# Patient Record
Sex: MALE | Race: WHITE | NOT HISPANIC OR LATINO | Employment: OTHER | URBAN - METROPOLITAN AREA
[De-identification: names, ages, dates, MRNs, and addresses within clinical notes are randomized per-mention and may not be internally consistent; named-entity substitution may affect disease eponyms.]

---

## 2017-01-09 RX ORDER — AMLODIPINE BESYLATE 10 MG/1
TABLET ORAL
Qty: 90 TABLET | Refills: 1 | Status: SHIPPED | OUTPATIENT
Start: 2017-01-09 | End: 2017-07-12 | Stop reason: SDUPTHER

## 2017-02-15 RX ORDER — ATORVASTATIN CALCIUM 40 MG/1
TABLET, FILM COATED ORAL
Qty: 90 TABLET | Refills: 3 | Status: SHIPPED | OUTPATIENT
Start: 2017-02-15 | End: 2018-03-16 | Stop reason: SDUPTHER

## 2017-02-15 RX ORDER — POTASSIUM CHLORIDE 750 MG/1
CAPSULE, EXTENDED RELEASE ORAL
Qty: 120 CAPSULE | Refills: 5 | Status: SHIPPED | OUTPATIENT
Start: 2017-02-15 | End: 2017-09-14 | Stop reason: SDUPTHER

## 2017-03-03 DIAGNOSIS — E78.5 HYPERLIPIDEMIA, UNSPECIFIED HYPERLIPIDEMIA TYPE: Primary | ICD-10-CM

## 2017-03-03 DIAGNOSIS — E11.9 TYPE 2 DIABETES MELLITUS WITHOUT COMPLICATION, UNSPECIFIED LONG TERM INSULIN USE STATUS: ICD-10-CM

## 2017-03-06 LAB
ALBUMIN SERPL-MCNC: 4.5 G/DL (ref 3.5–5.2)
ALBUMIN/GLOB SERPL: 2 G/DL
ALP SERPL-CCNC: 79 U/L (ref 39–117)
ALT SERPL-CCNC: 43 U/L (ref 1–41)
AST SERPL-CCNC: 21 U/L (ref 1–40)
BILIRUB SERPL-MCNC: 0.6 MG/DL (ref 0.1–1.2)
BUN SERPL-MCNC: 21 MG/DL (ref 8–23)
BUN/CREAT SERPL: 26.3 (ref 7–25)
CALCIUM SERPL-MCNC: 9.4 MG/DL (ref 8.6–10.5)
CHLORIDE SERPL-SCNC: 101 MMOL/L (ref 98–107)
CHOLEST SERPL-MCNC: 164 MG/DL (ref 0–200)
CO2 SERPL-SCNC: 28.5 MMOL/L (ref 22–29)
CREAT SERPL-MCNC: 0.8 MG/DL (ref 0.76–1.27)
GLOBULIN SER CALC-MCNC: 2.3 GM/DL
GLUCOSE SERPL-MCNC: 158 MG/DL (ref 65–99)
HBA1C MFR BLD: 6.5 % (ref 4.8–5.6)
HDLC SERPL-MCNC: 42 MG/DL (ref 40–60)
LDLC SERPL CALC-MCNC: 98 MG/DL (ref 0–100)
LDLC/HDLC SERPL: 2.34 {RATIO}
POTASSIUM SERPL-SCNC: 3.8 MMOL/L (ref 3.5–5.2)
PROT SERPL-MCNC: 6.8 G/DL (ref 6–8.5)
SODIUM SERPL-SCNC: 145 MMOL/L (ref 136–145)
TRIGL SERPL-MCNC: 119 MG/DL (ref 0–150)
VLDLC SERPL CALC-MCNC: 23.8 MG/DL (ref 5–40)

## 2017-03-20 ENCOUNTER — OFFICE VISIT (OUTPATIENT)
Dept: FAMILY MEDICINE CLINIC | Facility: CLINIC | Age: 62
End: 2017-03-20

## 2017-03-20 VITALS
HEART RATE: 65 BPM | RESPIRATION RATE: 16 BRPM | HEIGHT: 68 IN | DIASTOLIC BLOOD PRESSURE: 70 MMHG | BODY MASS INDEX: 35.46 KG/M2 | OXYGEN SATURATION: 98 % | TEMPERATURE: 97.7 F | SYSTOLIC BLOOD PRESSURE: 132 MMHG | WEIGHT: 234 LBS

## 2017-03-20 DIAGNOSIS — E11.9 TYPE 2 DIABETES MELLITUS WITHOUT COMPLICATION, WITHOUT LONG-TERM CURRENT USE OF INSULIN (HCC): Primary | ICD-10-CM

## 2017-03-20 DIAGNOSIS — E78.00 HYPERCHOLESTEROLEMIA: ICD-10-CM

## 2017-03-20 DIAGNOSIS — I10 ESSENTIAL HYPERTENSION: ICD-10-CM

## 2017-03-20 PROCEDURE — 99213 OFFICE O/P EST LOW 20 MIN: CPT

## 2017-03-20 NOTE — PROGRESS NOTES
Ankush Ram is a 61 y.o. male. Patient is here today for   Chief Complaint   Patient presents with   • Diabetes   • Hyperlipidemia          Vitals:    03/20/17 0752   BP: 132/70   Pulse: 65   Resp: 16   Temp: 97.7 °F (36.5 °C)   SpO2: 98%     The following portions of the patient's history were reviewed and updated as appropriate: allergies, current medications, past family history, past medical history, past social history, past surgical history and problem list.    Past Medical History   Diagnosis Date   • Arthritis    • Diabetes mellitus    • Hyperlipidemia    • Hypertension    • Second degree AV block    • Sinus node dysfunction       No Known Allergies   Social History     Social History   • Marital status:      Spouse name: N/A   • Number of children: N/A   • Years of education: N/A     Occupational History   • Not on file.     Social History Main Topics   • Smoking status: Former Smoker     Quit date: 10/22/2011   • Smokeless tobacco: Not on file   • Alcohol use Yes      Comment: SOCIAL   • Drug use: Not on file   • Sexual activity: Not on file     Other Topics Concern   • Not on file     Social History Narrative   • No narrative on file        Current Outpatient Prescriptions:   •  amLODIPine (NORVASC) 10 MG tablet, TAKE 1 TABLET BY MOUTH EVERY DAY, Disp: 90 tablet, Rfl: 1  •  aspirin 325 MG tablet, Take  by mouth daily., Disp: , Rfl:   •  atorvastatin (LIPITOR) 40 MG tablet, TAKE 1 TABLET BY MOUTH EVERY DAY, Disp: 90 tablet, Rfl: 3  •  carvedilol (COREG) 25 MG tablet, TAKE 2 TABLETS TWICE A DAY, Disp: 360 tablet, Rfl: 3  •  hydrALAZINE (APRESOLINE) 25 MG tablet, Take  by mouth 2 (two) times a day., Disp: , Rfl:   •  metFORMIN XR (GLUCOPHATE-XR) 500 MG 24 hr tablet, Take  by mouth., Disp: , Rfl:   •  potassium chloride (MICRO-K) 10 MEQ CR capsule, TAKE 2 CAPSULES BY MOUTH TWICE A DAY, Disp: 120 capsule, Rfl: 5  •  valsartan-hydrochlorothiazide (DIOVAN-HCT) 320-25 MG per tablet, Take  by  mouth daily., Disp: , Rfl:      Objective     History of Present Illness   The patient is here today for follow-up on type 2 diabetes mellitus, essential hypertension, and hyperlipidemia.    Review of Systems   Constitutional:        Mild fatigue   HENT: Negative.    Respiratory: Negative for cough, shortness of breath and wheezing.    Cardiovascular: Negative for chest pain, palpitations and leg swelling.   Gastrointestinal: Negative for abdominal pain and constipation.   Genitourinary: Negative.    Musculoskeletal:        Patient has osteoarthritic aches and pains in multiple sites including the upper back and neck area   Hematological: Negative.    Psychiatric/Behavioral: Negative.        Physical Exam   Constitutional: He is oriented to person, place, and time. He appears well-developed and well-nourished.   Moderately overweight   Neck:   Carotid pulses normal   Cardiovascular: Normal rate, regular rhythm and normal heart sounds.    Pulmonary/Chest: Effort normal and breath sounds normal. No respiratory distress. He has no wheezes. He has no rales.   Abdominal: Soft. Bowel sounds are normal.   Musculoskeletal:   Osteoarthritic changes in multiple joints   Neurological: He is alert and oriented to person, place, and time.   Skin: Skin is warm and dry.   Psychiatric: He has a normal mood and affect.   Nursing note and vitals reviewed.      ASSESSMENT  #1 type 2 diabetes mellitus          #2 essential hypertension        #3 hypercholesterolemia    DISCUSSION/SUMMARY   The patient's vital signs are normal today.  CMP showed an elevated fasting blood sugar 158 and slightly elevated ALT of 43.  The patient's hemoglobin A1c was 6.5%, up from 6.4% on last visit.  The patient states that his diet is fair and he was encouraged to stay on a low sugar and low starch diet.  Total cholesterol is 164, triglycerides 119, HDL cholesterol 42, and LDL cholesterol is 98.  Other than his osteoarthritic aches and pains the patient  is doing well.  I will see him again in 6 months.  He will continue his present medications.    PLAN  Recheck 6 months with fasting CMP, lipid panel, HbA1c and PSA.  No Follow-up on file.

## 2017-03-22 RX ORDER — CARVEDILOL 25 MG/1
TABLET ORAL
Qty: 360 TABLET | Refills: 3 | Status: SHIPPED | OUTPATIENT
Start: 2017-03-22 | End: 2018-03-23 | Stop reason: SDUPTHER

## 2017-04-22 VITALS
SYSTOLIC BLOOD PRESSURE: 152 MMHG | RESPIRATION RATE: 20 BRPM | OXYGEN SATURATION: 96 % | DIASTOLIC BLOOD PRESSURE: 96 MMHG | BODY MASS INDEX: 34.86 KG/M2 | TEMPERATURE: 97.6 F | WEIGHT: 230 LBS | HEIGHT: 68 IN | HEART RATE: 64 BPM

## 2017-04-22 PROCEDURE — 99283 EMERGENCY DEPT VISIT LOW MDM: CPT

## 2017-04-23 ENCOUNTER — APPOINTMENT (OUTPATIENT)
Dept: CT IMAGING | Facility: HOSPITAL | Age: 62
End: 2017-04-23

## 2017-04-23 ENCOUNTER — HOSPITAL ENCOUNTER (EMERGENCY)
Facility: HOSPITAL | Age: 62
Discharge: HOME | End: 2017-04-23
Attending: EMERGENCY MEDICINE | Admitting: EMERGENCY MEDICINE

## 2017-04-23 DIAGNOSIS — S76.211A GROIN STRAIN, RIGHT, INITIAL ENCOUNTER: Primary | ICD-10-CM

## 2017-04-23 LAB
ALBUMIN SERPL-MCNC: 3.7 G/DL (ref 3.5–5.2)
ALBUMIN/GLOB SERPL: 1.2 G/DL
ALP SERPL-CCNC: 59 U/L (ref 39–117)
ALT SERPL W P-5'-P-CCNC: 18 U/L (ref 1–41)
ANION GAP SERPL CALCULATED.3IONS-SCNC: 15.3 MMOL/L
AST SERPL-CCNC: 9 U/L (ref 1–40)
BACTERIA UR QL AUTO: ABNORMAL /HPF
BASOPHILS # BLD AUTO: 0.01 10*3/MM3 (ref 0–0.2)
BASOPHILS NFR BLD AUTO: 0.1 % (ref 0–1.5)
BILIRUB SERPL-MCNC: 0.4 MG/DL (ref 0.1–1.2)
BILIRUB UR QL STRIP: NEGATIVE
BUN BLD-MCNC: 21 MG/DL (ref 8–23)
BUN/CREAT SERPL: 29.6 (ref 7–25)
CALCIUM SPEC-SCNC: 9 MG/DL (ref 8.6–10.5)
CHLORIDE SERPL-SCNC: 101 MMOL/L (ref 98–107)
CLARITY UR: CLEAR
CO2 SERPL-SCNC: 25.7 MMOL/L (ref 22–29)
COLOR UR: YELLOW
CREAT BLD-MCNC: 0.71 MG/DL (ref 0.76–1.27)
DEPRECATED RDW RBC AUTO: 46.1 FL (ref 37–54)
EOSINOPHIL # BLD AUTO: 0.02 10*3/MM3 (ref 0–0.7)
EOSINOPHIL NFR BLD AUTO: 0.2 % (ref 0.3–6.2)
ERYTHROCYTE [DISTWIDTH] IN BLOOD BY AUTOMATED COUNT: 14.4 % (ref 11.5–14.5)
GFR SERPL CREATININE-BSD FRML MDRD: 112 ML/MIN/1.73
GLOBULIN UR ELPH-MCNC: 3 GM/DL
GLUCOSE BLD-MCNC: 160 MG/DL (ref 65–99)
GLUCOSE UR STRIP-MCNC: NEGATIVE MG/DL
HCT VFR BLD AUTO: 41.2 % (ref 40.4–52.2)
HGB BLD-MCNC: 14 G/DL (ref 13.7–17.6)
HGB UR QL STRIP.AUTO: NEGATIVE
HYALINE CASTS UR QL AUTO: ABNORMAL /LPF
IMM GRANULOCYTES # BLD: 0.02 10*3/MM3 (ref 0–0.03)
IMM GRANULOCYTES NFR BLD: 0.2 % (ref 0–0.5)
KETONES UR QL STRIP: NEGATIVE
LEUKOCYTE ESTERASE UR QL STRIP.AUTO: NEGATIVE
LIPASE SERPL-CCNC: 19 U/L (ref 13–60)
LYMPHOCYTES # BLD AUTO: 1.82 10*3/MM3 (ref 0.9–4.8)
LYMPHOCYTES NFR BLD AUTO: 16 % (ref 19.6–45.3)
MCH RBC QN AUTO: 29.8 PG (ref 27–32.7)
MCHC RBC AUTO-ENTMCNC: 34 G/DL (ref 32.6–36.4)
MCV RBC AUTO: 87.7 FL (ref 79.8–96.2)
MONOCYTES # BLD AUTO: 0.87 10*3/MM3 (ref 0.2–1.2)
MONOCYTES NFR BLD AUTO: 7.6 % (ref 5–12)
NEUTROPHILS # BLD AUTO: 8.66 10*3/MM3 (ref 1.9–8.1)
NEUTROPHILS NFR BLD AUTO: 75.9 % (ref 42.7–76)
NITRITE UR QL STRIP: NEGATIVE
PH UR STRIP.AUTO: 6 [PH] (ref 5–8)
PLATELET # BLD AUTO: 268 10*3/MM3 (ref 140–500)
PMV BLD AUTO: 10.7 FL (ref 6–12)
POTASSIUM BLD-SCNC: 3.3 MMOL/L (ref 3.5–5.2)
PROT SERPL-MCNC: 6.7 G/DL (ref 6–8.5)
PROT UR QL STRIP: ABNORMAL
RBC # BLD AUTO: 4.7 10*6/MM3 (ref 4.6–6)
RBC # UR: ABNORMAL /HPF
REF LAB TEST METHOD: ABNORMAL
SODIUM BLD-SCNC: 142 MMOL/L (ref 136–145)
SP GR UR STRIP: 1.02 (ref 1–1.03)
SQUAMOUS #/AREA URNS HPF: ABNORMAL /HPF
TROPONIN T SERPL-MCNC: <0.01 NG/ML (ref 0–0.03)
UROBILINOGEN UR QL STRIP: ABNORMAL
WBC NRBC COR # BLD: 11.4 10*3/MM3 (ref 4.5–10.7)
WBC UR QL AUTO: ABNORMAL /HPF

## 2017-04-23 PROCEDURE — 96374 THER/PROPH/DIAG INJ IV PUSH: CPT

## 2017-04-23 PROCEDURE — 96375 TX/PRO/DX INJ NEW DRUG ADDON: CPT

## 2017-04-23 PROCEDURE — 74176 CT ABD & PELVIS W/O CONTRAST: CPT

## 2017-04-23 PROCEDURE — 83690 ASSAY OF LIPASE: CPT | Performed by: PHYSICIAN ASSISTANT

## 2017-04-23 PROCEDURE — 80053 COMPREHEN METABOLIC PANEL: CPT | Performed by: PHYSICIAN ASSISTANT

## 2017-04-23 PROCEDURE — 36415 COLL VENOUS BLD VENIPUNCTURE: CPT

## 2017-04-23 PROCEDURE — 84484 ASSAY OF TROPONIN QUANT: CPT | Performed by: PHYSICIAN ASSISTANT

## 2017-04-23 PROCEDURE — 81001 URINALYSIS AUTO W/SCOPE: CPT | Performed by: PHYSICIAN ASSISTANT

## 2017-04-23 PROCEDURE — 25010000002 ONDANSETRON PER 1 MG: Performed by: PHYSICIAN ASSISTANT

## 2017-04-23 PROCEDURE — 25010000002 MORPHINE PER 10 MG: Performed by: EMERGENCY MEDICINE

## 2017-04-23 PROCEDURE — 85025 COMPLETE CBC W/AUTO DIFF WBC: CPT | Performed by: PHYSICIAN ASSISTANT

## 2017-04-23 RX ORDER — HYDROCODONE BITARTRATE AND ACETAMINOPHEN 7.5; 325 MG/1; MG/1
1 TABLET ORAL EVERY 6 HOURS PRN
Qty: 16 TABLET | Refills: 0 | Status: SHIPPED | OUTPATIENT
Start: 2017-04-23 | End: 2017-11-13

## 2017-04-23 RX ORDER — SODIUM CHLORIDE 0.9 % (FLUSH) 0.9 %
10 SYRINGE (ML) INJECTION AS NEEDED
Status: DISCONTINUED | OUTPATIENT
Start: 2017-04-23 | End: 2017-04-23 | Stop reason: HOSPADM

## 2017-04-23 RX ORDER — METHOCARBAMOL 500 MG/1
1000 TABLET, FILM COATED ORAL 4 TIMES DAILY
Qty: 40 TABLET | Refills: 0 | Status: SHIPPED | OUTPATIENT
Start: 2017-04-23 | End: 2017-11-13

## 2017-04-23 RX ORDER — ONDANSETRON 2 MG/ML
4 INJECTION INTRAMUSCULAR; INTRAVENOUS ONCE
Status: COMPLETED | OUTPATIENT
Start: 2017-04-23 | End: 2017-04-23

## 2017-04-23 RX ADMIN — MORPHINE SULFATE 4 MG: 4 INJECTION, SOLUTION INTRAMUSCULAR; INTRAVENOUS at 04:12

## 2017-04-23 RX ADMIN — ONDANSETRON 4 MG: 2 INJECTION INTRAMUSCULAR; INTRAVENOUS at 04:12

## 2017-04-23 NOTE — ED NOTES
Patient reports lower back pain since Wednesday evening. Patient states the pain felt like cramps that wrapped around to his groin. Patient states the muscles in his groin were bothering him so much that he started using crutches to walk because he was unable to lift feet off the ground. Patient states on the way here he began belching and feeling nauseated.        Barbara Robin RN  04/22/17 6096

## 2017-04-23 NOTE — DISCHARGE INSTRUCTIONS
Home, rest, medicine as prescribed, follow up with PCP for recheck. Return to care with further concerns.

## 2017-04-23 NOTE — ED PROVIDER NOTES
EMERGENCY DEPARTMENT ENCOUNTER    CHIEF COMPLAINT  Chief Complaint: back pain  History given by: patient   History limited by: n/a  Room Number: 01/01  PMD: Steffen Flores MD      HPI:  Pt is a 62 y.o. male who presents complaining of lower back pain that began 3 days ago after spending an extended amount of time on a ladder. Pt states that the pain initially felt like muscle spasms, but currently is a burning pain. Pt states that the pain seemed to radiate from his tailbone to his right groin. Pt reports difficulty ambulating secondary to the pain, and states that he feels unable to lift his feet. Pt also complains of belching and nausea that began tonight. He denies CP, SOA, V/D, incontinence, or any other sx.     Duration:  3 days   Onset: gradual  Timing: constant   Location: lower back  Radiation: right groin  Quality: burning pain  Intensity/Severity: moderate   Progression: worsening  Associated Symptoms: nausea, belching  Aggravating Factors: movement  Alleviating Factors: none  Previous Episodes: none  Treatment before arrival: none    PAST MEDICAL HISTORY  Active Ambulatory Problems     Diagnosis Date Noted   • Hypercholesterolemia 02/01/2016   • Hypertension 02/01/2016   • Type 2 diabetes mellitus 02/01/2016     Resolved Ambulatory Problems     Diagnosis Date Noted   • No Resolved Ambulatory Problems     Past Medical History:   Diagnosis Date   • Arthritis    • Diabetes mellitus    • Hyperlipidemia    • Hypertension    • Second degree AV block    • Sinus node dysfunction        PAST SURGICAL HISTORY  Past Surgical History:   Procedure Laterality Date   • KNEE SURGERY     • PACEMAKER IMPLANTATION         FAMILY HISTORY  Family History   Problem Relation Age of Onset   • Diabetes Mother    • Stroke Father    • Heart disease Father    • Stroke Sister    • Heart disease Brother        SOCIAL HISTORY  Social History     Social History   • Marital status:      Spouse name: N/A   • Number of  children: N/A   • Years of education: N/A     Occupational History   • Not on file.     Social History Main Topics   • Smoking status: Former Smoker     Quit date: 10/22/2011   • Smokeless tobacco: Not on file   • Alcohol use Yes      Comment: SOCIAL   • Drug use: Not on file   • Sexual activity: Not on file     Other Topics Concern   • Not on file     Social History Narrative       ALLERGIES  Review of patient's allergies indicates no known allergies.    REVIEW OF SYSTEMS  Review of Systems   Constitutional: Negative for chills and fever.   HENT: Negative for congestion and sore throat.    Eyes: Negative.    Respiratory: Negative for cough and shortness of breath.    Cardiovascular: Negative for chest pain and leg swelling.   Gastrointestinal: Positive for nausea. Negative for abdominal pain, diarrhea and vomiting.   Genitourinary: Negative for difficulty urinating and dysuria.   Musculoskeletal: Positive for back pain. Negative for neck pain.   Skin: Negative for rash and wound.   Allergic/Immunologic: Negative.    Neurological: Negative for dizziness, weakness, numbness and headaches.   Psychiatric/Behavioral: Negative.    All other systems reviewed and are negative.      PHYSICAL EXAM  ED Triage Vitals   Temp Heart Rate Resp BP SpO2   04/22/17 2209 04/22/17 2209 04/22/17 2218 04/22/17 2218 04/22/17 2209   97.6 °F (36.4 °C) 64 20 152/96 96 %      Temp src Heart Rate Source Patient Position BP Location FiO2 (%)   -- 04/22/17 2209 04/22/17 2218 04/22/17 2218 --    Monitor Sitting Right arm        Physical Exam   Constitutional: He is oriented to person, place, and time and well-developed, well-nourished, and in no distress.   HENT:   Head: Normocephalic and atraumatic.   Eyes: EOM are normal. Pupils are equal, round, and reactive to light.   Neck: Normal range of motion. Neck supple.   Cardiovascular: Normal rate, regular rhythm, normal heart sounds and intact distal pulses.    Pulmonary/Chest: Effort normal and  breath sounds normal. No respiratory distress.   Abdominal: Soft. There is no tenderness. There is no rebound and no guarding.   Musculoskeletal: Normal range of motion. He exhibits edema (1+ BLE).   Tenderness to the r groin. Pain with r hip flexion  Normal distal pulses  Mild Left lower back tenderness, no midline tenderness.    Neurological: He is alert and oriented to person, place, and time. He has normal sensation and normal strength.   Skin: Skin is warm and dry. No rash noted.   Psychiatric: Mood and affect normal.   Nursing note and vitals reviewed.      LAB RESULTS  Lab Results (last 24 hours)     Procedure Component Value Units Date/Time    CBC & Differential [[id number]] Collected:  04/23/17 0354    Specimen:  Blood Updated:  04/23/17 0413    Narrative:       The following orders were created for panel order CBC & Differential.  Procedure                               Abnormality         Status                     ---------                               -----------         ------                     CBC Auto Differential[[id number]]         Abnormal            Final result                 Please view results for these tests on the individual orders.    Comprehensive Metabolic Panel [[id number]]  (Abnormal) Collected:  04/23/17 0354    Specimen:  Blood from Arm, Right Updated:  04/23/17 0426     Glucose 160 (H) mg/dL      BUN 21 mg/dL      Creatinine 0.71 (L) mg/dL      Sodium 142 mmol/L      Potassium 3.3 (L) mmol/L      Chloride 101 mmol/L      CO2 25.7 mmol/L      Calcium 9.0 mg/dL      Total Protein 6.7 g/dL      Albumin 3.70 g/dL      ALT (SGPT) 18 U/L      AST (SGOT) 9 U/L      Alkaline Phosphatase 59 U/L      Total Bilirubin 0.4 mg/dL      eGFR Non African Amer 112 mL/min/1.73      Globulin 3.0 gm/dL      A/G Ratio 1.2 g/dL      BUN/Creatinine Ratio 29.6 (H)     Anion Gap 15.3 mmol/L     Lipase [[id number]]  (Normal) Collected:  04/23/17 0354    Specimen:  Blood from Arm, Right Updated:  04/23/17 0426      Lipase 19 U/L     Troponin [[id number]]  (Normal) Collected:  04/23/17 0354    Specimen:  Blood from Arm, Right Updated:  04/23/17 0426     Troponin T <0.010 ng/mL     Narrative:       Troponin T Reference Ranges:  Less than 0.03 ng/mL:    Negative for AMI  0.03 to 0.09 ng/mL:      Indeterminant for AMI  Greater than 0.09 ng/mL: Positive for AMI    CBC Auto Differential [[id number]]  (Abnormal) Collected:  04/23/17 0354    Specimen:  Blood from Arm, Right Updated:  04/23/17 0413     WBC 11.40 (H) 10*3/mm3      RBC 4.70 10*6/mm3      Hemoglobin 14.0 g/dL      Hematocrit 41.2 %      MCV 87.7 fL      MCH 29.8 pg      MCHC 34.0 g/dL      RDW 14.4 %      RDW-SD 46.1 fl      MPV 10.7 fL      Platelets 268 10*3/mm3      Neutrophil % 75.9 %      Lymphocyte % 16.0 (L) %      Monocyte % 7.6 %      Eosinophil % 0.2 (L) %      Basophil % 0.1 %      Immature Grans % 0.2 %      Neutrophils, Absolute 8.66 (H) 10*3/mm3      Lymphocytes, Absolute 1.82 10*3/mm3      Monocytes, Absolute 0.87 10*3/mm3      Eosinophils, Absolute 0.02 10*3/mm3      Basophils, Absolute 0.01 10*3/mm3      Immature Grans, Absolute 0.02 10*3/mm3     Urinalysis With / Culture If Indicated [[id number]]  (Abnormal) Collected:  04/23/17 0438    Specimen:  Urine from Urine, Clean Catch Updated:  04/23/17 0454     Color, UA Yellow     Appearance, UA Clear     pH, UA 6.0     Specific Gravity, UA 1.023     Glucose, UA Negative     Ketones, UA Negative     Bilirubin, UA Negative     Blood, UA Negative     Protein,  mg/dL (2+) (A)     Leuk Esterase, UA Negative     Nitrite, UA Negative     Urobilinogen, UA 0.2 E.U./dL    Urinalysis, Microscopic Only [[id number]]  (Abnormal) Collected:  04/23/17 0438    Specimen:  Urine from Urine, Clean Catch Updated:  04/23/17 0454     RBC, UA 3-5 (A) /HPF      WBC, UA 0-2 /HPF      Bacteria, UA None Seen /HPF      Squamous Epithelial Cells, UA 0-2 /HPF      Hyaline Casts, UA 0-2 /LPF      Methodology Automated Microscopy          I  ordered the above labs and reviewed the results    RADIOLOGY  CT Abdomen Pelvis Without Contrast   Final Result      CT abd/pelvis shows   FINDINGS:  1. Bilateral nonobstructing kidney stones.  2. Bilateral benign renal cysts measuring up to 10.2 cm on the left and  5.3 cm on the right.  3. The appendix is normal and there is no obstruction, free air nor  dilatation of bowel.  4. The liver, gallbladder, biliary system, spleen, pancreas and adrenal  glands are normal.  5. Degenerative changes lumbar spine with relative canal stenosis L3-L4.    I ordered the above noted radiological studies. Interpreted by radiologist. Reviewed by me in PACS.       PROCEDURES  Procedures      PROGRESS AND CONSULTS  ED Course     03:26  Labs and CT abd/pelvis ordered for further evaluation. Morphine and Zofran ordered to treat pain and nausea.     04:36  BP- 152/96 HR- 64 Temp- 97.6 °F (36.4 °C) O2 sat- 96%  Rechecked the patient who is in NAD and is resting comfortably. Pt states that he feels better after the medication. Awaiting the urine results. Pt understands and agrees with the plan, all questions answered.    04:59  Discussed pt with Dr. Frazier who agrees with plan of care.     05:00  BP- 152/96 HR- 64 Temp- 97.6 °F (36.4 °C) O2 sat- 96%  Rechecked the patient who is in NAD and is resting comfortably. Advised pt that the workup in the ED shows NAD. Sx sx are musculoskeletal in nature. Pt will be discharged. Pt understands and agrees with the plan, all questions answered.    MEDICAL DECISION MAKING  Results were reviewed/discussed with the patient and they were also made aware of online access. Pt also made aware that some labs, such as cultures, will not be resulted during ER visit and follow up with PMD is necessary.     MDM  Number of Diagnoses or Management Options     Amount and/or Complexity of Data Reviewed  Clinical lab tests: reviewed and ordered (Urinalysis- RBC 3-5, WBC 0-2, Bacteria none seen  )  Tests in the  radiology section of CPT®: ordered and reviewed (CT abd/pelvis shows   1. Bilateral nonobstructing kidney stones.  2. Bilateral benign renal cysts measuring up to 10.2 cm on the left and  5.3 cm on the right.  3. The appendix is normal and there is no obstruction, free air nor  dilatation of bowel.  4. The liver, gallbladder, biliary system, spleen, pancreas and adrenal  glands are normal.  5. Degenerative changes lumbar spine with relative canal stenosis L3-L4.)  Decide to obtain previous medical records or to obtain history from someone other than the patient: yes    Patient Progress  Patient progress: stable         DIAGNOSIS  Final diagnoses:   Groin strain, right, initial encounter       DISPOSITION  DISCHARGE    Patient discharged in stable condition.    Reviewed implications of results, diagnosis, meds, responsibility to follow up, warning signs and symptoms of possible worsening, potential complications and reasons to return to ER.    Patient/Family voiced understanding of above instructions.    Discussed plan for discharge, as there is no emergent indication for admission.  Pt/family is agreeable and understands need for follow up and repeat testing.  Pt is aware that discharge does not mean that nothing is wrong but it indicates no emergency is present that requires admission and they must continue care with follow-up as given below or physician of their choice.     FOLLOW-UP  Steffen Flores MD  [id number] [address]  [phone]    Schedule an appointment as soon as possible for a visit in 3 days           Medication List      New Prescriptions          HYDROcodone-acetaminophen 7.5-325 MG per tablet   Commonly known as:  NORCO   Take 1 tablet by mouth Every 6 (Six) Hours As Needed for Moderate Pain   (4-6).       methocarbamol 500 MG tablet   Commonly known as:  ROBAXIN   Take 2 tablets by mouth 4 (Four) Times a Day.         Stop          metFORMIN  MG 24 hr tablet   Commonly  known as:  GLUCOPHAGE-XR           Latest Documented Vital Signs:  As of 5:11 AM  BP- 152/96 HR- 64 Temp- 97.6 °F (36.4 °C) O2 sat- 96%    --  Documentation assistance provided by bret Vega for Buster Webber PA-C.  Information recorded by the scribtimmy was done at my direction and has been verified and validated by me.          Lali Vega  04/23/17 0507       RENEE Msihra  04/23/17 0511

## 2017-04-25 ENCOUNTER — TELEPHONE (OUTPATIENT)
Dept: SOCIAL WORK | Facility: HOSPITAL | Age: 62
End: 2017-04-25

## 2017-04-25 NOTE — TELEPHONE ENCOUNTER
ED follow-up phone call. States he is taking prescribed meds and feeling better. Reminded to f/u w/ PCP. No questions/concerns

## 2017-07-12 RX ORDER — AMLODIPINE BESYLATE 10 MG/1
TABLET ORAL
Qty: 90 TABLET | Refills: 1 | Status: SHIPPED | OUTPATIENT
Start: 2017-07-12 | End: 2018-01-07 | Stop reason: SDUPTHER

## 2017-09-14 RX ORDER — POTASSIUM CHLORIDE 750 MG/1
CAPSULE, EXTENDED RELEASE ORAL
Qty: 120 CAPSULE | Refills: 5 | Status: SHIPPED | OUTPATIENT
Start: 2017-09-14 | End: 2018-03-12 | Stop reason: SDUPTHER

## 2017-10-31 DIAGNOSIS — E11.9 TYPE 2 DIABETES MELLITUS WITHOUT COMPLICATION, UNSPECIFIED LONG TERM INSULIN USE STATUS: ICD-10-CM

## 2017-10-31 DIAGNOSIS — Z12.5 SPECIAL SCREENING FOR MALIGNANT NEOPLASM OF PROSTATE: ICD-10-CM

## 2017-10-31 DIAGNOSIS — E78.00 HYPERCHOLESTEROLEMIA: ICD-10-CM

## 2017-10-31 DIAGNOSIS — I10 ESSENTIAL HYPERTENSION: Primary | ICD-10-CM

## 2017-11-01 LAB
ALBUMIN SERPL-MCNC: 4.3 G/DL (ref 3.5–5.2)
ALBUMIN/GLOB SERPL: 1.8 G/DL
ALP SERPL-CCNC: 78 U/L (ref 39–117)
ALT SERPL-CCNC: 27 U/L (ref 1–41)
AST SERPL-CCNC: 13 U/L (ref 1–40)
BILIRUB SERPL-MCNC: 0.6 MG/DL (ref 0.1–1.2)
BUN SERPL-MCNC: 20 MG/DL (ref 8–23)
BUN/CREAT SERPL: 25.6 (ref 7–25)
CALCIUM SERPL-MCNC: 9.4 MG/DL (ref 8.6–10.5)
CHLORIDE SERPL-SCNC: 100 MMOL/L (ref 98–107)
CHOLEST SERPL-MCNC: 156 MG/DL (ref 0–200)
CO2 SERPL-SCNC: 27.7 MMOL/L (ref 22–29)
CREAT SERPL-MCNC: 0.78 MG/DL (ref 0.76–1.27)
GFR SERPLBLD CREATININE-BSD FMLA CKD-EPI: 101 ML/MIN/1.73
GFR SERPLBLD CREATININE-BSD FMLA CKD-EPI: 122 ML/MIN/1.73
GLOBULIN SER CALC-MCNC: 2.4 GM/DL
GLUCOSE SERPL-MCNC: 143 MG/DL (ref 65–99)
HBA1C MFR BLD: 6.49 % (ref 4.8–5.6)
HDLC SERPL-MCNC: 46 MG/DL (ref 40–60)
LDLC SERPL CALC-MCNC: 91 MG/DL (ref 0–100)
LDLC/HDLC SERPL: 1.98 {RATIO}
POTASSIUM SERPL-SCNC: 3.8 MMOL/L (ref 3.5–5.2)
PROT SERPL-MCNC: 6.7 G/DL (ref 6–8.5)
PSA SERPL-MCNC: 2.33 NG/ML (ref 0–4)
SODIUM SERPL-SCNC: 144 MMOL/L (ref 136–145)
TRIGL SERPL-MCNC: 95 MG/DL (ref 0–150)
VLDLC SERPL CALC-MCNC: 19 MG/DL (ref 5–40)

## 2017-11-13 ENCOUNTER — OFFICE VISIT (OUTPATIENT)
Dept: FAMILY MEDICINE CLINIC | Facility: CLINIC | Age: 62
End: 2017-11-13

## 2017-11-13 VITALS
SYSTOLIC BLOOD PRESSURE: 158 MMHG | DIASTOLIC BLOOD PRESSURE: 80 MMHG | OXYGEN SATURATION: 97 % | RESPIRATION RATE: 18 BRPM | TEMPERATURE: 97.9 F | HEIGHT: 68 IN | BODY MASS INDEX: 37.44 KG/M2 | WEIGHT: 247 LBS | HEART RATE: 68 BPM

## 2017-11-13 DIAGNOSIS — E78.00 HYPERCHOLESTEROLEMIA: ICD-10-CM

## 2017-11-13 DIAGNOSIS — I10 ESSENTIAL HYPERTENSION: ICD-10-CM

## 2017-11-13 DIAGNOSIS — E11.9 TYPE 2 DIABETES MELLITUS WITHOUT COMPLICATION, WITHOUT LONG-TERM CURRENT USE OF INSULIN (HCC): Primary | ICD-10-CM

## 2017-11-13 PROCEDURE — 99213 OFFICE O/P EST LOW 20 MIN: CPT

## 2017-11-13 NOTE — PROGRESS NOTES
Ankush Ram is a 62 y.o. male. Patient is here today for   Chief Complaint   Patient presents with   • Diabetes   • Hyperlipidemia   • Hypertension          Vitals:    11/13/17 0954   BP: 158/80   Pulse: 68   Resp: 18   Temp: 97.9 °F (36.6 °C)   SpO2: 97%     The following portions of the patient's history were reviewed and updated as appropriate: allergies, current medications, past family history, past medical history, past social history, past surgical history and problem list.    Past Medical History:   Diagnosis Date   • Arthritis    • Diabetes mellitus    • Hyperlipidemia    • Hypertension    • Second degree AV block    • Sinus node dysfunction       No Known Allergies   Social History     Social History   • Marital status:      Spouse name: N/A   • Number of children: N/A   • Years of education: N/A     Occupational History   • Not on file.     Social History Main Topics   • Smoking status: Former Smoker     Quit date: 10/22/2011   • Smokeless tobacco: Not on file   • Alcohol use Yes      Comment: SOCIAL   • Drug use: Not on file   • Sexual activity: Not on file     Other Topics Concern   • Not on file     Social History Narrative        Current Outpatient Prescriptions:   •  amLODIPine (NORVASC) 10 MG tablet, TAKE 1 TABLET BY MOUTH EVERY DAY, Disp: 90 tablet, Rfl: 1  •  aspirin 325 MG tablet, Take  by mouth daily., Disp: , Rfl:   •  atorvastatin (LIPITOR) 40 MG tablet, TAKE 1 TABLET BY MOUTH EVERY DAY, Disp: 90 tablet, Rfl: 3  •  carvedilol (COREG) 25 MG tablet, TAKE 2 TABLETS BY MOUTH TWICE A DAY, Disp: 360 tablet, Rfl: 3  •  hydrALAZINE (APRESOLINE) 25 MG tablet, Take  by mouth 2 (two) times a day., Disp: , Rfl:   •  metFORMIN XR (GLUCOPHATE-XR) 500 MG 24 hr tablet, Take  by mouth., Disp: , Rfl:   •  potassium chloride (MICRO-K) 10 MEQ CR capsule, TAKE 2 CAPSULES BY MOUTH TWICE A DAY, Disp: 120 capsule, Rfl: 5  •  valsartan-hydrochlorothiazide (DIOVAN-HCT) 320-25 MG per tablet, Take   by mouth daily., Disp: , Rfl:      Objective     History of Present Illness    The patient is here today for follow-up on type 2 diabetes mellitus, essential hypertension, and hypercholesterolemia    Review of Systems   Constitutional: Negative.    HENT: Negative.    Respiratory: Negative for cough, shortness of breath and wheezing.    Cardiovascular: Negative for chest pain, palpitations and leg swelling.   Gastrointestinal: Negative for abdominal pain, blood in stool, constipation and diarrhea.   Genitourinary: Negative.    Musculoskeletal:        The patient strained his low back and groin muscles several months ago in a work-related incident.  The patient is slowly getting back to feeling fairly normal.  He has mild to moderate osteoarthritic aches and pains.   Neurological: Negative.    Hematological: Negative.    Psychiatric/Behavioral: Negative.        Physical Exam   Constitutional: He is oriented to person, place, and time. He appears well-developed and well-nourished.   Overweight   Neck:   Carotid pulses normal   Cardiovascular: Normal rate, regular rhythm and normal heart sounds.    Pulmonary/Chest: Effort normal and breath sounds normal. No respiratory distress. He has no wheezes. He has no rales.   Abdominal: Soft. Bowel sounds are normal.   Musculoskeletal:   Mild osteoarthritic changes in multiple joints.   Neurological: He is alert and oriented to person, place, and time.   Psychiatric: He has a normal mood and affect.   Nursing note and vitals reviewed.      ASSESSMENT   #1 type 2 diabetes mellitus              #2 essential hypertension            #3 hyperlipidemia     DISCUSSION/SUMMARY   The patient's blood pressure was initially elevated at 158/80.  Upon recheck I read 148/80.  The patient is on multiple medications for his hypertension and I have asked him to check it at home on a regular basis.  He will let us know if his average systolic blood pressure exceeds 140.  CMP was normal except for  elevated fasting blood sugar of 143 and the patient's hemoglobin A1c is 6.49%.  The patient is on no medications for his diabetes; metformin was tried but it caused GI upset.  The patient states that he would continue to work on his diet which could be significantly better.  Total cholesterol is 156, triglycerides 95, HDL cholesterol 46, and LDL cholesterol is 91.  PSA remains normal at 2.330.  The patient will continue his present medications, work on his diet, and recheck with me in 6 months.    PLAN  Recheck in 6 months with fasting CMP, lipid panel and hemoglobin A1c   No Follow-up on file.

## 2018-01-08 RX ORDER — AMLODIPINE BESYLATE 10 MG/1
TABLET ORAL
Qty: 90 TABLET | Refills: 1 | Status: SHIPPED | OUTPATIENT
Start: 2018-01-08 | End: 2018-07-12 | Stop reason: SDUPTHER

## 2018-03-13 RX ORDER — POTASSIUM CHLORIDE 750 MG/1
CAPSULE, EXTENDED RELEASE ORAL
Qty: 120 CAPSULE | Refills: 5 | Status: SHIPPED | OUTPATIENT
Start: 2018-03-13 | End: 2018-09-17 | Stop reason: SDUPTHER

## 2018-03-19 RX ORDER — ATORVASTATIN CALCIUM 40 MG/1
TABLET, FILM COATED ORAL
Qty: 90 TABLET | Refills: 3 | Status: SHIPPED | OUTPATIENT
Start: 2018-03-19 | End: 2018-11-12

## 2018-03-23 RX ORDER — CARVEDILOL 25 MG/1
TABLET ORAL
Qty: 360 TABLET | Refills: 3 | Status: SHIPPED | OUTPATIENT
Start: 2018-03-23 | End: 2018-11-12

## 2018-04-10 ENCOUNTER — APPOINTMENT (OUTPATIENT)
Dept: CT IMAGING | Facility: HOSPITAL | Age: 63
End: 2018-04-10

## 2018-04-10 ENCOUNTER — HOSPITAL ENCOUNTER (EMERGENCY)
Facility: HOSPITAL | Age: 63
Discharge: HOME | End: 2018-04-11
Attending: EMERGENCY MEDICINE | Admitting: EMERGENCY MEDICINE

## 2018-04-10 ENCOUNTER — APPOINTMENT (OUTPATIENT)
Dept: GENERAL RADIOLOGY | Facility: HOSPITAL | Age: 63
End: 2018-04-10

## 2018-04-10 VITALS
BODY MASS INDEX: 34.56 KG/M2 | RESPIRATION RATE: 18 BRPM | DIASTOLIC BLOOD PRESSURE: 78 MMHG | TEMPERATURE: 98.1 F | WEIGHT: 228 LBS | HEART RATE: 62 BPM | OXYGEN SATURATION: 95 % | SYSTOLIC BLOOD PRESSURE: 123 MMHG | HEIGHT: 68 IN

## 2018-04-10 DIAGNOSIS — R04.2 COUGH WITH HEMOPTYSIS: Primary | ICD-10-CM

## 2018-04-10 DIAGNOSIS — J18.9 PNEUMONIA OF LEFT LOWER LOBE DUE TO INFECTIOUS ORGANISM: ICD-10-CM

## 2018-04-10 LAB
ALBUMIN SERPL-MCNC: 4 G/DL (ref 3.5–5.2)
ALBUMIN/GLOB SERPL: 1.2 G/DL
ALP SERPL-CCNC: 85 U/L (ref 40–129)
ALT SERPL W P-5'-P-CCNC: 19 U/L (ref 5–41)
ANION GAP SERPL CALCULATED.3IONS-SCNC: 13.9 MMOL/L
APTT PPP: 28.6 SECONDS (ref 24.3–38.1)
AST SERPL-CCNC: 12 U/L (ref 5–40)
BASOPHILS # BLD AUTO: 0.05 10*3/MM3 (ref 0–0.2)
BASOPHILS NFR BLD AUTO: 0.4 % (ref 0–2)
BILIRUB SERPL-MCNC: 0.3 MG/DL (ref 0.2–1.2)
BUN BLD-MCNC: 24 MG/DL (ref 8–23)
BUN/CREAT SERPL: 27.9 (ref 7–25)
CALCIUM SPEC-SCNC: 9.3 MG/DL (ref 8.8–10.5)
CHLORIDE SERPL-SCNC: 98 MMOL/L (ref 98–107)
CO2 SERPL-SCNC: 26.1 MMOL/L (ref 22–29)
CREAT BLD-MCNC: 0.86 MG/DL (ref 0.76–1.27)
D DIMER PPP FEU-MCNC: 0.63 MCGFEU/ML (ref 0–0.46)
DEPRECATED RDW RBC AUTO: 46.6 FL (ref 37–54)
EOSINOPHIL # BLD AUTO: 0.31 10*3/MM3 (ref 0.1–0.3)
EOSINOPHIL NFR BLD AUTO: 2.3 % (ref 0–4)
ERYTHROCYTE [DISTWIDTH] IN BLOOD BY AUTOMATED COUNT: 15.2 % (ref 11.5–14.5)
GFR SERPL CREATININE-BSD FRML MDRD: 90 ML/MIN/1.73
GLOBULIN UR ELPH-MCNC: 3.3 GM/DL
GLUCOSE BLD-MCNC: 115 MG/DL (ref 65–99)
HCT VFR BLD AUTO: 46.4 % (ref 42–52)
HGB BLD-MCNC: 15.6 G/DL (ref 14–18)
IMM GRANULOCYTES # BLD: 0.05 10*3/MM3 (ref 0–0.03)
IMM GRANULOCYTES NFR BLD: 0.4 % (ref 0–0.5)
INR PPP: 1.09 (ref 0.9–1.1)
LYMPHOCYTES # BLD AUTO: 2.64 10*3/MM3 (ref 0.6–4.8)
LYMPHOCYTES NFR BLD AUTO: 20 % (ref 20–45)
MCH RBC QN AUTO: 28.6 PG (ref 27–31)
MCHC RBC AUTO-ENTMCNC: 33.6 G/DL (ref 31–37)
MCV RBC AUTO: 85.1 FL (ref 80–94)
MONOCYTES # BLD AUTO: 1.21 10*3/MM3 (ref 0–1)
MONOCYTES NFR BLD AUTO: 9.2 % (ref 3–8)
NEUTROPHILS # BLD AUTO: 8.95 10*3/MM3 (ref 1.5–8.3)
NEUTROPHILS NFR BLD AUTO: 67.7 % (ref 45–70)
NRBC BLD MANUAL-RTO: 0 /100 WBC (ref 0–0)
NT-PROBNP SERPL-MCNC: 185 PG/ML (ref 5–125)
PLATELET # BLD AUTO: 328 10*3/MM3 (ref 140–500)
PMV BLD AUTO: 10.7 FL (ref 7.4–10.4)
POTASSIUM BLD-SCNC: 3 MMOL/L (ref 3.5–5.2)
PROT SERPL-MCNC: 7.3 G/DL (ref 6–8.5)
PROTHROMBIN TIME: 14.1 SECONDS (ref 12.1–15)
RBC # BLD AUTO: 5.45 10*6/MM3 (ref 4.7–6.1)
SODIUM BLD-SCNC: 138 MMOL/L (ref 136–145)
WBC NRBC COR # BLD: 13.21 10*3/MM3 (ref 4.8–10.8)

## 2018-04-10 PROCEDURE — 99284 EMERGENCY DEPT VISIT MOD MDM: CPT

## 2018-04-10 PROCEDURE — 85025 COMPLETE CBC W/AUTO DIFF WBC: CPT | Performed by: EMERGENCY MEDICINE

## 2018-04-10 PROCEDURE — 80053 COMPREHEN METABOLIC PANEL: CPT | Performed by: EMERGENCY MEDICINE

## 2018-04-10 PROCEDURE — 0 IOPAMIDOL PER 1 ML: Performed by: EMERGENCY MEDICINE

## 2018-04-10 PROCEDURE — 99284 EMERGENCY DEPT VISIT MOD MDM: CPT | Performed by: EMERGENCY MEDICINE

## 2018-04-10 PROCEDURE — 85730 THROMBOPLASTIN TIME PARTIAL: CPT | Performed by: EMERGENCY MEDICINE

## 2018-04-10 PROCEDURE — 85379 FIBRIN DEGRADATION QUANT: CPT | Performed by: EMERGENCY MEDICINE

## 2018-04-10 PROCEDURE — 71275 CT ANGIOGRAPHY CHEST: CPT

## 2018-04-10 PROCEDURE — 83880 ASSAY OF NATRIURETIC PEPTIDE: CPT | Performed by: EMERGENCY MEDICINE

## 2018-04-10 PROCEDURE — 96360 HYDRATION IV INFUSION INIT: CPT

## 2018-04-10 PROCEDURE — 85610 PROTHROMBIN TIME: CPT | Performed by: EMERGENCY MEDICINE

## 2018-04-10 PROCEDURE — 71046 X-RAY EXAM CHEST 2 VIEWS: CPT

## 2018-04-10 RX ORDER — LEVOFLOXACIN 750 MG/1
750 TABLET ORAL ONCE
Status: COMPLETED | OUTPATIENT
Start: 2018-04-10 | End: 2018-04-10

## 2018-04-10 RX ORDER — POTASSIUM CHLORIDE 20 MEQ/1
40 TABLET, EXTENDED RELEASE ORAL ONCE
Status: COMPLETED | OUTPATIENT
Start: 2018-04-10 | End: 2018-04-10

## 2018-04-10 RX ORDER — LEVOFLOXACIN 750 MG/1
750 TABLET ORAL DAILY
Qty: 7 TABLET | Refills: 0 | Status: SHIPPED | OUTPATIENT
Start: 2018-04-10 | End: 2018-04-17

## 2018-04-10 RX ORDER — SODIUM CHLORIDE 0.9 % (FLUSH) 0.9 %
10 SYRINGE (ML) INJECTION AS NEEDED
Status: DISCONTINUED | OUTPATIENT
Start: 2018-04-10 | End: 2018-04-11 | Stop reason: HOSPADM

## 2018-04-10 RX ADMIN — LEVOFLOXACIN 750 MG: 750 TABLET, FILM COATED ORAL at 23:45

## 2018-04-10 RX ADMIN — SODIUM CHLORIDE 1000 ML: 9 INJECTION, SOLUTION INTRAVENOUS at 23:07

## 2018-04-10 RX ADMIN — IOPAMIDOL 100 ML: 755 INJECTION, SOLUTION INTRAVENOUS at 22:50

## 2018-04-10 RX ADMIN — POTASSIUM CHLORIDE 40 MEQ: 1500 TABLET, EXTENDED RELEASE ORAL at 23:07

## 2018-04-10 RX ADMIN — IOPAMIDOL 18 ML: 755 INJECTION, SOLUTION INTRAVENOUS at 22:50

## 2018-04-11 NOTE — ED PROVIDER NOTES
Subjective   History of Present Illness    Review of Systems    Past Medical History:   Diagnosis Date   • Arthritis    • Diabetes mellitus    • Hyperlipidemia    • Hypertension    • Second degree AV block    • Sinus node dysfunction        No Known Allergies    Past Surgical History:   Procedure Laterality Date   • KNEE SURGERY     • PACEMAKER IMPLANTATION         Family History   Problem Relation Age of Onset   • Diabetes Mother    • Stroke Father    • Heart disease Father    • Stroke Sister    • Heart disease Brother        Social History     Social History   • Marital status:      Social History Main Topics   • Smoking status: Former Smoker     Quit date: 10/22/2011   • Alcohol use Yes      Comment: SOCIAL   • Drug use: Unknown     Other Topics Concern   • Not on file           Objective   Physical Exam    Procedures         ED Course  ED Course   Comment By Time   Review laboratory studies: The patient's white count was elevated at 13 to a normal differential.  His/H was 15.6/46.4 within normal limits.  His d-dimer was slightly +0.63.  BNP was slightly elevated 185.  Coagulation studies within normal limits.  His CMP had a low potassium of 3.0 and a slightly elevated BUN of 24.  The remainder of his chemistries and liver function tests were within normal limits.  His GFR was 90.  His chest x-ray showed no acute disease.  A CT angiogram of his chest showed no PE or dissection.  It did show a small left lower lobe infiltrate is probably pneumonia. Marshall Dlecid MD 04/10 1381   The patient was administered normal saline IV for renal prophylaxis.  His hypokalemia was addressed with potassium chloride 40 mEq po.  Antibody therapy was initiated for his pneumonia with Levaquin 750 mg po.  He'll be given a prescription for the same for the next 7 days, instructed to follow with his PCP Dr. Samson Leyva in about 2-3 days.  He was instructed to return the emergency department should the hemoptysis become  worse. Marshall Delcid MD 04/10 4548   23:40 the patient had no recurrence of hemoptysis while in the ER. Marshall Delcid MD 04/10 1786                  [facility]    Final diagnoses:   Cough with hemoptysis   Pneumonia of left lower lobe due to infectious organism     Addendum: Radiology over read on the chest x-ray did show a mild infiltrate or atelectasis in the left posterior lung base.  Chart review shows that this was also noted on a previous chest CT wet read and the patient was appropriately covered with antibiotics.  No change in treatment plan.       Pipe Arreola MD  04/11/18 0721

## 2018-04-11 NOTE — ED NOTES
Educated pt and spouse on medications, home care, follow-up care, and reasons to return to ER. Patient and spouse verbalized understanding. Patient ambulatory from ER with spouse. Patient and spouse express appreciation for care provided today.     Cindy Jorgensen RN  04/10/18 5878

## 2018-04-11 NOTE — ED PROVIDER NOTES
"Subjective     History provided by:  Patient and spouse    History of Present Illness    · Chief complaint: Coughing up blood    · Location: He reports a heaviness feeling in his left upper chest    · Quality/Severity: The patient states he has a heavy feeling in his left upper chest and initially coughed up clear sputum, but is been feeling like he needs to clear something out of his chest all evening.  About 45 minutes ago he coughed up about a quarter cup of umberto bright red blood.    · Timing/Onset: The heaviness sensation in his left upper chest started about 5 PM.  He coughed up the umberto bright red blood about 45 minutes ago.    · Modifying Factors: None    · Associated symptoms: He denies any shortness of breath or fever or dizziness.    · Narrative: The patient is a 63-year-old white male who states at 5 PM this past afternoon he felt a heaviness in his chest and he started coughing up clear sputum.  He states all evening he felt like he was trying to clear something out of his chest nonproductive coughs.  Approximate 45 minutes ago he coughed up a \"quarter cup\" of umberto bright red blood.  He denies a prior history of coughing up blood, and denies any shortness of breath or fever.  The patient coughed up a small amount of bright red blood sputum when he got to the ER.  His past medical history significant for hypertension, hypercholesterolemia, hypoglycemia, and sinus node dysfunction with second-degree AV block for which she has a pacemaker in place.  He is on no anticoagulants other than aspirin 325 mg daily.  Social history the patient is , he smokes cigars inhaling them about 1 a day for the last several weeks, and has smoked them periodically for the last several years.  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  He does drink alcohol socially.    ED Triage Vitals [04/10/18 2132]   Temp Heart Rate Resp BP SpO2   98.1 °F (36.7 °C) 78 18 161/91 94 %      Temp src Heart Rate Source " Patient Position BP Location FiO2 (%)   Oral Monitor Sitting Right arm --       Review of Systems   Constitutional: Negative for activity change, appetite change, chills, diaphoresis, fatigue and fever.   HENT: Negative for congestion, dental problem, ear pain, hearing loss, mouth sores, postnasal drip, rhinorrhea, sinus pressure, sore throat, trouble swallowing and voice change.    Eyes: Negative for photophobia, pain, discharge, redness and visual disturbance.   Respiratory: Positive for cough. Negative for apnea, choking, chest tightness, shortness of breath, wheezing and stridor.         Coughing up blood   Cardiovascular: Negative for chest pain, palpitations and leg swelling.        Heaviness in the left upper chest   Gastrointestinal: Negative for abdominal pain, blood in stool, diarrhea, nausea and vomiting.   Genitourinary: Negative for difficulty urinating, dysuria, flank pain, frequency, hematuria and urgency.   Musculoskeletal: Negative for arthralgias, back pain, gait problem, joint swelling, myalgias, neck pain and neck stiffness.   Skin: Negative for color change and rash.   Neurological: Negative for dizziness, tremors, seizures, syncope, facial asymmetry, speech difficulty, weakness, light-headedness, numbness and headaches.   Hematological: Negative for adenopathy.   Psychiatric/Behavioral: Negative.  Negative for confusion and decreased concentration. The patient is not nervous/anxious.        Past Medical History:   Diagnosis Date   • Arthritis    • Diabetes mellitus    • Hyperlipidemia    • Hypertension    • Second degree AV block    • Sinus node dysfunction        No Known Allergies    Past Surgical History:   Procedure Laterality Date   • KNEE SURGERY     • PACEMAKER IMPLANTATION         Family History   Problem Relation Age of Onset   • Diabetes Mother    • Stroke Father    • Heart disease Father    • Stroke Sister    • Heart disease Brother        Social History     Social History   •  Marital status:      Social History Main Topics   • Smoking status: Former Smoker     Quit date: 10/22/2011   • Alcohol use Yes      Comment: SOCIAL   • Drug use: Unknown     Other Topics Concern   • Not on file           Objective   Physical Exam   Constitutional: He is oriented to person, place, and time. He appears well-developed and well-nourished. No distress.   The patient appears in no acute distress.  Review of his vital signs: Blood pressure slightly elevated 161/91, heart rate 78, respirations 18, temp 98.1, oxygen saturation is 94% on room air.   HENT:   Head: Normocephalic and atraumatic.   Nose: Nose normal.   Mouth/Throat: Oropharynx is clear and moist. No oropharyngeal exudate.   Eyes: EOM are normal. Pupils are equal, round, and reactive to light. Right eye exhibits no discharge. Left eye exhibits no discharge. No scleral icterus.   Neck: Normal range of motion. Neck supple. No JVD present. No thyromegaly present.   Cardiovascular: Normal rate, regular rhythm and normal heart sounds.    No murmur heard.  Pulmonary/Chest: Effort normal and breath sounds normal. He has no wheezes. He has no rales. He exhibits no tenderness.   Abdominal: Soft. Bowel sounds are normal. He exhibits no distension. There is no tenderness.   Musculoskeletal: Normal range of motion. He exhibits no edema, tenderness or deformity.   Lymphadenopathy:     He has no cervical adenopathy.   Neurological: He is alert and oriented to person, place, and time. No cranial nerve deficit. Coordination normal.   No focal motor sensory deficit   Skin: Skin is warm and dry. Capillary refill takes less than 2 seconds. No rash noted. He is not diaphoretic.   Psychiatric: He has a normal mood and affect. His behavior is normal. Judgment and thought content normal.   Nursing note and vitals reviewed.      Procedures         ED Course  ED Course   Comment By Time   Review laboratory studies: The patient's white count was elevated at 13 to a  normal differential.  His/H was 15.6/46.4 within normal limits.  His d-dimer was slightly +0.63.  BNP was slightly elevated 185.  Coagulation studies within normal limits.  His CMP had a low potassium of 3.0 and a slightly elevated BUN of 24.  The remainder of his chemistries and liver function tests were within normal limits.  His GFR was 90.  His chest x-ray showed no acute disease.  A CT angiogram of his chest showed no PE or dissection.  It did show a small left lower lobe infiltrate is probably pneumonia. Marshall Delcid MD 04/10 9061   The patient was administered normal saline IV for renal prophylaxis.  His hypokalemia was addressed with potassium chloride 40 mEq po.  Antibody therapy was initiated for his pneumonia with Levaquin 750 mg po.  He'll be given a prescription for the same for the next 7 days, instructed to follow with his PCP Dr. Samson Leyva in about 2-3 days.  He was instructed to return the emergency department should the hemoptysis become worse. Marshall Delcid MD 04/10 1080   23:40 the patient had no recurrence of hemoptysis while in the ER. Marshall Delcid MD 04/10 4704                  MDM  Number of Diagnoses or Management Options  Cough with hemoptysis: new and requires workup  Pneumonia of left lower lobe due to infectious organism: new and requires workup     Amount and/or Complexity of Data Reviewed  Clinical lab tests: ordered and reviewed  Tests in the radiology section of CPT®: ordered and reviewed  Independent visualization of images, tracings, or specimens: yes    Risk of Complications, Morbidity, and/or Mortality  Presenting problems: high  Diagnostic procedures: high  Management options: high    Patient Progress  Patient progress: improved      Final diagnoses:   Cough with hemoptysis   Pneumonia of left lower lobe due to infectious organism           Labs Reviewed   COMPREHENSIVE METABOLIC PANEL - Abnormal; Notable for the following:        Result Value    Glucose 115 (*)      BUN 24 (*)     Potassium 3.0 (*)     BUN/Creatinine Ratio 27.9 (*)     All other components within normal limits   CBC WITH AUTO DIFFERENTIAL - Abnormal; Notable for the following:     WBC 13.21 (*)     RDW 15.2 (*)     MPV 10.7 (*)     Monocyte % 9.2 (*)     Neutrophils, Absolute 8.95 (*)     Monocytes, Absolute 1.21 (*)     Eosinophils, Absolute 0.31 (*)     Immature Grans, Absolute 0.05 (*)     All other components within normal limits   D-DIMER, QUANTITATIVE - Abnormal; Notable for the following:     D-Dimer, Quantitative 0.63 (*)     All other components within normal limits    Narrative:     Can be elevated in, but is not diagnostic for deep vein thrombosis (DVT) or pulmonary embolis (PE).  It is also elevated in other medical conditions.  Clinical correlation is required.  The negative cut-off value for the D-Dimer is 0.50 mcg FEU/mL for DVT and PE.   BNP (IN-HOUSE) - Abnormal; Notable for the following:     proBNP 185.0 (*)     All other components within normal limits    Narrative:     Among patients with dyspnea, NT-proBNP is highly sensitive for the detection of acute congestive heart failure. In addition NT-proBNP of <300 pg/ml effectively rules out acute congestive heart failure with 99% negative predictive value.   APTT - Normal    Narrative:     PTT = The equivalent PTT values for the therapeutic range of heparin levels at 0.1 to 0.7 U/ml are 53 to 110 seconds.   PROTIME-INR - Normal    Narrative:     Therapeutic Ranges for INR: 2.0-3.0 (PT 20-30)                              2.5-3.5 (PT 25-34)   CBC AND DIFFERENTIAL    Narrative:     The following orders were created for panel order CBC & Differential.  Procedure                               Abnormality         Status                     ---------                               -----------         ------                     CBC Auto Differential[[id number]]         Abnormal            Final result                 Please view results for these tests  on the individual orders.     CT Angiogram Chest With Contrast   ED Interpretation   No PE or dissection.  Mild left lower lobe infiltrate, probably pneumonia.  Emphysema.  Bilateral adrenal hyperplasia.  Per Dr. Mosher      XR Chest 2 View   ED Interpretation   No acute disease.  Slight atelectasis in the left base.             Medication List      New Prescriptions    levoFLOXacin 750 MG tablet  Commonly known as:  LEVAQUIN  Take 1 tablet by mouth Daily for 7 days.        Stop    aspirin 325 MG tablet     metFORMIN  MG 24 hr tablet  Commonly known as:  GLUCOPHAGE-XR               Marshall Delcid MD  04/10/18 8459

## 2018-04-11 NOTE — ED NOTES
Patient given a work note to return on 4/13/18 without limitations.     Cindy Jorgensen RN  04/10/18 4659

## 2018-04-12 ENCOUNTER — OFFICE VISIT (OUTPATIENT)
Dept: FAMILY MEDICINE CLINIC | Facility: CLINIC | Age: 63
End: 2018-04-12

## 2018-04-12 VITALS
RESPIRATION RATE: 18 BRPM | DIASTOLIC BLOOD PRESSURE: 68 MMHG | TEMPERATURE: 98.4 F | HEART RATE: 71 BPM | WEIGHT: 234 LBS | OXYGEN SATURATION: 97 % | HEIGHT: 68 IN | SYSTOLIC BLOOD PRESSURE: 116 MMHG | BODY MASS INDEX: 35.46 KG/M2

## 2018-04-12 DIAGNOSIS — R04.2 HEMOPTYSIS: ICD-10-CM

## 2018-04-12 DIAGNOSIS — R91.8 PULMONARY INFILTRATE PRESENT ON COMPUTED TOMOGRAPHY: Primary | ICD-10-CM

## 2018-04-12 PROCEDURE — 99213 OFFICE O/P EST LOW 20 MIN: CPT

## 2018-04-12 NOTE — PROGRESS NOTES
Ankush Ram is a 63 y.o. male. Patient is here today for   Chief Complaint   Patient presents with   • Follow-up     Hospital follow up 4/10/18; pneumonia           Vitals:    04/12/18 0952   BP: 116/68   Pulse: 71   Resp: 18   Temp: 98.4 °F (36.9 °C)   SpO2: 97%     The following portions of the patient's history were reviewed and updated as appropriate: allergies, current medications, past family history, past medical history, past social history, past surgical history and problem list.    Past Medical History:   Diagnosis Date   • Arthritis    • Diabetes mellitus    • Hyperlipidemia    • Hypertension    • Second degree AV block    • Sinus node dysfunction       No Known Allergies   Social History     Social History   • Marital status:      Spouse name: N/A   • Number of children: N/A   • Years of education: N/A     Occupational History   • Not on file.     Social History Main Topics   • Smoking status: Former Smoker     Quit date: 10/22/2011   • Smokeless tobacco: Not on file   • Alcohol use Yes      Comment: SOCIAL   • Drug use: Unknown   • Sexual activity: Not on file     Other Topics Concern   • Not on file     Social History Narrative   • No narrative on file        Current Outpatient Prescriptions:   •  amLODIPine (NORVASC) 10 MG tablet, TAKE 1 TABLET BY MOUTH EVERY DAY, Disp: 90 tablet, Rfl: 1  •  atorvastatin (LIPITOR) 40 MG tablet, TAKE 1 TABLET BY MOUTH EVERY DAY, Disp: 90 tablet, Rfl: 3  •  carvedilol (COREG) 25 MG tablet, TAKE 2 TABLETS BY MOUTH TWICE A DAY, Disp: 360 tablet, Rfl: 3  •  hydrALAZINE (APRESOLINE) 25 MG tablet, Take  by mouth 2 (two) times a day., Disp: , Rfl:   •  levoFLOXacin (LEVAQUIN) 750 MG tablet, Take 1 tablet by mouth Daily for 7 days., Disp: 7 tablet, Rfl: 0  •  potassium chloride (MICRO-K) 10 MEQ CR capsule, TAKE 2 CAPSULES BY MOUTH TWICE A DAY, Disp: 120 capsule, Rfl: 5  •  valsartan-hydrochlorothiazide (DIOVAN-HCT) 320-25 MG per tablet, Take  by mouth  daily., Disp: , Rfl:      Objective     History of Present Illness   The patient is here today for follow-up after an episode of hemoptysis and evaluation in the emergency room where he was diagnosed as having pneumonia 2 days ago    Review of Systems   Constitutional: Positive for fatigue. Negative for chills and fever.   HENT: Negative.    Respiratory:        The patient does have a mild cough.  The patient had an episode of significant hemoptysis 2 days ago where he coughed.  Probably nearly a couple of blood.  Now he is only noticing a very scant amount of streaks of blood when he coughs.  He is having no chest pain.  He denies shortness of air.  He is having no wheezing.   Cardiovascular:        The patient had a small amount of left upper chest discomfort 2 days ago when he had the onset of hemoptysis but now this is improved.   Gastrointestinal: Negative.    Musculoskeletal:        Mild to moderate muscular aches and pains   Neurological: Negative.    Hematological:        The patient has been on full dose, 325 mg, aspirin ever since he had a pacemaker a number of years ago.  He states that he does bleed easily when he has a cut.   Psychiatric/Behavioral: Negative.        Physical Exam   Constitutional: He is oriented to person, place, and time. He appears well-developed and well-nourished.   Moderately overweight   Cardiovascular: Normal rate and regular rhythm.    Pulmonary/Chest: Effort normal and breath sounds normal. No respiratory distress. He has no wheezes. He has no rales.   Abdominal: Soft. Bowel sounds are normal.   Musculoskeletal: Normal range of motion.   Neurological: He is alert and oriented to person, place, and time.   Skin: Skin is warm and dry.   Psychiatric: He has a normal mood and affect.   Nursing note and vitals reviewed.      ASSESSMENT  #1 small left lower lobe pneumonia per CT              #2 hemoptysis, resolving    DISCUSSION/SUMMARY   The patient's vital signs are normal.  The  patient states that he felt his normal self until about 5 PM on 4/10/18 when he noticed some heaviness in his left upper chest and he subsequently coughed up a very small amount of blood-streaked sputum.  Then a little while later he coughed up a moderate amount of blood, almost a cupful.  Of course this alarmed him and he went to the emergency room at [facility].  There a chest x-ray showed no abnormalities but a CTA was done to rule out pulmonary embolus.  The CT showed no evidence of pulmonary embolism or other acute vascular abnormalities.  It did show a sub-segmental focus of subpleural tree in bud nodularity peripheral to a fluid opacified peripheral posterior left lower lobe bronchus.  The radiologist felt that this was likely inflammatory in etiology and most likely the source of the patient's hemoptysis.  He recommended close clinical follow-up and a follow-up CT of the chest in 3-4 months.  On CT it was also felt that he had mild emphysema and he has been a smoker in the past.  The patient was sent home on Levaquin one daily for 7 days.  Today the patient still feels quite fatigued but he has not slept well over the last 2 nights.  He has a mild cough which is basically not very productive and only shows a few streaks of blood.  The emergency room physician told the patient to hold his full strength aspirin tablet until he talked to his cardiologist.  If the patient progresses well with respect to his symptoms we will just repeat a chest CT in 3 months.  If he has any worsening of his symptoms he will call us.    PLAN  Call if any increase in symptoms.  The patient has a follow-up appointment in May already set up.  No Follow-up on file.

## 2018-04-22 ENCOUNTER — APPOINTMENT (OUTPATIENT)
Dept: GENERAL RADIOLOGY | Facility: HOSPITAL | Age: 63
End: 2018-04-22

## 2018-04-22 ENCOUNTER — HOSPITAL ENCOUNTER (EMERGENCY)
Facility: HOSPITAL | Age: 63
Discharge: HOME | End: 2018-04-22
Attending: EMERGENCY MEDICINE | Admitting: EMERGENCY MEDICINE

## 2018-04-22 VITALS
BODY MASS INDEX: 34.86 KG/M2 | SYSTOLIC BLOOD PRESSURE: 150 MMHG | WEIGHT: 230 LBS | TEMPERATURE: 97.8 F | HEART RATE: 56 BPM | OXYGEN SATURATION: 95 % | DIASTOLIC BLOOD PRESSURE: 87 MMHG | RESPIRATION RATE: 16 BRPM | HEIGHT: 68 IN

## 2018-04-22 DIAGNOSIS — R04.2 HEMOPTYSIS: Primary | ICD-10-CM

## 2018-04-22 LAB
ALBUMIN SERPL-MCNC: 3.6 G/DL (ref 3.5–5.2)
ALBUMIN/GLOB SERPL: 1.2 G/DL
ALP SERPL-CCNC: 73 U/L (ref 40–129)
ALT SERPL W P-5'-P-CCNC: 15 U/L (ref 5–41)
ANION GAP SERPL CALCULATED.3IONS-SCNC: 11.5 MMOL/L
APTT PPP: 26.8 SECONDS (ref 24.3–38.1)
AST SERPL-CCNC: 11 U/L (ref 5–40)
BASOPHILS # BLD AUTO: 0.05 10*3/MM3 (ref 0–0.2)
BASOPHILS NFR BLD AUTO: 0.4 % (ref 0–2)
BILIRUB SERPL-MCNC: 0.2 MG/DL (ref 0.2–1.2)
BUN BLD-MCNC: 21 MG/DL (ref 8–23)
BUN/CREAT SERPL: 26.9 (ref 7–25)
CALCIUM SPEC-SCNC: 9.4 MG/DL (ref 8.8–10.5)
CHLORIDE SERPL-SCNC: 103 MMOL/L (ref 98–107)
CO2 SERPL-SCNC: 27.5 MMOL/L (ref 22–29)
CREAT BLD-MCNC: 0.78 MG/DL (ref 0.76–1.27)
DEPRECATED RDW RBC AUTO: 47.9 FL (ref 37–54)
EOSINOPHIL # BLD AUTO: 0.32 10*3/MM3 (ref 0.1–0.3)
EOSINOPHIL NFR BLD AUTO: 2.7 % (ref 0–4)
ERYTHROCYTE [DISTWIDTH] IN BLOOD BY AUTOMATED COUNT: 15.4 % (ref 11.5–14.5)
GFR SERPL CREATININE-BSD FRML MDRD: 101 ML/MIN/1.73
GLOBULIN UR ELPH-MCNC: 2.9 GM/DL
GLUCOSE BLD-MCNC: 124 MG/DL (ref 65–99)
HCT VFR BLD AUTO: 43.1 % (ref 42–52)
HGB BLD-MCNC: 14.5 G/DL (ref 14–18)
IMM GRANULOCYTES # BLD: 0.03 10*3/MM3 (ref 0–0.03)
IMM GRANULOCYTES NFR BLD: 0.3 % (ref 0–0.5)
INR PPP: 1.03 (ref 0.9–1.1)
LYMPHOCYTES # BLD AUTO: 2.02 10*3/MM3 (ref 0.6–4.8)
LYMPHOCYTES NFR BLD AUTO: 17.3 % (ref 20–45)
MCH RBC QN AUTO: 28.8 PG (ref 27–31)
MCHC RBC AUTO-ENTMCNC: 33.6 G/DL (ref 31–37)
MCV RBC AUTO: 85.7 FL (ref 80–94)
MONOCYTES # BLD AUTO: 0.88 10*3/MM3 (ref 0–1)
MONOCYTES NFR BLD AUTO: 7.5 % (ref 3–8)
NEUTROPHILS # BLD AUTO: 8.39 10*3/MM3 (ref 1.5–8.3)
NEUTROPHILS NFR BLD AUTO: 71.8 % (ref 45–70)
NRBC BLD MANUAL-RTO: 0 /100 WBC (ref 0–0)
PLATELET # BLD AUTO: 304 10*3/MM3 (ref 140–500)
PMV BLD AUTO: 10.4 FL (ref 7.4–10.4)
POTASSIUM BLD-SCNC: 3.3 MMOL/L (ref 3.5–5.2)
PROT SERPL-MCNC: 6.5 G/DL (ref 6–8.5)
PROTHROMBIN TIME: 13.5 SECONDS (ref 12.1–15)
RBC # BLD AUTO: 5.03 10*6/MM3 (ref 4.7–6.1)
SODIUM BLD-SCNC: 142 MMOL/L (ref 136–145)
WBC NRBC COR # BLD: 11.69 10*3/MM3 (ref 4.8–10.8)

## 2018-04-22 PROCEDURE — 71046 X-RAY EXAM CHEST 2 VIEWS: CPT

## 2018-04-22 PROCEDURE — 99283 EMERGENCY DEPT VISIT LOW MDM: CPT

## 2018-04-22 PROCEDURE — 85730 THROMBOPLASTIN TIME PARTIAL: CPT | Performed by: EMERGENCY MEDICINE

## 2018-04-22 PROCEDURE — 85025 COMPLETE CBC W/AUTO DIFF WBC: CPT | Performed by: EMERGENCY MEDICINE

## 2018-04-22 PROCEDURE — 99284 EMERGENCY DEPT VISIT MOD MDM: CPT | Performed by: EMERGENCY MEDICINE

## 2018-04-22 PROCEDURE — 85610 PROTHROMBIN TIME: CPT | Performed by: EMERGENCY MEDICINE

## 2018-04-22 PROCEDURE — 80053 COMPREHEN METABOLIC PANEL: CPT | Performed by: EMERGENCY MEDICINE

## 2018-04-22 RX ORDER — ASPIRIN 325 MG
325 TABLET ORAL DAILY
COMMUNITY
End: 2018-11-12

## 2018-04-22 NOTE — DISCHARGE INSTRUCTIONS
Must follow up with the Pulmonology specialist this week for further evaluation and testing.  Please return to the ER for any worsening pain, fevers, cough, weakness, dizziness, difficulties breathing, or any other concerns.

## 2018-04-22 NOTE — ED PROVIDER NOTES
"Subjective   History of Present Illness  History of Present Illness    Chief complaint: \"Coughed up blood\"    Location: Respiratory    Quality/Severity:  Mild symptoms     Timing/Duration: began again tonight for one episode    Modifying Factors: None    Narrative: This patient returns to our facility for repeat evaluation of hemoptysis tonight.  Apparently he was seen here a couple weeks ago for the same presentation.  He had blood work and a CT scan done.  An inflammatory area of the left lower lobe was identified.  The patient was discharged home with antibiotics and instructed to follow up with his primary care doctor.  He finished the antibiotics and did follow up with his doctor.  He said that he was feeling much better and had been breathing fine without any coughing up blood.  He went to bed tonight and was feeling well.  He woke up in the middle night with a coughing spell and noted that he had coughed up some bright red blood one time.  This concerned him so he returned here tonight.  He denies any chest pain.  He denies any fevers.  He denies any weakness or dizziness.  He denies any breathlessness or difficulties getting his air at all.  He says that he feels pre-well right now but he is alarmed about the coughing of blood again.  He is not a current smoker.  He quit several years ago.  He does not wear oxygen at home.  Apart from aspirin, he doesn't take any blood thinners.    Associated Symptoms: As above    Review of Systems   Constitutional: Negative for activity change, diaphoresis and fever.   HENT: Positive for congestion. Negative for sinus pain, trouble swallowing and voice change.    Respiratory: Positive for cough. Negative for apnea, chest tightness, shortness of breath, wheezing and stridor.    Cardiovascular: Negative for chest pain and palpitations.   Gastrointestinal: Negative for abdominal pain, diarrhea and vomiting.   Genitourinary: Negative for dysuria and flank pain. "   Musculoskeletal: Negative for back pain and myalgias.   Skin: Negative for color change and rash.   Neurological: Negative for syncope and headaches.   All other systems reviewed and are negative.      Past Medical History:   Diagnosis Date   • Arthritis    • Diabetes mellitus    • Hyperlipidemia    • Hypertension    • Pneumonia    • Second degree AV block    • Sinus node dysfunction        No Known Allergies    Past Surgical History:   Procedure Laterality Date   • KNEE SURGERY     • PACEMAKER IMPLANTATION         Family History   Problem Relation Age of Onset   • Diabetes Mother    • Stroke Father    • Heart disease Father    • Stroke Sister    • Heart disease Brother        Social History     Social History   • Marital status:      Social History Main Topics   • Smoking status: Former Smoker     Quit date: 10/22/2011   • Alcohol use Yes      Comment: SOCIAL   • Drug use: Unknown     Other Topics Concern   • Not on file       ED Triage Vitals [04/22/18 0328]   Temp Heart Rate Resp BP SpO2   97.8 °F (36.6 °C) 60 16 147/94 97 %      Temp src Heart Rate Source Patient Position BP Location FiO2 (%)   Oral Monitor Lying Right arm --         Objective   Physical Exam   Constitutional: He is oriented to person, place, and time. He appears well-developed and well-nourished. No distress.   No distress at all.  Pleasant, normal interaction.     HENT:   Head: Normocephalic and atraumatic.   Nose: Nose normal.   Mouth/Throat: Oropharynx is clear and moist.   Eyes: EOM are normal. Pupils are equal, round, and reactive to light. Right eye exhibits no discharge. Left eye exhibits no discharge.   Neck: Normal range of motion. Neck supple.   Cardiovascular: Normal rate, regular rhythm, normal heart sounds and intact distal pulses.  Exam reveals no gallop and no friction rub.    No murmur heard.  Pulmonary/Chest: Effort normal. No respiratory distress. He has no wheezes. He has no rales. He exhibits no tenderness.   No  distress at all.  Lungs clear bilaterally.  Not a single cough observed during the entire H&P process while I was in the room.    Abdominal: Soft. He exhibits no mass. There is no tenderness. There is no rebound and no guarding. No hernia.   Musculoskeletal: Normal range of motion. He exhibits no edema, tenderness or deformity.   Neurological: He is alert and oriented to person, place, and time. He exhibits normal muscle tone.   Skin: Skin is warm and dry. No rash noted. He is not diaphoretic. No erythema. No pallor.   Psychiatric: He has a normal mood and affect. His behavior is normal. Judgment and thought content normal.   Nursing note and vitals reviewed.    Results for orders placed or performed during the hospital encounter of 04/22/18   Comprehensive Metabolic Panel   Result Value Ref Range    Glucose 124 (H) 65 - 99 mg/dL    BUN 21 8 - 23 mg/dL    Creatinine 0.78 0.76 - 1.27 mg/dL    Sodium 142 136 - 145 mmol/L    Potassium 3.3 (L) 3.5 - 5.2 mmol/L    Chloride 103 98 - 107 mmol/L    CO2 27.5 22.0 - 29.0 mmol/L    Calcium 9.4 8.8 - 10.5 mg/dL    Total Protein 6.5 6.0 - 8.5 g/dL    Albumin 3.60 3.50 - 5.20 g/dL    ALT (SGPT) 15 5 - 41 U/L    AST (SGOT) 11 5 - 40 U/L    Alkaline Phosphatase 73 40 - 129 U/L    Total Bilirubin 0.2 0.2 - 1.2 mg/dL    eGFR Non African Amer 101 >60 mL/min/1.73    Globulin 2.9 gm/dL    A/G Ratio 1.2 g/dL    BUN/Creatinine Ratio 26.9 (H) 7.0 - 25.0    Anion Gap 11.5 mmol/L   Protime-INR   Result Value Ref Range    Protime 13.5 12.1 - 15.0 Seconds    INR 1.03 0.90 - 1.10   aPTT   Result Value Ref Range    PTT 26.8 24.3 - 38.1 seconds   CBC Auto Differential   Result Value Ref Range    WBC 11.69 (H) 4.80 - 10.80 10*3/mm3    RBC 5.03 4.70 - 6.10 10*6/mm3    Hemoglobin 14.5 14.0 - 18.0 g/dL    Hematocrit 43.1 42.0 - 52.0 %    MCV 85.7 80.0 - 94.0 fL    MCH 28.8 27.0 - 31.0 pg    MCHC 33.6 31.0 - 37.0 g/dL    RDW 15.4 (H) 11.5 - 14.5 %    RDW-SD 47.9 37.0 - 54.0 fl    MPV 10.4 7.4 - 10.4  fL    Platelets 304 140 - 500 10*3/mm3    Neutrophil % 71.8 (H) 45.0 - 70.0 %    Lymphocyte % 17.3 (L) 20.0 - 45.0 %    Monocyte % 7.5 3.0 - 8.0 %    Eosinophil % 2.7 0.0 - 4.0 %    Basophil % 0.4 0.0 - 2.0 %    Immature Grans % 0.3 0.0 - 0.5 %    Neutrophils, Absolute 8.39 (H) 1.50 - 8.30 10*3/mm3    Lymphocytes, Absolute 2.02 0.60 - 4.80 10*3/mm3    Monocytes, Absolute 0.88 0.00 - 1.00 10*3/mm3    Eosinophils, Absolute 0.32 (H) 0.10 - 0.30 10*3/mm3    Basophils, Absolute 0.05 0.00 - 0.20 10*3/mm3    Immature Grans, Absolute 0.03 0.00 - 0.03 10*3/mm3    nRBC 0.0 0.0 - 0.0 /100 WBC       RADIOLOGY        Study: Chest x-ray    Findings: No new findings.  Somewhat improved left posterior basilar inflammatory change    Interpreted contemporaneously with treatment by myself, independently viewed by me        Procedures         ED Course  ED Course   Comment By Time   I have reviewed the labs and a chest x-ray.  All of these seem to be quite reassuring right now.  His chest x-ray appears to be improved to me.  His hemoglobin and hematocrit are normal and he is not coagulopathic.  His work of breathing is totally normal and he does not seem to be suffering from ongoing or persistent hemoptysis episodes.  We kept him in the department for over 2 hours of observation and he did very well throughout the entire time with normal oxygenation on room air.  I spoke with him and his family after I reviewed his recent encounter very carefully in the records.  I don't think there is any need for further testing right now especially since he just had a CT scan done a few weeks ago.  I do think he may strongly from an urgent follow-up with a pulmonologist who could possibly decide to do an elective bronchoscopy for further investigation about this hemostasis.  I will give him Dr. Steele's office so he can arrange that follow-up appointment this week.  I explained to the patient that he should return to our facility emergently if he has  any shortness of breath or worsening coughing episodes especially with blood that does not resolve.  He agreed to do so.  We'll discharge home in good condition at this time. Dejuan Live MD 04/22 0715                  MDM  Number of Diagnoses or Management Options  Hemoptysis:       Final diagnoses:   Hemoptysis            Dejuan Live MD  04/22/18 0721

## 2018-05-18 DIAGNOSIS — E78.5 HYPERLIPIDEMIA, UNSPECIFIED HYPERLIPIDEMIA TYPE: ICD-10-CM

## 2018-05-18 DIAGNOSIS — Z11.59 NEED FOR HEPATITIS C SCREENING TEST: ICD-10-CM

## 2018-05-18 DIAGNOSIS — I10 ESSENTIAL HYPERTENSION: Primary | ICD-10-CM

## 2018-05-18 DIAGNOSIS — E11.9 TYPE 2 DIABETES MELLITUS WITHOUT COMPLICATION, UNSPECIFIED LONG TERM INSULIN USE STATUS: ICD-10-CM

## 2018-05-22 LAB
ALBUMIN SERPL-MCNC: 4.2 G/DL (ref 3.5–5.2)
ALBUMIN/CREAT UR: 1000 MG/G CREAT (ref 0–30)
ALBUMIN/GLOB SERPL: 1.6 G/DL
ALP SERPL-CCNC: 86 U/L (ref 39–117)
ALT SERPL-CCNC: 20 U/L (ref 1–41)
AST SERPL-CCNC: 11 U/L (ref 1–40)
BILIRUB SERPL-MCNC: 0.5 MG/DL (ref 0.1–1.2)
BUN SERPL-MCNC: 14 MG/DL (ref 8–23)
BUN/CREAT SERPL: 16.5 (ref 7–25)
CALCIUM SERPL-MCNC: 9.1 MG/DL (ref 8.6–10.5)
CHLORIDE SERPL-SCNC: 101 MMOL/L (ref 98–107)
CHOLEST SERPL-MCNC: 155 MG/DL (ref 0–200)
CO2 SERPL-SCNC: 27.5 MMOL/L (ref 22–29)
CREAT SERPL-MCNC: 0.85 MG/DL (ref 0.76–1.27)
CREAT UR-MCNC: 29.5 MG/DL
GFR SERPLBLD CREATININE-BSD FMLA CKD-EPI: 110 ML/MIN/1.73
GFR SERPLBLD CREATININE-BSD FMLA CKD-EPI: 91 ML/MIN/1.73
GLOBULIN SER CALC-MCNC: 2.7 GM/DL
GLUCOSE SERPL-MCNC: 134 MG/DL (ref 65–99)
HBA1C MFR BLD: 6.5 % (ref 4.8–5.6)
HCV AB S/CO SERPL IA: <0.1 S/CO RATIO (ref 0–0.9)
HDLC SERPL-MCNC: 45 MG/DL (ref 40–60)
LDLC SERPL CALC-MCNC: 94 MG/DL (ref 0–100)
LDLC/HDLC SERPL: 2.09 {RATIO}
MICROALBUMIN UR-MCNC: 295 UG/ML
POTASSIUM SERPL-SCNC: 3.9 MMOL/L (ref 3.5–5.2)
PROT SERPL-MCNC: 6.9 G/DL (ref 6–8.5)
SODIUM SERPL-SCNC: 143 MMOL/L (ref 136–145)
TRIGL SERPL-MCNC: 79 MG/DL (ref 0–150)
VLDLC SERPL CALC-MCNC: 15.8 MG/DL (ref 5–40)

## 2018-05-29 ENCOUNTER — OFFICE VISIT (OUTPATIENT)
Dept: FAMILY MEDICINE CLINIC | Facility: CLINIC | Age: 63
End: 2018-05-29

## 2018-05-29 VITALS
BODY MASS INDEX: 35.77 KG/M2 | WEIGHT: 236 LBS | HEIGHT: 68 IN | RESPIRATION RATE: 18 BRPM | TEMPERATURE: 97.4 F | DIASTOLIC BLOOD PRESSURE: 84 MMHG | SYSTOLIC BLOOD PRESSURE: 150 MMHG

## 2018-05-29 DIAGNOSIS — I10 ESSENTIAL HYPERTENSION: ICD-10-CM

## 2018-05-29 DIAGNOSIS — E11.9 TYPE 2 DIABETES MELLITUS WITHOUT COMPLICATION, WITHOUT LONG-TERM CURRENT USE OF INSULIN (HCC): Primary | ICD-10-CM

## 2018-05-29 DIAGNOSIS — E78.00 HYPERCHOLESTEROLEMIA: ICD-10-CM

## 2018-05-29 DIAGNOSIS — R91.8 PULMONARY INFILTRATE PRESENT ON COMPUTED TOMOGRAPHY: ICD-10-CM

## 2018-05-29 PROCEDURE — 99213 OFFICE O/P EST LOW 20 MIN: CPT

## 2018-05-29 NOTE — PROGRESS NOTES
Ankush Ram is a 63 y.o. male. Patient is here today for   Chief Complaint   Patient presents with   • Diabetes   • Hyperlipidemia   • Hypertension          Vitals:    05/29/18 0756   BP: 150/84   Resp: 18   Temp: 97.4 °F (36.3 °C)     The following portions of the patient's history were reviewed and updated as appropriate: allergies, current medications, past family history, past medical history, past social history, past surgical history and problem list.    Past Medical History:   Diagnosis Date   • Arthritis    • Diabetes mellitus    • Hyperlipidemia    • Hypertension    • Pneumonia    • Second degree AV block    • Sinus node dysfunction       No Known Allergies   Social History     Social History   • Marital status:      Spouse name: N/A   • Number of children: N/A   • Years of education: N/A     Occupational History   • Not on file.     Social History Main Topics   • Smoking status: Former Smoker     Quit date: 10/22/2011   • Smokeless tobacco: Not on file   • Alcohol use Yes      Comment: SOCIAL   • Drug use: Unknown   • Sexual activity: Not on file     Other Topics Concern   • Not on file     Social History Narrative   • No narrative on file        Current Outpatient Prescriptions:   •  amLODIPine (NORVASC) 10 MG tablet, TAKE 1 TABLET BY MOUTH EVERY DAY, Disp: 90 tablet, Rfl: 1  •  aspirin 325 MG tablet, Take 325 mg by mouth Daily., Disp: , Rfl:   •  atorvastatin (LIPITOR) 40 MG tablet, TAKE 1 TABLET BY MOUTH EVERY DAY, Disp: 90 tablet, Rfl: 3  •  carvedilol (COREG) 25 MG tablet, TAKE 2 TABLETS BY MOUTH TWICE A DAY, Disp: 360 tablet, Rfl: 3  •  hydrALAZINE (APRESOLINE) 25 MG tablet, Take  by mouth 2 (two) times a day., Disp: , Rfl:   •  potassium chloride (MICRO-K) 10 MEQ CR capsule, TAKE 2 CAPSULES BY MOUTH TWICE A DAY, Disp: 120 capsule, Rfl: 5  •  valsartan-hydrochlorothiazide (DIOVAN-HCT) 320-25 MG per tablet, Take  by mouth daily., Disp: , Rfl:      Objective     History of Present  Illness    The patient is here today for follow-up on type 2 diabetes mellitus, essential hypertension and hyperlipidemia    Review of Systems   Constitutional:        Mild fatigue   HENT: Negative.    Respiratory: Negative for cough, shortness of breath and wheezing.    Cardiovascular: Negative for chest pain, palpitations and leg swelling.   Gastrointestinal: Negative for blood in stool, constipation and diarrhea.        The patient had nausea associated with metformin   Genitourinary: Negative.    Musculoskeletal:        Mild to moderate osteoarthritic aches and pains   Hematological: Negative.    Psychiatric/Behavioral: Negative.        Physical Exam   Constitutional: He is oriented to person, place, and time. He appears well-developed and well-nourished.   Overweight   Neck:   Carotid pulses normal   Cardiovascular: Normal rate, regular rhythm and normal heart sounds.    Pulmonary/Chest: Effort normal and breath sounds normal. No respiratory distress. He has no wheezes. He has no rales.   Abdominal: Soft. Bowel sounds are normal. He exhibits no distension. There is no tenderness.   Musculoskeletal:   Osteoarthritic changes in multiple joints   Neurological: He is alert and oriented to person, place, and time.   Skin: Skin is warm and dry.   Psychiatric: He has a normal mood and affect.   Nursing note and vitals reviewed.      ASSESSMENT  #1 type 2 diabetes mellitus, diet controlled              #2 essential hypertension                    #3 hyperlipidemia                  #4 history of abnormal CT    DISCUSSION/SUMMARY   The patient's blood pressure is elevated today at 150/84.  The patient states that he has not yet taken his blood pressure medicine.  I explained to him that his blood pressure shouldn't come up even if he hadn't taken his blood pressure medicine this morning.  He will closely monitor his blood pressures at home over the next 2 weeks and bring his readings in.  We may have to give him more  medication.  CMP is normal except for elevated fasting blood sugar of 134 and the patient's hemoglobin A1c is 6.5%.  In the past patient was unable to take metformin due to nausea.  The patient does not want to take medications for this and I explained to him that he must lose weight in order to get his blood sugar down.  Hepatitis C antibody titer was normal.  Total cholesterol is 155, triglycerides 79, HDL cholesterol 45 and LDL cholesterol is 94.  Urine for microalbumin was abnormal.  I explained to the patient that this is another reason we must get his blood sugar down.  Back in late March or early April the patient had 2 episodes of hemoptysis and went to the emergency room.  A CT angiogram at that time showed slight nodularity in the left lower lobe and a repeat CT was suggested in 3-4 months.  The patient is concerned about this and I told him to call us right before July 1 and we will set this up for early July rather than waiting for months ;we will do this in 3 months.    PLAN  Recheck in 6 months with fasting CMP, lipid panel hemoglobin A1c and PSA  No Follow-up on file.

## 2018-07-12 RX ORDER — AMLODIPINE BESYLATE 10 MG/1
TABLET ORAL
Qty: 90 TABLET | Refills: 1 | Status: SHIPPED | OUTPATIENT
Start: 2018-07-12 | End: 2018-11-12

## 2018-07-16 ENCOUNTER — TELEPHONE (OUTPATIENT)
Dept: FAMILY MEDICINE CLINIC | Facility: CLINIC | Age: 63
End: 2018-07-16

## 2018-07-16 DIAGNOSIS — R91.1 LUNG NODULE SEEN ON IMAGING STUDY: Primary | ICD-10-CM

## 2018-07-16 NOTE — TELEPHONE ENCOUNTER
"Ordered     ----- Message from Andree Fernandez MA sent at 7/12/2018 11:37 AM EDT -----  Contact: PT'S WIFE VAN- [phone]  PT SAW DR. HAMILTON IN MAY FOR A HOSPITAL FOLLOW UP FROM April- PT HAD A CT SCAN DONE ON 04/11/2018 AND DR. HAMILTON SAID IN HIS LAST OFFICE VISIT NOTE: \"A CT angiogram at that time showed slight nodularity in the left lower lobe and a repeat CT was suggested in 3-4 months.  The patient is concerned about this and I told him to call us right before July 1 and we will set this up for early July rather than waiting for months ;we will do this in 3 months.\" CAN YOU PLEASE PUT ORDER IN FOR THIS? THANK YOU. PT DID HAVE LAST CT DONE AT [facility], BUT THEY SAID DR. HAMILTON SAID HE COULD HAVE NEXT ONE DONE AT [place]. PLEASE CALL PT'S WIFE WITH ANY QUESTIONS. [phone]. THANK YOU.        "

## 2018-07-31 ENCOUNTER — HOSPITAL ENCOUNTER (OUTPATIENT)
Dept: CT IMAGING | Facility: HOSPITAL | Age: 63
Discharge: HOME | End: 2018-07-31

## 2018-07-31 PROCEDURE — 71250 CT THORAX DX C-: CPT

## 2018-08-02 DIAGNOSIS — R91.8 ABNORMAL CT SCAN OF LUNG: Primary | ICD-10-CM

## 2018-09-17 RX ORDER — POTASSIUM CHLORIDE 750 MG/1
CAPSULE, EXTENDED RELEASE ORAL
Qty: 120 CAPSULE | Refills: 5 | Status: SHIPPED | OUTPATIENT
Start: 2018-09-17 | End: 2018-11-12

## 2018-10-11 ENCOUNTER — ANESTHESIA EVENT (OUTPATIENT)
Dept: GASTROENTEROLOGY | Facility: HOSPITAL | Age: 63
End: 2018-10-11

## 2018-10-11 ENCOUNTER — APPOINTMENT (OUTPATIENT)
Dept: GENERAL RADIOLOGY | Facility: HOSPITAL | Age: 63
End: 2018-10-11

## 2018-10-11 ENCOUNTER — HOSPITAL ENCOUNTER (OUTPATIENT)
Facility: HOSPITAL | Age: 63
Setting detail: HOSPITAL OUTPATIENT SURGERY
Discharge: HOME | End: 2018-10-11
Attending: INTERNAL MEDICINE | Admitting: INTERNAL MEDICINE

## 2018-10-11 ENCOUNTER — ANESTHESIA (OUTPATIENT)
Dept: GASTROENTEROLOGY | Facility: HOSPITAL | Age: 63
End: 2018-10-11

## 2018-10-11 VITALS
OXYGEN SATURATION: 94 % | TEMPERATURE: 97.4 F | WEIGHT: 237 LBS | HEIGHT: 68 IN | BODY MASS INDEX: 35.92 KG/M2 | RESPIRATION RATE: 16 BRPM | SYSTOLIC BLOOD PRESSURE: 110 MMHG | HEART RATE: 66 BPM | DIASTOLIC BLOOD PRESSURE: 56 MMHG

## 2018-10-11 DIAGNOSIS — R91.8 LUNG MASS: ICD-10-CM

## 2018-10-11 LAB
APPEARANCE FLD: ABNORMAL
COLOR FLD: ABNORMAL
GIE STN SPEC: NORMAL
GIE STN SPEC: NORMAL
LYMPHOCYTES NFR FLD MANUAL: 15 %
MONOS+MACROS NFR FLD: 65 %
NEUTROPHILS NFR FLD MANUAL: 20 %
RBC # FLD AUTO: 9875 /MM3
WBC # FLD: 665 /MM3

## 2018-10-11 PROCEDURE — 87071 CULTURE AEROBIC QUANT OTHER: CPT | Performed by: INTERNAL MEDICINE

## 2018-10-11 PROCEDURE — 71045 X-RAY EXAM CHEST 1 VIEW: CPT

## 2018-10-11 PROCEDURE — 87116 MYCOBACTERIA CULTURE: CPT | Performed by: INTERNAL MEDICINE

## 2018-10-11 PROCEDURE — 25010000002 PROPOFOL 10 MG/ML EMULSION: Performed by: ANESTHESIOLOGY

## 2018-10-11 PROCEDURE — 87205 SMEAR GRAM STAIN: CPT | Performed by: INTERNAL MEDICINE

## 2018-10-11 PROCEDURE — 88341 IMHCHEM/IMCYTCHM EA ADD ANTB: CPT | Performed by: INTERNAL MEDICINE

## 2018-10-11 PROCEDURE — 89051 BODY FLUID CELL COUNT: CPT | Performed by: INTERNAL MEDICINE

## 2018-10-11 PROCEDURE — 88342 IMHCHEM/IMCYTCHM 1ST ANTB: CPT | Performed by: INTERNAL MEDICINE

## 2018-10-11 PROCEDURE — 88305 TISSUE EXAM BY PATHOLOGIST: CPT | Performed by: INTERNAL MEDICINE

## 2018-10-11 PROCEDURE — 87206 SMEAR FLUORESCENT/ACID STAI: CPT | Performed by: INTERNAL MEDICINE

## 2018-10-11 PROCEDURE — 88112 CYTOPATH CELL ENHANCE TECH: CPT | Performed by: INTERNAL MEDICINE

## 2018-10-11 PROCEDURE — 76000 FLUOROSCOPY <1 HR PHYS/QHP: CPT

## 2018-10-11 PROCEDURE — 87102 FUNGUS ISOLATION CULTURE: CPT | Performed by: INTERNAL MEDICINE

## 2018-10-11 RX ORDER — GLYCOPYRROLATE 0.2 MG/ML
INJECTION INTRAMUSCULAR; INTRAVENOUS AS NEEDED
Status: DISCONTINUED | OUTPATIENT
Start: 2018-10-11 | End: 2018-10-11 | Stop reason: SURG

## 2018-10-11 RX ORDER — LIDOCAINE HYDROCHLORIDE 10 MG/ML
INJECTION, SOLUTION EPIDURAL; INFILTRATION; INTRACAUDAL; PERINEURAL AS NEEDED
Status: DISCONTINUED | OUTPATIENT
Start: 2018-10-11 | End: 2018-10-11 | Stop reason: HOSPADM

## 2018-10-11 RX ORDER — LIDOCAINE HYDROCHLORIDE 20 MG/ML
INJECTION, SOLUTION INFILTRATION; PERINEURAL AS NEEDED
Status: DISCONTINUED | OUTPATIENT
Start: 2018-10-11 | End: 2018-10-11 | Stop reason: SURG

## 2018-10-11 RX ORDER — PROPOFOL 10 MG/ML
VIAL (ML) INTRAVENOUS AS NEEDED
Status: DISCONTINUED | OUTPATIENT
Start: 2018-10-11 | End: 2018-10-11 | Stop reason: SURG

## 2018-10-11 RX ORDER — SODIUM CHLORIDE, SODIUM LACTATE, POTASSIUM CHLORIDE, CALCIUM CHLORIDE 600; 310; 30; 20 MG/100ML; MG/100ML; MG/100ML; MG/100ML
1000 INJECTION, SOLUTION INTRAVENOUS CONTINUOUS
Status: DISCONTINUED | OUTPATIENT
Start: 2018-10-11 | End: 2018-10-11 | Stop reason: HOSPADM

## 2018-10-11 RX ADMIN — LIDOCAINE HYDROCHLORIDE 60 MG: 20 INJECTION, SOLUTION INFILTRATION; PERINEURAL at 09:40

## 2018-10-11 RX ADMIN — PROPOFOL 200 MG: 10 INJECTION, EMULSION INTRAVENOUS at 09:45

## 2018-10-11 RX ADMIN — PROPOFOL 200 MG: 10 INJECTION, EMULSION INTRAVENOUS at 09:43

## 2018-10-11 RX ADMIN — GLYCOPYRROLATE 0.2 MG: 0.2 INJECTION INTRAMUSCULAR; INTRAVENOUS at 09:40

## 2018-10-11 RX ADMIN — SODIUM CHLORIDE, POTASSIUM CHLORIDE, SODIUM LACTATE AND CALCIUM CHLORIDE 1000 ML: 600; 310; 30; 20 INJECTION, SOLUTION INTRAVENOUS at 08:51

## 2018-10-11 RX ADMIN — PROPOFOL 100 MG: 10 INJECTION, EMULSION INTRAVENOUS at 09:50

## 2018-10-11 NOTE — ANESTHESIA PROCEDURE NOTES
Airway  Urgency: elective    Date/Time: 10/11/2018 9:46 AM    General Information and Staff    Patient location during procedure: OR  Anesthesiologist: JALIL GODDARD    Indications and Patient Condition  Indications for airway management: airway protection    Preoxygenated: yes  MILS maintained throughout      Final Airway Details  Final airway type: supraglottic airway      Successful airway: classic  Size 4    Number of attempts at approach: 1

## 2018-10-11 NOTE — OP NOTE
Bronchoscopy Procedure Note    Procedure:  1. Bronchoscopy, Diagnostic    Pre-Operative Diagnosis:  Lung mass    Post-Operative Diagnosis: Same    Indication:  Lung mass    Anesthesia: Monitored Anesthesia Care (MAC)    Procedure Details: Patient was consented for the procedure with all risk and benefit of the procedure explained in detail.  Patient was given the opportunity to ask questions and all concerns were answered.  The bronchocope was inserted into the main airway via the LMA. An anatomical survey was done of the main airways and the subsegmental bronchus to at least the first subsegmental level of all five lobes of both lungs.  The findings are reported below.  A bronchoalveolar lavage was performed using aliquots of normal saline instilled into the airways then aspirated back.    Findings:  Bronchoscope passed through LMA to the level of the vocal cords.  Lidocaine used for local anesthetic over vocal cords.  Bronchoscope was passed between the vocal cords into the trachea.  All airways were visualized to at least the first subsegment level of all 5 lobes of both lungs.  Airways were of normal size and caliber.  No endobronchial lesions seen.  Fluoroscopically guided transbronchial brushings left lower lobe x 3 passes.  Fluoroscopically guided transbronchial biopsies left lower lobe x 4 passes.  Bronchial alveolar lavage performed in the left lower lobe with 240cc saline instilled and 65cc blood tinged return.      Estimated Blood Loss:  Minimal           Specimens:  As above                Complications:  None; patient tolerated the procedure well.           Disposition: PACU - hemodynamically stable.      Patient tolerated the procedure well.    Akil Ortiz MD  10/11/2018  9:59 AM

## 2018-10-11 NOTE — DISCHARGE INSTRUCTIONS
Flexible Bronchoscopy, Care After  DR GILBERT [phone]  NOTHING BY MOUTH UNTIL 12:00  These instructions give you information on caring for yourself after your procedure. Your doctor may also give you more specific instructions. Call your doctor if you have any problems or questions after your procedure.  Follow these instructions at home:  · Do not eat or drink anything for 2 hours after your procedure. If you try to eat or drink before the medicine wears off, food or drink could go into your lungs. You could also burn yourself.  · After 2 hours have passed and when you can cough and gag normally, you may eat soft food and drink liquids slowly.  · The day after the test, you may eat your normal diet.  · You may do your normal activities.  · Keep all doctor visits.  Get help right away if:  · You get more and more short of breath.  · You get light-headed.  · You feel like you are going to pass out (faint).  · You have chest pain.  · You have new problems that worry you.  · You cough up more than a little blood.  · You cough up more blood than before.  This information is not intended to replace advice given to you by your health care provider. Make sure you discuss any questions you have with your health care provider.  Document Released: 10/15/2010 Document Revised: 05/25/2017 Document Reviewed: 08/22/2014  Elsevier Interactive Patient Education © 2017 [employer]

## 2018-10-11 NOTE — ANESTHESIA POSTPROCEDURE EVALUATION
"Patient: Alexy Ram    Procedure Summary     Date:  10/11/18 Room / Location:  [facility] ENDOSCOPY 7 /  [facility]    Anesthesia Start:  0937 Anesthesia Stop:  1022    Procedure:  BRONCHOSCOPY WITH FLUORO, LEFT LOWER LOBE BRUSHINGS WET AND DRY. WITH BX'S AND BAL (IN LLL). (N/A Bronchus) Diagnosis:      Surgeon:  Akil Ortiz MD Provider:  David Dalal MD    Anesthesia Type:  general ASA Status:  3          Anesthesia Type: general  Last vitals  BP   110/56 (10/11/18 1032)   Temp   36.8 °C (98.3 °F) (10/11/18 0839)   Pulse   66 (10/11/18 1041)   Resp   16 (10/11/18 1041)     SpO2   94 % (10/11/18 1041)     Post Anesthesia Care and Evaluation    Patient location during evaluation: PACU  Patient participation: complete - patient participated  Level of consciousness: awake  Pain score: 0  Pain management: adequate  Airway patency: patent  Anesthetic complications: No anesthetic complications  PONV Status: none  Cardiovascular status: acceptable  Respiratory status: acceptable  Hydration status: acceptable    Comments: /56 (BP Location: Left arm, Patient Position: Lying)   Pulse 66   Temp 36.8 °C (98.3 °F) (Oral)   Resp 16   Ht 172.7 cm (67.99\")   Wt 108 kg (237 lb)   SpO2 94%   BMI 36.04 kg/m²       "

## 2018-10-11 NOTE — ANESTHESIA PREPROCEDURE EVALUATION
Anesthesia Evaluation     Patient summary reviewed and Nursing notes reviewed                Airway   Mallampati: I  TM distance: >3 FB  Neck ROM: full  No difficulty expected  Dental - normal exam     Pulmonary - normal exam   (+) pneumonia , a smoker Former,   Cardiovascular - normal exam    (+) hypertension, dysrhythmias, hyperlipidemia,       Neuro/Psych- negative ROS  GI/Hepatic/Renal/Endo    (+)   diabetes mellitus,     Musculoskeletal     Abdominal  - normal exam    Bowel sounds: normal.   Substance History - negative use     OB/GYN negative ob/gyn ROS         Other   (+) arthritis                     Anesthesia Plan    ASA 3     general   total IV anesthesia  Anesthetic plan, all risks, benefits, and alternatives have been provided, discussed and informed consent has been obtained with: patient.

## 2018-10-13 LAB
BACTERIA SPEC AEROBE CULT: NORMAL
GRAM STN SPEC: NORMAL

## 2018-10-15 LAB
CYTO UR: NORMAL
LAB AP CASE REPORT: NORMAL
PATH REPORT.FINAL DX SPEC: NORMAL
PATH REPORT.GROSS SPEC: NORMAL

## 2018-10-17 LAB
CYTO UR: NORMAL
LAB AP CASE REPORT: NORMAL
LAB AP INTRADEPARTMENTAL CONSULT: NORMAL
PATH REPORT.ADDENDUM SPEC: NORMAL
PATH REPORT.FINAL DX SPEC: NORMAL
PATH REPORT.GROSS SPEC: NORMAL

## 2018-10-19 ENCOUNTER — TRANSCRIBE ORDERS (OUTPATIENT)
Dept: ADMINISTRATIVE | Facility: HOSPITAL | Age: 63
End: 2018-10-19

## 2018-10-19 DIAGNOSIS — C34.92 SQUAMOUS CELL LUNG CANCER, LEFT (HCC): Primary | ICD-10-CM

## 2018-10-24 ENCOUNTER — HOSPITAL ENCOUNTER (OUTPATIENT)
Dept: PET IMAGING | Facility: HOSPITAL | Age: 63
Discharge: HOME | End: 2018-10-24
Attending: INTERNAL MEDICINE

## 2018-10-24 ENCOUNTER — HOSPITAL ENCOUNTER (OUTPATIENT)
Dept: PET IMAGING | Facility: HOSPITAL | Age: 63
Discharge: HOME | End: 2018-10-24
Attending: INTERNAL MEDICINE | Admitting: INTERNAL MEDICINE

## 2018-10-24 DIAGNOSIS — C34.92 SQUAMOUS CELL LUNG CANCER, LEFT (HCC): ICD-10-CM

## 2018-10-24 LAB — GLUCOSE BLDC GLUCOMTR-MCNC: 99 MG/DL (ref 70–130)

## 2018-10-24 PROCEDURE — 82962 GLUCOSE BLOOD TEST: CPT

## 2018-10-24 PROCEDURE — A9552 F18 FDG: HCPCS | Performed by: INTERNAL MEDICINE

## 2018-10-24 PROCEDURE — 78815 PET IMAGE W/CT SKULL-THIGH: CPT

## 2018-10-24 PROCEDURE — 0 FLUDEOXYGLUCOSE F18 SOLUTION: Performed by: INTERNAL MEDICINE

## 2018-10-24 RX ADMIN — FLUDEOXYGLUCOSE F18 1 DOSE: 300 INJECTION INTRAVENOUS at 13:10

## 2018-11-01 ENCOUNTER — OFFICE VISIT (OUTPATIENT)
Dept: OTHER | Facility: HOSPITAL | Age: 63
End: 2018-11-01
Attending: THORACIC SURGERY (CARDIOTHORACIC VASCULAR SURGERY)

## 2018-11-01 ENCOUNTER — DOCUMENTATION (OUTPATIENT)
Dept: OTHER | Facility: HOSPITAL | Age: 63
End: 2018-11-01

## 2018-11-01 ENCOUNTER — APPOINTMENT (OUTPATIENT)
Dept: OTHER | Facility: HOSPITAL | Age: 63
End: 2018-11-01

## 2018-11-01 ENCOUNTER — OFFICE VISIT (OUTPATIENT)
Dept: OTHER | Facility: HOSPITAL | Age: 63
End: 2018-11-01
Attending: INTERNAL MEDICINE

## 2018-11-01 ENCOUNTER — PREP FOR SURGERY (OUTPATIENT)
Dept: OTHER | Facility: HOSPITAL | Age: 63
End: 2018-11-01

## 2018-11-01 VITALS
WEIGHT: 242 LBS | BODY MASS INDEX: 36.68 KG/M2 | HEART RATE: 72 BPM | RESPIRATION RATE: 16 BRPM | DIASTOLIC BLOOD PRESSURE: 90 MMHG | TEMPERATURE: 97.3 F | SYSTOLIC BLOOD PRESSURE: 153 MMHG | HEIGHT: 68 IN | OXYGEN SATURATION: 94 %

## 2018-11-01 VITALS
DIASTOLIC BLOOD PRESSURE: 90 MMHG | RESPIRATION RATE: 16 BRPM | OXYGEN SATURATION: 94 % | TEMPERATURE: 97.3 F | HEART RATE: 72 BPM | BODY MASS INDEX: 36.68 KG/M2 | WEIGHT: 242 LBS | HEIGHT: 68 IN | SYSTOLIC BLOOD PRESSURE: 153 MMHG

## 2018-11-01 DIAGNOSIS — C34.92 SQUAMOUS CELL CARCINOMA OF LEFT LUNG (HCC): Primary | ICD-10-CM

## 2018-11-01 DIAGNOSIS — C34.92 SQUAMOUS CELL LUNG CANCER, LEFT (HCC): Primary | ICD-10-CM

## 2018-11-01 PROBLEM — R91.8 PULMONARY INFILTRATE PRESENT ON COMPUTED TOMOGRAPHY: Status: RESOLVED | Noted: 2018-04-12 | Resolved: 2018-11-01

## 2018-11-01 PROCEDURE — G0463 HOSPITAL OUTPT CLINIC VISIT: HCPCS

## 2018-11-01 PROCEDURE — 99244 OFF/OP CNSLTJ NEW/EST MOD 40: CPT | Performed by: INTERNAL MEDICINE

## 2018-11-01 PROCEDURE — G0463 HOSPITAL OUTPT CLINIC VISIT: HCPCS | Performed by: INTERNAL MEDICINE

## 2018-11-01 PROCEDURE — 99245 OFF/OP CONSLTJ NEW/EST HI 55: CPT | Performed by: THORACIC SURGERY (CARDIOTHORACIC VASCULAR SURGERY)

## 2018-11-01 RX ORDER — SODIUM CHLORIDE 0.9 % (FLUSH) 0.9 %
3-10 SYRINGE (ML) INJECTION AS NEEDED
Status: CANCELLED | OUTPATIENT
Start: 2018-11-19

## 2018-11-01 RX ORDER — CEFAZOLIN SODIUM 2 G/100ML
2 INJECTION, SOLUTION INTRAVENOUS ONCE
Status: CANCELLED | OUTPATIENT
Start: 2018-11-19 | End: 2018-11-01

## 2018-11-01 RX ORDER — SODIUM CHLORIDE 0.9 % (FLUSH) 0.9 %
3 SYRINGE (ML) INJECTION EVERY 12 HOURS SCHEDULED
Status: CANCELLED | OUTPATIENT
Start: 2018-11-19

## 2018-11-01 NOTE — H&P (VIEW-ONLY)
Subjective   Patient ID: Alexy Ram is a 63 y.o. male is being seen for consultation today at the request of Samson Leyva MD    History of Present Illness  Dear Colleague,  Alexy Ram was seen in the lung care center at  today November 1, 2018 as part of our multidisciplinary thoracic oncology clinic.  Together with Dr. Whitt of the [facility] oncology group we have reviewed his history his x-rays and have examined him.  Thank you for asking us to participate in the care of Mr Ram    Patient is a 63-year-old  male.  He is a lifelong smoker of at least one pack of cigarettes per day.  He stopped smoking cigarettes in 2004 and began smoking cigars.  In April of this year he had a moderate amount of hemoptysis.  He was seen in the emergency room at [facility] and was started on treatment for pneumonia.  2 weeks later he had another episode of hemoptysis.  He was then referred to pulmonary medicine and underwent a workup which included bronchoscopy with biopsy showing squamous cell carcinoma.  He has had pulmonary function tests and CT PET scan.  He has been referred here for further evaluation and treatment.    He just recently retired.  He is active and has no significant shortness of breath or wheezing.  He has no pleuritic pain.  He has no unexplained weight loss.  There has been no history of myocardial infarction.  He does have atrial fibrillation and a pacemaker.  He is not on anticoagulation.  He has no prior history of cancer but has a family history of cancer    The following portions of the patient's history were reviewed and updated as appropriate: allergies, current medications, past family history, past medical history, past social history, past surgical history and problem list.  Review of Systems   Constitution: Positive for night sweats.   HENT: Positive for congestion.    Eyes: Negative.    Cardiovascular: Negative.    Respiratory: Negative.     Endocrine: Negative.    Hematologic/Lymphatic: Negative.    Skin: Negative.    Musculoskeletal: Positive for arthritis, back pain, joint pain, muscle cramps, neck pain and stiffness.   Gastrointestinal: Positive for heartburn.   Genitourinary: Negative.    Neurological: Positive for excessive daytime sleepiness and numbness.   Psychiatric/Behavioral: Positive for depression. The patient has insomnia.    All other systems reviewed and are negative.    Patient Active Problem List   Diagnosis   • Hypercholesterolemia   • Hypertension   • Type 2 diabetes mellitus (CMS/HCC)   • Hemoptysis   • Squamous cell lung cancer, left (CMS/HCC)     Past Medical History:   Diagnosis Date   • Arthritis    • Diabetes mellitus (CMS/HCC)     patient says he is not diabetic   • Hyperlipidemia    • Hypertension    • Hypoglycemia    • Lesion of lung     ON RECENT SCAN...   • Lung cancer (CMS/HCC)    • Pneumonia     APRIL 2018   • Second degree AV block    • Sinus node dysfunction (CMS/HCC)      Past Surgical History:   Procedure Laterality Date   • BRONCHOSCOPY N/A 10/11/2018    Procedure: BRONCHOSCOPY WITH FLUORO, LEFT LOWER LOBE BRUSHINGS WET AND DRY. WITH BX'S AND BAL (IN LLL).;  Surgeon: Akil Ortiz MD;  Location: [facility] ENDOSCOPY;  Service: Pulmonary   • KNEE SURGERY     • PACEMAKER IMPLANTATION  2005, 2014     Family History   Problem Relation Age of Onset   • Diabetes Mother    • Stroke Father    • Heart disease Father    • Stroke Sister    • Heart disease Brother    • Malig Hyperthermia Neg Hx      Social History     Social History   • Marital status:      Spouse name: N/A   • Number of children: N/A   • Years of education: N/A     Occupational History   • Not on file.     Social History Main Topics   • Smoking status: Former Smoker     Quit date: 10/22/2011   • Smokeless tobacco: Not on file   • Alcohol use Yes      Comment: SOCIAL   • Drug use: Unknown   • Sexual activity: Defer     Other Topics Concern   • Not  on file     Social History Narrative   • No narrative on file       Current Outpatient Prescriptions:   •  amLODIPine (NORVASC) 10 MG tablet, TAKE 1 TABLET BY MOUTH EVERY DAY, Disp: 90 tablet, Rfl: 1  •  aspirin 325 MG tablet, Take 325 mg by mouth Daily. HOLDING, Disp: , Rfl:   •  atorvastatin (LIPITOR) 40 MG tablet, TAKE 1 TABLET BY MOUTH EVERY DAY, Disp: 90 tablet, Rfl: 3  •  carvedilol (COREG) 25 MG tablet, TAKE 2 TABLETS BY MOUTH TWICE A DAY, Disp: 360 tablet, Rfl: 3  •  hydrALAZINE (APRESOLINE) 25 MG tablet, Take 25 mg by mouth 2 (Two) Times a Day., Disp: , Rfl:   •  potassium chloride (MICRO-K) 10 MEQ CR capsule, TAKE 2 CAPSULES BY MOUTH TWICE A DAY, Disp: 120 capsule, Rfl: 5  •  valsartan-hydrochlorothiazide (DIOVAN-HCT) 320-25 MG per tablet, Take 1 tablet by mouth Daily., Disp: , Rfl:   No Known Allergies     Objective   Vitals:    11/01/18 0844   BP: 153/90   Pulse: 72   Resp: 16   Temp: 97.3 °F (36.3 °C)   SpO2: 94%     Physical Exam   Constitutional: He is oriented to person, place, and time. He appears well-developed and well-nourished.   HENT:   Head: Normocephalic.   Eyes: Pupils are equal, round, and reactive to light. Conjunctivae, EOM and lids are normal.   Neck: Trachea normal and normal range of motion. Neck supple. No hepatojugular reflux and no JVD present. Carotid bruit is not present. No thyroid mass and no thyromegaly present.   Cardiovascular: Normal rate, regular rhythm, S1 normal, S2 normal, normal heart sounds and normal pulses.   No extrasystoles are present. PMI is not displaced.    Pulmonary/Chest: Effort normal and breath sounds normal.   Abdominal: Soft. Normal appearance and bowel sounds are normal. He exhibits no mass. There is no hepatosplenomegaly. There is no tenderness. No hernia.   Musculoskeletal: Normal range of motion.   Neurological: He is alert and oriented to person, place, and time. He has normal strength and normal reflexes. No cranial nerve deficit or sensory deficit.  He displays a negative Romberg sign.   Skin: Skin is warm, dry and intact.   Psychiatric: He has a normal mood and affect. His speech is normal and behavior is normal. Judgment and thought content normal. Cognition and memory are normal.     Independent Review of Radiographic Studies:    CT PET scan performed October 24, 2018 was independently reviewed.  3.8 x 1.8 cm mass in the medial basilar aspect of the left lower lobe is PET positive.  Some hilar and mediastinal lymph nodes which are small I have mild uptake which is below that of blood pool level.  There is no other evidence for cancer within the neck chest abdomen or pelvis.    Pathology:  Final Diagnosis   1.  LEFT LOWER LOBE LAVAGE:              POSITIVE FOR SQUAMOUS CELL CARCINOMA.     2.  LEFT LOWER LOBE WET AND DRY BRUSHING:             POSITIVE FOR SQUAMOUS CELL CARCINOMA.         Final Diagnosis   1.  LEFT LUNG, LOWER LOBE, ENDOBRONCHIAL BIOPSIES:                SQUAMOUS CARCINOMA.     COMMENT: Immunostains with appropriate controls are positive for CK5/6 and P63.  Stains are negative for CK7, CK20, TTF1, Napsin, CDX2, Villin, PSA, and GATA3.  These results support the above interpretation. Sent for PD-L1 (Keytruda) testing per standing medical staff order. Report to follow.     [facility]/th/jse  [facility]/a/CMK     Pulmonary function tests:    FVC is 3.66 which is 87% of predicted.  FEV1 is 2.74 which is 87% of predicted.  FEV1 FVC ratio is 75.  Diffusion capacity DLCO is 27.7 which is 97% of predicted.  Assessment/Plan     It appears that this gentleman has a clinical T2A N0 M0 stage IB squamous cell carcinoma of the left lower lobe of the lung.  I have recommended robot assisted left lower lobectomy.  I've explained the procedure as well as the risks and benefits.  All questions have been answered.  The patient has requested that we proceed.    He will be evaluated by cardiology for clearance for surgery.  Once we have his cardiac clearance he will be scheduled  for surgery.  I will keep you informed of his progress.  Thank you for allowing us to participate in the care Mr. Ram    Diagnoses and all orders for this visit:    Squamous cell lung cancer, left (CMS/HCC)  -     Case Request  -     Ambulatory Referral to Cardiology

## 2018-11-01 NOTE — PROGRESS NOTES
Subjective   Patient ID: Alexy Ram is a 63 y.o. male is being seen for consultation today at the request of Samson Leyva MD    History of Present Illness  Dear Colleague,  Alexy Ram was seen in the lung care center at [facility] today November 1, 2018 as part of our multidisciplinary thoracic oncology clinic.  Together with Dr. Whitt of the [facility] oncology group we have reviewed his history his x-rays and have examined him.  Thank you for asking us to participate in the care of Mr Ram    Patient is a 63-year-old  male.  He is a lifelong smoker of at least one pack of cigarettes per day.  He stopped smoking cigarettes in 2004 and began smoking cigars.  In April of this year he had a moderate amount of hemoptysis.  He was seen in the emergency room at [facility] and was started on treatment for pneumonia.  2 weeks later he had another episode of hemoptysis.  He was then referred to pulmonary medicine and underwent a workup which included bronchoscopy with biopsy showing squamous cell carcinoma.  He has had pulmonary function tests and CT PET scan.  He has been referred here for further evaluation and treatment.    He just recently retired.  He is active and has no significant shortness of breath or wheezing.  He has no pleuritic pain.  He has no unexplained weight loss.  There has been no history of myocardial infarction.  He does have atrial fibrillation and a pacemaker.  He is not on anticoagulation.  He has no prior history of cancer but has a family history of cancer    The following portions of the patient's history were reviewed and updated as appropriate: allergies, current medications, past family history, past medical history, past social history, past surgical history and problem list.  Review of Systems   Constitution: Positive for night sweats.   HENT: Positive for congestion.    Eyes: Negative.    Cardiovascular: Negative.    Respiratory: Negative.     Endocrine: Negative.    Hematologic/Lymphatic: Negative.    Skin: Negative.    Musculoskeletal: Positive for arthritis, back pain, joint pain, muscle cramps, neck pain and stiffness.   Gastrointestinal: Positive for heartburn.   Genitourinary: Negative.    Neurological: Positive for excessive daytime sleepiness and numbness.   Psychiatric/Behavioral: Positive for depression. The patient has insomnia.    All other systems reviewed and are negative.    Patient Active Problem List   Diagnosis   • Hypercholesterolemia   • Hypertension   • Type 2 diabetes mellitus (CMS/HCC)   • Hemoptysis   • Squamous cell lung cancer, left (CMS/HCC)     Past Medical History:   Diagnosis Date   • Arthritis    • Diabetes mellitus (CMS/HCC)     patient says he is not diabetic   • Hyperlipidemia    • Hypertension    • Hypoglycemia    • Lesion of lung     ON RECENT SCAN...   • Lung cancer (CMS/HCC)    • Pneumonia     APRIL 2018   • Second degree AV block    • Sinus node dysfunction (CMS/HCC)      Past Surgical History:   Procedure Laterality Date   • BRONCHOSCOPY N/A 10/11/2018    Procedure: BRONCHOSCOPY WITH FLUORO, LEFT LOWER LOBE BRUSHINGS WET AND DRY. WITH BX'S AND BAL (IN LLL).;  Surgeon: Akil Ortiz MD;  Location: [facility] ENDOSCOPY;  Service: Pulmonary   • KNEE SURGERY     • PACEMAKER IMPLANTATION  2005, 2014     Family History   Problem Relation Age of Onset   • Diabetes Mother    • Stroke Father    • Heart disease Father    • Stroke Sister    • Heart disease Brother    • Malig Hyperthermia Neg Hx      Social History     Social History   • Marital status:      Spouse name: N/A   • Number of children: N/A   • Years of education: N/A     Occupational History   • Not on file.     Social History Main Topics   • Smoking status: Former Smoker     Quit date: 10/22/2011   • Smokeless tobacco: Not on file   • Alcohol use Yes      Comment: SOCIAL   • Drug use: Unknown   • Sexual activity: Defer     Other Topics Concern   • Not  on file     Social History Narrative   • No narrative on file       Current Outpatient Prescriptions:   •  amLODIPine (NORVASC) 10 MG tablet, TAKE 1 TABLET BY MOUTH EVERY DAY, Disp: 90 tablet, Rfl: 1  •  aspirin 325 MG tablet, Take 325 mg by mouth Daily. HOLDING, Disp: , Rfl:   •  atorvastatin (LIPITOR) 40 MG tablet, TAKE 1 TABLET BY MOUTH EVERY DAY, Disp: 90 tablet, Rfl: 3  •  carvedilol (COREG) 25 MG tablet, TAKE 2 TABLETS BY MOUTH TWICE A DAY, Disp: 360 tablet, Rfl: 3  •  hydrALAZINE (APRESOLINE) 25 MG tablet, Take 25 mg by mouth 2 (Two) Times a Day., Disp: , Rfl:   •  potassium chloride (MICRO-K) 10 MEQ CR capsule, TAKE 2 CAPSULES BY MOUTH TWICE A DAY, Disp: 120 capsule, Rfl: 5  •  valsartan-hydrochlorothiazide (DIOVAN-HCT) 320-25 MG per tablet, Take 1 tablet by mouth Daily., Disp: , Rfl:   No Known Allergies     Objective   Vitals:    11/01/18 0844   BP: 153/90   Pulse: 72   Resp: 16   Temp: 97.3 °F (36.3 °C)   SpO2: 94%     Physical Exam   Constitutional: He is oriented to person, place, and time. He appears well-developed and well-nourished.   HENT:   Head: Normocephalic.   Eyes: Pupils are equal, round, and reactive to light. Conjunctivae, EOM and lids are normal.   Neck: Trachea normal and normal range of motion. Neck supple. No hepatojugular reflux and no JVD present. Carotid bruit is not present. No thyroid mass and no thyromegaly present.   Cardiovascular: Normal rate, regular rhythm, S1 normal, S2 normal, normal heart sounds and normal pulses.   No extrasystoles are present. PMI is not displaced.    Pulmonary/Chest: Effort normal and breath sounds normal.   Abdominal: Soft. Normal appearance and bowel sounds are normal. He exhibits no mass. There is no hepatosplenomegaly. There is no tenderness. No hernia.   Musculoskeletal: Normal range of motion.   Neurological: He is alert and oriented to person, place, and time. He has normal strength and normal reflexes. No cranial nerve deficit or sensory deficit.  He displays a negative Romberg sign.   Skin: Skin is warm, dry and intact.   Psychiatric: He has a normal mood and affect. His speech is normal and behavior is normal. Judgment and thought content normal. Cognition and memory are normal.     Independent Review of Radiographic Studies:    CT PET scan performed October 24, 2018 was independently reviewed.  3.8 x 1.8 cm mass in the medial basilar aspect of the left lower lobe is PET positive.  Some hilar and mediastinal lymph nodes which are small I have mild uptake which is below that of blood pool level.  There is no other evidence for cancer within the neck chest abdomen or pelvis.    Pathology:  Final Diagnosis   1.  LEFT LOWER LOBE LAVAGE:              POSITIVE FOR SQUAMOUS CELL CARCINOMA.     2.  LEFT LOWER LOBE WET AND DRY BRUSHING:             POSITIVE FOR SQUAMOUS CELL CARCINOMA.         Final Diagnosis   1.  LEFT LUNG, LOWER LOBE, ENDOBRONCHIAL BIOPSIES:                SQUAMOUS CARCINOMA.     COMMENT: Immunostains with appropriate controls are positive for CK5/6 and P63.  Stains are negative for CK7, CK20, TTF1, Napsin, CDX2, Villin, PSA, and GATA3.  These results support the above interpretation. Sent for PD-L1 (Keytruda) testing per standing medical staff order. Report to follow.     [facility]/th/jse  [facility]/a/CMK     Pulmonary function tests:    FVC is 3.66 which is 87% of predicted.  FEV1 is 2.74 which is 87% of predicted.  FEV1 FVC ratio is 75.  Diffusion capacity DLCO is 27.7 which is 97% of predicted.  Assessment/Plan     It appears that this gentleman has a clinical T2A N0 M0 stage IB squamous cell carcinoma of the left lower lobe of the lung.  I have recommended robot assisted left lower lobectomy.  I've explained the procedure as well as the risks and benefits.  All questions have been answered.  The patient has requested that we proceed.    He will be evaluated by cardiology for clearance for surgery.  Once we have his cardiac clearance he will be scheduled  for surgery.  I will keep you informed of his progress.  Thank you for allowing us to participate in the care Mr. Ram    Diagnoses and all orders for this visit:    Squamous cell lung cancer, left (CMS/HCC)  -     Case Request  -     Ambulatory Referral to Cardiology

## 2018-11-01 NOTE — PROGRESS NOTES
Subjective     REASON FOR CONSULTATION:  Provide an opinion on any further workup or treatment on:    Newly diagnosed left lung cancer                       REQUESTING PHYSICIAN: Samson Leyva MD      RECORDS OBTAINED: Records of the patients history including those obtained from the referring provider were reviewed and summarized in detail.    HISTORY OF PRESENT ILLNESS:      Alexy Ram is a 63 y.o. patient who was referred to the thoracic clinc for evaluation of lung cancer.   He is seen today accompanied by his wife. He developed hemoptysis in April 2018 that resolved spontaneously. He had a CT scan of the chest that showed a nodularity in the left lung. Follow up in 3-4 months was recommended. He had the follow up CT scan on 7/31/18 which revealed progression of the left lung density. He was referred to Pulmonary and Dr. Ortiz performed a bronchoscopy on 10/11/18. Cytology exam was positive for squamous cell carcinoma.    He report no chest pain or shortness of breath. He no longer has hemoptysis.  He reports congestion. No weight change. He did not notice lymph node enlargement.          Past Medical History:   Diagnosis Date   • Arthritis    • Diabetes mellitus (CMS/HCC)     patient says he is not diabetic   • Hyperlipidemia    • Hypertension    • Hypoglycemia    • Lesion of lung     ON RECENT SCAN...   • Lung cancer (CMS/HCC)    • Pneumonia     APRIL 2018   • Second degree AV block    • Sinus node dysfunction (CMS/HCC)        Past Surgical History:   Procedure Laterality Date   • BRONCHOSCOPY N/A 10/11/2018    Procedure: BRONCHOSCOPY WITH FLUORO, LEFT LOWER LOBE BRUSHINGS WET AND DRY. WITH BX'S AND BAL (IN LLL).;  Surgeon: Akil Ortiz MD;  Location: [facility];  Service: Pulmonary   • KNEE SURGERY     • PACEMAKER IMPLANTATION  2005, 2014       Social History     Social History   • Marital status:      Spouse name: N/A   • Number of children: N/A   • Years of education: N/A      Occupational History   • Not on file.     Social History Main Topics   • Smoking status: Former Smoker     Quit date: 10/22/2011   • Smokeless tobacco: Not on file   • Alcohol use Yes      Comment: SOCIAL   • Drug use: Unknown   • Sexual activity: Defer     Other Topics Concern   • Not on file     Social History Narrative   • No narrative on file       Cancer-related family history is not on file.    MEDICATIONS:    Current Outpatient Prescriptions:   •  amLODIPine (NORVASC) 10 MG tablet, TAKE 1 TABLET BY MOUTH EVERY DAY, Disp: 90 tablet, Rfl: 1  •  aspirin 325 MG tablet, Take 325 mg by mouth Daily. HOLDING, Disp: , Rfl:   •  atorvastatin (LIPITOR) 40 MG tablet, TAKE 1 TABLET BY MOUTH EVERY DAY, Disp: 90 tablet, Rfl: 3  •  carvedilol (COREG) 25 MG tablet, TAKE 2 TABLETS BY MOUTH TWICE A DAY, Disp: 360 tablet, Rfl: 3  •  hydrALAZINE (APRESOLINE) 25 MG tablet, Take 25 mg by mouth 2 (Two) Times a Day., Disp: , Rfl:   •  potassium chloride (MICRO-K) 10 MEQ CR capsule, TAKE 2 CAPSULES BY MOUTH TWICE A DAY, Disp: 120 capsule, Rfl: 5  •  valsartan-hydrochlorothiazide (DIOVAN-HCT) 320-25 MG per tablet, Take 1 tablet by mouth Daily., Disp: , Rfl:      ALLERGIES:  No Known Allergies     REVIEW OF SYSTEMS:  Review of Systems   Constitutional: Negative for chills, fever and unexpected weight change.        Night sweats   HENT: Positive for congestion. Negative for mouth sores, nosebleeds, sore throat and voice change.    Eyes: Negative for visual disturbance.   Respiratory: Negative for cough and shortness of breath.    Cardiovascular: Negative for chest pain and leg swelling.   Gastrointestinal: Negative for abdominal pain, blood in stool, constipation, diarrhea, nausea and vomiting.        Heartburn   Genitourinary: Negative for dysuria, frequency and hematuria.   Musculoskeletal: Negative for arthralgias, back pain and joint swelling.   Skin: Negative for rash.   Neurological: Positive for numbness. Negative for  "dizziness and headaches.   Hematological: Negative for adenopathy. Does not bruise/bleed easily.   Psychiatric/Behavioral: Negative for dysphoric mood. The patient is not nervous/anxious.          Objective   VITAL SIGNS:  Vitals:    11/01/18 0847   BP: 153/90   Pulse: 72   Resp: 16   Temp: 97.3 °F (36.3 °C)   TempSrc: Oral   SpO2: 94%  Comment: room air   Weight: 110 kg (242 lb)   Height: 172.7 cm (67.99\")       Wt Readings from Last 3 Encounters:   11/01/18 110 kg (242 lb)   11/01/18 110 kg (242 lb)   10/11/18 108 kg (237 lb)       PHYSICAL EXAMINATION  GENERAL:  The patient appears in good general condition, not in acute distress.  SKIN: Warm and dry. No skin rashes, ecchymosis or petechiae.  HEAD:  Normocephalic.  EYES:  No Jaundice. No Pallor. Pupils equal. EOMI.  NECK:  Supple with Good ROM. No Thyromegaly. No Masses.  LYMPHATICS:  No cervical or supraclavicular lymphadenopathy.  CHEST: Normal respiratory effort. Lungs clear to auscultation.   CARDIAC:  Normal S1 & S2. No murmurs. No edema.  ABDOMEN:  Non-distended.  EXTREMITIES:  No clubbing. No joint swelling in the hands.   NEUROLOGICAL:  No Focal neurological deficits.         RESULT REVIEW:     Pathology Exam from 10/11/18:  1.  LEFT LOWER LOBE LAVAGE:              POSITIVE FOR SQUAMOUS CELL CARCINOMA.     2.  LEFT LOWER LOBE WET AND DRY BRUSHING:             POSITIVE FOR SQUAMOUS CELL CARCINOMA.    F-18 FDG PET FROM SKULL BASE TO MID THIGH WITH PET/CT FUSION 10/24/18:     HISTORY: Lung cancer.     TECHNIQUE: Radiation dose reduction techniques were utilized, including  automated exposure control and exposure modulation based on body size.   Blood glucose level at time of injection was 99 mg/dL.  6.4 mCi of F-18  FDG were injected and PET was performed from skull base to mid thigh. CT  was obtained for localization and attenuation correction. Time at  injection 1310 hours. PET start time 1433 hours.      Compared with chest CT 07/31/2018.     FINDINGS:    "   Brain: No findings of intracranial pathology or abnormal FDG uptake.     Neck: No findings of FDG avid cervical lymph nodes. Peripherally  calcified 0.8 cm hypodense lesion within the left lobe of thyroid gland  demonstrating uptake mildly above that of blood pool with a max SUV of  3.5.     Chest: There is an irregular left lower lobe pulmonary mass measuring up  to 3.7 x 2.5 cm in greatest axial dimensions. Very few mildly prominent  mediastinal lymph nodes which demonstrate FDG uptake below that of blood  pool and measure less than 1 cm in short axis dimension which are likely  reactive.     A right-sided pacemaker is present. There is no pleural effusion or  pneumothorax.      Abdomen and pelvis:     The spleen, and pancreas have a normal non contrast CT appearance and  demonstrate physiologic FDG uptake.     There is a 1.3 cm hypodense left adrenal nodule with Hounsfield units of  1.5 and uptake similar to that of the liver likely representing an  adenoma.     Hepatic steatosis is present.     Bilateral probable simple renal cysts are present.     The gallbladder is unremarkable.     The bowel demonstrates normal physiologic FDG uptake.     There are no FDG avid or enlarged abdominopelvic lymph nodes by size  criteria.     Severe atherosclerotic calcification of the abdominal aorta and its  major branches is present.     There is no free intraperitoneal fluid or air.     The bladder is unremarkable for its degree of distension.     There is physiologic marrow uptake.  No suspicious lytic or blastic bony  lesions.     IMPRESSION:  1.  Moderate to intense FDG avid, partially calcified left lower lobe  pulmonary mass representing the patient's known malignancy.  2.  Mildly prominent subcentimeter mediastinal lymph nodes which  demonstrate FDG uptake below that of blood pool which are likely  reactive. Attention on follow-up is recommended.  3.  Subcentimeter peripherally calcified hypodense lesion within  the  left thyroid gland demonstrating focal mild FDG uptake. Findings are  nonspecific as both benign and malignant thyroid nodules can demonstrate  FDG uptake. Further evaluation with thyroid sonogram is recommended.  4.  Findings of a left adrenal adenoma as above.     This report was finalized on 10/26/2018 5:15 PM by Dr. Harmeet Baca M.D.        Assessment/Plan   Newly diagnosed left lung squamous cell carcinoma.He initially started to have symptoms of hemoptysis in April and was found to have a slowly enlarging left lung mass.  The PET scan showed a 3.7x2.5 cm lesion in the left lower lobe.  The mediastinal lymph nodes were mildly prominent but were not hypermetabolic and are likely reactive in nature. Clinically, he is considered to have stage IB disease (T1b,N0M0). Testing for PD-L1 was performed and was 1-4% (low expression).     I discussed the case with Dr. Ring who also saw the patient at the thoracic clinic today. Our recommendation is to proceed with surgery with left lower lobectomy and LN sampling. He does not need neoadjuvant therapy.  I explained to the patient that we would review the findings on pathology exam and make recommendations regarding the possible need for adjuvant therapy accordingly.    The patient and his wife asked questions and they were answered to their satisfaction.          Yoshi Pedraza MD  11/01/18

## 2018-11-02 NOTE — PATIENT INSTRUCTIONS
"Pt seen by Dr. Ring and will be scheduled for a robot assisted left lower lobectomy. Pt given booklet “Living With A Diagnosis of Lung Cancer” and pt given VATS dvd and brochure along with \"What to Expect\" VATS/Thoracotomy  handout with discharge instructions. Pt instructed to call nurse navigator with any questions or concerns. Pt given contact cards for Dr. Ring and nurse navigator.      "

## 2018-11-08 LAB
FUNGUS WND CULT: NORMAL
FUNGUS WND CULT: NORMAL

## 2018-11-12 ENCOUNTER — APPOINTMENT (OUTPATIENT)
Dept: PREADMISSION TESTING | Facility: HOSPITAL | Age: 63
End: 2018-11-12

## 2018-11-12 VITALS
HEIGHT: 68 IN | SYSTOLIC BLOOD PRESSURE: 126 MMHG | DIASTOLIC BLOOD PRESSURE: 83 MMHG | RESPIRATION RATE: 20 BRPM | WEIGHT: 241 LBS | TEMPERATURE: 98.2 F | OXYGEN SATURATION: 95 % | HEART RATE: 74 BPM | BODY MASS INDEX: 36.53 KG/M2

## 2018-11-12 DIAGNOSIS — C34.92 SQUAMOUS CELL CARCINOMA OF LEFT LUNG (HCC): ICD-10-CM

## 2018-11-12 LAB
ABO GROUP BLD: NORMAL
ANION GAP SERPL CALCULATED.3IONS-SCNC: 12.2 MMOL/L
BASOPHILS # BLD AUTO: 0.02 10*3/MM3 (ref 0–0.2)
BASOPHILS NFR BLD AUTO: 0.2 % (ref 0–1.5)
BLD GP AB SCN SERPL QL: NEGATIVE
BUN BLD-MCNC: 19 MG/DL (ref 8–23)
BUN/CREAT SERPL: 24.1 (ref 7–25)
CALCIUM SPEC-SCNC: 9 MG/DL (ref 8.6–10.5)
CHLORIDE SERPL-SCNC: 102 MMOL/L (ref 98–107)
CO2 SERPL-SCNC: 25.8 MMOL/L (ref 22–29)
CREAT BLD-MCNC: 0.79 MG/DL (ref 0.76–1.27)
DEPRECATED RDW RBC AUTO: 48.9 FL (ref 37–54)
EOSINOPHIL # BLD AUTO: 0.19 10*3/MM3 (ref 0–0.7)
EOSINOPHIL NFR BLD AUTO: 1.6 % (ref 0.3–6.2)
ERYTHROCYTE [DISTWIDTH] IN BLOOD BY AUTOMATED COUNT: 15.3 % (ref 11.5–14.5)
GFR SERPL CREATININE-BSD FRML MDRD: 99 ML/MIN/1.73
GLUCOSE BLD-MCNC: 155 MG/DL (ref 65–99)
HCT VFR BLD AUTO: 45.1 % (ref 40.4–52.2)
HGB BLD-MCNC: 14.8 G/DL (ref 13.7–17.6)
IMM GRANULOCYTES # BLD: 0.03 10*3/MM3 (ref 0–0.03)
IMM GRANULOCYTES NFR BLD: 0.3 % (ref 0–0.5)
INR PPP: 1.13 (ref 0.9–1.1)
LYMPHOCYTES # BLD AUTO: 2.12 10*3/MM3 (ref 0.9–4.8)
LYMPHOCYTES NFR BLD AUTO: 17.7 % (ref 19.6–45.3)
MCH RBC QN AUTO: 28.2 PG (ref 27–32.7)
MCHC RBC AUTO-ENTMCNC: 32.8 G/DL (ref 32.6–36.4)
MCV RBC AUTO: 85.9 FL (ref 79.8–96.2)
MONOCYTES # BLD AUTO: 1.02 10*3/MM3 (ref 0.2–1.2)
MONOCYTES NFR BLD AUTO: 8.5 % (ref 5–12)
NEUTROPHILS # BLD AUTO: 8.62 10*3/MM3 (ref 1.9–8.1)
NEUTROPHILS NFR BLD AUTO: 72 % (ref 42.7–76)
NRBC BLD MANUAL-RTO: 0 /100 WBC (ref 0–0)
PLATELET # BLD AUTO: 324 10*3/MM3 (ref 140–500)
PMV BLD AUTO: 11.1 FL (ref 6–12)
POTASSIUM BLD-SCNC: 3.2 MMOL/L (ref 3.5–5.2)
PROTHROMBIN TIME: 14.3 SECONDS (ref 11.7–14.2)
RBC # BLD AUTO: 5.25 10*6/MM3 (ref 4.6–6)
RH BLD: POSITIVE
SODIUM BLD-SCNC: 140 MMOL/L (ref 136–145)
T&S EXPIRATION DATE: NORMAL
WBC NRBC COR # BLD: 11.97 10*3/MM3 (ref 4.5–10.7)

## 2018-11-12 PROCEDURE — 36415 COLL VENOUS BLD VENIPUNCTURE: CPT

## 2018-11-12 PROCEDURE — 93005 ELECTROCARDIOGRAM TRACING: CPT

## 2018-11-12 PROCEDURE — 86850 RBC ANTIBODY SCREEN: CPT | Performed by: THORACIC SURGERY (CARDIOTHORACIC VASCULAR SURGERY)

## 2018-11-12 PROCEDURE — 80048 BASIC METABOLIC PNL TOTAL CA: CPT | Performed by: THORACIC SURGERY (CARDIOTHORACIC VASCULAR SURGERY)

## 2018-11-12 PROCEDURE — 86900 BLOOD TYPING SEROLOGIC ABO: CPT | Performed by: THORACIC SURGERY (CARDIOTHORACIC VASCULAR SURGERY)

## 2018-11-12 PROCEDURE — 85610 PROTHROMBIN TIME: CPT | Performed by: THORACIC SURGERY (CARDIOTHORACIC VASCULAR SURGERY)

## 2018-11-12 PROCEDURE — 93010 ELECTROCARDIOGRAM REPORT: CPT | Performed by: INTERNAL MEDICINE

## 2018-11-12 PROCEDURE — 86901 BLOOD TYPING SEROLOGIC RH(D): CPT | Performed by: THORACIC SURGERY (CARDIOTHORACIC VASCULAR SURGERY)

## 2018-11-12 PROCEDURE — 85025 COMPLETE CBC W/AUTO DIFF WBC: CPT | Performed by: THORACIC SURGERY (CARDIOTHORACIC VASCULAR SURGERY)

## 2018-11-12 RX ORDER — HYDRALAZINE HYDROCHLORIDE 25 MG/1
25 TABLET, FILM COATED ORAL 2 TIMES DAILY
COMMUNITY

## 2018-11-12 RX ORDER — ASPIRIN 325 MG
325 TABLET ORAL DAILY
COMMUNITY
End: 2021-10-22 | Stop reason: HOSPADM

## 2018-11-12 RX ORDER — CARVEDILOL 25 MG/1
50 TABLET ORAL 2 TIMES DAILY WITH MEALS
COMMUNITY
End: 2018-12-17 | Stop reason: SDUPTHER

## 2018-11-12 RX ORDER — VALSARTAN AND HYDROCHLOROTHIAZIDE 320; 25 MG/1; MG/1
1 TABLET, FILM COATED ORAL EVERY MORNING
COMMUNITY

## 2018-11-12 RX ORDER — ATORVASTATIN CALCIUM 40 MG/1
40 TABLET, FILM COATED ORAL NIGHTLY
COMMUNITY
End: 2018-12-17 | Stop reason: SDUPTHER

## 2018-11-12 RX ORDER — AMLODIPINE BESYLATE 10 MG/1
10 TABLET ORAL EVERY MORNING
COMMUNITY
End: 2019-03-18

## 2018-11-12 RX ORDER — POTASSIUM CHLORIDE 750 MG/1
20 TABLET, FILM COATED, EXTENDED RELEASE ORAL 2 TIMES DAILY
COMMUNITY
End: 2019-01-16 | Stop reason: SDUPTHER

## 2018-11-12 NOTE — DISCHARGE INSTRUCTIONS
Take the following medications the morning of surgery with a small sip of water:    Amlodipine   Coreg   Hydralazine   Valsartan/HCTZ    General Instructions:  • Do not eat solid food after midnight the night before surgery.  • You may drink clear liquids day of surgery but must stop at least one hour before your hospital arrival time.  • It is beneficial for you to have a clear drink that contains carbohydrates the day of surgery.  We suggest a 12 to 20 ounce bottle of Gatorade or Powerade for non-diabetic patients or a 12 to 20 ounce bottle of G2 or Powerade Zero for diabetic patients. (Pediatric patients, are not advised to drink a 12 to 20 ounce carbohydrate drink)    Clear liquids are liquids you can see through.  Nothing red in color.     Plain water                               Sports drinks  Sodas                                   Gelatin (Jell-O)  Fruit juices without pulp such as white grape juice and apple juice  Popsicles that contain no fruit or yogurt  Tea or coffee (no cream or milk added)  Gatorade / Powerade  G2 / Powerade Zero    • Infants may have breast milk up to four hours before surgery.  • Infants drinking formula may drink formula up to six hours before surgery.   • Patients who avoid smoking, chewing tobacco and alcohol for 4 weeks prior to surgery have a reduced risk of post-operative complications.  Quit smoking as many days before surgery as you can.  • Do not smoke, use chewing tobacco or drink alcohol the day of surgery.   • If applicable bring your C-PAP/ BI-PAP machine.  • Bring any papers given to you in the doctor’s office.  • Wear clean comfortable clothes and socks.  • Do not wear contact lenses or make-up.  Bring a case for your glasses.   • Bring crutches or walker if applicable.  • Remove all piercings.  Leave jewelry and any other valuables at home.  • Hair extensions with metal clips must be removed prior to surgery.  • The Pre-Admission Testing nurse will instruct you to  bring medications if unable to obtain an accurate list in Pre-Admission Testing.        If you were given a blood bank ID arm band remember to bring it with you the day of surgery.    Preventing a Surgical Site Infection:  • For 2 to 3 days before surgery, avoid shaving with a razor because the razor can irritate skin and make it easier to develop an infection.    • Any areas of open skin can increase the risk of a post-operative wound infection by allowing bacteria to enter and travel throughout the body.  Notify your surgeon if you have any skin wounds / rashes even if it is not near the expected surgical site.  The area will need assessed to determine if surgery should be delayed until it is healed.  • The night prior to surgery sleep in a clean bed with clean clothing.  Do not allow pets to sleep with you.  • Shower on the morning of surgery using a fresh bar of anti-bacterial soap (such as Dial) and clean washcloth.  Dry with a clean towel and dress in clean clothing.  • Ask your surgeon if you will be receiving antibiotics prior to surgery.  • Make sure you, your family, and all healthcare providers clean their hands with soap and water or an alcohol based hand  before caring for you or your wound.    Day of surgery:11/19/18   0930  Upon arrival, a Pre-op nurse and Anesthesiologist will review your health history, obtain vital signs, and answer questions you may have.  The only belongings needed at this time will be your home medications and if applicable your C-PAP/BI-PAP machine.  If you are staying overnight your family can leave the rest of your belongings in the car and bring them to your room later.  A Pre-op nurse will start an IV and you may receive medication in preparation for surgery, including something to help you relax.  Your family will be able to see you in the Pre-op area.  While you are in surgery your family should notify the waiting room  if they leave the waiting room  area and provide a contact phone number.    Please be aware that surgery does come with discomfort.  We want to make every effort to control your discomfort so please discuss any uncontrolled symptoms with your nurse.   Your doctor will most likely have prescribed pain medications.      If you are going home after surgery you will receive individualized written care instructions before being discharged.  A responsible adult must drive you to and from the hospital on the day of your surgery and stay with you for 24 hours.    If you are staying overnight following surgery, you will be transported to your hospital room following the recovery period.  [facility] has all private rooms.    You have received a list of surgical assistants for your reference.  If you have any questions please call Pre-Admission Testing at [phone].  Deductibles and co-payments are collected on the day of service. Please be prepared to pay the required co-pay, deductible or deposit on the day of service as defined by your plan.

## 2018-11-19 ENCOUNTER — HOSPITAL ENCOUNTER (INPATIENT)
Facility: HOSPITAL | Age: 63
LOS: 4 days | Discharge: HOME | End: 2018-11-23
Attending: THORACIC SURGERY (CARDIOTHORACIC VASCULAR SURGERY) | Admitting: THORACIC SURGERY (CARDIOTHORACIC VASCULAR SURGERY)

## 2018-11-19 ENCOUNTER — ANESTHESIA EVENT (OUTPATIENT)
Dept: PERIOP | Facility: HOSPITAL | Age: 63
End: 2018-11-19

## 2018-11-19 ENCOUNTER — ANESTHESIA (OUTPATIENT)
Dept: PERIOP | Facility: HOSPITAL | Age: 63
End: 2018-11-19

## 2018-11-19 ENCOUNTER — APPOINTMENT (OUTPATIENT)
Dept: GENERAL RADIOLOGY | Facility: HOSPITAL | Age: 63
End: 2018-11-19
Attending: THORACIC SURGERY (CARDIOTHORACIC VASCULAR SURGERY)

## 2018-11-19 DIAGNOSIS — J15.9 BACTERIAL PNEUMONIA: ICD-10-CM

## 2018-11-19 DIAGNOSIS — C34.92 SQUAMOUS CELL CARCINOMA OF LEFT LUNG (HCC): ICD-10-CM

## 2018-11-19 DIAGNOSIS — C34.92 SQUAMOUS CELL LUNG CANCER, LEFT (HCC): ICD-10-CM

## 2018-11-19 DIAGNOSIS — Z74.09 IMPAIRED FUNCTIONAL MOBILITY, BALANCE, GAIT, AND ENDURANCE: Primary | ICD-10-CM

## 2018-11-19 LAB
GLUCOSE BLDC GLUCOMTR-MCNC: 152 MG/DL (ref 70–130)
GLUCOSE BLDC GLUCOMTR-MCNC: 182 MG/DL (ref 70–130)

## 2018-11-19 PROCEDURE — C1729 CATH, DRAINAGE: HCPCS | Performed by: THORACIC SURGERY (CARDIOTHORACIC VASCULAR SURGERY)

## 2018-11-19 PROCEDURE — 25010000002 KETOROLAC TROMETHAMINE PER 15 MG: Performed by: NURSE ANESTHETIST, CERTIFIED REGISTERED

## 2018-11-19 PROCEDURE — 25010000002 ONDANSETRON PER 1 MG: Performed by: ANESTHESIOLOGY

## 2018-11-19 PROCEDURE — 25010000002 MIDAZOLAM PER 1 MG: Performed by: ANESTHESIOLOGY

## 2018-11-19 PROCEDURE — 82962 GLUCOSE BLOOD TEST: CPT

## 2018-11-19 PROCEDURE — 25010000002 HYDROMORPHONE PER 4 MG: Performed by: NURSE ANESTHETIST, CERTIFIED REGISTERED

## 2018-11-19 PROCEDURE — 25010000002 FENTANYL CITRATE (PF) 100 MCG/2ML SOLUTION: Performed by: ANESTHESIOLOGY

## 2018-11-19 PROCEDURE — 25010000003 CEFAZOLIN IN DEXTROSE 2-4 GM/100ML-% SOLUTION: Performed by: THORACIC SURGERY (CARDIOTHORACIC VASCULAR SURGERY)

## 2018-11-19 PROCEDURE — 25010000002 PROPOFOL 10 MG/ML EMULSION: Performed by: NURSE ANESTHETIST, CERTIFIED REGISTERED

## 2018-11-19 PROCEDURE — 71045 X-RAY EXAM CHEST 1 VIEW: CPT

## 2018-11-19 PROCEDURE — 25010000002 KETOROLAC TROMETHAMINE PER 15 MG: Performed by: THORACIC SURGERY (CARDIOTHORACIC VASCULAR SURGERY)

## 2018-11-19 PROCEDURE — 03HY32Z INSERTION OF MONITORING DEVICE INTO UPPER ARTERY, PERCUTANEOUS APPROACH: ICD-10-PCS | Performed by: ANESTHESIOLOGY

## 2018-11-19 PROCEDURE — 07B70ZX EXCISION OF THORAX LYMPHATIC, OPEN APPROACH, DIAGNOSTIC: ICD-10-PCS | Performed by: THORACIC SURGERY (CARDIOTHORACIC VASCULAR SURGERY)

## 2018-11-19 PROCEDURE — 0BJ08ZZ INSPECTION OF TRACHEOBRONCHIAL TREE, VIA NATURAL OR ARTIFICIAL OPENING ENDOSCOPIC: ICD-10-PCS | Performed by: THORACIC SURGERY (CARDIOTHORACIC VASCULAR SURGERY)

## 2018-11-19 PROCEDURE — 0BTJ0ZZ RESECTION OF LEFT LOWER LUNG LOBE, OPEN APPROACH: ICD-10-PCS | Performed by: THORACIC SURGERY (CARDIOTHORACIC VASCULAR SURGERY)

## 2018-11-19 PROCEDURE — 32480 PARTIAL REMOVAL OF LUNG: CPT | Performed by: THORACIC SURGERY (CARDIOTHORACIC VASCULAR SURGERY)

## 2018-11-19 PROCEDURE — 88309 TISSUE EXAM BY PATHOLOGIST: CPT | Performed by: THORACIC SURGERY (CARDIOTHORACIC VASCULAR SURGERY)

## 2018-11-19 PROCEDURE — 25010000002 FENTANYL CITRATE (PF) 100 MCG/2ML SOLUTION: Performed by: NURSE ANESTHETIST, CERTIFIED REGISTERED

## 2018-11-19 PROCEDURE — 25010000002 HYDROMORPHONE PER 4 MG: Performed by: ANESTHESIOLOGY

## 2018-11-19 PROCEDURE — 88305 TISSUE EXAM BY PATHOLOGIST: CPT | Performed by: THORACIC SURGERY (CARDIOTHORACIC VASCULAR SURGERY)

## 2018-11-19 DEVICE — CLIP LIG VASC HEMOCLIP TRADITIONAL TI 10CT SM: Type: IMPLANTABLE DEVICE | Status: FUNCTIONAL

## 2018-11-19 DEVICE — CLIP LIG HEMOLOK PA LG 6CT PRP: Type: IMPLANTABLE DEVICE | Status: FUNCTIONAL

## 2018-11-19 RX ORDER — BISACODYL 10 MG
10 SUPPOSITORY, RECTAL RECTAL DAILY PRN
Status: DISCONTINUED | OUTPATIENT
Start: 2018-11-19 | End: 2018-11-23 | Stop reason: HOSPADM

## 2018-11-19 RX ORDER — SODIUM CHLORIDE 0.9 % (FLUSH) 0.9 %
1-10 SYRINGE (ML) INJECTION AS NEEDED
Status: DISCONTINUED | OUTPATIENT
Start: 2018-11-19 | End: 2018-11-19 | Stop reason: HOSPADM

## 2018-11-19 RX ORDER — HYDROCODONE BITARTRATE AND ACETAMINOPHEN 7.5; 325 MG/1; MG/1
2 TABLET ORAL EVERY 4 HOURS PRN
Status: DISCONTINUED | OUTPATIENT
Start: 2018-11-19 | End: 2018-11-23 | Stop reason: HOSPADM

## 2018-11-19 RX ORDER — SODIUM CHLORIDE 0.9 % (FLUSH) 0.9 %
3-10 SYRINGE (ML) INJECTION AS NEEDED
Status: DISCONTINUED | OUTPATIENT
Start: 2018-11-19 | End: 2018-11-23 | Stop reason: HOSPADM

## 2018-11-19 RX ORDER — HYDROMORPHONE HYDROCHLORIDE 1 MG/ML
0.5 INJECTION, SOLUTION INTRAMUSCULAR; INTRAVENOUS; SUBCUTANEOUS
Status: DISCONTINUED | OUTPATIENT
Start: 2018-11-19 | End: 2018-11-19 | Stop reason: HOSPADM

## 2018-11-19 RX ORDER — MULTIPLE VITAMINS W/ MINERALS TAB 9MG-400MCG
1 TAB ORAL DAILY
Status: DISCONTINUED | OUTPATIENT
Start: 2018-11-19 | End: 2018-11-23 | Stop reason: HOSPADM

## 2018-11-19 RX ORDER — ATORVASTATIN CALCIUM 20 MG/1
40 TABLET, FILM COATED ORAL NIGHTLY
Status: DISCONTINUED | OUTPATIENT
Start: 2018-11-19 | End: 2018-11-23 | Stop reason: HOSPADM

## 2018-11-19 RX ORDER — HYDRALAZINE HYDROCHLORIDE 25 MG/1
25 TABLET, FILM COATED ORAL 2 TIMES DAILY
Status: DISCONTINUED | OUTPATIENT
Start: 2018-11-19 | End: 2018-11-23 | Stop reason: HOSPADM

## 2018-11-19 RX ORDER — POTASSIUM CHLORIDE 1.5 G/1.77G
40 POWDER, FOR SOLUTION ORAL AS NEEDED
Status: DISCONTINUED | OUTPATIENT
Start: 2018-11-19 | End: 2018-11-23 | Stop reason: HOSPADM

## 2018-11-19 RX ORDER — LABETALOL HYDROCHLORIDE 5 MG/ML
5 INJECTION, SOLUTION INTRAVENOUS
Status: DISCONTINUED | OUTPATIENT
Start: 2018-11-19 | End: 2018-11-19 | Stop reason: HOSPADM

## 2018-11-19 RX ORDER — CEFAZOLIN SODIUM 2 G/100ML
2 INJECTION, SOLUTION INTRAVENOUS ONCE
Status: COMPLETED | OUTPATIENT
Start: 2018-11-19 | End: 2018-11-19

## 2018-11-19 RX ORDER — DOCUSATE SODIUM 100 MG/1
100 CAPSULE, LIQUID FILLED ORAL 2 TIMES DAILY
Status: DISCONTINUED | OUTPATIENT
Start: 2018-11-19 | End: 2018-11-23 | Stop reason: HOSPADM

## 2018-11-19 RX ORDER — SODIUM CHLORIDE 9 MG/ML
INJECTION, SOLUTION INTRAVENOUS AS NEEDED
Status: DISCONTINUED | OUTPATIENT
Start: 2018-11-19 | End: 2018-11-19 | Stop reason: HOSPADM

## 2018-11-19 RX ORDER — HYDROCODONE BITARTRATE AND ACETAMINOPHEN 7.5; 325 MG/1; MG/1
1 TABLET ORAL EVERY 4 HOURS PRN
Status: DISCONTINUED | OUTPATIENT
Start: 2018-11-19 | End: 2018-11-23 | Stop reason: HOSPADM

## 2018-11-19 RX ORDER — SODIUM CHLORIDE 0.9 % (FLUSH) 0.9 %
3 SYRINGE (ML) INJECTION EVERY 12 HOURS SCHEDULED
Status: DISCONTINUED | OUTPATIENT
Start: 2018-11-19 | End: 2018-11-23 | Stop reason: HOSPADM

## 2018-11-19 RX ORDER — MORPHINE SULFATE 2 MG/ML
4 INJECTION, SOLUTION INTRAMUSCULAR; INTRAVENOUS EVERY 4 HOURS PRN
Status: DISCONTINUED | OUTPATIENT
Start: 2018-11-19 | End: 2018-11-23 | Stop reason: HOSPADM

## 2018-11-19 RX ORDER — PROPOFOL 10 MG/ML
VIAL (ML) INTRAVENOUS AS NEEDED
Status: DISCONTINUED | OUTPATIENT
Start: 2018-11-19 | End: 2018-11-19 | Stop reason: SURG

## 2018-11-19 RX ORDER — FENTANYL CITRATE 50 UG/ML
50 INJECTION, SOLUTION INTRAMUSCULAR; INTRAVENOUS
Status: DISCONTINUED | OUTPATIENT
Start: 2018-11-19 | End: 2018-11-19 | Stop reason: HOSPADM

## 2018-11-19 RX ORDER — SODIUM CHLORIDE 0.9 % (FLUSH) 0.9 %
3 SYRINGE (ML) INJECTION EVERY 12 HOURS SCHEDULED
Status: DISCONTINUED | OUTPATIENT
Start: 2018-11-20 | End: 2018-11-23 | Stop reason: HOSPADM

## 2018-11-19 RX ORDER — POTASSIUM CHLORIDE 750 MG/1
40 CAPSULE, EXTENDED RELEASE ORAL AS NEEDED
Status: DISCONTINUED | OUTPATIENT
Start: 2018-11-19 | End: 2018-11-23 | Stop reason: HOSPADM

## 2018-11-19 RX ORDER — IPRATROPIUM BROMIDE AND ALBUTEROL SULFATE 2.5; .5 MG/3ML; MG/3ML
3 SOLUTION RESPIRATORY (INHALATION) ONCE AS NEEDED
Status: DISCONTINUED | OUTPATIENT
Start: 2018-11-19 | End: 2018-11-19 | Stop reason: HOSPADM

## 2018-11-19 RX ORDER — OXYCODONE AND ACETAMINOPHEN 7.5; 325 MG/1; MG/1
1 TABLET ORAL ONCE AS NEEDED
Status: DISCONTINUED | OUTPATIENT
Start: 2018-11-19 | End: 2018-11-19 | Stop reason: HOSPADM

## 2018-11-19 RX ORDER — KETOROLAC TROMETHAMINE 30 MG/ML
INJECTION, SOLUTION INTRAMUSCULAR; INTRAVENOUS AS NEEDED
Status: DISCONTINUED | OUTPATIENT
Start: 2018-11-19 | End: 2018-11-19 | Stop reason: SURG

## 2018-11-19 RX ORDER — SODIUM CHLORIDE, SODIUM LACTATE, POTASSIUM CHLORIDE, CALCIUM CHLORIDE 600; 310; 30; 20 MG/100ML; MG/100ML; MG/100ML; MG/100ML
9 INJECTION, SOLUTION INTRAVENOUS CONTINUOUS
Status: DISCONTINUED | OUTPATIENT
Start: 2018-11-19 | End: 2018-11-19

## 2018-11-19 RX ORDER — PROMETHAZINE HYDROCHLORIDE 25 MG/1
25 SUPPOSITORY RECTAL ONCE AS NEEDED
Status: DISCONTINUED | OUTPATIENT
Start: 2018-11-19 | End: 2018-11-19 | Stop reason: HOSPADM

## 2018-11-19 RX ORDER — LIDOCAINE HYDROCHLORIDE 10 MG/ML
0.5 INJECTION, SOLUTION EPIDURAL; INFILTRATION; INTRACAUDAL; PERINEURAL ONCE AS NEEDED
Status: DISCONTINUED | OUTPATIENT
Start: 2018-11-19 | End: 2018-11-19 | Stop reason: HOSPADM

## 2018-11-19 RX ORDER — PROMETHAZINE HYDROCHLORIDE 25 MG/1
12.5 TABLET ORAL ONCE AS NEEDED
Status: DISCONTINUED | OUTPATIENT
Start: 2018-11-19 | End: 2018-11-19 | Stop reason: HOSPADM

## 2018-11-19 RX ORDER — ONDANSETRON 2 MG/ML
INJECTION INTRAMUSCULAR; INTRAVENOUS AS NEEDED
Status: DISCONTINUED | OUTPATIENT
Start: 2018-11-19 | End: 2018-11-19 | Stop reason: SURG

## 2018-11-19 RX ORDER — MEPERIDINE HYDROCHLORIDE 25 MG/ML
12.5 INJECTION INTRAMUSCULAR; INTRAVENOUS; SUBCUTANEOUS
Status: DISCONTINUED | OUTPATIENT
Start: 2018-11-19 | End: 2018-11-19 | Stop reason: HOSPADM

## 2018-11-19 RX ORDER — SODIUM CHLORIDE 0.9 % (FLUSH) 0.9 %
3 SYRINGE (ML) INJECTION EVERY 12 HOURS SCHEDULED
Status: DISCONTINUED | OUTPATIENT
Start: 2018-11-19 | End: 2018-11-19 | Stop reason: HOSPADM

## 2018-11-19 RX ORDER — IPRATROPIUM BROMIDE AND ALBUTEROL SULFATE 2.5; .5 MG/3ML; MG/3ML
3 SOLUTION RESPIRATORY (INHALATION)
Status: DISCONTINUED | OUTPATIENT
Start: 2018-11-19 | End: 2018-11-20

## 2018-11-19 RX ORDER — NALOXONE HCL 0.4 MG/ML
0.1 VIAL (ML) INJECTION
Status: DISCONTINUED | OUTPATIENT
Start: 2018-11-19 | End: 2018-11-21

## 2018-11-19 RX ORDER — HYDROMORPHONE HCL 110MG/55ML
PATIENT CONTROLLED ANALGESIA SYRINGE INTRAVENOUS AS NEEDED
Status: DISCONTINUED | OUTPATIENT
Start: 2018-11-19 | End: 2018-11-19 | Stop reason: SURG

## 2018-11-19 RX ORDER — AMLODIPINE BESYLATE 10 MG/1
10 TABLET ORAL EVERY MORNING
Status: DISCONTINUED | OUTPATIENT
Start: 2018-11-20 | End: 2018-11-23 | Stop reason: HOSPADM

## 2018-11-19 RX ORDER — FAMOTIDINE 10 MG/ML
20 INJECTION, SOLUTION INTRAVENOUS ONCE
Status: COMPLETED | OUTPATIENT
Start: 2018-11-19 | End: 2018-11-19

## 2018-11-19 RX ORDER — HEPARIN SODIUM 5000 [USP'U]/ML
5000 INJECTION, SOLUTION INTRAVENOUS; SUBCUTANEOUS EVERY 8 HOURS SCHEDULED
Status: DISCONTINUED | OUTPATIENT
Start: 2018-11-20 | End: 2018-11-23 | Stop reason: HOSPADM

## 2018-11-19 RX ORDER — MIDAZOLAM HYDROCHLORIDE 1 MG/ML
1 INJECTION INTRAMUSCULAR; INTRAVENOUS
Status: DISCONTINUED | OUTPATIENT
Start: 2018-11-19 | End: 2018-11-19 | Stop reason: HOSPADM

## 2018-11-19 RX ORDER — POTASSIUM CHLORIDE 7.45 MG/ML
10 INJECTION INTRAVENOUS
Status: DISCONTINUED | OUTPATIENT
Start: 2018-11-19 | End: 2018-11-23 | Stop reason: HOSPADM

## 2018-11-19 RX ORDER — SENNA AND DOCUSATE SODIUM 50; 8.6 MG/1; MG/1
2 TABLET, FILM COATED ORAL NIGHTLY
Status: DISCONTINUED | OUTPATIENT
Start: 2018-11-19 | End: 2018-11-23 | Stop reason: HOSPADM

## 2018-11-19 RX ORDER — DEXTROSE MONOHYDRATE 25 G/50ML
25 INJECTION, SOLUTION INTRAVENOUS
Status: DISCONTINUED | OUTPATIENT
Start: 2018-11-19 | End: 2018-11-23 | Stop reason: HOSPADM

## 2018-11-19 RX ORDER — FLUMAZENIL 0.1 MG/ML
0.2 INJECTION INTRAVENOUS AS NEEDED
Status: DISCONTINUED | OUTPATIENT
Start: 2018-11-19 | End: 2018-11-19 | Stop reason: HOSPADM

## 2018-11-19 RX ORDER — KETOROLAC TROMETHAMINE 30 MG/ML
15 INJECTION, SOLUTION INTRAMUSCULAR; INTRAVENOUS EVERY 6 HOURS SCHEDULED
Status: COMPLETED | OUTPATIENT
Start: 2018-11-19 | End: 2018-11-21

## 2018-11-19 RX ORDER — PROMETHAZINE HYDROCHLORIDE 25 MG/1
25 TABLET ORAL ONCE AS NEEDED
Status: DISCONTINUED | OUTPATIENT
Start: 2018-11-19 | End: 2018-11-19 | Stop reason: HOSPADM

## 2018-11-19 RX ORDER — ONDANSETRON 4 MG/1
4 TABLET, ORALLY DISINTEGRATING ORAL EVERY 6 HOURS PRN
Status: DISCONTINUED | OUTPATIENT
Start: 2018-11-19 | End: 2018-11-23 | Stop reason: HOSPADM

## 2018-11-19 RX ORDER — ONDANSETRON 2 MG/ML
4 INJECTION INTRAMUSCULAR; INTRAVENOUS EVERY 6 HOURS PRN
Status: DISCONTINUED | OUTPATIENT
Start: 2018-11-19 | End: 2018-11-23 | Stop reason: HOSPADM

## 2018-11-19 RX ORDER — PROMETHAZINE HYDROCHLORIDE 25 MG/ML
12.5 INJECTION, SOLUTION INTRAMUSCULAR; INTRAVENOUS ONCE AS NEEDED
Status: DISCONTINUED | OUTPATIENT
Start: 2018-11-19 | End: 2018-11-19 | Stop reason: HOSPADM

## 2018-11-19 RX ORDER — ONDANSETRON 4 MG/1
4 TABLET, FILM COATED ORAL EVERY 6 HOURS PRN
Status: DISCONTINUED | OUTPATIENT
Start: 2018-11-19 | End: 2018-11-23 | Stop reason: HOSPADM

## 2018-11-19 RX ORDER — MIDAZOLAM HYDROCHLORIDE 1 MG/ML
2 INJECTION INTRAMUSCULAR; INTRAVENOUS
Status: DISCONTINUED | OUTPATIENT
Start: 2018-11-19 | End: 2018-11-19 | Stop reason: HOSPADM

## 2018-11-19 RX ORDER — EPHEDRINE SULFATE 50 MG/ML
5 INJECTION, SOLUTION INTRAVENOUS ONCE AS NEEDED
Status: DISCONTINUED | OUTPATIENT
Start: 2018-11-19 | End: 2018-11-19 | Stop reason: HOSPADM

## 2018-11-19 RX ORDER — LIDOCAINE HYDROCHLORIDE 20 MG/ML
INJECTION, SOLUTION INFILTRATION; PERINEURAL AS NEEDED
Status: DISCONTINUED | OUTPATIENT
Start: 2018-11-19 | End: 2018-11-19 | Stop reason: SURG

## 2018-11-19 RX ORDER — NALOXONE HCL 0.4 MG/ML
0.4 VIAL (ML) INJECTION
Status: DISCONTINUED | OUTPATIENT
Start: 2018-11-19 | End: 2018-11-23 | Stop reason: HOSPADM

## 2018-11-19 RX ORDER — CARVEDILOL 25 MG/1
50 TABLET ORAL 2 TIMES DAILY WITH MEALS
Status: DISCONTINUED | OUTPATIENT
Start: 2018-11-19 | End: 2018-11-23 | Stop reason: HOSPADM

## 2018-11-19 RX ORDER — HYDROCODONE BITARTRATE AND ACETAMINOPHEN 7.5; 325 MG/1; MG/1
1 TABLET ORAL ONCE AS NEEDED
Status: DISCONTINUED | OUTPATIENT
Start: 2018-11-19 | End: 2018-11-19 | Stop reason: HOSPADM

## 2018-11-19 RX ORDER — ROCURONIUM BROMIDE 10 MG/ML
INJECTION, SOLUTION INTRAVENOUS AS NEEDED
Status: DISCONTINUED | OUTPATIENT
Start: 2018-11-19 | End: 2018-11-19 | Stop reason: SURG

## 2018-11-19 RX ORDER — MORPHINE SULFATE 2 MG/ML
2 INJECTION, SOLUTION INTRAMUSCULAR; INTRAVENOUS EVERY 4 HOURS PRN
Status: DISCONTINUED | OUTPATIENT
Start: 2018-11-19 | End: 2018-11-23 | Stop reason: HOSPADM

## 2018-11-19 RX ORDER — NICOTINE POLACRILEX 4 MG
15 LOZENGE BUCCAL
Status: DISCONTINUED | OUTPATIENT
Start: 2018-11-19 | End: 2018-11-23 | Stop reason: HOSPADM

## 2018-11-19 RX ORDER — MAGNESIUM HYDROXIDE 1200 MG/15ML
LIQUID ORAL AS NEEDED
Status: DISCONTINUED | OUTPATIENT
Start: 2018-11-19 | End: 2018-11-19 | Stop reason: HOSPADM

## 2018-11-19 RX ORDER — ONDANSETRON 2 MG/ML
4 INJECTION INTRAMUSCULAR; INTRAVENOUS ONCE AS NEEDED
Status: DISCONTINUED | OUTPATIENT
Start: 2018-11-19 | End: 2018-11-19 | Stop reason: HOSPADM

## 2018-11-19 RX ORDER — NITROGLYCERIN 0.4 MG/1
0.4 TABLET SUBLINGUAL
Status: DISCONTINUED | OUTPATIENT
Start: 2018-11-19 | End: 2018-11-23 | Stop reason: HOSPADM

## 2018-11-19 RX ORDER — SODIUM CHLORIDE 0.9 % (FLUSH) 0.9 %
3-10 SYRINGE (ML) INJECTION AS NEEDED
Status: DISCONTINUED | OUTPATIENT
Start: 2018-11-19 | End: 2018-11-19 | Stop reason: HOSPADM

## 2018-11-19 RX ORDER — HYDROMORPHONE HCL IN 0.9% NACL 10 MG/50ML
PATIENT CONTROLLED ANALGESIA SYRINGE INTRAVENOUS CONTINUOUS
Status: DISCONTINUED | OUTPATIENT
Start: 2018-11-19 | End: 2018-11-21

## 2018-11-19 RX ORDER — GLYCOPYRROLATE 0.2 MG/ML
INJECTION INTRAMUSCULAR; INTRAVENOUS AS NEEDED
Status: DISCONTINUED | OUTPATIENT
Start: 2018-11-19 | End: 2018-11-19 | Stop reason: SURG

## 2018-11-19 RX ORDER — SODIUM CHLORIDE, SODIUM LACTATE, POTASSIUM CHLORIDE, CALCIUM CHLORIDE 600; 310; 30; 20 MG/100ML; MG/100ML; MG/100ML; MG/100ML
INJECTION, SOLUTION INTRAVENOUS CONTINUOUS PRN
Status: DISCONTINUED | OUTPATIENT
Start: 2018-11-19 | End: 2018-11-19 | Stop reason: SURG

## 2018-11-19 RX ORDER — ASPIRIN 325 MG
325 TABLET ORAL DAILY
Status: DISCONTINUED | OUTPATIENT
Start: 2018-11-19 | End: 2018-11-23 | Stop reason: HOSPADM

## 2018-11-19 RX ORDER — SODIUM CHLORIDE 9 MG/ML
75 INJECTION, SOLUTION INTRAVENOUS CONTINUOUS
Status: DISCONTINUED | OUTPATIENT
Start: 2018-11-19 | End: 2018-11-20

## 2018-11-19 RX ORDER — ACETAMINOPHEN 325 MG/1
650 TABLET ORAL EVERY 4 HOURS PRN
Status: DISCONTINUED | OUTPATIENT
Start: 2018-11-19 | End: 2018-11-23 | Stop reason: HOSPADM

## 2018-11-19 RX ORDER — NALOXONE HCL 0.4 MG/ML
0.2 VIAL (ML) INJECTION AS NEEDED
Status: DISCONTINUED | OUTPATIENT
Start: 2018-11-19 | End: 2018-11-19 | Stop reason: HOSPADM

## 2018-11-19 RX ORDER — CEFAZOLIN SODIUM 2 G/100ML
2 INJECTION, SOLUTION INTRAVENOUS EVERY 8 HOURS
Status: COMPLETED | OUTPATIENT
Start: 2018-11-19 | End: 2018-11-20

## 2018-11-19 RX ADMIN — FAMOTIDINE 20 MG: 10 INJECTION INTRAVENOUS at 10:34

## 2018-11-19 RX ADMIN — FENTANYL CITRATE 100 MCG: 50 INJECTION INTRAMUSCULAR; INTRAVENOUS at 15:09

## 2018-11-19 RX ADMIN — GLYCOPYRROLATE 0.2 MG: 0.2 INJECTION INTRAMUSCULAR; INTRAVENOUS at 17:42

## 2018-11-19 RX ADMIN — HYDRALAZINE HYDROCHLORIDE 25 MG: 25 TABLET ORAL at 23:40

## 2018-11-19 RX ADMIN — CEFAZOLIN SODIUM 2 G: 2 INJECTION, SOLUTION INTRAVENOUS at 23:40

## 2018-11-19 RX ADMIN — ASPIRIN 325 MG: 325 TABLET ORAL at 22:03

## 2018-11-19 RX ADMIN — DOCUSATE SODIUM -SENNOSIDES 2 TABLET: 50; 8.6 TABLET, COATED ORAL at 22:07

## 2018-11-19 RX ADMIN — LIDOCAINE HYDROCHLORIDE 80 MG: 20 INJECTION, SOLUTION INFILTRATION; PERINEURAL at 15:11

## 2018-11-19 RX ADMIN — FENTANYL CITRATE 50 MCG: 50 INJECTION, SOLUTION INTRAMUSCULAR; INTRAVENOUS at 20:51

## 2018-11-19 RX ADMIN — HYDROMORPHONE HYDROCHLORIDE 0.25 MG: 2 INJECTION INTRAMUSCULAR; INTRAVENOUS; SUBCUTANEOUS at 19:39

## 2018-11-19 RX ADMIN — KETOROLAC TROMETHAMINE 15 MG: 30 INJECTION, SOLUTION INTRAMUSCULAR at 21:56

## 2018-11-19 RX ADMIN — MIDAZOLAM 1 MG: 1 INJECTION INTRAMUSCULAR; INTRAVENOUS at 10:12

## 2018-11-19 RX ADMIN — FENTANYL CITRATE 50 MCG: 50 INJECTION INTRAMUSCULAR; INTRAVENOUS at 10:23

## 2018-11-19 RX ADMIN — SODIUM CHLORIDE, POTASSIUM CHLORIDE, SODIUM LACTATE AND CALCIUM CHLORIDE 9 ML/HR: 600; 310; 30; 20 INJECTION, SOLUTION INTRAVENOUS at 10:00

## 2018-11-19 RX ADMIN — SODIUM CHLORIDE 75 ML/HR: 9 INJECTION, SOLUTION INTRAVENOUS at 21:55

## 2018-11-19 RX ADMIN — HYDROMORPHONE HYDROCHLORIDE 0.25 MG: 2 INJECTION INTRAMUSCULAR; INTRAVENOUS; SUBCUTANEOUS at 17:47

## 2018-11-19 RX ADMIN — ROCURONIUM BROMIDE 20 MG: 10 INJECTION INTRAVENOUS at 18:17

## 2018-11-19 RX ADMIN — ROCURONIUM BROMIDE 25 MG: 10 INJECTION INTRAVENOUS at 16:44

## 2018-11-19 RX ADMIN — HYDROMORPHONE HYDROCHLORIDE: 10 INJECTION, SOLUTION INTRAMUSCULAR; INTRAVENOUS; SUBCUTANEOUS at 20:08

## 2018-11-19 RX ADMIN — SODIUM CHLORIDE, POTASSIUM CHLORIDE, SODIUM LACTATE AND CALCIUM CHLORIDE: 600; 310; 30; 20 INJECTION, SOLUTION INTRAVENOUS at 15:03

## 2018-11-19 RX ADMIN — MIDAZOLAM 1 MG: 1 INJECTION INTRAMUSCULAR; INTRAVENOUS at 14:06

## 2018-11-19 RX ADMIN — ATORVASTATIN CALCIUM 40 MG: 20 TABLET, FILM COATED ORAL at 23:39

## 2018-11-19 RX ADMIN — KETOROLAC TROMETHAMINE 15 MG: 30 INJECTION, SOLUTION INTRAMUSCULAR at 23:40

## 2018-11-19 RX ADMIN — CEFAZOLIN SODIUM 2 G: 2 INJECTION, SOLUTION INTRAVENOUS at 15:20

## 2018-11-19 RX ADMIN — FENTANYL CITRATE 50 MCG: 50 INJECTION, SOLUTION INTRAMUSCULAR; INTRAVENOUS at 20:29

## 2018-11-19 RX ADMIN — ROCURONIUM BROMIDE 25 MG: 10 INJECTION INTRAVENOUS at 15:44

## 2018-11-19 RX ADMIN — ONDANSETRON 4 MG: 2 INJECTION INTRAMUSCULAR; INTRAVENOUS at 17:45

## 2018-11-19 RX ADMIN — MIDAZOLAM 2 MG: 1 INJECTION INTRAMUSCULAR; INTRAVENOUS at 14:49

## 2018-11-19 RX ADMIN — ROCURONIUM BROMIDE 50 MG: 10 INJECTION INTRAVENOUS at 15:11

## 2018-11-19 RX ADMIN — PROPOFOL 180 MG: 10 INJECTION, EMULSION INTRAVENOUS at 15:11

## 2018-11-19 RX ADMIN — SUGAMMADEX 200 MG: 100 INJECTION, SOLUTION INTRAVENOUS at 18:59

## 2018-11-19 RX ADMIN — DOCUSATE SODIUM 100 MG: 100 CAPSULE, LIQUID FILLED ORAL at 22:04

## 2018-11-19 RX ADMIN — KETOROLAC TROMETHAMINE 30 MG: 30 INJECTION, SOLUTION INTRAMUSCULAR; INTRAVENOUS at 15:39

## 2018-11-19 RX ADMIN — HYDROMORPHONE HYDROCHLORIDE 0.25 MG: 2 INJECTION INTRAMUSCULAR; INTRAVENOUS; SUBCUTANEOUS at 19:01

## 2018-11-19 RX ADMIN — FENTANYL CITRATE 50 MCG: 50 INJECTION, SOLUTION INTRAMUSCULAR; INTRAVENOUS at 20:13

## 2018-11-19 RX ADMIN — HYDROCHLOROTHIAZIDE: 25 TABLET ORAL at 23:41

## 2018-11-19 RX ADMIN — FENTANYL CITRATE 50 MCG: 50 INJECTION, SOLUTION INTRAMUSCULAR; INTRAVENOUS at 21:00

## 2018-11-19 RX ADMIN — MULTIPLE VITAMINS W/ MINERALS TAB 1 TABLET: TAB at 23:39

## 2018-11-19 RX ADMIN — FENTANYL CITRATE 50 MCG: 50 INJECTION INTRAMUSCULAR; INTRAVENOUS at 16:35

## 2018-11-19 RX ADMIN — SODIUM CHLORIDE, PRESERVATIVE FREE 3 ML: 5 INJECTION INTRAVENOUS at 21:56

## 2018-11-19 RX ADMIN — HYDROMORPHONE HYDROCHLORIDE 0.5 MG: 1 INJECTION, SOLUTION INTRAMUSCULAR; INTRAVENOUS; SUBCUTANEOUS at 20:14

## 2018-11-19 NOTE — ANESTHESIA PREPROCEDURE EVALUATION
Anesthesia Evaluation     Patient summary reviewed and Nursing notes reviewed   NPO Solid Status: > 8 hours  NPO Liquid Status: > 4 hours           Airway   Mallampati: II  TM distance: >3 FB  Neck ROM: full  no difficulty expected  Dental - normal exam     Pulmonary - normal exam   (+) pneumonia , a smoker Former, lung cancer,   Cardiovascular - normal exam    (+) hypertension, dysrhythmias, hyperlipidemia,       Neuro/Psych  GI/Hepatic/Renal/Endo    (+) obesity, morbid obesity,  diabetes mellitus type 2,     Musculoskeletal     Abdominal  - normal exam   Substance History      OB/GYN          Other                      Anesthesia Plan    ASA 3     general   (A line)  intravenous induction   Anesthetic plan, all risks, benefits, and alternatives have been provided, discussed and informed consent has been obtained with: patient.

## 2018-11-20 ENCOUNTER — APPOINTMENT (OUTPATIENT)
Dept: GENERAL RADIOLOGY | Facility: HOSPITAL | Age: 63
End: 2018-11-20

## 2018-11-20 LAB
ANION GAP SERPL CALCULATED.3IONS-SCNC: 13.3 MMOL/L
BUN BLD-MCNC: 19 MG/DL (ref 8–23)
BUN/CREAT SERPL: 26.4 (ref 7–25)
CALCIUM SPEC-SCNC: 8.3 MG/DL (ref 8.6–10.5)
CHLORIDE SERPL-SCNC: 98 MMOL/L (ref 98–107)
CO2 SERPL-SCNC: 26.7 MMOL/L (ref 22–29)
CREAT BLD-MCNC: 0.72 MG/DL (ref 0.76–1.27)
DEPRECATED RDW RBC AUTO: 46.9 FL (ref 37–54)
ERYTHROCYTE [DISTWIDTH] IN BLOOD BY AUTOMATED COUNT: 14.8 % (ref 11.5–14.5)
GFR SERPL CREATININE-BSD FRML MDRD: 110 ML/MIN/1.73
GLUCOSE BLD-MCNC: 169 MG/DL (ref 65–99)
GLUCOSE BLDC GLUCOMTR-MCNC: 139 MG/DL (ref 70–130)
GLUCOSE BLDC GLUCOMTR-MCNC: 146 MG/DL (ref 70–130)
GLUCOSE BLDC GLUCOMTR-MCNC: 150 MG/DL (ref 70–130)
GLUCOSE BLDC GLUCOMTR-MCNC: 161 MG/DL (ref 70–130)
HCT VFR BLD AUTO: 41 % (ref 40.4–52.2)
HGB BLD-MCNC: 13.7 G/DL (ref 13.7–17.6)
MCH RBC QN AUTO: 28.8 PG (ref 27–32.7)
MCHC RBC AUTO-ENTMCNC: 33.4 G/DL (ref 32.6–36.4)
MCV RBC AUTO: 86.3 FL (ref 79.8–96.2)
PLATELET # BLD AUTO: 311 10*3/MM3 (ref 140–500)
PMV BLD AUTO: 10.6 FL (ref 6–12)
POTASSIUM BLD-SCNC: 3.3 MMOL/L (ref 3.5–5.2)
RBC # BLD AUTO: 4.75 10*6/MM3 (ref 4.6–6)
SODIUM BLD-SCNC: 138 MMOL/L (ref 136–145)
WBC NRBC COR # BLD: 20.02 10*3/MM3 (ref 4.5–10.7)

## 2018-11-20 PROCEDURE — 85027 COMPLETE CBC AUTOMATED: CPT | Performed by: THORACIC SURGERY (CARDIOTHORACIC VASCULAR SURGERY)

## 2018-11-20 PROCEDURE — 99024 POSTOP FOLLOW-UP VISIT: CPT | Performed by: NURSE PRACTITIONER

## 2018-11-20 PROCEDURE — 82962 GLUCOSE BLOOD TEST: CPT

## 2018-11-20 PROCEDURE — 87081 CULTURE SCREEN ONLY: CPT | Performed by: NURSE PRACTITIONER

## 2018-11-20 PROCEDURE — 71045 X-RAY EXAM CHEST 1 VIEW: CPT

## 2018-11-20 PROCEDURE — 94799 UNLISTED PULMONARY SVC/PX: CPT

## 2018-11-20 PROCEDURE — 94640 AIRWAY INHALATION TREATMENT: CPT

## 2018-11-20 PROCEDURE — 25010000002 ONDANSETRON PER 1 MG: Performed by: THORACIC SURGERY (CARDIOTHORACIC VASCULAR SURGERY)

## 2018-11-20 PROCEDURE — 97162 PT EVAL MOD COMPLEX 30 MIN: CPT

## 2018-11-20 PROCEDURE — 80048 BASIC METABOLIC PNL TOTAL CA: CPT | Performed by: THORACIC SURGERY (CARDIOTHORACIC VASCULAR SURGERY)

## 2018-11-20 PROCEDURE — 25010000002 KETOROLAC TROMETHAMINE PER 15 MG: Performed by: THORACIC SURGERY (CARDIOTHORACIC VASCULAR SURGERY)

## 2018-11-20 PROCEDURE — 63710000001 INSULIN LISPRO (HUMAN) PER 5 UNITS: Performed by: INTERNAL MEDICINE

## 2018-11-20 PROCEDURE — 97110 THERAPEUTIC EXERCISES: CPT

## 2018-11-20 PROCEDURE — 87070 CULTURE OTHR SPECIMN AEROBIC: CPT | Performed by: NURSE PRACTITIONER

## 2018-11-20 PROCEDURE — 87205 SMEAR GRAM STAIN: CPT | Performed by: NURSE PRACTITIONER

## 2018-11-20 PROCEDURE — 25010000002 PIPERACILLIN SOD-TAZOBACTAM PER 1 G: Performed by: NURSE PRACTITIONER

## 2018-11-20 PROCEDURE — 25010000003 CEFAZOLIN IN DEXTROSE 2-4 GM/100ML-% SOLUTION: Performed by: THORACIC SURGERY (CARDIOTHORACIC VASCULAR SURGERY)

## 2018-11-20 PROCEDURE — 25010000002 HEPARIN (PORCINE) PER 1000 UNITS: Performed by: THORACIC SURGERY (CARDIOTHORACIC VASCULAR SURGERY)

## 2018-11-20 RX ORDER — IPRATROPIUM BROMIDE AND ALBUTEROL SULFATE 2.5; .5 MG/3ML; MG/3ML
3 SOLUTION RESPIRATORY (INHALATION)
Status: DISCONTINUED | OUTPATIENT
Start: 2018-11-20 | End: 2018-11-23 | Stop reason: HOSPADM

## 2018-11-20 RX ORDER — IPRATROPIUM BROMIDE AND ALBUTEROL SULFATE 2.5; .5 MG/3ML; MG/3ML
3 SOLUTION RESPIRATORY (INHALATION)
Status: DISCONTINUED | OUTPATIENT
Start: 2018-11-20 | End: 2018-11-20

## 2018-11-20 RX ADMIN — HYDRALAZINE HYDROCHLORIDE 25 MG: 25 TABLET ORAL at 21:03

## 2018-11-20 RX ADMIN — SODIUM CHLORIDE, PRESERVATIVE FREE 3 ML: 5 INJECTION INTRAVENOUS at 08:18

## 2018-11-20 RX ADMIN — ASPIRIN 325 MG: 325 TABLET ORAL at 08:07

## 2018-11-20 RX ADMIN — HEPARIN SODIUM 5000 UNITS: 5000 INJECTION INTRAVENOUS; SUBCUTANEOUS at 15:06

## 2018-11-20 RX ADMIN — HYDROCODONE BITARTRATE AND ACETAMINOPHEN 1 TABLET: 7.5; 325 TABLET ORAL at 03:22

## 2018-11-20 RX ADMIN — KETOROLAC TROMETHAMINE 15 MG: 30 INJECTION, SOLUTION INTRAMUSCULAR at 05:44

## 2018-11-20 RX ADMIN — HYDROCHLOROTHIAZIDE: 25 TABLET ORAL at 08:06

## 2018-11-20 RX ADMIN — AMLODIPINE BESYLATE 10 MG: 10 TABLET ORAL at 08:06

## 2018-11-20 RX ADMIN — HYDROCODONE BITARTRATE AND ACETAMINOPHEN 2 TABLET: 7.5; 325 TABLET ORAL at 15:13

## 2018-11-20 RX ADMIN — TAZOBACTAM SODIUM AND PIPERACILLIN SODIUM 3.38 G: 375; 3 INJECTION, SOLUTION INTRAVENOUS at 12:40

## 2018-11-20 RX ADMIN — KETOROLAC TROMETHAMINE 15 MG: 30 INJECTION, SOLUTION INTRAMUSCULAR at 17:54

## 2018-11-20 RX ADMIN — HYDROMORPHONE HYDROCHLORIDE: 10 INJECTION, SOLUTION INTRAMUSCULAR; INTRAVENOUS; SUBCUTANEOUS at 19:12

## 2018-11-20 RX ADMIN — POTASSIUM CHLORIDE 40 MEQ: 750 CAPSULE, EXTENDED RELEASE ORAL at 10:17

## 2018-11-20 RX ADMIN — HEPARIN SODIUM 5000 UNITS: 5000 INJECTION INTRAVENOUS; SUBCUTANEOUS at 05:43

## 2018-11-20 RX ADMIN — IPRATROPIUM BROMIDE AND ALBUTEROL SULFATE 3 ML: 2.5; .5 SOLUTION RESPIRATORY (INHALATION) at 14:42

## 2018-11-20 RX ADMIN — CARVEDILOL 50 MG: 25 TABLET, FILM COATED ORAL at 08:06

## 2018-11-20 RX ADMIN — CEFAZOLIN SODIUM 2 G: 2 INJECTION, SOLUTION INTRAVENOUS at 06:10

## 2018-11-20 RX ADMIN — HYDRALAZINE HYDROCHLORIDE 25 MG: 25 TABLET ORAL at 08:06

## 2018-11-20 RX ADMIN — IPRATROPIUM BROMIDE AND ALBUTEROL SULFATE 3 ML: 2.5; .5 SOLUTION RESPIRATORY (INHALATION) at 19:20

## 2018-11-20 RX ADMIN — DOCUSATE SODIUM 100 MG: 100 CAPSULE, LIQUID FILLED ORAL at 21:04

## 2018-11-20 RX ADMIN — POTASSIUM CHLORIDE 40 MEQ: 750 CAPSULE, EXTENDED RELEASE ORAL at 05:43

## 2018-11-20 RX ADMIN — IPRATROPIUM BROMIDE AND ALBUTEROL SULFATE 3 ML: 2.5; .5 SOLUTION RESPIRATORY (INHALATION) at 00:27

## 2018-11-20 RX ADMIN — ATORVASTATIN CALCIUM 40 MG: 20 TABLET, FILM COATED ORAL at 21:04

## 2018-11-20 RX ADMIN — HEPARIN SODIUM 5000 UNITS: 5000 INJECTION INTRAVENOUS; SUBCUTANEOUS at 21:04

## 2018-11-20 RX ADMIN — TAZOBACTAM SODIUM AND PIPERACILLIN SODIUM 3.38 G: 375; 3 INJECTION, SOLUTION INTRAVENOUS at 21:03

## 2018-11-20 RX ADMIN — CARVEDILOL 50 MG: 25 TABLET, FILM COATED ORAL at 17:54

## 2018-11-20 RX ADMIN — IPRATROPIUM BROMIDE AND ALBUTEROL SULFATE 3 ML: 2.5; .5 SOLUTION RESPIRATORY (INHALATION) at 07:55

## 2018-11-20 RX ADMIN — INSULIN LISPRO 3 UNITS: 100 INJECTION, SOLUTION INTRAVENOUS; SUBCUTANEOUS at 08:10

## 2018-11-20 RX ADMIN — DOCUSATE SODIUM -SENNOSIDES 2 TABLET: 50; 8.6 TABLET, COATED ORAL at 21:04

## 2018-11-20 RX ADMIN — POLYETHYLENE GLYCOL 3350 17 G: 17 POWDER, FOR SOLUTION ORAL at 08:08

## 2018-11-20 RX ADMIN — KETOROLAC TROMETHAMINE 15 MG: 30 INJECTION, SOLUTION INTRAMUSCULAR at 12:39

## 2018-11-20 RX ADMIN — ONDANSETRON 4 MG: 2 INJECTION INTRAMUSCULAR; INTRAVENOUS at 07:01

## 2018-11-20 RX ADMIN — SODIUM CHLORIDE 75 ML/HR: 9 INJECTION, SOLUTION INTRAVENOUS at 08:54

## 2018-11-20 NOTE — ANESTHESIA POSTPROCEDURE EVALUATION
"Patient: Alexy Ram    Procedure Summary     Date:  11/19/18 Room / Location:  [facility] OR 08 /  [facility]    Anesthesia Start:  1503 Anesthesia Stop:  1939    Procedure:  BRONCHOSCOPY, DAVINCI ROBOT ASSISTED VIDEIO ASSISTED THORACOSCOPY  WITH CONVERT TO OPEN THORACOTOMY,LEFT LOWER LOBECTOMY,INTERCOSTAL NERVE BLOCK (Left Chest) Diagnosis:       Squamous cell lung cancer, left (CMS/HCC)      (Squamous cell lung cancer, left (CMS/HCC) [C34.92])    Surgeon:  Michael Ring III, MD Provider:  Sebastián Perez MD    Anesthesia Type:  general ASA Status:  3          Anesthesia Type: general  Last vitals  BP   138/89 (11/19/18 2045)   Temp   36.6 °C (97.8 °F) (11/19/18 1936)   Pulse   61 (11/19/18 2045)   Resp   16 (11/19/18 2045)     SpO2   93 % (11/19/18 2045)     Post Anesthesia Care and Evaluation    Patient location during evaluation: bedside  Patient participation: complete - patient participated  Level of consciousness: awake and alert  Pain management: adequate  Airway patency: patent  Anesthetic complications: No anesthetic complications    Cardiovascular status: acceptable  Respiratory status: acceptable  Hydration status: acceptable    Comments: /89 (BP Location: Left arm, Patient Position: Lying)   Pulse 61   Temp 36.6 °C (97.8 °F) (Oral)   Resp 16   Ht 172.7 cm (67.99\")   Wt 108 kg (237 lb)   SpO2 93%   BMI 36.04 kg/m²       "

## 2018-11-20 NOTE — PLAN OF CARE
Problem: Patient Care Overview  Goal: Plan of Care Review  Outcome: Ongoing (interventions implemented as appropriate)   11/20/18 0611   Coping/Psychosocial   Plan of Care Reviewed With patient   OTHER   Outcome Summary pt came from PACU to ICU due to restlessness. Pt slept well most of night, completely alert and oriented now. Dressing changed at 0400, chest tube site and incision CDI. Pain meds given x1, patient uses PCA appropriately. CT had out 290mL, air leak present, PACU RN said MD aware. James removed at 0600, will continue to monitor       Problem: Skin Injury Risk (Adult)  Goal: Identify Related Risk Factors and Signs and Symptoms  Outcome: Outcome(s) achieved Date Met: 11/20/18    Goal: Skin Health and Integrity  Outcome: Ongoing (interventions implemented as appropriate)      Problem: Fall Risk (Adult)  Goal: Identify Related Risk Factors and Signs and Symptoms  Outcome: Outcome(s) achieved Date Met: 11/20/18    Goal: Absence of Fall  Outcome: Ongoing (interventions implemented as appropriate)      Problem: Infection, Risk/Actual (Adult)  Goal: Identify Related Risk Factors and Signs and Symptoms  Outcome: Outcome(s) achieved Date Met: 11/20/18    Goal: Infection Prevention/Resolution  Outcome: Ongoing (interventions implemented as appropriate)      Problem: Pain, Acute (Adult)  Goal: Identify Related Risk Factors and Signs and Symptoms  Outcome: Outcome(s) achieved Date Met: 11/20/18    Goal: Acceptable Pain Control/Comfort Level  Outcome: Ongoing (interventions implemented as appropriate)

## 2018-11-20 NOTE — PROGRESS NOTES
"    Chief Complaint: Left lower lobe lung mass  S/P: Bronchoscopy, robot assisted VATS with conversion to open thoracotomy, left lower lobectomy, intercostal nerve blocks  POD # 1    Subjective:  Symptoms:  Stable.  He reports chest pain and weakness.  No shortness of breath.    Diet:  Adequate intake.  No nausea or vomiting.    Activity level: Impaired due to pain.    Pain:  He complains of pain that is moderate.  Pain is well controlled.        Vital Signs:  Temp:  [97.4 °F (36.3 °C)-98.3 °F (36.8 °C)] 97.8 °F (36.6 °C)  Heart Rate:  [55-77] 75  Resp:  [16-18] 18  BP: (103-156)/() 132/85  Arterial Line BP: (0-163)/(0-125) 63/51    Intake & Output (last day)       11/19 0701 - 11/20 0700 11/20 0701 - 11/21 0700    I.V. (mL/kg) 2222 (20.4)     IV Piggyback 100     Total Intake(mL/kg) 2322 (21.3)     Urine (mL/kg/hr) 675     Chest Tube 290     Total Output 965     Net +1357                 Objective:  General Appearance:  Comfortable, well-appearing, in no acute distress and in pain.    Vital signs: (most recent): Blood pressure 132/85, pulse 75, temperature 97.8 °F (36.6 °C), temperature source Oral, resp. rate 18, height 172.7 cm (67.99\"), weight 110 kg (241 lb 6.5 oz), SpO2 (!) 89 %.  Vital signs are normal.  No fever.    Output: Producing urine and no stool output.    HEENT: Normal HEENT exam.    Lungs:  Normal effort and normal respiratory rate.  He is not in respiratory distress.  There are decreased breath sounds (Left lung fields) and rhonchi.    Heart: Normal rate.  Regular rhythm.  S1 normal and S2 normal.  No murmur.   Chest: Chest wall tenderness (At thoracotomy incision site) present.    Abdomen: Abdomen is soft and non-distended.  Bowel sounds are normal.   There is no abdominal tenderness.   There is no mass.   Extremities: Normal range of motion.  There is no dependent edema.    Pulses: Distal pulses are intact.    Neurological: Patient is alert and oriented to person, place and time.  Normal " strength.    Pupils:  Pupils are equal, round, and reactive to light.    Skin:  Warm and dry.              Chest tube:   Site: Left, Clean, Dry, Intact and Securement device intact  Suction: -20 cm  Air Leak: positive  24 Hour Total: 290cc    Results Review:     I reviewed the patient's new clinical results.  I reviewed the patient's new imaging results and agree with the interpretation.  Discussed with patient, family at the bedside, RN and Dr. Ring.    Imaging Results (last 24 hours)     Procedure Component Value Units Date/Time    XR Chest 1 View [[id number]] Collected:  11/20/18 0641     Updated:  11/20/18 0641    Narrative:       PORTABLE CHEST X-RAY     CLINICAL HISTORY: Chest tube management; C34.92-Malignant neoplasm of  unspecified part of left bronchus or lung; C34.92-Malignant neoplasm of  unspecified part of left bronchus or lung     COMPARISON: 11/19/2018.     FINDINGS: Portable AP view of the chest was obtained with overlying  monitor leads in place. Postsurgical changes again noted on the left  side. Thoracotomy tube and pacemaker remain in place. There is some  volume loss on the left side with leftward shift of the mediastinum, but  no pneumothorax. Increasing consolidation and pleural thickening on the  left side likely some postoperative atelectasis and pleural fluid. Right  lung is under aerated but clear. Stable cardiomegaly. Vascularity is  normal considering under aeration and portable technique.             Impression:       Increasing left-sided opacities, likely atelectasis and  effusion. Continued follow-up recommended.                XR Chest 1 View [[id number]] Collected:  11/19/18 1953     Updated:  11/19/18 1958    Narrative:       XR CHEST 1 VW-     HISTORY: Male who is 63 years-old,  postoperative evaluation     TECHNIQUE: Frontal view of the chest     COMPARISON: 10/11/2018     FINDINGS: Left chest tube extends to the upper medial left hemithorax,  overlying skin staples. Left-sided  pacemaker, cardiac leads. Heart  appears enlarged. Slight prominence of vascular and interstitial  markings. Opacity at the left mid to lower lung may reflect atelectasis,  effusion. Subcutaneous emphysema is apparent along the lower left chest  wall. No pneumothorax. No acute osseous process.       Impression:       Postsurgical changes with opacity at the left mid to lower  hemithorax, continued follow-up suggested.     This report was finalized on 11/19/2018 7:55 PM by Dr. Sebastián Griffith M.D.             Lab Results:     Lab Results (last 24 hours)     Procedure Component Value Units Date/Time    POC Glucose Once [[id number]]  (Abnormal) Collected:  11/20/18 1123    Specimen:  Blood Updated:  11/20/18 1137     Glucose 146 mg/dL     POC Glucose Once [[id number]]  (Abnormal) Collected:  11/20/18 0737    Specimen:  Blood Updated:  11/20/18 0745     Glucose 161 mg/dL     Basic Metabolic Panel [[id number]]  (Abnormal) Collected:  11/20/18 0414    Specimen:  Blood Updated:  11/20/18 0511     Glucose 169 mg/dL      BUN 19 mg/dL      Creatinine 0.72 mg/dL      Sodium 138 mmol/L      Potassium 3.3 mmol/L      Chloride 98 mmol/L      CO2 26.7 mmol/L      Calcium 8.3 mg/dL      eGFR Non African Amer 110 mL/min/1.73      BUN/Creatinine Ratio 26.4     Anion Gap 13.3 mmol/L     Narrative:       GFR Normal >60  Chronic Kidney Disease <60  Kidney Failure <15    CBC (No Diff) [[id number]]  (Abnormal) Collected:  11/20/18 0414    Specimen:  Blood Updated:  11/20/18 0455     WBC 20.02 10*3/mm3      RBC 4.75 10*6/mm3      Hemoglobin 13.7 g/dL      Hematocrit 41.0 %      MCV 86.3 fL      MCH 28.8 pg      MCHC 33.4 g/dL      RDW 14.8 %      RDW-SD 46.9 fl      MPV 10.6 fL      Platelets 311 10*3/mm3     POC Glucose Once [[id number]]  (Abnormal) Collected:  11/19/18 2151    Specimen:  Blood Updated:  11/19/18 2153     Glucose 182 mg/dL     Tissue Pathology Exam [[id number]] Collected:  11/19/18 1638    Specimen:  Tissue from Lymph Node;  Tissue from Lymph Node; Tissue from Lymph Node; Tissue from Lymph Node; Tissue from Lung, Left Lower Lobe; Tissue from Lymph Node Updated:  11/19/18 2038    POC Glucose Once [[id number]]  (Abnormal) Collected:  11/19/18 1954    Specimen:  Blood Updated:  11/19/18 1956     Glucose 152 mg/dL            Assessment/Plan       Squamous cell lung cancer, left (CMS/HCC)    Squamous cell carcinoma of left lung (CMS/HCC)       Assessment & Plan     Patient is doing well.  Hemodynamically stable.  On room air.  Alert and oriented ×3.  Working with physical therapy today.  I placed chest tube to [facility].  I have asked for him to be ambulated at least 3 times per day, up to chair today.   I have discussed the importance of good pulmonary hygiene with the patient with his family at the bedside.  He needs to be performing incentive spirometry at least 10 times per hour.  I have added a flutter valve.  I have renewed the order for his dual neb's.  He may need NT suctioning, but he states that he has been reluctant to cough due to pain.  I have discussed the use of his PCA in order to perform good pulmonary toilet.  This morning's chest x-ray has been reviewed.  I have ordered a sputum culture as well as MRSA screen of the nares.  Starting on Zosyn due to concern for postoperative pneumonia developing.  Okay to transfer to  E.  Follow-up chest x-ray in the morning.    ROME Reyes  [facility]  11/20/18  11:37 AM

## 2018-11-20 NOTE — PROGRESS NOTES
"                                              LOS: 1 day   Patient Care Team:  Samson Leyva MD as PCP - General (Family Medicine)  Steve Rice MD as Consulting Physician (Cardiac Electrophysiology)    Chief Complaint:  Follow-up on thoracotomy for lung cancer, respiratory failure with hypoxemia and medical care in the intensive care unit    Interval History:   I reviewed the consultation note by Dr. Vega.  I reviewed the PMH, PSH, family history and social history.  No updates.      Since yesterday, patient was weaned off oxygen.  He reported cough which is dry.  He has chest pain on the left but this manageable with current pain medications.  No fever overnight.  Again, no sputum production. James catheter was removed.  Arterial line removed as well.  He has no significant secretions from the left chest tube.  Potassium is 3.3.        REVIEW OF SYSTEMS:   CARDIOVASCULAR: Left-sided chest pain at the site of the surgery. No palpitations or edema.   RESPIRATORY: See above   GASTROINTESTINAL: No anorexia, nausea, vomiting or diarrhea. No abdominal pain or blood.   HEMATOLOGIC: No bleeding or bruising.     Ventilator/Non-Invasive Ventilation Settings (From admission, onward)    None            Vital Signs  Temp:  [97.4 °F (36.3 °C)-98.3 °F (36.8 °C)] 98 °F (36.7 °C)  Heart Rate:  [55-77] 75  Resp:  [16-18] 18  BP: (103-156)/() 132/85  Arterial Line BP: (0-163)/(0-125) 63/51    Intake/Output Summary (Last 24 hours) at 11/20/2018 1207  Last data filed at 11/20/2018 0500  Gross per 24 hour   Intake 2322 ml   Output 965 ml   Net 1357 ml     Flowsheet Rows      First Filed Value   Admission Height  172.7 cm (67.99\") Documented at 11/19/2018 0927   Admission Weight  108 kg (237 lb) Documented at 11/19/2018 0927          Physical Exam:   General Appearance:    Alert, cooperative, in no acute distress   HEENT:  Mallampati score 3, moist mucous membrane   Neck:   Large   Lungs:     Decreased air entry on the " left with diffuse rhonchi bilaterally.  No crackles.  No wheezing     Heart:    Regular rhythm and normal rate, normal S1 and S2, no            murmur   Skin:    No abnormalities observed   Abdomen:     Obese. Soft. No tenderness. No dullness.   Neuro:   Conscious, alert, oriented x3   Extremities:   Moves all extremities well, no edema, no cyanosis, no             Redness          Results Review:        Results from last 7 days   Lab Units  11/20/18   0414   SODIUM mmol/L  138   POTASSIUM mmol/L  3.3*   CHLORIDE mmol/L  98   CO2 mmol/L  26.7   BUN mg/dL  19   CREATININE mg/dL  0.72*   GLUCOSE mg/dL  169*   CALCIUM mg/dL  8.3*         Results from last 7 days   Lab Units  11/20/18   0414   WBC 10*3/mm3  20.02*   HEMOGLOBIN g/dL  13.7   HEMATOCRIT %  41.0   PLATELETS 10*3/mm3  311               I reviewed the patient's new clinical results.  I personally viewed and interpreted the patient's CXR        Medication Review:     amLODIPine 10 mg Oral QAM   aspirin 325 mg Oral Daily   atorvastatin 40 mg Oral Nightly   carvedilol 50 mg Oral BID With Meals   docusate sodium 100 mg Oral BID   heparin (porcine) 5,000 Units Subcutaneous Q8H   hydrALAZINE 25 mg Oral BID   insulin lispro 0-14 Units Subcutaneous 4x Daily With Meals & Nightly   ipratropium-albuterol 3 mL Nebulization Q8H - RT   ketorolac 15 mg Intravenous Q6H   multivitamin with minerals 1 tablet Oral Daily   piperacillin-tazobactam 3.375 g Intravenous Q8H   polyethylene glycol 17 g Oral Daily   sennosides-docusate sodium 2 tablet Oral Nightly   sodium chloride 3 mL Intravenous Q12H   sodium chloride 3 mL Intravenous Q12H   valsartan-HCTZ 320-25 combo dose  Oral Daily         HYDROmorphone HCl-NaCl     sodium chloride 75 mL/hr Last Rate: 75 mL/hr (11/20/18 0854)       Diagnostic imaging:  I personally and independently reviewed the following images:    11/20/18 11/19/18:  Left lung atelectasis with mediastinal  shift.  No infiltrates on the right.      Assessment:    1. Squamous Cell carcinoma the left lung status post 11/19/18 left thoracotomy with intercostal nerve blocks with left lower lobe lobectomy  2. Acute hypoxic respiratory failure postoperatively , resolved  3. Atelectasis left lung  4. Hypokalemia   5. Leukocytosis, likely stress-induced   6. Hypokalemia  7. Diabetes mellitus type 2  8. Hypertension  9. Hyperlipidemia  10. Tobacco abuse    Plan   · Increase DuoNeb to every 6 and add flutter to improve mucous clearance.  · Continue incentive spirometry and I did encourage the patient to use.  · Check pro-calcitonin.  Not much concerns for pneumonia.  Zosyn started by primary team  · Replace potassium   · Bowel regimen instituted   · Insulin subcutaneous for hyperglycemia   · Heparin for DVT prophylaxis  · Okay to transfer out of ICU        Abdiel Robertson MD  11/20/18  12:07 PM        This note was dictated utilizing [employer] Text dictation

## 2018-11-20 NOTE — OP NOTE
THORACOSCOPY WITH DAVINCI ROBOT  Progress Note    Alexy Ram  11/19/2018    Pre-op Diagnosis:   Squamous cell lung cancer, left (CMS/HCC) [C34.92]       Post-Op Diagnosis Codes:     * Squamous cell lung cancer, left (CMS/HCC) [C34.92]    Procedure/CPT® Codes:      Procedure(s):  BRONCHOSCOPY, ROBOT ASSISTED VATS WITH CONVERSION TO OPEN THORACOTOMY, LEFT LOWER LOBECTOMY, INTERCOSTAL NERVE BLOCKS    Surgeon(s):  Michael Ring III, MD    Anesthesia: General    Staff:   Cell Saver : Frederick Harp  Circulator: Slime Cruz RN; Ernie Guy Jr., RN; Belinda Porter RN  Scrub Person: Sonya Hammond; Omaira Sims; Tiana Encarnacion  Assistant: Ame Lopez CRNFA    Estimated Blood Loss: 300 cc    Urine Voided: * No values recorded between 11/19/2018  3:00 PM and 11/19/2018  7:04 PM *    Specimens:                ID Type Source Tests Collected by Time   A : L9 LYMPH NODE Tissue Lymph Node TISSUE PATHOLOGY EXAM Michael Ring III, MD 11/19/2018 1638   B : L9 LYMPH NODE #2 Tissue Lymph Node TISSUE PATHOLOGY EXAM Michael Ring III, MD 11/19/2018 1639   C : L9 LYMPH NODE #3 Tissue Lymph Node TISSUE PATHOLOGY EXAM Michael Ring III, MD 11/19/2018 1643   D : L10 LYMPH NODE Tissue Lymph Node TISSUE PATHOLOGY EXAM Michael Ring III, MD 11/19/2018 1721   E :  Tissue Lung, Left Lower Lobe TISSUE PATHOLOGY EXAM Michael Ring III, MD 11/19/2018 1826   F : AP WINDOW LYMPH NODE Tissue Lymph Node TISSUE PATHOLOGY EXAM Michael Ring III, MD 11/19/2018 1828         Drains:   Chest Tube Left (Active)       Urethral Catheter Non-latex 16 Fr. (Active)       Findings: Extensive scarring and adhesions around the hilum of the lung.  Major fissure was mostly fused.  Inflamed calcified lymph nodes around the bronchus and pulmonary artery.  Because of this anatomy had to abort the robot and proceed with open thoracotomy    Complications: none    Summary of procedure: Mr. Ram was brought  to the operating room and placed on the operating table in the supine position.  Following the induction of adequate general endotracheal anesthesia, the flexible bronchoscope was passed down the endotracheal tube.  Distal trachea and shady appeared to be normal.  Shady was sharp and nondisplaced.  Right mainstem bronchus, right upper lobe, right middle lobe, and right lower lobe bronchi showed no endobronchial lesions or mucosal abnormalities.  The scope was then passed down the left main bronchus.  Left upper lobe and left lower lobe bronchi showed no endobronchial lesions or mucosal abnormalities.  The scope was then used to position the double-lumen tube in the left main bronchus.  Once the endotracheal tube was in good position, the patient was turned into the right lateral decubitus position.  All pressure points were padded.  A roll was placed in the right axilla.  Patient was secured to the operating table with 3 inch adhesive tape and Velcro safety strap.  Left chest was prepped and draped in usual sterile manner.    Port site was created the seventh intercostal space mid axillary line.  It was difficult to get into the pleural space because of scarring and adhesions.  Once I did get into the pleural space we insufflated CO2.  4 other port sites were created.  The robot was brought into the field and docked to the ports.  Instruments were passed under direct vision into the pleural space.  At this point, I broke scrub and went to the robot console.  My assistant stayed at the bedside to pass and manipulate instruments.    We began by dividing the inferior pulmonary ligament.  There is a lot of scarring around the ligament leading up to the inferior pulmonary vein.  There is extensive adenopathy all around the pulmonary vein.  Multiple lymph nodes from the L9 station were harvested and sent for permanent section.  Scarring around of the vein made it difficult to mobilize the vein.  I then went to the  interlobar fissure.  This was mostly fused.  I tried to dissect the pulmonary artery free but was unable to do so.  At this point I decided that the anatomy was such that I could not safely do this procedure with the robot and elected to abort the robot and proceed with open thoracotomy.    I scrubbed back into the operative field.  Instruments were removed from the patient under direct vision.  The robot was undocked.  Ports were removed.  A standard lateral thoracotomy incision was made.  Chest was entered through the sixth intercostal space shingling the seventh rib.  It was quite difficult to mobilize the inferior pulmonary vein but I was able to get a vessel loop around this.  I carried out dissection up along the posterior aspect of the hilum to the pulmonary artery.  Lymph nodes from the L 10 station were harvested.  Multiple lymph nodes and were removed along the way.  The lung was retracted posteriorly. We continued our dissection up along the pericardium and identified the superior pulmonary vein.  I continued dissecting in the interlobar fissure.  Was able to create a tunnel along the pulmonary artery.  With 2 passes of the echelon 45 with the 4.8 staples was able to complete the fissure between the upper lobe and the superior segment of the lower lobe.  I continued dissecting in the interlobar fissure.  There were a lot of inflamed lymph nodes around the pulmonary artery.  Some of these lymph nodes are calcified making it more difficult to mobilize the artery.  With 4 passes of the echelon 45 I was able to complete the fissure between the lingula and the lower lobe.  This allowed for better mobilization of the pulmonary artery.  After quite a while with tedious dissection, I was able to get a vessel loop around the pulmonary artery.  I then divided the pulmonary artery with the endovascular stapler.  The pulmonary vein was then divided with the endovascular stapler.  I was able to mobilize the bronchus  and  placed a TX 30 with the 4.8 staples across the bronchus.  I partially close the stapler occluding the bronchus.  The upper lobe ventilated easily.  Stapled the bronchus and then divided the bronchus.  The lobe was removed.      The bronchial stump was tested under water at an airway pressure 30 cm water and was found to be airtight.  The hilum, the bronchus, and the area of the inferior pulmonary ligament were also sprayed with platelet rich plasma.  Lymph nodes were harvested from the AP window.  This area was sprayed with platelet rich plasma.  Intercostal nerve blocks were then performed with Exparel.  A #28 chest tube was passed into the pleural space and positioned in the paraspinous gutter with its tip at the apex of the chest.  This was brought out through one of the port sites and secured to the chest wall with a suture 0 silk.    The ribs were now reapproximated with #2 Vicryl.  The muscle layers were sprayed with platelet rich plasma.  Muscle layers were then closed individually with running 0 Vicryl.  Subcutaneous tissues were closed with running 2-0 Vicryl.  The skin was closed with staples.  Dry sterile dressings were applied.  The patient was then awakened and extubated in the operating room and transported to recovery room in satisfactory condition having tolerated procedure well.  Sponge instrument and needle counts were correct at the end the procedure    .        Dictated utilizing Dragon dictation  Michael Ring III, MD     Date: 11/19/2018  Time: 7:23 PM

## 2018-11-20 NOTE — CONSULTS
Patient Care Team:  Samson Leyva MD as PCP - General (Family Medicine)  Steve Rice MD as Consulting Physician (Cardiac Electrophysiology)      Subjective     Patient is a 63 y.o. male.  To see to assist with critical care management this gentleman underwent a thoracotomy with left lower lobe lobectomy and adductors costal nerve blocks today for squamous cell carcinoma the left lung.  Patient denies pain or shortness of breath he is on 4 L nasal cannula O2 the nurses report he has been hemodynamically stable      Review of Systems:  History of seizure strokes no recent heart disease he does have history of hypertension hyperlipidemia.  No kidney disease or liver disease no melena or hematochezia.  No hematuria or blood clots easy bleeding or bruising patient does smoke drinks a little bit of alcohol      History  Past Medical History:   Diagnosis Date   • Arthritis    • At risk for obstructive sleep apnea 2018   • At risk for sleep apnea     5   • Hyperlipidemia    • Hypertension    • Hypoglycemia    • Hypoglycemia    • Lesion of lung     ON RECENT SCAN...   • Lung cancer (CMS/HCC)    • Pacemaker    • Pneumonia     2018   • Second degree AV block    • Sinus node dysfunction (CMS/HCC)      Past Surgical History:   Procedure Laterality Date   • KNEE SURGERY N/A    • PACEMAKER IMPLANTATION  ,      Social History     Socioeconomic History   • Marital status:      Spouse name: Not on file   • Number of children: Not on file   • Years of education: Not on file   • Highest education level: Not on file   Tobacco Use   • Smoking status: Former Smoker     Packs/day: 1.00     Years: 33.00     Pack years: 33.00     Types: Cigarettes     Last attempt to quit: 2018     Years since quittin.8   • Smokeless tobacco: Never Used   • Tobacco comment: started cigars 3-4 daily in 2018 and has quit a month ago   Substance and Sexual Activity   • Alcohol use: Yes     Comment:  18  lillian monthly   • Drug use: No     Family History   Problem Relation Age of Onset   • Diabetes Mother    • Stroke Father    • Heart disease Father    • Stroke Sister    • Heart disease Brother    • Malig Hyperthermia Neg Hx          Allergies:  Patient has no known allergies.    Medications:  Prior to Admission medications    Medication Sig Start Date End Date Taking? Authorizing Provider   amLODIPine (NORVASC) 10 MG tablet Take 10 mg by mouth Every Morning.   Yes Jonas Horvath MD   aspirin 325 MG tablet Take 325 mg by mouth Daily.   Yes Jonas Horvath MD   atorvastatin (LIPITOR) 40 MG tablet Take 40 mg by mouth Every Night.   Yes Jonas Horvath MD   carvedilol (COREG) 25 MG tablet Take 50 mg by mouth 2 (Two) Times a Day With Meals.   Yes Jonas Horvath MD   hydrALAZINE (APRESOLINE) 25 MG tablet Take 25 mg by mouth 2 (Two) Times a Day.   Yes Jonas Horvath MD   Multiple Vitamins-Minerals (MULTIVITAMIN ADULT PO) Take 1 tablet by mouth Daily.   Yes Jonas Horvath MD   potassium chloride (K-DUR) 10 MEQ CR tablet Take 20 mEq by mouth 2 (Two) Times a Day.   Yes Jonas Horvath MD   TURMERIC PO Take 1 capsule by mouth Daily.   Yes Jonas Horvath MD   valsartan-hydrochlorothiazide (DIOVAN-HCT) 320-25 MG per tablet Take 1 tablet by mouth Every Morning.   Yes Jonas Horvath MD       [START ON 11/20/2018] amLODIPine 10 mg Oral QAM   aspirin 325 mg Oral Daily   atorvastatin 40 mg Oral Nightly   carvedilol 50 mg Oral BID With Meals   ceFAZolin 2 g Intravenous Q8H   docusate sodium 100 mg Oral BID   [START ON 11/20/2018] heparin (porcine) 5,000 Units Subcutaneous Q8H   hydrALAZINE 25 mg Oral BID   ipratropium-albuterol 3 mL Nebulization Q8H - RT   ketorolac 15 mg Intravenous Q6H   multivitamin with minerals 1 tablet Oral Daily   polyethylene glycol 17 g Oral Daily   sennosides-docusate sodium 2 tablet Oral Nightly   sodium chloride 3 mL Intravenous Q12H  "  valsartan-HCTZ 320-25 combo dose  Oral Daily       HYDROmorphone HCl-NaCl     sodium chloride 75 mL/hr Last Rate: 75 mL/hr (11/19/18 2155)       Objective     Vital Signs  Vital Sign Min/Max for last 24 hours  Temp  Min: 97.4 °F (36.3 °C)  Max: 98.6 °F (37 °C)   BP  Min: 103/90  Max: 159/106   Pulse  Min: 55  Max: 77   Resp  Min: 16  Max: 20   SpO2  Min: 88 %  Max: 100 %   Flow (L/min)  Min: 2  Max: 4   Weight  Min: 108 kg (237 lb)  Max: 110 kg (241 lb 6.5 oz)       Intake/Output Summary (Last 24 hours) at 11/19/2018 2253  Last data filed at 11/19/2018 1939  Gross per 24 hour   Intake 1300 ml   Output --   Net 1300 ml     I/O this shift:  In: 900 [I.V.:900]  Out: -   Last Weight and Admission Weight        11/19/18 2140   Weight: 110 kg (241 lb 6.5 oz)     Flowsheet Rows      First Filed Value   Admission Height  172.7 cm (67.99\") Documented at 11/19/2018 0927   Admission Weight  108 kg (237 lb) Documented at 11/19/2018 0927          Body mass index is 36.71 kg/m².           Physical Exam:  General Appearance: Well developed white male he is resting comfortably in bed he is in no apparent distress he is on 4 L nasal cannula O2 with an oxygen saturation of 99%.  His heart rate is in the 60s and looks to be sinus on the monitor and his blood pressure by arterial line is in the 120s over 70s systolic.  Eyes: Conjunctiva are clear and anicteric pupils are about 3 mm equal and reactive to light  ENT: There is no facial asymmetry nasal and oral mucous membranes are dry no erythema or exudates he has a Mallampati 1 airway and nasal septum is midline  Neck: No adenopathy or thyromegaly no jugular venous distention trachea is midline there is no crepitance  Lungs: Very decreased breath sounds on the left almost no air movement.  The right is clear no wheezes rales no rhonchi he is not using accessory muscles and he is not splinting  Cardiac: Regular rate and rhythm no murmur  Abdomen: Soft nontender no palpable " hepatosplenomegaly or masses active bowel sounds  : Catheter with yellow urine  Musculoskeletal: He has a left chest tube there is a small intermittent air leak and there is about 210 cc of serosanguineous fluid in the Pleur-evac.  He has a dressing over the left posterior lateral chest consistent with surgical history it is clean and dry  Skin: No jaundice no petechiae no rashes noted.  No cyanosis or mottling  Neuro: Alert oriented he is following commands moving all 4 extremities well I don't see any focal neurologic deficits  Extremities/P Vascular: Clubbing no cyanosis no edema he has palpable dorsalis pedis and left radial pulse he has a right radial a line with good waveform  MSE: He is rather flat affect person but otherwise seems appropriate      Labs:      Estimated Creatinine Clearance: 115.1 mL/min (by C-G formula based on SCr of 0.79 mg/dL).                                  Microbiology Results (last 10 days)     ** No results found for the last 240 hours. **            Diagnostics:  Xr Chest 1 View    Result Date: 11/19/2018  XR CHEST 1 VW-  HISTORY: Male who is 63 years-old,  postoperative evaluation  TECHNIQUE: Frontal view of the chest  COMPARISON: 10/11/2018  FINDINGS: Left chest tube extends to the upper medial left hemithorax, overlying skin staples. Left-sided pacemaker, cardiac leads. Heart appears enlarged. Slight prominence of vascular and interstitial markings. Opacity at the left mid to lower lung may reflect atelectasis, effusion. Subcutaneous emphysema is apparent along the lower left chest wall. No pneumothorax. No acute osseous process.      Postsurgical changes with opacity at the left mid to lower hemithorax, continued follow-up suggested.  This report was finalized on 11/19/2018 7:55 PM by Dr. Sebastián Griffith M.D.      Nm Pet Skull Base To Mid Thigh    Result Date: 10/26/2018  F-18 FDG PET FROM SKULL BASE TO MID THIGH WITH PET/CT FUSION  HISTORY: Lung cancer.  TECHNIQUE:  Radiation dose reduction techniques were utilized, including automated exposure control and exposure modulation based on body size. Blood glucose level at time of injection was 99 mg/dL.  6.4 mCi of F-18 FDG were injected and PET was performed from skull base to mid thigh. CT was obtained for localization and attenuation correction. Time at injection 1310 hours. PET start time 1433 hours.  Compared with chest CT 07/31/2018.  FINDINGS:  Brain: No findings of intracranial pathology or abnormal FDG uptake.  Neck: No findings of FDG avid cervical lymph nodes. Peripherally calcified 0.8 cm hypodense lesion within the left lobe of thyroid gland demonstrating uptake mildly above that of blood pool with a max SUV of 3.5.  Chest: There is an irregular left lower lobe pulmonary mass measuring up to 3.7 x 2.5 cm in greatest axial dimensions. Very few mildly prominent mediastinal lymph nodes which demonstrate FDG uptake below that of blood pool and measure less than 1 cm in short axis dimension which are likely reactive.  A right-sided pacemaker is present. There is no pleural effusion or pneumothorax.  Abdomen and pelvis:  The spleen, and pancreas have a normal non contrast CT appearance and demonstrate physiologic FDG uptake.  There is a 1.3 cm hypodense left adrenal nodule with Hounsfield units of 1.5 and uptake similar to that of the liver likely representing an adenoma.  Hepatic steatosis is present.  Bilateral probable simple renal cysts are present.  The gallbladder is unremarkable.  The bowel demonstrates normal physiologic FDG uptake.  There are no FDG avid or enlarged abdominopelvic lymph nodes by size criteria.  Severe atherosclerotic calcification of the abdominal aorta and its major branches is present.  There is no free intraperitoneal fluid or air.  The bladder is unremarkable for its degree of distension.  There is physiologic marrow uptake.  No suspicious lytic or blastic bony lesions.      1.  Moderate to  intense FDG avid, partially calcified left lower lobe pulmonary mass representing the patient's known malignancy. 2.  Mildly prominent subcentimeter mediastinal lymph nodes which demonstrate FDG uptake below that of blood pool which are likely reactive. Attention on follow-up is recommended. 3.  Subcentimeter peripherally calcified hypodense lesion within the left thyroid gland demonstrating focal mild FDG uptake. Findings are nonspecific as both benign and malignant thyroid nodules can demonstrate FDG uptake. Further evaluation with thyroid sonogram is recommended. 4.  Findings of a left adrenal adenoma as above.  This report was finalized on 10/26/2018 5:15 PM by Dr. Harmeet Baca M.D.           Asked x-ray looks like there might be some mild vascular congestion on the right the left hemithorax is almost completely opacified except for the very left apex the midline really doesn't appear to be shifted    Assessment/Plan     1. Squamous Cell carcinoma the left lung status post 11/19/18 left thoracotomy with intercostal nerve blocks with left lower lobe lobectomy  2. Acute hypoxic respiratory failure postoperatively I suspect it is primarily atelectasis in the left chest but impossible to say for certain day if the not improve he may need a CT scan to better evaluate and work on pulmonary hygiene.  3. Diabetes mellitus type 2 we'll put him on a sliding scale insulin  4. Hypertension  5. Hyperlipidemia  6. Tobacco abuse discussed discussed smoking cessation      Calvin Vega MD  11/19/18  10:53 PM    Time:

## 2018-11-20 NOTE — PROGRESS NOTES
Clinical Pharmacy Services: Medication History    Alexy Ram is a 63 y.o. male presenting to [facility] for Squamous cell lung cancer, left (CMS/HCC) [C34.92]  Squamous cell carcinoma of left lung (CMS/HCC) [C34.92]    He  has a past medical history of Arthritis, At risk for obstructive sleep apnea (11/19/2018), At risk for sleep apnea, Hyperlipidemia, Hypertension, Hypoglycemia, Hypoglycemia, Lesion of lung, Lung cancer (CMS/HCC), Pacemaker, Pneumonia, Second degree AV block, and Sinus node dysfunction (CMS/HCC).    Allergies as of 11/01/2018   • (No Known Allergies)       Medication information was obtained from: Self and Spouse    Prior to Admission Medications     Prescriptions Last Dose Informant Patient Reported? Taking?    amLODIPine (NORVASC) 10 MG tablet 11/19/2018 Self Yes Yes    Take 10 mg by mouth Every Morning.    aspirin 325 MG tablet 11/12/2018  Yes Yes    Take 325 mg by mouth Daily.    atorvastatin (LIPITOR) 40 MG tablet 11/19/2018 Self Yes Yes    Take 40 mg by mouth Every Night.    carvedilol (COREG) 25 MG tablet 11/19/2018 Self Yes Yes    Take 50 mg by mouth 2 (Two) Times a Day With Meals.    hydrALAZINE (APRESOLINE) 25 MG tablet 11/19/2018 Self Yes Yes    Take 25 mg by mouth 2 (Two) Times a Day.    Multiple Vitamins-Minerals (MULTIVITAMIN ADULT PO) 11/12/2018 Self Yes Yes    Take 1 tablet by mouth Daily.    potassium chloride (K-DUR) 10 MEQ CR tablet 11/18/2018  Yes Yes    Take 20 mEq by mouth 2 (Two) Times a Day.    valsartan-hydrochlorothiazide (DIOVAN-HCT) 320-25 MG per tablet 11/19/2018  Yes Yes    Take 1 tablet by mouth Every Morning.            Medication notes: Patient was alert and oriented. His wife had a physical medication list she had written out. Their medication list matched ours on file and the patient verified that the list was accurate and up to date. No changes were necessary except tumeric powder (which was probably added accidentally) was removed from the  list.     This medication list is complete to the best of my knowledge as of 11/20/2018    Please call if questions.    Sheryl Urban  11/20/2018 11:49 AM

## 2018-11-20 NOTE — THERAPY EVALUATION
Acute Care - Physical Therapy Initial Evaluation  [facility]     Patient Name: Alexy Ram  : 1955  MRN: [id number]  Today's Date: 2018   Onset of Illness/Injury or Date of Surgery: 18  Date of Referral to PT: 18  Referring Physician: Dr. Ring      Admit Date: 2018    Visit Dx:     ICD-10-CM ICD-9-CM   1. Impaired functional mobility, balance, gait, and endurance Z74.09 V49.89   2. Squamous cell carcinoma of left lung (CMS/HCC) C34.92 162.9   3. Squamous cell lung cancer, left (CMS/HCC) C34.92 162.9     Patient Active Problem List   Diagnosis   • Hypercholesterolemia   • Hypertension   • Type 2 diabetes mellitus (CMS/HCC)   • Hemoptysis   • Squamous cell lung cancer, left (CMS/HCC)   • Squamous cell carcinoma of left lung (CMS/HCC)     Past Medical History:   Diagnosis Date   • Arthritis    • At risk for obstructive sleep apnea 2018   • At risk for sleep apnea     5   • Hyperlipidemia    • Hypertension    • Hypoglycemia    • Hypoglycemia    • Lesion of lung     ON RECENT SCAN...   • Lung cancer (CMS/HCC)    • Pacemaker    • Pneumonia     2018   • Second degree AV block    • Sinus node dysfunction (CMS/HCC)      Past Surgical History:   Procedure Laterality Date   • KNEE SURGERY N/A    • PACEMAKER IMPLANTATION  ,         PT ASSESSMENT (last 12 hours)      Physical Therapy Evaluation     Row Name 18 1100          PT Evaluation Time/Intention    Subjective Information  complains of;pain  -MA     Document Type  evaluation  -MA     Mode of Treatment  physical therapy  -MA     Patient Effort  adequate  -MA     Symptoms Noted During/After Treatment  fatigue;increased pain;dizziness  -MA     Row Name 18 1100          General Information    Patient Profile Reviewed?  yes  -MA     Onset of Illness/Injury or Date of Surgery  18  -MA     Referring Physician  Dr. Ring  -MA     Patient Observations  alert;cooperative;agree to therapy  -MA     General  Observations of Patient  Supine in bed with HOB elevated, no acute distress noted at rest, chest tube in place, no acute distress noted at rest  -MA     Prior Level of Function  independent:;all household mobility Daljit for bed mobility per patient  -MA     Equipment Currently Used at Home  none  -MA     Pertinent History of Current Functional Problem  Dx of squamous cell lung CA, POD1 BRONCHOSCOPY, ROBOT ASSISTED VATS WITH CONVERSION TO OPEN THORACOTOMY, LEFT LOWER LOBECTOMY, INTERCOSTAL NERVE BLOCKS  -MA     Existing Precautions/Restrictions  fall chest tube- on suction this date  -MA     Limitations/Impairments  safety/cognitive  -MA     Risks Reviewed  patient and family:  -MA     Benefits Reviewed  patient and family:  -MA     Barriers to Rehab  none identified  -MA     Row Name 11/20/18 1100          Home Main Entrance    Stairs Comment, Main Entrance  2 options- flight or 4 steps  -MA     Row Name 11/20/18 1100          Cognitive Assessment/Intervention- PT/OT    Orientation Status (Cognition)  oriented x 4  -MA     Follows Commands (Cognition)  follows two step commands;repetition of directions required;verbal cues/prompting required  -MA     Safety Deficit (Cognitive)  mild deficit;at risk behavior observed;awareness of need for assistance  -MA     Personal Safety Interventions  fall prevention program maintained;gait belt;nonskid shoes/slippers when out of bed  -MA     Row Name 11/20/18 1100          Safety Issues, Functional Mobility    Safety Issues Affecting Function (Mobility)  sequencing abilities  -MA     Impairments Affecting Function (Mobility)  pain;strength;endurance/activity tolerance  -MA     Row Name 11/20/18 1100          Bed Mobility Assessment/Treatment    Bed Mobility Assessment/Treatment  supine-sit;sit-supine  -MA     Supine-Sit [place] (Bed Mobility)  minimum assist (75% patient effort);verbal cues;nonverbal cues (demo/gesture)  -MA     Bed Mobility, Safety Issues  decreased use of  arms for pushing/pulling  -MA     Assistive Device (Bed Mobility)  bed rails;head of bed elevated  -MA     Comment (Bed Mobility)  Use of UEs for assist as patient did not want to roll on chest tube side.  -MA     Row Name 11/20/18 1100          Transfer Assessment/Treatment    Transfer Assessment/Treatment  sit-stand transfer;stand-sit transfer  -MA     Comment (Transfers)  Hand placement and sequencing cues.  -MA     Sit-Stand [place] (Transfers)  contact guard;1 person to manage equipment;verbal cues;nonverbal cues (demo/gesture)  -MA     Stand-Sit [place] (Transfers)  contact guard;verbal cues  -MA     Row Name 11/20/18 1100          Sit-Stand Transfer    Assistive Device (Sit-Stand Transfers)  -- HHA  -MA     Row Name 11/20/18 1100          Stand-Sit Transfer    Assistive Device (Stand-Sit Transfers)  -- HHA  -MA     Row Name 11/20/18 1100          Gait/Stairs Assessment/Training    Gait/Stairs Assessment/Training  gait/ambulation independence  -MA     [place] Level (Gait)  contact guard;2 person assist;1 person to manage equipment  -MA     Assistive Device (Gait)  -- HHAx2  -MA     Distance in Feet (Gait)  5  -MA     Pattern (Gait)  step-to  -MA     Comment (Gait/Stairs)  Side steps towards HOB- limited 2' to fatigue as well as suction line.  -MA     Row Name 11/20/18 1100          General ROM    GENERAL ROM COMMENTS  B LE WFL  -MA     Row Name 11/20/18 1100          MMT (Manual Muscle Testing)    General MMT Comments  B LEs grossly 4/5  -MA     Row Name 11/20/18 1100          Motor Assessment/Intervention    Additional Documentation  Balance (Group);Balance Interventions (Group);Therapeutic Exercise (Group);Therapeutic Exercise Interventions (Group)  -MA     Row Name 11/20/18 1100          Therapeutic Exercise    Lower Extremity (Therapeutic Exercise)  marching while seated;LAQ (long arc quad), bilateral  -MA     Exercise Type (Therapeutic Exercise)  AROM (active range of motion)  -MA      Position (Therapeutic Exercise)  seated  -MA     Sets/Reps (Therapeutic Exercise)  10  -MA     Row Name 11/20/18 1100          Balance    Balance  static sitting balance;static standing balance  -MA     Row Name 11/20/18 1100          Static Sitting Balance    Level of [place] (Unsupported Sitting, Static Balance)  supervision  -MA     Row Name 11/20/18 1100          Static Standing Balance    Level of [place] (Supported Standing, Static Balance)  contact guard assist  -MA     Row Name 11/20/18 1100          Sensory Assessment/Intervention    Sensory General Assessment  no sensation deficits identified  -[facility] Name 11/20/18 1100          Vision Assessment/Intervention    Visual Impairment/Limitations  WFL  -MA     Row Name 11/20/18 1100          Pain Assessment    Additional Documentation  Pain Scale: FACES Pre/Post-Treatment (Group)  -MA     Row Name 11/20/18 1100          Pain Scale: Numbers Pre/Post-Treatment    Pain Location - Side  Left  -MA     Pain Location - Orientation  incisional  -MA     Pain Location  chest  -MA     Pain Intervention(s)  Repositioned;Ambulation/increased activity;Rest  -[facility] Name 11/20/18 1100          Pain Scale: FACES Pre/Post-Treatment    Pain: FACES Scale, Pretreatment  4-->hurts little more  -MA     Pain: FACES Scale, Post-Treatment  6-->hurts even more  -MA     Row Name             Wound 11/19/18 1922 Left chest incision    Wound - Properties Group Date first assessed: 11/19/18  -RM Time first assessed: 1922  -RM Side: Left  -RM Location: chest  -RM Type: incision  -RM    Row Name 11/20/18 1100          Plan of Care Review    Plan of Care Reviewed With  patient  -MA     Row Name 11/20/18 1100          Physical Therapy Clinical Impression    Date of Referral to PT  11/20/18  -MA     PT Diagnosis (PT Clinical Impression)  impaired functional mobility and endurance  -MA     Functional Level at Time of Evaluation (PT Clinical Impression)  SBA-CGAx2  -MA      Patient/Family Goals Statement (PT Clinical Impression)  Return home with assist  -MA     Criteria for Skilled Interventions Met (PT Clinical Impression)  yes;treatment indicated  -MA     Pathology/Pathophysiology Noted (Describe Specifically for Each System)  musculoskeletal  -MA     Impairments Found (describe specific impairments)  aerobic capacity/endurance;ergonomics and body mechanics;gait, locomotion, and balance  -MA     Rehab Potential (PT Clinical Summary)  good, to achieve stated therapy goals  -MA     Care Plan Review (PT)  patient/other agree to care plan  -MA     Row Name 11/20/18 1100          Vital Signs    Post Systolic BP Rehab  127  -MA     Post Treatment Diastolic BP  77  -MA     Pre SpO2 (%)  93  -MA     O2 Delivery Pre Treatment  room air  -MA     Row Name 11/20/18 1100          Physical Therapy Goals    Bed Mobility Goal Selection (PT)  bed mobility, PT goal 1  -MA     Transfer Goal Selection (PT)  transfer, PT goal 1  -MA     Gait Training Goal Selection (PT)  gait training, PT goal 1  -MA     Row Name 11/20/18 1100          Bed Mobility Goal 1 (PT)    Activity/Assistive Device (Bed Mobility Goal 1, PT)  bed mobility activities, all  -MA     [place] Level/Cues Needed (Bed Mobility Goal 1, PT)  supervision required  -MA     Time Frame (Bed Mobility Goal 1, PT)  1 week  -MA     Progress/Outcomes (Bed Mobility Goal 1, PT)  goal ongoing  -MA     Row Name 11/20/18 1100          Transfer Goal 1 (PT)    Activity/Assistive Device (Transfer Goal 1, PT)  transfers, all  -MA     [place] Level/Cues Needed (Transfer Goal 1, PT)  supervision required  -MA     Time Frame (Transfer Goal 1, PT)  1 week  -MA     Progress/Outcome (Transfer Goal 1, PT)  goal ongoing  -MA     Row Name 11/20/18 1100          Gait Training Goal 1 (PT)    Activity/Assistive Device (Gait Training Goal 1, PT)  gait (walking locomotion)  -MA     [place] Level (Gait Training Goal 1, PT)  supervision required  -MA      Distance (Gait Goal 1, PT)  150  -MA     Time Frame (Gait Training Goal 1, PT)  1 week  -MA     Progress/Outcome (Gait Training Goal 1, PT)  goal ongoing  -MA     Row Name 11/20/18 1100          Positioning and Restraints    Pre-Treatment Position  in bed  -MA     Post Treatment Position  bed  -MA     In Bed  notified nsg;fowlers;call light within reach;encouraged to call for assist;with family/caregiver  -MA     Row Name 11/20/18 1100          Living Environment    Home Accessibility  stairs to enter home  -MA       User Key  (r) = Recorded By, (t) = Taken By, (c) = Cosigned By    Initials Name Provider Type     Belinda Porter, RN Registered Nurse    Zakia Parker, PT Physical Therapist        Physical Therapy Education     Title: PT OT SLP Therapies (Active)     Topic: Physical Therapy (Active)     Point: Mobility training (Active)     Learning Progress Summary           Patient Acceptance, E, NR by MA at 11/20/2018 11:21 AM                   Point: Home exercise program (Active)     Learning Progress Summary           Patient Acceptance, E, NR by MA at 11/20/2018 11:21 AM                   Point: Body mechanics (Active)     Learning Progress Summary           Patient Acceptance, E, NR by MA at 11/20/2018 11:21 AM                   Point: Precautions (Active)     Learning Progress Summary           Patient Acceptance, E, NR by MA at 11/20/2018 11:21 AM                               User Key     Initials Effective Dates Name Provider Type Discipline    MA 04/03/18 -  Zakia Mcgraw, PT Physical Therapist PT              PT Recommendation and Plan  Anticipated Discharge Disposition (PT): home with assist, home with home health(pending PT progress)  Planned Therapy Interventions (PT Eval): balance training, bed mobility training, gait training, home exercise program, patient/family education, postural re-education, strengthening, transfer training  Therapy Frequency (PT Clinical Impression):  daily  Outcome Summary/Treatment Plan (PT)  Anticipated Discharge Disposition (PT): home with assist, home with home health(pending PT progress)  Plan of Care Reviewed With: patient, family  Outcome Summary: Patient is a pleasant 63 y.o. male admitted to [facility] for squamous cell lung CA resulting in [id number], ROBOT ASSISTED VATS WITH CONVERSION TO OPEN THORACOTOMY, LEFT LOWER LOBECTOMY, INTERCOSTAL NERVE BLOCKS per Dr. Ring on 11/19/2018. Patient is independent at baseline, requires Janes for bed mobility at home and lives with family- steps to enter home required. No prior use of AD. Today, patient performed bed mobility with Janes, required CGA for transfers, and tolerated taking a few side steps towards HOB CGA with HHAx2. Endurance and strength deficits noted as well as complaints of L sided chest pain. Patient may benefit from skilled PT services acutely to address functional deficits as well as improve level of independence prior to discharge. Anticipate home with assist and possible HH PT upon DC.  Outcome Measures     Row Name 11/20/18 1100             How much help from another person do you currently need...    Turning from your back to your side while in flat bed without using bedrails?  3  -MA      Moving from lying on back to sitting on the side of a flat bed without bedrails?  3  -MA      Moving to and from a bed to a chair (including a wheelchair)?  3  -MA      Standing up from a chair using your arms (e.g., wheelchair, bedside chair)?  3  -MA      Climbing 3-5 steps with a railing?  3  -MA      To walk in hospital room?  3  -MA      AM-PAC 6 Clicks Score  18  -MA         Functional Assessment    Outcome Measure Options  AM-PAC 6 Clicks Basic Mobility (PT)  -MA        User Key  (r) = Recorded By, (t) = Taken By, (c) = Cosigned By    Initials Name Provider Type    Zakia Parker, PT Physical Therapist         Time Calculation:   PT Charges     Row Name 11/20/18 1106             Time  Calculation    Start Time  1042  -MA      Stop Time  1102  -MA      Time Calculation (min)  20 min  -MA      PT Received On  11/20/18  -MA      PT - Next Appointment  11/21/18  -MA      PT Goal Re-Cert Due Date  11/27/18  -MA         Time Calculation- PT    Total Timed Code Minutes- PT  16 minute(s)  -MA        User Key  (r) = Recorded By, (t) = Taken By, (c) = Cosigned By    Initials Name Provider Type    Zakia Parker, PT Physical Therapist        Therapy Suggested Charges     Code   Minutes Charges    None           Therapy Charges for Today     Code Description Service Date Service Provider Modifiers Qty    76063525671 HC PT EVAL MOD COMPLEXITY 2 11/20/2018 Zakia Mcgraw, PT GP 1    66619534680 HC PT THER PROC EA 15 MIN 11/20/2018 Zakia Mcgraw, PT GP 1          PT G-Codes  Outcome Measure Options: AM-PAC 6 Clicks Basic Mobility (PT)  AM-PAC 6 Clicks Score: 18      Zakia Mcgraw PT  11/20/2018

## 2018-11-20 NOTE — PLAN OF CARE
Problem: Patient Care Overview  Goal: Plan of Care Review   11/20/18 1117   Coping/Psychosocial   Plan of Care Reviewed With patient;family   OTHER   Outcome Summary Patient is a pleasant 63 y.o. male admitted to [facility] for squamous cell lung CA resulting in [id number], ROBOT ASSISTED VATS WITH CONVERSION TO OPEN THORACOTOMY, LEFT LOWER LOBECTOMY, INTERCOSTAL NERVE BLOCKS per Dr. Ring on 11/19/2018. Patient is independent at baseline, requires Janes for bed mobility at home and lives with family- steps to enter home required. No prior use of AD. Today, patient performed bed mobility with Janes, required CGA for transfers, and tolerated taking a few side steps towards HOB CGA with HHAx2. Endurance and strength deficits noted as well as complaints of L sided chest pain. Patient may benefit from skilled PT services acutely to address functional deficits as well as improve level of independence prior to discharge. Anticipate home with assist and possible  PT upon DC.

## 2018-11-21 ENCOUNTER — APPOINTMENT (OUTPATIENT)
Dept: GENERAL RADIOLOGY | Facility: HOSPITAL | Age: 63
End: 2018-11-21

## 2018-11-21 LAB
ANION GAP SERPL CALCULATED.3IONS-SCNC: 12.9 MMOL/L
BUN BLD-MCNC: 29 MG/DL (ref 8–23)
BUN/CREAT SERPL: 25.4 (ref 7–25)
CALCIUM SPEC-SCNC: 8.7 MG/DL (ref 8.6–10.5)
CHLORIDE SERPL-SCNC: 95 MMOL/L (ref 98–107)
CO2 SERPL-SCNC: 26.1 MMOL/L (ref 22–29)
CREAT BLD-MCNC: 1.14 MG/DL (ref 0.76–1.27)
CYTO UR: NORMAL
DEPRECATED RDW RBC AUTO: 48.3 FL (ref 37–54)
ERYTHROCYTE [DISTWIDTH] IN BLOOD BY AUTOMATED COUNT: 14.9 % (ref 11.5–14.5)
GFR SERPL CREATININE-BSD FRML MDRD: 65 ML/MIN/1.73
GLUCOSE BLD-MCNC: 177 MG/DL (ref 65–99)
GLUCOSE BLDC GLUCOMTR-MCNC: 126 MG/DL (ref 70–130)
GLUCOSE BLDC GLUCOMTR-MCNC: 129 MG/DL (ref 70–130)
GLUCOSE BLDC GLUCOMTR-MCNC: 157 MG/DL (ref 70–130)
GLUCOSE BLDC GLUCOMTR-MCNC: 261 MG/DL (ref 70–130)
HCT VFR BLD AUTO: 42 % (ref 40.4–52.2)
HGB BLD-MCNC: 13.6 G/DL (ref 13.7–17.6)
LAB AP CASE REPORT: NORMAL
LAB AP SYNOPTIC CHECKLIST: NORMAL
MCH RBC QN AUTO: 28.7 PG (ref 27–32.7)
MCHC RBC AUTO-ENTMCNC: 32.4 G/DL (ref 32.6–36.4)
MCV RBC AUTO: 88.6 FL (ref 79.8–96.2)
MRSA SPEC QL CULT: ABNORMAL
PATH REPORT.FINAL DX SPEC: NORMAL
PATH REPORT.GROSS SPEC: NORMAL
PLATELET # BLD AUTO: 310 10*3/MM3 (ref 140–500)
PMV BLD AUTO: 9.9 FL (ref 6–12)
POTASSIUM BLD-SCNC: 3.4 MMOL/L (ref 3.5–5.2)
PROCALCITONIN SERPL-MCNC: 0.75 NG/ML (ref 0.1–0.25)
RBC # BLD AUTO: 4.74 10*6/MM3 (ref 4.6–6)
SODIUM BLD-SCNC: 134 MMOL/L (ref 136–145)
WBC NRBC COR # BLD: 16.56 10*3/MM3 (ref 4.5–10.7)

## 2018-11-21 PROCEDURE — 94799 UNLISTED PULMONARY SVC/PX: CPT

## 2018-11-21 PROCEDURE — 25010000002 VANCOMYCIN 10 G RECONSTITUTED SOLUTION: Performed by: THORACIC SURGERY (CARDIOTHORACIC VASCULAR SURGERY)

## 2018-11-21 PROCEDURE — 97110 THERAPEUTIC EXERCISES: CPT

## 2018-11-21 PROCEDURE — 25010000002 KETOROLAC TROMETHAMINE PER 15 MG: Performed by: THORACIC SURGERY (CARDIOTHORACIC VASCULAR SURGERY)

## 2018-11-21 PROCEDURE — 25010000002 KETOROLAC TROMETHAMINE PER 15 MG: Performed by: NURSE PRACTITIONER

## 2018-11-21 PROCEDURE — 80048 BASIC METABOLIC PNL TOTAL CA: CPT | Performed by: THORACIC SURGERY (CARDIOTHORACIC VASCULAR SURGERY)

## 2018-11-21 PROCEDURE — 25010000002 PIPERACILLIN SOD-TAZOBACTAM PER 1 G: Performed by: NURSE PRACTITIONER

## 2018-11-21 PROCEDURE — 71045 X-RAY EXAM CHEST 1 VIEW: CPT

## 2018-11-21 PROCEDURE — 99024 POSTOP FOLLOW-UP VISIT: CPT | Performed by: NURSE PRACTITIONER

## 2018-11-21 PROCEDURE — 25010000002 HEPARIN (PORCINE) PER 1000 UNITS: Performed by: THORACIC SURGERY (CARDIOTHORACIC VASCULAR SURGERY)

## 2018-11-21 PROCEDURE — 63710000001 INSULIN LISPRO (HUMAN) PER 5 UNITS: Performed by: INTERNAL MEDICINE

## 2018-11-21 PROCEDURE — 82962 GLUCOSE BLOOD TEST: CPT

## 2018-11-21 PROCEDURE — 85027 COMPLETE CBC AUTOMATED: CPT | Performed by: NURSE PRACTITIONER

## 2018-11-21 PROCEDURE — 84145 PROCALCITONIN (PCT): CPT | Performed by: INTERNAL MEDICINE

## 2018-11-21 RX ORDER — KETOROLAC TROMETHAMINE 15 MG/ML
15 INJECTION, SOLUTION INTRAMUSCULAR; INTRAVENOUS EVERY 6 HOURS SCHEDULED
Status: DISCONTINUED | OUTPATIENT
Start: 2018-11-21 | End: 2018-11-23 | Stop reason: HOSPADM

## 2018-11-21 RX ORDER — ALPRAZOLAM 0.25 MG/1
0.25 TABLET ORAL 2 TIMES DAILY PRN
Status: DISCONTINUED | OUTPATIENT
Start: 2018-11-21 | End: 2018-11-23 | Stop reason: HOSPADM

## 2018-11-21 RX ADMIN — POLYETHYLENE GLYCOL 3350 17 G: 17 POWDER, FOR SOLUTION ORAL at 08:56

## 2018-11-21 RX ADMIN — CARVEDILOL 50 MG: 25 TABLET, FILM COATED ORAL at 08:55

## 2018-11-21 RX ADMIN — HEPARIN SODIUM 5000 UNITS: 5000 INJECTION INTRAVENOUS; SUBCUTANEOUS at 23:32

## 2018-11-21 RX ADMIN — TAZOBACTAM SODIUM AND PIPERACILLIN SODIUM 3.38 G: 375; 3 INJECTION, SOLUTION INTRAVENOUS at 23:35

## 2018-11-21 RX ADMIN — KETOROLAC TROMETHAMINE 15 MG: 30 INJECTION, SOLUTION INTRAMUSCULAR at 00:02

## 2018-11-21 RX ADMIN — HYDROCODONE BITARTRATE AND ACETAMINOPHEN 1 TABLET: 7.5; 325 TABLET ORAL at 09:03

## 2018-11-21 RX ADMIN — KETOROLAC TROMETHAMINE 15 MG: 15 INJECTION, SOLUTION INTRAMUSCULAR; INTRAVENOUS at 23:34

## 2018-11-21 RX ADMIN — IPRATROPIUM BROMIDE AND ALBUTEROL SULFATE 3 ML: 2.5; .5 SOLUTION RESPIRATORY (INHALATION) at 10:59

## 2018-11-21 RX ADMIN — HYDROCODONE BITARTRATE AND ACETAMINOPHEN 2 TABLET: 7.5; 325 TABLET ORAL at 23:50

## 2018-11-21 RX ADMIN — MULTIPLE VITAMINS W/ MINERALS TAB 1 TABLET: TAB at 08:55

## 2018-11-21 RX ADMIN — TAZOBACTAM SODIUM AND PIPERACILLIN SODIUM 3.38 G: 375; 3 INJECTION, SOLUTION INTRAVENOUS at 14:01

## 2018-11-21 RX ADMIN — HEPARIN SODIUM 5000 UNITS: 5000 INJECTION INTRAVENOUS; SUBCUTANEOUS at 06:28

## 2018-11-21 RX ADMIN — IPRATROPIUM BROMIDE AND ALBUTEROL SULFATE 3 ML: 2.5; .5 SOLUTION RESPIRATORY (INHALATION) at 19:14

## 2018-11-21 RX ADMIN — HEPARIN SODIUM 5000 UNITS: 5000 INJECTION INTRAVENOUS; SUBCUTANEOUS at 14:01

## 2018-11-21 RX ADMIN — IPRATROPIUM BROMIDE AND ALBUTEROL SULFATE 3 ML: 2.5; .5 SOLUTION RESPIRATORY (INHALATION) at 07:05

## 2018-11-21 RX ADMIN — HYDRALAZINE HYDROCHLORIDE 25 MG: 25 TABLET ORAL at 08:55

## 2018-11-21 RX ADMIN — SODIUM CHLORIDE, PRESERVATIVE FREE 3 ML: 5 INJECTION INTRAVENOUS at 23:36

## 2018-11-21 RX ADMIN — INSULIN LISPRO 8 UNITS: 100 INJECTION, SOLUTION INTRAVENOUS; SUBCUTANEOUS at 20:59

## 2018-11-21 RX ADMIN — ATORVASTATIN CALCIUM 40 MG: 20 TABLET, FILM COATED ORAL at 20:45

## 2018-11-21 RX ADMIN — HYDRALAZINE HYDROCHLORIDE 25 MG: 25 TABLET ORAL at 20:45

## 2018-11-21 RX ADMIN — VANCOMYCIN HYDROCHLORIDE 2250 MG: 10 INJECTION, POWDER, LYOPHILIZED, FOR SOLUTION INTRAVENOUS at 14:01

## 2018-11-21 RX ADMIN — ASPIRIN 325 MG: 325 TABLET ORAL at 08:55

## 2018-11-21 RX ADMIN — KETOROLAC TROMETHAMINE 15 MG: 30 INJECTION, SOLUTION INTRAMUSCULAR at 06:28

## 2018-11-21 RX ADMIN — KETOROLAC TROMETHAMINE 15 MG: 15 INJECTION, SOLUTION INTRAMUSCULAR; INTRAVENOUS at 18:36

## 2018-11-21 RX ADMIN — HYDROCODONE BITARTRATE AND ACETAMINOPHEN 2 TABLET: 7.5; 325 TABLET ORAL at 12:33

## 2018-11-21 RX ADMIN — HYDROCODONE BITARTRATE AND ACETAMINOPHEN 2 TABLET: 7.5; 325 TABLET ORAL at 17:08

## 2018-11-21 RX ADMIN — SODIUM CHLORIDE, PRESERVATIVE FREE 3 ML: 5 INJECTION INTRAVENOUS at 23:33

## 2018-11-21 RX ADMIN — TAZOBACTAM SODIUM AND PIPERACILLIN SODIUM 3.38 G: 375; 3 INJECTION, SOLUTION INTRAVENOUS at 04:11

## 2018-11-21 RX ADMIN — CARVEDILOL 50 MG: 25 TABLET, FILM COATED ORAL at 17:08

## 2018-11-21 RX ADMIN — ALPRAZOLAM 0.25 MG: 0.25 TABLET ORAL at 19:04

## 2018-11-21 RX ADMIN — DOCUSATE SODIUM 100 MG: 100 CAPSULE, LIQUID FILLED ORAL at 08:56

## 2018-11-21 RX ADMIN — KETOROLAC TROMETHAMINE 15 MG: 30 INJECTION, SOLUTION INTRAMUSCULAR at 12:32

## 2018-11-21 RX ADMIN — IPRATROPIUM BROMIDE AND ALBUTEROL SULFATE 3 ML: 2.5; .5 SOLUTION RESPIRATORY (INHALATION) at 14:49

## 2018-11-21 RX ADMIN — AMLODIPINE BESYLATE 10 MG: 10 TABLET ORAL at 08:55

## 2018-11-21 RX ADMIN — HYDROCHLOROTHIAZIDE: 25 TABLET ORAL at 08:56

## 2018-11-21 NOTE — PLAN OF CARE
Problem: Patient Care Overview  Goal: Plan of Care Review  Outcome: Ongoing (interventions implemented as appropriate)   11/21/18 4911   Coping/Psychosocial   Plan of Care Reviewed With patient   OTHER   Outcome Summary vss, pt with + MRSA nares so started on vancomycin and pipercillin. ambulated with assistance x3 ct remains waterseal with occassional airleak, cxr in am continue monitor    Plan of Care Review   Progress improving

## 2018-11-21 NOTE — PLAN OF CARE
Problem: Patient Care Overview  Goal: Plan of Care Review  Outcome: Ongoing (interventions implemented as appropriate)   11/21/18 1508   Coping/Psychosocial   Plan of Care Reviewed With patient   OTHER   Outcome Summary Pt tolerated treatment with no complaints. Pt ambulated 150 feet with standby assist no AD.    Plan of Care Review   Progress improving

## 2018-11-21 NOTE — PROGRESS NOTES
"                                              LOS: 2 days   Patient Care Team:  Samson Leyva MD as PCP - General (Family Medicine)  Steve Rice MD as Consulting Physician (Cardiac Electrophysiology)    Chief Complaint:  Follow-up on thoracotomy for lung cancer, respiratory failure with hypoxemia and medical care in the intensive care unit    Interval History:   Off oxygen.  He reported cough with brownish/bloody phlegm.  Chest pain is controlled.  His still have his chest tube on the left.  There is visible intermittent air leak during deep breathing and cough.  Large bloody output noted.  Patient ambulated today.  He is using the incentive spirometry and getting up to 1500 ML.  He also has a flutter device    REVIEW OF SYSTEMS:   CARDIOVASCULAR: Left-sided chest pain at the site of the surgery. No palpitations or edema.   RESPIRATORY: See above   GASTROINTESTINAL: No anorexia, nausea, vomiting or diarrhea. No abdominal pain or blood.   HEMATOLOGIC: No bleeding or bruising.     Ventilator/Non-Invasive Ventilation Settings (From admission, onward)    None            Vital Signs  Temp:  [96.8 °F (36 °C)-98.7 °F (37.1 °C)] 96.8 °F (36 °C)  Heart Rate:  [60-94] 94  Resp:  [14-20] 14  BP: (107-138)/(76-90) 117/87    Intake/Output Summary (Last 24 hours) at 11/21/2018 1836  Last data filed at 11/21/2018 1710  Gross per 24 hour   Intake 720 ml   Output 1000 ml   Net -280 ml     Flowsheet Rows      First Filed Value   Admission Height  172.7 cm (67.99\") Documented at 11/19/2018 0927   Admission Weight  108 kg (237 lb) Documented at 11/19/2018 0927          Physical Exam:   General Appearance:    Alert, cooperative, in no acute distress   HEENT:  Mallampati score 3, moist mucous membrane   Neck:   Large   Lungs:     Decreased air entry on the left with diffuse rhonchi bilaterally.  No crackles.  No wheezing     Heart:    Regular rhythm and normal rate, normal S1 and S2, no            murmur   Skin:    No " abnormalities observed   Abdomen:     Obese. Soft. No tenderness. No dullness.   Neuro:   Conscious, alert, oriented x3   Extremities:   Moves all extremities well, no edema, no cyanosis, no             Redness          Results Review:        Results from last 7 days   Lab Units  11/21/18   1420  11/20/18   0414   SODIUM mmol/L  134*  138   POTASSIUM mmol/L  3.4*  3.3*   CHLORIDE mmol/L  95*  98   CO2 mmol/L  26.1  26.7   BUN mg/dL  29*  19   CREATININE mg/dL  1.14  0.72*   GLUCOSE mg/dL  177*  169*   CALCIUM mg/dL  8.7  8.3*         Results from last 7 days   Lab Units  11/21/18   0936  11/20/18   0414   WBC 10*3/mm3  16.56*  20.02*   HEMOGLOBIN g/dL  13.6*  13.7   HEMATOCRIT %  42.0  41.0   PLATELETS 10*3/mm3  310  311               I reviewed the patient's new clinical results.  I personally viewed and interpreted the patient's CXR        Medication Review:     amLODIPine 10 mg Oral QAM   aspirin 325 mg Oral Daily   atorvastatin 40 mg Oral Nightly   carvedilol 50 mg Oral BID With Meals   docusate sodium 100 mg Oral BID   heparin (porcine) 5,000 Units Subcutaneous Q8H   hydrALAZINE 25 mg Oral BID   insulin lispro 0-14 Units Subcutaneous 4x Daily With Meals & Nightly   ipratropium-albuterol 3 mL Nebulization 4x Daily - RT   ketorolac 15 mg Intravenous Q6H   multivitamin with minerals 1 tablet Oral Daily   piperacillin-tazobactam 3.375 g Intravenous Q8H   polyethylene glycol 17 g Oral Daily   sennosides-docusate sodium 2 tablet Oral Nightly   sodium chloride 3 mL Intravenous Q12H   sodium chloride 3 mL Intravenous Q12H   valsartan-HCTZ 320-25 combo dose  Oral Daily   Vancomycin Pharmacy Intermittent Dosing  Does not apply Daily         Pharmacy to dose vancomycin        Diagnostic imaging:  I personally and independently reviewed the following images:    11/20/18 11/19/18:  Left lung atelectasis with mediastinal shift.  No infiltrates on the right.      I  reviewed the chest x-ray from 11/21/18.  No significant changes compared to yesterday.    Assessment:    1. Squamous Cell carcinoma the left lung status post 11/19/18 left thoracotomy with intercostal nerve blocks with left lower lobe lobectomy  2. Acute hypoxic respiratory failure postoperatively , resolved  3. Atelectasis left lung  4. Possible left lower lobe pneumonia  5. Hypokalemia   6. Leukocytosis, secondary to pneumonia versus stress induced  7. Hypokalemia  8. Diabetes mellitus type 2  9. Hypertension  10. Hyperlipidemia  11. Tobacco abuse    Plan   · Continue bronchodilators with DuoNeb, incentive spirometry and flutter  · Check pro-calcitonin and it's elevated.  Agree with current antibiotics.  Patient is currently on vancomycin and Zosyn.  May potentially switch to Augmentin prior to patient's discharge.          Abdiel Robertson MD  11/21/18  6:36 PM        This note was dictated utilizing [employer] dictation

## 2018-11-21 NOTE — THERAPY TREATMENT NOTE
Acute Care - Physical Therapy Treatment Note  [facility]     Patient Name: Alexy Ram  : 1955  MRN: [id number]  Today's Date: 2018  Onset of Illness/Injury or Date of Surgery: 18  Date of Referral to PT: 18  Referring Physician: Dr. Ring    Admit Date: 2018    Visit Dx:    ICD-10-CM ICD-9-CM   1. Impaired functional mobility, balance, gait, and endurance Z74.09 V49.89   2. Squamous cell carcinoma of left lung (CMS/HCC) C34.92 162.9   3. Squamous cell lung cancer, left (CMS/HCC) C34.92 162.9   4. Bacterial pneumonia J15.9 482.9     Patient Active Problem List   Diagnosis   • Hypercholesterolemia   • Hypertension   • Type 2 diabetes mellitus (CMS/HCC)   • Hemoptysis   • Squamous cell lung cancer, left (CMS/HCC)   • Squamous cell carcinoma of left lung (CMS/HCC)       Therapy Treatment    Rehabilitation Treatment Summary     Row Name 18 1451             Treatment Time/Intention    Discipline  physical therapy assistant  -      Document Type  therapy note (daily note)  -      Subjective Information  no complaints  -EH      Mode of Treatment  physical therapy  -EH      Patient/Family Observations  Pt sitting in chair with family present  -EH      Care Plan Review  patient/other agree to care plan  -EH      Therapy Frequency (PT Clinical Impression)  daily  -EH      Patient Effort  good  -EH      Existing Precautions/Restrictions  fall  -EH      Recorded by [EH] Lisbet Schroeder, PTA 18 1504      Row Name 18 1451             Cognitive Assessment/Intervention- PT/OT    Orientation Status (Cognition)  oriented x 4  -EH      Follows Commands (Cognition)  WNL  -EH2      Safety Deficit (Cognitive)  mild deficit;insight into deficits/self awareness;awareness of need for assistance  -EH2      Personal Safety Interventions  fall prevention program maintained;gait belt;nonskid shoes/slippers when out of bed  -EH      Recorded by [EH] Lisbet Schroeder, ADELA 18  1504  [EH2] Lisbet Schroeder, PTA 11/21/18 1508      Row Name 11/21/18 1451             Bed Mobility Assessment/Treatment    Supine-Sit Spencer (Bed Mobility)  not tested pt sitting in chair  -EH      Recorded by [EH] Lisbet Schroeder, PTA 11/21/18 1508      Row Name 11/21/18 1451             Sit-Stand Transfer    Sit-Stand Spencer (Transfers)  stand by assist  -      Assistive Device (Sit-Stand Transfers)  -- No AD  -EH      Recorded by [] Lisbet Schroeder, PTA 11/21/18 1508      Row Name 11/21/18 1451             Stand-Sit Transfer    Stand-Sit Spencer (Transfers)  stand by assist  -      Assistive Device (Stand-Sit Transfers)  -- No AD  -EH      Recorded by [] Lisbet Schroeder, PTA 11/21/18 1508      Row Name 11/21/18 1451             Gait/Stairs Assessment/Training    Spencer Level (Gait)  stand by assist  -EH      Assistive Device (Gait)  -- No AD  -EH      Distance in Feet (Gait)  150  -EH      Pattern (Gait)  step-through  -EH      Deviations/Abnormal Patterns (Gait)  gait speed decreased  -EH      Recorded by [EH] Lisbet Schroeder, PTA 11/21/18 1508      Row Name 11/21/18 1451             Positioning and Restraints    Pre-Treatment Position  sitting in chair/recliner  -EH      Post Treatment Position  chair  -EH      In Chair  sitting;with family/caregiver  -EH      Recorded by [EH] Lisbet Schroeder, PTA 11/21/18 1508      Row Name                Wound 11/19/18 1922 Left chest incision    Wound - Properties Group Date first assessed: 11/19/18 [RM] Time first assessed: 1922 [RM] Side: Left [RM] Location: chest [RM] Type: incision [RM] Recorded by:  [RM] Belinda Porter RN 11/19/18 1922      User Key  (r) = Recorded By, (t) = Taken By, (c) = Cosigned By    Initials Name Effective Dates Discipline    RM Belinda Porter RN 06/16/16 -  Nurse    EH Lisbet Schroeder, PTA 08/19/18 -  PT          Wound 11/19/18 1922 Left chest incision (Active)   Dressing Appearance open to air 11/21/2018  9:07 AM    Closure Staples 11/21/2018  9:07 AM   Base clean;pink 11/21/2018  9:07 AM   Periwound intact;dry;swelling 11/21/2018  9:07 AM   Periwound Temperature warm 11/21/2018  9:07 AM   Periwound Skin Turgor soft 11/21/2018  9:07 AM   Drainage Amount none 11/21/2018  9:07 AM   Care, Wound cleansed with;antimicrobial agent applied 11/21/2018  9:07 AM   Dressing Care, Wound open to air 11/21/2018  9:07 AM           Physical Therapy Education     Title: PT OT SLP Therapies (Done)     Topic: Physical Therapy (Done)     Point: Mobility training (Done)     Learning Progress Summary           Patient Acceptance, E, VU,DU by  at 11/21/2018  3:09 PM    Acceptance, E, NR by MA at 11/20/2018 11:21 AM                   Point: Home exercise program (Done)     Learning Progress Summary           Patient Acceptance, E, VU,DU by  at 11/21/2018  3:09 PM    Acceptance, E, NR by MA at 11/20/2018 11:21 AM                   Point: Body mechanics (Done)     Learning Progress Summary           Patient Acceptance, E, VU,DU by  at 11/21/2018  3:09 PM    Acceptance, E, NR by MA at 11/20/2018 11:21 AM                   Point: Precautions (Done)     Learning Progress Summary           Patient Acceptance, E, VU,DU by  at 11/21/2018  3:09 PM    Acceptance, E, NR by MA at 11/20/2018 11:21 AM                               User Key     Initials Effective Dates Name Provider Type Discipline    MA 04/03/18 -  Zakia Mcgraw, PT Physical Therapist PT     08/19/18 -  Lisbet Schroeder PTA Physical Therapy Assistant PT                PT Recommendation and Plan  Therapy Frequency (PT Clinical Impression): daily  Plan of Care Reviewed With: patient  Progress: improving  Outcome Summary: Pt tolerated treatment with no complaints. Pt ambulated 150 feet with standby assist no AD.   Outcome Measures     Row Name 11/21/18 1500 11/20/18 1100          How much help from another person do you currently need...    Turning from your back to your side while in  flat bed without using bedrails?  3  -  3  -MA     Moving from lying on back to sitting on the side of a flat bed without bedrails?  3  -  3  -MA     Moving to and from a bed to a chair (including a wheelchair)?  3  -EH  3  -MA     Standing up from a chair using your arms (e.g., wheelchair, bedside chair)?  3  -EH  3  -MA     Climbing 3-5 steps with a railing?  3  -  3  -MA     To walk in hospital room?  3  -  3  -MA     AM-PAC 6 Clicks Score  18  -  18  -MA        Functional Assessment    Outcome Measure Options  --  AM-PAC 6 Clicks Basic Mobility (PT)  -MA       User Key  (r) = Recorded By, (t) = Taken By, (c) = Cosigned By    Initials Name Provider Type    Zakia Parker, PT Physical Therapist     Lisbet Schroeder PTA Physical Therapy Assistant         Time Calculation:   PT Charges     Row Name 11/21/18 1509             Time Calculation    Start Time  1451  -      Stop Time  1500  -      Time Calculation (min)  9 min  -      PT Received On  11/21/18  -      PT - Next Appointment  11/22/18  -         Time Calculation- PT    Total Timed Code Minutes- PT  9 minute(s)  -        User Key  (r) = Recorded By, (t) = Taken By, (c) = Cosigned By    Initials Name Provider Type     Lisbet Schroeder PTA Physical Therapy Assistant        Therapy Suggested Charges     Code   Minutes Charges    None           Therapy Charges for Today     Code Description Service Date Service Provider Modifiers Qty    72053875181 HC PT THER PROC EA 15 MIN 11/21/2018 Lisbet Schroeder PTA GP 1          PT G-Codes  Outcome Measure Options: AM-PAC 6 Clicks Basic Mobility (PT)  AM-PAC 6 Clicks Score: 18    Lisbet Schroeder PTA  11/21/2018

## 2018-11-21 NOTE — PROGRESS NOTES
Discharge Planning Assessment  [facility]     Patient Name: Alexy Ram  MRN: [id number]  Today's Date: 11/21/2018    Admit Date: 11/19/2018    Discharge Needs Assessment     Row Name 11/21/18 1706       Living Environment    Quality of Family Relationships  supportive    Row Name 11/21/18 1704       Living Environment    Lives With  spouse    Current Living Arrangements  home/apartment/condo    Primary Care Provided by  spouse/significant other    Provides Primary Care For  no one, unable/limited ability to care for self    Family Caregiver if Needed  none    Quality of Family Relationships  supportive       Resource/Environmental Concerns    Resource/Environmental Concerns  none    Transportation Concerns  car, none       Transition Planning    Patient/Family Anticipates Transition to  home with family       Discharge Needs Assessment    Readmission Within the Last 30 Days  no previous admission in last 30 days    Equipment Currently Used at Home  none    Anticipated Changes Related to Illness  none    Equipment Needed After Discharge  none        Discharge Plan     Row Name 11/21/18 7584       Plan    Plan  Home no needs    Patient/Family in Agreement with Plan  yes wife and son at bedside.     Plan Comments  Met at bedside with pt and his family at bedside. Pt gives permission for CCP to question in front of amily. Per pt he lives at home with wife and has been independant in care. He has never used home health or been to rehab. Pt amb 150ft today with PT. Pt uses no equipment. [employer] demograpghics. Pt pharmacy is [facility] in [place]. denied problems affording or obtaining medications. Pt denies having an advanced directives. Plan at DC is home no needs. CCP will follow....[facility]        Destination      No service coordination in this encounter.      Durable Medical Equipment      No service coordination in this encounter.      Dialysis/Infusion      No service coordination in this encounter.      Home  Medical Care      No service coordination in this encounter.      Community Resources      No service coordination in this encounter.          Demographic Summary     Row Name 11/21/18 1707       General Information    Admission Type  inpatient    Arrived From  emergency department    Referral Source  admission list    Reason for Consult  discharge planning        Functional Status     Row Name 11/21/18 1706       Functional Status    Usual Activity Tolerance  good    Current Activity Tolerance  poor       Functional Status, IADL    Medications  independent    Meal Preparation  independent    Housekeeping  independent    Laundry  independent    Shopping  independent       Mental Status    General Appearance WDL  WDL       Mental Status Summary    Recent Changes in Mental Status/Cognitive Functioning  no changes        Psychosocial    No documentation.       Abuse/Neglect    No documentation.       Legal    No documentation.       Substance Abuse    No documentation.       Patient Forms    No documentation.           Dayna Lyle RN

## 2018-11-21 NOTE — PLAN OF CARE
Problem: Patient Care Overview  Goal: Plan of Care Review  Outcome: Ongoing (interventions implemented as appropriate)   11/20/18 1947   Coping/Psychosocial   Plan of Care Reviewed With patient   OTHER   Outcome Summary pt transferred from ICU with left thoracotomy lower lobectomy, ct remains with intermittent air leak, fluctuation, suture intact, thoracotomy site herb with staples, dilaudid PCA infusing, oral pain medication controlling pain. continue to montior    Plan of Care Review   Progress improving

## 2018-11-21 NOTE — PROGRESS NOTES
"Pharmacokinetic Consult - Vancomycin Dosing (Follow-up Note)    Alexy Ram is a 63 y.o. male who is on day 1 of 7 pharmacy to dose vancomycin for pneumonia.  Pharmacy dosing vancomycin per Dr. Lizabeth Lowery's request.   Other antimicrobials: Zosyn  Goal trough: 15-20 mg/L    Current Vancomycin dose: 1500 mg IV q12h     Relevant clinical data and objective history reviewed:  172.7 cm (67.99\")  102 kg (224 lb)  Body mass index is 34.07 kg/m².     He has a past medical history of Arthritis, At risk for obstructive sleep apnea (2018), At risk for sleep apnea, Hyperlipidemia, Hypertension, Hypoglycemia, Hypoglycemia, Lesion of lung, Lung cancer (CMS/HCC), Pacemaker, Pneumonia, Second degree AV block, and Sinus node dysfunction (CMS/HCC).    Allergies as of 2018   • (No Known Allergies)     Vital Signs (last 24 hours)        0700  -   0659  0700  -   1718   Most Recent    Temp (°F) 97.8 -  98.4    96.8 -  98.7     96.8 (36)    Heart Rate 59 -  75    60 -  94     94    Resp  -      14 -  16     14    /81 -  145/96    117/87 -  138/90     117/87    SpO2 (%) (!)89 -  100    91 -  98     92        Estimated Creatinine Clearance: 76.7 mL/min (by C-G formula based on SCr of 1.14 mg/dL).  Results from last 7 days   Lab Units  18   1420  18   0414   CREATININE mg/dL  1.14  0.72*     Results from last 7 days   Lab Units  18   0936  18   0414   WBC 10*3/mm3  16.56*  20.02*     Baseline culture/source/susceptibility:    MRSA swab positive   sputum w no organism on gram stain and normal resp hernandez growth      Imagin/21 CXR Increased expansion of right lung with increased opacity and  diminished aeration of mid to lower left lung.     Anti-Infectives (From admission, onward)    Ordered     Dose/Rate Route Frequency Start Stop    18 1717  Vancomycin Pharmacy Intermittent Dosing     Ordering Provider:  Michael Ring III, MD     Does not " apply Daily 11/21/18 1815 11/28/18 0859    11/21/18 1208  vancomycin 2250 mg/500 mL 0.9% NS IVPB ([facility])     Ordering Provider:  Michael Ring III, MD    20 mg/kg × 110 kg  over 225 Minutes Intravenous Once 11/21/18 1300      11/21/18 1231  piperacillin-tazobactam (ZOSYN) 3.375 g in iso-osmotic dextrose 50 ml (premix)     Comments:  Time dosing appropriately based on recently given dose from previous scheduled order   Ordering Provider:  Lizabeth Lowery APRN    3.375 g Intravenous Every 8 Hours 11/21/18 1300 11/28/18 1259    11/21/18 1143  Pharmacy to dose vancomycin     Ordering Provider:  Lizabeth Lowery APRN     Does not apply Continuous PRN 11/21/18 1143 11/28/18 1142    11/19/18 2043  ceFAZolin in dextrose (ANCEF) IVPB solution 2 g     Ordering Provider:  Michael Ring III, MD    2 g  over 30 Minutes Intravenous Every 8 Hours 11/19/18 2300 11/20/18 0640    11/19/18 0953  ceFAZolin in dextrose (ANCEF) IVPB solution 2 g     Ordering Provider:  Michael Ring III, MD    2 g  over 30 Minutes Intravenous Once 11/19/18 0955 11/19/18 1520           Assessment/Plan  Vancomycin 2250 mg once given 11/21/18 1401. Due to SCr increase 0.72 to 1.14 in < 12 hours, will cancel scheduled vancomycin regimen and begin intermittent dosing.     1) Vancomycin level ordered with AM labs tomorrow. Recommend re-dosing when level < 20 mg/L.   2) Will monitor serum creatinine tomorrow with AM labs.   3) Encourage hydration as allowed by MD to help prevent toxic accumulation; monitor for s/sxn of toxicity including increase in SCr and decrease in UOP.    Pharmacy will continue to follow daily while on vancomycin and adjust as needed.     Thank you for this consult,    Pipe Hutton, PharmD, MORENA, BCPS

## 2018-11-21 NOTE — PLAN OF CARE
Problem: Patient Care Overview  Goal: Plan of Care Review  Outcome: Ongoing (interventions implemented as appropriate)   11/21/18 5581   Coping/Psychosocial   Plan of Care Reviewed With patient   OTHER   Outcome Summary VSS; Dilaudid PCA continued. CT remains with intermittent air leak and flucation. Thoracotomy site painted with betadine. Pt. up to the side of bed several times. Anxious. Will continue to monitor       Problem: Skin Injury Risk (Adult)  Goal: Skin Health and Integrity  Outcome: Ongoing (interventions implemented as appropriate)      Problem: Fall Risk (Adult)  Goal: Absence of Fall  Outcome: Ongoing (interventions implemented as appropriate)      Problem: Infection, Risk/Actual (Adult)  Goal: Infection Prevention/Resolution  Outcome: Ongoing (interventions implemented as appropriate)      Problem: Pain, Acute (Adult)  Goal: Acceptable Pain Control/Comfort Level  Outcome: Ongoing (interventions implemented as appropriate)

## 2018-11-21 NOTE — PROGRESS NOTES
"Pharmacokinetic Consult - Vancomycin Dosing    Alexy Ram is a 63 y.o. male who is on day 1 of 7 pharmacy to dose vancomycin for pna per Lizabeth Camacho's request.   Other antimicrobials: zosyn  Goal trough: 15-20 mg/L     He  has a past medical history of Arthritis, At risk for obstructive sleep apnea (2018), At risk for sleep apnea, Hyperlipidemia, Hypertension, Hypoglycemia, Hypoglycemia, Lesion of lung, Lung cancer (CMS/HCC), Pacemaker, Pneumonia, Second degree AV block, and Sinus node dysfunction (CMS/HCC).    Allergies as of 2018   • (No Known Allergies)     /79 (BP Location: Left leg, Patient Position: Sitting)   Pulse 67   Temp 97.7 °F (36.5 °C) (Oral)   Resp 16   Ht 172.7 cm (67.99\")   Wt 110 kg (241 lb 6.5 oz)   SpO2 92%   BMI 36.71 kg/m²     Estimated Creatinine Clearance: 126.3 mL/min (A) (by C-G formula based on SCr of 0.72 mg/dL (L)).  Results from last 7 days   Lab Units  18   0414   CREATININE mg/dL  0.72*     Lab Results   Component Value Date    WBC 16.56 (H) 2018     Baseline culture/source/susceptibility:    MRSA swab positive   sputum w no organism on gram stain and normal resp hernandez growth     Imagin/21 CXR Increased expansion of right lung with increased opacity and  diminished aeration of mid to lower left lung.     Dosing hx: none    Assessment/Plan  Reviewed chart   Renal function is stable/at baseline, no lab today, so need creatinine prior to scheduling    1. Vancomycin 2250 mg once then 1500 mg q12 (unless renal fxn worse)   2. Vancomycin trough  1230   3. Creatinine now and  am labs     Pharmacy will continue to follow daily while on vancomycin and adjust as needed.     Jaliyn Salomon, PharmD, BCPS  2018 12:01 PM      "

## 2018-11-21 NOTE — PROGRESS NOTES
"    Chief Complaint: Left lower lobe lung mass  S/P: Bronchoscopy, robot assisted VATS with conversion to open thoracotomy, left lower lobectomy, intercostal nerve blocks  POD # 2    Subjective:  Symptoms:  Stable.  He reports shortness of breath and chest pain.    Diet:  Adequate intake.  No nausea or vomiting.    Activity level: Returning to normal.    Pain:  He complains of pain that is moderate.  Pain is partially controlled.        Vital Signs:  Temp:  [97.7 °F (36.5 °C)-98.7 °F (37.1 °C)] 97.7 °F (36.5 °C)  Heart Rate:  [60-83] 76  Resp:  [14-20] 14  BP: (107-138)/(76-90) 123/78    Intake & Output (last day)       11/20 0701 - 11/21 0700 11/21 0701 - 11/22 0700    P.O.  480    I.V. (mL/kg)      IV Piggyback      Total Intake(mL/kg)  480 (4.7)    Urine (mL/kg/hr) 1100 (0.4)     Chest Tube 300     Total Output 1400     Net -1400 +480                Objective:  General Appearance:  Comfortable, well-appearing, in pain and in no acute distress.    Vital signs: (most recent): Blood pressure 123/78, pulse 76, temperature 97.7 °F (36.5 °C), temperature source Oral, resp. rate 14, height 172.7 cm (67.99\"), weight 102 kg (224 lb), SpO2 92 %.  Vital signs are normal.  No fever.    Output: Producing urine.    Lungs:  Normal effort and normal respiratory rate.  He is not in respiratory distress.  There are decreased breath sounds (left lung fields).    Heart: Normal rate.  Regular rhythm.  S1 normal and S2 normal.  No murmur.   Chest: Chest wall tenderness present.    Abdomen: Abdomen is soft and non-distended.  Bowel sounds are normal.   There is no abdominal tenderness.   There is no mass.   Extremities: Normal range of motion.    Pulses: Distal pulses are intact.    Neurological: Patient is alert and oriented to person, place and time.    Skin:  Warm and dry.              Chest tube:   Site: Left, Clean, Dry, Intact and Securement device intact  Suction: waterseal  Air Leak: negative  24 Hour Total: 300cc    Results " Review:     I reviewed the patient's new clinical results.  I reviewed the patient's new imaging results and agree with the interpretation.  Discussed with patient, his wife at the bedside, RN, and Dr. Ring.    Imaging Results (last 24 hours)     Procedure Component Value Units Date/Time    XR Chest 1 View [[id number]] Collected:  11/21/18 0814     Updated:  11/21/18 0819    Narrative:       CLINICAL HISTORY: 63-year-old male 2 days postop left thoracotomy with  left lower lobectomy.     PORTABLE AP ERECT CHEST DATED 11/21/2018 AT 0555 HOURS     FINDINGS: When compared to the most recent available prior chest  radiograph, the portable AP semierect projection of 11/20/2018 at 0456  hours, there is again a left chest tube terminating at the medial left  mid to upper chest. Skin staples about the left chest remain. There is  increased diffuse hazy to dense opacity throughout the mid to lower left  chest. Coarse markings in the right lung are present. There is increased  expansion of right lung with perhaps minimally reduced expansion of left  lung. Permanent right subclavian transvenous pacer and lead wires to the  distributions of right atrium and right ventricle of the heart remain.  Aortic calcifications and ectasia are again demonstrated. The diffusely  coarse perihilar pulmonary vascular and interstitial markings in the  lungs remain. Right costophrenic angle is relatively sharp. Left  costophrenic angle is obscured with the remainder of the left lung base.  Cardiac silhouette appears borderline enlarged but is not as well  demonstrated on the left. Monitoring lead wires are present. Severe  degenerative change or post fracture deformity and degenerative change  at the right shoulder and some extensive degenerative change at the left  shoulder are again demonstrated.     CONCLUSION: Increased expansion of right lung with increased opacity and  diminished aeration of mid to lower left lung. No pneumothorax  is  detected.     This report was finalized on 11/21/2018 8:16 AM by Dr. Arie Hairston M.D.       XR Chest 1 View [[id number]] Collected:  11/20/18 0641     Updated:  11/21/18 0040    Narrative:       PORTABLE CHEST X-RAY     CLINICAL HISTORY: Chest tube management; C34.92-Malignant neoplasm of  unspecified part of left bronchus or lung; C34.92-Malignant neoplasm of  unspecified part of left bronchus or lung     COMPARISON: 11/19/2018.     FINDINGS: Portable AP view of the chest was obtained with overlying  monitor leads in place. Postsurgical changes again noted on the left  side. Thoracotomy tube and pacemaker remain in place. There is some  volume loss on the left side with leftward shift of the mediastinum, but  no pneumothorax. Increasing consolidation and pleural thickening on the  left side likely some postoperative atelectasis and pleural fluid. Right  lung is under aerated but clear. Stable cardiomegaly. Vascularity is  normal considering under aeration and portable technique.             Impression:       Increasing left-sided opacities, likely atelectasis and  effusion. Continued follow-up recommended.        This report was finalized on 11/21/2018 12:37 AM by Clovis Crockett M.D.             Lab Results:     Lab Results (last 24 hours)     Procedure Component Value Units Date/Time    POC Glucose Once [[id number]]  (Abnormal) Collected:  11/21/18 1133    Specimen:  Blood Updated:  11/21/18 1137     Glucose 157 mg/dL     MRSA Screen Culture - Swab, Nares [[id number]]  (Abnormal) Collected:  11/20/18 1247    Specimen:  Swab from Nares Updated:  11/21/18 1043     MRSA SCREEN CX Staphylococcus aureus, MRSA     Comment:   Methicillin resistant Staphylococcus aureus, Patient may be an isolation risk.       Respiratory Culture - Sputum, Cough [[id number]] Collected:  11/20/18 1623    Specimen:  Sputum from Cough Updated:  11/21/18 1022     Respiratory Culture Light growth (2+) Normal Respiratory Kami     Gram Stain  No organisms seen      No WBCs per low power field      No Epithelial cells per low power field    CBC (No Diff) [[id number]]  (Abnormal) Collected:  11/21/18 0936    Specimen:  Blood Updated:  11/21/18 0947     WBC 16.56 10*3/mm3      RBC 4.74 10*6/mm3      Hemoglobin 13.6 g/dL      Hematocrit 42.0 %      MCV 88.6 fL      MCH 28.7 pg      MCHC 32.4 g/dL      RDW 14.9 %      RDW-SD 48.3 fl      MPV 9.9 fL      Platelets 310 10*3/mm3     POC Glucose Once [[id number]]  (Normal) Collected:  11/21/18 0626    Specimen:  Blood Updated:  11/21/18 0628     Glucose 129 mg/dL     POC Glucose Once [[id number]]  (Abnormal) Collected:  11/20/18 2048    Specimen:  Blood Updated:  11/20/18 2050     Glucose 150 mg/dL     POC Glucose Once [[id number]]  (Abnormal) Collected:  11/20/18 1717    Specimen:  Blood Updated:  11/20/18 1719     Glucose 139 mg/dL            Assessment/Plan       Squamous cell lung cancer, left (CMS/HCC)    Squamous cell carcinoma of left lung (CMS/HCC)       Assessment:    Condition: In stable condition.  Improving.       Plan:   Encourage ambulation.  Start/continue incentive spirometry.  Regular diet.  Chest x-ray.  Administer medications as ordered.       This morning's chest x-ray is similar in appearance to yesterday's with significant opacities in the left lung fields.    MRSA nares positive.  I will add vancomycin to his antibiotic regimen.  Pharmacist consulted and she recommends continue with Zosyn, as well since sputum culture is pending and looks to be normal hernandez preliminarily.  Follow-up chest x-ray in the morning.  Patient is ambulating but is in some pain.  He has not been using his PCA because the Dilaudid was making him itch.  I am going to DC his PCA and switch him to by mouth pain medication with IV meds as needed.      Continue to encourage good pulmonary hygiene, incentive spirometry, flutter valve and increasing activity as much as tolerated.    ROME Reyes  [facility]  11/21/18  3:11 PM

## 2018-11-22 ENCOUNTER — APPOINTMENT (OUTPATIENT)
Dept: GENERAL RADIOLOGY | Facility: HOSPITAL | Age: 63
End: 2018-11-22

## 2018-11-22 LAB
ANION GAP SERPL CALCULATED.3IONS-SCNC: 12.1 MMOL/L
BACTERIA SPEC RESP CULT: NORMAL
BUN BLD-MCNC: 18 MG/DL (ref 8–23)
BUN/CREAT SERPL: 23.1 (ref 7–25)
CALCIUM SPEC-SCNC: 8.6 MG/DL (ref 8.6–10.5)
CHLORIDE SERPL-SCNC: 101 MMOL/L (ref 98–107)
CO2 SERPL-SCNC: 25.9 MMOL/L (ref 22–29)
CREAT BLD-MCNC: 0.78 MG/DL (ref 0.76–1.27)
DEPRECATED RDW RBC AUTO: 48.7 FL (ref 37–54)
ERYTHROCYTE [DISTWIDTH] IN BLOOD BY AUTOMATED COUNT: 15 % (ref 11.5–14.5)
GFR SERPL CREATININE-BSD FRML MDRD: 101 ML/MIN/1.73
GLUCOSE BLD-MCNC: 116 MG/DL (ref 65–99)
GLUCOSE BLDC GLUCOMTR-MCNC: 116 MG/DL (ref 70–130)
GLUCOSE BLDC GLUCOMTR-MCNC: 121 MG/DL (ref 70–130)
GLUCOSE BLDC GLUCOMTR-MCNC: 173 MG/DL (ref 70–130)
GLUCOSE BLDC GLUCOMTR-MCNC: 214 MG/DL (ref 70–130)
GRAM STN SPEC: NORMAL
HCT VFR BLD AUTO: 39.6 % (ref 40.4–52.2)
HGB BLD-MCNC: 12.5 G/DL (ref 13.7–17.6)
MCH RBC QN AUTO: 28 PG (ref 27–32.7)
MCHC RBC AUTO-ENTMCNC: 31.6 G/DL (ref 32.6–36.4)
MCV RBC AUTO: 88.8 FL (ref 79.8–96.2)
MYCOBACTERIUM SPEC CULT: NORMAL
MYCOBACTERIUM SPEC CULT: NORMAL
NIGHT BLUE STAIN TISS: NORMAL
NIGHT BLUE STAIN TISS: NORMAL
PLATELET # BLD AUTO: 303 10*3/MM3 (ref 140–500)
PMV BLD AUTO: 9.8 FL (ref 6–12)
POTASSIUM BLD-SCNC: 3.1 MMOL/L (ref 3.5–5.2)
RBC # BLD AUTO: 4.46 10*6/MM3 (ref 4.6–6)
SODIUM BLD-SCNC: 139 MMOL/L (ref 136–145)
VANCOMYCIN SERPL-MCNC: 5.9 MCG/ML (ref 5–40)
WBC NRBC COR # BLD: 13.72 10*3/MM3 (ref 4.5–10.7)

## 2018-11-22 PROCEDURE — 99024 POSTOP FOLLOW-UP VISIT: CPT | Performed by: THORACIC SURGERY (CARDIOTHORACIC VASCULAR SURGERY)

## 2018-11-22 PROCEDURE — 71045 X-RAY EXAM CHEST 1 VIEW: CPT

## 2018-11-22 PROCEDURE — 85027 COMPLETE CBC AUTOMATED: CPT | Performed by: NURSE PRACTITIONER

## 2018-11-22 PROCEDURE — 94799 UNLISTED PULMONARY SVC/PX: CPT

## 2018-11-22 PROCEDURE — 25010000002 KETOROLAC TROMETHAMINE PER 15 MG: Performed by: NURSE PRACTITIONER

## 2018-11-22 PROCEDURE — 63710000001 INSULIN LISPRO (HUMAN) PER 5 UNITS: Performed by: INTERNAL MEDICINE

## 2018-11-22 PROCEDURE — 82962 GLUCOSE BLOOD TEST: CPT

## 2018-11-22 PROCEDURE — 80048 BASIC METABOLIC PNL TOTAL CA: CPT | Performed by: NURSE PRACTITIONER

## 2018-11-22 PROCEDURE — 25010000002 HEPARIN (PORCINE) PER 1000 UNITS: Performed by: THORACIC SURGERY (CARDIOTHORACIC VASCULAR SURGERY)

## 2018-11-22 PROCEDURE — 25010000002 PIPERACILLIN SOD-TAZOBACTAM PER 1 G: Performed by: NURSE PRACTITIONER

## 2018-11-22 PROCEDURE — 97110 THERAPEUTIC EXERCISES: CPT

## 2018-11-22 PROCEDURE — 25010000002 VANCOMYCIN PER 500 MG: Performed by: INTERNAL MEDICINE

## 2018-11-22 PROCEDURE — 80202 ASSAY OF VANCOMYCIN: CPT | Performed by: THORACIC SURGERY (CARDIOTHORACIC VASCULAR SURGERY)

## 2018-11-22 RX ORDER — VANCOMYCIN HYDROCHLORIDE 1 G/200ML
1000 INJECTION, SOLUTION INTRAVENOUS EVERY 12 HOURS
Status: DISCONTINUED | OUTPATIENT
Start: 2018-11-22 | End: 2018-11-23 | Stop reason: HOSPADM

## 2018-11-22 RX ORDER — POTASSIUM CHLORIDE 750 MG/1
20 CAPSULE, EXTENDED RELEASE ORAL 2 TIMES DAILY WITH MEALS
Status: DISCONTINUED | OUTPATIENT
Start: 2018-11-23 | End: 2018-11-23 | Stop reason: HOSPADM

## 2018-11-22 RX ADMIN — VANCOMYCIN HYDROCHLORIDE 1000 MG: 1 INJECTION, SOLUTION INTRAVENOUS at 11:43

## 2018-11-22 RX ADMIN — HEPARIN SODIUM 5000 UNITS: 5000 INJECTION INTRAVENOUS; SUBCUTANEOUS at 05:23

## 2018-11-22 RX ADMIN — SODIUM CHLORIDE, PRESERVATIVE FREE 3 ML: 5 INJECTION INTRAVENOUS at 08:45

## 2018-11-22 RX ADMIN — KETOROLAC TROMETHAMINE 15 MG: 15 INJECTION, SOLUTION INTRAMUSCULAR; INTRAVENOUS at 23:04

## 2018-11-22 RX ADMIN — TAZOBACTAM SODIUM AND PIPERACILLIN SODIUM 3.38 G: 375; 3 INJECTION, SOLUTION INTRAVENOUS at 22:55

## 2018-11-22 RX ADMIN — SODIUM CHLORIDE, PRESERVATIVE FREE 3 ML: 5 INJECTION INTRAVENOUS at 20:45

## 2018-11-22 RX ADMIN — HYDROCODONE BITARTRATE AND ACETAMINOPHEN 2 TABLET: 7.5; 325 TABLET ORAL at 14:25

## 2018-11-22 RX ADMIN — KETOROLAC TROMETHAMINE 15 MG: 15 INJECTION, SOLUTION INTRAMUSCULAR; INTRAVENOUS at 05:23

## 2018-11-22 RX ADMIN — VANCOMYCIN HYDROCHLORIDE 1000 MG: 1 INJECTION, SOLUTION INTRAVENOUS at 20:51

## 2018-11-22 RX ADMIN — TAZOBACTAM SODIUM AND PIPERACILLIN SODIUM 3.38 G: 375; 3 INJECTION, SOLUTION INTRAVENOUS at 14:25

## 2018-11-22 RX ADMIN — HYDRALAZINE HYDROCHLORIDE 25 MG: 25 TABLET ORAL at 20:51

## 2018-11-22 RX ADMIN — HEPARIN SODIUM 5000 UNITS: 5000 INJECTION INTRAVENOUS; SUBCUTANEOUS at 21:36

## 2018-11-22 RX ADMIN — HEPARIN SODIUM 5000 UNITS: 5000 INJECTION INTRAVENOUS; SUBCUTANEOUS at 14:16

## 2018-11-22 RX ADMIN — IPRATROPIUM BROMIDE AND ALBUTEROL SULFATE 3 ML: 2.5; .5 SOLUTION RESPIRATORY (INHALATION) at 19:56

## 2018-11-22 RX ADMIN — IPRATROPIUM BROMIDE AND ALBUTEROL SULFATE 3 ML: 2.5; .5 SOLUTION RESPIRATORY (INHALATION) at 15:15

## 2018-11-22 RX ADMIN — HYDROCODONE BITARTRATE AND ACETAMINOPHEN 2 TABLET: 7.5; 325 TABLET ORAL at 18:35

## 2018-11-22 RX ADMIN — INSULIN LISPRO 5 UNITS: 100 INJECTION, SOLUTION INTRAVENOUS; SUBCUTANEOUS at 11:43

## 2018-11-22 RX ADMIN — IPRATROPIUM BROMIDE AND ALBUTEROL SULFATE 3 ML: 2.5; .5 SOLUTION RESPIRATORY (INHALATION) at 11:16

## 2018-11-22 RX ADMIN — AMLODIPINE BESYLATE 10 MG: 10 TABLET ORAL at 08:44

## 2018-11-22 RX ADMIN — SODIUM CHLORIDE, PRESERVATIVE FREE 3 ML: 5 INJECTION INTRAVENOUS at 20:46

## 2018-11-22 RX ADMIN — ASPIRIN 325 MG: 325 TABLET ORAL at 08:44

## 2018-11-22 RX ADMIN — POTASSIUM CHLORIDE 40 MEQ: 750 CAPSULE, EXTENDED RELEASE ORAL at 08:43

## 2018-11-22 RX ADMIN — HYDROCODONE BITARTRATE AND ACETAMINOPHEN 2 TABLET: 7.5; 325 TABLET ORAL at 22:55

## 2018-11-22 RX ADMIN — HYDROCODONE BITARTRATE AND ACETAMINOPHEN 2 TABLET: 7.5; 325 TABLET ORAL at 05:10

## 2018-11-22 RX ADMIN — IPRATROPIUM BROMIDE AND ALBUTEROL SULFATE 3 ML: 2.5; .5 SOLUTION RESPIRATORY (INHALATION) at 07:30

## 2018-11-22 RX ADMIN — POTASSIUM CHLORIDE 40 MEQ: 750 CAPSULE, EXTENDED RELEASE ORAL at 17:38

## 2018-11-22 RX ADMIN — HYDRALAZINE HYDROCHLORIDE 25 MG: 25 TABLET ORAL at 08:44

## 2018-11-22 RX ADMIN — POTASSIUM CHLORIDE 40 MEQ: 750 CAPSULE, EXTENDED RELEASE ORAL at 11:43

## 2018-11-22 RX ADMIN — POLYETHYLENE GLYCOL 3350 17 G: 17 POWDER, FOR SOLUTION ORAL at 08:43

## 2018-11-22 RX ADMIN — CARVEDILOL 50 MG: 25 TABLET, FILM COATED ORAL at 17:37

## 2018-11-22 RX ADMIN — TAZOBACTAM SODIUM AND PIPERACILLIN SODIUM 3.38 G: 375; 3 INJECTION, SOLUTION INTRAVENOUS at 05:07

## 2018-11-22 RX ADMIN — HYDROCHLOROTHIAZIDE: 25 TABLET ORAL at 08:43

## 2018-11-22 RX ADMIN — CARVEDILOL 50 MG: 25 TABLET, FILM COATED ORAL at 08:44

## 2018-11-22 RX ADMIN — KETOROLAC TROMETHAMINE 15 MG: 15 INJECTION, SOLUTION INTRAMUSCULAR; INTRAVENOUS at 11:43

## 2018-11-22 RX ADMIN — ATORVASTATIN CALCIUM 40 MG: 20 TABLET, FILM COATED ORAL at 20:51

## 2018-11-22 RX ADMIN — DOCUSATE SODIUM 100 MG: 100 CAPSULE, LIQUID FILLED ORAL at 08:43

## 2018-11-22 RX ADMIN — MULTIPLE VITAMINS W/ MINERALS TAB 1 TABLET: TAB at 08:44

## 2018-11-22 RX ADMIN — HYDROCODONE BITARTRATE AND ACETAMINOPHEN 2 TABLET: 7.5; 325 TABLET ORAL at 10:14

## 2018-11-22 RX ADMIN — KETOROLAC TROMETHAMINE 15 MG: 15 INJECTION, SOLUTION INTRAMUSCULAR; INTRAVENOUS at 17:37

## 2018-11-22 NOTE — PROGRESS NOTES
"    Chief Complaint: Left lower lobe lung mass  S/P: Bronchoscopy, robot assisted VATS with conversion to open thoracotomy, left lower lobectomy, intercostal nerve blocks  POD # 3    Subjective:  Symptoms:  Stable.  He reports shortness of breath and chest pain.  No cough.    Diet:  Adequate intake.  No nausea or vomiting.    Activity level: Returning to normal.    Pain:  He complains of pain that is mild.  Pain is well controlled.        Vital Signs:  Temp:  [96.8 °F (36 °C)-98.7 °F (37.1 °C)] 98.7 °F (37.1 °C)  Heart Rate:  [] 126  Resp:  [14-18] 14  BP: (114-155)/(73-93) 128/86    Intake & Output (last day)       11/21 0701 - 11/22 0700 11/22 0701 - 11/23 0700    P.O. 720     Total Intake(mL/kg) 720 (7.1)     Urine (mL/kg/hr) 700 (0.3) 375 (1.5)    Chest Tube 290     Total Output 990 375    Net -270 -375                Objective:  General Appearance:  Comfortable, well-appearing and in no acute distress.    Vital signs: (most recent): Blood pressure 128/86, pulse (!) 126, temperature 98.7 °F (37.1 °C), temperature source Oral, resp. rate 14, height 172.7 cm (67.99\"), weight 102 kg (224 lb), SpO2 94 %.  No fever.    Output: Producing urine.    Lungs:  Normal effort and normal respiratory rate.  There are decreased breath sounds.    Heart: Normal rate.  Regular rhythm.  No murmur.   Abdomen: Abdomen is soft.  Bowel sounds are normal.   There is no abdominal tenderness.   There is no mass.   Extremities: There is no dependent edema.    Neurological: Patient is alert and oriented to person, place and time.  Normal strength.              Chest tube:   Site: Left, Clean, Dry, Intact and Securement device intact  Suction: waterseal  Air Leak: negative  24 Hour Total: 290 cc    Results Review:     I reviewed the patient's new clinical results.  I reviewed the patient's new imaging results and agree with the interpretation.    Imaging Results (last 24 hours)     Procedure Component Value Units Date/Time    XR Chest " 1 View [[id number]] Collected:  11/22/18 0654     Updated:  11/22/18 0700    Narrative:       XR CHEST 1 VW-     HISTORY: Male who is 63 years-old,  chest tube      TECHNIQUE: Frontal view of the chest     COMPARISON: 11/21/2018     FINDINGS: Stable appearing left chest tube, right-sided pacemaker with  cardiac leads. Heart is enlarged. Aorta is tortuous. Pulmonary  vasculature is mildly congested. Increased opacification of the left  hemithorax may reflect increased atelectasis/infiltrate/effusion.  Suggestion of minimal left apical pneumothorax. No right pneumothorax.  No acute osseous process.       Impression:       Interval worsening. Increased opacification of the left  hemithorax, with suggestion of minimal left apical pneumothorax.  Continued follow-up recommended.     This report was finalized on 11/22/2018 6:57 AM by Dr. Sebastián Griffith M.D.             Lab Results:     Lab Results (last 24 hours)     Procedure Component Value Units Date/Time    POC Glucose Once [[id number]]  (Normal) Collected:  11/22/18 0709    Specimen:  Blood Updated:  11/22/18 0712     Glucose 116 mg/dL     Vancomycin, Random [[id number]]  (Normal) Collected:  11/22/18 0537    Specimen:  Blood Updated:  11/22/18 0627     Vancomycin Random 5.90 mcg/mL     Basic Metabolic Panel [[id number]]  (Abnormal) Collected:  11/22/18 0537    Specimen:  Blood Updated:  11/22/18 0625     Glucose 116 mg/dL      BUN 18 mg/dL      Creatinine 0.78 mg/dL      Sodium 139 mmol/L      Potassium 3.1 mmol/L      Chloride 101 mmol/L      CO2 25.9 mmol/L      Calcium 8.6 mg/dL      eGFR Non African Amer 101 mL/min/1.73      BUN/Creatinine Ratio 23.1     Anion Gap 12.1 mmol/L     Narrative:       GFR Normal >60  Chronic Kidney Disease <60  Kidney Failure <15    CBC (No Diff) [[id number]]  (Abnormal) Collected:  11/22/18 0538    Specimen:  Blood Updated:  11/22/18 0601     WBC 13.72 10*3/mm3      RBC 4.46 10*6/mm3      Hemoglobin 12.5 g/dL      Hematocrit 39.6 %   "    MCV 88.8 fL      MCH 28.0 pg      MCHC 31.6 g/dL      RDW 15.0 %      RDW-SD 48.7 fl      MPV 9.8 fL      Platelets 303 10*3/mm3     POC Glucose Once [[id number]]  (Abnormal) Collected:  11/21/18 2021    Specimen:  Blood Updated:  11/21/18 2023     Glucose 261 mg/dL     Procalcitonin [[id number]]  (Abnormal) Collected:  11/21/18 1420    Specimen:  Blood Updated:  11/21/18 1801     Procalcitonin 0.75 ng/mL     Narrative:       As a Marker for Sepsis (Non-Neonates):   1. <0.5 ng/mL represents a low risk of severe sepsis and/or septic shock.  1. >2 ng/mL represents a high risk of severe sepsis and/or septic shock.    As a Marker for Lower Respiratory Tract Infections that require antibiotic therapy:  PCT on Admission     Antibiotic Therapy             6-12 Hrs later  > 0.5                Strongly Recommended            >0.25 - <0.5         Recommended  0.1 - 0.25           Discouraged                   Remeasure/reassess PCT  <0.1                 Strongly Discouraged          Remeasure/reassess PCT      As 28 day mortality risk marker: \"Change in Procalcitonin Result\" (> 80 % or <=80 %) if Day 0 (or Day 1) and Day 4 values are available. Refer to http://www.[employer]-pct-calculator.com/   Change in PCT <=80 %   A decrease of PCT levels below or equal to 80 % defines a positive change in PCT test result representing a higher risk for 28-day all-cause mortality of patients diagnosed with severe sepsis or septic shock.  Change in PCT > 80 %   A decrease of PCT levels of more than 80 % defines a negative change in PCT result representing a lower risk for 28-day all-cause mortality of patients diagnosed with severe sepsis or septic shock.                Basic Metabolic Panel [[id number]]  (Abnormal) Collected:  11/21/18 1420    Specimen:  Blood Updated:  11/21/18 1705     Glucose 177 mg/dL      BUN 29 mg/dL      Creatinine 1.14 mg/dL      Sodium 134 mmol/L      Potassium 3.4 mmol/L      Chloride 95 mmol/L      CO2 26.1 mmol/L "      Calcium 8.7 mg/dL      eGFR Non African Amer 65 mL/min/1.73      BUN/Creatinine Ratio 25.4     Anion Gap 12.9 mmol/L     Narrative:       GFR Normal >60  Chronic Kidney Disease <60  Kidney Failure <15    Tissue Pathology Exam [[id number]] Collected:  11/19/18 1638    Specimen:  Tissue from Lymph Node; Tissue from Lymph Node; Tissue from Lymph Node; Tissue from Lymph Node; Tissue from Lung, Left Lower Lobe; Tissue from Lymph Node Updated:  11/21/18 1637     Case Report --     Surgical Pathology Report                         Case: [id number]                                  Authorizing Provider:  Michael Ring III, MD  Collected:           11/19/2018 04:38 PM          Ordering Location:     [facility]  Received:            11/19/2018 08:38 PM                                 MAIN OR                                                                      Pathologist:           Carol Ann Loya MD                                                          Specimens:   1) - Lymph Node, L9 LYMPH NODE                                                                      2) - Lymph Node, L9 LYMPH NODE #2                                                                   3) - Lymph Node, L9 LYMPH NODE #3                                                                   4) - Lymph Node, L10 LYMPH NODE                                                                     5) - Lung, Left Lower Lobe                                                                          6) - Lymph Node, AP WINDOW LYMPH NODE                                                       Final Diagnosis --     1.  Lymph Node, L9, Excision:    A.  Reactive lymph node (0/1).     2.  Lymph Node L9, #2, Excision:    A.  Reactive lymph node (0/1).     3.  Lymph Node L9, #3, Excision:    A.  Reactive anthracotic lymph node (0/1).     4.  Lymph Node L10, Excision:    A.  Fragments of reactive anthracotic lymph node (0/1).     5.  Lung, Left Lower  Lobe, Lobectomy:    A.  Invasive moderate to poorly differentiated squamous cell carcinoma, 1.2 cm maximally.     B.  Margin free of tumor.   C.  16 reactive lymph nodes (0/16).    D.   See synoptic template below for all additional details.    6.  Lymph Node, AP Window, Excision:    A.  Benign anthracotic lymph node (0/1).     MEC/brb          Synoptic Checklist --     LUNG  (Lung - 1, 2, 3, 4, 5, 6)      SPECIMEN     Procedure:    Lobectomy      Specimen Laterality:    Left     TUMOR     Tumor Site:    Lower lobe      Histologic Type:    Invasive squamous cell carcinoma, keratinizing      Histologic Grade:    G3: Poorly differentiated      Spread Through Air Spaces (BEATRICE):    Not identified    :         Tumor Size:    Greatest dimension in Centimeters (cm): 1.2 Centimeters (cm)       Additional Dimension in Centimeters (cm):    0.6 Centimeters (cm)   Tumor Focality:    Single tumor    Tumor Extent:         Visceral Pleura Invasion:    Not identified      Direct Invasion of Adjacent Structures:    No adjacent structures present    Accessory Findings:         Treatment Effect:    No known presurgical therapy      Lymphovascular Invasion:    Not identified     MARGINS   Margins:    All margins are uninvolved by carcinoma      Margins Examined:    Bronchial      Margins Examined:    Vascular      Margins Examined:    Parenchymal      Distance of Invasive Carcinoma from Closest Margin in Centimeters (cm):    At least: 4.5 Centimeters (cm)       Closest Margin:    Bronchial        Closest Margin:    Vascular        Closest Margin:    Parenchymal      Distance of Carcinoma in Situ from Closest Margin in Centimeters (cm):    At least: 4.5 Centimeters (cm)       Closest Margin:    Bronchial     LYMPH NODES   Number of Lymph Nodes Involved:    0    Number of Lymph Nodes Examined:    21      Cassie Stations Examined:    9R: Pulmonary ligament      Cassie Stations Examined:    10R: Hilar      Cassie Stations Examined:    5:  "Subaortic/ aortopulmonary (AP) / AP window     PATHOLOGIC STAGE CLASSIFICATION (pTNM, AJCC 8th Edition)   TNM Descriptors:    Not applicable    Primary Tumor (pT):    pT1b    Regional Lymph Nodes (pN):    pN0     ADDITIONAL FINDINGS   Additional Pathologic Findings:    Squamous dysplasia    Additional Pathologic Findings:    Obstructive organizing pneumonia        Gross Description --     1. Received in formalin labeled \"L9 lymph node\" and consists of two purple-pink apparent lymph nodes with scant attached adipose tissue that measure 0.7 cm and 1.1 cm in greatest dimension.  These lymph nodes are entirely submitted in cassette 1A.    2. Received in formalin labeled \"L9 lymph node #2\" and consists of a gray-purple apparent lymph node with scant attached adipose tissue that measures 1.5 x 1.3 x 0.8 cm.  The specimen is bisected and entirely submitted in cassette 2A.    3. Received in formalin labeled \"L9 lymph node #3\" and consists of a purple-gray lymph node with scant attached adipose tissue measuring 1.5 x 1.3 x 0.6 cm.  The lymph node is bisected and entirely submitted in cassette 3A.    4. Received in formalin labeled \"L10 lymph node\" and consists of four purple-black lymph node fragments ranging in size from 0.5 cm up to 1.2 cm in greatest dimension.  The lymph node fragments are entirely submitted in cassette 4A.    5. Received in formalin labeled \"lung, left lower lobe\" and consists of a 309 gram intact lobe of lung measuring 15.0 x 11.5 x 4.5 cm.  The vascular margins are stapled.  The staple lines are removed, and the underlying surface is inked green.  The airway margins are also inked green.  The staple line indicating the resected margin is removed, and the underlying surface is inked blue.  The pleura is predominantly purple-gray and smooth.  A firm nodule is palpated within the base of the lobe, and discrete pleural involvement is not grossly identified.  The pleural surface closest to the firm nodule " "is inked black.  The specimen is serially sectioned revealing a poorly circumscribed tan-gray mass that corresponds to the previously described palpated nodule that measures 5.0 x 5.0 x 4.5 cm.  The nodule comes to within 0.2 cm from the black inked pleural surface and is 4.5 cm from the hilum.  The mass is 5.0 cm from the closest blue inked resected stapled margin.  Additional sectioning reveals two calcified nodules near the hilum that measure 1.4 cm and 0.6 cm in greatest dimension.  The remaining cut surfaces are purple-red and grossly unremarkable. No additional discrete lesions are identified.  Upon palpation, thirteen possible lymph nodes are identified at the hilum ranging in size from 0.4 cm up to 1.0 cm in greatest dimension.  A full thickness cross section of the lung including the mass, hilum and resection margin is displayed in a photograph.  Representative sections are submitted in cassettes 5A-5K as follows:  5A-B - shave of the hilar vascular and area margins (inked green)  5C - nine possible lymph nodes  5D - five possible lymph nodes  5E - calcified nodules present at the hilum (will be ready after decalcification)  5F-G - perpendicular sections of the mass to the closest black inked margin  5H-I - representative sections of the mass to the closest vessels and airways  5J - representative section of the mass to the uninvolved lung parenchyma  5K - representative section shave of the blue inked resection margin and additional representative section of unremarkable lung parenchyma    6. Received in formalin labeled \"AP window lymph node\" and consists of three irregular gray-black lymph node fragments with attached adipose tissue ranging in size from 0.4 cm up to 1.7 cm in greatest dimension.  The specimen is entirely submitted in cassette 6A.    MARTHA/USO/MEC/jse        Microscopic Description --     Microscopic performed, incorporated in diagnosis.       POC Glucose Once [[id number]]  (Normal) " Collected:  11/21/18 1634    Specimen:  Blood Updated:  11/21/18 1635     Glucose 126 mg/dL     POC Glucose Once [[id number]]  (Abnormal) Collected:  11/21/18 1133    Specimen:  Blood Updated:  11/21/18 1137     Glucose 157 mg/dL     MRSA Screen Culture - Swab, Nares [[id number]]  (Abnormal) Collected:  11/20/18 1247    Specimen:  Swab from Nares Updated:  11/21/18 1043     MRSA SCREEN CX Staphylococcus aureus, MRSA     Comment:   Methicillin resistant Staphylococcus aureus, Patient may be an isolation risk.       Respiratory Culture - Sputum, Cough [[id number]] Collected:  11/20/18 1623    Specimen:  Sputum from Cough Updated:  11/21/18 1022     Respiratory Culture Light growth (2+) Normal Respiratory Kami     Gram Stain No organisms seen      No WBCs per low power field      No Epithelial cells per low power field    CBC (No Diff) [[id number]]  (Abnormal) Collected:  11/21/18 0936    Specimen:  Blood Updated:  11/21/18 0947     WBC 16.56 10*3/mm3      RBC 4.74 10*6/mm3      Hemoglobin 13.6 g/dL      Hematocrit 42.0 %      MCV 88.6 fL      MCH 28.7 pg      MCHC 32.4 g/dL      RDW 14.9 %      RDW-SD 48.3 fl      MPV 9.9 fL      Platelets 310 10*3/mm3            Assessment/Plan       Squamous cell lung cancer, left (CMS/HCC)    Squamous cell carcinoma of left lung (CMS/HCC)       Assessment & Plan     Patient continues to look better than his chest x-ray.  Ambulating without significant shortness of breath.  Pulling of 1500 cc on his ILS.  His pain is well controlled.  No air leak with forceful cough.  Will give trial of clamping chest tube today.  Check chest x-ray in the morning.  Continue antibiotics for now.  Replace potassium.  Encourage good pulmonary hygiene and increase activity.    Path report shows p T1b N0 M0 squamous cell carcinoma of the left lower lobe of the lung.  No adjuvant treatment needed.  Discussed with the patient and his family.    Michael Ring III, MD  [facility]  11/22/18  9:27 AM

## 2018-11-22 NOTE — PROGRESS NOTES
"                                              LOS: 3 days   Patient Care Team:  Samson Leyva MD as PCP - General (Family Medicine)  Steve Rice MD as Consulting Physician (Cardiac Electrophysiology)    Chief Complaint:  Follow-up on thoracotomy for lung cancer, respiratory failure with hypoxemia and medical care in the intensive care unit    Interval History:   Off oxygen.  Cough improved but continued to be productive of dark phlegm.  Chest tube was clamped.  Patient is ambulating in the room with no difficulty breathing.          Ventilator/Non-Invasive Ventilation Settings (From admission, onward)    None            Vital Signs  Temp:  [96.8 °F (36 °C)-98.7 °F (37.1 °C)] 97 °F (36.1 °C)  Heart Rate:  [] 76  Resp:  [14-18] 14  BP: ()/(64-93) 96/64    Intake/Output Summary (Last 24 hours) at 11/22/2018 1336  Last data filed at 11/22/2018 0843  Gross per 24 hour   Intake 240 ml   Output 1365 ml   Net -1125 ml     Flowsheet Rows      First Filed Value   Admission Height  172.7 cm (67.99\") Documented at 11/19/2018 0927   Admission Weight  108 kg (237 lb) Documented at 11/19/2018 0927          Physical Exam:   General Appearance:    Alert, cooperative, in no acute distress   HEENT:  Mallampati score 3, moist mucous membrane   Neck:   Large   Lungs:     Decreased air entry on the left with diffuse rhonchi bilaterally.  No crackles.  No wheezing     Heart:    Regular rhythm and normal rate, normal S1 and S2, no            murmur   Skin:    No abnormalities observed   Abdomen:     Obese. Soft. No tenderness. No dullness.   Neuro:   Conscious, alert, oriented x3   Extremities:   Moves all extremities well, no edema, no cyanosis, no             Redness          Results Review:        Results from last 7 days   Lab Units  11/22/18   0537  11/21/18   1420  11/20/18   0414   SODIUM mmol/L  139  134*  138   POTASSIUM mmol/L  3.1*  3.4*  3.3*   CHLORIDE mmol/L  101  95*  98   CO2 mmol/L  25.9  26.1  26.7 "   BUN mg/dL  18  29*  19   CREATININE mg/dL  0.78  1.14  0.72*   GLUCOSE mg/dL  116*  177*  169*   CALCIUM mg/dL  8.6  8.7  8.3*         Results from last 7 days   Lab Units  11/22/18   0538  11/21/18   0936  11/20/18   0414   WBC 10*3/mm3  13.72*  16.56*  20.02*   HEMOGLOBIN g/dL  12.5*  13.6*  13.7   HEMATOCRIT %  39.6*  42.0  41.0   PLATELETS 10*3/mm3  303  310  311               I reviewed the patient's new clinical results.  I personally viewed and interpreted the patient's CXR        Medication Review:     amLODIPine 10 mg Oral QAM   aspirin 325 mg Oral Daily   atorvastatin 40 mg Oral Nightly   carvedilol 50 mg Oral BID With Meals   docusate sodium 100 mg Oral BID   heparin (porcine) 5,000 Units Subcutaneous Q8H   hydrALAZINE 25 mg Oral BID   insulin lispro 0-14 Units Subcutaneous 4x Daily With Meals & Nightly   ipratropium-albuterol 3 mL Nebulization 4x Daily - RT   ketorolac 15 mg Intravenous Q6H   multivitamin with minerals 1 tablet Oral Daily   piperacillin-tazobactam 3.375 g Intravenous Q8H   polyethylene glycol 17 g Oral Daily   sennosides-docusate sodium 2 tablet Oral Nightly   sodium chloride 3 mL Intravenous Q12H   sodium chloride 3 mL Intravenous Q12H   valsartan-HCTZ 320-25 combo dose  Oral Daily   vancomycin 1,000 mg Intravenous Q12H   Vancomycin Pharmacy Intermittent Dosing  Does not apply Daily         Pharmacy to dose vancomycin        Diagnostic imaging:  I personally and independently reviewed the following images:    11/20/18 11/19/18:  Left lung atelectasis with mediastinal shift.  No infiltrates on the right.      11/22/18:  Worsening midline shift and infiltrate in the left upper lobe.      Assessment:    1. Squamous Cell carcinoma the left lung status post 11/19/18 left thoracotomy with intercostal nerve blocks with left lower lobe lobectomy  2. Acute hypoxic respiratory failure postoperatively , resolved  3. Atelectasis left  lung  4. Possible left upper lobe pneumonia  5. Hypokalemia   6. Leukocytosis, secondary to pneumonia versus stress induced  7. Hypokalemia  8. Diabetes mellitus type 2  9. Hypertension  10. Hyperlipidemia  11. Tobacco abuse    Plan   · Continue bronchodilators with DuoNeb, incentive spirometry and flutter.  I encouraged the patient to use the IS and flutter.   · Continue antibiotics.  May potentially switch to Augmentin prior to patient's discharge.          Abdiel Robertson MD  11/22/18  1:36 PM        This note was dictated utilizing Dragon dictation

## 2018-11-22 NOTE — PLAN OF CARE
Problem: Patient Care Overview  Goal: Plan of Care Review  Outcome: Ongoing (interventions implemented as appropriate)   11/22/18 8906   Coping/Psychosocial   Plan of Care Reviewed With patient   OTHER   Outcome Summary VSS. CT clamped today. Pain controlled. Possibly home tomorrow.   Plan of Care Review   Progress improving     Goal: Individualization and Mutuality  Outcome: Ongoing (interventions implemented as appropriate)    Goal: Discharge Needs Assessment  Outcome: Ongoing (interventions implemented as appropriate)    Goal: Interprofessional Rounds/Family Conf  Outcome: Ongoing (interventions implemented as appropriate)

## 2018-11-22 NOTE — THERAPY TREATMENT NOTE
Acute Care - Physical Therapy Treatment Note  [facility]     Patient Name: Alexy Ram  : 1955  MRN: [id number]  Today's Date: 2018  Onset of Illness/Injury or Date of Surgery: 18  Date of Referral to PT: 18  Referring Physician: Dr. Ring    Admit Date: 2018    Visit Dx:    ICD-10-CM ICD-9-CM   1. Impaired functional mobility, balance, gait, and endurance Z74.09 V49.89   2. Squamous cell carcinoma of left lung (CMS/HCC) C34.92 162.9   3. Squamous cell lung cancer, left (CMS/HCC) C34.92 162.9   4. Bacterial pneumonia J15.9 482.9     Patient Active Problem List   Diagnosis   • Hypercholesterolemia   • Hypertension   • Type 2 diabetes mellitus (CMS/HCC)   • Hemoptysis   • Squamous cell lung cancer, left (CMS/HCC)   • Squamous cell carcinoma of left lung (CMS/HCC)       Therapy Treatment    Rehabilitation Treatment Summary     Row Name 18 1400             Treatment Time/Intention    Discipline  physical therapy assistant  -      Document Type  therapy note (daily note)  -      Subjective Information  complains of;pain  -RH      Mode of Treatment  physical therapy  -RH      Patient Effort  good  -RH      Existing Precautions/Restrictions  -- chest tube  -RH      Recorded by [RH] Jonathan Real, PTA 18 1445      Row Name 18 1400             Vital Signs    O2 Delivery Pre Treatment  room air  -RH      Recorded by [RH] Jonathan Real, PTA 18 1445      Row Name 18 1400             Cognitive Assessment/Intervention- PT/OT    Orientation Status (Cognition)  oriented x 4  -RH      Recorded by [RH] Jonathan Real, PTA 18 1445      Row Name 18 1400             Bed Mobility Assessment/Treatment    Comment (Bed Mobility)  sitting up at EOB  -RH      Recorded by [RH] Jonathan Real, PTA 18 1445      Row Name 18 1400             Gait/Stairs Assessment/Training    Boyle Level (Gait)  supervision;verbal cues  -RH       Assistive Device (Gait)  -- none  -RH      Distance in Feet (Gait)  200  -RH      Pattern (Gait)  step-through  -RH      Recorded by [RH] Jonathan Real, PTA 11/22/18 1445      Row Name 11/22/18 1400             Static Sitting Balance    Level of [place] (Unsupported Sitting, Static Balance)  supervision  -RH      Recorded by [RH] Jonathan Real, PTA 11/22/18 1445      Row Name 11/22/18 1400             Static Standing Balance    Level of [place] (Supported Standing, Static Balance)  supervision  -RH      Recorded by [RH] Jonathan Real, PTA 11/22/18 1445      Row Name 11/22/18 1400             Positioning and Restraints    Pre-Treatment Position  in bed  -RH      Post Treatment Position  bed  -RH      In Bed  sitting EOB  -RH      Recorded by [] Jonathan Real, PTA 11/22/18 1445      Row Name 11/22/18 1400             Pain Assessment    Additional Documentation  -- 3/10  -RH      Recorded by [] Jonathan Real, PTA 11/22/18 1445      Row Name 11/22/18 1400             Pain Scale: Numbers Pre/Post-Treatment    Pain Location - Side  Left  -RH      Pain Location - Orientation  lateral  -RH      Pain Location  -- tube site  -RH      Recorded by [] Jonathan Real, PTA 11/22/18 1445      Row Name                Wound 11/19/18 1922 Left chest incision    Wound - Properties Group Date first assessed: 11/19/18 [RM] Time first assessed: 1922 [RM] Side: Left [RM] Location: chest [RM] Type: incision [RM] Recorded by:  [RM] Belinda Porter RN 11/19/18 1922    Row Name 11/22/18 1400             Coping    Observed Emotional State  accepting  -RH      Recorded by [RH] Jonathan Real, PTA 11/22/18 1445      Row Name 11/22/18 1400             Outcome Summary/Treatment Plan (PT)    Daily Summary of Progress (PT)  progress toward functional goals is good  -RH      Recorded by [] Jonathan Real, PTA 11/22/18 1445        User Key  (r) = Recorded By, (t) = Taken By, (c) = Cosigned By     Initials Name Effective Dates Discipline     Belinda Porter, RN 06/16/16 -  Nurse    RH Jonathan Real PTA 03/07/18 -  PT          Wound 11/19/18 1922 Left chest incision (Active)   Dressing Appearance open to air 11/22/2018  8:48 AM   Closure Staples 11/22/2018  8:48 AM   Base clean;pink 11/22/2018  8:48 AM   Periwound intact;dry 11/22/2018  8:48 AM   Periwound Temperature warm 11/22/2018  8:48 AM   Periwound Skin Turgor soft 11/22/2018  8:48 AM   Drainage Amount none 11/22/2018  8:48 AM   Care, Wound cleansed with 11/22/2018  8:48 AM   Dressing Care, Wound open to air 11/22/2018  8:48 AM           Physical Therapy Education     Title: PT OT SLP Therapies (Done)     Topic: Physical Therapy (Done)     Point: Mobility training (Done)     Learning Progress Summary           Patient Acceptance, E, VU,DU by  at 11/21/2018  3:09 PM    Acceptance, E, NR by MA at 11/20/2018 11:21 AM                   Point: Home exercise program (Done)     Learning Progress Summary           Patient Acceptance, E, VU,DU by  at 11/21/2018  3:09 PM    Acceptance, E, NR by MA at 11/20/2018 11:21 AM                   Point: Body mechanics (Done)     Learning Progress Summary           Patient Acceptance, E, VU,DU by  at 11/21/2018  3:09 PM    Acceptance, E, NR by MA at 11/20/2018 11:21 AM                   Point: Precautions (Done)     Learning Progress Summary           Patient Acceptance, E, VU,DU by  at 11/21/2018  3:09 PM    Acceptance, E, NR by MA at 11/20/2018 11:21 AM                               User Key     Initials Effective Dates Name Provider Type Discipline    MA 04/03/18 -  Zakia Mcgraw, PT Physical Therapist PT     08/19/18 -  Lisbet Schroeder PTA Physical Therapy Assistant PT                PT Recommendation and Plan     Outcome Summary/Treatment Plan (PT)  Daily Summary of Progress (PT): progress toward functional goals is good  Plan of Care Reviewed With: patient  Progress: improving  Outcome  Measures     Row Name 11/22/18 1400 11/21/18 1500 11/20/18 1100       How much help from another person do you currently need...    Turning from your back to your side while in flat bed without using bedrails?  3  -  3  -EH  3  -MA    Moving from lying on back to sitting on the side of a flat bed without bedrails?  3  -  3  -EH  3  -MA    Moving to and from a bed to a chair (including a wheelchair)?  3  -  3  -EH  3  -MA    Standing up from a chair using your arms (e.g., wheelchair, bedside chair)?  3  -  3  -EH  3  -MA    Climbing 3-5 steps with a railing?  3  -  3  -EH  3  -MA    To walk in hospital room?  3  -  3  -EH  3  -MA    AM-PAC 6 Clicks Score  18  -  18  -EH  18  -MA       Functional Assessment    Outcome Measure Options  --  --  AM-PAC 6 Clicks Basic Mobility (PT)  -MA      User Key  (r) = Recorded By, (t) = Taken By, (c) = Cosigned By    Initials Name Provider Type     Jonathan Real, [employer] Physical Therapy Assistant    Zakia Parker, PT Physical Therapist     Lisbet Schroeder, [facility] Physical Therapy Assistant         Time Calculation:   PT Charges     Row Name 11/22/18 1446             Time Calculation    Start Time  1425  -      Stop Time  1441  -      Time Calculation (min)  16 min  -      PT Received On  11/22/18  -      PT - Next Appointment  11/23/18  -        User Key  (r) = Recorded By, (t) = Taken By, (c) = Cosigned By    Initials Name Provider Type     Jonathan Real PTA Physical Therapy Assistant        Therapy Suggested Charges     Code   Minutes Charges    None           Therapy Charges for Today     Code Description Service Date Service Provider Modifiers Qty    87541032576 HC PT THER PROC EA 15 MIN 11/22/2018 Jonathan Real, A[employer] GP 1          PT G-Codes  Outcome Measure Options: AM-PAC 6 Clicks Basic Mobility (PT)  AM-PAC 6 Clicks Score: 18    Jonathan Real PTA  11/22/2018

## 2018-11-22 NOTE — PROGRESS NOTES
"Pharmacokinetic Evaluation - Vancomycin    Alexy Ram is a 63 y.o. male on vancomycin pharmacy to dose.  MRN: [id number]  : 1955    Day of vancomycin therapy:   Indication: pna  Consulted by: Lizabeth Lowery  Goal trough: 15-20 mcg/ml  Current dose: intermittent  Other antimicrobials: zosyn    Blood pressure 128/86, pulse (!) 126, temperature 98.7 °F (37.1 °C), temperature source Oral, resp. rate 14, height 172.7 cm (67.99\"), weight 102 kg (224 lb), SpO2 94 %.  Results from last 7 days   Lab Units  18   0537  18   1420  18   0414   CREATININE mg/dL  0.78  1.14  0.72*     Estimated Creatinine Clearance: 112.2 mL/min (by C-G formula based on SCr of 0.78 mg/dL).  Results from last 7 days   Lab Units  18   0538  18   0936  18   0414   WBC 10*3/mm3  13.72*  16.56*  20.02*   HEMOGLOBIN g/dL  12.5*  13.6*  13.7   HEMATOCRIT %  39.6*  42.0  41.0   PLATELETS 10*3/mm3  303  310  311       Procal: 0.75      Cultures:      Microbiology Results (last 10 days)     Procedure Component Value - Date/Time    Respiratory Culture - Sputum, Cough [[id number]] Collected:  18 1623    Lab Status:  Preliminary result Specimen:  Sputum from Cough Updated:  18 1022     Respiratory Culture Light growth (2+) Normal Respiratory Kami     Gram Stain No organisms seen      No WBCs per low power field      No Epithelial cells per low power field    MRSA Screen Culture - Swab, Nares [[id number]]  (Abnormal) Collected:  18 1247    Lab Status:  Final result Specimen:  Swab from Nares Updated:  18 1043     MRSA SCREEN CX Staphylococcus aureus, MRSA     Comment:   Methicillin resistant Staphylococcus aureus, Patient may be an isolation risk.               Dosing hx (include troughs if drawn):     2250 mg at 1401.  537 random=5.9 mcg/ml . Start 1g iv q12.     Assessment:  Since scr has trended back down, 1.14->0.78, will change to scheduled dosing and start 1g iv q12 this " am.     Plan:  1) Start vancomycin 1000 mg every 12 hours.  2) Next trough on 11/24 at 0915 after 5 total doses.  3) Encourage adequate hydration if appropriate. Monitor for decreased UOP, rash or other signs of vancomycin intolerance.    Thanks for this consult, will follow until Jaiden monsalve Pharm.D, BCCCP

## 2018-11-23 ENCOUNTER — APPOINTMENT (OUTPATIENT)
Dept: GENERAL RADIOLOGY | Facility: HOSPITAL | Age: 63
End: 2018-11-23

## 2018-11-23 VITALS
HEIGHT: 68 IN | SYSTOLIC BLOOD PRESSURE: 133 MMHG | BODY MASS INDEX: 35.61 KG/M2 | DIASTOLIC BLOOD PRESSURE: 90 MMHG | WEIGHT: 235 LBS | HEART RATE: 104 BPM | OXYGEN SATURATION: 94 % | RESPIRATION RATE: 18 BRPM | TEMPERATURE: 98.2 F

## 2018-11-23 LAB
ANION GAP SERPL CALCULATED.3IONS-SCNC: 12.5 MMOL/L
BASOPHILS # BLD AUTO: 0.04 10*3/MM3 (ref 0–0.2)
BASOPHILS NFR BLD AUTO: 0.2 % (ref 0–1.5)
BUN BLD-MCNC: 24 MG/DL (ref 8–23)
BUN/CREAT SERPL: 26.4 (ref 7–25)
CALCIUM SPEC-SCNC: 8.7 MG/DL (ref 8.6–10.5)
CHLORIDE SERPL-SCNC: 99 MMOL/L (ref 98–107)
CO2 SERPL-SCNC: 24.5 MMOL/L (ref 22–29)
CREAT BLD-MCNC: 0.91 MG/DL (ref 0.76–1.27)
DEPRECATED RDW RBC AUTO: 47.9 FL (ref 37–54)
EOSINOPHIL # BLD AUTO: 0.43 10*3/MM3 (ref 0–0.7)
EOSINOPHIL NFR BLD AUTO: 2.7 % (ref 0.3–6.2)
ERYTHROCYTE [DISTWIDTH] IN BLOOD BY AUTOMATED COUNT: 15.4 % (ref 11.5–14.5)
GFR SERPL CREATININE-BSD FRML MDRD: 84 ML/MIN/1.73
GLUCOSE BLD-MCNC: 255 MG/DL (ref 65–99)
GLUCOSE BLDC GLUCOMTR-MCNC: 106 MG/DL (ref 70–130)
GLUCOSE BLDC GLUCOMTR-MCNC: 148 MG/DL (ref 70–130)
HCT VFR BLD AUTO: 38.8 % (ref 40.4–52.2)
HGB BLD-MCNC: 12.8 G/DL (ref 13.7–17.6)
IMM GRANULOCYTES # BLD: 0.08 10*3/MM3 (ref 0–0.03)
IMM GRANULOCYTES NFR BLD: 0.5 % (ref 0–0.5)
LYMPHOCYTES # BLD AUTO: 2.33 10*3/MM3 (ref 0.9–4.8)
LYMPHOCYTES NFR BLD AUTO: 14.4 % (ref 19.6–45.3)
MCH RBC QN AUTO: 28.1 PG (ref 27–32.7)
MCHC RBC AUTO-ENTMCNC: 33 G/DL (ref 32.6–36.4)
MCV RBC AUTO: 85.3 FL (ref 79.8–96.2)
MONOCYTES # BLD AUTO: 1.15 10*3/MM3 (ref 0.2–1.2)
MONOCYTES NFR BLD AUTO: 7.1 % (ref 5–12)
NEUTROPHILS # BLD AUTO: 12.2 10*3/MM3 (ref 1.9–8.1)
NEUTROPHILS NFR BLD AUTO: 75.6 % (ref 42.7–76)
PLATELET # BLD AUTO: 373 10*3/MM3 (ref 140–500)
PMV BLD AUTO: 10.6 FL (ref 6–12)
POTASSIUM BLD-SCNC: 3.4 MMOL/L (ref 3.5–5.2)
RBC # BLD AUTO: 4.55 10*6/MM3 (ref 4.6–6)
SODIUM BLD-SCNC: 136 MMOL/L (ref 136–145)
WBC NRBC COR # BLD: 16.15 10*3/MM3 (ref 4.5–10.7)

## 2018-11-23 PROCEDURE — 85025 COMPLETE CBC W/AUTO DIFF WBC: CPT | Performed by: THORACIC SURGERY (CARDIOTHORACIC VASCULAR SURGERY)

## 2018-11-23 PROCEDURE — 80048 BASIC METABOLIC PNL TOTAL CA: CPT | Performed by: THORACIC SURGERY (CARDIOTHORACIC VASCULAR SURGERY)

## 2018-11-23 PROCEDURE — 25010000002 PIPERACILLIN SOD-TAZOBACTAM PER 1 G: Performed by: NURSE PRACTITIONER

## 2018-11-23 PROCEDURE — 71045 X-RAY EXAM CHEST 1 VIEW: CPT

## 2018-11-23 PROCEDURE — 99024 POSTOP FOLLOW-UP VISIT: CPT | Performed by: THORACIC SURGERY (CARDIOTHORACIC VASCULAR SURGERY)

## 2018-11-23 PROCEDURE — 82962 GLUCOSE BLOOD TEST: CPT

## 2018-11-23 PROCEDURE — 25010000002 KETOROLAC TROMETHAMINE PER 15 MG: Performed by: NURSE PRACTITIONER

## 2018-11-23 PROCEDURE — 25010000002 HEPARIN (PORCINE) PER 1000 UNITS: Performed by: THORACIC SURGERY (CARDIOTHORACIC VASCULAR SURGERY)

## 2018-11-23 PROCEDURE — 25010000002 VANCOMYCIN PER 500 MG: Performed by: INTERNAL MEDICINE

## 2018-11-23 RX ORDER — HYDROCODONE BITARTRATE AND ACETAMINOPHEN 7.5; 325 MG/1; MG/1
1 TABLET ORAL EVERY 6 HOURS PRN
Qty: 60 TABLET | Refills: 0 | Status: SHIPPED | OUTPATIENT
Start: 2018-11-23 | End: 2018-12-07

## 2018-11-23 RX ORDER — LEVOFLOXACIN 750 MG/1
750 TABLET ORAL DAILY
Qty: 7 TABLET | Refills: 0 | Status: SHIPPED | OUTPATIENT
Start: 2018-11-23 | End: 2018-12-03

## 2018-11-23 RX ORDER — ALPRAZOLAM 0.25 MG/1
0.25 TABLET ORAL 2 TIMES DAILY PRN
Qty: 28 TABLET | Refills: 0 | Status: SHIPPED | OUTPATIENT
Start: 2018-11-23 | End: 2018-12-07

## 2018-11-23 RX ADMIN — CARVEDILOL 50 MG: 25 TABLET, FILM COATED ORAL at 08:17

## 2018-11-23 RX ADMIN — VANCOMYCIN HYDROCHLORIDE 1000 MG: 1 INJECTION, SOLUTION INTRAVENOUS at 09:45

## 2018-11-23 RX ADMIN — TAZOBACTAM SODIUM AND PIPERACILLIN SODIUM 3.38 G: 375; 3 INJECTION, SOLUTION INTRAVENOUS at 05:58

## 2018-11-23 RX ADMIN — POTASSIUM CHLORIDE 20 MEQ: 750 CAPSULE, EXTENDED RELEASE ORAL at 08:17

## 2018-11-23 RX ADMIN — KETOROLAC TROMETHAMINE 15 MG: 15 INJECTION, SOLUTION INTRAMUSCULAR; INTRAVENOUS at 05:56

## 2018-11-23 RX ADMIN — ASPIRIN 325 MG: 325 TABLET ORAL at 08:17

## 2018-11-23 RX ADMIN — AMLODIPINE BESYLATE 10 MG: 10 TABLET ORAL at 08:17

## 2018-11-23 RX ADMIN — HYDROCODONE BITARTRATE AND ACETAMINOPHEN 2 TABLET: 7.5; 325 TABLET ORAL at 05:57

## 2018-11-23 RX ADMIN — HYDROCODONE BITARTRATE AND ACETAMINOPHEN 2 TABLET: 7.5; 325 TABLET ORAL at 11:07

## 2018-11-23 RX ADMIN — HYDRALAZINE HYDROCHLORIDE 25 MG: 25 TABLET ORAL at 09:49

## 2018-11-23 RX ADMIN — HEPARIN SODIUM 5000 UNITS: 5000 INJECTION INTRAVENOUS; SUBCUTANEOUS at 05:57

## 2018-11-23 RX ADMIN — HYDROCHLOROTHIAZIDE: 25 TABLET ORAL at 08:16

## 2018-11-23 RX ADMIN — MULTIPLE VITAMINS W/ MINERALS TAB 1 TABLET: TAB at 08:16

## 2018-11-23 NOTE — DISCHARGE SUMMARY
Patient Care Team:  Samson Leyva MD as PCP - General (Family Medicine)  Steve Rice MD as Consulting Physician (Cardiac Electrophysiology)    Date of Admission: 11/19/2018   Date of Discharge:  11/23/2018    Discharge Diagnosis: pT1b N0 M0 squamous cell carcinoma left lower lobe of the lung    Presenting Problem  Squamous cell lung cancer, left (CMS/HCC) [C34.92]  Squamous cell carcinoma of left lung (CMS/HCC) [C34.92]     History of Present Illness  Alexy Ram is a 63 y.o. male . He is a lifelong smoker of at least one pack of cigarettes per day.  He stopped smoking cigarettes in 2004 and began smoking cigars.  In April of this year he had a moderate amount of hemoptysis.  He was seen in the emergency room at [facility] and was started on treatment for pneumonia.  2 weeks later he had another episode of hemoptysis.  He was then referred to pulmonary medicine and underwent a workup which included bronchoscopy with biopsy showing squamous cell carcinoma.  He has had pulmonary function tests and CT PET scan.  He has been referred here for further evaluation and treatment.     He just recently retired.  He is active and has no significant shortness of breath or wheezing.  He has no pleuritic pain.  He has no unexplained weight loss.  There has been no history of myocardial infarction.  He does have atrial fibrillation and a pacemaker.  He is not on anticoagulation.  He has no prior history of cancer but has a family history of cancer        Hospital Course  Patient was brought to the hospital on November 19, 2018.  Under general anesthesia and attempt was made to do a robot-assisted left lower lobectomy.  Scarring in patient's anatomy did not allow the use of the minimally invasive techniques.  A thoracotomy was required.  Patient tolerated the lobectomy without difficulty.  He was extubated in the operating room. In the recovery room he was slow to wake up.  We elected to transfer  him to the intensive care unit.  He did very well during the night.  He remained hemodynamically stable.  He was weaned off of his oxygen.  He had moderate chest tube drainage.  Chest x-ray did show some infiltrate in the remaining right upper lobe.  He was started on Zosyn.  He was transferred to 5 E.  On 5 E. diet and activities were gradually increased.  He tolerated this well.  He had no air leak.  Chest tube was clamped.  Lung remained expanded.  There was infiltrate in the upper lobe.  Despite this temperature and white blood cell count was normal.  Chest tube was removed.  He is discharged home today on antibiotics to return to see me in the office in 2 weeks with a chest x-ray.  Prescriptions and instructions were given to the patient.    Procedures Performed  Procedure(s):  BRONCHOSCOPY, DAVINCI ROBOT ASSISTED VIDEIO ASSISTED THORACOSCOPY  WITH CONVERT TO OPEN THORACOTOMY,LEFT LOWER LOBECTOMY,INTERCOSTAL NERVE BLOCK       Consults:   Consults     No orders found from 10/21/2018 to 11/20/2018.          Pertinent Test Results:     Imaging Results (last 24 hours)     Procedure Component Value Units Date/Time    XR Chest 1 View [[id number]] Collected:  11/23/18 0802     Updated:  11/23/18 0807    Narrative:       CLINICAL HISTORY: 63-year-old male 4 days postop left thoracotomy with  left lower lobectomy for malignancy. Follow-up with left chest tube.     EXAM: PORTABLE AP ERECT CHEST DATED 11/23/2018 AT 0535 HOURS.     FINDINGS: When compared to the exam dated 11/22/2018 at 0606 hours,  there is again faint demonstration of the tip of the patient's left  chest tube at the medial left mid chest. Aortic calcification is again  demonstrated. There is again subtotal opacification of the left chest  with some apparent pulmonary aeration demonstrated at the apical area  and with a small subcentimeter superolateral left pneumothorax  remaining. Skin staples about the left chest are again noted. Shift of  midline  structures to the left along with some mild rotation of  projection to the left. Right subclavian permanent transvenous pacer and  lead wires to the distributions of right atrium and right ventricle the  heart remain. Cardiac size is not determined with certainty on the  current exam but is apparently within normal range. Degenerative changes  in the spine are again demonstrated. There is increased expansion of  right lung with sharp right costophrenic angle. Monitoring lead wires  are present.     CONCLUSION: Increased expansion of right lung with only trace aeration  of upper lung parenchyma, with opacification of most of the left chest,  and with a small superolateral left pneumothorax despite left chest  tube.     This report was finalized on 11/23/2018 8:04 AM by Dr. Arie Hairston M.D.             Lab Results (last 24 hours)     Procedure Component Value Units Date/Time    CBC & Differential [[id number]] Collected:  11/23/18 0820    Specimen:  Blood Updated:  11/23/18 0902    Narrative:       The following orders were created for panel order CBC & Differential.  Procedure                               Abnormality         Status                     ---------                               -----------         ------                     CBC Auto Differential[[id number]]        Abnormal            Final result                 Please view results for these tests on the individual orders.    CBC Auto Differential [[id number]]  (Abnormal) Collected:  11/23/18 0820    Specimen:  Blood Updated:  11/23/18 0902     WBC 16.15 10*3/mm3      RBC 4.55 10*6/mm3      Hemoglobin 12.8 g/dL      Hematocrit 38.8 %      MCV 85.3 fL      MCH 28.1 pg      MCHC 33.0 g/dL      RDW 15.4 %      RDW-SD 47.9 fl      MPV 10.6 fL      Platelets 373 10*3/mm3      Neutrophil % 75.6 %      Lymphocyte % 14.4 %      Monocyte % 7.1 %      Eosinophil % 2.7 %      Basophil % 0.2 %      Immature Grans % 0.5 %      Neutrophils, Absolute 12.20 10*3/mm3       Lymphocytes, Absolute 2.33 10*3/mm3      Monocytes, Absolute 1.15 10*3/mm3      Eosinophils, Absolute 0.43 10*3/mm3      Basophils, Absolute 0.04 10*3/mm3      Immature Grans, Absolute 0.08 10*3/mm3     Basic Metabolic Panel [[id number]] Collected:  11/23/18 0820    Specimen:  Blood Updated:  11/23/18 0856    POC Glucose Once [[id number]]  (Normal) Collected:  11/23/18 0537    Specimen:  Blood Updated:  11/23/18 0538     Glucose 106 mg/dL     POC Glucose Once [[id number]]  (Abnormal) Collected:  11/22/18 2017    Specimen:  Blood Updated:  11/22/18 2019     Glucose 173 mg/dL     POC Glucose Once [[id number]]  (Normal) Collected:  11/22/18 1655    Specimen:  Blood Updated:  11/22/18 1657     Glucose 121 mg/dL     POC Glucose Once [[id number]]  (Abnormal) Collected:  11/22/18 1129    Specimen:  Blood Updated:  11/22/18 1130     Glucose 214 mg/dL     Respiratory Culture - Sputum, Cough [[id number]] Collected:  11/20/18 1623    Specimen:  Sputum from Cough Updated:  11/22/18 1045     Respiratory Culture Light growth (2+) Normal Respiratory Kami     Gram Stain No organisms seen      No WBCs per low power field      No Epithelial cells per low power field            Condition on Discharge:  Stable    Vital Signs  Temp:  [97 °F (36.1 °C)-98.2 °F (36.8 °C)] 98.2 °F (36.8 °C)  Heart Rate:  [] 104  Resp:  [14-18] 18  BP: ()/(64-90) 133/90    Physical Exam:  Chest: Incisions are clean dry and healing well.  Chest tube with moderate drainage around the tube.  No air leak with forceful cough.  Chest tube was removed.  Diminished breath sounds at the apex.    Cardiac: Regular rate and rhythm.  No murmurs or gallops.  No dependent edema.    Abdomen: Soft and nontender.  No masses or organomegaly.  Good bowel sounds in all quadrants.    Discharge Disposition  Home today    Discharge Medications     Discharge Medications      New Medications      Instructions Start Date   ALPRAZolam 0.25 MG tablet  Commonly known as:  XANAX    0.25 mg, Oral, 2 Times Daily PRN      HYDROcodone-acetaminophen 7.5-325 MG per tablet  Commonly known as:  NORCO   1 tablet, Oral, Every 6 Hours PRN      levoFLOXacin 750 MG tablet  Commonly known as:  LEVAQUIN   750 mg, Oral, Daily         Continue These Medications      Instructions Start Date   amLODIPine 10 MG tablet  Commonly known as:  NORVASC   10 mg, Oral, Every Morning      aspirin 325 MG tablet   325 mg, Oral, Daily      atorvastatin 40 MG tablet  Commonly known as:  LIPITOR   40 mg, Oral, Nightly      carvedilol 25 MG tablet  Commonly known as:  COREG   50 mg, Oral, 2 Times Daily With Meals      hydrALAZINE 25 MG tablet  Commonly known as:  APRESOLINE   25 mg, Oral, 2 Times Daily      MULTIVITAMIN ADULT PO   1 tablet, Oral, Daily      potassium chloride 10 MEQ CR tablet  Commonly known as:  K-DUR   20 mEq, Oral, 2 Times Daily      valsartan-hydrochlorothiazide 320-25 MG per tablet  Commonly known as:  DIOVAN-HCT   1 tablet, Oral, Every Morning             Discharge Instructions:  · No heavy lifting, pushing, pulling greater than 10 pounds.  · No driving up until 2 weeks after surgery and no longer taking narcotics.  · Resume home diet as tolerated.  · Continue incentive spirometer at least 4 times per day.  · Remove dressing from post chest tube site after 48 hours, may shower and clean surgical sites with antibacterial soap or hydrogen peroxide, and apply gauze dressing or band-aid as needed for any drainage.  No dressing needed once no longer draining.          Follow-up Appointments  Future Appointments   Date Time Provider Department Center   12/3/2018  8:15 AM [facility] None   12/10/2018  8:30 AM Samson Leyva MD [facility] None   12/12/2018 10:45 AM Michael Ring III, MD [facility] None         Test Results Pending at Discharge   Order Current Status    Basic Metabolic Panel In process          For any questions regarding patient's stay, please refer to  patient's chart.    Michael Ring III, MD  [facility]  11/23/18  9:25 AM

## 2018-11-23 NOTE — PLAN OF CARE
Problem: Patient Care Overview  Goal: Plan of Care Review  Outcome: Ongoing (interventions implemented as appropriate)   11/23/18 0123   Coping/Psychosocial   Plan of Care Reviewed With patient   OTHER   Outcome Summary Monitor pain,labs,and vitals. CT clamped. Pain controlled with PRN meds. IV ABX. Pt is on room air. Possible D/C in the morning. Will cont. to monitor.   Plan of Care Review   Progress improving     Goal: Individualization and Mutuality  Outcome: Ongoing (interventions implemented as appropriate)    Goal: Discharge Needs Assessment  Outcome: Ongoing (interventions implemented as appropriate)    Goal: Interprofessional Rounds/Family Conf  Outcome: Ongoing (interventions implemented as appropriate)      Problem: Skin Injury Risk (Adult)  Goal: Skin Health and Integrity  Outcome: Ongoing (interventions implemented as appropriate)   11/23/18 0123   Skin Injury Risk (Adult)   Skin Health and Integrity making progress toward outcome       Problem: Fall Risk (Adult)  Goal: Absence of Fall  Outcome: Ongoing (interventions implemented as appropriate)   11/23/18 0123   Fall Risk (Adult)   Absence of Fall making progress toward outcome       Problem: Infection, Risk/Actual (Adult)  Goal: Infection Prevention/Resolution  Outcome: Ongoing (interventions implemented as appropriate)   11/23/18 0123   Infection, Risk/Actual (Adult)   Infection Prevention/Resolution making progress toward outcome       Problem: Pain, Acute (Adult)  Goal: Acceptable Pain Control/Comfort Level  Outcome: Ongoing (interventions implemented as appropriate)   11/23/18 0123   Pain, Acute (Adult)   Acceptable Pain Control/Comfort Level making progress toward outcome

## 2018-11-24 ENCOUNTER — READMISSION MANAGEMENT (OUTPATIENT)
Dept: CALL CENTER | Facility: HOSPITAL | Age: 63
End: 2018-11-24

## 2018-11-24 NOTE — OUTREACH NOTE
Prep Survey      Responses   Facility patient discharged from?  [place]   Is patient eligible?  Yes   Discharge diagnosis  Squamous celllung cancer bronchoscopy, thoracoscotomy, lobectomy this visit   Does the patient have one of the following disease processes/diagnoses(primary or secondary)?  Cardiothoracic surgery   Does the patient have Home health ordered?  No   Is there a DME ordered?  No   Prep survey completed?  Yes          Gabbi Bueno RN

## 2018-11-26 ENCOUNTER — READMISSION MANAGEMENT (OUTPATIENT)
Dept: CALL CENTER | Facility: HOSPITAL | Age: 63
End: 2018-11-26

## 2018-11-26 NOTE — PROGRESS NOTES
Case Management Discharge Note    Final Note: Pt discharged home ,no known needs. WHIT Corbett RN    Destination      No service has been selected for the patient.      Durable Medical Equipment      No service has been selected for the patient.      Dialysis/Infusion      No service has been selected for the patient.      Home Medical Care      No service has been selected for the patient.      Community Resources      No service has been selected for the patient.        Other: Other(private auto)    Final Discharge Disposition Code: 01 - home or self-care

## 2018-11-26 NOTE — OUTREACH NOTE
CT Surgery Week 1 Survey      Responses   Facility patient discharged from?  [place]   Does the patient have one of the following disease processes/diagnoses(primary or secondary)?  Cardiothoracic surgery   Is there a successful TCM telephone encounter documented?  No   Week 1 attempt successful?  Yes   Call start time  1429   Rescheduled  Revoked   Revoke  Decline to participate   Call end time  1431   Discharge diagnosis  Squamous celllung cancer bronchoscopy, thoracoscotomy, lobectomy this visit   Is patient permission given to speak with other caregiver?  Yes   Person spoke with today (if not patient) and relationship  Wife, Darlin Mendoza RN

## 2018-11-29 DIAGNOSIS — E11.9 TYPE 2 DIABETES MELLITUS WITHOUT COMPLICATION, UNSPECIFIED WHETHER LONG TERM INSULIN USE (HCC): ICD-10-CM

## 2018-11-29 DIAGNOSIS — E78.5 HYPERLIPIDEMIA, UNSPECIFIED HYPERLIPIDEMIA TYPE: Primary | ICD-10-CM

## 2018-11-29 DIAGNOSIS — Z12.5 SCREENING PSA (PROSTATE SPECIFIC ANTIGEN): ICD-10-CM

## 2018-12-03 LAB
ALBUMIN SERPL-MCNC: 3.3 G/DL (ref 3.5–5.2)
ALBUMIN/GLOB SERPL: 1.3 G/DL
ALP SERPL-CCNC: 75 U/L (ref 39–117)
ALT SERPL-CCNC: 32 U/L (ref 1–41)
AST SERPL-CCNC: 15 U/L (ref 1–40)
BILIRUB SERPL-MCNC: 0.3 MG/DL (ref 0.1–1.2)
BUN SERPL-MCNC: 25 MG/DL (ref 8–23)
BUN/CREAT SERPL: 29.8 (ref 7–25)
CALCIUM SERPL-MCNC: 9.1 MG/DL (ref 8.6–10.5)
CHLORIDE SERPL-SCNC: 99 MMOL/L (ref 98–107)
CHOLEST SERPL-MCNC: 139 MG/DL (ref 0–200)
CO2 SERPL-SCNC: 30 MMOL/L (ref 22–29)
CREAT SERPL-MCNC: 0.84 MG/DL (ref 0.76–1.27)
GLOBULIN SER CALC-MCNC: 2.5 GM/DL
GLUCOSE SERPL-MCNC: 115 MG/DL (ref 65–99)
HBA1C MFR BLD: 6.5 % (ref 4.8–5.6)
HDLC SERPL-MCNC: 33 MG/DL (ref 40–60)
LDLC SERPL CALC-MCNC: 88 MG/DL (ref 0–100)
LDLC/HDLC SERPL: 2.66 {RATIO}
POTASSIUM SERPL-SCNC: 4.2 MMOL/L (ref 3.5–5.2)
PROT SERPL-MCNC: 5.8 G/DL (ref 6–8.5)
PSA SERPL-MCNC: 4.11 NG/ML (ref 0–4)
SODIUM SERPL-SCNC: 139 MMOL/L (ref 136–145)
TRIGL SERPL-MCNC: 91 MG/DL (ref 0–150)
VLDLC SERPL CALC-MCNC: 18.2 MG/DL (ref 5–40)

## 2018-12-04 NOTE — ANESTHESIA PROCEDURE NOTES
ANESTHESIA INTUBATION  Urgency: elective    Date/Time: 11/19/2018 3:14 PM  Airway not difficult    General Information and Staff    Patient location during procedure: OR  Anesthesiologist: José Antonio Lawson MD  CRNA: Dayna Boogie CRNA    Indications and Patient Condition  Indications for airway management: airway protection    Preoxygenated: yes  Mask difficulty assessment: 1 - vent by mask    Final Airway Details  Final airway type: endotracheal airway      Successful airway: ETT - double lumen left  Cuffed: yes   Successful intubation technique: direct laryngoscopy  Endotracheal tube insertion site: oral  Blade: Sidra  Blade size: 4  ETT DL size (fr): 41  Cormack-Lehane Classification: grade I - full view of glottis  Placement verified by: chest auscultation, bronchoscopy and capnometry   Measured from: lips  ETT to lips (cm): 29  Number of attempts at approach: 1    Additional Comments  Smooth IV induction. Trachea intubated. Cuff up. BEBS.  FOB per surgeon for placement. BEBS after secured.            
Arterial Line      Patient location during procedure: holding area   Line placed for hemodynamic monitoring.  Performed By   Anesthesiologist: José Antonio Lawson MD  Preanesthetic Checklist  Completed: patient identified, site marked, surgical consent, pre-op evaluation, timeout performed, IV checked, risks and benefits discussed and monitors and equipment checked  Arterial Line Prep   Sterile Tech: mask and cap  Prep: ChloraPrep  Patient monitoring: blood pressure monitoring, continuous pulse oximetry and EKG  Arterial Line Procedure   Laterality:right  Location:  radial artery  Catheter size: 20 G   Guidance: landmark technique  Number of attempts: 2  Successful placement: yes          Post Assessment   Dressing Type: occlusive dressing applied, secured with tape and wrist guard applied.   Complications no  Circ/Move/Sens Assessment: unchanged.   Patient Tolerance: patient tolerated the procedure well with no apparent complications            
Dr Celio Sanchez

## 2018-12-10 ENCOUNTER — OFFICE VISIT (OUTPATIENT)
Dept: FAMILY MEDICINE CLINIC | Facility: CLINIC | Age: 63
End: 2018-12-10

## 2018-12-10 VITALS
DIASTOLIC BLOOD PRESSURE: 74 MMHG | OXYGEN SATURATION: 96 % | RESPIRATION RATE: 20 BRPM | SYSTOLIC BLOOD PRESSURE: 142 MMHG | WEIGHT: 251 LBS | BODY MASS INDEX: 38.04 KG/M2 | HEART RATE: 71 BPM | HEIGHT: 68 IN | TEMPERATURE: 97.6 F

## 2018-12-10 DIAGNOSIS — E78.00 HYPERCHOLESTEROLEMIA: ICD-10-CM

## 2018-12-10 DIAGNOSIS — E11.9 TYPE 2 DIABETES MELLITUS WITHOUT COMPLICATION, WITHOUT LONG-TERM CURRENT USE OF INSULIN (HCC): Primary | ICD-10-CM

## 2018-12-10 DIAGNOSIS — C34.92 SQUAMOUS CELL LUNG CANCER, LEFT (HCC): ICD-10-CM

## 2018-12-10 DIAGNOSIS — I10 ESSENTIAL HYPERTENSION: ICD-10-CM

## 2018-12-10 PROCEDURE — 99213 OFFICE O/P EST LOW 20 MIN: CPT

## 2018-12-10 NOTE — PROGRESS NOTES
Ankush Ram is a 63 y.o. male. Patient is here today for   Chief Complaint   Patient presents with   • Hyperlipidemia   • Diabetes          Vitals:    12/10/18 0810   BP: 142/74   Pulse: 71   Resp: 20   Temp: 97.6 °F (36.4 °C)   SpO2: 96%     The following portions of the patient's history were reviewed and updated as appropriate: allergies, current medications, past family history, past medical history, past social history, past surgical history and problem list.    Past Medical History:   Diagnosis Date   • Arthritis    • At risk for obstructive sleep apnea 2018   • At risk for sleep apnea     5   • Hyperlipidemia    • Hypertension    • Hypoglycemia    • Hypoglycemia    • Lesion of lung     ON RECENT SCAN...   • Lung cancer (CMS/HCC)    • Pacemaker    • Pneumonia     2018   • Second degree AV block    • Sinus node dysfunction (CMS/HCC)       No Known Allergies   Social History     Socioeconomic History   • Marital status:      Spouse name: Not on file   • Number of children: Not on file   • Years of education: Not on file   • Highest education level: Not on file   Social Needs   • Financial resource strain: Not on file   • Food insecurity - worry: Not on file   • Food insecurity - inability: Not on file   • Transportation needs - medical: Not on file   • Transportation needs - non-medical: Not on file   Occupational History   • Not on file   Tobacco Use   • Smoking status: Former Smoker     Packs/day: 1.00     Years: 33.00     Pack years: 33.00     Types: Cigarettes     Last attempt to quit: 2018     Years since quittin.9   • Smokeless tobacco: Never Used   • Tobacco comment: started cigars 3-4 daily in 2018 and has quit a month ago   Substance and Sexual Activity   • Alcohol use: Yes     Comment:  18 beers monthly   • Drug use: No   • Sexual activity: Not on file   Other Topics Concern   • Not on file   Social History Narrative   • Not on file        Current Outpatient  Medications:   •  amLODIPine (NORVASC) 10 MG tablet, Take 10 mg by mouth Every Morning., Disp: , Rfl:   •  aspirin 325 MG tablet, Take 325 mg by mouth Daily., Disp: , Rfl:   •  atorvastatin (LIPITOR) 40 MG tablet, Take 40 mg by mouth Every Night., Disp: , Rfl:   •  carvedilol (COREG) 25 MG tablet, Take 50 mg by mouth 2 (Two) Times a Day With Meals., Disp: , Rfl:   •  hydrALAZINE (APRESOLINE) 25 MG tablet, Take 25 mg by mouth 2 (Two) Times a Day., Disp: , Rfl:   •  Multiple Vitamins-Minerals (MULTIVITAMIN ADULT PO), Take 1 tablet by mouth Daily., Disp: , Rfl:   •  potassium chloride (K-DUR) 10 MEQ CR tablet, Take 20 mEq by mouth 2 (Two) Times a Day., Disp: , Rfl:   •  valsartan-hydrochlorothiazide (DIOVAN-HCT) 320-25 MG per tablet, Take 1 tablet by mouth Every Morning., Disp: , Rfl:      Objective     History of Present Illness   The patient is here today for follow-up on type 2 diabetes mellitus, essential hypertension and hyperlipidemia.  The patient was hospitalized on 11/19/18 and underwent a left lower lobe lobectomy for squamous cell cancer of the lung.  He is under close follow-up by his thoracic surgeon.    Review of Systems   Constitutional: Negative for chills and fever.        The patient is still somewhat fatigued from his recent surgery.   HENT: Negative.    Respiratory: Negative for cough and shortness of breath.         The patient does have some left lower anterior chest discomfort when sneezing or coughing secondary to his recent left lower lobe lobectomy   Cardiovascular: Negative for chest pain, palpitations and leg swelling.   Gastrointestinal: Negative for abdominal pain, blood in stool, constipation and diarrhea.   Genitourinary:        The patient is not having any dysuria at this time.  The patient was catheterized during hospitalization for his left lower lobe lobectomy   Musculoskeletal:        Mild to moderate osteoarthritic aches and pains.   Neurological: Negative.    Hematological:  Negative.    Psychiatric/Behavioral: Negative.        Physical Exam   Constitutional: He is oriented to person, place, and time. He appears well-developed and well-nourished.   Moderately overweight   Neck:   Carotid Pulses normal   Cardiovascular: Normal rate, regular rhythm and normal heart sounds.   Pulmonary/Chest: Effort normal and breath sounds normal. No respiratory distress. He has no wheezes. He has no rales.   Abdominal: Soft. Bowel sounds are normal.   Musculoskeletal: Normal range of motion. He exhibits no edema.   Neurological: He is alert and oriented to person, place, and time.   Skin: Skin is warm and dry.   Psychiatric: He has a normal mood and affect.   Nursing note and vitals reviewed.      ASSESSMENT  #1 type 2 diabetes mellitus                 #2 essential hypertension                 #3 hyperlipidemia               #4 status post recent left lower lobectomy for squamous cell cancer of the lung     DISCUSSION/SUMMARY   Blood pressure by me was 136/80.  Fasting CMP showed a fasting blood sugar of 115, slightly low total protein and albumin but was otherwise essentially normal.  Hemoglobin A1c is 6.50%.  Total cholesterol is 139, triglycerides 91, HDL cholesterol 33 and LDL cholesterol is 88.  The patient's PSA was elevated at 4.110 as compared to 2.330 year ago.  However the patient was catheterized during his hospitalization for his left lower lobectomy within the last 3 weeks.  I'm going to repeat a PSA, lab only, in about 2 months.  The patient still has a mild amount of left lower chest discomfort but is doing well after his surgery.  The patient has now retired.  I will see him again in 6 months.    PLAN  Recheck in 6 months with fasting CMP, lipid panel, hemoglobin A1c.  Lab only, nonfasting appointment for PSA in about 2 months   No Follow-up on file.

## 2018-12-12 ENCOUNTER — OFFICE VISIT (OUTPATIENT)
Dept: SURGERY | Facility: CLINIC | Age: 63
End: 2018-12-12

## 2018-12-12 ENCOUNTER — HOSPITAL ENCOUNTER (OUTPATIENT)
Dept: GENERAL RADIOLOGY | Facility: HOSPITAL | Age: 63
Discharge: HOME | End: 2018-12-12
Attending: THORACIC SURGERY (CARDIOTHORACIC VASCULAR SURGERY) | Admitting: THORACIC SURGERY (CARDIOTHORACIC VASCULAR SURGERY)

## 2018-12-12 VITALS
OXYGEN SATURATION: 97 % | DIASTOLIC BLOOD PRESSURE: 86 MMHG | BODY MASS INDEX: 37.13 KG/M2 | HEART RATE: 79 BPM | HEIGHT: 68 IN | WEIGHT: 245 LBS | SYSTOLIC BLOOD PRESSURE: 132 MMHG

## 2018-12-12 DIAGNOSIS — Z09 FOLLOW-UP EXAMINATION FOLLOWING SURGERY: ICD-10-CM

## 2018-12-12 DIAGNOSIS — Z98.890 POST-OPERATIVE STATE: ICD-10-CM

## 2018-12-12 DIAGNOSIS — Z98.890 POST-OPERATIVE STATE: Primary | ICD-10-CM

## 2018-12-12 DIAGNOSIS — C34.92 SQUAMOUS CELL CARCINOMA OF LEFT LUNG (HCC): Primary | ICD-10-CM

## 2018-12-12 PROCEDURE — 71046 X-RAY EXAM CHEST 2 VIEWS: CPT

## 2018-12-12 PROCEDURE — 99024 POSTOP FOLLOW-UP VISIT: CPT | Performed by: THORACIC SURGERY (CARDIOTHORACIC VASCULAR SURGERY)

## 2018-12-12 NOTE — PROGRESS NOTES
Subjective   Patient ID: Alexy Ram is a 63 y.o. male is here today for follow-up.    History of Present Illness  Dear Colleagues,   Alexy Ram is here today for their first postoperative visit following a robot-assisted left lower lobectomy performed November 19, 2018 for squamous cell carcinoma of the lung.  This was a T1 N0 M0 squamous cell carcinoma.  Postoperative course was prolonged by respiratory insufficiency thought to be related to aspiration pneumonia.  Patient has been doing well at home.  He is not using any oxygen.  He is resuming his normal activities is not experiencing any significant shortness of breath.  He has some chest wall pain but this is resolving.  There is some numbness under his left breast but this too is resolving.  His appetite is normal.  He has had no fever chills or night sweats.  He has no cough or hemoptysis    The following portions of the patient's history were reviewed and updated as appropriate: allergies, current medications, past family history, past medical history, past social history, past surgical history and problem list.  Review of Systems   Constitution: Negative.   HENT: Negative.    Eyes: Negative.    Cardiovascular: Negative.    Respiratory: Positive for shortness of breath.    Endocrine: Negative.    Hematologic/Lymphatic: Negative.    Skin: Negative.    Musculoskeletal: Negative.    Gastrointestinal: Negative.    Genitourinary: Negative.    Neurological: Negative.    Psychiatric/Behavioral: Negative.    Allergic/Immunologic: Negative.         Objective   Physical Exam   Constitutional: He is oriented to person, place, and time. He appears well-developed and well-nourished.   HENT:   Head: Normocephalic.   Eyes: Conjunctivae, EOM and lids are normal. Pupils are equal, round, and reactive to light.   Neck: Trachea normal and normal range of motion. Neck supple. No hepatojugular reflux and no JVD present. Carotid bruit is not present. No thyroid mass and  no thyromegaly present.   Cardiovascular: Normal rate, regular rhythm, S1 normal, S2 normal, normal heart sounds and normal pulses.  No extrasystoles are present. PMI is not displaced.   Pulmonary/Chest: Effort normal. He has decreased breath sounds in the left lower field.   Left thoracotomy incision is well-healed.  Staples were removed.  No subcutaneous masses or nodules.  Chest wall is stable.  Good range of motion in the left shoulder.   Abdominal: Soft. Normal appearance and bowel sounds are normal. He exhibits no mass. There is no hepatosplenomegaly. There is no tenderness. No hernia.   Musculoskeletal: Normal range of motion.   Neurological: He is alert and oriented to person, place, and time. He has normal strength and normal reflexes. No cranial nerve deficit or sensory deficit. He displays a negative Romberg sign.   Skin: Skin is warm, dry and intact.   Psychiatric: He has a normal mood and affect. His speech is normal and behavior is normal. Judgment and thought content normal. Cognition and memory are normal.        Assessment/Plan   Independent Review of Radiographic Studies:    Chest x-ray performed today was independently reviewed and compared to previous x-rays.  There is some marked improvement in the overall appearance of the left lung.  Left upper lobe is much better aerated.  There is some elevation of the left hemidiaphragm with left pleural effusion.  I see no infiltrates.  Right side is normal.    Assessment:    The patient is doing remarkably well.  He has little or no intercostal nerve neuralgia.  Chest wall pain is resolving.  He is resuming his normal activities.  Respiratory insufficiency is resolved    Plan:     Patient will continue increasing his activities back to normal.  He may start driving his car.  He will return to this office in 6 weeks with a repeat chest x-ray.  I will keep you informed of his progress.    Diagnoses and all orders for this visit:    Squamous cell carcinoma  of left lung (CMS/HCC)  -     XR Chest 2 View; Future    Follow-up examination following surgery  -     XR Chest 2 View; Future

## 2018-12-17 RX ORDER — ATORVASTATIN CALCIUM 40 MG/1
40 TABLET, FILM COATED ORAL NIGHTLY
Qty: 90 TABLET | Refills: 3 | Status: SHIPPED | OUTPATIENT
Start: 2018-12-17 | End: 2020-04-14

## 2018-12-17 RX ORDER — CARVEDILOL 25 MG/1
50 TABLET ORAL 2 TIMES DAILY WITH MEALS
Qty: 360 TABLET | Refills: 3 | Status: SHIPPED | OUTPATIENT
Start: 2018-12-17 | End: 2020-01-08 | Stop reason: SDUPTHER

## 2018-12-31 RX ORDER — AMLODIPINE BESYLATE 10 MG/1
TABLET ORAL
Qty: 90 TABLET | Refills: 1 | Status: SHIPPED | OUTPATIENT
Start: 2018-12-31 | End: 2019-06-28 | Stop reason: SDUPTHER

## 2019-01-16 RX ORDER — POTASSIUM CHLORIDE 750 MG/1
TABLET, FILM COATED, EXTENDED RELEASE ORAL
Qty: 360 TABLET | Refills: 3 | Status: SHIPPED | OUTPATIENT
Start: 2019-01-16 | End: 2019-06-24 | Stop reason: SDUPTHER

## 2019-01-21 RX ORDER — POTASSIUM CHLORIDE 750 MG/1
CAPSULE, EXTENDED RELEASE ORAL
Qty: 120 CAPSULE | Refills: 4 | Status: SHIPPED | OUTPATIENT
Start: 2019-01-21 | End: 2019-04-20 | Stop reason: SDUPTHER

## 2019-01-23 ENCOUNTER — HOSPITAL ENCOUNTER (OUTPATIENT)
Dept: GENERAL RADIOLOGY | Facility: HOSPITAL | Age: 64
Discharge: HOME | End: 2019-01-23
Attending: THORACIC SURGERY (CARDIOTHORACIC VASCULAR SURGERY) | Admitting: THORACIC SURGERY (CARDIOTHORACIC VASCULAR SURGERY)

## 2019-01-23 ENCOUNTER — OFFICE VISIT (OUTPATIENT)
Dept: SURGERY | Facility: CLINIC | Age: 64
End: 2019-01-23

## 2019-01-23 VITALS
WEIGHT: 245 LBS | SYSTOLIC BLOOD PRESSURE: 130 MMHG | DIASTOLIC BLOOD PRESSURE: 78 MMHG | BODY MASS INDEX: 37.13 KG/M2 | HEIGHT: 68 IN | HEART RATE: 76 BPM | OXYGEN SATURATION: 97 %

## 2019-01-23 DIAGNOSIS — C34.92 SQUAMOUS CELL CARCINOMA OF LEFT LUNG (HCC): Primary | ICD-10-CM

## 2019-01-23 DIAGNOSIS — Z09 FOLLOW-UP EXAMINATION FOLLOWING SURGERY: ICD-10-CM

## 2019-01-23 DIAGNOSIS — C34.92 SQUAMOUS CELL CARCINOMA OF LEFT LUNG (HCC): ICD-10-CM

## 2019-01-23 PROCEDURE — 99024 POSTOP FOLLOW-UP VISIT: CPT | Performed by: THORACIC SURGERY (CARDIOTHORACIC VASCULAR SURGERY)

## 2019-01-23 PROCEDURE — 71046 X-RAY EXAM CHEST 2 VIEWS: CPT

## 2019-01-23 NOTE — PROGRESS NOTES
Subjective   Patient ID: Alexy Ram is a 63 y.o. male is here today for follow-up.    History of Present Illness  Dear Colleagues,   Alexy Ram is here today for their second postoperative visit following a left lower lobectomy performed November 19, 2018 for a stage IA2 squamous cell carcinoma of the lung.  Since his last visit he has improved.  Chest wall pain and numbness are almost completely resolved.  He still gets short of breath with climbing steps and minimal activity but this is no different than preoperatively.  He has no wheezing.  He has no cough or hemoptysis.  He has no hoarseness or change in his voice.  He has gained some weight.    The following portions of the patient's history were reviewed and updated as appropriate: allergies, current medications, past family history, past medical history, past social history, past surgical history and problem list.  Review of Systems   Constitution: Negative.   HENT: Negative.    Eyes: Negative.    Cardiovascular: Negative.    Respiratory: Positive for shortness of breath.    Endocrine: Negative.    Hematologic/Lymphatic: Negative.    Skin: Negative.    Musculoskeletal: Negative.    Gastrointestinal: Negative.    Genitourinary: Negative.    Neurological: Negative.    Psychiatric/Behavioral: Negative.         Objective   Physical Exam   Constitutional: He is oriented to person, place, and time. He appears well-developed and well-nourished.   HENT:   Head: Normocephalic.   Eyes: Conjunctivae, EOM and lids are normal. Pupils are equal, round, and reactive to light.   Neck: Trachea normal and normal range of motion. Neck supple. No hepatojugular reflux and no JVD present. Carotid bruit is not present. No thyroid mass and no thyromegaly present.   Cardiovascular: Normal rate, regular rhythm, S1 normal, S2 normal, normal heart sounds and normal pulses.  No extrasystoles are present. PMI is not displaced.   Pulmonary/Chest: Effort normal. He has decreased  breath sounds in the left lower field.   Left thoracotomy incision is well-healed.  No subcutaneous masses or nodules.  Chest wall is stable.  Good range of motion in the left shoulder.   Abdominal: Soft. Normal appearance and bowel sounds are normal. He exhibits no mass. There is no hepatosplenomegaly. There is no tenderness. No hernia.   Musculoskeletal: Normal range of motion.   Neurological: He is alert and oriented to person, place, and time. He has normal strength and normal reflexes. No cranial nerve deficit or sensory deficit. He displays a negative Romberg sign.   Skin: Skin is warm, dry and intact.   Psychiatric: He has a normal mood and affect. His speech is normal and behavior is normal. Judgment and thought content normal. Cognition and memory are normal.       Assessment/Plan   Independent Review of Radiographic Studies:    Chest x-ray performed today shows usual postoperative changes on the left.  There is good expansion of the left upper lobe.  There is elevation of the left hemidiaphragm and fluid on the left unchanged from previous x-ray.  Right lung remains clear.      Assessment: Patient has made a satisfactory recovery from his left thoracotomy and left lower lobectomy.  He has a stage I squamous cell carcinoma and will require no adjuvant therapy.  I have encouraged him to continue increasing his activities back to his normal status.    Plan: Patient will return to see me in 6 months with a CT of the chest with contrast for his routine surveillance.  I will keep you informed of his progress.    Diagnoses and all orders for this visit:    Squamous cell carcinoma of left lung (CMS/HCC)  -     CT Chest With Contrast; Future    Follow-up examination following surgery  -     CT Chest With Contrast; Future

## 2019-02-07 DIAGNOSIS — Z12.5 SPECIAL SCREENING FOR MALIGNANT NEOPLASM OF PROSTATE: Primary | ICD-10-CM

## 2019-02-15 LAB — PSA SERPL-MCNC: 3.5 NG/ML (ref 0–4)

## 2019-02-19 ENCOUNTER — TELEPHONE (OUTPATIENT)
Dept: FAMILY MEDICINE CLINIC | Facility: CLINIC | Age: 64
End: 2019-02-19

## 2019-02-19 NOTE — TELEPHONE ENCOUNTER
Patient notified.    ----- Message from Samson Leyva MD sent at 2/18/2019 10:39 AM EST -----  Tell patient that his repeat PSA was down from 4.11 to 3.5 which is in the normal range.  We will repeat his PSA again in about 6 months

## 2019-03-13 ENCOUNTER — APPOINTMENT (OUTPATIENT)
Dept: GENERAL RADIOLOGY | Facility: HOSPITAL | Age: 64
End: 2019-03-13

## 2019-03-13 PROCEDURE — 71046 X-RAY EXAM CHEST 2 VIEWS: CPT | Performed by: GENERAL PRACTICE

## 2019-03-18 ENCOUNTER — OFFICE VISIT (OUTPATIENT)
Dept: FAMILY MEDICINE CLINIC | Facility: CLINIC | Age: 64
End: 2019-03-18

## 2019-03-18 VITALS
OXYGEN SATURATION: 97 % | BODY MASS INDEX: 38.65 KG/M2 | HEIGHT: 68 IN | TEMPERATURE: 97.5 F | SYSTOLIC BLOOD PRESSURE: 144 MMHG | WEIGHT: 255 LBS | DIASTOLIC BLOOD PRESSURE: 78 MMHG | HEART RATE: 79 BPM | RESPIRATION RATE: 18 BRPM

## 2019-03-18 DIAGNOSIS — Z90.2 STATUS POST LOBECTOMY OF LUNG: ICD-10-CM

## 2019-03-18 DIAGNOSIS — J20.9 ACUTE BRONCHITIS WITH BRONCHOSPASM: Primary | ICD-10-CM

## 2019-03-18 PROCEDURE — 99213 OFFICE O/P EST LOW 20 MIN: CPT

## 2019-03-18 RX ORDER — DEXTROMETHORPHAN HYDROBROMIDE AND PROMETHAZINE HYDROCHLORIDE 15; 6.25 MG/5ML; MG/5ML
5 SYRUP ORAL 4 TIMES DAILY PRN
Qty: 180 ML | Refills: 0 | OUTPATIENT
Start: 2019-03-18 | End: 2019-06-09

## 2019-03-18 NOTE — PROGRESS NOTES
Ankush Ram is a 63 y.o. male. Patient is here today for   Chief Complaint   Patient presents with   • Cough     patient went to the urgent care 3/13/19 URI          Vitals:    19 1425   BP: 144/78   Pulse: 79   Resp: 18   Temp: 97.5 °F (36.4 °C)   SpO2: 97%     The following portions of the patient's history were reviewed and updated as appropriate: allergies, current medications, past family history, past medical history, past social history, past surgical history and problem list.    Past Medical History:   Diagnosis Date   • Arthritis    • At risk for obstructive sleep apnea 2018   • At risk for sleep apnea     5   • Hyperlipidemia    • Hypertension    • Hypoglycemia    • Hypoglycemia    • Lesion of lung     ON RECENT SCAN...   • Lung cancer (CMS/HCC)    • Pacemaker    • Pneumonia     2018   • Second degree AV block    • Sinus node dysfunction (CMS/HCC)       No Known Allergies   Social History     Socioeconomic History   • Marital status:      Spouse name: Not on file   • Number of children: Not on file   • Years of education: Not on file   • Highest education level: Not on file   Social Needs   • Financial resource strain: Not on file   • Food insecurity - worry: Not on file   • Food insecurity - inability: Not on file   • Transportation needs - medical: Not on file   • Transportation needs - non-medical: Not on file   Occupational History   • Not on file   Tobacco Use   • Smoking status: Former Smoker     Packs/day: 1.00     Years: 33.00     Pack years: 33.00     Types: Cigarettes     Last attempt to quit: 2018     Years since quittin.2   • Smokeless tobacco: Never Used   • Tobacco comment: started cigars 3-4 daily in 2018 and has quit a month ago   Substance and Sexual Activity   • Alcohol use: Yes     Comment:  18 beers monthly   • Drug use: No   • Sexual activity: Defer   Other Topics Concern   • Not on file   Social History Narrative   • Not on file         Current Outpatient Medications:   •  albuterol sulfate  (90 Base) MCG/ACT inhaler, Inhale 2 puffs Every 4 (Four) Hours As Needed for Wheezing., Disp: 1 inhaler, Rfl: 0  •  amLODIPine (NORVASC) 10 MG tablet, TAKE 1 TABLET BY MOUTH EVERY DAY, Disp: 90 tablet, Rfl: 1  •  aspirin 325 MG tablet, Take 325 mg by mouth Daily., Disp: , Rfl:   •  atorvastatin (LIPITOR) 40 MG tablet, Take 1 tablet by mouth Every Night., Disp: 90 tablet, Rfl: 3  •  carvedilol (COREG) 25 MG tablet, Take 2 tablets by mouth 2 (Two) Times a Day With Meals., Disp: 360 tablet, Rfl: 3  •  cefdinir (OMNICEF) 300 MG capsule, Take 1 capsule by mouth 2 (Two) Times a Day., Disp: 20 capsule, Rfl: 0  •  hydrALAZINE (APRESOLINE) 25 MG tablet, Take 25 mg by mouth 2 (Two) Times a Day., Disp: , Rfl:   •  Multiple Vitamins-Minerals (MULTIVITAMIN ADULT PO), Take 1 tablet by mouth Daily., Disp: , Rfl:   •  potassium chloride (K-DUR) 10 MEQ CR tablet, Take 2 capsules by mouth twice a day, Disp: 360 tablet, Rfl: 3  •  potassium chloride (MICRO-K) 10 MEQ CR capsule, TAKE 2 CAPSULES BY MOUTH TWICE A DAY, Disp: 120 capsule, Rfl: 4  •  predniSONE (DELTASONE) 10 MG (21) tablet pack, Take  by mouth Daily. Use as directed on package, Disp: 1 each, Rfl: 0  •  valsartan-hydrochlorothiazide (DIOVAN-HCT) 320-25 MG per tablet, Take 1 tablet by mouth Every Morning., Disp: , Rfl:   •  promethazine-dextromethorphan (PROMETHAZINE-DM) 6.25-15 MG/5ML syrup, Take 5 mL by mouth 4 (Four) Times a Day As Needed for Cough., Disp: 180 mL, Rfl: 0     Objective     History of Present Illness   The patient is here today for follow-up of an apparent acute bronchitis for which the patient was treated at an urgent care center last week.    Review of Systems   Constitutional:        The patient stated that he did have a fever for a couple of days but has not had a fever now for probably close to a week.  The patient has been quite inactive due to his cough and breathing issues.   HENT:         The patient states that his problem originally started with  typical cold symptoms approximately 2 or 3 weeks ago.  He then developed increased cough which was productive as well as increased shortness of air and wheezing.   Respiratory:        The patient has a mildly productive cough, episodic wheezing, and moderate shortness of air.   Cardiovascular: Negative for chest pain and palpitations.   Gastrointestinal: Negative.    Genitourinary: Negative.    Musculoskeletal:        Patient does have some discomfort along the left lower anterolateral rib cage area from his cough.   Psychiatric/Behavioral: Negative.        Physical Exam   Constitutional: He is oriented to person, place, and time. He appears well-developed and well-nourished.   Overweight.  The patient is somewhat uncomfortable due to his chest wall discomfort from his cough but is in no acute respiratory distress.   HENT:   Right Ear: External ear normal.   Left Ear: External ear normal.   Mouth/Throat: Oropharynx is clear and moist.   Cardiovascular: Normal rate, regular rhythm and normal heart sounds.   Pulmonary/Chest: Effort normal.   The patient has coarse rales throughout his chest.  He does have some mild expiratory wheezes.  Decreased breath sounds in the left lower chest.  There is chest wall tenderness in the left lower anterolateral rib cage   Abdominal: Soft. Bowel sounds are normal.   Neurological: He is alert and oriented to person, place, and time.   Skin: Skin is warm and dry.   Psychiatric: He has a normal mood and affect.   Nursing note and vitals reviewed.      ASSESSMENT  #1 acute bronchitis with bronchospasm                    #2 status post left lower lobe lobectomy for lung cancer in November 2018               #3 stable left lower lobe pleural effusion    DISCUSSION/SUMMARY   The patient's vital signs are essentially normal today.  The patient had a left lower lobe lobectomy in November 2018 for lung cancer and has been  doing fairly well since that time.  He does have a stable left pleural effusion for several months now on chest x-ray is reported.  The patient went to the urgent care center last week before an acute orchitis and a chest x-ray showed the above.  There were no infiltrates.  The patient was started on a cephalosporin, steroid pack, and was given an albuterol inhaler to use as needed.  The patient states that the degree of coughing has lessened slightly in the amount of sputum that he is coughing up has also lessened somewhat since that time.  However he is still coughing up a fair amount of sputum.  Patient states that he does feel tightness in his chest.  He has been using his albuterol inhaler a couple of times a day and I encouraged him to increase this to every 4-6 hours as needed for wheezing.  I am also giving him a Spiriva inhaler sample to start using 2 inhalations once daily.  He will continue his antibiotic and finish his steroid pack.  I am going to try to get him to see his pulmonologist as soon as possible for reevaluation because he is not improving as he should.    PLAN  The patient will be referred back to Dr. Akil Ortiz for evaluation of his persistent bronchitis  No Follow-up on file.

## 2019-04-22 RX ORDER — POTASSIUM CHLORIDE 750 MG/1
CAPSULE, EXTENDED RELEASE ORAL
Qty: 120 CAPSULE | Refills: 2 | Status: SHIPPED | OUTPATIENT
Start: 2019-04-22 | End: 2019-07-28 | Stop reason: SDUPTHER

## 2019-06-13 DIAGNOSIS — E78.00 HYPERCHOLESTEROLEMIA: ICD-10-CM

## 2019-06-13 DIAGNOSIS — E11.9 TYPE 2 DIABETES MELLITUS WITHOUT COMPLICATION, WITHOUT LONG-TERM CURRENT USE OF INSULIN (HCC): ICD-10-CM

## 2019-06-18 LAB
ALBUMIN SERPL-MCNC: 3.7 G/DL (ref 3.5–5.2)
ALBUMIN/GLOB SERPL: 1.4 G/DL
ALP SERPL-CCNC: 84 U/L (ref 39–117)
ALT SERPL-CCNC: 79 U/L (ref 1–41)
AST SERPL-CCNC: 33 U/L (ref 1–40)
BILIRUB SERPL-MCNC: 0.6 MG/DL (ref 0.2–1.2)
BUN SERPL-MCNC: 21 MG/DL (ref 8–23)
BUN/CREAT SERPL: 26.6 (ref 7–25)
CALCIUM SERPL-MCNC: 8.9 MG/DL (ref 8.6–10.5)
CHLORIDE SERPL-SCNC: 101 MMOL/L (ref 98–107)
CHOLEST SERPL-MCNC: 150 MG/DL (ref 0–200)
CO2 SERPL-SCNC: 27.6 MMOL/L (ref 22–29)
CREAT SERPL-MCNC: 0.79 MG/DL (ref 0.76–1.27)
GLOBULIN SER CALC-MCNC: 2.6 GM/DL
GLUCOSE SERPL-MCNC: 128 MG/DL (ref 65–99)
HBA1C MFR BLD: 7.3 % (ref 4.8–5.6)
HDLC SERPL-MCNC: 40 MG/DL (ref 40–60)
LDLC SERPL CALC-MCNC: 67 MG/DL (ref 0–100)
LDLC/HDLC SERPL: 1.67 {RATIO}
POTASSIUM SERPL-SCNC: 3.8 MMOL/L (ref 3.5–5.2)
PROT SERPL-MCNC: 6.3 G/DL (ref 6–8.5)
SODIUM SERPL-SCNC: 142 MMOL/L (ref 136–145)
TRIGL SERPL-MCNC: 216 MG/DL (ref 0–150)
VLDLC SERPL CALC-MCNC: 43.2 MG/DL

## 2019-06-24 ENCOUNTER — OFFICE VISIT (OUTPATIENT)
Dept: FAMILY MEDICINE CLINIC | Facility: CLINIC | Age: 64
End: 2019-06-24

## 2019-06-24 VITALS
RESPIRATION RATE: 20 BRPM | HEIGHT: 68 IN | HEART RATE: 69 BPM | TEMPERATURE: 98.2 F | WEIGHT: 261.4 LBS | DIASTOLIC BLOOD PRESSURE: 86 MMHG | SYSTOLIC BLOOD PRESSURE: 134 MMHG | BODY MASS INDEX: 39.62 KG/M2 | OXYGEN SATURATION: 96 %

## 2019-06-24 DIAGNOSIS — E11.9 TYPE 2 DIABETES MELLITUS WITHOUT COMPLICATION, WITHOUT LONG-TERM CURRENT USE OF INSULIN (HCC): Primary | ICD-10-CM

## 2019-06-24 DIAGNOSIS — Z90.2 STATUS POST LOBECTOMY OF LUNG: ICD-10-CM

## 2019-06-24 DIAGNOSIS — I10 ESSENTIAL HYPERTENSION: ICD-10-CM

## 2019-06-24 DIAGNOSIS — R74.8 ELEVATED LIVER ENZYMES: ICD-10-CM

## 2019-06-24 DIAGNOSIS — E78.00 HYPERCHOLESTEROLEMIA: ICD-10-CM

## 2019-06-24 PROCEDURE — 99213 OFFICE O/P EST LOW 20 MIN: CPT

## 2019-06-24 NOTE — PROGRESS NOTES
Ankush Ram is a 64 y.o. male. Patient is here today for   Chief Complaint   Patient presents with   • Hypertension     HYPERCHOLESTEROLEMIA- FOLLOW UP LABS          Vitals:    19 0902   BP: 134/86   Pulse: 69   Resp: 20   Temp: 98.2 °F (36.8 °C)   SpO2: 96%     The following portions of the patient's history were reviewed and updated as appropriate: allergies, current medications, past family history, past medical history, past social history, past surgical history and problem list.    Past Medical History:   Diagnosis Date   • Arthritis    • At risk for obstructive sleep apnea 2018   • At risk for sleep apnea     5   • Hyperlipidemia    • Hypertension    • Hypoglycemia    • Hypoglycemia    • Lesion of lung     ON RECENT SCAN...   • Lung cancer (CMS/HCC)    • Pacemaker    • Pneumonia     2018   • Second degree AV block    • Sinus node dysfunction (CMS/HCC)       No Known Allergies   Social History     Socioeconomic History   • Marital status:      Spouse name: Not on file   • Number of children: Not on file   • Years of education: Not on file   • Highest education level: Not on file   Tobacco Use   • Smoking status: Former Smoker     Packs/day: 1.00     Years: 33.00     Pack years: 33.00     Types: Cigarettes     Last attempt to quit: 2018     Years since quittin.4   • Smokeless tobacco: Never Used   • Tobacco comment: started cigars 3-4 daily in 2018 and has quit a month ago   Substance and Sexual Activity   • Alcohol use: Yes     Comment:  18 beers monthly   • Drug use: No   • Sexual activity: Defer        Current Outpatient Medications:   •  albuterol sulfate  (90 Base) MCG/ACT inhaler, Inhale 2 puffs Every 4 (Four) Hours As Needed for Wheezing., Disp: 1 inhaler, Rfl: 0  •  amLODIPine (NORVASC) 10 MG tablet, TAKE 1 TABLET BY MOUTH EVERY DAY, Disp: 90 tablet, Rfl: 1  •  aspirin 325 MG tablet, Take 325 mg by mouth Daily., Disp: , Rfl:   •  atorvastatin  (LIPITOR) 40 MG tablet, Take 1 tablet by mouth Every Night., Disp: 90 tablet, Rfl: 3  •  carvedilol (COREG) 25 MG tablet, Take 2 tablets by mouth 2 (Two) Times a Day With Meals., Disp: 360 tablet, Rfl: 3  •  fluticasone (FLONASE) 50 MCG/ACT nasal spray, 2 sprays into the nostril(s) as directed by provider Daily., Disp: 15.8 mL, Rfl: 0  •  hydrALAZINE (APRESOLINE) 25 MG tablet, Take 25 mg by mouth 2 (Two) Times a Day., Disp: , Rfl:   •  Multiple Vitamins-Minerals (MULTIVITAMIN ADULT PO), Take 1 tablet by mouth Daily., Disp: , Rfl:   •  potassium chloride (MICRO-K) 10 MEQ CR capsule, TAKE 2 CAPSULES BY MOUTH TWICE A DAY, Disp: 120 capsule, Rfl: 2  •  valsartan-hydrochlorothiazide (DIOVAN-HCT) 320-25 MG per tablet, Take 1 tablet by mouth Every Morning., Disp: , Rfl:      Objective     History of Present Illness   The patient is here today for follow-up on type 2 diabetes mellitus, essential hypertension, and hyper cholesterolemia    Review of Systems   Constitutional: Negative for activity change, appetite change, chills and fever.        Mild fatigue.  The patient has gained significant weight over the last year.   HENT:        The patient was recently treated for sinusitis and bronchitis at the urgent care center approximately 3 weeks ago.   Respiratory:        Occasional breakthrough wheezing.  Mild shortness of air with moderate exertion   Cardiovascular: Negative for chest pain, palpitations and leg swelling.   Gastrointestinal: Negative for abdominal pain, blood in stool, constipation and diarrhea.   Genitourinary: Negative.    Musculoskeletal:        Mild osteoarthritic aches and pains   Neurological: Negative.    Hematological: Negative.    Psychiatric/Behavioral: Negative.        Physical Exam   Constitutional: He is oriented to person, place, and time. He appears well-developed and well-nourished.   Overweight   Neck: No JVD present. No thyromegaly present.   Carotid pulses normal   Cardiovascular: Normal  rate, regular rhythm and normal heart sounds.   Pulmonary/Chest: Effort normal.   A few scattered mild expiratory wheezes were heard   Abdominal: Soft. Bowel sounds are normal.   Musculoskeletal:   Mild osteoarthritic changes in multiple joints   Neurological: He is alert and oriented to person, place, and time.   Skin: Skin is warm and dry.   Psychiatric: He has a normal mood and affect.   Nursing note and vitals reviewed.      ASSESSMENT  #1 type 2 diabetes mellitus                     #2 essential hypertension                      #3 hyperlipidemia                     #4 history of lung cancer                       #5 chronic bronchitis    DISCUSSION/SUMMARY   The patient's vital signs are essentially normal.  CMP showed an elevated fasting blood sugar of 128 and elevated ALT of 79.  AST was in the normal range.  The patient has been off and on steroids for chronic bronchitis most recently being several weeks ago.  The patient's diet is high in sugars and starches.  Total cholesterol is 150, triglycerides 216, HDL cholesterol 40 and LDL cholesterol is 67.  Hemoglobin A1c is elevated at 7.30%.  Hopefully his elevated hemoglobin A1c is mostly due to to his being on steroids for quite frequently for his lungs.    The patient was tried on both immediate release metformin and extended release Metformin in the past and they both caused him to have GI upset.  The patient would like to work hard on his diet and exercise before thinking about any new diabetic medications.    I am going to have the patient come back in about 6 weeks for a lab only hepatic function panel because of his elevated ALT.  Recheck in 3-4 months with fasting labs.    PLAN  Lab only, nonfasting hepatic function panel in approximately 6 weeks.  Recheck in 3 to 4 months with fasting CMP, lipid panel and hemoglobin A1c  No Follow-up on file.

## 2019-06-28 RX ORDER — AMLODIPINE BESYLATE 10 MG/1
TABLET ORAL
Qty: 90 TABLET | Refills: 1 | Status: SHIPPED | OUTPATIENT
Start: 2019-06-28 | End: 2019-10-23 | Stop reason: SDUPTHER

## 2019-07-17 ENCOUNTER — HOSPITAL ENCOUNTER (OUTPATIENT)
Dept: CT IMAGING | Facility: HOSPITAL | Age: 64
Discharge: HOME | End: 2019-07-17
Attending: THORACIC SURGERY (CARDIOTHORACIC VASCULAR SURGERY) | Admitting: THORACIC SURGERY (CARDIOTHORACIC VASCULAR SURGERY)

## 2019-07-17 DIAGNOSIS — C34.92 SQUAMOUS CELL CARCINOMA OF LEFT LUNG (HCC): ICD-10-CM

## 2019-07-17 DIAGNOSIS — Z09 FOLLOW-UP EXAMINATION FOLLOWING SURGERY: ICD-10-CM

## 2019-07-17 LAB — CREAT BLDA-MCNC: 0.8 MG/DL (ref 0.6–1.3)

## 2019-07-17 PROCEDURE — 71260 CT THORAX DX C+: CPT

## 2019-07-17 PROCEDURE — 82565 ASSAY OF CREATININE: CPT

## 2019-07-17 PROCEDURE — 25010000002 IOPAMIDOL 61 % SOLUTION: Performed by: THORACIC SURGERY (CARDIOTHORACIC VASCULAR SURGERY)

## 2019-07-17 RX ADMIN — IOPAMIDOL 85 ML: 612 INJECTION, SOLUTION INTRAVENOUS at 10:46

## 2019-07-24 ENCOUNTER — OFFICE VISIT (OUTPATIENT)
Dept: SURGERY | Facility: CLINIC | Age: 64
End: 2019-07-24

## 2019-07-24 VITALS
HEIGHT: 68 IN | BODY MASS INDEX: 39.56 KG/M2 | OXYGEN SATURATION: 95 % | HEART RATE: 69 BPM | DIASTOLIC BLOOD PRESSURE: 86 MMHG | SYSTOLIC BLOOD PRESSURE: 128 MMHG | WEIGHT: 261 LBS

## 2019-07-24 DIAGNOSIS — Z09 FOLLOW-UP EXAMINATION FOLLOWING SURGERY: ICD-10-CM

## 2019-07-24 DIAGNOSIS — C34.92 SQUAMOUS CELL CARCINOMA OF LEFT LUNG (HCC): Primary | ICD-10-CM

## 2019-07-24 PROCEDURE — 99213 OFFICE O/P EST LOW 20 MIN: CPT | Performed by: NURSE PRACTITIONER

## 2019-07-24 NOTE — PROGRESS NOTES
Subjective   Patient ID: Alexy Ram is a 64 y.o. male is here today for follow-up.    History of Present Illness  Dear Colleague,  Alexy Ram was seen in our office today for continued follow up and surveillance after undergoing a left thoracotomy with lower lobectomy by Dr. 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   Mr. Ram has been doing well since he was last seen.  His chest wall pain and numbness has nearly completely resolved.  He does get short of air with exertion, but this is his baseline.  The patient denies a cough, hemoptysis, wheezing, fever, chills.  He has no hoarseness or change in his voice.  He has gained weight since his surgery.   The following portions of the patient's history were reviewed and updated as appropriate: allergies, current medications, past family history, past medical history, past social history, past surgical history and problem list.  Review of Systems   Constitution: Negative.   HENT: Negative.    Eyes: Negative.    Cardiovascular: Negative.    Respiratory: Positive for shortness of breath.    Endocrine: Negative.    Hematologic/Lymphatic: Negative.    Skin: Negative.    Musculoskeletal: Negative.    Gastrointestinal: Negative.    Genitourinary: Negative.    Neurological: Negative.    Psychiatric/Behavioral: Negative.    Allergic/Immunologic: Negative.      Patient Active Problem List   Diagnosis   • Hypercholesterolemia   • Hypertension   • Type 2 diabetes mellitus (CMS/HCC)   • Hemoptysis   • Squamous cell carcinoma of left lung (CMS/HCC)   • Acute bronchitis with bronchospasm   • Status post lobectomy of lung   • Elevated liver enzymes     Past Medical History:   Diagnosis Date   • Arthritis    • At risk for obstructive sleep apnea 11/19/2018   • At risk for sleep apnea     5   • Hyperlipidemia    • Hypertension    • Hypoglycemia    • Hypoglycemia    • Lesion of lung     ON RECENT SCAN...   • Lung cancer  (CMS/HCC)    • Pacemaker    • Pneumonia     2018   • Second degree AV block    • Sinus node dysfunction (CMS/HCC)      Past Surgical History:   Procedure Laterality Date   • BRONCHOSCOPY N/A 10/11/2018    Procedure: BRONCHOSCOPY WITH FLUORO, LEFT LOWER LOBE BRUSHINGS WET AND DRY. WITH BX'S AND BAL (IN LLL).;  Surgeon: Akil Ortiz MD;  Location: [facility] ENDOSCOPY;  Service: Pulmonary   • KNEE SURGERY N/A    • PACEMAKER IMPLANTATION  ,    • THORACOSCOPY Left 2018    Procedure: BRONCHOSCOPY, DAVINCI ROBOT ASSISTED VIDEIO ASSISTED THORACOSCOPY  WITH CONVERT TO OPEN THORACOTOMY,LEFT LOWER LOBECTOMY,INTERCOSTAL NERVE BLOCK;  Surgeon: Michael Ring III, MD;  Location: [facility] MAIN OR;  [facility]     Family History   Problem Relation Age of Onset   • Diabetes Mother    • Stroke Father    • Heart disease Father    • Stroke Sister    • Heart disease Brother    • Malig Hyperthermia Neg Hx      Social History     Socioeconomic History   • Marital status:      Spouse name: Not on file   • Number of children: Not on file   • Years of education: Not on file   • Highest education level: Not on file   Tobacco Use   • Smoking status: Former Smoker     Packs/day: 1.00     Years: 33.00     Pack years: 33.00     Types: Cigarettes     Last attempt to quit: 2018     Years since quittin.5   • Smokeless tobacco: Never Used   • Tobacco comment: started cigars 3-4 daily in 2018 and has quit a month ago   Substance and Sexual Activity   • Alcohol use: Yes     Comment:  18 beers monthly   • Drug use: No   • Sexual activity: Defer       Current Outpatient Medications:   •  albuterol sulfate  (90 Base) MCG/ACT inhaler, Inhale 2 puffs Every 4 (Four) Hours As Needed for Wheezing., Disp: 1 inhaler, Rfl: 0  •  amLODIPine (NORVASC) 10 MG tablet, TAKE 1 TABLET BY MOUTH EVERY DAY, Disp: 90 tablet, Rfl: 1  •  aspirin 325 MG tablet, Take 325 mg by mouth Daily., Disp: , Rfl:   •  atorvastatin  (LIPITOR) 40 MG tablet, Take 1 tablet by mouth Every Night., Disp: 90 tablet, Rfl: 3  •  carvedilol (COREG) 25 MG tablet, Take 2 tablets by mouth 2 (Two) Times a Day With Meals., Disp: 360 tablet, Rfl: 3  •  hydrALAZINE (APRESOLINE) 25 MG tablet, Take 25 mg by mouth 2 (Two) Times a Day., Disp: , Rfl:   •  Multiple Vitamins-Minerals (MULTIVITAMIN ADULT PO), Take 1 tablet by mouth Daily., Disp: , Rfl:   •  potassium chloride (MICRO-K) 10 MEQ CR capsule, TAKE 2 CAPSULES BY MOUTH TWICE A DAY, Disp: 120 capsule, Rfl: 2  •  valsartan-hydrochlorothiazide (DIOVAN-HCT) 320-25 MG per tablet, Take 1 tablet by mouth Every Morning., Disp: , Rfl:   •  fluticasone (FLONASE) 50 MCG/ACT nasal spray, 2 sprays into the nostril(s) as directed by provider Daily., Disp: 15.8 mL, Rfl: 0  No Known Allergies     Objective   Vitals:    07/24/19 1347   BP: 128/86   Pulse: 69   SpO2: 95%     Physical Exam   Constitutional: He is oriented to person, place, and time. He appears well-developed and well-nourished. No distress.   HENT:   Head: Normocephalic and atraumatic.   Eyes: Conjunctivae and EOM are normal. No scleral icterus.   Neck: Normal range of motion. Neck supple. No JVD present. No tracheal deviation present.   Cardiovascular: Normal rate, regular rhythm, normal heart sounds and intact distal pulses.   No murmur heard.  Pulmonary/Chest: Effort normal and breath sounds normal. No stridor. No respiratory distress. He has no wheezes. He has no rales.   Surgical incisions have healed nicely.   Abdominal: Soft. Bowel sounds are normal. He exhibits no mass.   Musculoskeletal: Normal range of motion. He exhibits no edema.   Lymphadenopathy:     He has no cervical adenopathy.   Neurological: He is alert and oriented to person, place, and time.   Skin: Skin is warm and dry. Capillary refill takes less than 2 seconds. He is not diaphoretic.   Psychiatric: He has a normal mood and affect. His behavior is normal. Judgment and thought content  normal.   Nursing note and vitals reviewed.    Independent Review of Radiographic Studies: I personally reviewed the CT of the chest performed with contrast on 7/17/2019.  There is no acute pulmonary disease.  There is no new nodule or evidence of recurrent or metastatic disease.  Small mediastinal and hilar lymph nodes are stable.  There are expected postoperative findings including volume loss of the left hemothorax and hyper aeration of the left upper lobe.  There is some atelectasis adjacent to the suture line and a small left-sided pleural effusion as well as healing left right rib fractures.    Assessment/Plan     Assessment: Mr. Ram has returned to his normal activities and has been doing well since he was last seen in our office.  CT of the chest is stable with no evidence of new, recurrent or metastatic disease.    Plan: We will continue our surveillance with a follow-up CT of the chest with contrast in 6 months.  We will set the patient up to see Dr. Ring at that time.  Thank you for allowing us to participate in the care of Alexy Ram.  We will continue to keep you informed of his progress.    Diagnoses and all orders for this visit:    Squamous cell carcinoma of left lung (CMS/HCC)  -     CT Chest With Contrast; Future    Follow-up examination following surgery

## 2019-07-29 RX ORDER — POTASSIUM CHLORIDE 750 MG/1
CAPSULE, EXTENDED RELEASE ORAL
Qty: 360 CAPSULE | Refills: 0 | Status: SHIPPED | OUTPATIENT
Start: 2019-07-29 | End: 2019-10-27 | Stop reason: SDUPTHER

## 2019-08-05 DIAGNOSIS — E11.9 TYPE 2 DIABETES MELLITUS WITHOUT COMPLICATION, WITHOUT LONG-TERM CURRENT USE OF INSULIN (HCC): ICD-10-CM

## 2019-08-05 DIAGNOSIS — E78.00 HYPERCHOLESTEROLEMIA: ICD-10-CM

## 2019-08-06 LAB
ALBUMIN SERPL-MCNC: 4.2 G/DL (ref 3.5–5.2)
ALBUMIN/GLOB SERPL: 1.6 G/DL
ALP SERPL-CCNC: 85 U/L (ref 39–117)
ALT SERPL-CCNC: 101 U/L (ref 1–41)
AST SERPL-CCNC: 44 U/L (ref 1–40)
BILIRUB SERPL-MCNC: 0.4 MG/DL (ref 0.2–1.2)
BUN SERPL-MCNC: 15 MG/DL (ref 8–23)
BUN/CREAT SERPL: 17.4 (ref 7–25)
CALCIUM SERPL-MCNC: 9.4 MG/DL (ref 8.6–10.5)
CHLORIDE SERPL-SCNC: 99 MMOL/L (ref 98–107)
CHOLEST SERPL-MCNC: 145 MG/DL (ref 0–200)
CO2 SERPL-SCNC: 33.1 MMOL/L (ref 22–29)
CREAT SERPL-MCNC: 0.86 MG/DL (ref 0.76–1.27)
GLOBULIN SER CALC-MCNC: 2.6 GM/DL
GLUCOSE SERPL-MCNC: 143 MG/DL (ref 65–99)
HBA1C MFR BLD: 7.4 % (ref 4.8–5.6)
HDLC SERPL-MCNC: 37 MG/DL (ref 40–60)
LDLC SERPL CALC-MCNC: 79 MG/DL (ref 0–100)
LDLC/HDLC SERPL: 2.14 {RATIO}
MICROALBUMIN UR-MCNC: 818.1 UG/ML
POTASSIUM SERPL-SCNC: 4.3 MMOL/L (ref 3.5–5.2)
PROT SERPL-MCNC: 6.8 G/DL (ref 6–8.5)
SODIUM SERPL-SCNC: 142 MMOL/L (ref 136–145)
TRIGL SERPL-MCNC: 145 MG/DL (ref 0–150)
VLDLC SERPL CALC-MCNC: 29 MG/DL

## 2019-10-04 ENCOUNTER — TELEPHONE (OUTPATIENT)
Dept: FAMILY MEDICINE CLINIC | Facility: CLINIC | Age: 64
End: 2019-10-04

## 2019-10-23 DIAGNOSIS — R74.8 ELEVATED LIVER ENZYMES: ICD-10-CM

## 2019-10-23 DIAGNOSIS — E11.9 TYPE 2 DIABETES MELLITUS WITHOUT COMPLICATION, WITHOUT LONG-TERM CURRENT USE OF INSULIN (HCC): Primary | ICD-10-CM

## 2019-10-23 RX ORDER — AMLODIPINE BESYLATE 10 MG/1
10 TABLET ORAL DAILY
Qty: 90 TABLET | Refills: 1 | Status: SHIPPED | OUTPATIENT
Start: 2019-10-23 | End: 2020-04-27

## 2019-10-24 LAB
ALBUMIN SERPL-MCNC: 4.3 G/DL (ref 3.5–5.2)
ALBUMIN/GLOB SERPL: 1.7 G/DL
ALP SERPL-CCNC: 82 U/L (ref 39–117)
ALT SERPL-CCNC: 85 U/L (ref 1–41)
AST SERPL-CCNC: 39 U/L (ref 1–40)
BILIRUB SERPL-MCNC: 0.5 MG/DL (ref 0.2–1.2)
BUN SERPL-MCNC: 16 MG/DL (ref 8–23)
BUN/CREAT SERPL: 18.8 (ref 7–25)
CALCIUM SERPL-MCNC: 9.2 MG/DL (ref 8.6–10.5)
CHLORIDE SERPL-SCNC: 100 MMOL/L (ref 98–107)
CO2 SERPL-SCNC: 29.9 MMOL/L (ref 22–29)
CREAT SERPL-MCNC: 0.85 MG/DL (ref 0.76–1.27)
GLOBULIN SER CALC-MCNC: 2.6 GM/DL
GLUCOSE SERPL-MCNC: 173 MG/DL (ref 65–99)
HBA1C MFR BLD: 7.7 % (ref 4.8–5.6)
POTASSIUM SERPL-SCNC: 3.9 MMOL/L (ref 3.5–5.2)
PROT SERPL-MCNC: 6.9 G/DL (ref 6–8.5)
SODIUM SERPL-SCNC: 143 MMOL/L (ref 136–145)

## 2019-10-28 RX ORDER — POTASSIUM CHLORIDE 750 MG/1
CAPSULE, EXTENDED RELEASE ORAL
Qty: 360 CAPSULE | Refills: 0 | Status: SHIPPED | OUTPATIENT
Start: 2019-10-28 | End: 2020-01-29

## 2019-11-06 ENCOUNTER — OFFICE VISIT (OUTPATIENT)
Dept: FAMILY MEDICINE CLINIC | Facility: CLINIC | Age: 64
End: 2019-11-06

## 2019-11-06 VITALS
DIASTOLIC BLOOD PRESSURE: 74 MMHG | WEIGHT: 264.4 LBS | RESPIRATION RATE: 16 BRPM | HEART RATE: 59 BPM | TEMPERATURE: 97.8 F | OXYGEN SATURATION: 98 % | HEIGHT: 68 IN | BODY MASS INDEX: 40.07 KG/M2 | SYSTOLIC BLOOD PRESSURE: 124 MMHG

## 2019-11-06 DIAGNOSIS — I10 ESSENTIAL HYPERTENSION: Primary | ICD-10-CM

## 2019-11-06 DIAGNOSIS — R74.8 ELEVATED LIVER ENZYMES: ICD-10-CM

## 2019-11-06 DIAGNOSIS — C34.92 SQUAMOUS CELL CARCINOMA OF LEFT LUNG (HCC): ICD-10-CM

## 2019-11-06 DIAGNOSIS — E11.9 TYPE 2 DIABETES MELLITUS WITHOUT COMPLICATION, WITHOUT LONG-TERM CURRENT USE OF INSULIN (HCC): ICD-10-CM

## 2019-11-06 PROCEDURE — 90674 CCIIV4 VAC NO PRSV 0.5 ML IM: CPT | Performed by: INTERNAL MEDICINE

## 2019-11-06 PROCEDURE — 99214 OFFICE O/P EST MOD 30 MIN: CPT | Performed by: INTERNAL MEDICINE

## 2019-11-06 PROCEDURE — 90471 IMMUNIZATION ADMIN: CPT | Performed by: INTERNAL MEDICINE

## 2019-11-06 RX ORDER — TIOTROPIUM BROMIDE INHALATION SPRAY 3.12 UG/1
SPRAY, METERED RESPIRATORY (INHALATION)
Refills: 3 | COMMUNITY
Start: 2019-09-08 | End: 2022-01-17 | Stop reason: SDUPTHER

## 2019-11-06 NOTE — PROGRESS NOTES
Ankush Ram is a 64 y.o. male. Patient is here today for follow-up on his hypertension and diabetes mellitus type 2.  He also has a history of lung cancer and seems to be doing okay.  He also has had some elevated liver tests and a history of hypokalemia.  He is generally feeling well and has no acute complaints aside from some dependent edema controlled by compression stockings  Chief Complaint   Patient presents with   • Diabetes   • Hypertension          Vitals:    19 0758   BP: 124/74   Pulse: 59   Resp: 16   Temp: 97.8 °F (36.6 °C)   SpO2: 98%     Body mass index is 40.21 kg/m².  The following portions of the patient's history were reviewed and updated as appropriate: allergies, current medications, past family history, past medical history, past social history, past surgical history and problem list.    Past Medical History:   Diagnosis Date   • Arthritis    • At risk for obstructive sleep apnea 2018   • At risk for sleep apnea     5   • Hyperlipidemia    • Hypertension    • Hypoglycemia    • Hypoglycemia    • Lesion of lung     ON RECENT SCAN...   • Lung cancer (CMS/HCC)    • Pacemaker    • Pneumonia     2018   • Second degree AV block    • Sinus node dysfunction (CMS/HCC)       No Known Allergies   Social History     Socioeconomic History   • Marital status:      Spouse name: Not on file   • Number of children: Not on file   • Years of education: Not on file   • Highest education level: Not on file   Tobacco Use   • Smoking status: Former Smoker     Packs/day: 1.00     Years: 33.00     Pack years: 33.00     Types: Cigarettes     Last attempt to quit: 2018     Years since quittin.8   • Smokeless tobacco: Never Used   • Tobacco comment: started cigars 3-4 daily in 2018 and has quit a month ago   Substance and Sexual Activity   • Alcohol use: Yes     Comment:  18 beers monthly   • Drug use: No   • Sexual activity: Defer        Current Outpatient Medications:   •   albuterol sulfate  (90 Base) MCG/ACT inhaler, Inhale 2 puffs Every 4 (Four) Hours As Needed for Wheezing., Disp: 1 inhaler, Rfl: 0  •  amLODIPine (NORVASC) 10 MG tablet, Take 1 tablet by mouth Daily., Disp: 90 tablet, Rfl: 1  •  aspirin 325 MG tablet, Take 325 mg by mouth Daily., Disp: , Rfl:   •  atorvastatin (LIPITOR) 40 MG tablet, Take 1 tablet by mouth Every Night., Disp: 90 tablet, Rfl: 3  •  carvedilol (COREG) 25 MG tablet, Take 2 tablets by mouth 2 (Two) Times a Day With Meals., Disp: 360 tablet, Rfl: 3  •  fluticasone (FLONASE) 50 MCG/ACT nasal spray, 2 sprays into the nostril(s) as directed by provider Daily., Disp: 15.8 mL, Rfl: 0  •  hydrALAZINE (APRESOLINE) 25 MG tablet, Take 25 mg by mouth 2 (Two) Times a Day., Disp: , Rfl:   •  Multiple Vitamins-Minerals (MULTIVITAMIN ADULT PO), Take 1 tablet by mouth Daily., Disp: , Rfl:   •  potassium chloride (MICRO-K) 10 MEQ CR capsule, TAKE 2 CAPSULES BY MOUTH TWICE A DAY, Disp: 360 capsule, Rfl: 0  •  SPIRIVA RESPIMAT 2.5 MCG/ACT aerosol solution inhaler, 2 PUFF DAILY, Disp: , Rfl: 3  •  valsartan-hydrochlorothiazide (DIOVAN-HCT) 320-25 MG per tablet, Take 1 tablet by mouth Every Morning., Disp: , Rfl:   •  Canagliflozin (INVOKANA) 100 MG tablet, Take 100 mg by mouth Daily., Disp: 30 tablet, Rfl: 5     Objective     History of Present Illness     Review of Systems   Constitutional: Negative.    HENT: Negative.    Eyes: Negative.    Respiratory: Negative.    Cardiovascular: Positive for leg swelling.   Gastrointestinal: Negative.    Genitourinary: Negative.    Musculoskeletal: Negative.    Skin: Negative.    Neurological: Negative.    Psychiatric/Behavioral: Negative.        Physical Exam   Constitutional: He is oriented to person, place, and time. He appears well-developed and well-nourished.   Pleasant, cooperative no acute distress, blood pressure 120/80   HENT:   Head: Normocephalic and atraumatic.   Eyes: Conjunctivae are normal. Pupils are equal,  round, and reactive to light. No scleral icterus.   Neck: Normal range of motion. Neck supple.   Cardiovascular: Normal rate, regular rhythm and normal heart sounds.   Pulmonary/Chest: Effort normal and breath sounds normal. No respiratory distress. He has no wheezes. He has no rales.   Musculoskeletal: Normal range of motion. He exhibits edema.   Patient has about 2+ edema little more in the left ankle and right   Neurological: He is alert and oriented to person, place, and time.   Skin: Skin is warm and dry.   Psychiatric: He has a normal mood and affect. His behavior is normal.   Nursing note and vitals reviewed.      ASSESSMENT CMP had an elevated sugar of 173 and ALT of 85 and was otherwise normal.  Urine microalbumin was elevated at 818.  Hemoglobin A1c was high at 7.7  #1-hypertension well controlled  #2-diabetes mellitus type 2 with proteinuria, not optimally controlled  #3-history of lung cancer, asymptomatic  #4-dependent edema, stable  #5-minimal elevation of ALT, isolated and asymptomatic     Problem List Items Addressed This Visit        Cardiovascular and Mediastinum    Hypertension - Primary       Respiratory    Squamous cell carcinoma of left lung (CMS/HCC)    Relevant Medications    SPIRIVA RESPIMAT 2.5 MCG/ACT aerosol solution inhaler       Endocrine    Type 2 diabetes mellitus (CMS/HCC)    Relevant Medications    Canagliflozin (INVOKANA) 100 MG tablet       Other    Elevated liver enzymes          PLAN because of the proteinuria and the diabetes I am going to try the patient on Invokana 100 mg daily.  He is been intolerant of metformin.  The patient received a flu shot today and I want to recheck him in 3 months with a CBC, CMP, lipid panel, hemoglobin A1c, urine microalbumin    There are no Patient Instructions on file for this visit.  Return in about 3 months (around 2/6/2020) for with labs.

## 2019-11-15 ENCOUNTER — TELEPHONE (OUTPATIENT)
Dept: FAMILY MEDICINE CLINIC | Facility: CLINIC | Age: 64
End: 2019-11-15

## 2020-01-08 RX ORDER — CARVEDILOL 25 MG/1
50 TABLET ORAL 2 TIMES DAILY WITH MEALS
Qty: 360 TABLET | Refills: 0 | Status: SHIPPED | OUTPATIENT
Start: 2020-01-08 | End: 2020-04-06

## 2020-01-21 ENCOUNTER — HOSPITAL ENCOUNTER (OUTPATIENT)
Dept: CT IMAGING | Facility: HOSPITAL | Age: 65
Discharge: HOME | End: 2020-01-21
Admitting: NURSE PRACTITIONER

## 2020-01-21 DIAGNOSIS — C34.92 SQUAMOUS CELL CARCINOMA OF LEFT LUNG (HCC): ICD-10-CM

## 2020-01-21 LAB — CREAT BLDA-MCNC: 0.8 MG/DL (ref 0.6–1.3)

## 2020-01-21 PROCEDURE — 25010000002 IOPAMIDOL 61 % SOLUTION: Performed by: NURSE PRACTITIONER

## 2020-01-21 PROCEDURE — 71260 CT THORAX DX C+: CPT

## 2020-01-21 PROCEDURE — 82565 ASSAY OF CREATININE: CPT

## 2020-01-21 RX ADMIN — IOPAMIDOL 75 ML: 612 INJECTION, SOLUTION INTRAVENOUS at 10:42

## 2020-01-28 ENCOUNTER — OFFICE VISIT (OUTPATIENT)
Dept: SURGERY | Facility: CLINIC | Age: 65
End: 2020-01-28

## 2020-01-28 VITALS
WEIGHT: 264 LBS | SYSTOLIC BLOOD PRESSURE: 132 MMHG | DIASTOLIC BLOOD PRESSURE: 86 MMHG | HEART RATE: 75 BPM | OXYGEN SATURATION: 98 % | BODY MASS INDEX: 40.01 KG/M2 | HEIGHT: 68 IN

## 2020-01-28 DIAGNOSIS — Z48.3 AFTERCARE FOLLOWING SURGERY FOR NEOPLASM: ICD-10-CM

## 2020-01-28 DIAGNOSIS — C34.92 SQUAMOUS CELL CARCINOMA OF LEFT LUNG (HCC): Primary | ICD-10-CM

## 2020-01-28 DIAGNOSIS — E11.9 TYPE 2 DIABETES MELLITUS WITHOUT COMPLICATION, WITHOUT LONG-TERM CURRENT USE OF INSULIN (HCC): ICD-10-CM

## 2020-01-28 DIAGNOSIS — E78.00 HYPERCHOLESTEROLEMIA: ICD-10-CM

## 2020-01-28 PROCEDURE — 99213 OFFICE O/P EST LOW 20 MIN: CPT | Performed by: THORACIC SURGERY (CARDIOTHORACIC VASCULAR SURGERY)

## 2020-01-28 NOTE — PROGRESS NOTES
Subjective   Patient ID: Alexy Ram is a 64 y.o. male is here today for follow-up.    History of Present Illness  Dear Colleague,  Alexy Ram was seen in our office today for further follow-up of a robot-assisted left VAT with conversion to open thoracotomy for left lower lobectomy performed November 19, 2018 for squamous cell carcinoma of the lung.  Since his last visit here he has been doing well.  He has shortness of breath with moderate exertion.  He occasionally will have wheezing especially if he has problems with his sinuses.  He has no hemoptysis.  He has no pleuritic pain.  He has no hoarseness or change in his voice.  He has had no unexplained weight loss.    The following portions of the patient's history were reviewed and updated as appropriate: allergies, current medications, past family history, past medical history, past social history, past surgical history and problem list.  Review of Systems   Constitution: Negative.   HENT: Negative.    Eyes: Negative.    Cardiovascular: Negative.    Respiratory: Negative.    Endocrine: Negative.    Hematologic/Lymphatic: Negative.    Skin: Negative.    Musculoskeletal: Negative.    Gastrointestinal: Negative.    Genitourinary: Negative.    Neurological: Negative.    Psychiatric/Behavioral: Negative.    Allergic/Immunologic: Negative.      Patient Active Problem List   Diagnosis   • Hypercholesterolemia   • Hypertension   • Type 2 diabetes mellitus (CMS/HCC)   • Hemoptysis   • Squamous cell carcinoma of left lung (CMS/HCC)   • Acute bronchitis with bronchospasm   • Status post lobectomy of lung   • Elevated liver enzymes     Past Medical History:   Diagnosis Date   • Arthritis    • At risk for obstructive sleep apnea 11/19/2018   • At risk for sleep apnea     5   • Hyperlipidemia    • Hypertension    • Hypoglycemia    • Hypoglycemia    • Lesion of lung     ON RECENT SCAN...   • Lung cancer (CMS/HCC)    • Pacemaker    • Pneumonia     APRIL 2018   • Second  degree AV block    • Sinus node dysfunction (CMS/HCC)      Past Surgical History:   Procedure Laterality Date   • BRONCHOSCOPY N/A 10/11/2018    Procedure: BRONCHOSCOPY WITH FLUORO, LEFT LOWER LOBE BRUSHINGS WET AND DRY. WITH BX'S AND BAL (IN LLL).;  Surgeon: Akil Ortiz MD;  Location: [facility] ENDOSCOPY;  Service: Pulmonary   • KNEE SURGERY N/A    • PACEMAKER IMPLANTATION  ,    • THORACOSCOPY Left 2018    Procedure: BRONCHOSCOPY, DAVINCI ROBOT ASSISTED VIDEIO ASSISTED THORACOSCOPY  WITH CONVERT TO OPEN THORACOTOMY,LEFT LOWER LOBECTOMY,INTERCOSTAL NERVE BLOCK;  Surgeon: Michael Ring III, MD;  Location: [facility] MAIN OR;  Service: [facility]     Family History   Problem Relation Age of Onset   • Diabetes Mother    • Stroke Father    • Heart disease Father    • Stroke Sister    • Heart disease Brother    • Malig Hyperthermia Neg Hx      Social History     Socioeconomic History   • Marital status:      Spouse name: Not on file   • Number of children: Not on file   • Years of education: Not on file   • Highest education level: Not on file   Tobacco Use   • Smoking status: Former Smoker     Packs/day: 1.00     Years: 33.00     Pack years: 33.00     Types: Cigarettes     Last attempt to quit: 2018     Years since quittin.0   • Smokeless tobacco: Never Used   • Tobacco comment: started cigars 3-4 daily in 2018 and has quit a month ago   Substance and Sexual Activity   • Alcohol use: Yes     Comment:  18 beers monthly   • Drug use: No   • Sexual activity: Defer       Current Outpatient Medications:   •  albuterol sulfate  (90 Base) MCG/ACT inhaler, Inhale 2 puffs Every 4 (Four) Hours As Needed for Wheezing., Disp: 1 inhaler, Rfl: 0  •  amLODIPine (NORVASC) 10 MG tablet, Take 1 tablet by mouth Daily., Disp: 90 tablet, Rfl: 1  •  aspirin 325 MG tablet, Take 325 mg by mouth Daily., Disp: , Rfl:   •  atorvastatin (LIPITOR) 40 MG tablet, Take 1 tablet by mouth Every Night., Disp: 90  tablet, Rfl: 3  •  benzonatate (TESSALON) 200 MG capsule, Take 1 capsule by mouth 3 (Three) Times a Day As Needed for Cough., Disp: 20 capsule, Rfl: 0  •  Canagliflozin (INVOKANA) 100 MG tablet, Take 100 mg by mouth Daily., Disp: 30 tablet, Rfl: 5  •  carvedilol (COREG) 25 MG tablet, Take 2 tablets by mouth 2 (Two) Times a Day With Meals., Disp: 360 tablet, Rfl: 0  •  fluticasone (FLONASE) 50 MCG/ACT nasal spray, 2 sprays into the nostril(s) as directed by provider Daily., Disp: 15.8 mL, Rfl: 0  •  fluticasone (FLONASE) 50 MCG/ACT nasal spray, 2 sprays into the nostril(s) as directed by provider Daily., Disp: 1 bottle, Rfl: 0  •  hydrALAZINE (APRESOLINE) 25 MG tablet, Take 25 mg by mouth 2 (Two) Times a Day., Disp: , Rfl:   •  Multiple Vitamins-Minerals (MULTIVITAMIN ADULT PO), Take 1 tablet by mouth Daily., Disp: , Rfl:   •  potassium chloride (MICRO-K) 10 MEQ CR capsule, TAKE 2 CAPSULES BY MOUTH TWICE A DAY, Disp: 360 capsule, Rfl: 0  •  SPIRIVA RESPIMAT 2.5 MCG/ACT aerosol solution inhaler, 2 PUFF DAILY, Disp: , Rfl: 3  •  valsartan-hydrochlorothiazide (DIOVAN-HCT) 320-25 MG per tablet, Take 1 tablet by mouth Every Morning., Disp: , Rfl:   No Known Allergies     Objective   Vitals:    01/28/20 1402   BP: 132/86   Pulse: 75   SpO2: 98%     Physical Exam   Constitutional: He is oriented to person, place, and time. He appears well-developed and well-nourished.   HENT:   Head: Normocephalic.   Eyes: Pupils are equal, round, and reactive to light. Conjunctivae, EOM and lids are normal.   Neck: Trachea normal and normal range of motion. Neck supple. No hepatojugular reflux and no JVD present. Carotid bruit is not present. No thyroid mass and no thyromegaly present.   Cardiovascular: Normal rate, regular rhythm, S1 normal, S2 normal, normal heart sounds and normal pulses.  No extrasystoles are present. PMI is not displaced.   Pulmonary/Chest: Effort normal. He has decreased breath sounds in the left lower field.    Incisions are well-healed.  No subcutaneous masses or nodules.  Chest wall is stable.  Good range of motion in the left shoulder.   Abdominal: Soft. Normal appearance and bowel sounds are normal. He exhibits no mass. There is no hepatosplenomegaly. There is no tenderness. No hernia.   Musculoskeletal: Normal range of motion.   Neurological: He is alert and oriented to person, place, and time. He has normal strength and normal reflexes. No cranial nerve deficit or sensory deficit. He displays a negative Romberg sign.   Skin: Skin is warm, dry and intact.   Psychiatric: He has a normal mood and affect. His speech is normal and behavior is normal. Judgment and thought content normal. Cognition and memory are normal.     Independent Review of Radiographic Studies:    CT of the chest performed January 21, 2020 was independently reviewed and compared to previous CT scans.  There is some increased density in the anterior left lung base that appears to be atelectasis.  There are no new infiltrates nodules or masses.  There are no suspicious hilar or mediastinal lymph nodes.  There is no pleural effusion.  Upper abdomen shows a left renal cyst and some fatty infiltration of the liver.      Assessment/Plan   Assessment:  Patient continues to do well following left lower lobectomy for squamous cell carcinoma.  He shows no evidence of recurrent or metastatic disease.    Plan: Patient will return to our office in 6 months for further surveillance.  He will have a CT of the chest with contrast on his next visit.  I will keep you informed of his progress.  Thank you for allowing us to participate in the care of Mr. Ram    Diagnoses and all orders for this visit:    Squamous cell carcinoma of left lung (CMS/HCC)  -     CT Chest With Contrast; Future    Aftercare following surgery for neoplasm  -     CT Chest With Contrast; Future

## 2020-01-29 RX ORDER — POTASSIUM CHLORIDE 750 MG/1
CAPSULE, EXTENDED RELEASE ORAL
Qty: 360 CAPSULE | Refills: 0 | Status: SHIPPED | OUTPATIENT
Start: 2020-01-29 | End: 2020-04-30

## 2020-02-04 LAB
ALBUMIN SERPL-MCNC: 3.9 G/DL (ref 3.5–5.2)
ALBUMIN/GLOB SERPL: 1.8 G/DL
ALP SERPL-CCNC: 70 U/L (ref 39–117)
ALT SERPL-CCNC: 75 U/L (ref 1–41)
AST SERPL-CCNC: 35 U/L (ref 1–40)
BASOPHILS # BLD AUTO: 0.05 10*3/MM3 (ref 0–0.2)
BASOPHILS NFR BLD AUTO: 0.5 % (ref 0–1.5)
BILIRUB SERPL-MCNC: 0.6 MG/DL (ref 0.2–1.2)
BUN SERPL-MCNC: 16 MG/DL (ref 8–23)
BUN/CREAT SERPL: 18.8 (ref 7–25)
CALCIUM SERPL-MCNC: 9 MG/DL (ref 8.6–10.5)
CHLORIDE SERPL-SCNC: 100 MMOL/L (ref 98–107)
CHOLEST SERPL-MCNC: 139 MG/DL (ref 0–200)
CO2 SERPL-SCNC: 26.1 MMOL/L (ref 22–29)
CREAT SERPL-MCNC: 0.85 MG/DL (ref 0.76–1.27)
EOSINOPHIL # BLD AUTO: 0.19 10*3/MM3 (ref 0–0.4)
EOSINOPHIL NFR BLD AUTO: 1.9 % (ref 0.3–6.2)
ERYTHROCYTE [DISTWIDTH] IN BLOOD BY AUTOMATED COUNT: 14.5 % (ref 12.3–15.4)
GLOBULIN SER CALC-MCNC: 2.2 GM/DL
GLUCOSE SERPL-MCNC: 155 MG/DL (ref 65–99)
HBA1C MFR BLD: 8.1 % (ref 4.8–5.6)
HCT VFR BLD AUTO: 47.1 % (ref 37.5–51)
HDLC SERPL-MCNC: 35 MG/DL (ref 40–60)
HGB BLD-MCNC: 15.7 G/DL (ref 13–17.7)
IMM GRANULOCYTES # BLD AUTO: 0.02 10*3/MM3 (ref 0–0.05)
IMM GRANULOCYTES NFR BLD AUTO: 0.2 % (ref 0–0.5)
LDLC SERPL CALC-MCNC: 82 MG/DL (ref 0–100)
LDLC/HDLC SERPL: 2.33 {RATIO}
LYMPHOCYTES # BLD AUTO: 1.78 10*3/MM3 (ref 0.7–3.1)
LYMPHOCYTES NFR BLD AUTO: 17.9 % (ref 19.6–45.3)
MCH RBC QN AUTO: 27.8 PG (ref 26.6–33)
MCHC RBC AUTO-ENTMCNC: 33.3 G/DL (ref 31.5–35.7)
MCV RBC AUTO: 83.5 FL (ref 79–97)
MICROALBUMIN UR-MCNC: 198.5 UG/ML
MONOCYTES # BLD AUTO: 0.77 10*3/MM3 (ref 0.1–0.9)
MONOCYTES NFR BLD AUTO: 7.8 % (ref 5–12)
NEUTROPHILS # BLD AUTO: 7.11 10*3/MM3 (ref 1.7–7)
NEUTROPHILS NFR BLD AUTO: 71.7 % (ref 42.7–76)
NRBC BLD AUTO-RTO: 0 /100 WBC (ref 0–0.2)
PLATELET # BLD AUTO: 280 10*3/MM3 (ref 140–450)
POTASSIUM SERPL-SCNC: 3.4 MMOL/L (ref 3.5–5.2)
PROT SERPL-MCNC: 6.1 G/DL (ref 6–8.5)
RBC # BLD AUTO: 5.64 10*6/MM3 (ref 4.14–5.8)
SODIUM SERPL-SCNC: 140 MMOL/L (ref 136–145)
TRIGL SERPL-MCNC: 112 MG/DL (ref 0–150)
VLDLC SERPL CALC-MCNC: 22.4 MG/DL
WBC # BLD AUTO: 9.92 10*3/MM3 (ref 3.4–10.8)

## 2020-02-07 ENCOUNTER — OFFICE VISIT (OUTPATIENT)
Dept: FAMILY MEDICINE CLINIC | Facility: CLINIC | Age: 65
End: 2020-02-07

## 2020-02-07 VITALS
HEART RATE: 70 BPM | DIASTOLIC BLOOD PRESSURE: 78 MMHG | HEIGHT: 68 IN | BODY MASS INDEX: 39.98 KG/M2 | RESPIRATION RATE: 18 BRPM | SYSTOLIC BLOOD PRESSURE: 118 MMHG | OXYGEN SATURATION: 97 % | TEMPERATURE: 98.2 F | WEIGHT: 263.8 LBS

## 2020-02-07 DIAGNOSIS — I10 ESSENTIAL HYPERTENSION: Primary | ICD-10-CM

## 2020-02-07 DIAGNOSIS — E66.01 CLASS 3 SEVERE OBESITY DUE TO EXCESS CALORIES WITH SERIOUS COMORBIDITY AND BODY MASS INDEX (BMI) OF 40.0 TO 44.9 IN ADULT (HCC): ICD-10-CM

## 2020-02-07 DIAGNOSIS — E78.00 HYPERCHOLESTEROLEMIA: ICD-10-CM

## 2020-02-07 DIAGNOSIS — R74.8 ELEVATED LIVER ENZYMES: ICD-10-CM

## 2020-02-07 DIAGNOSIS — E11.9 TYPE 2 DIABETES MELLITUS WITHOUT COMPLICATION, WITHOUT LONG-TERM CURRENT USE OF INSULIN (HCC): ICD-10-CM

## 2020-02-07 PROBLEM — E66.813 CLASS 3 SEVERE OBESITY DUE TO EXCESS CALORIES WITH BODY MASS INDEX (BMI) OF 40.0 TO 44.9 IN ADULT: Status: ACTIVE | Noted: 2020-02-07

## 2020-02-07 PROCEDURE — 99214 OFFICE O/P EST MOD 30 MIN: CPT | Performed by: INTERNAL MEDICINE

## 2020-02-07 NOTE — PROGRESS NOTES
Ankush Ram is a 64 y.o. male. Patient is here today for follow-up on his hyperlipidemia, hypertension, diabetes mellitus type 2 and history of lung cancer.  The patient is also obese with no weight gain or loss.  Chief Complaint   Patient presents with   • Diabetes     HTN, HYPERCHOLESTEROLEMIA- FOLLOW UP LABS          Vitals:    20 0821   BP: 118/78   Pulse: 70   Resp: 18   Temp: 98.2 °F (36.8 °C)   SpO2: 97%     Body mass index is 40.12 kg/m².  The following portions of the patient's history were reviewed and updated as appropriate: allergies, current medications, past family history, past medical history, past social history, past surgical history and problem list.    Past Medical History:   Diagnosis Date   • Arthritis    • At risk for obstructive sleep apnea 2018   • At risk for sleep apnea     5   • Hyperlipidemia    • Hypertension    • Hypoglycemia    • Hypoglycemia    • Lesion of lung     ON RECENT SCAN...   • Lung cancer (CMS/HCC)    • Pacemaker    • Pneumonia     2018   • Second degree AV block    • Sinus node dysfunction (CMS/HCC)       No Known Allergies   Social History     Socioeconomic History   • Marital status:      Spouse name: Not on file   • Number of children: Not on file   • Years of education: Not on file   • Highest education level: Not on file   Tobacco Use   • Smoking status: Former Smoker     Packs/day: 1.00     Years: 33.00     Pack years: 33.00     Types: Cigarettes     Last attempt to quit: 2018     Years since quittin.1   • Smokeless tobacco: Never Used   • Tobacco comment: started cigars 3-4 daily in 2018 and has quit a month ago   Substance and Sexual Activity   • Alcohol use: Yes     Comment:  18 beers monthly   • Drug use: No   • Sexual activity: Defer        Current Outpatient Medications:   •  albuterol sulfate  (90 Base) MCG/ACT inhaler, Inhale 2 puffs Every 4 (Four) Hours As Needed for Wheezing., Disp: 1 inhaler, Rfl: 0  •   amLODIPine (NORVASC) 10 MG tablet, Take 1 tablet by mouth Daily., Disp: 90 tablet, Rfl: 1  •  aspirin 325 MG tablet, Take 325 mg by mouth Daily., Disp: , Rfl:   •  atorvastatin (LIPITOR) 40 MG tablet, Take 1 tablet by mouth Every Night., Disp: 90 tablet, Rfl: 3  •  carvedilol (COREG) 25 MG tablet, Take 2 tablets by mouth 2 (Two) Times a Day With Meals., Disp: 360 tablet, Rfl: 0  •  fluticasone (FLONASE) 50 MCG/ACT nasal spray, 2 sprays into the nostril(s) as directed by provider Daily., Disp: 1 bottle, Rfl: 0  •  hydrALAZINE (APRESOLINE) 25 MG tablet, Take 25 mg by mouth 2 (Two) Times a Day., Disp: , Rfl:   •  Multiple Vitamins-Minerals (MULTIVITAMIN ADULT PO), Take 1 tablet by mouth Daily., Disp: , Rfl:   •  potassium chloride (MICRO-K) 10 MEQ CR capsule, TAKE 2 CAPSULES BY MOUTH TWICE A DAY, Disp: 360 capsule, Rfl: 0  •  SPIRIVA RESPIMAT 2.5 MCG/ACT aerosol solution inhaler, 2 PUFF DAILY, Disp: , Rfl: 3  •  valsartan-hydrochlorothiazide (DIOVAN-HCT) 320-25 MG per tablet, Take 1 tablet by mouth Every Morning., Disp: , Rfl:   •  Canagliflozin (INVOKANA) 300 MG tablet, Take 300 mg by mouth Daily., Disp: 30 tablet, Rfl: 11     Objective     History of Present Illness     Review of Systems   Constitutional: Negative.    HENT: Negative.    Eyes: Negative.    Respiratory: Negative.    Cardiovascular: Negative.    Gastrointestinal: Negative.    Genitourinary: Negative.    Musculoskeletal: Negative.    Skin: Negative.    Neurological: Negative.    Psychiatric/Behavioral: Negative.        Physical Exam   Constitutional: He is oriented to person, place, and time. He appears well-developed and well-nourished.   Pleasant, cooperative no acute distress but obese, blood pressure 105/70   HENT:   Head: Normocephalic and atraumatic.   Eyes: Pupils are equal, round, and reactive to light. Conjunctivae are normal. No scleral icterus.   Neck: Normal range of motion. Neck supple.   Cardiovascular: Normal rate, regular rhythm and  normal heart sounds.   Pulmonary/Chest: Effort normal and breath sounds normal. No respiratory distress. He has no wheezes. He has no rales.   Musculoskeletal: Normal range of motion. He exhibits no edema.   Neurological: He is alert and oriented to person, place, and time.   Skin: Skin is warm and dry.   Psychiatric: He has a normal mood and affect. His behavior is normal.   Nursing note and vitals reviewed.      ASSESSMENT CBC was essentially normal.  CMP had an elevated but stable sugar of 155, and ALT of 75 and potassium of 3.4.  Lipid panel has a total cholesterol of 139, HDL 35 and LDL of 82.  Hemoglobin A1c is elevated at 8.1.  Urine microalbumin is elevated but significantly improved at 198.  #1-hypertension, controlled  #2-hyperlipidemia, controlled  #3-diabetes mellitus type 2, not optimally controlled  #4-history of lung cancer, asymptomatic  #5-obesity with no weight loss     Problem List Items Addressed This Visit        Cardiovascular and Mediastinum    Hypercholesterolemia    Hypertension - Primary       Endocrine    Type 2 diabetes mellitus (CMS/HCC)    Relevant Medications    Canagliflozin (INVOKANA) 300 MG tablet       Other    Elevated liver enzymes          PLAN I am going to increase the patient's Invokana to 300 mg daily and he will continue on other medicines as now.  I encouraged him to exercise, watch dietary carbs and really try and lose some weight.  I would like to recheck him in 6 months with a CMP, lipid panel, hemoglobin A1c and urine microalbumin and PSA and TSH    There are no Patient Instructions on file for this visit.  Return in about 6 months (around 8/7/2020) for with labs.

## 2020-04-06 RX ORDER — CARVEDILOL 25 MG/1
50 TABLET ORAL 2 TIMES DAILY WITH MEALS
Qty: 360 TABLET | Refills: 0 | Status: SHIPPED | OUTPATIENT
Start: 2020-04-06 | End: 2020-07-06

## 2020-04-14 RX ORDER — ATORVASTATIN CALCIUM 40 MG/1
TABLET, FILM COATED ORAL
Qty: 90 TABLET | Refills: 0 | Status: SHIPPED | OUTPATIENT
Start: 2020-04-14 | End: 2020-07-13

## 2020-04-27 RX ORDER — AMLODIPINE BESYLATE 10 MG/1
TABLET ORAL
Qty: 90 TABLET | Refills: 1 | Status: SHIPPED | OUTPATIENT
Start: 2020-04-27 | End: 2020-10-26

## 2020-04-30 RX ORDER — POTASSIUM CHLORIDE 750 MG/1
CAPSULE, EXTENDED RELEASE ORAL
Qty: 360 CAPSULE | Refills: 0 | Status: SHIPPED | OUTPATIENT
Start: 2020-04-30 | End: 2020-07-21

## 2020-07-06 RX ORDER — CARVEDILOL 25 MG/1
50 TABLET ORAL 2 TIMES DAILY WITH MEALS
Qty: 360 TABLET | Refills: 0 | Status: SHIPPED | OUTPATIENT
Start: 2020-07-06 | End: 2020-10-01

## 2020-07-09 ENCOUNTER — HOSPITAL ENCOUNTER (OUTPATIENT)
Dept: CT IMAGING | Facility: HOSPITAL | Age: 65
Discharge: HOME | End: 2020-07-09
Admitting: THORACIC SURGERY (CARDIOTHORACIC VASCULAR SURGERY)

## 2020-07-09 DIAGNOSIS — C34.92 SQUAMOUS CELL CARCINOMA OF LEFT LUNG (HCC): ICD-10-CM

## 2020-07-09 DIAGNOSIS — Z48.3 AFTERCARE FOLLOWING SURGERY FOR NEOPLASM: ICD-10-CM

## 2020-07-09 PROCEDURE — 82565 ASSAY OF CREATININE: CPT

## 2020-07-09 PROCEDURE — 25010000002 IOPAMIDOL 61 % SOLUTION: Performed by: THORACIC SURGERY (CARDIOTHORACIC VASCULAR SURGERY)

## 2020-07-09 PROCEDURE — 71260 CT THORAX DX C+: CPT

## 2020-07-09 RX ADMIN — IOPAMIDOL 80 ML: 612 INJECTION, SOLUTION INTRAVENOUS at 09:25

## 2020-07-10 LAB — CREAT BLDA-MCNC: 0.7 MG/DL (ref 0.6–1.3)

## 2020-07-13 RX ORDER — ATORVASTATIN CALCIUM 40 MG/1
TABLET, FILM COATED ORAL
Qty: 90 TABLET | Refills: 0 | Status: SHIPPED | OUTPATIENT
Start: 2020-07-13 | End: 2020-10-13

## 2020-07-21 RX ORDER — POTASSIUM CHLORIDE 750 MG/1
TABLET, FILM COATED, EXTENDED RELEASE ORAL
Qty: 360 TABLET | Refills: 0 | Status: SHIPPED | OUTPATIENT
Start: 2020-07-21 | End: 2020-10-27

## 2020-07-22 ENCOUNTER — OFFICE VISIT (OUTPATIENT)
Dept: SURGERY | Facility: CLINIC | Age: 65
End: 2020-07-22

## 2020-07-22 DIAGNOSIS — C34.92 SQUAMOUS CELL CARCINOMA OF LEFT LUNG (HCC): Primary | ICD-10-CM

## 2020-07-22 DIAGNOSIS — Z48.3 AFTERCARE FOLLOWING SURGERY FOR NEOPLASM: ICD-10-CM

## 2020-07-22 PROCEDURE — 99442 PR PHYS/QHP TELEPHONE EVALUATION 11-20 MIN: CPT | Performed by: NURSE PRACTITIONER

## 2020-07-22 NOTE — PROGRESS NOTES
Subjective   Patient ID: Alexy Ram is a 65 y.o. male presents today for follow-up. You have chosen to receive care through a telephone visit. Do you consent to use a telephone visit for your medical care today? Yes. Mr. Ram presents for telephone visit and his wife is on the call with him today.    History of Present Illness  Dear Colleague,  Alexy Ram presented for a telemedicine visit today for continued follow up and surveillance after undergoing a left lower lobectomy for treatment of a squamous cell carcinoma by Dr. Michael Ring on November 19, 2018.  Mr. Ram reports that in the last couple of days he has had increased wheezing and has had to use his albuterol inhaler more frequently. He follows with Dr. Akil Ortiz with [facility] and saw him in June. He believes his pulmonary symptoms is secondary to the heat and poor air quality. The patient denies complaints of fever, chills, pleuritic chest pain, night sweats or voice changes.  He has had no nausea, vomiting or unintentional weight loss.  In fact, he admits to being overweight and having difficulty with weight loss which could also exacerbate his shortness of breath.  The following portions of the patient's history were reviewed and updated as appropriate: allergies, current medications, past family history, past medical history, past social history, past surgical history and problem list.  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   • Lung cancer (CMS/HCC)    • Pacemaker    • Pneumonia     2018   • Second degree AV block    • Sinus node dysfunction (CMS/HCC)      Past Surgical History:   Procedure Laterality Date   • BRONCHOSCOPY N/A 10/11/2018    Procedure: BRONCHOSCOPY WITH FLUORO, LEFT LOWER LOBE BRUSHINGS WET AND DRY. WITH BX'S AND BAL (IN LLL).;  Surgeon: Akil Ortiz MD;  Location: [facility] ENDOSCOPY;  Service: Pulmonary   • KNEE SURGERY N/A    • PACEMAKER IMPLANTATION  ,    • THORACOSCOPY Left 2018    Procedure: BRONCHOSCOPY, DAVINCI ROBOT ASSISTED VIDEIO ASSISTED THORACOSCOPY  WITH CONVERT TO OPEN THORACOTOMY,LEFT LOWER LOBECTOMY,INTERCOSTAL NERVE BLOCK;  Surgeon: Michael Ring III, MD;  Location: [facility] MAIN OR;  Service: [facility]     Family History   Problem Relation Age of Onset   • Diabetes Mother    • Stroke Father    • Heart disease Father    • Stroke Sister    • Heart disease Brother    • Malig Hyperthermia Neg Hx      Social History     Socioeconomic History   • Marital status:      Spouse name: Not on file   • Number of children: Not on file   • Years of education: Not on file   • Highest education level: Not on file   Tobacco Use   • Smoking status: Former Smoker     Packs/day: 1.00     Years: 33.00     Pack years: 33.00     Types: Cigarettes     Last attempt to quit: 2018     Years since quittin.5   • Smokeless tobacco: Never Used   • Tobacco comment: started cigars 3-4 daily in 2018 and has quit a month ago   Substance and Sexual Activity   • Alcohol use: Yes     Comment:  18 beers monthly   • Drug use: No   • Sexual activity: Defer       Current Outpatient Medications:   •  albuterol sulfate  (90 Base) MCG/ACT inhaler, Inhale 2 puffs Every 4 (Four) Hours As Needed for Wheezing., Disp: 1 inhaler, Rfl: 0  •  amLODIPine (NORVASC) 10 MG tablet, TAKE 1 TABLET  BY MOUTH EVERY DAY, Disp: 90 tablet, Rfl: 1  •  amoxicillin (AMOXIL) 875 MG tablet, Take 1 tablet by mouth 2 (Two) Times a Day., Disp: 20 tablet, Rfl: 0  •  aspirin 325 MG tablet, Take 325 mg by mouth Daily., Disp: , Rfl:   •  atorvastatin (LIPITOR) 40 MG tablet, TAKE 1 TABLET BY MOUTH EVERY DAY AT NIGHT, Disp: 90 tablet, Rfl: 0  •  Canagliflozin (INVOKANA) 300 MG tablet, Take 300 mg by mouth Daily., Disp: 30 tablet, Rfl: 11  •  carvedilol (COREG) 25 MG tablet, TAKE 2 TABLETS BY MOUTH 2 (TWO) TIMES A DAY WITH MEALS., Disp: 360 tablet, Rfl: 0  •  fluticasone (FLONASE) 50 MCG/ACT nasal spray, 2 sprays into the nostril(s) as directed by provider Daily., Disp: 1 bottle, Rfl: 0  •  guaiFENesin (MUCINEX) 600 MG 12 hr tablet, Take 2 tablets by mouth 2 (Two) Times a Day., Disp: 40 tablet, Rfl: 0  •  hydrALAZINE (APRESOLINE) 25 MG tablet, Take 25 mg by mouth 2 (Two) Times a Day., Disp: , Rfl:   •  Multiple Vitamins-Minerals (MULTIVITAMIN ADULT PO), Take 1 tablet by mouth Daily., Disp: , Rfl:   •  potassium chloride (K-DUR) 10 MEQ CR tablet, TAKE 2 TABLETS BY MOUTH TWICE A DAY, Disp: 360 tablet, Rfl: 0  •  SPIRIVA RESPIMAT 2.5 MCG/ACT aerosol solution inhaler, 2 PUFF DAILY, Disp: , Rfl: 3  •  valsartan-hydrochlorothiazide (DIOVAN-HCT) 320-25 MG per tablet, Take 1 tablet by mouth Every Morning., Disp: , Rfl:   No Known Allergies     Objective   There were no vitals filed for this visit.  Physical Exam   No vital signs were assessed or physical examination performed secondary to telephone visit.    Independent Review of Radiographic Studies: I personally reviewed the CT of the chest performed with IV contrast and super dimension protocol on 7/9/2020.  There are expected postoperative changes status post lobectomy.  No evidence of new, recurrent or metastatic disease within the chest.  There is some diffuse fatty liver.    Assessment/Plan   Assessment: Mr. Ram's CT of the chest is stable with no evidence of acute pulmonary  process, new, recurrent or metastatic disease within the chest.  He does exhibit some worsening shortness of breath over the last few days and has been noticing relief with more frequent use of his rescue inhaler - sometimes more frequently than every four hours.    Plan: Recommended he notify his pulmonologist that he is needing to utilize his albuterol inhaler much more frequently in case they would like to make an adjustment in his current treatment regimen.  Patient is stable from a surgical standpoint and we will plan a surveillance CT in 6 months with a follow-up appointment in our office at that time.  Thank you for allowing us to participate in the care of Alexy Ram.  We will continue to keep you informed of his progress.    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

## 2020-08-03 DIAGNOSIS — Z12.5 SPECIAL SCREENING FOR MALIGNANT NEOPLASM OF PROSTATE: ICD-10-CM

## 2020-08-03 DIAGNOSIS — E11.9 TYPE 2 DIABETES MELLITUS WITHOUT COMPLICATION, WITHOUT LONG-TERM CURRENT USE OF INSULIN (HCC): ICD-10-CM

## 2020-08-03 DIAGNOSIS — E78.00 HYPERCHOLESTEROLEMIA: ICD-10-CM

## 2020-08-05 ENCOUNTER — RESULTS ENCOUNTER (OUTPATIENT)
Dept: FAMILY MEDICINE CLINIC | Facility: CLINIC | Age: 65
End: 2020-08-05

## 2020-08-05 DIAGNOSIS — E11.9 TYPE 2 DIABETES MELLITUS WITHOUT COMPLICATION, WITHOUT LONG-TERM CURRENT USE OF INSULIN (HCC): ICD-10-CM

## 2020-08-05 DIAGNOSIS — E78.00 HYPERCHOLESTEROLEMIA: ICD-10-CM

## 2020-08-05 DIAGNOSIS — Z12.5 SPECIAL SCREENING FOR MALIGNANT NEOPLASM OF PROSTATE: ICD-10-CM

## 2020-08-06 LAB
ALBUMIN SERPL-MCNC: 4.1 G/DL (ref 3.5–5.2)
ALBUMIN/GLOB SERPL: 2 G/DL
ALP SERPL-CCNC: 76 U/L (ref 39–117)
ALT SERPL-CCNC: 70 U/L (ref 1–41)
AST SERPL-CCNC: 34 U/L (ref 1–40)
BILIRUB SERPL-MCNC: 0.4 MG/DL (ref 0–1.2)
BUN SERPL-MCNC: 19 MG/DL (ref 8–23)
BUN/CREAT SERPL: 22.6 (ref 7–25)
CALCIUM SERPL-MCNC: 8.6 MG/DL (ref 8.6–10.5)
CHLORIDE SERPL-SCNC: 101 MMOL/L (ref 98–107)
CHOLEST SERPL-MCNC: 130 MG/DL (ref 0–200)
CO2 SERPL-SCNC: 29 MMOL/L (ref 22–29)
CREAT SERPL-MCNC: 0.84 MG/DL (ref 0.76–1.27)
GLOBULIN SER CALC-MCNC: 2.1 GM/DL
GLUCOSE SERPL-MCNC: 180 MG/DL (ref 65–99)
HBA1C MFR BLD: 7.9 % (ref 4.8–5.6)
HDLC SERPL-MCNC: 33 MG/DL (ref 40–60)
LDLC SERPL CALC-MCNC: 69 MG/DL (ref 0–100)
LDLC/HDLC SERPL: 2.1 {RATIO}
MICROALBUMIN UR-MCNC: 320.1 UG/ML
POTASSIUM SERPL-SCNC: 3.7 MMOL/L (ref 3.5–5.2)
PROT SERPL-MCNC: 6.2 G/DL (ref 6–8.5)
PSA SERPL-MCNC: 3.39 NG/ML (ref 0–4)
SODIUM SERPL-SCNC: 141 MMOL/L (ref 136–145)
TRIGL SERPL-MCNC: 138 MG/DL (ref 0–150)
TSH SERPL DL<=0.005 MIU/L-ACNC: 2.8 UIU/ML (ref 0.27–4.2)
VLDLC SERPL CALC-MCNC: 27.6 MG/DL

## 2020-08-12 ENCOUNTER — OFFICE VISIT (OUTPATIENT)
Dept: FAMILY MEDICINE CLINIC | Facility: CLINIC | Age: 65
End: 2020-08-12

## 2020-08-12 VITALS
DIASTOLIC BLOOD PRESSURE: 82 MMHG | TEMPERATURE: 97.3 F | HEIGHT: 68 IN | WEIGHT: 266 LBS | OXYGEN SATURATION: 95 % | HEART RATE: 77 BPM | SYSTOLIC BLOOD PRESSURE: 120 MMHG | BODY MASS INDEX: 40.32 KG/M2 | RESPIRATION RATE: 20 BRPM

## 2020-08-12 DIAGNOSIS — E11.9 TYPE 2 DIABETES MELLITUS WITHOUT COMPLICATION, WITHOUT LONG-TERM CURRENT USE OF INSULIN (HCC): ICD-10-CM

## 2020-08-12 DIAGNOSIS — R74.8 ELEVATED LIVER ENZYMES: ICD-10-CM

## 2020-08-12 DIAGNOSIS — I10 ESSENTIAL HYPERTENSION: Primary | ICD-10-CM

## 2020-08-12 DIAGNOSIS — E66.01 CLASS 3 SEVERE OBESITY DUE TO EXCESS CALORIES WITH SERIOUS COMORBIDITY AND BODY MASS INDEX (BMI) OF 40.0 TO 44.9 IN ADULT (HCC): ICD-10-CM

## 2020-08-12 DIAGNOSIS — C34.92 SQUAMOUS CELL CARCINOMA OF LEFT LUNG (HCC): ICD-10-CM

## 2020-08-12 DIAGNOSIS — E78.00 HYPERCHOLESTEROLEMIA: ICD-10-CM

## 2020-08-12 PROCEDURE — 90732 PPSV23 VACC 2 YRS+ SUBQ/IM: CPT | Performed by: INTERNAL MEDICINE

## 2020-08-12 PROCEDURE — G0009 ADMIN PNEUMOCOCCAL VACCINE: HCPCS | Performed by: INTERNAL MEDICINE

## 2020-08-12 PROCEDURE — 99214 OFFICE O/P EST MOD 30 MIN: CPT | Performed by: INTERNAL MEDICINE

## 2020-08-12 NOTE — PROGRESS NOTES
Subjective   Alexy Ram is a 65 y.o. male. Patient is here today for follow-up on his hypertension, hyperlipidemia, diabetes mellitus type 2 and history of lung cancer.  He recently saw Dr. Payne and had a CT scan which shows no sign of recurrence.  He does have a fatty liver but no liver masses and has some obesity.  He is generally been feeling well and has no acute complaints.  He was unable to tolerate the Invokana due to muscle cramps..  Chief Complaint   Patient presents with   • Diabetes     HTN, HYPERCHOLESTEROLEMIA- FOLLOW UP LABS          Vitals:    20 0819   BP: 120/82   Pulse: 77   Resp: 20   Temp: 97.3 °F (36.3 °C)   SpO2: 95%     Body mass index is 40.45 kg/m².  The following portions of the patient's history were reviewed and updated as appropriate: allergies, current medications, past family history, past medical history, past social history, past surgical history and problem list.    Past Medical History:   Diagnosis Date   • Arthritis    • At risk for obstructive sleep apnea 2018   • At risk for sleep apnea     5   • Hyperlipidemia    • Hypertension    • Hypoglycemia    • Hypoglycemia    • Lesion of lung     ON RECENT SCAN...   • Lung cancer (CMS/HCC)    • Pacemaker    • Pneumonia     2018   • Second degree AV block    • Sinus node dysfunction (CMS/HCC)       No Known Allergies   Social History     Socioeconomic History   • Marital status:      Spouse name: Not on file   • Number of children: Not on file   • Years of education: Not on file   • Highest education level: Not on file   Tobacco Use   • Smoking status: Former Smoker     Packs/day: 1.00     Years: 33.00     Pack years: 33.00     Types: Cigarettes     Last attempt to quit: 2018     Years since quittin.6   • Smokeless tobacco: Never Used   • Tobacco comment: started cigars 3-4 daily in 2018 and has quit a month ago   Substance and Sexual Activity   • Alcohol use: Yes     Comment:  18 beers monthly   •  Drug use: No   • Sexual activity: Defer        Current Outpatient Medications:   •  albuterol sulfate  (90 Base) MCG/ACT inhaler, Inhale 2 puffs Every 4 (Four) Hours As Needed for Wheezing., Disp: 1 inhaler, Rfl: 0  •  amLODIPine (NORVASC) 10 MG tablet, TAKE 1 TABLET BY MOUTH EVERY DAY, Disp: 90 tablet, Rfl: 1  •  aspirin 325 MG tablet, Take 325 mg by mouth Daily., Disp: , Rfl:   •  atorvastatin (LIPITOR) 40 MG tablet, TAKE 1 TABLET BY MOUTH EVERY DAY AT NIGHT, Disp: 90 tablet, Rfl: 0  •  carvedilol (COREG) 25 MG tablet, TAKE 2 TABLETS BY MOUTH 2 (TWO) TIMES A DAY WITH MEALS., Disp: 360 tablet, Rfl: 0  •  hydrALAZINE (APRESOLINE) 25 MG tablet, Take 25 mg by mouth 2 (Two) Times a Day., Disp: , Rfl:   •  Multiple Vitamins-Minerals (MULTIVITAMIN ADULT PO), Take 1 tablet by mouth Daily., Disp: , Rfl:   •  potassium chloride (K-DUR) 10 MEQ CR tablet, TAKE 2 TABLETS BY MOUTH TWICE A DAY, Disp: 360 tablet, Rfl: 0  •  SPIRIVA RESPIMAT 2.5 MCG/ACT aerosol solution inhaler, 2 PUFF DAILY, Disp: , Rfl: 3  •  valsartan-hydrochlorothiazide (DIOVAN-HCT) 320-25 MG per tablet, Take 1 tablet by mouth Every Morning., Disp: , Rfl:   •  SITagliptin (Januvia) 100 MG tablet, Take 1 tablet by mouth Daily., Disp: 90 tablet, Rfl: 3     Objective     History of Present Illness     Review of Systems   Constitutional: Negative.    HENT: Negative.    Respiratory: Negative.    Cardiovascular: Negative.    Gastrointestinal: Negative.    Genitourinary: Negative.    Musculoskeletal: Negative.    Skin: Negative.    Neurological: Negative.    Psychiatric/Behavioral: Negative.        Physical Exam   Constitutional: He is oriented to person, place, and time. He appears well-developed and well-nourished.   Pleasant, cooperative no acute distress, blood pressure 110/70   HENT:   Head: Normocephalic and atraumatic.   Eyes: Conjunctivae are normal. No scleral icterus.   Neck: Normal range of motion. Neck supple.   Cardiovascular: Normal rate, regular  rhythm and normal heart sounds.   Pulmonary/Chest: Effort normal and breath sounds normal. No respiratory distress. He has no wheezes. He has no rales.   Musculoskeletal: Normal range of motion. He exhibits no edema.   Neurological: He is alert and oriented to person, place, and time.   Skin: Skin is warm and dry.   Psychiatric: He has a normal mood and affect. His behavior is normal.   Nursing note and vitals reviewed.      ASSESSMENT CMP has a sugar of 180 and ALT stable but elevated at 70 and is otherwise normal and hemoglobin A1c is slightly improved but high at 7.9.  Urine microalbumin was 320.  Somewhat lower.  TSH was normal and PSA is normal.  Lipid panel is controlled with total cholesterol 130, HDL of 33 and LDL 69.  Recent CT scan of the chest shows no signs of recurrence of his squamous cell carcinoma of the lung.  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  He will continue other medicines as now and I would like to recheck him in 3 months with a CMP, hemoglobin A1c and urine microalbumin    There are no Patient Instructions on file for this visit.  Return in about 3 months (around  11/12/2020) for with labs.

## 2020-10-01 RX ORDER — CARVEDILOL 25 MG/1
50 TABLET ORAL 2 TIMES DAILY WITH MEALS
Qty: 360 TABLET | Refills: 3 | Status: SHIPPED | OUTPATIENT
Start: 2020-10-01 | End: 2021-07-28

## 2020-10-13 RX ORDER — ATORVASTATIN CALCIUM 40 MG/1
TABLET, FILM COATED ORAL
Qty: 90 TABLET | Refills: 0 | Status: SHIPPED | OUTPATIENT
Start: 2020-10-13 | End: 2021-01-08

## 2020-10-27 RX ORDER — AMLODIPINE BESYLATE 10 MG/1
TABLET ORAL
Qty: 90 TABLET | Refills: 1 | Status: SHIPPED | OUTPATIENT
Start: 2020-10-27 | End: 2021-04-26

## 2020-10-27 RX ORDER — POTASSIUM CHLORIDE 750 MG/1
TABLET, FILM COATED, EXTENDED RELEASE ORAL
Qty: 360 TABLET | Refills: 0 | Status: SHIPPED | OUTPATIENT
Start: 2020-10-27 | End: 2021-01-19

## 2020-11-05 DIAGNOSIS — E11.9 TYPE 2 DIABETES MELLITUS WITHOUT COMPLICATION, WITHOUT LONG-TERM CURRENT USE OF INSULIN (HCC): Primary | ICD-10-CM

## 2020-11-12 ENCOUNTER — RESULTS ENCOUNTER (OUTPATIENT)
Dept: FAMILY MEDICINE CLINIC | Facility: CLINIC | Age: 65
End: 2020-11-12

## 2020-11-12 DIAGNOSIS — E11.9 TYPE 2 DIABETES MELLITUS WITHOUT COMPLICATION, WITHOUT LONG-TERM CURRENT USE OF INSULIN (HCC): ICD-10-CM

## 2020-11-13 LAB
ALBUMIN SERPL-MCNC: 3.9 G/DL (ref 3.5–5.2)
ALBUMIN/GLOB SERPL: 1.7 G/DL
ALP SERPL-CCNC: 76 U/L (ref 39–117)
ALT SERPL-CCNC: 93 U/L (ref 1–41)
AST SERPL-CCNC: 46 U/L (ref 1–40)
BILIRUB SERPL-MCNC: 0.5 MG/DL (ref 0–1.2)
BUN SERPL-MCNC: 22 MG/DL (ref 8–23)
BUN/CREAT SERPL: 28.2 (ref 7–25)
CALCIUM SERPL-MCNC: 8.8 MG/DL (ref 8.6–10.5)
CHLORIDE SERPL-SCNC: 101 MMOL/L (ref 98–107)
CO2 SERPL-SCNC: 30.8 MMOL/L (ref 22–29)
CREAT SERPL-MCNC: 0.78 MG/DL (ref 0.76–1.27)
GLOBULIN SER CALC-MCNC: 2.3 GM/DL
GLUCOSE SERPL-MCNC: 147 MG/DL (ref 65–99)
HBA1C MFR BLD: 7.8 % (ref 4.8–5.6)
POTASSIUM SERPL-SCNC: 3.4 MMOL/L (ref 3.5–5.2)
PROT SERPL-MCNC: 6.2 G/DL (ref 6–8.5)
SODIUM SERPL-SCNC: 141 MMOL/L (ref 136–145)
UNABLE TO VOID: NORMAL

## 2020-11-18 ENCOUNTER — OFFICE VISIT (OUTPATIENT)
Dept: FAMILY MEDICINE CLINIC | Facility: CLINIC | Age: 65
End: 2020-11-18

## 2020-11-18 VITALS
DIASTOLIC BLOOD PRESSURE: 80 MMHG | TEMPERATURE: 97.5 F | HEIGHT: 68 IN | OXYGEN SATURATION: 97 % | BODY MASS INDEX: 41.31 KG/M2 | SYSTOLIC BLOOD PRESSURE: 130 MMHG | RESPIRATION RATE: 20 BRPM | HEART RATE: 76 BPM | WEIGHT: 272.6 LBS

## 2020-11-18 DIAGNOSIS — E78.00 HYPERCHOLESTEROLEMIA: ICD-10-CM

## 2020-11-18 DIAGNOSIS — E66.01 CLASS 3 SEVERE OBESITY DUE TO EXCESS CALORIES WITH SERIOUS COMORBIDITY AND BODY MASS INDEX (BMI) OF 40.0 TO 44.9 IN ADULT (HCC): ICD-10-CM

## 2020-11-18 DIAGNOSIS — E11.9 TYPE 2 DIABETES MELLITUS WITHOUT COMPLICATION, WITHOUT LONG-TERM CURRENT USE OF INSULIN (HCC): ICD-10-CM

## 2020-11-18 DIAGNOSIS — Z90.2 STATUS POST LOBECTOMY OF LUNG: ICD-10-CM

## 2020-11-18 DIAGNOSIS — I10 ESSENTIAL HYPERTENSION: Primary | ICD-10-CM

## 2020-11-18 DIAGNOSIS — C34.92 SQUAMOUS CELL CARCINOMA OF LEFT LUNG (HCC): ICD-10-CM

## 2020-11-18 PROCEDURE — 99214 OFFICE O/P EST MOD 30 MIN: CPT | Performed by: INTERNAL MEDICINE

## 2020-11-18 RX ORDER — PIOGLITAZONEHYDROCHLORIDE 30 MG/1
30 TABLET ORAL DAILY
Qty: 90 TABLET | Refills: 3 | Status: SHIPPED | OUTPATIENT
Start: 2020-11-18 | End: 2021-11-23 | Stop reason: SDUPTHER

## 2020-11-18 NOTE — PROGRESS NOTES
Ankush Ram is a 65 y.o. male. Patient is here today for follow-up on his hypertension, hyperlipidemia, history of lung cancer, status post lobectomy, obesity and diabetes mellitus type 2.  He is generally been stable but unfortunately has gained some weight.  He has had no chest pain, change in breathing or significant abdominal pains.  Chief Complaint   Patient presents with   • Diabetes     HTN, HYPERCHOLESTEROLEMIA- FOLLOW UP LABS          Vitals:    20 0823   BP: 130/80   Pulse: 76   Resp: 20   Temp: 97.5 °F (36.4 °C)   SpO2: 97%     Body mass index is 41.46 kg/m².  The following portions of the patient's history were reviewed and updated as appropriate: allergies, current medications, past family history, past medical history, past social history, past surgical history and problem list.    Past Medical History:   Diagnosis Date   • Arthritis    • At risk for obstructive sleep apnea 2018   • At risk for sleep apnea     5   • Hyperlipidemia    • Hypertension    • Hypoglycemia    • Hypoglycemia    • Lesion of lung     ON RECENT SCAN...   • Lung cancer (CMS/HCC)    • Pacemaker    • Pneumonia     2018   • Second degree AV block    • Sinus node dysfunction (CMS/HCC)       No Known Allergies   Social History     Socioeconomic History   • Marital status:      Spouse name: Not on file   • Number of children: Not on file   • Years of education: Not on file   • Highest education level: Not on file   Tobacco Use   • Smoking status: Former Smoker     Packs/day: 1.00     Years: 33.00     Pack years: 33.00     Types: Cigarettes     Quit date: 2018     Years since quittin.8   • Smokeless tobacco: Never Used   • Tobacco comment: started cigars 3-4 daily in 2018 and has quit a month ago   Substance and Sexual Activity   • Alcohol use: Yes     Comment:  18 beers monthly   • Drug use: No   • Sexual activity: Defer        Current Outpatient Medications:   •  albuterol sulfate   (90 Base) MCG/ACT inhaler, Inhale 2 puffs Every 4 (Four) Hours As Needed for Wheezing., Disp: 1 inhaler, Rfl: 0  •  amLODIPine (NORVASC) 10 MG tablet, TAKE 1 TABLET BY MOUTH EVERY DAY, Disp: 90 tablet, Rfl: 1  •  aspirin 325 MG tablet, Take 325 mg by mouth Daily., Disp: , Rfl:   •  atorvastatin (LIPITOR) 40 MG tablet, TAKE 1 TABLET BY MOUTH EVERY DAY AT NIGHT, Disp: 90 tablet, Rfl: 0  •  carvedilol (COREG) 25 MG tablet, TAKE 2 TABLETS BY MOUTH 2 (TWO) TIMES A DAY WITH MEALS., Disp: 360 tablet, Rfl: 3  •  hydrALAZINE (APRESOLINE) 25 MG tablet, Take 25 mg by mouth 2 (Two) Times a Day., Disp: , Rfl:   •  Multiple Vitamins-Minerals (MULTIVITAMIN ADULT PO), Take 1 tablet by mouth Daily., Disp: , Rfl:   •  potassium chloride 10 MEQ CR tablet, TAKE 2 TABLETS BY MOUTH TWICE A DAY, Disp: 360 tablet, Rfl: 0  •  SITagliptin (Januvia) 100 MG tablet, Take 1 tablet by mouth Daily., Disp: 90 tablet, Rfl: 3  •  SPIRIVA RESPIMAT 2.5 MCG/ACT aerosol solution inhaler, 2 PUFF DAILY, Disp: , Rfl: 3  •  valsartan-hydrochlorothiazide (DIOVAN-HCT) 320-25 MG per tablet, Take 1 tablet by mouth Every Morning., Disp: , Rfl:   •  pioglitazone (Actos) 30 MG tablet, Take 1 tablet by mouth Daily., Disp: 90 tablet, Rfl: 3     Objective     History of Present Illness     Review of Systems   Constitutional: Negative.    HENT: Negative.    Respiratory: Negative.    Cardiovascular: Negative.    Gastrointestinal: Negative.    Genitourinary: Negative.    Musculoskeletal: Negative.    Skin: Negative.    Neurological: Negative.    Hematological: Negative.    Psychiatric/Behavioral: Negative.        Physical Exam  Vitals signs and nursing note reviewed.   Constitutional:       General: He is not in acute distress.     Appearance: Normal appearance. He is obese. He is not ill-appearing.   HENT:      Head: Normocephalic and atraumatic.   Eyes:      General: No scleral icterus.     Conjunctiva/sclera: Conjunctivae normal.   Neck:      Musculoskeletal: Normal  range of motion and neck supple.   Cardiovascular:      Rate and Rhythm: Normal rate and regular rhythm.      Heart sounds: Normal heart sounds.   Pulmonary:      Effort: Pulmonary effort is normal. No respiratory distress.      Breath sounds: Normal breath sounds. No wheezing, rhonchi or rales.   Musculoskeletal: Normal range of motion.   Skin:     General: Skin is warm and dry.   Neurological:      General: No focal deficit present.      Mental Status: He is alert and oriented to person, place, and time.   Psychiatric:         Mood and Affect: Mood normal.         Behavior: Behavior normal.         ASSESSMENT CMP has a sugar of 147 that is a bit lower, minimally low potassium at 3.4 and an AST mildly elevated at 46 and ALT of 93.  Hemoglobin A1c is high but slightly improved at 7.8.  The patient is intolerant of Metformin and is doing okay on the Januvia.  #1-hypertension, reasonable control on medication  #2-hyperlipidemia, asymptomatic  #3-obesity with weight gain  #4-diabetes mellitus type 2, stable with elevated but slightly improved hemoglobin A1c  #5-mild hypokalemia, on supplement  #6-minimally elevated liver tests, probably related to obesity  #7-history of lung cancer, status post lobectomy and asymptomatic     Problems Addressed this Visit        Cardiovascular and Mediastinum    Hypercholesterolemia    Hypertension - Primary       Respiratory    Squamous cell carcinoma of left lung (CMS/HCC)       Digestive    Class 3 severe obesity due to excess calories with body mass index (BMI) of 40.0 to 44.9 in adult (CMS/HCC)       Endocrine    Type 2 diabetes mellitus (CMS/HCC)    Relevant Medications    pioglitazone (Actos) 30 MG tablet       Other    Status post lobectomy of lung      Diagnoses       Codes Comments    Essential hypertension    -  Primary ICD-10-CM: I10  ICD-9-CM: 401.9     Hypercholesterolemia     ICD-10-CM: E78.00  ICD-9-CM: 272.0     Squamous cell carcinoma of left lung (CMS/HCC)      ICD-10-CM: C34.92  ICD-9-CM: 162.9     Class 3 severe obesity due to excess calories with serious comorbidity and body mass index (BMI) of 40.0 to 44.9 in adult (CMS/[facility])     ICD-10-CM: E66.01, Z68.41  ICD-9-CM: 278.01, V85.41     Type 2 diabetes mellitus without complication, without long-term current use of insulin (CMS/[facility])     ICD-10-CM: E11.9  ICD-9-CM: 250.00     Status post lobectomy of lung     ICD-10-CM: Z90.2  ICD-9-CM: V45.89           PLAN I am adding in pioglitazone 30 mg daily in addition to his Januvia 100 mg daily.  I would like to recheck him in 3 months with a CBC, CMP, lipid panel, hemoglobin A1c    There are no Patient Instructions on file for this visit.  Return in about 3 months (around 2/18/2021) for with labs.

## 2021-01-08 RX ORDER — ATORVASTATIN CALCIUM 40 MG/1
TABLET, FILM COATED ORAL
Qty: 90 TABLET | Refills: 1 | Status: SHIPPED | OUTPATIENT
Start: 2021-01-08 | End: 2021-07-08

## 2021-01-19 RX ORDER — POTASSIUM CHLORIDE 750 MG/1
TABLET, FILM COATED, EXTENDED RELEASE ORAL
Qty: 360 TABLET | Refills: 3 | Status: SHIPPED | OUTPATIENT
Start: 2021-01-19 | End: 2021-11-23 | Stop reason: SDUPTHER

## 2021-01-20 ENCOUNTER — HOSPITAL ENCOUNTER (OUTPATIENT)
Dept: CT IMAGING | Facility: HOSPITAL | Age: 66
Discharge: HOME | End: 2021-01-20
Admitting: NURSE PRACTITIONER

## 2021-01-20 DIAGNOSIS — C34.92 SQUAMOUS CELL CARCINOMA OF LEFT LUNG (HCC): ICD-10-CM

## 2021-01-20 DIAGNOSIS — Z48.3 AFTERCARE FOLLOWING SURGERY FOR NEOPLASM: ICD-10-CM

## 2021-01-20 PROCEDURE — 71250 CT THORAX DX C-: CPT

## 2021-01-26 ENCOUNTER — OFFICE VISIT (OUTPATIENT)
Dept: SURGERY | Facility: CLINIC | Age: 66
End: 2021-01-26

## 2021-01-26 DIAGNOSIS — C34.92 SQUAMOUS CELL CARCINOMA OF LEFT LUNG (HCC): Primary | ICD-10-CM

## 2021-01-26 DIAGNOSIS — Z48.3 AFTERCARE FOLLOWING SURGERY FOR NEOPLASM: ICD-10-CM

## 2021-01-26 PROCEDURE — 99441 PR PHYS/QHP TELEPHONE EVALUATION 5-10 MIN: CPT | Performed by: THORACIC SURGERY (CARDIOTHORACIC VASCULAR SURGERY)

## 2021-01-26 NOTE — PROGRESS NOTES
Subjective   Patient ID: Alexy Ram is a 65 y.o. male is here today for follow-up.    History of Present Illness  Dear Colleague,  Alexy Ram was contacted by telephone today January 26, 2021 and consented to a telemedicine visit.  Patient is being followed for a left lower lobectomy performed November 19, 2018 for squamous cell carcinoma.  Patient has been doing well.  He reports no cough or hemoptysis.  He has no hoarseness or change in his voice.  He has no pleuritic pain.  He does have some shortness of breath with moderate exertion.  He reports no wheezing.  He has had no unexplained weight loss.    The following portions of the patient's history were reviewed and updated as appropriate: allergies, current medications, past family history, past medical history, past social history, past surgical history and problem list.  Review of Systems   Constitution: Negative.   HENT: Negative.    Eyes: Negative.    Cardiovascular: Negative.    Respiratory: Positive for shortness of breath.    Endocrine: Negative.    Hematologic/Lymphatic: Negative.    Skin: Negative.    Musculoskeletal: Negative.    Gastrointestinal: Negative.    Genitourinary: Negative.    Neurological: Negative.    Psychiatric/Behavioral: Negative.      Patient Active Problem List   Diagnosis   • Hypercholesterolemia   • Hypertension   • Type 2 diabetes mellitus (CMS/HCC)   • Hemoptysis   • Squamous cell carcinoma of left lung (CMS/HCC)   • Acute bronchitis with bronchospasm   • Status post lobectomy of lung   • Elevated liver enzymes   • Class 3 severe obesity due to excess calories with body mass index (BMI) of 40.0 to 44.9 in adult (CMS/HCC)     Past Medical History:   Diagnosis Date   • Arthritis    • At risk for obstructive sleep apnea 11/19/2018   • At risk for sleep apnea     5   • Hyperlipidemia    • Hypertension    • Hypoglycemia    • Hypoglycemia    • Lesion of lung     ON RECENT SCAN...   • Lung cancer (CMS/HCC)    • Pacemaker    •  Pneumonia     APRIL 2018   • Second degree AV block    • Sinus node dysfunction (CMS/HCC)      Past Surgical History:   Procedure Laterality Date   • BRONCHOSCOPY N/A 10/11/2018    Procedure: BRONCHOSCOPY WITH FLUORO, LEFT LOWER LOBE BRUSHINGS WET AND DRY. WITH BX'S AND BAL (IN LLL).;  Surgeon: Akil Ortiz MD;  Location: [facility] ENDOSCOPY;  Service: Pulmonary   • KNEE SURGERY N/A    • PACEMAKER IMPLANTATION  2005, 2014   • THORACOSCOPY Left 11/19/2018    Procedure: BRONCHOSCOPY, DAVINCI ROBOT ASSISTED VIDEIO ASSISTED THORACOSCOPY  WITH CONVERT TO OPEN THORACOTOMY,LEFT LOWER LOBECTOMY,INTERCOSTAL NERVE BLOCK;  Surgeon: Michael Ring III, MD;  Location: [facility] MAIN OR;  [facility]     Family History   Problem Relation Age of Onset   • Diabetes Mother    • Stroke Father    • Heart disease Father    • Stroke Sister    • Heart disease Brother    • Malig Hyperthermia Neg Hx      Social History     Socioeconomic History   • Marital status:      Spouse name: Not on file   • Number of children: Not on file   • Years of education: Not on file   • Highest education level: Not on file   Tobacco Use   • Smoking status: Former Smoker     Packs/day: 1.00     Years: 33.00     Pack years: 33.00     Types: Cigarettes     Quit date: 2018     Years since quitting: 3.0   • Smokeless tobacco: Never Used   • Tobacco comment: started cigars 3-4 daily in 4/2018 and has quit a month ago   Substance and Sexual Activity   • Alcohol use: Yes     Comment:  18 beers monthly   • Drug use: No   • Sexual activity: Defer       Current Outpatient Medications:   •  albuterol sulfate  (90 Base) MCG/ACT inhaler, Inhale 2 puffs Every 4 (Four) Hours As Needed for Wheezing., Disp: 1 inhaler, Rfl: 0  •  amLODIPine (NORVASC) 10 MG tablet, TAKE 1 TABLET BY MOUTH EVERY DAY, Disp: 90 tablet, Rfl: 1  •  aspirin 325 MG tablet, Take 325 mg by mouth Daily., Disp: , Rfl:   •  atorvastatin (LIPITOR) 40 MG tablet, TAKE 1 TABLET BY MOUTH  EVERY DAY AT NIGHT, Disp: 90 tablet, Rfl: 1  •  carvedilol (COREG) 25 MG tablet, TAKE 2 TABLETS BY MOUTH 2 (TWO) TIMES A DAY WITH MEALS., Disp: 360 tablet, Rfl: 3  •  hydrALAZINE (APRESOLINE) 25 MG tablet, Take 25 mg by mouth 2 (Two) Times a Day., Disp: , Rfl:   •  Multiple Vitamins-Minerals (MULTIVITAMIN ADULT PO), Take 1 tablet by mouth Daily., Disp: , Rfl:   •  pioglitazone (Actos) 30 MG tablet, Take 1 tablet by mouth Daily., Disp: 90 tablet, Rfl: 3  •  potassium chloride 10 MEQ CR tablet, TAKE 2 TABLETS BY MOUTH TWICE A DAY, Disp: 360 tablet, Rfl: 3  •  SITagliptin (Januvia) 100 MG tablet, Take 1 tablet by mouth Daily., Disp: 90 tablet, Rfl: 3  •  SPIRIVA RESPIMAT 2.5 MCG/ACT aerosol solution inhaler, 2 PUFF DAILY, Disp: , Rfl: 3  •  valsartan-hydrochlorothiazide (DIOVAN-HCT) 320-25 MG per tablet, Take 1 tablet by mouth Every Morning., Disp: , Rfl:   No Known Allergies     Objective   There were no vitals filed for this visit.  Physical Exam    Telemedicine visit      Independent Review of Radiographic Studies:    CT scan of the chest performed January 20 2021 was independently reviewed and compared to previous x-rays.  Postoperative changes of left lower lobectomy in the left chest.  No new infiltrates nodules or masses.  No suspicious hilar or mediastinal lymphadenopathy.  No pleural effusions.  Upper abdomen shows some diffuse hepatic steatosis and a large left renal cyst both of which are unchanged.    Assessment/Plan   Assessment:  Patient continues to do well and shows no evidence of recurrent or metastatic cancer.  We will continue our surveillance in 6 months.    Plan:  Return to the office in 6 months with CT of the chest without contrast.  I will keep you informed of his progress.  Thank you for allowing me to participate in the care of Mr. Ram    I spent 8 minutes talking with the patient on the telephone today.    Diagnoses and all orders for this visit:    Squamous cell carcinoma of left lung  (CMS/HCC)  -     CT Chest Without Contrast; Future    Aftercare following surgery for neoplasm  -     CT Chest Without Contrast; Future

## 2021-02-17 DIAGNOSIS — E11.9 TYPE 2 DIABETES MELLITUS WITHOUT COMPLICATION, WITHOUT LONG-TERM CURRENT USE OF INSULIN (HCC): ICD-10-CM

## 2021-02-17 DIAGNOSIS — E78.00 HYPERCHOLESTEROLEMIA: Primary | ICD-10-CM

## 2021-03-03 ENCOUNTER — OFFICE VISIT (OUTPATIENT)
Dept: FAMILY MEDICINE CLINIC | Facility: CLINIC | Age: 66
End: 2021-03-03

## 2021-03-03 VITALS
HEIGHT: 68 IN | HEART RATE: 76 BPM | WEIGHT: 277 LBS | SYSTOLIC BLOOD PRESSURE: 132 MMHG | TEMPERATURE: 97.3 F | DIASTOLIC BLOOD PRESSURE: 78 MMHG | OXYGEN SATURATION: 96 % | BODY MASS INDEX: 41.98 KG/M2

## 2021-03-03 DIAGNOSIS — E11.9 TYPE 2 DIABETES MELLITUS WITHOUT COMPLICATION, WITHOUT LONG-TERM CURRENT USE OF INSULIN (HCC): ICD-10-CM

## 2021-03-03 DIAGNOSIS — E66.01 CLASS 3 SEVERE OBESITY DUE TO EXCESS CALORIES WITH SERIOUS COMORBIDITY AND BODY MASS INDEX (BMI) OF 40.0 TO 44.9 IN ADULT (HCC): ICD-10-CM

## 2021-03-03 DIAGNOSIS — C34.92 SQUAMOUS CELL CARCINOMA OF LEFT LUNG (HCC): ICD-10-CM

## 2021-03-03 DIAGNOSIS — E78.00 HYPERCHOLESTEROLEMIA: ICD-10-CM

## 2021-03-03 DIAGNOSIS — I10 ESSENTIAL HYPERTENSION: Primary | ICD-10-CM

## 2021-03-03 DIAGNOSIS — J20.9 ACUTE BRONCHITIS WITH BRONCHOSPASM: ICD-10-CM

## 2021-03-03 DIAGNOSIS — Z90.2 STATUS POST LOBECTOMY OF LUNG: ICD-10-CM

## 2021-03-03 PROCEDURE — 99214 OFFICE O/P EST MOD 30 MIN: CPT | Performed by: INTERNAL MEDICINE

## 2021-03-03 NOTE — PROGRESS NOTES
"Chief Complaint  Hypertension (6 mth follow up lab review) and Diabetes    Subjective          Alexy Ram presents to [facility]  History of Present Illness patient is here for follow-up on his hypertension, hyperlipidemia, diabetes mellitus type 2, obesity and history of lung cancer.  He saw Dr. Payne recently and seems to be doing okay.  He has had no chest pain, shortness of breath, edema or myalgias and is signed up for the COVID-19 vaccinations.    Review of Systems   Constitutional: Negative.    HENT: Negative.    Eyes: Negative.    Respiratory: Negative.    Cardiovascular: Negative.    Gastrointestinal: Negative.    Endocrine: Negative.    Genitourinary: Negative.    Musculoskeletal: Negative.    Skin: Negative.    Neurological: Negative.    Hematological: Negative.    Psychiatric/Behavioral: Negative.      Objective   Vital Signs:   /78 (BP Location: Left arm, Patient Position: Sitting, Cuff Size: Large Adult)   Pulse 76   Temp 97.3 °F (36.3 °C) (Temporal)   Ht 172.7 cm (68\")   Wt 126 kg (277 lb)   SpO2 96%   BMI 42.12 kg/m²     Physical Exam  Vitals signs (100/80) and nursing note reviewed.   Constitutional:       General: He is not in acute distress.     Appearance: Normal appearance. He is obese. He is not ill-appearing.   HENT:      Head: Normocephalic and atraumatic.   Eyes:      General: No scleral icterus.     Conjunctiva/sclera: Conjunctivae normal.   Neck:      Musculoskeletal: Normal range of motion and neck supple.   Cardiovascular:      Rate and Rhythm: Normal rate and regular rhythm.      Heart sounds: Normal heart sounds.   Pulmonary:      Effort: Pulmonary effort is normal. No respiratory distress.      Breath sounds: Normal breath sounds. No wheezing or rales.   Musculoskeletal: Normal range of motion.   Skin:     General: Skin is warm and dry.   Neurological:      General: No focal deficit present.      Mental Status: He is alert and oriented " to person, place, and time.   Psychiatric:         Mood and Affect: Mood normal.         Behavior: Behavior normal.        Result Review :                 Assessment and Plan    There are no diagnoses linked to this encounter.  CBC is normal.  CMP has a sugar of 125 that is improved and ALT of 87 that is stable.  Hemoglobin A1c is improved to 7.2 and the patient was off Metformin for a month.  Lipid panel has total cholesterol 128, HDL 35 and LDL 76.  #1-hypertension, well controlled on medications  #2-hyperlipidemia, reasonable control on medications  #3-diabetes mellitus type 2, improved control on medications  #4-obesity with no weight loss, slight weight gain  #5-history of lung cancer, stable and asymptomatic    Follow Up   No follow-ups on file.  I am going to have the patient continue current medicines for now and I would like to recheck him in 4 months with a CMP, lipid panel, hemoglobin A1c    Patient was given instructions and counseling regarding his condition or for health maintenance advice. Please see specific information pulled into the AVS if appropriate.

## 2021-03-18 ENCOUNTER — TELEPHONE (OUTPATIENT)
Dept: FAMILY MEDICINE CLINIC | Facility: CLINIC | Age: 66
End: 2021-03-18

## 2021-03-18 NOTE — TELEPHONE ENCOUNTER
PT WIFE CALLED AND NEEDS A REFERRAL FOR A COLONOSCOPY FOR HER . SHE WOULD LIKE TO MAKE AN APPT. WITH DR. CAMARENA.  PT HAS A APPT. ON 4/1/21 AT 8:30, IN CASE PT NEEDS TO COME IN.    PT #[phone]

## 2021-04-13 ENCOUNTER — TELEPHONE (OUTPATIENT)
Dept: GASTROENTEROLOGY | Facility: CLINIC | Age: 66
End: 2021-04-13

## 2021-04-15 ENCOUNTER — PREP FOR SURGERY (OUTPATIENT)
Dept: OTHER | Facility: HOSPITAL | Age: 66
End: 2021-04-15

## 2021-04-15 DIAGNOSIS — Z12.11 ENCOUNTER FOR SCREENING FOR MALIGNANT NEOPLASM OF COLON: Primary | ICD-10-CM

## 2021-04-15 DIAGNOSIS — Z80.0 FAMILY HISTORY OF COLON CANCER: ICD-10-CM

## 2021-04-19 PROBLEM — Z12.11 ENCOUNTER FOR SCREENING FOR MALIGNANT NEOPLASM OF COLON: Status: ACTIVE | Noted: 2021-04-19

## 2021-04-19 PROBLEM — Z80.0 FAMILY HISTORY OF COLON CANCER: Status: ACTIVE | Noted: 2021-04-19

## 2021-04-19 NOTE — TELEPHONE ENCOUNTER
Wife called.  Need to move his colonoscopy on 10/08/2021.  She unable to get off work.    Rescheduled to [place] on 10/22/2021 at 10:15am - arrive 9am.  Will mail instructions.

## 2021-04-19 NOTE — TELEPHONE ENCOUNTER
CALLED AND SPOKE WITH PATIENT.  SCHEDULED AT [facility] ON 10/08/2021 AT 11:45AM - ARRIVE 10:30AM.  WILL MAIL INSTRUCTIONS.    COVID TEST ON 10/06/2021, WILL CALL WITH TIME.  NEED TO SELF QUARANTINE UNTIL AFTER PROCEDURE.  HE UNDERSTANDS.

## 2021-04-26 RX ORDER — AMLODIPINE BESYLATE 10 MG/1
TABLET ORAL
Qty: 90 TABLET | Refills: 1 | Status: SHIPPED | OUTPATIENT
Start: 2021-04-26 | End: 2021-11-01

## 2021-05-26 ENCOUNTER — TRANSCRIBE ORDERS (OUTPATIENT)
Dept: OBSTETRICS AND GYNECOLOGY | Facility: CLINIC | Age: 66
End: 2021-05-26

## 2021-05-26 DIAGNOSIS — Z01.818 OTHER SPECIFIED PRE-OPERATIVE EXAMINATION: Primary | ICD-10-CM

## 2021-06-15 ENCOUNTER — LAB (OUTPATIENT)
Dept: LAB | Facility: HOSPITAL | Age: 66
End: 2021-06-15

## 2021-06-15 DIAGNOSIS — Z01.818 OTHER SPECIFIED PRE-OPERATIVE EXAMINATION: ICD-10-CM

## 2021-06-15 LAB — SARS-COV-2 RNA PNL SPEC NAA+PROBE: NOT DETECTED

## 2021-06-15 PROCEDURE — C9803 HOPD COVID-19 SPEC COLLECT: HCPCS

## 2021-06-15 PROCEDURE — 87635 SARS-COV-2 COVID-19 AMP PRB: CPT | Performed by: OBSTETRICS & GYNECOLOGY

## 2021-06-16 ENCOUNTER — TRANSCRIBE ORDERS (OUTPATIENT)
Dept: PULMONOLOGY | Facility: HOSPITAL | Age: 66
End: 2021-06-16

## 2021-06-16 DIAGNOSIS — G47.33 OSA (OBSTRUCTIVE SLEEP APNEA): Primary | ICD-10-CM

## 2021-06-17 ENCOUNTER — HOSPITAL ENCOUNTER (OUTPATIENT)
Dept: SLEEP MEDICINE | Facility: HOSPITAL | Age: 66
Discharge: HOME | End: 2021-06-17
Admitting: INTERNAL MEDICINE

## 2021-06-17 DIAGNOSIS — G47.33 OSA (OBSTRUCTIVE SLEEP APNEA): ICD-10-CM

## 2021-06-17 PROCEDURE — 95811 POLYSOM 6/>YRS CPAP 4/> PARM: CPT

## 2021-06-24 DIAGNOSIS — E78.00 HYPERCHOLESTEROLEMIA: ICD-10-CM

## 2021-06-24 DIAGNOSIS — E11.9 TYPE 2 DIABETES MELLITUS WITHOUT COMPLICATION, WITHOUT LONG-TERM CURRENT USE OF INSULIN (HCC): ICD-10-CM

## 2021-07-01 ENCOUNTER — TELEPHONE (OUTPATIENT)
Dept: SLEEP MEDICINE | Facility: HOSPITAL | Age: 66
End: 2021-07-01

## 2021-07-01 NOTE — TELEPHONE ENCOUNTER
Spoke to patient about results, faxed orders to [facility], patient to f/u with Dr Ortiz at [facility]

## 2021-07-07 ENCOUNTER — OFFICE VISIT (OUTPATIENT)
Dept: FAMILY MEDICINE CLINIC | Facility: CLINIC | Age: 66
End: 2021-07-07

## 2021-07-07 VITALS
WEIGHT: 286 LBS | HEIGHT: 68 IN | SYSTOLIC BLOOD PRESSURE: 130 MMHG | TEMPERATURE: 97.7 F | DIASTOLIC BLOOD PRESSURE: 78 MMHG | RESPIRATION RATE: 20 BRPM | HEART RATE: 78 BPM | BODY MASS INDEX: 43.35 KG/M2 | OXYGEN SATURATION: 92 %

## 2021-07-07 DIAGNOSIS — I10 ESSENTIAL HYPERTENSION: Primary | ICD-10-CM

## 2021-07-07 DIAGNOSIS — R74.8 ELEVATED LIVER ENZYMES: ICD-10-CM

## 2021-07-07 DIAGNOSIS — E66.01 CLASS 3 SEVERE OBESITY DUE TO EXCESS CALORIES WITH SERIOUS COMORBIDITY AND BODY MASS INDEX (BMI) OF 40.0 TO 44.9 IN ADULT (HCC): ICD-10-CM

## 2021-07-07 DIAGNOSIS — E78.00 HYPERCHOLESTEROLEMIA: ICD-10-CM

## 2021-07-07 DIAGNOSIS — G47.33 OBSTRUCTIVE SLEEP APNEA OF ADULT: ICD-10-CM

## 2021-07-07 DIAGNOSIS — E11.9 TYPE 2 DIABETES MELLITUS WITHOUT COMPLICATION, WITHOUT LONG-TERM CURRENT USE OF INSULIN (HCC): ICD-10-CM

## 2021-07-07 PROCEDURE — 99214 OFFICE O/P EST MOD 30 MIN: CPT | Performed by: INTERNAL MEDICINE

## 2021-07-07 NOTE — PROGRESS NOTES
Ankush Ram is a 66 y.o. male. Patient is here today for follow-up on his hypertension, hyperlipidemia, diabetes mellitus type 2, obesity with weight gain, elevated liver tests.  Patient's generally been feeling okay and stable.  He is very tired frequently and has had a sleep study and has been diagnosed with obstructive sleep apnea and will be starting on CPAP.  Chief Complaint   Patient presents with   • Hypertension     DIABETES, HYPERCHOLESTEROLEMIA- F/U LABS          Vitals:    07/07/21 0754   BP: 130/78   Pulse: 78   Resp: 20   Temp: 97.7 °F (36.5 °C)   SpO2: 92%     Body mass index is 43.5 kg/m².  The following portions of the patient's history were reviewed and updated as appropriate: allergies, current medications, past family history, past medical history, past social history, past surgical history and problem list.    Past Medical History:   Diagnosis Date   • Arthritis    • At risk for obstructive sleep apnea 11/19/2018   • At risk for sleep apnea     5   • Hyperlipidemia    • Hypertension    • Hypoglycemia    • Hypoglycemia    • Lesion of lung     ON RECENT SCAN...   • Lung cancer (CMS/HCC)    • Pacemaker    • Pneumonia     APRIL 2018   • Second degree AV block    • Sinus node dysfunction (CMS/HCC)       Allergies   Allergen Reactions   • Metformin GI Intolerance      Social History     Socioeconomic History   • Marital status:      Spouse name: Not on file   • Number of children: Not on file   • Years of education: Not on file   • Highest education level: Not on file   Tobacco Use   • Smoking status: Former Smoker     Packs/day: 1.00     Years: 33.00     Pack years: 33.00     Types: Cigarettes     Quit date: 2018     Years since quitting: 3.5   • Smokeless tobacco: Never Used   • Tobacco comment: started cigars 3-4 daily in 4/2018 and has quit a month ago   Substance and Sexual Activity   • Alcohol use: Yes     Comment:  18 beers monthly   • Drug use: No   • Sexual activity:  Defer        Current Outpatient Medications:   •  albuterol sulfate  (90 Base) MCG/ACT inhaler, Inhale 2 puffs Every 4 (Four) Hours As Needed for Wheezing., Disp: 1 inhaler, Rfl: 0  •  amLODIPine (NORVASC) 10 MG tablet, TAKE 1 TABLET BY MOUTH EVERY DAY, Disp: 90 tablet, Rfl: 1  •  aspirin 325 MG tablet, Take 325 mg by mouth Daily., Disp: , Rfl:   •  atorvastatin (LIPITOR) 40 MG tablet, TAKE 1 TABLET BY MOUTH EVERY DAY AT NIGHT, Disp: 90 tablet, Rfl: 1  •  carvedilol (COREG) 25 MG tablet, TAKE 2 TABLETS BY MOUTH 2 (TWO) TIMES A DAY WITH MEALS., Disp: 360 tablet, Rfl: 3  •  hydrALAZINE (APRESOLINE) 25 MG tablet, Take 25 mg by mouth 2 (Two) Times a Day., Disp: , Rfl:   •  Multiple Vitamins-Minerals (MULTIVITAMIN ADULT PO), Take 1 tablet by mouth Daily., Disp: , Rfl:   •  pioglitazone (Actos) 30 MG tablet, Take 1 tablet by mouth Daily., Disp: 90 tablet, Rfl: 3  •  potassium chloride 10 MEQ CR tablet, TAKE 2 TABLETS BY MOUTH TWICE A DAY, Disp: 360 tablet, Rfl: 3  •  SITagliptin (Januvia) 100 MG tablet, Take 1 tablet by mouth Daily., Disp: 90 tablet, Rfl: 3  •  SPIRIVA RESPIMAT 2.5 MCG/ACT aerosol solution inhaler, 2 PUFF DAILY, Disp: , Rfl: 3  •  valsartan-hydrochlorothiazide (DIOVAN-HCT) 320-25 MG per tablet, Take 1 tablet by mouth Every Morning., Disp: , Rfl:      Objective     History of Present Illness     Review of Systems   Constitutional: Positive for fatigue.   HENT: Negative.    Respiratory: Negative.    Cardiovascular: Negative.  Negative for chest pain.   Gastrointestinal: Negative.    Endocrine: Positive for polyuria. Negative for polydipsia and polyphagia.   Genitourinary: Negative.    Musculoskeletal: Negative.    Skin: Negative.  Negative for pallor.   Neurological: Negative.  Negative for dizziness, tremors, seizures, speech difficulty, weakness and headaches.   Hematological: Negative.    Psychiatric/Behavioral: Negative.  Negative for confusion. The patient is not nervous/anxious.        Physical  Exam  Vitals (105/70) and nursing note reviewed.   Constitutional:       General: He is not in acute distress.     Appearance: Normal appearance. He is obese. He is not ill-appearing.   HENT:      Head: Normocephalic and atraumatic.   Eyes:      General: No scleral icterus.     Conjunctiva/sclera: Conjunctivae normal.   Cardiovascular:      Rate and Rhythm: Normal rate and regular rhythm.      Heart sounds: Normal heart sounds.   Pulmonary:      Effort: Pulmonary effort is normal. No respiratory distress.      Breath sounds: Normal breath sounds. No wheezing or rales.   Musculoskeletal:         General: Normal range of motion.      Cervical back: Normal range of motion and neck supple.   Skin:     General: Skin is warm and dry.   Neurological:      General: No focal deficit present.      Mental Status: He is alert and oriented to person, place, and time.   Psychiatric:         Mood and Affect: Mood normal.         Behavior: Behavior normal.         ASSESSMENT CMP has a sugar of 132, potassium minimally low at 3.4 and ALT of 56 that are stable.  Hemoglobin A1c is improved to 6.5.  Lipid panel is total cholesterol 143, HDL low at 32 but LDL of 90.  #1-hypertension, well controlled on medication  #2-hyperlipidemia, controlled on medication  #3-obesity with 9 pound weight gain  #4-diabetes mellitus type 2, stable with improved acceptable hemoglobin A1c  #5-obstructive sleep apnea, starting CPAP     Problems Addressed this Visit        Cardiac and Vasculature    Hypercholesterolemia    Hypertension - Primary       Endocrine and Metabolic    Type 2 diabetes mellitus (CMS/[facility])    Relevant Medications    SITagliptin (Januvia) 100 MG tablet    Other Relevant Orders    Ambulatory Referral to Diabetic Education    Class 3 severe obesity due to excess calories with body mass index (BMI) of 40.0 to 44.9 in adult (CMS/[facility])    Relevant Orders    Ambulatory Referral to Diabetic Education       Gastrointestinal Abdominal      Elevated liver enzymes      Diagnoses       Codes Comments    Essential hypertension    -  Primary ICD-10-CM: I10  ICD-9-CM: 401.9     Hypercholesterolemia     ICD-10-CM: E78.00  ICD-9-CM: 272.0     Type 2 diabetes mellitus without complication, without long-term current use of insulin (CMS/HCC)     ICD-10-CM: E11.9  ICD-9-CM: 250.00     Class 3 severe obesity due to excess calories with serious comorbidity and body mass index (BMI) of 40.0 to 44.9 in adult (CMS/HCC)     ICD-10-CM: E66.01, Z68.41  ICD-9-CM: 278.01, V85.41     Elevated liver enzymes     ICD-10-CM: R74.8  ICD-9-CM: 790.5           PLAN patient will continue current medicines as now.  I am referring him to diabetic education to see if we can help with his diabetes and get some weight loss going.  I would like to recheck him in 6 months with a CBC, CMP, lipid panel, hemoglobin A1c, TSH, PSA and urine microalbumin    There are no Patient Instructions on file for this visit.  Return in about 6 months (around 1/7/2022) for with labs.

## 2021-07-08 RX ORDER — ATORVASTATIN CALCIUM 40 MG/1
TABLET, FILM COATED ORAL
Qty: 90 TABLET | Refills: 1 | Status: SHIPPED | OUTPATIENT
Start: 2021-07-08 | End: 2021-11-19

## 2021-07-13 ENCOUNTER — HOSPITAL ENCOUNTER (OUTPATIENT)
Dept: CT IMAGING | Facility: HOSPITAL | Age: 66
Discharge: HOME | End: 2021-07-13
Admitting: THORACIC SURGERY (CARDIOTHORACIC VASCULAR SURGERY)

## 2021-07-13 DIAGNOSIS — Z48.3 AFTERCARE FOLLOWING SURGERY FOR NEOPLASM: ICD-10-CM

## 2021-07-13 DIAGNOSIS — C34.92 SQUAMOUS CELL CARCINOMA OF LEFT LUNG (HCC): ICD-10-CM

## 2021-07-13 PROCEDURE — 71250 CT THORAX DX C-: CPT

## 2021-07-28 RX ORDER — CARVEDILOL 25 MG/1
50 TABLET ORAL 2 TIMES DAILY WITH MEALS
Qty: 360 TABLET | Refills: 3 | Status: SHIPPED | OUTPATIENT
Start: 2021-07-28 | End: 2022-01-17 | Stop reason: SDUPTHER

## 2021-08-10 ENCOUNTER — OFFICE VISIT (OUTPATIENT)
Dept: SURGERY | Facility: CLINIC | Age: 66
End: 2021-08-10

## 2021-08-10 VITALS
RESPIRATION RATE: 16 BRPM | HEIGHT: 68 IN | TEMPERATURE: 97.7 F | BODY MASS INDEX: 43.44 KG/M2 | SYSTOLIC BLOOD PRESSURE: 123 MMHG | HEART RATE: 68 BPM | WEIGHT: 286.6 LBS | OXYGEN SATURATION: 94 % | DIASTOLIC BLOOD PRESSURE: 82 MMHG

## 2021-08-10 DIAGNOSIS — C34.92 SQUAMOUS CELL CARCINOMA OF LEFT LUNG (HCC): Primary | ICD-10-CM

## 2021-08-10 DIAGNOSIS — Z48.3 AFTERCARE FOLLOWING SURGERY FOR NEOPLASM: ICD-10-CM

## 2021-08-10 PROCEDURE — 99212 OFFICE O/P EST SF 10 MIN: CPT | Performed by: THORACIC SURGERY (CARDIOTHORACIC VASCULAR SURGERY)

## 2021-08-10 NOTE — PROGRESS NOTES
"Chief Complaint  No chief complaint on file.     Aftercare following surgery for lung cancer    Subjective          Alexy Ram presents to [facility] for a 6 month CT follow up.  History of Present Illness     66-year-old  male presents today for further follow-up of a left lower lobectomy performed in November 19, 2018 for squamous cell carcinoma of the lung.  Since his last visit the patient has done well.  He was diagnosed with sleep apnea and has been placed on CPAP.  He states that this is made a huge change in his life.  He has been able to sleep 8 hours at night uninterrupted and he feels much better.  He still has shortness of breath with exertion but this is not changed since his last visit.  He has no cough or hemoptysis.  He has no pleuritic pain.  He has no hoarseness or change in his voice.  He is not had any unexplained weight loss.    Objective   Vital Signs:   /82 (BP Location: Right arm, Patient Position: Sitting, Cuff Size: Adult)   Pulse 68   Temp 97.7 °F (36.5 °C) (Temporal)   Resp 16   Ht 172.7 cm (67.99\")   Wt 130 kg (286 lb 9.6 oz)   SpO2 94%   BMI 43.59 kg/m²     Physical Exam     Chest: No cervical supraclavicular or axillary adenopathy.  Incision is well-healed.  No subcutaneous masses or nodules.    Pulmonary: Equal breath sounds bilaterally.  Good air movement.    Cardiac: Regular rate and rhythm.  No murmurs or gallops.  No dependent edema.    Result Review :   The following data was reviewed by: Michael Ring III, MD on 08/10/2021:    CT scan of the chest performed July 13, 2021 was independently reviewed.  Postoperative changes in the left chest.  There are no new infiltrates nodules or masses.  No suspicious hilar or mediastinal adenopathy.  No pleural effusions.         Assessment and Plan    Diagnoses and all orders for this visit:    1. Squamous cell carcinoma of left lung (CMS/HCC) (Primary)  -     CT Chest Without " Contrast; Future    2. Aftercare following surgery for neoplasm  -     CT Chest Without Contrast; Future      I spent 15 minutes caring for Alexy on this date of service. This time includes time spent by me in the following activities:preparing for the visit, reviewing tests, performing a medically appropriate examination and/or evaluation , counseling and educating the patient/family/caregiver, ordering medications, tests, or procedures, referring and communicating with other health care professionals  and documenting information in the medical record  Follow Up     Patient continues to do well and shows no evidence of recurrent or metastatic lung cancer.  He follows with pulmonary medicine for his COPD and sleep apnea.  He will return to see me in 1 year with a CT of the chest without contrast.  I will keep you informed of his progress.  Thank you for allowing us to participate in the care of Mr. Ram.    Return in about 1 year (around 8/10/2022) for Recheck.  Patient was given instructions and counseling regarding his condition or for health maintenance advice. Please see specific information pulled into the AVS if appropriate.

## 2021-10-11 DIAGNOSIS — Z12.11 ENCOUNTER FOR SCREENING FOR MALIGNANT NEOPLASM OF COLON: Primary | ICD-10-CM

## 2021-10-20 ENCOUNTER — LAB (OUTPATIENT)
Dept: LAB | Facility: HOSPITAL | Age: 66
End: 2021-10-20

## 2021-10-20 DIAGNOSIS — Z12.11 ENCOUNTER FOR SCREENING FOR MALIGNANT NEOPLASM OF COLON: ICD-10-CM

## 2021-10-20 LAB — SARS-COV-2 RNA PNL SPEC NAA+PROBE: NOT DETECTED

## 2021-10-20 PROCEDURE — C9803 HOPD COVID-19 SPEC COLLECT: HCPCS

## 2021-10-20 PROCEDURE — 87635 SARS-COV-2 COVID-19 AMP PRB: CPT | Performed by: INTERNAL MEDICINE

## 2021-10-21 ENCOUNTER — ANESTHESIA EVENT (OUTPATIENT)
Dept: PERIOP | Facility: HOSPITAL | Age: 66
End: 2021-10-21

## 2021-10-22 ENCOUNTER — HOSPITAL ENCOUNTER (OUTPATIENT)
Facility: HOSPITAL | Age: 66
Setting detail: HOSPITAL OUTPATIENT SURGERY
Discharge: HOME | End: 2021-10-22
Attending: INTERNAL MEDICINE | Admitting: INTERNAL MEDICINE

## 2021-10-22 ENCOUNTER — ANESTHESIA (OUTPATIENT)
Dept: PERIOP | Facility: HOSPITAL | Age: 66
End: 2021-10-22

## 2021-10-22 VITALS
SYSTOLIC BLOOD PRESSURE: 166 MMHG | DIASTOLIC BLOOD PRESSURE: 99 MMHG | RESPIRATION RATE: 18 BRPM | HEART RATE: 62 BPM | WEIGHT: 283 LBS | OXYGEN SATURATION: 93 % | BODY MASS INDEX: 43.04 KG/M2 | TEMPERATURE: 97.4 F

## 2021-10-22 DIAGNOSIS — Z12.11 ENCOUNTER FOR SCREENING FOR MALIGNANT NEOPLASM OF COLON: ICD-10-CM

## 2021-10-22 DIAGNOSIS — Z80.0 FAMILY HISTORY OF COLON CANCER: ICD-10-CM

## 2021-10-22 PROCEDURE — 88305 TISSUE EXAM BY PATHOLOGIST: CPT | Performed by: INTERNAL MEDICINE

## 2021-10-22 PROCEDURE — C1889 IMPLANT/INSERT DEVICE, NOC: HCPCS | Performed by: INTERNAL MEDICINE

## 2021-10-22 PROCEDURE — 25010000002 PROPOFOL 10 MG/ML EMULSION: Performed by: NURSE ANESTHETIST, CERTIFIED REGISTERED

## 2021-10-22 PROCEDURE — 45380 COLONOSCOPY AND BIOPSY: CPT | Performed by: INTERNAL MEDICINE

## 2021-10-22 PROCEDURE — 45385 COLONOSCOPY W/LESION REMOVAL: CPT | Performed by: INTERNAL MEDICINE

## 2021-10-22 DEVICE — DEV CLIP ENDO RESOLUTION360 CONTRL ROT 235CM: Type: IMPLANTABLE DEVICE | Site: SIGMOID COLON | Status: FUNCTIONAL

## 2021-10-22 RX ORDER — SODIUM CHLORIDE, SODIUM LACTATE, POTASSIUM CHLORIDE, CALCIUM CHLORIDE 600; 310; 30; 20 MG/100ML; MG/100ML; MG/100ML; MG/100ML
9 INJECTION, SOLUTION INTRAVENOUS CONTINUOUS
Status: DISCONTINUED | OUTPATIENT
Start: 2021-10-22 | End: 2021-10-22 | Stop reason: HOSPADM

## 2021-10-22 RX ORDER — SODIUM CHLORIDE 0.9 % (FLUSH) 0.9 %
10 SYRINGE (ML) INJECTION EVERY 12 HOURS SCHEDULED
Status: DISCONTINUED | OUTPATIENT
Start: 2021-10-22 | End: 2021-10-22 | Stop reason: HOSPADM

## 2021-10-22 RX ORDER — SODIUM CHLORIDE 0.9 % (FLUSH) 0.9 %
10 SYRINGE (ML) INJECTION AS NEEDED
Status: DISCONTINUED | OUTPATIENT
Start: 2021-10-22 | End: 2021-10-22 | Stop reason: HOSPADM

## 2021-10-22 RX ORDER — LIDOCAINE HYDROCHLORIDE 10 MG/ML
0.5 INJECTION, SOLUTION EPIDURAL; INFILTRATION; INTRACAUDAL; PERINEURAL ONCE AS NEEDED
Status: DISCONTINUED | OUTPATIENT
Start: 2021-10-22 | End: 2021-10-22 | Stop reason: HOSPADM

## 2021-10-22 RX ORDER — LIDOCAINE HYDROCHLORIDE 20 MG/ML
INJECTION, SOLUTION INFILTRATION; PERINEURAL AS NEEDED
Status: DISCONTINUED | OUTPATIENT
Start: 2021-10-22 | End: 2021-10-22 | Stop reason: SURG

## 2021-10-22 RX ORDER — PROPOFOL 10 MG/ML
VIAL (ML) INTRAVENOUS AS NEEDED
Status: DISCONTINUED | OUTPATIENT
Start: 2021-10-22 | End: 2021-10-22 | Stop reason: SURG

## 2021-10-22 RX ORDER — MAGNESIUM HYDROXIDE 1200 MG/15ML
LIQUID ORAL AS NEEDED
Status: DISCONTINUED | OUTPATIENT
Start: 2021-10-22 | End: 2021-10-22 | Stop reason: HOSPADM

## 2021-10-22 RX ORDER — SODIUM CHLORIDE 9 MG/ML
40 INJECTION, SOLUTION INTRAVENOUS AS NEEDED
Status: DISCONTINUED | OUTPATIENT
Start: 2021-10-22 | End: 2021-10-22 | Stop reason: HOSPADM

## 2021-10-22 RX ORDER — SODIUM CHLORIDE, SODIUM LACTATE, POTASSIUM CHLORIDE, CALCIUM CHLORIDE 600; 310; 30; 20 MG/100ML; MG/100ML; MG/100ML; MG/100ML
100 INJECTION, SOLUTION INTRAVENOUS CONTINUOUS
Status: DISCONTINUED | OUTPATIENT
Start: 2021-10-22 | End: 2021-10-22 | Stop reason: HOSPADM

## 2021-10-22 RX ADMIN — PROPOFOL 50 MG: 10 INJECTION, EMULSION INTRAVENOUS at 10:09

## 2021-10-22 RX ADMIN — SODIUM CHLORIDE, POTASSIUM CHLORIDE, SODIUM LACTATE AND CALCIUM CHLORIDE 9 ML/HR: 600; 310; 30; 20 INJECTION, SOLUTION INTRAVENOUS at 09:37

## 2021-10-22 RX ADMIN — LIDOCAINE HYDROCHLORIDE 100 MG: 20 INJECTION, SOLUTION INFILTRATION; PERINEURAL at 10:02

## 2021-10-22 RX ADMIN — PROPOFOL 50 MG: 10 INJECTION, EMULSION INTRAVENOUS at 10:06

## 2021-10-22 RX ADMIN — PROPOFOL 50 MG: 10 INJECTION, EMULSION INTRAVENOUS at 10:28

## 2021-10-22 RX ADMIN — PROPOFOL 50 MG: 10 INJECTION, EMULSION INTRAVENOUS at 10:14

## 2021-10-22 RX ADMIN — PROPOFOL 100 MG: 10 INJECTION, EMULSION INTRAVENOUS at 10:02

## 2021-10-22 RX ADMIN — PROPOFOL 50 MG: 10 INJECTION, EMULSION INTRAVENOUS at 10:22

## 2021-10-22 NOTE — ANESTHESIA PREPROCEDURE EVALUATION
Anesthesia Evaluation     Patient summary reviewed and Nursing notes reviewed   NPO Solid Status: > 8 hours  NPO Liquid Status: > 8 hours           Airway   Mallampati: II  TM distance: >3 FB  Neck ROM: full  no difficulty expected  Dental - normal exam     Pulmonary - normal exam   (+) a smoker ( quit 3.5 years ago. Hx 1.5ppd x 40 years.) Former, lung cancer ( 2018 left lower lobectomy), sleep apnea on CPAP,   Cardiovascular - normal exam    (+) pacemaker ( interrogated in 8/2021) pacemaker, hypertension well controlled 2 medications or greater, dysrhythmias, hyperlipidemia,       Neuro/Psych- negative ROS  GI/Hepatic/Renal/Endo    (+) obesity, morbid obesity,  diabetes mellitus type 2 well controlled,     Musculoskeletal     Abdominal  - normal exam   Substance History      OB/GYN          Other        ROS/Med Hx Other: Tests:   ECG tracing performed and reviewed today shows sinus rhythm and right bundle branch block. ECG was performed in addition to device interrogation to evaluate for conduction abnormalities not evident on device printouts.     Device Interrogation: Normal function of Viptable Dual-chamber pacemaker. Battery estimated at 4 years, 6 months. A paced 18%. V paced 1%. Normal pacing and sensing thresholds. No episodes. No changes made.    Assessment:   1. AV block, 2nd degree   2. PAF (paroxysmal atrial fibrillation) (CMS/HCC)   --No episodes on today's interrogation and review of remotes over the past year show only 1 brief AF episode lasting 8 seconds.   3. Essential hypertension   --Well-controlled on current medications   4. Cardiac pacemaker in situ   --Normal function. No episodes. No changes made.   Last asa 1 week ago                    Anesthesia Plan    ASA 3     MAC     intravenous induction     Anesthetic plan, all risks, benefits, and alternatives have been provided, discussed and informed consent has been obtained with: patient.  Use of blood products discussed with patient   Consented to blood products.   Plan discussed with CRNA.

## 2021-10-22 NOTE — ANESTHESIA POSTPROCEDURE EVALUATION
Patient: Alexy Ram    Procedure Summary     Date: 10/22/21 Room / Location: [facility] ENDOSCOPY 1 /  [facility]    Anesthesia Start: 0958 Anesthesia Stop: 1036    Procedure: COLONOSCOPY WITH POLYPECTOMY (N/A ) Diagnosis:       Encounter for screening for malignant neoplasm of colon      Family history of colon cancer      Colon polyp      Diverticulosis      (Encounter for screening for malignant neoplasm of colon [Z12.11])      (Family history of colon cancer [Z80.0])    Surgeons: Lan Solis MD Provider: Jason Fernando CRNA    Anesthesia Type: MAC ASA Status: 3          Anesthesia Type: MAC    Vitals  Vitals Value Taken Time   /99 10/22/21 1106   Temp 97.4 °F (36.3 °C) 10/22/21 1043   Pulse 62 10/22/21 1106   Resp 18 10/22/21 1106   SpO2 93 % 10/22/21 1106           Post Anesthesia Care and Evaluation    Patient location during evaluation: PHASE II  Patient participation: complete - patient participated  Level of consciousness: awake  Pain score: 0  Pain management: adequate  Airway patency: patent  Anesthetic complications: No anesthetic complications  PONV Status: none  Cardiovascular status: acceptable  Respiratory status: acceptable  Hydration status: acceptable

## 2021-10-25 LAB
LAB AP CASE REPORT: NORMAL
PATH REPORT.FINAL DX SPEC: NORMAL
PATH REPORT.GROSS SPEC: NORMAL

## 2021-11-01 RX ORDER — AMLODIPINE BESYLATE 10 MG/1
TABLET ORAL
Qty: 90 TABLET | Refills: 1 | Status: SHIPPED | OUTPATIENT
Start: 2021-11-01 | End: 2022-04-18

## 2021-11-01 NOTE — TELEPHONE ENCOUNTER
Rx Refill Note  Requested Prescriptions     Pending Prescriptions Disp Refills   • amLODIPine (NORVASC) 10 MG tablet [Pharmacy Med Name: AMLODIPINE BESYLATE 10 MG TAB] 90 tablet 1     Sig: TAKE 1 TABLET BY MOUTH EVERY DAY      Last office visit with prescribing clinician: Visit date not found      Next office visit with prescribing clinician: Visit date not found            Tasha Leyva MA  11/01/21, 09:34 EDT

## 2021-11-19 RX ORDER — ATORVASTATIN CALCIUM 40 MG/1
TABLET, FILM COATED ORAL
Qty: 90 TABLET | Refills: 1 | Status: SHIPPED | OUTPATIENT
Start: 2021-11-19 | End: 2022-01-17 | Stop reason: SDUPTHER

## 2021-11-23 RX ORDER — POTASSIUM CHLORIDE 750 MG/1
20 TABLET, FILM COATED, EXTENDED RELEASE ORAL 2 TIMES DAILY
Qty: 360 TABLET | Refills: 3 | Status: SHIPPED | OUTPATIENT
Start: 2021-11-23 | End: 2022-01-17 | Stop reason: SDUPTHER

## 2021-11-23 RX ORDER — PIOGLITAZONEHYDROCHLORIDE 30 MG/1
30 TABLET ORAL DAILY
Qty: 90 TABLET | Refills: 3 | Status: SHIPPED | OUTPATIENT
Start: 2021-11-23 | End: 2022-10-11

## 2021-11-23 NOTE — TELEPHONE ENCOUNTER
Caller: Darlin Winslow    Relationship: Emergency Contact    Best call back number:     Requested Prescriptions:   Requested Prescriptions     Pending Prescriptions Disp Refills   • pioglitazone (Actos) 30 MG tablet 90 tablet 3     Sig: Take 1 tablet by mouth Daily.   • potassium chloride 10 MEQ CR tablet 360 tablet 3     Sig: Take 2 tablets by mouth 2 (Two) Times a Day.    CAN PUT POTASSIUM ON HOLD FOR NOW     Pharmacy where request should be sent: [facility]/PHARMACY #[id number] - [facility] [address] [address] [address] [address] - [phone]  - [phone] FX     Additional details provided by patient:     Does the patient have less than a 3 day supply:  [x] Yes  [] No    Laurence Callahan Rep   11/23/21 09:48 EST

## 2021-11-29 RX ORDER — PIOGLITAZONEHYDROCHLORIDE 30 MG/1
TABLET ORAL
Qty: 90 TABLET | Refills: 3 | OUTPATIENT
Start: 2021-11-29

## 2021-12-07 ENCOUNTER — IMMUNIZATION (OUTPATIENT)
Dept: VACCINE CLINIC | Facility: HOSPITAL | Age: 66
End: 2021-12-07

## 2021-12-07 PROCEDURE — 91300 HC SARSCOV02 VAC 30MCG/0.3ML IM: CPT | Performed by: INTERNAL MEDICINE

## 2021-12-07 PROCEDURE — 0004A HC ADM SARSCOV2 30MCG/0.3ML BOOSTER: CPT | Performed by: INTERNAL MEDICINE

## 2022-01-05 DIAGNOSIS — Z12.5 SPECIAL SCREENING FOR MALIGNANT NEOPLASM OF PROSTATE: ICD-10-CM

## 2022-01-05 DIAGNOSIS — E11.9 TYPE 2 DIABETES MELLITUS WITHOUT COMPLICATION, WITHOUT LONG-TERM CURRENT USE OF INSULIN: ICD-10-CM

## 2022-01-05 DIAGNOSIS — E78.00 HYPERCHOLESTEROLEMIA: ICD-10-CM

## 2022-01-11 LAB
ALBUMIN SERPL-MCNC: 4 G/DL (ref 3.8–4.8)
ALBUMIN/GLOB SERPL: 1.4 {RATIO} (ref 1.2–2.2)
ALP SERPL-CCNC: 72 IU/L (ref 44–121)
ALT SERPL-CCNC: 60 IU/L (ref 0–44)
AST SERPL-CCNC: 33 IU/L (ref 0–40)
BASOPHILS # BLD AUTO: 0.1 X10E3/UL (ref 0–0.2)
BASOPHILS NFR BLD AUTO: 1 %
BILIRUB SERPL-MCNC: 0.4 MG/DL (ref 0–1.2)
BUN SERPL-MCNC: 15 MG/DL (ref 8–27)
BUN/CREAT SERPL: 19 (ref 10–24)
CALCIUM SERPL-MCNC: 8.9 MG/DL (ref 8.6–10.2)
CHLORIDE SERPL-SCNC: 101 MMOL/L (ref 96–106)
CHOLEST SERPL-MCNC: 153 MG/DL (ref 100–199)
CO2 SERPL-SCNC: 26 MMOL/L (ref 20–29)
CREAT SERPL-MCNC: 0.81 MG/DL (ref 0.76–1.27)
EOSINOPHIL # BLD AUTO: 0.3 X10E3/UL (ref 0–0.4)
EOSINOPHIL NFR BLD AUTO: 3 %
ERYTHROCYTE [DISTWIDTH] IN BLOOD BY AUTOMATED COUNT: 14.5 % (ref 11.6–15.4)
GLOBULIN SER CALC-MCNC: 2.9 G/DL (ref 1.5–4.5)
GLUCOSE SERPL-MCNC: 127 MG/DL (ref 65–99)
HBA1C MFR BLD: 6.5 % (ref 4.8–5.6)
HCT VFR BLD AUTO: 45.2 % (ref 37.5–51)
HDLC SERPL-MCNC: 35 MG/DL
HGB BLD-MCNC: 15.2 G/DL (ref 13–17.7)
IMM GRANULOCYTES # BLD AUTO: 0 X10E3/UL (ref 0–0.1)
IMM GRANULOCYTES NFR BLD AUTO: 0 %
LDLC SERPL CALC-MCNC: 96 MG/DL (ref 0–99)
LDLC/HDLC SERPL: 2.7 RATIO (ref 0–3.6)
LYMPHOCYTES # BLD AUTO: 1.8 X10E3/UL (ref 0.7–3.1)
LYMPHOCYTES NFR BLD AUTO: 20 %
MCH RBC QN AUTO: 28.6 PG (ref 26.6–33)
MCHC RBC AUTO-ENTMCNC: 33.6 G/DL (ref 31.5–35.7)
MCV RBC AUTO: 85 FL (ref 79–97)
MICROALBUMIN UR-MCNC: 253.5 UG/ML
MONOCYTES # BLD AUTO: 0.8 X10E3/UL (ref 0.1–0.9)
MONOCYTES NFR BLD AUTO: 9 %
NEUTROPHILS # BLD AUTO: 6.2 X10E3/UL (ref 1.4–7)
NEUTROPHILS NFR BLD AUTO: 67 %
PLATELET # BLD AUTO: 293 X10E3/UL (ref 150–450)
POTASSIUM SERPL-SCNC: 3.6 MMOL/L (ref 3.5–5.2)
PROT SERPL-MCNC: 6.9 G/DL (ref 6–8.5)
PSA SERPL-MCNC: 2.7 NG/ML (ref 0–4)
RBC # BLD AUTO: 5.32 X10E6/UL (ref 4.14–5.8)
SODIUM SERPL-SCNC: 140 MMOL/L (ref 134–144)
TRIGL SERPL-MCNC: 122 MG/DL (ref 0–149)
TSH SERPL DL<=0.005 MIU/L-ACNC: 2.82 UIU/ML (ref 0.45–4.5)
VLDLC SERPL CALC-MCNC: 22 MG/DL (ref 5–40)
WBC # BLD AUTO: 9.1 X10E3/UL (ref 3.4–10.8)

## 2022-01-17 NOTE — TELEPHONE ENCOUNTER
Caller: Darlin Winslow    Relationship: Emergency Contact    Best call back number: [phone]    Requested Prescriptions:   Requested Prescriptions     Pending Prescriptions Disp Refills   • atorvastatin (LIPITOR) 40 MG tablet 90 tablet 1     Sig: Take 1 tablet by mouth every night at bedtime.   • carvedilol (COREG) 25 MG tablet 360 tablet 3     Sig: Take 2 tablets by mouth 2 (Two) Times a Day With Meals.   • potassium chloride 10 MEQ CR tablet 360 tablet 3     Sig: Take 2 tablets by mouth 2 (Two) Times a Day.   • Spiriva Respimat 2.5 MCG/ACT aerosol solution inhaler  3        Pharmacy where request should be sent: [facility]/PHARMACY #[id number] - [place], [place] - [address] [address] AT [address] OF [address] [address] - [phone]  - [phone]      Additional details provided by patient: PATIENT HAS THREE DOES LEFT OF THE CARVEDILOL.  THE OTHERS HAVE APPROX A WEEK    Does the patient have less than a 3 day supply:  [x] Yes  [] No    Laurence Garcia Rep   01/17/22 13:17 EST

## 2022-01-18 RX ORDER — TIOTROPIUM BROMIDE INHALATION SPRAY 3.12 UG/1
2 SPRAY, METERED RESPIRATORY (INHALATION)
Qty: 3 EACH | Refills: 3 | Status: SHIPPED | OUTPATIENT
Start: 2022-01-18

## 2022-01-18 RX ORDER — POTASSIUM CHLORIDE 750 MG/1
20 TABLET, FILM COATED, EXTENDED RELEASE ORAL 2 TIMES DAILY
Qty: 360 TABLET | Refills: 3 | Status: SHIPPED | OUTPATIENT
Start: 2022-01-18 | End: 2023-02-01

## 2022-01-18 RX ORDER — CARVEDILOL 25 MG/1
50 TABLET ORAL 2 TIMES DAILY WITH MEALS
Qty: 360 TABLET | Refills: 3 | Status: SHIPPED | OUTPATIENT
Start: 2022-01-18 | End: 2023-01-23

## 2022-01-18 RX ORDER — ATORVASTATIN CALCIUM 40 MG/1
40 TABLET, FILM COATED ORAL
Qty: 90 TABLET | Refills: 3 | Status: SHIPPED | OUTPATIENT
Start: 2022-01-18 | End: 2023-01-23

## 2022-01-19 ENCOUNTER — OFFICE VISIT (OUTPATIENT)
Dept: FAMILY MEDICINE CLINIC | Facility: CLINIC | Age: 67
End: 2022-01-19

## 2022-01-19 VITALS
BODY MASS INDEX: 43.89 KG/M2 | HEIGHT: 68 IN | RESPIRATION RATE: 20 BRPM | WEIGHT: 289.6 LBS | OXYGEN SATURATION: 97 % | SYSTOLIC BLOOD PRESSURE: 136 MMHG | HEART RATE: 76 BPM | TEMPERATURE: 96.8 F | DIASTOLIC BLOOD PRESSURE: 80 MMHG

## 2022-01-19 DIAGNOSIS — E78.00 HYPERCHOLESTEROLEMIA: ICD-10-CM

## 2022-01-19 DIAGNOSIS — R74.8 ELEVATED LIVER ENZYMES: ICD-10-CM

## 2022-01-19 DIAGNOSIS — E11.9 TYPE 2 DIABETES MELLITUS WITHOUT COMPLICATION, WITHOUT LONG-TERM CURRENT USE OF INSULIN: ICD-10-CM

## 2022-01-19 DIAGNOSIS — G47.33 OBSTRUCTIVE SLEEP APNEA OF ADULT: ICD-10-CM

## 2022-01-19 DIAGNOSIS — I10 PRIMARY HYPERTENSION: Primary | ICD-10-CM

## 2022-01-19 DIAGNOSIS — E66.01 CLASS 3 SEVERE OBESITY DUE TO EXCESS CALORIES WITH SERIOUS COMORBIDITY AND BODY MASS INDEX (BMI) OF 40.0 TO 44.9 IN ADULT: ICD-10-CM

## 2022-01-19 PROCEDURE — 1170F FXNL STATUS ASSESSED: CPT | Performed by: INTERNAL MEDICINE

## 2022-01-19 PROCEDURE — 99214 OFFICE O/P EST MOD 30 MIN: CPT | Performed by: INTERNAL MEDICINE

## 2022-01-19 PROCEDURE — 1159F MED LIST DOCD IN RCRD: CPT | Performed by: INTERNAL MEDICINE

## 2022-01-19 PROCEDURE — G0438 PPPS, INITIAL VISIT: HCPCS | Performed by: INTERNAL MEDICINE

## 2022-01-19 NOTE — PROGRESS NOTES
The ABCs of the Annual Wellness Visit  [place] to [employer] Visit    Chief Complaint   Patient presents with   • Hypertension     HYPERCHOLESTEROLEMIA, DIABETES- F/U LABS     Subjective {   History of Present Illness:  Alexy Ram is a 66 y.o. male who presents for a  Welcome to Medicare Visit.  He is also here for follow-up on his hypertension, hyperlipidemia, diabetes mellitus type 2, obesity, elevated liver tests, history of lung cancer and sleep apnea.  He is using his CPAP machine and is happy with it.  He has no acute complaints    The following portions of the patient's history were reviewed and   updated as appropriate: allergies, current medications, past family history, past medical history, past social history, past surgical history and problem list.     Compared to one year ago, the patient feels his physical   health is better.    Compared to one year ago, the patient feels his mental   health is better.    Recent Hospitalizations:  He was not admitted to the hospital during the last year.       Current Medical Providers:  Patient Care Team:  Steffen Flores MD as PCP - General (Internal Medicine)  Steve Rice MD as Consulting Physician (Cardiac Electrophysiology)    Outpatient Medications Prior to Visit   Medication Sig Dispense Refill   • albuterol sulfate  (90 Base) MCG/ACT inhaler Inhale 2 puffs Every 4 (Four) Hours As Needed for Wheezing. 1 inhaler 0   • amLODIPine (NORVASC) 10 MG tablet TAKE 1 TABLET BY MOUTH EVERY DAY 90 tablet 1   • atorvastatin (LIPITOR) 40 MG tablet Take 1 tablet by mouth every night at bedtime. 90 tablet 3   • carvedilol (COREG) 25 MG tablet Take 2 tablets by mouth 2 (Two) Times a Day With Meals. 360 tablet 3   • hydrALAZINE (APRESOLINE) 25 MG tablet Take 25 mg by mouth 2 (Two) Times a Day.     • Multiple Vitamins-Minerals (MULTIVITAMIN ADULT PO) Take 1 tablet by mouth Daily.     • pioglitazone (Actos) 30 MG tablet Take 1 tablet by mouth Daily. 90 tablet 3    • potassium chloride 10 MEQ CR tablet Take 2 tablets by mouth 2 (Two) Times a Day. 360 tablet 3   • SITagliptin (Januvia) 100 MG tablet Take 1 tablet by mouth Daily. 90 tablet 3   • Spiriva Respimat 2.5 MCG/ACT aerosol solution inhaler Inhale 2 puffs Daily. 3 each 3   • valsartan-hydrochlorothiazide (DIOVAN-HCT) 320-25 MG per tablet Take 1 tablet by mouth Every Morning.       No facility-administered medications prior to visit.       No opioid medication identified on active medication list. I have reviewed chart for other potential  high risk medication/s and harmful drug interactions in the elderly.          Aspirin is not on active medication list.  Aspirin use is indicated based on review of current medical condition/s. Pros and cons of this therapy have been discussed with this patient. Benefits of this medication outweigh potential harm.  Patient has been instructed to start taking this medication..    Patient Active Problem List   Diagnosis   • Hypercholesterolemia   • Hypertension   • Type 2 diabetes mellitus (HCC)   • Hemoptysis   • Squamous cell carcinoma of left lung (HCC)   • Acute bronchitis with bronchospasm   • Status post lobectomy of lung   • Elevated liver enzymes   • Class 3 severe obesity due to excess calories with body mass index (BMI) of 40.0 to 44.9 in adult (HCC)   • Encounter for screening for malignant neoplasm of colon   • Family history of colon cancer   • Obstructive sleep apnea of adult     Advance Care Planning  Advance Directive is not on file.  ACP discussion was held with the patient during this visit. Patient does not have an advance directive, information provided. Patient does not have an advance directive, declines further assistance.    Review of Systems   All other systems reviewed and are negative.       Objective      Vitals:    01/19/22 1426   BP: 136/80   Pulse: 76   Resp: 20   Temp: 96.8 °F (36 °C)   TempSrc: Oral   SpO2: 97%   Weight: 131 kg (289 lb 9.6 oz)   Height:  "172.7 cm (67.99\")     BMI Readings from Last 1 Encounters:   22 44.04 kg/m²   BMI is above normal parameters. Recommendations include: educational material    Does the patient have evidence of cognitive impairment? No    Physical Exam  Vitals and nursing note reviewed.   Constitutional:       General: He is not in acute distress.     Appearance: Normal appearance. He is obese. He is not ill-appearing.   HENT:      Head: Normocephalic and atraumatic.   Eyes:      General: No scleral icterus.     Conjunctiva/sclera: Conjunctivae normal.   Cardiovascular:      Rate and Rhythm: Normal rate and regular rhythm.      Heart sounds: Normal heart sounds.   Pulmonary:      Effort: Pulmonary effort is normal. No respiratory distress.      Breath sounds: Normal breath sounds. No wheezing or rales.   Musculoskeletal:         General: Normal range of motion.   Skin:     General: Skin is warm and dry.   Neurological:      General: No focal deficit present.      Mental Status: He is alert and oriented to person, place, and time.   Psychiatric:         Mood and Affect: Mood normal.         Behavior: Behavior normal.         Lab Results   Component Value Date    CHLPL 153 01/10/2022    TRIG 122 01/10/2022    HDL 35 (L) 01/10/2022    LDL 96 01/10/2022    VLDL 22 01/10/2022    HGBA1C 6.5 (H) 01/10/2022       Procedures       HEALTH RISK ASSESSMENT    Smoking Status:  Social History     Tobacco Use   Smoking Status Former Smoker   • Packs/day: 1.00   • Years: 33.00   • Pack years: 33.00   • Types: Cigarettes   • Quit date:    • Years since quittin.0   Smokeless Tobacco Never Used   Tobacco Comment    started cigars 3-4 daily in 2018 and has quit a month ago     Alcohol Consumption:  Social History     Substance and Sexual Activity   Alcohol Use Yes    Comment: rare       Fall Risk Screen:    STEADI Fall Risk Assessment was completed, and patient is at LOW risk for falls.Assessment completed on:2022    Depression " Screen:   PHQ-2/PHQ-9 Depression Screening 1/19/2022   Little interest or pleasure in doing things 0   Feeling down, depressed, or hopeless 0   Total Score 0       Health Habits and Functional and Cognitive Screening:  Functional & Cognitive Status 1/19/2022   Do you have difficulty preparing food and eating? No   Do you have difficulty bathing yourself, getting dressed or grooming yourself? No   Do you have difficulty using the toilet? No   Do you have difficulty moving around from place to place? Yes   Do you have trouble with steps or getting out of a bed or a chair? No   Current Diet Well Balanced Diet   Dental Exam Up to date   Eye Exam Up to date   Exercise (times per week) 0 times per week   Current Exercises Include No Regular Exercise   Do you need help using the phone?  No   Are you deaf or do you have serious difficulty hearing?  Yes   Do you need help with transportation? Yes   Do you need help shopping? No   Do you need help preparing meals?  No   Do you need help with housework?  No   Do you need help with laundry? No   Do you need help taking your medications? No   Do you need help managing money? No   Do you ever drive or ride in a car without wearing a seat belt? No   Have you felt unusual stress, anger or loneliness in the last month? No   Who do you live with? Spouse   If you need help, do you have trouble finding someone available to you? No   Have you been bothered in the last four weeks by sexual problems? No   Do you have difficulty concentrating, remembering or making decisions? No       Visual Acuity:    No exam data present    Age-appropriate Screening Schedule:  Refer to the list below for future screening recommendations based on patient's age, sex and/or medical conditions. Orders for these recommended tests are listed in the plan section. The patient has been provided with a written plan.    Health Maintenance   Topic Date Due   • DIABETIC EYE EXAM  12/03/2021   • DIABETIC FOOT EXAM   07/07/2022   • HEMOGLOBIN A1C  07/10/2022   • LIPID PANEL  01/10/2023   • URINE MICROALBUMIN  01/10/2023   • TDAP/TD VACCINES (3 - Td or Tdap) 07/08/2027   • INFLUENZA VACCINE  Completed   • ZOSTER VACCINE  Completed          Assessment/Plan CBC was normal.  CMP is stable with a sugar of 127 and other values fine aside from ALT minimally elevated at 60.  Lipid panel is stable with total cholesterol 153, HDL 35, LDL 96.  Hemoglobin A1c is stable at 6.5.  Urine microalbumin is improved at 253.  TSH was normal and PSA was normal at 2.7.  #1-hypertension controlled  #2-hyperlipidemia controlled on medication  #3-diabetes mellitus type 2 with stable acceptable hemoglobin A1c  #4-obesity with no significant weight loss  #5-obstructive sleep apnea, on CPAP  #6-history of lung cancer with no symptoms    CMS Preventative Services Quick Reference  Risk Factors Identified During Encounter  Immunizations Discussed/Encouraged (specific Immunizations; PREVNAR  The above risks/problems have been discussed with the patient.  Pertinent information has been shared with the patient in the After Visit Summary.  Follow up plans and orders are seen below in the Assessment/Plan Section.    There are no diagnoses linked to this encounter.    Follow Up: The patient received a Prevnar 13 vaccination today.  He will continue other medicines as now.  I encouraged him to work on trying to lose weight.  I plan on rechecking him in 6 months with a CBC, CMP, lipid panel, hemoglobin A1c  No follow-ups on file.     An After Visit Summary and PPPS were made available to the patient.

## 2022-01-20 ENCOUNTER — TELEPHONE (OUTPATIENT)
Dept: FAMILY MEDICINE CLINIC | Facility: CLINIC | Age: 67
End: 2022-01-20

## 2022-01-20 NOTE — TELEPHONE ENCOUNTER
Caller: Alexy Ram    Relationship: Self    Best call back number: [phone]    Who are you requesting to speak with (clinical staff, provider,  specific staff member): CLINICAL STAFF     What was the call regarding: PATIENT STATES THAT IN OFFICE NOTE FROM YESTERDAY STATES HE RECEIVED THE PREVNAR 13 VACCINE BUT HE STATES HE DID NOT RECEIVE THE VACCINE WANTING TO KNOW IF HE CAN COME IN TO RECEIVE THE VACCINE     Do you require a callback: YES

## 2022-01-21 NOTE — TELEPHONE ENCOUNTER
CALLED AND S/W PT. DR. RAI DID WANT HIM TO GET THE PREVNAR SHOT. PT IS GOING TO COME IN DURING SHOT HOURS ONE DAY NEXT WEEK.

## 2022-01-24 ENCOUNTER — CLINICAL SUPPORT (OUTPATIENT)
Dept: FAMILY MEDICINE CLINIC | Facility: CLINIC | Age: 67
End: 2022-01-24

## 2022-01-24 PROCEDURE — 90670 PCV13 VACCINE IM: CPT | Performed by: INTERNAL MEDICINE

## 2022-01-24 PROCEDURE — G0009 ADMIN PNEUMOCOCCAL VACCINE: HCPCS | Performed by: INTERNAL MEDICINE

## 2022-04-18 RX ORDER — AMLODIPINE BESYLATE 10 MG/1
TABLET ORAL
Qty: 90 TABLET | Refills: 1 | Status: SHIPPED | OUTPATIENT
Start: 2022-04-18 | End: 2022-08-15

## 2022-07-25 DIAGNOSIS — E78.00 HYPERCHOLESTEROLEMIA: Primary | ICD-10-CM

## 2022-07-25 DIAGNOSIS — E11.9 TYPE 2 DIABETES MELLITUS WITHOUT COMPLICATION, WITHOUT LONG-TERM CURRENT USE OF INSULIN: ICD-10-CM

## 2022-07-30 LAB
ALBUMIN SERPL-MCNC: 3.8 G/DL (ref 3.8–4.8)
ALBUMIN/GLOB SERPL: 1.4 {RATIO} (ref 1.2–2.2)
ALP SERPL-CCNC: 69 IU/L (ref 44–121)
ALT SERPL-CCNC: 57 IU/L (ref 0–44)
AST SERPL-CCNC: 27 IU/L (ref 0–40)
BASOPHILS # BLD AUTO: 0 X10E3/UL (ref 0–0.2)
BASOPHILS NFR BLD AUTO: 1 %
BILIRUB SERPL-MCNC: 0.4 MG/DL (ref 0–1.2)
BUN SERPL-MCNC: 21 MG/DL (ref 8–27)
BUN/CREAT SERPL: 23 (ref 10–24)
CALCIUM SERPL-MCNC: 9.2 MG/DL (ref 8.6–10.2)
CHLORIDE SERPL-SCNC: 102 MMOL/L (ref 96–106)
CHOLEST SERPL-MCNC: 135 MG/DL (ref 100–199)
CO2 SERPL-SCNC: 28 MMOL/L (ref 20–29)
CREAT SERPL-MCNC: 0.9 MG/DL (ref 0.76–1.27)
EGFRCR SERPLBLD CKD-EPI 2021: 94 ML/MIN/1.73
EOSINOPHIL # BLD AUTO: 0.2 X10E3/UL (ref 0–0.4)
EOSINOPHIL NFR BLD AUTO: 3 %
ERYTHROCYTE [DISTWIDTH] IN BLOOD BY AUTOMATED COUNT: 14.3 % (ref 11.6–15.4)
GLOBULIN SER CALC-MCNC: 2.7 G/DL (ref 1.5–4.5)
GLUCOSE SERPL-MCNC: 132 MG/DL (ref 65–99)
HBA1C MFR BLD: 6.8 % (ref 4.8–5.6)
HCT VFR BLD AUTO: 43.8 % (ref 37.5–51)
HDLC SERPL-MCNC: 34 MG/DL
HGB BLD-MCNC: 14.4 G/DL (ref 13–17.7)
IMM GRANULOCYTES # BLD AUTO: 0 X10E3/UL (ref 0–0.1)
IMM GRANULOCYTES NFR BLD AUTO: 0 %
LDLC SERPL CALC-MCNC: 79 MG/DL (ref 0–99)
LDLC/HDLC SERPL: 2.3 RATIO (ref 0–3.6)
LYMPHOCYTES # BLD AUTO: 1.6 X10E3/UL (ref 0.7–3.1)
LYMPHOCYTES NFR BLD AUTO: 24 %
MCH RBC QN AUTO: 28.5 PG (ref 26.6–33)
MCHC RBC AUTO-ENTMCNC: 32.9 G/DL (ref 31.5–35.7)
MCV RBC AUTO: 87 FL (ref 79–97)
MONOCYTES # BLD AUTO: 0.6 X10E3/UL (ref 0.1–0.9)
MONOCYTES NFR BLD AUTO: 9 %
NEUTROPHILS # BLD AUTO: 4.3 X10E3/UL (ref 1.4–7)
NEUTROPHILS NFR BLD AUTO: 63 %
PLATELET # BLD AUTO: 278 X10E3/UL (ref 150–450)
POTASSIUM SERPL-SCNC: 3.7 MMOL/L (ref 3.5–5.2)
PROT SERPL-MCNC: 6.5 G/DL (ref 6–8.5)
RBC # BLD AUTO: 5.06 X10E6/UL (ref 4.14–5.8)
SODIUM SERPL-SCNC: 143 MMOL/L (ref 134–144)
TRIGL SERPL-MCNC: 123 MG/DL (ref 0–149)
VLDLC SERPL CALC-MCNC: 22 MG/DL (ref 5–40)
WBC # BLD AUTO: 6.7 X10E3/UL (ref 3.4–10.8)

## 2022-08-03 ENCOUNTER — OFFICE VISIT (OUTPATIENT)
Dept: FAMILY MEDICINE CLINIC | Facility: CLINIC | Age: 67
End: 2022-08-03

## 2022-08-03 VITALS
HEART RATE: 72 BPM | DIASTOLIC BLOOD PRESSURE: 62 MMHG | TEMPERATURE: 96.9 F | BODY MASS INDEX: 43.92 KG/M2 | SYSTOLIC BLOOD PRESSURE: 116 MMHG | RESPIRATION RATE: 18 BRPM | OXYGEN SATURATION: 97 % | HEIGHT: 68 IN | WEIGHT: 289.8 LBS

## 2022-08-03 DIAGNOSIS — E78.00 HYPERCHOLESTEROLEMIA: Primary | ICD-10-CM

## 2022-08-03 DIAGNOSIS — I10 PRIMARY HYPERTENSION: ICD-10-CM

## 2022-08-03 DIAGNOSIS — Z12.5 SCREENING PSA (PROSTATE SPECIFIC ANTIGEN): ICD-10-CM

## 2022-08-03 DIAGNOSIS — E11.9 TYPE 2 DIABETES MELLITUS WITHOUT COMPLICATION, WITHOUT LONG-TERM CURRENT USE OF INSULIN: ICD-10-CM

## 2022-08-03 DIAGNOSIS — E08.9 DIABETES MELLITUS DUE TO UNDERLYING CONDITION WITHOUT COMPLICATION, WITHOUT LONG-TERM CURRENT USE OF INSULIN: ICD-10-CM

## 2022-08-03 DIAGNOSIS — R74.8 ELEVATED LIVER ENZYMES: ICD-10-CM

## 2022-08-03 DIAGNOSIS — E66.01 CLASS 3 SEVERE OBESITY DUE TO EXCESS CALORIES WITH SERIOUS COMORBIDITY AND BODY MASS INDEX (BMI) OF 40.0 TO 44.9 IN ADULT: ICD-10-CM

## 2022-08-03 DIAGNOSIS — G47.33 OBSTRUCTIVE SLEEP APNEA OF ADULT: ICD-10-CM

## 2022-08-03 PROCEDURE — 99214 OFFICE O/P EST MOD 30 MIN: CPT | Performed by: INTERNAL MEDICINE

## 2022-08-03 RX ORDER — DIPHENOXYLATE HYDROCHLORIDE AND ATROPINE SULFATE 2.5; .025 MG/1; MG/1
TABLET ORAL
COMMUNITY

## 2022-08-03 RX ORDER — ALBUTEROL SULFATE 90 UG/1
AEROSOL, METERED RESPIRATORY (INHALATION)
COMMUNITY

## 2022-08-03 RX ORDER — VIT C/B6/B5/MAGNESIUM/HERB 173 50-5-6-5MG
CAPSULE ORAL
COMMUNITY
End: 2022-12-26

## 2022-08-03 NOTE — PROGRESS NOTES
Ankush Ram is a 67 y.o. male. Patient is here today for follow-up on his hyperlipidemia, hypertension, obesity, diabetes mellitus type 2, history of elevated liver enzymes.  He also has obstructive sleep apnea.  He is generally been feeling well and has no acute complaints.    Chief Complaint   Patient presents with   • Follow-up     Pt here for 6 month f/u.           Vitals:    22 0916   BP: 116/62   Pulse: 72   Resp: 18   Temp: 96.9 °F (36.1 °C)   SpO2: 97%     Body mass index is 44.07 kg/m².  The following portions of the patient's history were reviewed and updated as appropriate: allergies, current medications, past family history, past medical history, past social history, past surgical history and problem list.    Past Medical History:   Diagnosis Date   • Arthritis    • At risk for sleep apnea     5   • Diabetes mellitus (HCC)    • Hyperlipidemia    • Hypertension    • Hypoglycemia    • Hypoglycemia    • Lesion of lung     ON RECENT SCAN...   • Lung cancer (HCC)     lung   • Pacemaker    • Pneumonia     2018   • Second degree AV block    • Sinus node dysfunction (HCC)    • Sleep apnea       Allergies   Allergen Reactions   • Metformin GI Intolerance      Social History     Socioeconomic History   • Marital status:    Tobacco Use   • Smoking status: Former Smoker     Packs/day: 1.00     Years: 33.00     Pack years: 33.00     Types: Cigarettes     Quit date:      Years since quittin.5   • Smokeless tobacco: Never Used   • Tobacco comment: started cigars 3-4 daily in 2018 and has quit a month ago   Substance and Sexual Activity   • Alcohol use: Yes     Comment: rare   • Drug use: No   • Sexual activity: Defer        Current Outpatient Medications:   •  albuterol sulfate  (90 Base) MCG/ACT inhaler, Inhale 2 puffs Every 4 (Four) Hours As Needed for Wheezing., Disp: 1 inhaler, Rfl: 0  •  albuterol sulfate  (90 Base) MCG/ACT inhaler, Inhale., Disp: , Rfl:    •  amLODIPine (NORVASC) 10 MG tablet, TAKE 1 TABLET BY MOUTH EVERY DAY, Disp: 90 tablet, Rfl: 1  •  atorvastatin (LIPITOR) 40 MG tablet, Take 1 tablet by mouth every night at bedtime., Disp: 90 tablet, Rfl: 3  •  carvedilol (COREG) 25 MG tablet, Take 2 tablets by mouth 2 (Two) Times a Day With Meals., Disp: 360 tablet, Rfl: 3  •  Cyanocobalamin-Methylcobalamin 84066 (B12) MCG/2ML liquid, Take  by mouth., Disp: , Rfl:   •  hydrALAZINE (APRESOLINE) 25 MG tablet, Take 25 mg by mouth 2 (Two) Times a Day., Disp: , Rfl:   •  Misc Natural Products (ELDERBERRY ZINC/VIT C/IMMUNE MT), Apply  to the mouth or throat., Disp: , Rfl:   •  Multiple Vitamins-Minerals (MULTIVITAMIN ADULT PO), Take 1 tablet by mouth Daily., Disp: , Rfl:   •  multivitamin (THERAGRAN) tablet tablet, Take  by mouth., Disp: , Rfl:   •  pioglitazone (Actos) 30 MG tablet, Take 1 tablet by mouth Daily., Disp: 90 tablet, Rfl: 3  •  potassium chloride 10 MEQ CR tablet, Take 2 tablets by mouth 2 (Two) Times a Day., Disp: 360 tablet, Rfl: 3  •  SITagliptin (Januvia) 100 MG tablet, Take 1 tablet by mouth Daily., Disp: 90 tablet, Rfl: 3  •  Spiriva Respimat 2.5 MCG/ACT aerosol solution inhaler, Inhale 2 puffs Daily., Disp: 3 each, Rfl: 3  •  Turmeric 500 MG capsule, Take  by mouth., Disp: , Rfl:   •  valsartan-hydrochlorothiazide (DIOVAN-HCT) 320-25 MG per tablet, Take 1 tablet by mouth Every Morning., Disp: , Rfl:      Objective     History of Present Illness     Review of Systems    Physical Exam  Vitals and nursing note reviewed.   Constitutional:       General: He is not in acute distress.     Appearance: Normal appearance. He is obese. He is not ill-appearing.   HENT:      Head: Normocephalic and atraumatic.   Cardiovascular:      Rate and Rhythm: Normal rate and regular rhythm.      Heart sounds: Normal heart sounds.   Pulmonary:      Effort: Pulmonary effort is normal. No respiratory distress.      Breath sounds: Normal breath sounds. No wheezing or  rales.   Skin:     General: Skin is warm and dry.   Neurological:      General: No focal deficit present.      Mental Status: He is alert and oriented to person, place, and time.   Psychiatric:         Mood and Affect: Mood normal.         Behavior: Behavior normal.         ASSESSMENT CBC was normal.  CMP had a stable sugar of 132 and an ALT of 57 and was otherwise normal.  Lipid panel is total cholesterol 135, HDL 34, LDL 79 and hemoglobin A1c was relatively stable at 6.8.  #1-diabetes mellitus type 2 with acceptable hemoglobin A1c  #2-hyperlipidemia controlled on medication  #3-hypertension controlled on medication  #4-obesity with stable weight  #5-history of obstructive sleep apnea, stable       Problems Addressed this Visit        Cardiac and Vasculature    Hypercholesterolemia - Primary    Hypertension       Endocrine and Metabolic    Type 2 diabetes mellitus (HCC)    Relevant Medications    SITagliptin (Januvia) 100 MG tablet    Class 3 severe obesity due to excess calories with body mass index (BMI) of 40.0 to 44.9 in adult (HCC)       Gastrointestinal Abdominal     Elevated liver enzymes       Sleep    Obstructive sleep apnea of adult      Diagnoses       Codes Comments    Hypercholesterolemia    -  Primary ICD-10-CM: E78.00  ICD-9-CM: 272.0     Primary hypertension     ICD-10-CM: I10  ICD-9-CM: 401.9     Class 3 severe obesity due to excess calories with serious comorbidity and body mass index (BMI) of 40.0 to 44.9 in adult (HCC)     ICD-10-CM: E66.01, Z68.41  ICD-9-CM: 278.01, V85.41     Type 2 diabetes mellitus without complication, without long-term current use of insulin (HCC)     ICD-10-CM: E11.9  ICD-9-CM: 250.00     Elevated liver enzymes     ICD-10-CM: R74.8  ICD-9-CM: 790.5     Obstructive sleep apnea of adult     ICD-10-CM: G47.33  ICD-9-CM: 327.23           PLAN the patient will continue current medicines as now.  I recommended he get a flu shot in October and get a COVID-19 vaccination now.  I  plan on rechecking him in 6 months with laboratory studies    There are no Patient Instructions on file for this visit.  Return in about 6 months (around 2/3/2023) for with labs.

## 2022-08-10 ENCOUNTER — HOSPITAL ENCOUNTER (OUTPATIENT)
Dept: CT IMAGING | Facility: HOSPITAL | Age: 67
Discharge: HOME | End: 2022-08-10
Admitting: THORACIC SURGERY (CARDIOTHORACIC VASCULAR SURGERY)

## 2022-08-10 DIAGNOSIS — Z48.3 AFTERCARE FOLLOWING SURGERY FOR NEOPLASM: ICD-10-CM

## 2022-08-10 DIAGNOSIS — C34.92 SQUAMOUS CELL CARCINOMA OF LEFT LUNG: ICD-10-CM

## 2022-08-10 PROCEDURE — 71250 CT THORAX DX C-: CPT

## 2022-08-15 RX ORDER — AMLODIPINE BESYLATE 10 MG/1
TABLET ORAL
Qty: 90 TABLET | Refills: 1 | Status: SHIPPED | OUTPATIENT
Start: 2022-08-15 | End: 2023-03-01

## 2022-08-24 ENCOUNTER — OFFICE VISIT (OUTPATIENT)
Dept: SURGERY | Facility: CLINIC | Age: 67
End: 2022-08-24

## 2022-08-24 VITALS
DIASTOLIC BLOOD PRESSURE: 70 MMHG | HEART RATE: 75 BPM | HEIGHT: 68 IN | SYSTOLIC BLOOD PRESSURE: 126 MMHG | BODY MASS INDEX: 43.77 KG/M2 | OXYGEN SATURATION: 94 % | WEIGHT: 288.8 LBS

## 2022-08-24 DIAGNOSIS — C34.92 SQUAMOUS CELL CARCINOMA OF LEFT LUNG: Primary | ICD-10-CM

## 2022-08-24 PROCEDURE — 99213 OFFICE O/P EST LOW 20 MIN: CPT | Performed by: NURSE PRACTITIONER

## 2022-08-24 NOTE — PROGRESS NOTES
"Chief Complaint  Squamous cell carcinoma of the lung, follow-up with CT chest    Subjective        Alexy Ram presents to [facility] for continued follow-up and surveillance.    History of Present Illness     Mr. Ram is a pleasant 67 year-old gentleman presents today for further follow-up of a left lower lobectomy performed in November 19, 2018 for squamous cell carcinoma of the lung. Surgery was performed by Dr. Ring. Since his last visit the patient has done well.  Continues to utilize his CPAP and says this has been life-changing for him as he is now able to sleep and interruptedly and feels much better during the day.  He denies any new pulmonary complaints today.  He has had no fever, chills, cough or hemoptysis.  His shortness of breath is stable.  He denies unexplained weight loss, change in voice or night sweats.  He denies pleuritic pain in his chest.   The patient presents today for follow-up with a CT of the chest.  He is accompanied by his spouse.    Objective   Vital Signs:  /70 (BP Location: Right arm, Patient Position: Sitting, Cuff Size: Adult)   Pulse 75   Ht 172 cm (67.72\")   Wt 131 kg (288 lb 12.8 oz)   SpO2 94%   BMI 44.28 kg/m²   Estimated body mass index is 44.28 kg/m² as calculated from the following:    Height as of this encounter: 172 cm (67.72\").    Weight as of this encounter: 131 kg (288 lb 12.8 oz).          Physical Exam  Vitals and nursing note reviewed.   Constitutional:       Appearance: Normal appearance. He is obese.   HENT:      Head: Normocephalic and atraumatic.   Cardiovascular:      Rate and Rhythm: Normal rate and regular rhythm.      Pulses: Normal pulses.   Pulmonary:      Effort: Pulmonary effort is normal.      Breath sounds: Normal breath sounds. No wheezing, rhonchi or rales.   Abdominal:      General: Bowel sounds are normal.      Palpations: Abdomen is soft.   Skin:     General: Skin is warm and dry. "   Neurological:      General: No focal deficit present.      Mental Status: He is alert. Mental status is at baseline.        Result Review :  The following data was reviewed by: ROME Reyes on 08/24/2022:    Data reviewed: Radiologic studies CT of the chest performed without contrast on 8/10/2022 was independently reviewed.  No adenopathy visualized within the chest.  No pleural or pericardial effusions.  No acute abnormalities in the visualized upper abdomen.  Expected postoperative changes s/p left lower lobectomy with some chronic areas of scarring in the left hemothorax and emphysema.  No infiltrates or evidence of new, recurrent or metastatic disease.          Assessment and Plan   Diagnoses and all orders for this visit:    1. Squamous cell carcinoma of left lung (HCC) (Primary)  -     CT Chest Without Contrast Diagnostic; Future    Mr. Ram continues to do well s/p left lower lobectomy.  He has no new complaints today.  We will continue surveillance with repeat CT of the chest and follow-up in our office in 1 year.  Of course we are happy to see him sooner, should the need arise.  Thank you for allowing us to participate in the care of Alexy Ram. We will continue to keep you informed of his progress.       I spent 24 minutes caring for Alexy on this date of service. This time includes time spent by me in the following activities:preparing for the visit, reviewing tests, obtaining and/or reviewing a separately obtained history, performing a medically appropriate examination and/or evaluation , counseling and educating the patient/family/caregiver, ordering medications, tests, or procedures, referring and communicating with other health care professionals , documenting information in the medical record, independently interpreting results and communicating that information with the patient/family/caregiver and care coordination  Follow Up   Return in about 1 year (around 8/24/2023) for Next  scheduled follow up with CT chest.  Patient was given instructions and counseling regarding his condition or for health maintenance advice. Please see specific information pulled into the AVS if appropriate.

## 2022-10-11 RX ORDER — PIOGLITAZONEHYDROCHLORIDE 30 MG/1
TABLET ORAL
Qty: 90 TABLET | Refills: 3 | Status: SHIPPED | OUTPATIENT
Start: 2022-10-11

## 2022-11-09 ENCOUNTER — OFFICE VISIT (OUTPATIENT)
Dept: FAMILY MEDICINE CLINIC | Facility: CLINIC | Age: 67
End: 2022-11-09

## 2022-11-09 VITALS
HEIGHT: 68 IN | BODY MASS INDEX: 44.1 KG/M2 | WEIGHT: 291 LBS | RESPIRATION RATE: 18 BRPM | HEART RATE: 70 BPM | DIASTOLIC BLOOD PRESSURE: 72 MMHG | SYSTOLIC BLOOD PRESSURE: 112 MMHG | OXYGEN SATURATION: 97 % | TEMPERATURE: 98 F

## 2022-11-09 DIAGNOSIS — C34.92 SQUAMOUS CELL CARCINOMA OF LEFT LUNG: ICD-10-CM

## 2022-11-09 DIAGNOSIS — G89.29 HIP PAIN, CHRONIC, LEFT: ICD-10-CM

## 2022-11-09 DIAGNOSIS — M25.552 HIP PAIN, CHRONIC, LEFT: ICD-10-CM

## 2022-11-09 DIAGNOSIS — E66.01 CLASS 3 SEVERE OBESITY DUE TO EXCESS CALORIES WITH SERIOUS COMORBIDITY AND BODY MASS INDEX (BMI) OF 40.0 TO 44.9 IN ADULT: Primary | ICD-10-CM

## 2022-11-09 PROCEDURE — 99213 OFFICE O/P EST LOW 20 MIN: CPT | Performed by: INTERNAL MEDICINE

## 2022-11-09 RX ORDER — GABAPENTIN 100 MG/1
100 CAPSULE ORAL 3 TIMES DAILY
Qty: 90 CAPSULE | Refills: 1 | Status: SHIPPED | OUTPATIENT
Start: 2022-11-09 | End: 2023-01-05

## 2022-11-09 NOTE — PROGRESS NOTES
Subjective   Alexy Ram is a 67 y.o. male. Patient is here today for complaints of significant left hip pain.  He has had no acute injuries, this is been just a long time and coming.  He tells me he took one of his daughters gabapentin pills which helped tremendously and he is wondering if it is possible to continue on them.    Chief Complaint   Patient presents with   • Joint Pain   • Arthritis     Started yrs ago has worsened          Vitals:    22 1318   BP: 112/72   Pulse: 70   Resp: 18   Temp: 98 °F (36.7 °C)   SpO2: 97%     Body mass index is 44.62 kg/m².  The following portions of the patient's history were reviewed and updated as appropriate: allergies, current medications, past family history, past medical history, past social history, past surgical history and problem list.    Past Medical History:   Diagnosis Date   • Arthritis    • At risk for sleep apnea     5   • COPD (chronic obstructive pulmonary disease) (HCC) 2018   • Diabetes mellitus (HCC)    • Hyperlipidemia    • Hypertension    • Hypoglycemia    • Hypoglycemia    • Lesion of lung     ON RECENT SCAN...   • Lung cancer (HCC)     lung   • Pacemaker    • Pneumonia     2018   • Second degree AV block    • Sinus node dysfunction (HCC)    • Sleep apnea       Allergies   Allergen Reactions   • Metformin GI Intolerance      Social History     Socioeconomic History   • Marital status:    Tobacco Use   • Smoking status: Former     Packs/day: 1.00     Years: 33.00     Pack years: 33.00     Types: Cigarettes     Quit date: 2018     Years since quittin.8   • Smokeless tobacco: Never   • Tobacco comments:     started cigars 3-4 daily in 2018 and has quit a month ago   Substance and Sexual Activity   • Alcohol use: Yes     Comment: rare   • Drug use: No   • Sexual activity: Defer        Current Outpatient Medications:   •  albuterol sulfate  (90 Base) MCG/ACT inhaler, Inhale 2 puffs Every 4 (Four) Hours As Needed  for Wheezing., Disp: 1 inhaler, Rfl: 0  •  amLODIPine (NORVASC) 10 MG tablet, TAKE 1 TABLET BY MOUTH EVERY DAY, Disp: 90 tablet, Rfl: 1  •  atorvastatin (LIPITOR) 40 MG tablet, Take 1 tablet by mouth every night at bedtime., Disp: 90 tablet, Rfl: 3  •  carvedilol (COREG) 25 MG tablet, Take 2 tablets by mouth 2 (Two) Times a Day With Meals., Disp: 360 tablet, Rfl: 3  •  Cyanocobalamin-Methylcobalamin 48977 (B12) MCG/2ML liquid, Take  by mouth., Disp: , Rfl:   •  hydrALAZINE (APRESOLINE) 25 MG tablet, Take 25 mg by mouth 2 (Two) Times a Day., Disp: , Rfl:   •  Misc Natural Products (ELDERBERRY ZINC/VIT C/IMMUNE MT), Apply  to the mouth or throat., Disp: , Rfl:   •  Multiple Vitamins-Minerals (MULTIVITAMIN ADULT PO), Take 1 tablet by mouth Daily., Disp: , Rfl:   •  multivitamin (THERAGRAN) tablet tablet, Take  by mouth., Disp: , Rfl:   •  pioglitazone (ACTOS) 30 MG tablet, TAKE 1 TABLET BY MOUTH EVERY DAY, Disp: 90 tablet, Rfl: 3  •  potassium chloride 10 MEQ CR tablet, Take 2 tablets by mouth 2 (Two) Times a Day., Disp: 360 tablet, Rfl: 3  •  SITagliptin (Januvia) 100 MG tablet, Take 1 tablet by mouth Daily., Disp: 90 tablet, Rfl: 3  •  Spiriva Respimat 2.5 MCG/ACT aerosol solution inhaler, Inhale 2 puffs Daily., Disp: 3 each, Rfl: 3  •  Turmeric 500 MG capsule, Take  by mouth., Disp: , Rfl:   •  valsartan-hydrochlorothiazide (DIOVAN-HCT) 320-25 MG per tablet, Take 1 tablet by mouth Every Morning., Disp: , Rfl:   •  albuterol sulfate  (90 Base) MCG/ACT inhaler, Inhale., Disp: , Rfl:   •  gabapentin (NEURONTIN) 100 MG capsule, Take 1 capsule by mouth 3 (Three) Times a Day., Disp: 90 capsule, Rfl: 1     Objective     History of Present Illness     Review of Systems    Physical Exam  Vitals and nursing note reviewed.   Constitutional:       General: He is not in acute distress.     Appearance: Normal appearance. He is not ill-appearing.   HENT:      Head: Normocephalic and atraumatic.   Cardiovascular:      Rate  and Rhythm: Normal rate and regular rhythm.      Heart sounds: Normal heart sounds.   Pulmonary:      Effort: Pulmonary effort is normal. No respiratory distress.      Breath sounds: Normal breath sounds. No wheezing or rales.   Skin:     General: Skin is warm and dry.   Neurological:      General: No focal deficit present.      Mental Status: He is alert and oriented to person, place, and time.   Psychiatric:         Mood and Affect: Mood normal.         Behavior: Behavior normal.         ASSESSMENT #1-left hip pain, most likely osteoarthritis     Problems Addressed this Visit        Endocrine and Metabolic    Class 3 severe obesity due to excess calories with body mass index (BMI) of 40.0 to 44.9 in adult ([facility]) - Primary       Hematology and Neoplasia    Squamous cell carcinoma of left lung ([facility])   Other Visit Diagnoses     Hip pain, chronic, left        Relevant Medications    gabapentin (NEURONTIN) 100 MG capsule    Other Relevant Orders    Ambulatory Referral to Orthopedic Surgery      Diagnoses       Codes Comments    Class 3 severe obesity due to excess calories with serious comorbidity and body mass index (BMI) of 40.0 to 44.9 in adult ([facility])    -  Primary ICD-10-CM: E66.01, Z68.41  ICD-9-CM: 278.01, V85.41     Squamous cell carcinoma of left lung ([facility])     ICD-10-CM: C34.92  ICD-9-CM: 162.9     Hip pain, chronic, left     ICD-10-CM: M25.552, G89.29  ICD-9-CM: 719.45, 338.29           PLAN the patient had apparently excellent response to gabapentin so I am going to start him on a low-dose and see how that does with him.  Additionally I will refer him to orthopedics.  He is already scheduled for follow-up with me in several months with labs and will keep that appointment    There are no Patient Instructions on file for this visit.  No follow-ups on file.

## 2022-11-28 ENCOUNTER — OFFICE VISIT (OUTPATIENT)
Dept: ORTHOPEDIC SURGERY | Facility: CLINIC | Age: 67
End: 2022-11-28

## 2022-11-28 VITALS — TEMPERATURE: 96.4 F | WEIGHT: 287 LBS | HEIGHT: 68 IN | BODY MASS INDEX: 43.5 KG/M2

## 2022-11-28 DIAGNOSIS — M25.552 LEFT HIP PAIN: Primary | ICD-10-CM

## 2022-11-28 PROCEDURE — 73502 X-RAY EXAM HIP UNI 2-3 VIEWS: CPT | Performed by: NURSE PRACTITIONER

## 2022-11-28 PROCEDURE — 99214 OFFICE O/P EST MOD 30 MIN: CPT | Performed by: NURSE PRACTITIONER

## 2022-11-28 NOTE — PROGRESS NOTES
Patient: Alexy Ram  YOB: 1955 67 y.o. male  Medical Record Number: [id number]    Chief Complaints:   Chief Complaint   Patient presents with   • Left Hip - Initial Evaluation       History of Present Illness:Alexy Ram is a 67 y.o. male who presents as a new patient both myself as well as to the practice with complaints of severe left hip pain.  The patient reports that he has had pain off and on for the last couple of years but is progressively gotten worse over the last few months.  Denies any injury.  He does have a history of low back pain as well.  He does at times have pain going down his left leg.  Patient denies any numbness or tingling.  He reports that the pain is worse with any standing walking also trying to lift his leg.    Allergies:   Allergies   Allergen Reactions   • Metformin GI Intolerance       Medications:   Current Outpatient Medications   Medication Sig Dispense Refill   • albuterol sulfate  (90 Base) MCG/ACT inhaler Inhale 2 puffs Every 4 (Four) Hours As Needed for Wheezing. 1 inhaler 0   • albuterol sulfate  (90 Base) MCG/ACT inhaler Inhale.     • amLODIPine (NORVASC) 10 MG tablet TAKE 1 TABLET BY MOUTH EVERY DAY 90 tablet 1   • atorvastatin (LIPITOR) 40 MG tablet Take 1 tablet by mouth every night at bedtime. 90 tablet 3   • carvedilol (COREG) 25 MG tablet Take 2 tablets by mouth 2 (Two) Times a Day With Meals. 360 tablet 3   • Cyanocobalamin-Methylcobalamin 76158 (B12) MCG/2ML liquid Take  by mouth.     • gabapentin (NEURONTIN) 100 MG capsule Take 1 capsule by mouth 3 (Three) Times a Day. 90 capsule 1   • hydrALAZINE (APRESOLINE) 25 MG tablet Take 25 mg by mouth 2 (Two) Times a Day.     • Multiple Vitamins-Minerals (MULTIVITAMIN ADULT PO) Take 1 tablet by mouth Daily.     • multivitamin (THERAGRAN) tablet tablet Take  by mouth.     • pioglitazone (ACTOS) 30 MG tablet TAKE 1 TABLET BY MOUTH EVERY DAY 90 tablet 3   • potassium chloride 10 MEQ CR tablet  "Take 2 tablets by mouth 2 (Two) Times a Day. 360 tablet 3   • SITagliptin (Januvia) 100 MG tablet Take 1 tablet by mouth Daily. 90 tablet 3   • Spiriva Respimat 2.5 MCG/ACT aerosol solution inhaler Inhale 2 puffs Daily. 3 each 3   • valsartan-hydrochlorothiazide (DIOVAN-HCT) 320-25 MG per tablet Take 1 tablet by mouth Every Morning.     • Misc Natural Products (ELDERBERRY ZINC/VIT C/IMMUNE MT) Apply  to the mouth or throat.     • Turmeric 500 MG capsule Take  by mouth.       No current facility-administered medications for this visit.         The following portions of the patient's history were reviewed and updated as appropriate: allergies, current medications, past family history, past medical history, past social history, past surgical history and problem list.    Review of Systems:   A 14 point review of systems was performed. All systems negative except pertinent positives/negative listed in HPI above    Physical Exam:   Vitals:    11/28/22 0826   Temp: 96.4 °F (35.8 °C)   TempSrc: Temporal   Weight: 130 kg (287 lb)   Height: 172 cm (67.72\")       General: A and O x 3, ASA, NAD    SCLERA:    Normal    Skin clear no unusual lesions noted  Left hip patient is nontender palpation he does have severe pain with internal ex rotation with a positive Stinchfield positive logroll calf is soft and nontender  Body mass index is 44 kg/m².         Radiology:  Xrays 2 views of left hip ordered and reviewed today secondary to pain show bone-on-bone end-stage osteoarthritis with cyst and spur formation.  He does however have significant arthritic changes noted of his lower lumbar spine as well.  No compared to views available    Assessment/Plan: End-stage osteoarthritis left hip with increasing pain  obesity    Patient and I discussed options, we will proceed with a left hip fluoroscopy guided cortisone injection to definitively determine the source of his pain given the amount of arthritis he also has in his lumbar spine.  In " addition his BMI is high today and given his other medical conditions including his diabetes we will start with the injection first to see if that helps provide him some relief so he can better address weight management and other medical conditions first.  Most likely will need a total hip replacement      Dari Nelson, APRN  11/28/2022

## 2022-11-29 ENCOUNTER — PATIENT ROUNDING (BHMG ONLY) (OUTPATIENT)
Dept: ORTHOPEDIC SURGERY | Facility: CLINIC | Age: 67
End: 2022-11-29

## 2022-11-29 NOTE — PROGRESS NOTES
A saperatec Message has been sent to the patient for PATIENT ROUNDING with [facility]

## 2022-12-06 ENCOUNTER — TELEPHONE (OUTPATIENT)
Dept: ORTHOPEDIC SURGERY | Facility: CLINIC | Age: 67
End: 2022-12-06

## 2022-12-06 NOTE — TELEPHONE ENCOUNTER
Provider: ROME LIZAMA    Caller: PATRICIA MEYERS    Relationship to Patient: SELF    Phone Number: [phone]    Reason for Call: PATIENT IS HAVING A LOT OF PAIN IN HIS LEFT HIP. HE STATED ITS HURTING SO BAD ITS HARD FOR HIM TO GET AROUND. HE IS HAVING A HARD TIME GETTING IN AND OUT OF THE CAR TOO. PATIENT DIDN'T KNOW IF HE CAN GET PAIN MEDICATION. PLEASE CALL HIM TO ADVISE ON WHAT HE SHOULD DO ABOUT THE PAIN.    THANK YOU

## 2022-12-08 NOTE — TELEPHONE ENCOUNTER
Spoke with patient, relayed he will need to speak with his medical doctor regarding non surgical pain medication,patient verbalized understanding

## 2022-12-09 ENCOUNTER — TELEPHONE (OUTPATIENT)
Dept: PEDIATRICS | Facility: OTHER | Age: 67
End: 2022-12-09

## 2022-12-09 NOTE — TELEPHONE ENCOUNTER
Caller: Alexy Ram    Relationship: Self    Best call back number: [phone]    What medication are you requesting: SOMETHING FOR HIP PAIN.    What are your current symptoms: LEFT HIP PAIN. STATES IT IS BONE ON BONE    How long have you been experiencing symptoms: SINCE SEEN BY ORTHOPEDICS 11-28-22    Have you had these symptoms before:    [] Yes  [x] No    Have you been treated for these symptoms before:   [] Yes  [x] No    If a prescription is needed, what is your preferred pharmacy and phone number:  [facility]/pharmacy #[id number] - [place], [place] - [address] [facility] [address] - [phone] ph - [phone]     Additional notes:PATIENT STATES THAT HE DOES NOT WANT TO TAKE LORTAB BECAUSE  IN THE PAST THIS MEDICATION HAS MADE HIM JUMPY AND JITTERY.

## 2022-12-13 RX ORDER — MELOXICAM 7.5 MG/1
7.5 TABLET ORAL 2 TIMES DAILY PRN
Qty: 30 TABLET | Refills: 1 | OUTPATIENT
Start: 2022-12-13 | End: 2022-12-26

## 2022-12-13 NOTE — TELEPHONE ENCOUNTER
CALLED AND LEFT MESSAGE FOR PT THAT DR. RAI SENT RX FOR MELOXICAM FOR PT TO TRY UNTIL ORTHO SEES HIM.

## 2022-12-14 ENCOUNTER — HOSPITAL ENCOUNTER (OUTPATIENT)
Dept: GENERAL RADIOLOGY | Facility: HOSPITAL | Age: 67
Discharge: HOME | End: 2022-12-14
Admitting: NURSE PRACTITIONER

## 2022-12-14 DIAGNOSIS — M25.552 LEFT HIP PAIN: ICD-10-CM

## 2022-12-14 PROCEDURE — 0 LIDOCAINE 1 % SOLUTION: Performed by: NURSE PRACTITIONER

## 2022-12-14 PROCEDURE — 25010000002 METHYLPREDNISOLONE PER 40 MG: Performed by: NURSE PRACTITIONER

## 2022-12-14 PROCEDURE — 25010000002 IOPAMIDOL 61 % SOLUTION: Performed by: NURSE PRACTITIONER

## 2022-12-14 PROCEDURE — 77002 NEEDLE LOCALIZATION BY XRAY: CPT

## 2022-12-14 RX ORDER — METHYLPREDNISOLONE ACETATE 40 MG/ML
80 INJECTION, SUSPENSION INTRA-ARTICULAR; INTRALESIONAL; INTRAMUSCULAR; SOFT TISSUE ONCE
Status: COMPLETED | OUTPATIENT
Start: 2022-12-14 | End: 2022-12-14

## 2022-12-14 RX ORDER — LIDOCAINE HYDROCHLORIDE 10 MG/ML
3 INJECTION, SOLUTION INFILTRATION; PERINEURAL ONCE
Status: COMPLETED | OUTPATIENT
Start: 2022-12-14 | End: 2022-12-14

## 2022-12-14 RX ORDER — BUPIVACAINE HYDROCHLORIDE 2.5 MG/ML
5 INJECTION, SOLUTION EPIDURAL; INFILTRATION; INTRACAUDAL ONCE
Status: COMPLETED | OUTPATIENT
Start: 2022-12-14 | End: 2022-12-14

## 2022-12-14 RX ADMIN — BUPIVACAINE HYDROCHLORIDE 5 ML: 2.5 INJECTION, SOLUTION EPIDURAL; INFILTRATION; INTRACAUDAL; PERINEURAL at 12:31

## 2022-12-14 RX ADMIN — METHYLPREDNISOLONE ACETATE 80 MG: 40 INJECTION, SUSPENSION INTRA-ARTICULAR; INTRALESIONAL; INTRAMUSCULAR; SOFT TISSUE at 12:30

## 2022-12-14 RX ADMIN — LIDOCAINE HYDROCHLORIDE 3 ML: 10 INJECTION, SOLUTION INFILTRATION; PERINEURAL at 12:31

## 2022-12-14 RX ADMIN — IOPAMIDOL 5 ML: 612 INJECTION, SOLUTION INTRAVENOUS at 12:31

## 2023-01-05 DIAGNOSIS — G89.29 HIP PAIN, CHRONIC, LEFT: ICD-10-CM

## 2023-01-05 DIAGNOSIS — M25.552 HIP PAIN, CHRONIC, LEFT: ICD-10-CM

## 2023-01-05 RX ORDER — GABAPENTIN 100 MG/1
CAPSULE ORAL
Qty: 90 CAPSULE | Refills: 1 | Status: SHIPPED | OUTPATIENT
Start: 2023-01-05 | End: 2023-02-16 | Stop reason: SDUPTHER

## 2023-01-05 NOTE — TELEPHONE ENCOUNTER
Rx Refill Note  Requested Prescriptions     Pending Prescriptions Disp Refills   • gabapentin (NEURONTIN) 100 MG capsule [Pharmacy Med Name: GABAPENTIN 100 MG CAPSULE] 90 capsule 1     Sig: TAKE 1 CAPSULE BY MOUTH THREE TIMES A DAY      Last office visit with prescribing clinician: 11/9/2022   Last telemedicine visit with prescribing clinician: 2/13/2023   Next office visit with prescribing clinician: 2/16/2023                         Would you like a call back once the refill request has been completed: [] Yes [] No    If the office needs to give you a call back, can they leave a voicemail: [] Yes [] No    Andree Laboy MA  01/05/23, 07:29 EST

## 2023-01-06 ENCOUNTER — OFFICE VISIT (OUTPATIENT)
Dept: ORTHOPEDIC SURGERY | Facility: CLINIC | Age: 68
End: 2023-01-06
Payer: MEDICARE

## 2023-01-06 VITALS — BODY MASS INDEX: 43.19 KG/M2 | TEMPERATURE: 97.3 F | WEIGHT: 285 LBS | HEIGHT: 68 IN

## 2023-01-06 DIAGNOSIS — M16.12 PRIMARY OSTEOARTHRITIS OF LEFT HIP: Primary | ICD-10-CM

## 2023-01-06 PROCEDURE — 99214 OFFICE O/P EST MOD 30 MIN: CPT | Performed by: NURSE PRACTITIONER

## 2023-01-06 NOTE — PROGRESS NOTES
Patient: Alexy Ram  YOB: 1955 67 y.o. male  Medical Record Number: [id number]    Chief Complaints:   Chief Complaint   Patient presents with   • Left Hip - Follow-up       History of Present Illness:Alexy Ram is a 67 y.o. male who presents with continued complaints of severe left hip pain.  Patient had his left hip fluoroscopy guided cortisone injection and for 2 days\" felt great\" unfortunately that has since worn off he is having severe pain with any walking or standing.    Allergies:   Allergies   Allergen Reactions   • Metformin GI Intolerance       Medications:   Current Outpatient Medications   Medication Sig Dispense Refill   • albuterol sulfate  (90 Base) MCG/ACT inhaler Inhale 2 puffs Every 4 (Four) Hours As Needed for Wheezing. 1 inhaler 0   • albuterol sulfate  (90 Base) MCG/ACT inhaler Inhale.     • amLODIPine (NORVASC) 10 MG tablet TAKE 1 TABLET BY MOUTH EVERY DAY 90 tablet 1   • atorvastatin (LIPITOR) 40 MG tablet Take 1 tablet by mouth every night at bedtime. 90 tablet 3   • carvedilol (COREG) 25 MG tablet Take 2 tablets by mouth 2 (Two) Times a Day With Meals. 360 tablet 3   • Cyanocobalamin-Methylcobalamin 72670 (B12) MCG/2ML liquid Take  by mouth.     • Eliquis 5 MG tablet tablet Take 5 mg by mouth 2 (Two) Times a Day.     • gabapentin (NEURONTIN) 100 MG capsule TAKE 1 CAPSULE BY MOUTH THREE TIMES A DAY 90 capsule 1   • hydrALAZINE (APRESOLINE) 25 MG tablet Take 25 mg by mouth 2 (Two) Times a Day.     • Multiple Vitamins-Minerals (MULTIVITAMIN ADULT PO) Take 1 tablet by mouth Daily.     • multivitamin (THERAGRAN) tablet tablet Take  by mouth.     • pioglitazone (ACTOS) 30 MG tablet TAKE 1 TABLET BY MOUTH EVERY DAY 90 tablet 3   • potassium chloride 10 MEQ CR tablet Take 2 tablets by mouth 2 (Two) Times a Day. 360 tablet 3   • SITagliptin (Januvia) 100 MG tablet Take 1 tablet by mouth Daily. 90 tablet 3   • Spiriva Respimat 2.5 MCG/ACT aerosol solution inhaler  Inhale 2 puffs Daily. 3 each 3   • valsartan-hydrochlorothiazide (DIOVAN-HCT) 320-25 MG per tablet Take 1 tablet by mouth Every Morning.       No current facility-administered medications for this visit.         The following portions of the patient's history were reviewed and updated as appropriate: allergies, current medications, past family history, past medical history, past social history, past surgical history and problem list.    Review of Systems:   A 14 point review of systems was performed. All systems negative except pertinent positives/negative listed in HPI above    Physical Exam:   Vitals:    01/06/23 0953   Temp: 97.3 °F (36.3 °C)   TempSrc: Temporal   Weight: 129 kg (285 lb)   Height: 172 cm (67.72\")       General: A and O x 3, ASA, NAD    SCLERA:    Normal    Skin clear no unusual lesions noted  Patient has decreased range of motion secondary to pain with a positive [facility] positive logroll calf soft and nontender  Body mass index is 43.7 kg/m².      Radiology:  Xrays previous x-rays of the left hip were reviewed show bone-on-bone end-stage osteoarthritis with cyst and spur formation    Assessment/Plan: End-stage osteoarthritis left hip with severe pain    Patient and I discussed options, unfortunately given his BMI we are not able to proceed with surgery.  We have set a 20 pound weight loss goal and once he has obtained that he will give us a call we can go ahead and proceed with left total hip replacement posterior approach.  He will also need cardiac and pulmonary clearance.  Patient verbalized understanding.  Continuation of conservative management vs. RD discussed.  The patient wishes to proceed with total hip replacement.  At this point the patient has failed the full gamut of conservative treatment and stating complete understanding of the risks/benefits/ anternatives wishes to proceed with surgical treatment.    Risk and benefits of surgery were reviewed.  Including, but not limited to,  blood clots, anesthesia risk, infection, leg length discrepancy, fracture, skin/leg numbness, failure of the implant, need for future surgeries, continued pain, hematoma, need for transfusion, and death, among others.  The patient understands and wishes to proceed.     The spectrum of treatment options were discussed with the patient in detail including both the nonoperative and operative treatment modalities and their respective risks and benefits.  After thorough discussion, the patient has elected to undergo surgical treatment.  The details of the surgical procedure were explained including the location of probable incisions and a description of the likely implants to be used.  Models and diagrams were used as educational resources. The patient understands the likely convalescence after surgery, as well as the rehabilitation required.  We thoroughly discussed the risks, benefits, and alternatives to surgery.  The risks include but are not limited to the risk of infection, joint stiffness, blood clots (including DVT and/or pulmonary embolus along with the risk of death), neurologic and/or vascular injury, fracture, dislocation, nonunion, malunion, need for further surgery including hardware failure requiring revision, and continued pain.  It was explained that if tissue has been repaired or reconstructed, there is also a chance of failure which may require further management.  Following the completion of the discussion, the patient expressed understanding of this planned course of care, all their questions were answered and consent will be obtained preoperatively.    Operative Plan: Posterior approach Total Hip Replacement  Outpatient          Dari Nelson, APRN  1/6/2023

## 2023-01-23 RX ORDER — ATORVASTATIN CALCIUM 40 MG/1
TABLET, FILM COATED ORAL
Qty: 90 TABLET | Refills: 3 | Status: SHIPPED | OUTPATIENT
Start: 2023-01-23

## 2023-01-23 RX ORDER — CARVEDILOL 25 MG/1
TABLET ORAL
Qty: 360 TABLET | Refills: 3 | Status: SHIPPED | OUTPATIENT
Start: 2023-01-23

## 2023-02-01 RX ORDER — POTASSIUM CHLORIDE 750 MG/1
TABLET, FILM COATED, EXTENDED RELEASE ORAL
Qty: 360 TABLET | Refills: 3 | Status: SHIPPED | OUTPATIENT
Start: 2023-02-01 | End: 2023-02-16 | Stop reason: SDUPTHER

## 2023-02-10 ENCOUNTER — TELEPHONE (OUTPATIENT)
Dept: FAMILY MEDICINE CLINIC | Facility: CLINIC | Age: 68
End: 2023-02-10

## 2023-02-10 NOTE — TELEPHONE ENCOUNTER
Caller: Alexy Ram    Relationship to patient: Self    Best call back number: [phone]     Patient is needing: PATIENT HAS A STRESS TEST SCHEDULED FOR 2/13. DUE TO HIM BEING CLAUSTROPHOBIC, HE WAS WANTING TO KNOW IF HE COULD GET SOME TYPE OF MEDICATION SENT IN TO HELP HIM CALM HIS NERVES DURING THE PROCESS. PLEASE ADVISE AS SOON AS POSSIBLE

## 2023-02-10 NOTE — TELEPHONE ENCOUNTER
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 I ADVISED PT TO CALL HIS CARDIOLOGIST THAT ORDERED THE TEST AND ASK IF THEY CAN CALL HIM SOMETHING IN. PT VOICED UNDERSTANDING.

## 2023-02-16 ENCOUNTER — OFFICE VISIT (OUTPATIENT)
Dept: FAMILY MEDICINE CLINIC | Facility: CLINIC | Age: 68
End: 2023-02-16
Payer: MEDICARE

## 2023-02-16 VITALS
BODY MASS INDEX: 41.92 KG/M2 | SYSTOLIC BLOOD PRESSURE: 116 MMHG | WEIGHT: 276.6 LBS | TEMPERATURE: 98.4 F | HEIGHT: 68 IN | HEART RATE: 82 BPM | OXYGEN SATURATION: 95 % | RESPIRATION RATE: 20 BRPM | DIASTOLIC BLOOD PRESSURE: 66 MMHG

## 2023-02-16 DIAGNOSIS — E78.00 HYPERCHOLESTEROLEMIA: Primary | ICD-10-CM

## 2023-02-16 DIAGNOSIS — M25.552 HIP PAIN, CHRONIC, LEFT: ICD-10-CM

## 2023-02-16 DIAGNOSIS — I10 PRIMARY HYPERTENSION: ICD-10-CM

## 2023-02-16 DIAGNOSIS — R74.8 ELEVATED LIVER ENZYMES: ICD-10-CM

## 2023-02-16 DIAGNOSIS — M19.90 ARTHRITIS: ICD-10-CM

## 2023-02-16 DIAGNOSIS — E66.01 CLASS 3 SEVERE OBESITY DUE TO EXCESS CALORIES WITH SERIOUS COMORBIDITY AND BODY MASS INDEX (BMI) OF 40.0 TO 44.9 IN ADULT: ICD-10-CM

## 2023-02-16 DIAGNOSIS — G89.29 HIP PAIN, CHRONIC, LEFT: ICD-10-CM

## 2023-02-16 DIAGNOSIS — E11.9 TYPE 2 DIABETES MELLITUS WITHOUT COMPLICATION, WITHOUT LONG-TERM CURRENT USE OF INSULIN: ICD-10-CM

## 2023-02-16 PROCEDURE — 99214 OFFICE O/P EST MOD 30 MIN: CPT | Performed by: INTERNAL MEDICINE

## 2023-02-16 RX ORDER — POTASSIUM CHLORIDE 750 MG/1
20 TABLET, FILM COATED, EXTENDED RELEASE ORAL 3 TIMES DAILY
Qty: 540 TABLET | Refills: 3 | Status: SHIPPED | OUTPATIENT
Start: 2023-02-16

## 2023-02-16 RX ORDER — GABAPENTIN 100 MG/1
100 CAPSULE ORAL 3 TIMES DAILY
Qty: 90 CAPSULE | Refills: 2 | Status: SHIPPED | OUTPATIENT
Start: 2023-02-16 | End: 2023-03-01

## 2023-02-16 NOTE — PROGRESS NOTES
Subjective   Alexy Ram is a 67 y.o. male. Patient is here today for follow-up on his hypertension, hyperlipidemia, obesity, diabetes mellitus type 2, hypokalemia and elevated liver tests.  He tells me he is having significant pain in his left hip and will be having a total hip replacement when he can lose enough weight.  Otherwise he has been stable.    Chief Complaint   Patient presents with   • Follow-up     LABS           Vitals:    23 0858   BP: 116/66   Pulse: 82   Resp: 20   Temp: 98.4 °F (36.9 °C)   SpO2: 95%     Body mass index is 42.41 kg/m².  The following portions of the patient's history were reviewed and updated as appropriate: allergies, current medications, past family history, past medical history, past social history, past surgical history and problem list.    Past Medical History:   Diagnosis Date   • Arthritis    • At risk for sleep apnea     5   • COPD (chronic obstructive pulmonary disease) (HCC) 2018   • Diabetes mellitus (HCC)    • Hyperlipidemia    • Hypertension    • Hypoglycemia    • Hypoglycemia    • Lesion of lung     ON RECENT SCAN...   • Lung cancer (HCC)     lung   • Pacemaker    • Pneumonia     2018   • Second degree AV block    • Sinus node dysfunction (HCC)    • Sleep apnea       Allergies   Allergen Reactions   • Metformin GI Intolerance      Social History     Socioeconomic History   • Marital status:    Tobacco Use   • Smoking status: Former     Packs/day: 1.00     Years: 33.00     Pack years: 33.00     Types: Cigarettes     Quit date: 2018     Years since quittin.1   • Smokeless tobacco: Never   • Tobacco comments:     started cigars 3-4 daily in 2018 and has quit a month ago   Substance and Sexual Activity   • Alcohol use: Yes     Comment: rare   • Drug use: No   • Sexual activity: Defer        Current Outpatient Medications:   •  albuterol sulfate  (90 Base) MCG/ACT inhaler, Inhale 2 puffs Every 4 (Four) Hours As Needed for  Wheezing., Disp: 1 inhaler, Rfl: 0  •  albuterol sulfate  (90 Base) MCG/ACT inhaler, Inhale., Disp: , Rfl:   •  amLODIPine (NORVASC) 10 MG tablet, TAKE 1 TABLET BY MOUTH EVERY DAY, Disp: 90 tablet, Rfl: 1  •  atorvastatin (LIPITOR) 40 MG tablet, TAKE 1 TABLET BY MOUTH EVERYDAY AT BEDTIME, Disp: 90 tablet, Rfl: 3  •  carvedilol (COREG) 25 MG tablet, TAKE 2 TABLETS BY MOUTH 2 TIMES A DAY WITH MEALS., Disp: 360 tablet, Rfl: 3  •  Cyanocobalamin-Methylcobalamin 87245 (B12) MCG/2ML liquid, Take  by mouth., Disp: , Rfl:   •  Eliquis 5 MG tablet tablet, Take 5 mg by mouth 2 (Two) Times a Day., Disp: , Rfl:   •  gabapentin (NEURONTIN) 100 MG capsule, Take 1 capsule by mouth 3 (Three) Times a Day., Disp: 90 capsule, Rfl: 2  •  hydrALAZINE (APRESOLINE) 25 MG tablet, Take 25 mg by mouth 2 (Two) Times a Day., Disp: , Rfl:   •  Multiple Vitamins-Minerals (MULTIVITAMIN ADULT PO), Take 1 tablet by mouth Daily., Disp: , Rfl:   •  multivitamin (THERAGRAN) tablet tablet, Take  by mouth., Disp: , Rfl:   •  pioglitazone (ACTOS) 30 MG tablet, TAKE 1 TABLET BY MOUTH EVERY DAY, Disp: 90 tablet, Rfl: 3  •  potassium chloride 10 MEQ CR tablet, Take 2 tablets by mouth 3 (Three) Times a Day., Disp: 540 tablet, Rfl: 3  •  SITagliptin (Januvia) 100 MG tablet, Take 1 tablet by mouth Daily., Disp: 90 tablet, Rfl: 3  •  Spiriva Respimat 2.5 MCG/ACT aerosol solution inhaler, Inhale 2 puffs Daily., Disp: 3 each, Rfl: 3  •  valsartan-hydrochlorothiazide (DIOVAN-HCT) 320-25 MG per tablet, Take 1 tablet by mouth Every Morning., Disp: , Rfl:      Objective     History of Present Illness     Review of Systems    Physical Exam  Vitals and nursing note reviewed.   Constitutional:       General: He is not in acute distress.     Appearance: Normal appearance. He is obese. He is not ill-appearing.   HENT:      Head: Normocephalic and atraumatic.   Cardiovascular:      Rate and Rhythm: Normal rate and regular rhythm.      Heart sounds: Normal heart  sounds.   Pulmonary:      Effort: Pulmonary effort is normal. No respiratory distress.      Breath sounds: Normal breath sounds. No wheezing or rales.   Skin:     General: Skin is warm and dry.   Neurological:      General: No focal deficit present.      Mental Status: He is alert and oriented to person, place, and time.   Psychiatric:         Mood and Affect: Mood normal.         Behavior: Behavior normal.         ASSESSMENT CBC had a hemoglobin minimally low at 12.8 and was otherwise normal.  CMP had an improved sugar of 109, potassium low at 3.3 and was otherwise essentially normal.  Hemoglobin A1c has improved to 6.2.  Lipid panel had total cholesterol 120, HDL 37, LDL 67.  Urine microalbumin has improved to 133.4.  TSH and free T4 were normal and PSA remains normal at 3.21.  #1-obesity with significant weight loss, continue diet control  #2-hypertension controlled on medication  #3-hyperlipidemia controlled on medication  #4-diabetes mellitus type 2 with improved hemoglobin A1c  #5-hypokalemia, asymptomatic  #6-arthritis of the left hip       Problems Addressed this Visit        Cardiac and Vasculature    Hypercholesterolemia - Primary    Hypertension       Endocrine and Metabolic    Type 2 diabetes mellitus (HCC)    Class 3 severe obesity due to excess calories with body mass index (BMI) of 40.0 to 44.9 in adult ([facility])       Gastrointestinal Abdominal     Elevated liver enzymes       Musculoskeletal and Injuries    Arthritis   Other Visit Diagnoses     Hip pain, chronic, left        Relevant Medications    gabapentin (NEURONTIN) 100 MG capsule      Diagnoses       Codes Comments    Hypercholesterolemia    -  Primary ICD-10-CM: E78.00  ICD-9-CM: 272.0     Primary hypertension     ICD-10-CM: I10  ICD-9-CM: 401.9     Class 3 severe obesity due to excess calories with serious comorbidity and body mass index (BMI) of 40.0 to 44.9 in adult ([facility])     ICD-10-CM: E66.01, Z68.41  ICD-9-CM: 278.01, V85.41     Type 2  diabetes mellitus without complication, without long-term current use of insulin (HCC)     ICD-10-CM: E11.9  ICD-9-CM: 250.00     Elevated liver enzymes     ICD-10-CM: R74.8  ICD-9-CM: 790.5     Arthritis     ICD-10-CM: M19.90  ICD-9-CM: 716.90     Hip pain, chronic, left     ICD-10-CM: M25.552, G89.29  ICD-9-CM: 719.45, 338.29           PLAN patient will continue current medicines as now but I am going to increase his potassium to 20 mEq 3 times a day.  He also can take the gabapentin 3 times a day and gradually increase it by a tablet at a time if it is helping with his hip pain.  I encouraged him to continue working on weight loss.  I plan on rechecking him in 6 months with a wellness visit and a CBC, CMP, hemoglobin A1c, lipid panel, urine microalbumin and TSH    There are no Patient Instructions on file for this visit.  Return in about 6 months (around 8/16/2023) for with labs.

## 2023-02-28 DIAGNOSIS — G89.29 HIP PAIN, CHRONIC, LEFT: ICD-10-CM

## 2023-02-28 DIAGNOSIS — M25.552 HIP PAIN, CHRONIC, LEFT: ICD-10-CM

## 2023-03-01 RX ORDER — AMLODIPINE BESYLATE 10 MG/1
TABLET ORAL
Qty: 90 TABLET | Refills: 1 | Status: SHIPPED | OUTPATIENT
Start: 2023-03-01

## 2023-03-01 RX ORDER — GABAPENTIN 100 MG/1
CAPSULE ORAL
Qty: 90 CAPSULE | Refills: 1 | Status: SHIPPED | OUTPATIENT
Start: 2023-03-01

## 2023-03-01 NOTE — TELEPHONE ENCOUNTER
Rx Refill Note  Requested Prescriptions     Pending Prescriptions Disp Refills   • gabapentin (NEURONTIN) 100 MG capsule [Pharmacy Med Name: GABAPENTIN 100 MG CAPSULE] 90 capsule 1     Sig: TAKE 1 CAPSULE BY MOUTH THREE TIMES A DAY      Last office visit with prescribing clinician: 2/16/2023   Last telemedicine visit with prescribing clinician: 8/22/2023   Next office visit with prescribing clinician: 8/30/2023                         Would you like a call back once the refill request has been completed: [] Yes [] No    If the office needs to give you a call back, can they leave a voicemail: [] Yes [] No    Andree Laboy MA  03/01/23, 07:58 EST

## 2023-04-20 DIAGNOSIS — G89.29 HIP PAIN, CHRONIC, LEFT: ICD-10-CM

## 2023-04-20 DIAGNOSIS — M25.552 HIP PAIN, CHRONIC, LEFT: ICD-10-CM

## 2023-04-25 RX ORDER — GABAPENTIN 100 MG/1
CAPSULE ORAL
Qty: 90 CAPSULE | Refills: 1 | Status: SHIPPED | OUTPATIENT
Start: 2023-04-25

## 2023-04-25 NOTE — TELEPHONE ENCOUNTER
Rx Refill Note  Requested Prescriptions     Pending Prescriptions Disp Refills   • gabapentin (NEURONTIN) 100 MG capsule [Pharmacy Med Name: GABAPENTIN 100 MG CAPSULE] 90 capsule 1     Sig: TAKE 1 CAPSULE BY MOUTH THREE TIMES A DAY      Last office visit with prescribing clinician: 2/16/2023   Last telemedicine visit with prescribing clinician: 8/22/2023   Next office visit with prescribing clinician: 8/30/2023                         Would you like a call back once the refill request has been completed: [] Yes [] No    If the office needs to give you a call back, can they leave a voicemail: [] Yes [] No    Andree Laboy MA  04/25/23, 07:12 EDT

## 2023-05-02 ENCOUNTER — TELEPHONE (OUTPATIENT)
Dept: ORTHOPEDIC SURGERY | Facility: CLINIC | Age: 68
End: 2023-05-02
Payer: MEDICARE

## 2023-05-02 ENCOUNTER — PREP FOR SURGERY (OUTPATIENT)
Dept: OTHER | Facility: HOSPITAL | Age: 68
End: 2023-05-02
Payer: MEDICARE

## 2023-05-02 DIAGNOSIS — G89.29 HIP PAIN, CHRONIC, LEFT: Primary | ICD-10-CM

## 2023-05-02 DIAGNOSIS — M25.552 HIP PAIN, CHRONIC, LEFT: Primary | ICD-10-CM

## 2023-05-02 DIAGNOSIS — M16.12 PRIMARY OSTEOARTHRITIS OF LEFT HIP: ICD-10-CM

## 2023-05-02 RX ORDER — CEFAZOLIN SODIUM 2 G/100ML
2 INJECTION, SOLUTION INTRAVENOUS ONCE
OUTPATIENT
Start: 2023-05-02 | End: 2023-05-02

## 2023-05-02 RX ORDER — CHLORHEXIDINE GLUCONATE 500 MG/1
CLOTH TOPICAL 2 TIMES DAILY
OUTPATIENT
Start: 2023-05-02

## 2023-05-02 RX ORDER — PREGABALIN 75 MG/1
150 CAPSULE ORAL ONCE
OUTPATIENT
Start: 2023-05-02 | End: 2023-05-02

## 2023-05-02 NOTE — TELEPHONE ENCOUNTER
Patient is ready to schedule his posterior left hip surgery. Patient has lost 20 pounds. Patient's cardiac, and pulmonary clearances are scanned in his chart. Can you please put in a case request for surgery.

## 2023-05-04 PROBLEM — M16.12 PRIMARY OSTEOARTHRITIS OF LEFT HIP: Status: ACTIVE | Noted: 2023-05-04

## 2023-07-14 ENCOUNTER — TELEPHONE (OUTPATIENT)
Dept: ORTHOPEDIC SURGERY | Facility: CLINIC | Age: 68
End: 2023-07-14

## 2023-07-14 PROBLEM — M16.12 OSTEOARTHRITIS OF LEFT HIP: Status: ACTIVE | Noted: 2023-07-14

## 2023-07-14 NOTE — TELEPHONE ENCOUNTER
Received call from [facility] dep't.  Calling to check on orders needed for patient.   They have discharge orders but states it looks like patient may be getting admitted and if so would like orders please. Thanks.  [phone]-  Nurse([place])

## 2023-07-15 ENCOUNTER — HOME HEALTH ADMISSION (OUTPATIENT)
Dept: HOME HEALTH SERVICES | Facility: HOME HEALTHCARE | Age: 68
End: 2023-07-15
Payer: MEDICARE

## 2023-07-25 ENCOUNTER — HOME CARE VISIT (OUTPATIENT)
Dept: HOME HEALTH SERVICES | Facility: HOME HEALTHCARE | Age: 68
End: 2023-07-25
Payer: MEDICARE

## 2023-07-25 VITALS
HEART RATE: 65 BPM | DIASTOLIC BLOOD PRESSURE: 86 MMHG | OXYGEN SATURATION: 96 % | SYSTOLIC BLOOD PRESSURE: 110 MMHG | RESPIRATION RATE: 18 BRPM | TEMPERATURE: 97.5 F

## 2023-07-25 PROCEDURE — G0151 HHCP-SERV OF PT,EA 15 MIN: HCPCS

## 2023-07-25 NOTE — HOME HEALTH
"Subjective: \"I think I might have overdone it yesterday because I am more sore today.\" per patient    no new med changes  no recent falls    Skill/education provided: see interventions for details    Patient/caregiver response: see interventions for details    Assessment: patient with reports of good pain control and good follow through with HEP.    Plan for next visit: gait training, ther ex, stair training, progress HEP"

## 2023-07-28 ENCOUNTER — HOME CARE VISIT (OUTPATIENT)
Dept: HOME HEALTH SERVICES | Facility: HOME HEALTHCARE | Age: 68
End: 2023-07-28
Payer: MEDICARE

## 2023-07-28 VITALS
RESPIRATION RATE: 18 BRPM | OXYGEN SATURATION: 95 % | HEART RATE: 69 BPM | TEMPERATURE: 97.8 F | SYSTOLIC BLOOD PRESSURE: 106 MMHG | DIASTOLIC BLOOD PRESSURE: 73 MMHG

## 2023-07-28 PROCEDURE — G0151 HHCP-SERV OF PT,EA 15 MIN: HCPCS

## 2023-07-28 NOTE — Clinical Note
Discharge Summary:    Patient was seen for PT discharge today due to PT goals met see interventions/goal status for details. Patient was seen for a total of 5 visits for decreased ability to transfer and amb related to recent THR for evaluation, gait training, transfer training, home safety, fall prevention, and pain/edema management. Currently patient is able to to amb IND/SUP with FWW, IND with transfers, IND with HEP with written handouts, IND with pain/edema management and fall prevention. Patient agrees with PT discharge.    Home environment: lives with spouse who provides assist as needed    Follow up: with MD 7/31/23

## 2023-08-01 ENCOUNTER — OFFICE VISIT (OUTPATIENT)
Dept: ORTHOPEDIC SURGERY | Facility: CLINIC | Age: 68
End: 2023-08-01
Payer: MEDICARE

## 2023-08-01 VITALS — HEIGHT: 68 IN | BODY MASS INDEX: 40.68 KG/M2 | WEIGHT: 268.4 LBS | TEMPERATURE: 97.3 F

## 2023-08-01 DIAGNOSIS — R52 PAIN: Primary | ICD-10-CM

## 2023-08-01 DIAGNOSIS — Z96.642 STATUS POST LEFT HIP REPLACEMENT: ICD-10-CM

## 2023-08-13 DIAGNOSIS — M25.552 HIP PAIN, CHRONIC, LEFT: ICD-10-CM

## 2023-08-13 DIAGNOSIS — G89.29 HIP PAIN, CHRONIC, LEFT: ICD-10-CM

## 2023-08-14 RX ORDER — GABAPENTIN 100 MG/1
CAPSULE ORAL
Qty: 150 CAPSULE | Refills: 0 | Status: SHIPPED | OUTPATIENT
Start: 2023-08-14

## 2023-08-14 NOTE — TELEPHONE ENCOUNTER
Rx Refill Note  Requested Prescriptions     Pending Prescriptions Disp Refills    gabapentin (NEURONTIN) 100 MG capsule [Pharmacy Med Name: GABAPENTIN 100 MG CAPSULE] 150 capsule 0     Sig: TAKE TWO TABLETS BY MOUTH EVERY MORNING AND AT BEDTIME. TAKE ONE TABLET BY MOUTH IN MIDDLE OF THE DAY.M      Last office visit with prescribing clinician: 7/20/2023   Last telemedicine visit with prescribing clinician: Visit date not found   Next office visit with prescribing clinician: 8/30/2023                         Would you like a call back once the refill request has been completed: [] Yes [] No    If the office needs to give you a call back, can they leave a voicemail: [] Yes [] No    Beatriz Samaniego MA  08/14/23, 10:10 EDT

## 2023-08-16 ENCOUNTER — HOSPITAL ENCOUNTER (OUTPATIENT)
Dept: CT IMAGING | Facility: HOSPITAL | Age: 68
Discharge: HOME | End: 2023-08-16
Admitting: NURSE PRACTITIONER
Payer: MEDICARE

## 2023-08-16 DIAGNOSIS — C34.92 SQUAMOUS CELL CARCINOMA OF LEFT LUNG: ICD-10-CM

## 2023-08-16 PROCEDURE — 71250 CT THORAX DX C-: CPT

## 2023-08-18 ENCOUNTER — TELEPHONE (OUTPATIENT)
Dept: SURGERY | Facility: CLINIC | Age: 68
End: 2023-08-18
Payer: MEDICARE

## 2023-08-22 DIAGNOSIS — E78.00 HYPERCHOLESTEROLEMIA: Primary | ICD-10-CM

## 2023-08-22 DIAGNOSIS — E11.9 TYPE 2 DIABETES MELLITUS WITHOUT COMPLICATION, WITHOUT LONG-TERM CURRENT USE OF INSULIN: ICD-10-CM

## 2023-08-22 DIAGNOSIS — I10 PRIMARY HYPERTENSION: ICD-10-CM

## 2023-08-22 LAB
ALBUMIN SERPL-MCNC: 3.9 G/DL (ref 3.5–5.2)
ALBUMIN/GLOB SERPL: 1.7 G/DL
ALP SERPL-CCNC: 77 U/L (ref 39–117)
ALT SERPL-CCNC: 13 U/L (ref 1–41)
AST SERPL-CCNC: 13 U/L (ref 1–40)
BASOPHILS # BLD AUTO: 0.04 10*3/MM3 (ref 0–0.2)
BASOPHILS NFR BLD AUTO: 0.6 % (ref 0–1.5)
BILIRUB SERPL-MCNC: 0.4 MG/DL (ref 0–1.2)
BUN SERPL-MCNC: 17 MG/DL (ref 8–23)
BUN/CREAT SERPL: 23.3 (ref 7–25)
CALCIUM SERPL-MCNC: 9.2 MG/DL (ref 8.6–10.5)
CHLORIDE SERPL-SCNC: 103 MMOL/L (ref 98–107)
CHOLEST SERPL-MCNC: 138 MG/DL (ref 0–200)
CO2 SERPL-SCNC: 30.4 MMOL/L (ref 22–29)
CREAT SERPL-MCNC: 0.73 MG/DL (ref 0.76–1.27)
EGFRCR SERPLBLD CKD-EPI 2021: 99.1 ML/MIN/1.73
EOSINOPHIL # BLD AUTO: 0.16 10*3/MM3 (ref 0–0.4)
EOSINOPHIL NFR BLD AUTO: 2.3 % (ref 0.3–6.2)
ERYTHROCYTE [DISTWIDTH] IN BLOOD BY AUTOMATED COUNT: 16 % (ref 12.3–15.4)
GLOBULIN SER CALC-MCNC: 2.3 GM/DL
GLUCOSE SERPL-MCNC: 107 MG/DL (ref 65–99)
HBA1C MFR BLD: 5.8 % (ref 4.8–5.6)
HCT VFR BLD AUTO: 40.1 % (ref 37.5–51)
HDLC SERPL-MCNC: 33 MG/DL (ref 40–60)
HGB BLD-MCNC: 13 G/DL (ref 13–17.7)
IMM GRANULOCYTES # BLD AUTO: 0.02 10*3/MM3 (ref 0–0.05)
IMM GRANULOCYTES NFR BLD AUTO: 0.3 % (ref 0–0.5)
LDLC SERPL CALC-MCNC: 90 MG/DL (ref 0–100)
LDLC/HDLC SERPL: 2.72 {RATIO}
LYMPHOCYTES # BLD AUTO: 1.51 10*3/MM3 (ref 0.7–3.1)
LYMPHOCYTES NFR BLD AUTO: 21.6 % (ref 19.6–45.3)
MCH RBC QN AUTO: 27.7 PG (ref 26.6–33)
MCHC RBC AUTO-ENTMCNC: 32.4 G/DL (ref 31.5–35.7)
MCV RBC AUTO: 85.5 FL (ref 79–97)
MONOCYTES # BLD AUTO: 0.55 10*3/MM3 (ref 0.1–0.9)
MONOCYTES NFR BLD AUTO: 7.9 % (ref 5–12)
NEUTROPHILS # BLD AUTO: 4.7 10*3/MM3 (ref 1.7–7)
NEUTROPHILS NFR BLD AUTO: 67.3 % (ref 42.7–76)
NRBC BLD AUTO-RTO: 0.1 /100 WBC (ref 0–0.2)
PLATELET # BLD AUTO: 283 10*3/MM3 (ref 140–450)
POTASSIUM SERPL-SCNC: 3.5 MMOL/L (ref 3.5–5.2)
PROT SERPL-MCNC: 6.2 G/DL (ref 6–8.5)
RBC # BLD AUTO: 4.69 10*6/MM3 (ref 4.14–5.8)
SODIUM SERPL-SCNC: 143 MMOL/L (ref 136–145)
T4 FREE SERPL-MCNC: 1.23 NG/DL (ref 0.93–1.7)
TRIGL SERPL-MCNC: 76 MG/DL (ref 0–150)
TSH SERPL DL<=0.005 MIU/L-ACNC: 2.54 UIU/ML (ref 0.27–4.2)
VLDLC SERPL CALC-MCNC: 15 MG/DL (ref 5–40)
WBC # BLD AUTO: 6.98 10*3/MM3 (ref 3.4–10.8)

## 2023-08-23 NOTE — PROGRESS NOTES
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    Subjective   HISTORY OF PRESENTING ILLNESS:   Alexy Ram is a 68 y.o. male has significant medical problems as mentioned below.    He presents today for further follow-up of a left lower lobectomy performed in November 19, 2018 for squamous cell carcinoma of the lung.  Overall, he is doing well. He had COPD but denies any unusual breathing difficulty but feels that he is doing better. He is still using his CPAP machine and denies any dyspnea when talking or sitting. He will become short of breath if he walks 1 block. He just had a hip arthroplasty and uses a cane to ambulate. He denies chronic coughing but has occasional days where he does cough. He denies any pneumonia or any infection in the chest that required antibiotics within the last year. He quit smoking when he was diagnosed with lung cancer.     Past Medical History:   Diagnosis Date    Arthritis     At risk for sleep apnea     5    CHF (congestive heart failure)     COPD (chronic obstructive pulmonary disease) July 2018    Diabetes mellitus     Dyspnea on exertion     Fatty liver     Hip pain     History of low potassium     History of transfusion     Hyperlipidemia     Hypertension     Hypoglycemia     Hypoglycemia     Lung cancer     lung    Pacemaker     Second degree AV block     Sinus node dysfunction     Sleep apnea     WEARS CPAP    Slow to wake up after anesthesia     Urine output low        Past Surgical History:   Procedure Laterality Date    BRONCHOSCOPY N/A 10/11/2018    Procedure: BRONCHOSCOPY WITH FLUORO, LEFT LOWER LOBE BRUSHINGS WET AND DRY. WITH BX'S AND BAL (IN LLL).;  Surgeon: Akil Ortiz MD;  Location: [facility];  Service: Pulmonary    COLONOSCOPY  2021    COLONOSCOPY W/ POLYPECTOMY N/A 10/22/2021    Procedure: COLONOSCOPY WITH POLYPECTOMY;  Surgeon: Lan Solis  MD Annie;  Location:  [facility];  Service: Gastroenterology;  Laterality: N/A;  cecal polyp x1 (cold snare)  ascending polyp x1 (hot snare)  transverse polyp x3 (hot snare x 1), (cold snare x2)  sigmoid polyp x1 (hot snare, clip applied)  rectal polyp x3 (cold snare)    INSERT / REPLACE / REMOVE PACEMAKER  2005    replaced Oct 2014    JOINT REPLACEMENT  Hip    2023    KNEE ARTHROSCOPY Left     PACEMAKER IMPLANTATION  2014    THORACOSCOPY Left 2018    Procedure: BRONCHOSCOPY, DAVINCI ROBOT ASSISTED VIDEIO ASSISTED THORACOSCOPY  WITH CONVERT TO OPEN THORACOTOMY,LEFT LOWER LOBECTOMY,INTERCOSTAL NERVE BLOCK;  Surgeon: Michael Ring III, MD;  Location: [facility] OR;  Service: DaVinci    TOTAL HIP ARTHROPLASTY Left 2023    Procedure: TOTAL HIP ARTHROPLASTY;  Surgeon: Nikko Staton MD;  Location:  [facility];  Service: Orthopedics;  Laterality: Left;       Family History   Problem Relation Age of Onset    Diabetes Mother     Stroke Father     Heart disease Father     Stroke Sister     Cancer Sister     Heart disease Brother     Diabetes Brother     Stroke Sister     Diabetes Sister     Diabetes Brother     Malig Hyperthermia Neg Hx        Social History     Socioeconomic History    Marital status:    Tobacco Use    Smoking status: Former     Packs/day: 1.00     Years: 33.00     Pack years: 33.00     Types: Cigarettes     Quit date: 2018     Years since quittin.6    Smokeless tobacco: Never    Tobacco comments:     started cigars 3-4 daily in 2018 and has quit a month ago   Vaping Use    Vaping Use: Never used   Substance and Sexual Activity    Alcohol use: Not Currently     Comment: rare    Drug use: No    Sexual activity: Defer         Current Outpatient Medications:     albuterol sulfate  (90 Base) MCG/ACT inhaler, Inhale 2 puffs Every 4 (Four) Hours As Needed for Wheezing., Disp: 1 inhaler, Rfl: 0    amLODIPine (NORVASC) 10 MG tablet, TAKE 1 TABLET BY MOUTH  EVERY DAY, Disp: 90 tablet, Rfl: 1    atorvastatin (LIPITOR) 40 MG tablet, TAKE 1 TABLET BY MOUTH EVERYDAY AT BEDTIME, Disp: 90 tablet, Rfl: 3    carvedilol (COREG) 25 MG tablet, TAKE 2 TABLETS BY MOUTH 2 TIMES A DAY WITH MEALS. (Patient taking differently: Take 2 tablets by mouth 2 (Two) Times a Day With Meals.), Disp: 360 tablet, Rfl: 3    Eliquis 5 MG tablet tablet, Take 1 tablet by mouth 2 (Two) Times a Day., Disp: 60 tablet, Rfl: 0    gabapentin (NEURONTIN) 100 MG capsule, TAKE TWO TABLETS BY MOUTH EVERY MORNING AND AT BEDTIME. TAKE ONE TABLET BY MOUTH IN MIDDLE OF THE DAY.M, Disp: 150 capsule, Rfl: 0    hydrALAZINE (APRESOLINE) 25 MG tablet, Take 1 tablet by mouth 2 (Two) Times a Day., Disp: , Rfl:     HYDROcodone-acetaminophen (NORCO) 7.5-325 MG per tablet, Take 1 tablet by mouth Every 4 (Four) Hours As Needed for Moderate Pain., Disp: 30 tablet, Rfl: 0    pioglitazone (ACTOS) 30 MG tablet, TAKE 1 TABLET BY MOUTH EVERY DAY (Patient taking differently: No sig reported), Disp: 90 tablet, Rfl: 3    potassium chloride 10 MEQ CR tablet, Take 2 tablets by mouth 3 (Three) Times a Day. (Patient taking differently: Take 2 tablets by mouth 2 (Two) Times a Day.), Disp: 540 tablet, Rfl: 3    Probiotic Product (PROBIOTIC PO), Take 1 tablet by mouth Daily., Disp: , Rfl:     SITagliptin (Januvia) 100 MG tablet, TAKE 1 TABLET BY MOUTH EVERY DAY, Disp: 90 tablet, Rfl: 0    Spiriva Respimat 2.5 MCG/ACT aerosol solution inhaler, Inhale 2 puffs Daily., Disp: 3 each, Rfl: 3    tamsulosin (FLOMAX) 0.4 MG capsule 24 hr capsule, Take 1 capsule by mouth Every Night for 180 days., Disp: 90 capsule, Rfl: 1    valsartan-hydrochlorothiazide (DIOVAN-HCT) 320-25 MG per tablet, Take 1 tablet by mouth Every Morning., Disp: , Rfl:      Allergies   Allergen Reactions    Metformin GI Intolerance             Objective    OBJECTIVE:     VITAL SIGNS:  /76 (BP Location: Left arm, Patient Position: Sitting, Cuff Size: Adult)   Pulse 61   Ht  "172.7 cm (67.99\")   Wt 114 kg (252 lb)   SpO2 95%   BMI 38.33 kg/mý     PHYSICAL EXAM:  Constitutional:       Appearance: Normal appearance.   HENT:      Head: Normocephalic.      Right Ear: External ear normal.      Left Ear: External ear normal.      Nose: Nose normal.      Mouth/Throat: No obvious deformity     Pharynx: Oropharynx is clear.   Eyes:      Conjunctiva/sclera: Conjunctivae normal.   Cardiovascular:      Rate and Rhythm: Normal rate.      Pulses: Normal pulses.   Pulmonary:      Effort: Pulmonary effort is normal.  Incision clean, dry, intact.  Abdominal:      Palpations: Abdomen is soft.   Musculoskeletal:         General: Normal range of motion.      Cervical back: Normal range of motion.   Skin:     General: Skin is warm.   Neurological:      General: No focal deficit present.      Mental Status: He is alert and oriented to person, place, and time.     LAB RESULTS:  I have reviewed all the available laboratory results in the chart.    RESULTS REVIEW:  I have reviewed the patient's all relevant laboratory and imaging findings.     IMAGING RESULTS:    CT Chest Without Contrast Diagnostic (08/10/2022 17:11)    XR Chest 2 View (07/05/2023 19:17)    CT Chest Without Contrast Diagnostic (08/16/2023 11:23)        ASSESSMENT & PLAN:  Alexy Ram is a 68 y.o. male with significant medical conditions as mentioned above presented to my clinic.    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    He has new masslike consolidation along the anterior suture line which measures 5.4 cm.  He had a cardiac PET/CT 6 months ago at [facility] which reported masslike consolidation.  Unfortunately we do not have the images available in our system to compare the scans.  Also that scan was a cardiac PET/CT not a dedicated whole-body PET/CT.  Therefore I recommended PET/CT to evaluate the masslike consolidation.  If it is PET active, he will need tissue diagnosis to rule out " recurrence.    I discussed the patients findings and my recommendations with the patient. The patient was given adequate time to ask questions and all questions were answered to patient satisfaction.     Taye Chavira MD  Thoracic Surgeon  [facility]        Dictated utilizing Dragon dictation    I spent 50 minutes caring for Alexy on this date of service. This time includes time spent by me in the following activities:preparing for the visit, reviewing tests, obtaining and/or reviewing a separately obtained history, performing a medically appropriate examination and/or evaluation , counseling and educating the patient/family/caregiver, ordering medications, tests, or procedures, referring and communicating with other health care professionals , documenting information in the medical record, independently interpreting results and communicating that information with the patient/family/caregiver, and care coordination and more than half the time was spent in direct face to face evaluation and decision making.       Transcribed from ambient dictation for Taye Chavira MD by Urvashi Wong.  08/24/23   10:35 EDT    Patient or patient representative verbalized consent to the visit recording.  I have personally performed the services described in this document as transcribed by the above individual, and it is both accurate and complete.

## 2023-08-23 NOTE — H&P (VIEW-ONLY)
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    Subjective   HISTORY OF PRESENTING ILLNESS:   Alexy Ram is a 68 y.o. male has significant medical problems as mentioned below.    He presents today for further follow-up of a left lower lobectomy performed in November 19, 2018 for squamous cell carcinoma of the lung.  Overall, he is doing well. He had COPD but denies any unusual breathing difficulty but feels that he is doing better. He is still using his CPAP machine and denies any dyspnea when talking or sitting. He will become short of breath if he walks 1 block. He just had a hip arthroplasty and uses a cane to ambulate. He denies chronic coughing but has occasional days where he does cough. He denies any pneumonia or any infection in the chest that required antibiotics within the last year. He quit smoking when he was diagnosed with lung cancer.     Past Medical History:   Diagnosis Date    Arthritis     At risk for sleep apnea     5    CHF (congestive heart failure)     COPD (chronic obstructive pulmonary disease) July 2018    Diabetes mellitus     Dyspnea on exertion     Fatty liver     Hip pain     History of low potassium     History of transfusion     Hyperlipidemia     Hypertension     Hypoglycemia     Hypoglycemia     Lung cancer     lung    Pacemaker     Second degree AV block     Sinus node dysfunction     Sleep apnea     WEARS CPAP    Slow to wake up after anesthesia     Urine output low        Past Surgical History:   Procedure Laterality Date    BRONCHOSCOPY N/A 10/11/2018    Procedure: BRONCHOSCOPY WITH FLUORO, LEFT LOWER LOBE BRUSHINGS WET AND DRY. WITH BX'S AND BAL (IN LLL).;  Surgeon: Akil Ortiz MD;  Location: [facility] ENDOSCOPY;  Service: Pulmonary    COLONOSCOPY  2021    COLONOSCOPY W/ POLYPECTOMY N/A 10/22/2021    Procedure: COLONOSCOPY WITH POLYPECTOMY;  Surgeon: Lan Solis  MD Annie;  Location:  [facility];  Service: Gastroenterology;  Laterality: N/A;  cecal polyp x1 (cold snare)  ascending polyp x1 (hot snare)  transverse polyp x3 (hot snare x 1), (cold snare x2)  sigmoid polyp x1 (hot snare, clip applied)  rectal polyp x3 (cold snare)    INSERT / REPLACE / REMOVE PACEMAKER  2005    replaced Oct 2014    JOINT REPLACEMENT  Hip    2023    KNEE ARTHROSCOPY Left     PACEMAKER IMPLANTATION  2014    THORACOSCOPY Left 2018    Procedure: BRONCHOSCOPY, DAVINCI ROBOT ASSISTED VIDEIO ASSISTED THORACOSCOPY  WITH CONVERT TO OPEN THORACOTOMY,LEFT LOWER LOBECTOMY,INTERCOSTAL NERVE BLOCK;  Surgeon: Michael Ring III, MD;  [facility];  Service: DaVinci    TOTAL HIP ARTHROPLASTY Left 2023    Procedure: TOTAL HIP ARTHROPLASTY;  Surgeon: Nikko Staton MD;  Location:  [facility];  Service: Orthopedics;  Laterality: Left;       Family History   Problem Relation Age of Onset    Diabetes Mother     Stroke Father     Heart disease Father     Stroke Sister     Cancer Sister     Heart disease Brother     Diabetes Brother     Stroke Sister     Diabetes Sister     Diabetes Brother     Malig Hyperthermia Neg Hx        Social History     Socioeconomic History    Marital status:    Tobacco Use    Smoking status: Former     Packs/day: 1.00     Years: 33.00     Pack years: 33.00     Types: Cigarettes     Quit date: 2018     Years since quittin.6    Smokeless tobacco: Never    Tobacco comments:     started cigars 3-4 daily in 2018 and has quit a month ago   Vaping Use    Vaping Use: Never used   Substance and Sexual Activity    Alcohol use: Not Currently     Comment: rare    Drug use: No    Sexual activity: Defer         Current Outpatient Medications:     albuterol sulfate  (90 Base) MCG/ACT inhaler, Inhale 2 puffs Every 4 (Four) Hours As Needed for Wheezing., Disp: 1 inhaler, Rfl: 0    amLODIPine (NORVASC) 10 MG tablet, TAKE 1 TABLET BY MOUTH  EVERY DAY, Disp: 90 tablet, Rfl: 1    atorvastatin (LIPITOR) 40 MG tablet, TAKE 1 TABLET BY MOUTH EVERYDAY AT BEDTIME, Disp: 90 tablet, Rfl: 3    carvedilol (COREG) 25 MG tablet, TAKE 2 TABLETS BY MOUTH 2 TIMES A DAY WITH MEALS. (Patient taking differently: Take 2 tablets by mouth 2 (Two) Times a Day With Meals.), Disp: 360 tablet, Rfl: 3    Eliquis 5 MG tablet tablet, Take 1 tablet by mouth 2 (Two) Times a Day., Disp: 60 tablet, Rfl: 0    gabapentin (NEURONTIN) 100 MG capsule, TAKE TWO TABLETS BY MOUTH EVERY MORNING AND AT BEDTIME. TAKE ONE TABLET BY MOUTH IN MIDDLE OF THE DAY.M, Disp: 150 capsule, Rfl: 0    hydrALAZINE (APRESOLINE) 25 MG tablet, Take 1 tablet by mouth 2 (Two) Times a Day., Disp: , Rfl:     HYDROcodone-acetaminophen (NORCO) 7.5-325 MG per tablet, Take 1 tablet by mouth Every 4 (Four) Hours As Needed for Moderate Pain., Disp: 30 tablet, Rfl: 0    pioglitazone (ACTOS) 30 MG tablet, TAKE 1 TABLET BY MOUTH EVERY DAY (Patient taking differently: No sig reported), Disp: 90 tablet, Rfl: 3    potassium chloride 10 MEQ CR tablet, Take 2 tablets by mouth 3 (Three) Times a Day. (Patient taking differently: Take 2 tablets by mouth 2 (Two) Times a Day.), Disp: 540 tablet, Rfl: 3    Probiotic Product (PROBIOTIC PO), Take 1 tablet by mouth Daily., Disp: , Rfl:     SITagliptin (Januvia) 100 MG tablet, TAKE 1 TABLET BY MOUTH EVERY DAY, Disp: 90 tablet, Rfl: 0    Spiriva Respimat 2.5 MCG/ACT aerosol solution inhaler, Inhale 2 puffs Daily., Disp: 3 each, Rfl: 3    tamsulosin (FLOMAX) 0.4 MG capsule 24 hr capsule, Take 1 capsule by mouth Every Night for 180 days., Disp: 90 capsule, Rfl: 1    valsartan-hydrochlorothiazide (DIOVAN-HCT) 320-25 MG per tablet, Take 1 tablet by mouth Every Morning., Disp: , Rfl:      Allergies   Allergen Reactions    Metformin GI Intolerance             Objective    OBJECTIVE:     VITAL SIGNS:  /76 (BP Location: Left arm, Patient Position: Sitting, Cuff Size: Adult)   Pulse 61   Ht  "172.7 cm (67.99\")   Wt 114 kg (252 lb)   SpO2 95%   BMI 38.33 kg/m²     PHYSICAL EXAM:  Constitutional:       Appearance: Normal appearance.   HENT:      Head: Normocephalic.      Right Ear: External ear normal.      Left Ear: External ear normal.      Nose: Nose normal.      Mouth/Throat: No obvious deformity     Pharynx: Oropharynx is clear.   Eyes:      Conjunctiva/sclera: Conjunctivae normal.   Cardiovascular:      Rate and Rhythm: Normal rate.      Pulses: Normal pulses.   Pulmonary:      Effort: Pulmonary effort is normal.  Incision clean, dry, intact.  Abdominal:      Palpations: Abdomen is soft.   Musculoskeletal:         General: Normal range of motion.      Cervical back: Normal range of motion.   Skin:     General: Skin is warm.   Neurological:      General: No focal deficit present.      Mental Status: He is alert and oriented to person, place, and time.     LAB RESULTS:  I have reviewed all the available laboratory results in the chart.    RESULTS REVIEW:  I have reviewed the patient's all relevant laboratory and imaging findings.     IMAGING RESULTS:    CT Chest Without Contrast Diagnostic (08/10/2022 17:11)    XR Chest 2 View (07/05/2023 19:17)    CT Chest Without Contrast Diagnostic (08/16/2023 11:23)        ASSESSMENT & PLAN:  Alexy Ram is a 68 y.o. male with significant medical conditions as mentioned above presented to my clinic.    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    He has new masslike consolidation along the anterior suture line which measures 5.4 cm.  He had a cardiac PET/CT 6 months ago at [facility] which reported masslike consolidation.  Unfortunately we do not have the images available in our system to compare the scans.  Also that scan was a cardiac PET/CT not a dedicated whole-body PET/CT.  Therefore I recommended PET/CT to evaluate the masslike consolidation.  If it is PET active, he will need tissue diagnosis to rule out " recurrence.    I discussed the patients findings and my recommendations with the patient. The patient was given adequate time to ask questions and all questions were answered to patient satisfaction.     Taye Chavira MD  Thoracic Surgeon  [facility]        Dictated utilizing Dragon dictation    I spent 50 minutes caring for Alexy on this date of service. This time includes time spent by me in the following activities:preparing for the visit, reviewing tests, obtaining and/or reviewing a separately obtained history, performing a medically appropriate examination and/or evaluation , counseling and educating the patient/family/caregiver, ordering medications, tests, or procedures, referring and communicating with other health care professionals , documenting information in the medical record, independently interpreting results and communicating that information with the patient/family/caregiver, and care coordination and more than half the time was spent in direct face to face evaluation and decision making.       Transcribed from ambient dictation for Taye Chavira MD by Urvashi Wong.  08/24/23   10:35 EDT    Patient or patient representative verbalized consent to the visit recording.  I have personally performed the services described in this document as transcribed by the above individual, and it is both accurate and complete.

## 2023-08-24 ENCOUNTER — OFFICE VISIT (OUTPATIENT)
Dept: SURGERY | Facility: CLINIC | Age: 68
End: 2023-08-24
Payer: MEDICARE

## 2023-08-24 VITALS
SYSTOLIC BLOOD PRESSURE: 130 MMHG | HEIGHT: 68 IN | BODY MASS INDEX: 38.19 KG/M2 | DIASTOLIC BLOOD PRESSURE: 76 MMHG | WEIGHT: 252 LBS | OXYGEN SATURATION: 95 % | HEART RATE: 61 BPM

## 2023-08-24 DIAGNOSIS — C34.92 SQUAMOUS CELL CARCINOMA OF LEFT LUNG: Primary | ICD-10-CM

## 2023-08-24 DIAGNOSIS — C34.32 MALIGNANT NEOPLASM OF LOWER LOBE, LEFT BRONCHUS OR LUNG: ICD-10-CM

## 2023-08-25 ENCOUNTER — HOSPITAL ENCOUNTER (OUTPATIENT)
Dept: CARDIOLOGY | Facility: HOSPITAL | Age: 68
Discharge: HOME | End: 2023-08-25
Payer: MEDICARE

## 2023-08-25 DIAGNOSIS — I25.112 CORONARY ARTERY DISEASE INVOLVING NATIVE HEART WITH REFRACTORY ANGINA PECTORIS, UNSPECIFIED VESSEL OR LESION TYPE: ICD-10-CM

## 2023-08-29 ENCOUNTER — TELEPHONE (OUTPATIENT)
Dept: FAMILY MEDICINE CLINIC | Facility: CLINIC | Age: 68
End: 2023-08-29
Payer: MEDICARE

## 2023-08-29 NOTE — TELEPHONE ENCOUNTER
Called pt and informed him He still has prediabetes, but his glucose control seems to be improving. Remainder of labs are essentially normal. We will discuss in detail during appointment tomorrow. Pt understood verbally

## 2023-08-29 NOTE — TELEPHONE ENCOUNTER
----- Message from Pedro Luis Simons MD sent at 8/23/2023  7:51 AM EDT -----  Please call the patient regarding his abnormal result. He still has prediabetes, but his glucose control seems to be improving. Remainder of labs are essentially normal. We will discuss in detail during appointment next week. Thanks!

## 2023-08-30 ENCOUNTER — OFFICE VISIT (OUTPATIENT)
Dept: FAMILY MEDICINE CLINIC | Facility: CLINIC | Age: 68
End: 2023-08-30
Payer: MEDICARE

## 2023-08-30 VITALS
BODY MASS INDEX: 39.01 KG/M2 | SYSTOLIC BLOOD PRESSURE: 126 MMHG | OXYGEN SATURATION: 98 % | WEIGHT: 257.4 LBS | HEIGHT: 68 IN | DIASTOLIC BLOOD PRESSURE: 66 MMHG | HEART RATE: 66 BPM

## 2023-08-30 DIAGNOSIS — E11.9 TYPE 2 DIABETES MELLITUS WITHOUT COMPLICATION, WITHOUT LONG-TERM CURRENT USE OF INSULIN: Primary | ICD-10-CM

## 2023-08-30 DIAGNOSIS — C34.92 SQUAMOUS CELL CARCINOMA OF LEFT LUNG: ICD-10-CM

## 2023-08-30 PROBLEM — J20.9 ACUTE BRONCHITIS WITH BRONCHOSPASM: Status: RESOLVED | Noted: 2019-03-18 | Resolved: 2023-08-30

## 2023-08-30 PROBLEM — R04.2 HEMOPTYSIS: Status: RESOLVED | Noted: 2018-04-12 | Resolved: 2023-08-30

## 2023-08-30 NOTE — PROGRESS NOTES
Greg Simons MD  Internal Medicine  [phone] (office)             08/30/2023    Patient Information  Alexy Ram                                                                                          [facility] [address]  1955        Chief Complaint   Patient presents with    Hypertension    Results     6 month follow up           History of Present Illness:    Alexy Ram is a 68 y.o. male who presents to the office for follow-up.     He has imaging findings concerning for recurrence of lung cancer and will have PET scan on Friday to determine next steps. Patient denies dyspnea or unintentional weight loss.     He reports that he's continuing to improve after L hip replacement and is sometimes walking without his cane.     He main concern today, aside from above, is medications for DMII. Specifically, Januvia has become cost prohibitive. He's lost a substantial amount of weight through diet changes and A1c shows great control.       No Active Allergies        Current Outpatient Medications:     albuterol sulfate  (90 Base) MCG/ACT inhaler, Inhale 2 puffs Every 4 (Four) Hours As Needed for Wheezing., Disp: 1 inhaler, Rfl: 0    amLODIPine (NORVASC) 10 MG tablet, TAKE 1 TABLET BY MOUTH EVERY DAY, Disp: 90 tablet, Rfl: 1    atorvastatin (LIPITOR) 40 MG tablet, TAKE 1 TABLET BY MOUTH EVERYDAY AT BEDTIME, Disp: 90 tablet, Rfl: 3    carvedilol (COREG) 25 MG tablet, TAKE 2 TABLETS BY MOUTH 2 TIMES A DAY WITH MEALS. (Patient taking differently: Take 2 tablets by mouth 2 (Two) Times a Day With Meals.), Disp: 360 tablet, Rfl: 3    Eliquis 5 MG tablet tablet, Take 1 tablet by mouth 2 (Two) Times a Day., Disp: 60 tablet, Rfl: 0    gabapentin (NEURONTIN) 100 MG capsule, TAKE TWO TABLETS BY MOUTH EVERY MORNING AND AT BEDTIME. TAKE ONE TABLET BY MOUTH IN MIDDLE OF THE DAY.M, Disp: 150 capsule, Rfl: 0    hydrALAZINE (APRESOLINE) 25 MG tablet, Take 1 tablet by mouth 2 (Two) Times  "a Day., Disp: , Rfl:     HYDROcodone-acetaminophen (NORCO) 7.5-325 MG per tablet, Take 1 tablet by mouth Every 4 (Four) Hours As Needed for Moderate Pain., Disp: 30 tablet, Rfl: 0    pioglitazone (ACTOS) 30 MG tablet, TAKE 1 TABLET BY MOUTH EVERY DAY (Patient taking differently: No sig reported), Disp: 90 tablet, Rfl: 3    potassium chloride 10 MEQ CR tablet, Take 2 tablets by mouth 3 (Three) Times a Day. (Patient taking differently: Take 2 tablets by mouth 2 (Two) Times a Day.), Disp: 540 tablet, Rfl: 3    Probiotic Product (PROBIOTIC PO), Take 1 tablet by mouth Daily., Disp: , Rfl:     Spiriva Respimat 2.5 MCG/ACT aerosol solution inhaler, Inhale 2 puffs Daily., Disp: 3 each, Rfl: 3    tamsulosin (FLOMAX) 0.4 MG capsule 24 hr capsule, Take 1 capsule by mouth Every Night for 180 days., Disp: 90 capsule, Rfl: 1    valsartan-hydrochlorothiazide (DIOVAN-HCT) 320-25 MG per tablet, Take 1 tablet by mouth Every Morning., Disp: , Rfl:       Social History     Socioeconomic History    Marital status:    Tobacco Use    Smoking status: Former     Packs/day: 1.00     Years: 33.00     Pack years: 33.00     Types: Cigarettes     Quit date: 2018     Years since quittin.6    Smokeless tobacco: Never    Tobacco comments:     started cigars 3-4 daily in 2018 and has quit a month ago   Vaping Use    Vaping Use: Never used   Substance and Sexual Activity    Alcohol use: Not Currently     Comment: rare    Drug use: No    Sexual activity: Defer         Vitals:    23 0841   BP: 126/66   BP Location: Right arm   Patient Position: Sitting   Cuff Size: Adult   Pulse: 66   SpO2: 98%   Weight: 117 kg (257 lb 6.4 oz)   Height: 172.7 cm (67.99\")      Wt Readings from Last 3 Encounters:   23 117 kg (257 lb 6.4 oz)   23 114 kg (252 lb)   23 122 kg (268 lb 6.4 oz)     Body mass index is 39.15 kg/mý.      Physical Exam  Vitals reviewed.   Constitutional:       Appearance: Normal appearance. He is obese. He " is not ill-appearing.   Pulmonary:      Effort: Pulmonary effort is normal.   Neurological:      Mental Status: He is alert and oriented to person, place, and time.   Psychiatric:         Mood and Affect: Mood normal.         Behavior: Behavior normal.          Lab/other results:  Common labs          7/14/2023    08:08 7/14/2023    14:39 7/15/2023    04:34 8/22/2023    09:14   Common Labs   Glucose 125    107    BUN 16    17    Creatinine 0.75    0.73    Sodium 143    143    Potassium 3.4    3.5    Chloride 106    103    Calcium 9.5    9.2    Total Protein    6.2    Albumin    3.9    Total Bilirubin    0.4    Alkaline Phosphatase    77    AST (SGOT)    13    ALT (SGPT)    13    WBC    6.98    Hemoglobin  13.6  12.1  13.0    Hematocrit  41.7  36.1  40.1    Platelets    283    Total Cholesterol    138    Triglycerides    76    HDL Cholesterol    33    LDL Cholesterol     90    Hemoglobin A1C    5.80        Assessment/Plan:    Diagnoses and all orders for this visit:    1. Type 2 diabetes mellitus without complication, without long-term current use of insulin (Primary)  Assessment & Plan:  Diabetes is improving with lifestyle modifications.   Continue current treatment regimen.  Regular aerobic exercise.  Diabetes will be reassessed in 1 month.    A1c 5.8% (goal < 8%). I praised weight loss efforts and encouraged patient to continue attempts to eat healthy and exercise as tolerated. Ok to stop Januvia once supply is out and then we'll check A1c 3-6 months later.     Orders:  -     Discontinue: metFORMIN (Glucophage) 500 MG tablet; Take 1 tablet by mouth Daily With Breakfast.  Dispense: 30 tablet; Refill: 0    2. Squamous cell carcinoma of left lung     Regarding potential lung cancer recurrence, I advised patient that PET (and likely biopsy) are appropriate next steps. He seems to be in good spirits so far. I told him to let me know if he had any questions or concerns going forward. Will follow-up in one month to  review plan.

## 2023-08-30 NOTE — ASSESSMENT & PLAN NOTE
Diabetes is improving with lifestyle modifications.   Continue current treatment regimen.  Regular aerobic exercise.  Diabetes will be reassessed in 1 month.    A1c 5.8% (goal < 8%). I praised weight loss efforts and encouraged patient to continue attempts to eat healthy and exercise as tolerated. Ok to stop Januvia once supply is out and then we'll check A1c 3-6 months later.

## 2023-09-01 ENCOUNTER — HOSPITAL ENCOUNTER (OUTPATIENT)
Dept: PET IMAGING | Facility: HOSPITAL | Age: 68
Discharge: HOME | End: 2023-09-01
Payer: MEDICARE

## 2023-09-01 DIAGNOSIS — C34.32 MALIGNANT NEOPLASM OF LOWER LOBE, LEFT BRONCHUS OR LUNG: ICD-10-CM

## 2023-09-01 DIAGNOSIS — C34.92 SQUAMOUS CELL CARCINOMA OF LEFT LUNG: ICD-10-CM

## 2023-09-01 LAB — GLUCOSE BLDC GLUCOMTR-MCNC: 101 MG/DL (ref 70–130)

## 2023-09-01 PROCEDURE — 0 FLUDEOXYGLUCOSE F18 SOLUTION: Performed by: SURGERY

## 2023-09-01 PROCEDURE — A9552 F18 FDG: HCPCS | Performed by: SURGERY

## 2023-09-01 PROCEDURE — 71250 CT THORAX DX C-: CPT

## 2023-09-01 PROCEDURE — 82948 REAGENT STRIP/BLOOD GLUCOSE: CPT

## 2023-09-01 PROCEDURE — 78815 PET IMAGE W/CT SKULL-THIGH: CPT

## 2023-09-01 RX ADMIN — FLUDEOXYGLUCOSE F18 1 DOSE: 300 INJECTION INTRAVENOUS at 11:32

## 2023-09-04 ENCOUNTER — PREP FOR SURGERY (OUTPATIENT)
Dept: OTHER | Facility: HOSPITAL | Age: 68
End: 2023-09-04
Payer: MEDICARE

## 2023-09-04 DIAGNOSIS — C34.92 SQUAMOUS CELL CARCINOMA OF LEFT LUNG: Primary | ICD-10-CM

## 2023-09-07 ENCOUNTER — ANESTHESIA EVENT (OUTPATIENT)
Dept: GASTROENTEROLOGY | Facility: HOSPITAL | Age: 68
End: 2023-09-07
Payer: MEDICARE

## 2023-09-07 ENCOUNTER — ANESTHESIA (OUTPATIENT)
Dept: GASTROENTEROLOGY | Facility: HOSPITAL | Age: 68
End: 2023-09-07
Payer: MEDICARE

## 2023-09-07 ENCOUNTER — HOSPITAL ENCOUNTER (OUTPATIENT)
Facility: HOSPITAL | Age: 68
Setting detail: OBSERVATION
Discharge: HOME | End: 2023-09-08
Attending: SURGERY | Admitting: SURGERY
Payer: MEDICARE

## 2023-09-07 ENCOUNTER — APPOINTMENT (OUTPATIENT)
Dept: GENERAL RADIOLOGY | Facility: HOSPITAL | Age: 68
End: 2023-09-07
Payer: MEDICARE

## 2023-09-07 DIAGNOSIS — J98.11 ATELECTASIS OF LEFT LUNG: Primary | ICD-10-CM

## 2023-09-07 DIAGNOSIS — C34.92 SQUAMOUS CELL CARCINOMA OF LEFT LUNG: ICD-10-CM

## 2023-09-07 LAB — GLUCOSE BLDC GLUCOMTR-MCNC: 109 MG/DL (ref 70–130)

## 2023-09-07 PROCEDURE — 82948 REAGENT STRIP/BLOOD GLUCOSE: CPT

## 2023-09-07 PROCEDURE — 87102 FUNGUS ISOLATION CULTURE: CPT | Performed by: SURGERY

## 2023-09-07 PROCEDURE — 76000 FLUOROSCOPY <1 HR PHYS/QHP: CPT

## 2023-09-07 PROCEDURE — 71045 X-RAY EXAM CHEST 1 VIEW: CPT

## 2023-09-07 PROCEDURE — 94799 UNLISTED PULMONARY SVC/PX: CPT

## 2023-09-07 PROCEDURE — 88112 CYTOPATH CELL ENHANCE TECH: CPT | Performed by: SURGERY

## 2023-09-07 PROCEDURE — 87205 SMEAR GRAM STAIN: CPT | Performed by: SURGERY

## 2023-09-07 PROCEDURE — 25010000002 ONDANSETRON PER 1 MG: Performed by: NURSE ANESTHETIST, CERTIFIED REGISTERED

## 2023-09-07 PROCEDURE — 31624 DX BRONCHOSCOPE/LAVAGE: CPT | Performed by: SURGERY

## 2023-09-07 PROCEDURE — G0378 HOSPITAL OBSERVATION PER HR: HCPCS

## 2023-09-07 PROCEDURE — 25010000002 SUGAMMADEX 200 MG/2ML SOLUTION: Performed by: NURSE ANESTHETIST, CERTIFIED REGISTERED

## 2023-09-07 PROCEDURE — 88312 SPECIAL STAINS GROUP 1: CPT | Performed by: SURGERY

## 2023-09-07 PROCEDURE — 94761 N-INVAS EAR/PLS OXIMETRY MLT: CPT

## 2023-09-07 PROCEDURE — 25010000002 PROPOFOL 10 MG/ML EMULSION: Performed by: NURSE ANESTHETIST, CERTIFIED REGISTERED

## 2023-09-07 PROCEDURE — 31628 BRONCHOSCOPY/LUNG BX EACH: CPT | Performed by: SURGERY

## 2023-09-07 PROCEDURE — 87071 CULTURE AEROBIC QUANT OTHER: CPT | Performed by: SURGERY

## 2023-09-07 PROCEDURE — 88173 CYTOPATH EVAL FNA REPORT: CPT | Performed by: SURGERY

## 2023-09-07 PROCEDURE — 31654 BRONCH EBUS IVNTJ PERPH LES: CPT | Performed by: SURGERY

## 2023-09-07 PROCEDURE — 94667 MNPJ CHEST WALL 1ST: CPT

## 2023-09-07 PROCEDURE — 87252 VIRUS INOCULATION TISSUE: CPT | Performed by: SURGERY

## 2023-09-07 PROCEDURE — 88305 TISSUE EXAM BY PATHOLOGIST: CPT | Performed by: SURGERY

## 2023-09-07 PROCEDURE — 94640 AIRWAY INHALATION TREATMENT: CPT

## 2023-09-07 RX ORDER — HYDRALAZINE HYDROCHLORIDE 25 MG/1
25 TABLET, FILM COATED ORAL 2 TIMES DAILY
Status: DISCONTINUED | OUTPATIENT
Start: 2023-09-07 | End: 2023-09-08 | Stop reason: HOSPADM

## 2023-09-07 RX ORDER — DROPERIDOL 2.5 MG/ML
0.62 INJECTION, SOLUTION INTRAMUSCULAR; INTRAVENOUS
Status: DISCONTINUED | OUTPATIENT
Start: 2023-09-07 | End: 2023-09-07

## 2023-09-07 RX ORDER — FLUMAZENIL 0.1 MG/ML
0.2 INJECTION INTRAVENOUS AS NEEDED
Status: DISCONTINUED | OUTPATIENT
Start: 2023-09-07 | End: 2023-09-07

## 2023-09-07 RX ORDER — HYDROMORPHONE HYDROCHLORIDE 2 MG/ML
0.25 INJECTION, SOLUTION INTRAMUSCULAR; INTRAVENOUS; SUBCUTANEOUS
Status: DISCONTINUED | OUTPATIENT
Start: 2023-09-07 | End: 2023-09-07

## 2023-09-07 RX ORDER — ONDANSETRON 4 MG/1
4 TABLET, FILM COATED ORAL EVERY 6 HOURS PRN
Status: DISCONTINUED | OUTPATIENT
Start: 2023-09-07 | End: 2023-09-08 | Stop reason: HOSPADM

## 2023-09-07 RX ORDER — LABETALOL HYDROCHLORIDE 5 MG/ML
5 INJECTION, SOLUTION INTRAVENOUS
Status: DISCONTINUED | OUTPATIENT
Start: 2023-09-07 | End: 2023-09-07

## 2023-09-07 RX ORDER — PROMETHAZINE HYDROCHLORIDE 25 MG/1
25 TABLET ORAL ONCE AS NEEDED
Status: DISCONTINUED | OUTPATIENT
Start: 2023-09-07 | End: 2023-09-07

## 2023-09-07 RX ORDER — HYDRALAZINE HYDROCHLORIDE 20 MG/ML
5 INJECTION INTRAMUSCULAR; INTRAVENOUS
Status: DISCONTINUED | OUTPATIENT
Start: 2023-09-07 | End: 2023-09-07

## 2023-09-07 RX ORDER — NITROGLYCERIN 0.4 MG/1
0.4 TABLET SUBLINGUAL
Status: DISCONTINUED | OUTPATIENT
Start: 2023-09-07 | End: 2023-09-08 | Stop reason: HOSPADM

## 2023-09-07 RX ORDER — ACETYLCYSTEINE 200 MG/ML
3 SOLUTION ORAL; RESPIRATORY (INHALATION)
Status: DISCONTINUED | OUTPATIENT
Start: 2023-09-07 | End: 2023-09-08 | Stop reason: HOSPADM

## 2023-09-07 RX ORDER — VALSARTAN 320 MG/1
320 TABLET ORAL
Status: DISCONTINUED | OUTPATIENT
Start: 2023-09-08 | End: 2023-09-08 | Stop reason: HOSPADM

## 2023-09-07 RX ORDER — EPHEDRINE SULFATE 50 MG/ML
5 INJECTION, SOLUTION INTRAVENOUS ONCE AS NEEDED
Status: DISCONTINUED | OUTPATIENT
Start: 2023-09-07 | End: 2023-09-07

## 2023-09-07 RX ORDER — ACETAMINOPHEN 500 MG
1000 TABLET ORAL 3 TIMES DAILY
Status: DISCONTINUED | OUTPATIENT
Start: 2023-09-07 | End: 2023-09-08 | Stop reason: HOSPADM

## 2023-09-07 RX ORDER — DIPHENHYDRAMINE HYDROCHLORIDE 50 MG/ML
12.5 INJECTION INTRAMUSCULAR; INTRAVENOUS
Status: DISCONTINUED | OUTPATIENT
Start: 2023-09-07 | End: 2023-09-07

## 2023-09-07 RX ORDER — CARVEDILOL 25 MG/1
50 TABLET ORAL 2 TIMES DAILY WITH MEALS
Status: DISCONTINUED | OUTPATIENT
Start: 2023-09-07 | End: 2023-09-08 | Stop reason: HOSPADM

## 2023-09-07 RX ORDER — ALBUTEROL SULFATE 2.5 MG/3ML
2.5 SOLUTION RESPIRATORY (INHALATION) EVERY 4 HOURS PRN
Status: DISCONTINUED | OUTPATIENT
Start: 2023-09-07 | End: 2023-09-08 | Stop reason: HOSPADM

## 2023-09-07 RX ORDER — HYDROCODONE BITARTRATE AND ACETAMINOPHEN 7.5; 325 MG/1; MG/1
1 TABLET ORAL EVERY 4 HOURS PRN
Status: DISCONTINUED | OUTPATIENT
Start: 2023-09-07 | End: 2023-09-07

## 2023-09-07 RX ORDER — ONDANSETRON 2 MG/ML
4 INJECTION INTRAMUSCULAR; INTRAVENOUS ONCE AS NEEDED
Status: DISCONTINUED | OUTPATIENT
Start: 2023-09-07 | End: 2023-09-07

## 2023-09-07 RX ORDER — NITROGLYCERIN 0.4 MG/1
0.4 TABLET SUBLINGUAL
Status: DISCONTINUED | OUTPATIENT
Start: 2023-09-07 | End: 2023-09-07 | Stop reason: SDUPTHER

## 2023-09-07 RX ORDER — PROMETHAZINE HYDROCHLORIDE 25 MG/1
25 SUPPOSITORY RECTAL ONCE AS NEEDED
Status: DISCONTINUED | OUTPATIENT
Start: 2023-09-07 | End: 2023-09-07

## 2023-09-07 RX ORDER — SODIUM CHLORIDE, SODIUM LACTATE, POTASSIUM CHLORIDE, CALCIUM CHLORIDE 600; 310; 30; 20 MG/100ML; MG/100ML; MG/100ML; MG/100ML
30 INJECTION, SOLUTION INTRAVENOUS CONTINUOUS PRN
Status: DISCONTINUED | OUTPATIENT
Start: 2023-09-07 | End: 2023-09-07

## 2023-09-07 RX ORDER — ROCURONIUM BROMIDE 10 MG/ML
INJECTION, SOLUTION INTRAVENOUS AS NEEDED
Status: DISCONTINUED | OUTPATIENT
Start: 2023-09-07 | End: 2023-09-07 | Stop reason: SURG

## 2023-09-07 RX ORDER — LIDOCAINE HYDROCHLORIDE 20 MG/ML
INJECTION, SOLUTION INFILTRATION; PERINEURAL AS NEEDED
Status: DISCONTINUED | OUTPATIENT
Start: 2023-09-07 | End: 2023-09-07 | Stop reason: SURG

## 2023-09-07 RX ORDER — PROPOFOL 10 MG/ML
VIAL (ML) INTRAVENOUS AS NEEDED
Status: DISCONTINUED | OUTPATIENT
Start: 2023-09-07 | End: 2023-09-07 | Stop reason: SURG

## 2023-09-07 RX ORDER — NALOXONE HCL 0.4 MG/ML
0.2 VIAL (ML) INJECTION AS NEEDED
Status: DISCONTINUED | OUTPATIENT
Start: 2023-09-07 | End: 2023-09-07

## 2023-09-07 RX ORDER — IPRATROPIUM BROMIDE AND ALBUTEROL SULFATE 2.5; .5 MG/3ML; MG/3ML
3 SOLUTION RESPIRATORY (INHALATION) ONCE AS NEEDED
Status: DISCONTINUED | OUTPATIENT
Start: 2023-09-07 | End: 2023-09-07

## 2023-09-07 RX ORDER — AMLODIPINE BESYLATE 10 MG/1
10 TABLET ORAL DAILY
Status: DISCONTINUED | OUTPATIENT
Start: 2023-09-08 | End: 2023-09-08 | Stop reason: HOSPADM

## 2023-09-07 RX ORDER — SODIUM CHLORIDE, SODIUM LACTATE, POTASSIUM CHLORIDE, CALCIUM CHLORIDE 600; 310; 30; 20 MG/100ML; MG/100ML; MG/100ML; MG/100ML
INJECTION, SOLUTION INTRAVENOUS CONTINUOUS PRN
Status: DISCONTINUED | OUTPATIENT
Start: 2023-09-07 | End: 2023-09-07 | Stop reason: SURG

## 2023-09-07 RX ORDER — TAMSULOSIN HYDROCHLORIDE 0.4 MG/1
0.4 CAPSULE ORAL NIGHTLY
Status: DISCONTINUED | OUTPATIENT
Start: 2023-09-08 | End: 2023-09-08 | Stop reason: HOSPADM

## 2023-09-07 RX ORDER — FENTANYL CITRATE 50 UG/ML
25 INJECTION, SOLUTION INTRAMUSCULAR; INTRAVENOUS
Status: DISCONTINUED | OUTPATIENT
Start: 2023-09-07 | End: 2023-09-07

## 2023-09-07 RX ORDER — HYDROCHLOROTHIAZIDE 25 MG/1
25 TABLET ORAL
Status: DISCONTINUED | OUTPATIENT
Start: 2023-09-08 | End: 2023-09-08 | Stop reason: HOSPADM

## 2023-09-07 RX ORDER — ONDANSETRON 2 MG/ML
INJECTION INTRAMUSCULAR; INTRAVENOUS AS NEEDED
Status: DISCONTINUED | OUTPATIENT
Start: 2023-09-07 | End: 2023-09-07 | Stop reason: SURG

## 2023-09-07 RX ORDER — ONDANSETRON 2 MG/ML
4 INJECTION INTRAMUSCULAR; INTRAVENOUS EVERY 6 HOURS PRN
Status: DISCONTINUED | OUTPATIENT
Start: 2023-09-07 | End: 2023-09-08 | Stop reason: HOSPADM

## 2023-09-07 RX ORDER — HYDROCODONE BITARTRATE AND ACETAMINOPHEN 5; 325 MG/1; MG/1
1 TABLET ORAL ONCE AS NEEDED
Status: DISCONTINUED | OUTPATIENT
Start: 2023-09-07 | End: 2023-09-07

## 2023-09-07 RX ORDER — ALBUTEROL SULFATE 2.5 MG/3ML
2.5 SOLUTION RESPIRATORY (INHALATION)
Status: DISCONTINUED | OUTPATIENT
Start: 2023-09-07 | End: 2023-09-08 | Stop reason: HOSPADM

## 2023-09-07 RX ADMIN — SODIUM CHLORIDE, POTASSIUM CHLORIDE, SODIUM LACTATE AND CALCIUM CHLORIDE 30 ML/HR: 600; 310; 30; 20 INJECTION, SOLUTION INTRAVENOUS at 07:38

## 2023-09-07 RX ADMIN — ROCURONIUM BROMIDE 50 MG: 10 INJECTION, SOLUTION INTRAVENOUS at 08:09

## 2023-09-07 RX ADMIN — ACETAMINOPHEN 1000 MG: 500 TABLET ORAL at 17:10

## 2023-09-07 RX ADMIN — ONDANSETRON 4 MG: 2 INJECTION INTRAMUSCULAR; INTRAVENOUS at 09:33

## 2023-09-07 RX ADMIN — ACETYLCYSTEINE 3 ML: 200 SOLUTION ORAL; RESPIRATORY (INHALATION) at 14:16

## 2023-09-07 RX ADMIN — PROPOFOL 120 MG: 10 INJECTION, EMULSION INTRAVENOUS at 08:09

## 2023-09-07 RX ADMIN — LIDOCAINE HYDROCHLORIDE 60 MG: 20 INJECTION, SOLUTION INFILTRATION; PERINEURAL at 08:09

## 2023-09-07 RX ADMIN — SODIUM CHLORIDE, POTASSIUM CHLORIDE, SODIUM LACTATE AND CALCIUM CHLORIDE: 600; 310; 30; 20 INJECTION, SOLUTION INTRAVENOUS at 08:00

## 2023-09-07 RX ADMIN — ACETYLCYSTEINE 3 ML: 200 SOLUTION ORAL; RESPIRATORY (INHALATION) at 22:22

## 2023-09-07 RX ADMIN — SUGAMMADEX 200 MG: 100 INJECTION, SOLUTION INTRAVENOUS at 09:33

## 2023-09-07 RX ADMIN — PROPOFOL 100 MCG/KG/MIN: 10 INJECTION, EMULSION INTRAVENOUS at 08:13

## 2023-09-07 RX ADMIN — ALBUTEROL SULFATE 2.5 MG: 2.5 SOLUTION RESPIRATORY (INHALATION) at 22:22

## 2023-09-07 RX ADMIN — ALBUTEROL SULFATE 2.5 MG: 2.5 SOLUTION RESPIRATORY (INHALATION) at 14:18

## 2023-09-07 NOTE — PROGRESS NOTES
Progress note    Patient had complete opacification of the left lung.  This could likely be due to airway swelling, hyperemia and mucous/clot plugging of the left upper lobe bronchus.  He will be admitted to the hospital for overnight observation.  If BX in the morning does not show significant improvement in the left upper lobe aeration, we will perform flexible bronchoscopy for airway clearance.    Taye Chavira MD  Thoracic surgeon.

## 2023-09-07 NOTE — ANESTHESIA PREPROCEDURE EVALUATION
Anesthesia Evaluation     Patient summary reviewed and Nursing notes reviewed   history of anesthetic complications:  prolonged sedation               Airway   Mallampati: III  TM distance: >3 FB  Neck ROM: limited  Possible difficult intubation  Dental      Pulmonary    (+) a smoker Former, lung cancer, COPD,shortness of breath  Cardiovascular     ECG reviewed  PT is on anticoagulation therapy  Patient on routine beta blocker  Rhythm: regular  Rate: normal    (+) pacemaker pacemaker, hypertension, dysrhythmias, hyperlipidemia      Neuro/Psych- negative ROS  GI/Hepatic/Renal/Endo    (+) morbid obesity, liver disease fatty liver disease, diabetes mellitus type 2    Musculoskeletal (-) negative ROS    Abdominal    Substance History - negative use     OB/GYN negative ob/gyn ROS         Other                        Anesthesia Plan    ASA 3     general     (Pacemaker  CMAC)  intravenous induction     Anesthetic plan, risks, benefits, and alternatives have been provided, discussed and informed consent has been obtained with: patient.      CODE STATUS:

## 2023-09-07 NOTE — ANESTHESIA PROCEDURE NOTES
Airway  Urgency: elective    Date/Time: 9/7/2023 8:13 AM  Airway not difficult    General Information and Staff    Patient location during procedure: OR    Indications and Patient Condition  Indications for airway management: airway protection    Preoxygenated: yes  Mask difficulty assessment: 2 - vent by mask + OA or adjuvant +/- NMBA    Final Airway Details  Final airway type: endotracheal airway      Successful airway: ETT  Cuffed: yes   Successful intubation technique: direct laryngoscopy  Facilitating devices/methods: intubating stylet  Endotracheal tube insertion site: oral  Blade: Sidra  Blade size: 4  ETT size (mm): 8.5 (psr)  Cormack-Lehane Classification: grade IIb - view of arytenoids or posterior of glottis only  Placement verified by: bronchoscopy and capnometry   Measured from: lips  ETT/EBT  to lips (cm): 21  Number of attempts at approach: 1  Assessment: lips, teeth, and gum same as pre-op and atraumatic intubation

## 2023-09-07 NOTE — ANESTHESIA POSTPROCEDURE EVALUATION
Patient: Alexy Ram    Procedure Summary       Date: 09/07/23 Room / Location:  [facility] /  [facility]    Anesthesia Start: 0800 Anesthesia Stop: 0950    Procedure: BRONCHOSCOPY WITH ION ROBOT AND ENDOBRONCHIAL ULTRASOUND with brushing and FNA (Chest) Diagnosis:       Squamous cell carcinoma of left lung      (Squamous cell carcinoma of left lung [C34.92])    Surgeons: Taye Chavira MD Provider: José Antonio Rucker MD    Anesthesia Type: general ASA Status: 3            Anesthesia Type: general    Vitals  Vitals Value Taken Time   /66 09/07/23 1014   Temp     Pulse 58 09/07/23 1019   Resp 16 09/07/23 1014   SpO2 88 % 09/07/23 1019   Vitals shown include unvalidated device data.        Post Anesthesia Care and Evaluation    Patient location during evaluation: PACU  Patient participation: complete - patient participated  Level of consciousness: awake and alert  Pain management: adequate    Airway patency: patent  Anesthetic complications: No anesthetic complications    Cardiovascular status: acceptable  Respiratory status: acceptable  Hydration status: acceptable    Comments: --------------------            09/07/23               1015     --------------------   BP:                  Pulse:            VSS   Resp:                SpO2:      93%      --------------------

## 2023-09-07 NOTE — PLAN OF CARE
Goal Outcome Evaluation:           Progress: improving  Outcome Evaluation: 67 y/o admitted from [facility] after s/POD 0 of bronchoscopy. VSS. RA. Up ad alicia. PIV saline locked. Awaiting cultures. Opacification of the left lung noted. Plan to observe overnight and plan for possible flexible bronchoscopy for airway clearance if needed. NPO at [place]. Needs met. Will CTM.

## 2023-09-07 NOTE — OP NOTE
Operative Note     Date of procedure: 9/7/2023     Patient name: Alexy Ram  MRN: [id number]    Pre OP diagnosis:  Left upper lobe partial collapse.  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  Hypercholesterolemia.  Hypertension.  Type 2 diabetes mellitus.  Obstructive sleep apnea.  Obesity, BMI 38.  Elevated liver enzymes.  Primary osteoarthritis of left hip.    Post OP diagnosis:  Left upper lobe partial collapse.  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  Hypercholesterolemia.  Hypertension.  Type 2 diabetes mellitus.  Obstructive sleep apnea.  Obesity, BMI 38.  Elevated liver enzymes.  Primary osteoarthritis of left hip.    Procedure performed:   Flexible bronchoscopy.  Navigational bronchoscopy with ION robot with C-arm.  Interpretation of fluoroscopy images.  Radial endobronchial ultrasound.  Fine-needle aspiration of the left upper lobe consolidation.  Cold forcep biopsy of left upper lobe consolidation.  Left upper lobe brush biopsy.  Left upper lobe segmental bronchoalveolar lavage.  Endobronchial ultrasound for mediastinal lymph node assessment.  Fine-needle aspiration of level 7 lymph node.    Indications:   Alexy Ram is a 68-year-old male who has significant medical problems as mentioned below.     He presents today for further follow-up of a left lower lobectomy performed in November 19, 2018 for squamous cell carcinoma of the lung.  Overall, he is doing well. He had COPD but denies any unusual breathing difficulty but feels that he is doing better. He is still using his CPAP machine and denies any dyspnea when talking or sitting. He will become short of breath if he walks 1 block. He just had a hip arthroplasty and uses a cane to ambulate. He denies chronic coughing but has occasional days where he does cough. He denies any pneumonia or any infection in the chest that required antibiotics within the last year.  He quit smoking when he was diagnosed with lung cancer.     He has new masslike consolidation along the anterior suture line which measures 5.4 cm with PET avidity.  The possibility of malignancy cannot be ruled out.  Therefore recommended ION robotic navigational bronchoscopy to establish diagnosis.       Surgeon: aTye Chavira MD     Assistants: No qualified assistant was available for this procedure.      Anesthesia: General endotracheal anesthesia    ASA Class: 3    Procedure Details   On 9/7/2023, the patient was brought to the operating room and placed in the supine position on the operating room table. Following an uneventful induction of general anesthesia, patient was intubated with a single endotracheal tube without incident.  Antibiotic for surgical prophylaxis was not indicated due to the nature of the procedure.  Prior to beginning the operation, a time-out was conducted with all members of surgical team present. The patient was identified as .name, the procedure and the correct site were verified.     I began by performing flexible bronchoscopy.  A flexible adult bronchoscope was advanced through the endotracheal tube.  A complete examination of the distal trachea and bilateral mainstem and lobar bronchi and all segmental bronchial orifices was performed.  There is a short bronchial left lower lobe stump which was consistent with prior left lower lobectomy.  The left upper lobe takeoff was narrowed and hyperemic.      Prior to the procedure, the patient CT scan was integrated into the PlanPoint interface that generated the patient's 3D airway tree.  The target nodule was identified and its anatomical borders were mapped.  A path to the target nodule was generated through the PlanPoint interface.  After the plan was finalized, the patient image and plan guide was transferred to the [employer] robotic platform.  Using the Ion's controller, the 3.5 mm robotic catheter was advanced with the Ion's vision probe and  navigated to the target along the preplanned path.  The catheter was parked next to the target lesion.  The Ion vision probe was removed and radial EBUS was used to confirm the presence of the target lesion.  The radial EBUS was removed and under fluoroscopic guidance, a 21 gauge Ion’s Flexision needle was passed into the robotic catheter.  The needle was advanced into the target lesion and the location of the needle was confirmed with the C-arm. Multiple passes of the needle were made into the target lesion and the aspirate was sent for Rapid on Site Evaluation (GAIL). The needle was removed. I then performed cold forcep biopsy through the working channel in the same location. The bronchioloalveolar lavage was performed in the segmental bronchi.  The aspirate was sent for cytology and culture.  I then performed brush sample from the left lower lobe bronchus.  The robotic catheter was removed and an adult flexible bronchoscope was inserted.  There was no pooling of blood into the bronchial tree.  All tracheobronchial tree were cleared from secretions.    The flexible bronchoscope with the Olympus endobronchial ultrasound was inserted.  The level 7 lymph node was identified that measured 10 mm.  Using 21-gauge needle endobronchial Olympus needle, transbronchial FNA was performed.  Multiple passes with the needle were performed. The Olympus endobronchial ultrasound was removed and adult bronchoscope was inserted. There was no pooling of blood into the bronchial tree.  All tracheobronchial tree were cleared from secretions.    The patient was awakened from anesthesia, was extubated without incident, and was transported to the Post Anesthesia Care Unit in stable condition.    Findings:  The left upper lobe takeoff was narrowed.  There was significant hyperemia that led to bleeding into the orifice making navigation challenging to the target lesion.  Using 21-gauge needle, fine-needle aspiration of the left upper lobe  consolidation was performed.  Cold forcep biopsy of the left upper lobe consolation was performed.  Bronchoalveolar lavage of the left upper lobe bronchus was performed.  [place] biopsy of the left upper lobe bronchus was performed.  10 mm level 7 lymph node was identified and FNA was performed.  No evidence of significant active bleeding at the end of the procedure.    Estimated Blood Loss:  Minimal           Drains: None                 Specimens:   ID Type Source Tests Collected by Time   A : left upper lobe fna Fine Needle Aspirate Lung, Left Upper Lobe FINE NEEDLE ASPIRATION Taye Chavira MD 9/7/2023 0835   B : PERI brushing Brushing Lung, Left Upper Lobe NON-GYNECOLOGIC CYTOLOGY Taye Chavira MD 9/7/2023 0911   C : station #7 fna Lymph Node Mediastinum FINE NEEDLE ASPIRATION Taye Chavira MD 9/7/2023 0918   D : BAL PERI Lavage Lung, Left Upper Lobe NON-GYNECOLOGIC CYTOLOGY, FUNGAL CULTURE, VIRUS CULTURE, BAL CULTURE, QUANTITATIVE Taye Chavira MD 9/7/2023 0969       Implants: None           Complications: None           Disposition: PACU - hemodynamically stable.           Condition: Stable     Taye Chavira MD   Thoracic Surgeon  [facility]

## 2023-09-08 ENCOUNTER — READMISSION MANAGEMENT (OUTPATIENT)
Dept: CALL CENTER | Facility: HOSPITAL | Age: 68
End: 2023-09-08
Payer: MEDICARE

## 2023-09-08 ENCOUNTER — APPOINTMENT (OUTPATIENT)
Dept: GENERAL RADIOLOGY | Facility: HOSPITAL | Age: 68
End: 2023-09-08
Payer: MEDICARE

## 2023-09-08 VITALS
WEIGHT: 255 LBS | OXYGEN SATURATION: 91 % | SYSTOLIC BLOOD PRESSURE: 121 MMHG | DIASTOLIC BLOOD PRESSURE: 68 MMHG | HEIGHT: 68 IN | RESPIRATION RATE: 16 BRPM | TEMPERATURE: 97.4 F | HEART RATE: 61 BPM | BODY MASS INDEX: 38.65 KG/M2

## 2023-09-08 LAB
CYTO UR: NORMAL
CYTO UR: NORMAL
DX PRELIMINARY: NORMAL
LAB AP CASE REPORT: NORMAL
LAB AP CASE REPORT: NORMAL
LAB AP NON-GYN SPECIMEN ADEQUACY: NORMAL
PATH REPORT.FINAL DX SPEC: NORMAL
PATH REPORT.FINAL DX SPEC: NORMAL
PATH REPORT.GROSS SPEC: NORMAL
PATH REPORT.GROSS SPEC: NORMAL

## 2023-09-08 PROCEDURE — 94799 UNLISTED PULMONARY SVC/PX: CPT

## 2023-09-08 PROCEDURE — 99238 HOSP IP/OBS DSCHRG MGMT 30/<: CPT | Performed by: SURGERY

## 2023-09-08 PROCEDURE — G0378 HOSPITAL OBSERVATION PER HR: HCPCS

## 2023-09-08 PROCEDURE — 71045 X-RAY EXAM CHEST 1 VIEW: CPT

## 2023-09-08 PROCEDURE — 94668 MNPJ CHEST WALL SBSQ: CPT

## 2023-09-08 PROCEDURE — 94664 DEMO&/EVAL PT USE INHALER: CPT

## 2023-09-08 RX ORDER — POTASSIUM CHLORIDE 750 MG/1
20 TABLET, FILM COATED, EXTENDED RELEASE ORAL ONCE
Status: COMPLETED | OUTPATIENT
Start: 2023-09-08 | End: 2023-09-08

## 2023-09-08 RX ADMIN — HYDRALAZINE HYDROCHLORIDE 25 MG: 25 TABLET, FILM COATED ORAL at 08:00

## 2023-09-08 RX ADMIN — ACETYLCYSTEINE 3 ML: 200 SOLUTION ORAL; RESPIRATORY (INHALATION) at 08:11

## 2023-09-08 RX ADMIN — TIOTROPIUM BROMIDE INHALATION SPRAY 2 PUFF: 3.12 SPRAY, METERED RESPIRATORY (INHALATION) at 08:25

## 2023-09-08 RX ADMIN — CARVEDILOL 50 MG: 25 TABLET, FILM COATED ORAL at 08:01

## 2023-09-08 RX ADMIN — ALBUTEROL SULFATE 2.5 MG: 2.5 SOLUTION RESPIRATORY (INHALATION) at 08:14

## 2023-09-08 RX ADMIN — AMLODIPINE BESYLATE 10 MG: 10 TABLET ORAL at 08:00

## 2023-09-08 RX ADMIN — HYDROCHLOROTHIAZIDE 25 MG: 25 TABLET ORAL at 08:00

## 2023-09-08 RX ADMIN — POTASSIUM CHLORIDE 20 MEQ: 750 TABLET, EXTENDED RELEASE ORAL at 08:11

## 2023-09-08 RX ADMIN — ACETAMINOPHEN 1000 MG: 500 TABLET ORAL at 08:00

## 2023-09-08 RX ADMIN — VALSARTAN 320 MG: 320 TABLET, FILM COATED ORAL at 08:00

## 2023-09-08 NOTE — DISCHARGE SUMMARY
Patient Care Team:  Pedro Luis Simons MD as PCP - General (Internal Medicine)  Steve Rice MD as Consulting Physician (Cardiac Electrophysiology)    Date of Admission: 9/7/2023   Date of Discharge:  9/8/2023    Discharge Diagnosis:   Left upper lobe partial collapse.  Left lung consolidation/atelectasis.  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  Hypercholesterolemia.  Hypertension.  Type 2 diabetes mellitus.  Obstructive sleep apnea.  Obesity, BMI 38.  Elevated liver enzymes.  Primary osteoarthritis of left hip.    Presenting Problem  Squamous cell carcinoma of left lung [C34.92]  Atelectasis [J98.11]  Atelectasis of left lung [J98.11]     History of Present Illness  Alexy Ram is a 68-year-old male who has significant medical problems as mentioned below.     He presents today for further follow-up of a left lower lobectomy performed in November 19, 2018 for squamous cell carcinoma of the lung.  Overall, he is doing well. He had COPD but denies any unusual breathing difficulty but feels that he is doing better. He is still using his CPAP machine and denies any dyspnea when talking or sitting. He will become short of breath if he walks 1 block. He just had a hip arthroplasty and uses a cane to ambulate. He denies chronic coughing but has occasional days where he does cough. He denies any pneumonia or any infection in the chest that required antibiotics within the last year. He quit smoking when he was diagnosed with lung cancer.      He has new masslike consolidation along the anterior suture line which measures 5.4 cm with PET avidity.  The possibility of malignancy cannot be ruled out.  Therefore I recommended ION robotic navigational bronchoscopy to establish diagnosis.  He underwent the procedure on 9/7/2023.  The left upper lobe takeoff was narrowed.  There was significant hyperemia that led to bleeding into the orifice making navigation challenging  to the target lesion.  I performed FNA and cold forcep biopsy from the left upper lobe consolation.  There was a 10 mm level 7 lymph node which was biopsied.    Postprocedure he had complete whiteout of left lung that was likely due to narrowing/swelling of the airway.  He was admitted to the hospital for observation.    Hospital Course  There were no acute issues during the hospitalization.  He was given bronchodilators and mucolytic.  His breathing improved and he was on room air.  The chest x-ray on the next day showed improved aeration of the left lung.  He was discharged home in a stable condition.    Procedures Performed  Procedure(s):  BRONCHOSCOPY WITH ION ROBOT AND ENDOBRONCHIAL ULTRASOUND with brushing and FNA       Consults:   Consults       No orders found for last 30 day(s).            Pertinent Test Results:     Imaging Results (Last 24 Hours)       Procedure Component Value Units Date/Time    XR Chest 1 View [[id number]] Collected: 09/08/23 0501     Updated: 09/08/23 0508    Narrative:      SINGLE VIEW OF THE CHEST     HISTORY: Status post bronchoscopic biopsy     COMPARISON: September 7, 2023     FINDINGS:  There has been significant interval improvement in aeration of the left  lung when compared to the prior study. There is chronic volume loss  within the left hemithorax. There is chronic blunting of the left  costophrenic angle. The heart is enlarged. There is calcification of the  aorta. Right subclavian pacemaker is noted. Right lung is clear.       Impression:      Significant interval improvement in aeration of the left lung.     This report was finalized on 9/8/2023 5:04 AM by Dr. Alexandria Abbasi M.D.       XR Chest 1 View [[id number]] Collected: 09/07/23 1145     Updated: 09/07/23 1150    Narrative:      XR CHEST 1 VW-, FL C ARM DURING SURGERY-     HISTORY: Male who is 68 years-old, bronchoscopy     TECHNIQUE: FLUOROSCOPIC ASSISTANCE IN THE OPERATING ROOM.     FINDINGS:     2 intraoperative  fluoroscopic spot views were obtained and recorded the  PACS for review, revealing left-sided bronchoscopy. Please see operative  report for full details.     Fluoroscopy time: 5 minutes, 27 seconds     Radiation exposure: Not provided          Impression:         As described.        This report was finalized on 9/7/2023 11:47 AM by Dr. Sebastián Griffith M.D.       FL C Arm During Surgery [[id number]] Collected: 09/07/23 1145     Updated: 09/07/23 1150    Narrative:      XR CHEST 1 VW-, FL C ARM DURING SURGERY-     HISTORY: Male who is 68 years-old, bronchoscopy     TECHNIQUE: FLUOROSCOPIC ASSISTANCE IN THE OPERATING ROOM.     FINDINGS:     2 intraoperative fluoroscopic spot views were obtained and recorded the  PACS for review, revealing left-sided bronchoscopy. Please see operative  report for full details.     Fluoroscopy time: 5 minutes, 27 seconds     Radiation exposure: Not provided          Impression:         As described.        This report was finalized on 9/7/2023 11:47 AM by Dr. Sebastián Griffith M.D.       XR Chest 1 View [[id number]] Collected: 09/07/23 1009     Updated: 09/07/23 1019    Narrative:      Portable chest radiograph     HISTORY: Status post bronchoscopic biopsy     TECHNIQUE: Single AP portable radiograph of the chest     COMPARISON: Chest radiograph 7/5/2023       Impression:      FINDINGS AND IMPRESSION:  The left lung is completely opacified with what appears to be a  bronchial cutoff sign of the left mainstem bronchus. While the patient  is rotated, the mediastinum appears to be shifted to the left. Findings  are most suggestive of complete collapse of the left lung in the setting  of mucous plugging; however this cannot be confirmed with chest  radiographs. Findings can be better characterized with chest CT if  clinically indicated. At least continued follow-up with serial chest  radiographs is recommended to confirm resolution.  The above findings  were discussed with Dr. Chavira    by telephone by Harmeet Baca at 10:12  a.m.   on   9/7/2023 .      Mild right basilar pulmonary opacification is present suggestive of  atelectasis, pneumonia versus asymmetric edema in the appropriate  clinical context and correlation with patient history is recommended.  Right-sided pacemaker is present..      No right-sided pneumothorax is seen. Cardiac silhouette cannot be  accurately assessed due to adjacent opacification.      This report was finalized on 9/7/2023 10:16 AM by Dr. Harmeet Baca M.D.               Lab Results (last 24 hours)       Procedure Component Value Units Date/Time    Non-gynecologic Cytology [[id number]] Collected: 09/07/23 0911    Specimen: Lavage from Lung, Left Upper Lobe; Lavage from Lung, Left Upper Lobe Updated: 09/07/23 1416    BAL Culture, Quantitative - Lavage, Lung, Left Upper Lobe [[id number]] Collected: 09/07/23 0934    Specimen: Lavage from Lung, Left Upper Lobe Updated: 09/07/23 1409     Gram Stain Rare (1+) WBCs seen      No organisms seen    Fine Needle Aspiration [[id number]] Collected: 09/07/23 0835    Specimen: Fine Needle Aspirate from Lung, Left Upper Lobe; Fine Needle Aspirate from Mediastinum Updated: 09/07/23 1206    Fungus Culture - Lavage, Lung, Left Upper Lobe [[id number]] Collected: 09/07/23 0934    Specimen: Lavage from Lung, Left Upper Lobe Updated: 09/07/23 0951    Virus Culture - Lavage, Lung, Left Upper Lobe [[id number]] Collected: 09/07/23 0934    Specimen: Lavage from Lung, Left Upper Lobe Updated: 09/07/23 0951              Condition on Discharge: Stable    Vital Signs  Temp:  [97 °F (36.1 °C)-97.5 °F (36.4 °C)] 97.4 °F (36.3 °C)  Heart Rate:  [56-74] 61  Resp:  [12-18] 16  BP: (101-133)/(58-91) 121/68    Physical Exam:  No acute distress.  Alert, oriented x4.  Rate rhythm regular.  Non-labored breathing.  Abdomen soft, nondistended.  With all 4 extremities, no focal deficit.  Extremities warm.    Discharge Disposition  Home today    Discharge  Medications     Discharge Medications        Continue These Medications        Instructions Start Date   albuterol sulfate  (90 Base) MCG/ACT inhaler  Commonly known as: PROVENTIL HFA;VENTOLIN HFA;PROAIR HFA   2 puffs, Inhalation, Every 4 Hours PRN      amLODIPine 10 MG tablet  Commonly known as: NORVASC   TAKE 1 TABLET BY MOUTH EVERY DAY      atorvastatin 40 MG tablet  Commonly known as: LIPITOR   TAKE 1 TABLET BY MOUTH EVERYDAY AT BEDTIME      carvedilol 25 MG tablet  Commonly known as: COREG   TAKE 2 TABLETS BY MOUTH 2 TIMES A DAY WITH MEALS.      Eliquis 5 MG tablet tablet  Generic drug: apixaban   5 mg, Oral, 2 Times Daily      gabapentin 100 MG capsule  Commonly known as: NEURONTIN   TAKE TWO TABLETS BY MOUTH EVERY MORNING AND AT BEDTIME. TAKE ONE TABLET BY MOUTH IN MIDDLE OF THE DAY.M      hydrALAZINE 25 MG tablet  Commonly known as: APRESOLINE   25 mg, Oral, 2 Times Daily      pioglitazone 30 MG tablet  Commonly known as: ACTOS   TAKE 1 TABLET BY MOUTH EVERY DAY      potassium chloride 10 MEQ CR tablet   20 mEq, Oral, 3 Times Daily      PROBIOTIC PO   1 tablet, Oral, Daily      Spiriva Respimat 2.5 MCG/ACT aerosol solution inhaler  Generic drug: tiotropium bromide monohydrate   2 puffs, Inhalation, Daily - RT      tamsulosin 0.4 MG capsule 24 hr capsule  Commonly known as: FLOMAX   0.4 mg, Oral, Nightly      valsartan-hydrochlorothiazide 320-25 MG per tablet  Commonly known as: DIOVAN-HCT   1 tablet, Oral, Every Morning               Discharge Instructions:  No heavy lifting, pushing, pulling greater than 10 pounds.  No driving up until 2 weeks after surgery and no longer taking narcotics.  Resume home diet as tolerated.  Continue incentive spirometer at least 4 times per day.  Remove dressing from post chest tube site after 48 hours, may shower and clean surgical sites with antibacterial soap or hydrogen peroxide, and apply gauze dressing or band-aid as needed for any drainage.  No dressing needed  once no longer draining.          Follow-up Appointments  Future Appointments   Date Time Provider Department Center   9/14/2023 11:45 AM Taye Chavira MD [facility]   9/14/2023  3:50 PM Nima Farmer APRN [facility]   10/2/2023  8:15 AM Pedro Luis Simons MD [facility]   11/28/2023 10:15 AM [facility]   12/4/2023 10:15 AM Pedro Luis Simons MD [facility]         Test Results Pending at Discharge  Pending Labs       Order Current Status    Fine Needle Aspiration In process    Fungus Culture - Lavage, Lung, Left Upper Lobe In process    Non-gynecologic Cytology In process    Virus Culture - Lavage, Lung, Left Upper Lobe In process    BAL Culture, Quantitative - Lavage, Lung, Left Upper Lobe Preliminary result            For any questions regarding patient's stay, please refer to patient's chart.    Taye Chavira MD  [facility]  09/08/23  08:29 EDT

## 2023-09-09 ENCOUNTER — TRANSITIONAL CARE MANAGEMENT TELEPHONE ENCOUNTER (OUTPATIENT)
Dept: CALL CENTER | Facility: HOSPITAL | Age: 68
End: 2023-09-09
Payer: MEDICARE

## 2023-09-09 LAB
BACTERIA SPEC AEROBE CULT: NO GROWTH
GRAM STN SPEC: NORMAL
GRAM STN SPEC: NORMAL

## 2023-09-09 NOTE — OUTREACH NOTE
Call Center TCM Note      Flowsheet Row Responses   [facility] patient discharged from? [place]   Does the patient have one of the following disease processes/diagnoses(primary or secondary)? Other   TCM attempt successful? Yes  [Verbal release for wife and son]   Call start time 1153   Discharge diagnosis Squamous cell carcinoma of left lung   Person spoke with today (if not patient) and relationship patient   Meds reviewed with patient/caregiver? Yes   Is the patient having any side effects they believe may be caused by any medication additions or changes? No   Does the patient have all medications ordered at discharge? Yes   Is the patient taking all medications as directed (includes completed medication regime)? Yes   Comments TCM complete.  Pt declined rescheduling PCP appt.  He has post-op scheduled for 9/14/23   Does the patient have an appointment with their PCP within 7-14 days of discharge? No   Nursing Interventions Patient declined scheduling/rescheduling appointment at this time   Psychosocial issues? No   Did the patient receive a copy of their discharge instructions? Yes   Nursing interventions Reviewed instructions with patient   What is the patient's perception of their health status since discharge? Improving   Is the patient/caregiver able to teach back the hierarchy of who to call/visit for symptoms/problems? PCP, Specialist, Home health nurse, Urgent Care, ED, 911 Yes   If the patient is a current smoker, are they able to teach back resources for cessation? Not a smoker   TCM call completed? Yes   Wrap up additional comments Pt states he is doing well and denies needs/concerns   Would this patient benefit from a Referral to Amb Social Work? No   Is the patient interested in additional calls from an ambulatory ? No            Gabbi Hutton RN    9/9/2023, 11:57 EDT

## 2023-09-10 DIAGNOSIS — G89.29 HIP PAIN, CHRONIC, LEFT: ICD-10-CM

## 2023-09-10 DIAGNOSIS — M25.552 HIP PAIN, CHRONIC, LEFT: ICD-10-CM

## 2023-09-10 LAB — VIRUS SPEC CULT: NORMAL

## 2023-09-11 ENCOUNTER — TELEPHONE (OUTPATIENT)
Dept: SURGERY | Facility: CLINIC | Age: 68
End: 2023-09-11
Payer: MEDICARE

## 2023-09-11 RX ORDER — GABAPENTIN 100 MG/1
CAPSULE ORAL
Qty: 150 CAPSULE | Refills: 0 | Status: SHIPPED | OUTPATIENT
Start: 2023-09-11

## 2023-09-11 NOTE — PROGRESS NOTES
Case Management Discharge Note      Final Note: discharged to home on 9/8/23. Family transported. KARLA Cruz RN, CCP.         Selected Continued Care - Discharged on 9/8/2023 Admission date: 9/7/2023 - Discharge disposition: Home or Self Care      Destination    No services have been selected for the patient.                Durable Medical Equipment    No services have been selected for the patient.                Dialysis/Infusion    No services have been selected for the patient.                Home Medical Care    No services have been selected for the patient.                Therapy    No services have been selected for the patient.                Community Resources    No services have been selected for the patient.                Community & DME    No services have been selected for the patient.                    Selected Continued Care - Prior Encounters Includes continued care and service providers with selected services from prior encounters from 6/9/2023 to 9/8/2023      Discharged on 7/15/2023 Admission date: 7/14/2023 - Discharge disposition: Home-Health Care Svc      Home Medical Care       Service Provider Selected Services Address Phone Fax Patient Preferred     [facility] [address] [address] [phone] --                          Transportation Services  Private: Car    Final Discharge Disposition Code: 01 - home or self-care

## 2023-09-11 NOTE — TELEPHONE ENCOUNTER
Rx Refill Note  Requested Prescriptions     Pending Prescriptions Disp Refills    gabapentin (NEURONTIN) 100 MG capsule 150 capsule 0     Sig: TAKE TWO TABLETS BY MOUTH EVERY MORNING AND AT BEDTIME. TAKE ONE TABLET BY MOUTH IN MIDDLE OF THE DAY.M      Last office visit with prescribing clinician: 8/30/2023   Last telemedicine visit with prescribing clinician: Visit date not found   Next office visit with prescribing clinician: 10/2/2023                         Would you like a call back once the refill request has been completed: [] Yes [] No    If the office needs to give you a call back, can they leave a voicemail: [] Yes [] No    Beatriz Samaniego MA  09/11/23, 07:42 EDT

## 2023-09-14 ENCOUNTER — OFFICE VISIT (OUTPATIENT)
Dept: SURGERY | Facility: CLINIC | Age: 68
End: 2023-09-14
Payer: MEDICARE

## 2023-09-14 ENCOUNTER — OFFICE VISIT (OUTPATIENT)
Dept: ORTHOPEDIC SURGERY | Facility: CLINIC | Age: 68
End: 2023-09-14
Payer: MEDICARE

## 2023-09-14 VITALS — BODY MASS INDEX: 38.49 KG/M2 | HEIGHT: 68 IN | RESPIRATION RATE: 16 BRPM | WEIGHT: 254 LBS | TEMPERATURE: 96.3 F

## 2023-09-14 VITALS
HEIGHT: 68 IN | WEIGHT: 251 LBS | OXYGEN SATURATION: 93 % | HEART RATE: 55 BPM | DIASTOLIC BLOOD PRESSURE: 78 MMHG | SYSTOLIC BLOOD PRESSURE: 124 MMHG | BODY MASS INDEX: 38.04 KG/M2

## 2023-09-14 DIAGNOSIS — Z96.642 STATUS POST LEFT HIP REPLACEMENT: ICD-10-CM

## 2023-09-14 DIAGNOSIS — R52 PAIN: Primary | ICD-10-CM

## 2023-09-14 DIAGNOSIS — C34.32 MALIGNANT NEOPLASM OF LOWER LOBE, LEFT BRONCHUS OR LUNG: Primary | ICD-10-CM

## 2023-09-14 LAB — FUNGUS WND CULT: NORMAL

## 2023-09-14 NOTE — PROGRESS NOTES
Alexy Ram : 1955 MRN: [id number] DATE: 2023    DIAGNOSIS: 8 week follow up left total hip Posterior Lateral Approach    SUBJECTIVE:Patient returns today for 8 week follow up of left total hip replacement. Patient reports doing well with no unusual complaints. Appears to be progressing appropriately and is using a cane.  Patient reports that his pain level is very minimal at this time.  He is done with outpatient physical therapy and is doing home exercises.  Still reports some slight limitations of the hip of putting on his shoes and socks.  Denies any instability issues.  Denies any signs or symptoms of infection, and he is without any other significant complaints today.    OBJECTIVE:   Exam:. The incision is healed. No sign of infection. Range of motion is progressing as expected. The calf is soft and nontender with a negative Homans sign. Strength progressing    DIAGNOSTIC STUDIES  Xrays: 2 views of the left hip (AP pelvis and lateral left hip) were ordered and reviewed for evaluation of recent hip replacement. They demonstrate a well positioned, well aligned hip replacement without complicating factors noted. In comparison with previous films there has been interval implant placement.    ASSESSMENT: 8 week status post left hip replacement Posterior Lateral Approach    PLAN: 1) Activity as tolerated   2) Continue hip strengthening exercises    3) Follow up 1 year post-op with repeat Xrays of left hip (2views AP Pelvis and lateral left hip)    ROME Ignacio  2023

## 2023-09-14 NOTE — PROGRESS NOTES
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    Subjective   HISTORY OF PRESENTING ILLNESS:   Alexy Ram is a 68-year-old male who has significant medical problems as mentioned below.     He presents today for further follow-up of a left lower lobectomy performed in November 19, 2018 for squamous cell carcinoma of the lung.  Overall, he is doing well. He had COPD but denies any unusual breathing difficulty but feels that he is doing better. He is still using his CPAP machine and denies any dyspnea when talking or sitting. He will become short of breath if he walks 1 block. He just had a hip arthroplasty and uses a cane to ambulate. He denies chronic coughing but has occasional days where he does cough. He denies any pneumonia or any infection in the chest that required antibiotics within the last year. He quit smoking when he was diagnosed with lung cancer.      He has new masslike consolidation along the anterior suture line which measures 5.4 cm with PET avidity.  The possibility of malignancy cannot be ruled out.  Therefore I recommended ION robotic navigational bronchoscopy to establish diagnosis.  He underwent the procedure on 9/7/2023.  The left upper lobe takeoff was narrowed.  There was significant hyperemia that led to bleeding into the orifice making navigation challenging to the target lesion.  I performed FNA and cold forcep biopsy from the left upper lobe consolation.  There was a 10 mm level 7 lymph node which was biopsied.  There is no evidence of malignancy.     Postprocedure he had complete whiteout of left lung that was likely due to narrowing/swelling of the airway.  He was admitted to the hospital for observation.    He came to clinic for follow-up visit.  He is doing well, and his breathing is also. He is still having hemoptysis and had a recent episode 2 days ago that has not recurred since.  The blood that appeared in his sputum he describes as dark blood.     Past Medical History:   Diagnosis Date    Arthritis     OSTEOARTHRITIS    At risk for sleep apnea     5    COPD (chronic obstructive pulmonary disease) July 2018    Diabetes mellitus     Dyspnea on exertion     Fatty liver     Hip pain     History of low potassium     History of transfusion     Hyperlipidemia     Hypertension     Hypoglycemia     Hypoglycemia     Lung cancer     lung    Pacemaker     Second degree AV block     Sinus node dysfunction     Sleep apnea     WEARS CPAP    Slow to wake up after anesthesia     Status post lobectomy of lung 03/18/2019    Squamous cell carcinoma    Urine output low        Past Surgical History:   Procedure Laterality Date    BRONCHOSCOPY N/A 10/11/2018    Procedure: BRONCHOSCOPY WITH FLUORO, LEFT LOWER LOBE BRUSHINGS WET AND DRY. WITH BX'S AND BAL (IN LLL).;  Surgeon: Akil Ortiz MD;  Location:  [facility];  Service: Pulmonary    BRONCHOSCOPY WITH ION ROBOTIC ASSIST N/A 9/7/2023    Procedure: BRONCHOSCOPY WITH ION ROBOT AND ENDOBRONCHIAL ULTRASOUND with brushing and FNA;  Surgeon: Taye Chavira MD;  Location:  [facility];  Service: Robotics - Pulmonary;  Laterality: N/A;  PRE/POST - left upper lobe mass    COLONOSCOPY  2021    COLONOSCOPY W/ POLYPECTOMY N/A 10/22/2021    Procedure: COLONOSCOPY WITH POLYPECTOMY;  Surgeon: Lan Solis MD;  Location: [facility];  Service: Gastroenterology;  Laterality: N/A;  cecal polyp x1 (cold snare)  ascending polyp x1 (hot snare)  transverse polyp x3 (hot snare x 1), (cold snare x2)  sigmoid polyp x1 (hot snare, clip applied)  rectal polyp x3 (cold snare)    INSERT / REPLACE / REMOVE PACEMAKER  6/5/2005    replaced Oct 2014    JOINT REPLACEMENT  Hip    07/14/2023    KNEE ARTHROSCOPY Left     LUNG LOBECTOMY Left 03/18/2019    Squamous cell carcinoma    PACEMAKER IMPLANTATION  2005, 2014    THORACOSCOPY Left 11/19/2018    Procedure:  BRONCHOSCOPY, DAVINCI ROBOT ASSISTED VIDEIO ASSISTED THORACOSCOPY  WITH CONVERT TO OPEN THORACOTOMY,LEFT LOWER LOBECTOMY,INTERCOSTAL NERVE BLOCK;  Surgeon: Michael Ring III, MD;  Location: [facility] MAIN OR;  Service: DaVinci    TOTAL HIP ARTHROPLASTY Left 2023    Procedure: TOTAL HIP ARTHROPLASTY;  Surgeon: Nikko Staton MD;  Location:  [facility] [place];  Service: Orthopedics;  Laterality: Left;       Family History   Problem Relation Age of Onset    Diabetes Mother     Stroke Father     Heart disease Father     Stroke Sister     Cancer Sister     Heart disease Brother     Diabetes Brother     Stroke Sister     Diabetes Sister     Diabetes Brother     Malig Hyperthermia Neg Hx        Social History     Socioeconomic History    Marital status:    Tobacco Use    Smoking status: Former     Packs/day: 1.00     Years: 33.00     Pack years: 33.00     Types: Cigarettes     Quit date: 2018     Years since quittin.7    Smokeless tobacco: Never    Tobacco comments:     started cigars 3-4 daily in 2018 and has quit a month ago   Vaping Use    Vaping Use: Never used   Substance and Sexual Activity    Alcohol use: Not Currently     Comment: rare    Drug use: No    Sexual activity: Defer         Current Outpatient Medications:     albuterol sulfate  (90 Base) MCG/ACT inhaler, Inhale 2 puffs Every 4 (Four) Hours As Needed for Wheezing., Disp: 1 inhaler, Rfl: 0    amLODIPine (NORVASC) 10 MG tablet, TAKE 1 TABLET BY MOUTH EVERY DAY, Disp: 90 tablet, Rfl: 1    atorvastatin (LIPITOR) 40 MG tablet, TAKE 1 TABLET BY MOUTH EVERYDAY AT BEDTIME, Disp: 90 tablet, Rfl: 3    carvedilol (COREG) 25 MG tablet, TAKE 2 TABLETS BY MOUTH 2 TIMES A DAY WITH MEALS. (Patient taking differently: Take 2 tablets by mouth 2 (Two) Times a Day With Meals.), Disp: 360 tablet, Rfl: 3    Eliquis 5 MG tablet tablet, Take 1 tablet by mouth 2 (Two) Times a Day. (Patient taking differently: Take 1 tablet by mouth 2 (Two) Times a Day.  "HOLD X 48 HRS FOR ENDO  Indications: Prevention of Unwanted Clot in Veins), Disp: 60 tablet, Rfl: 0    gabapentin (NEURONTIN) 100 MG capsule, TAKE TWO TABLETS BY MOUTH EVERY MORNING AND AT BEDTIME. TAKE ONE TABLET BY MOUTH IN MIDDLE OF THE DAY.M, Disp: 150 capsule, Rfl: 0    hydrALAZINE (APRESOLINE) 25 MG tablet, Take 1 tablet by mouth 2 (Two) Times a Day., Disp: , Rfl:     pioglitazone (ACTOS) 30 MG tablet, TAKE 1 TABLET BY MOUTH EVERY DAY (Patient taking differently: No sig reported), Disp: 90 tablet, Rfl: 3    potassium chloride 10 MEQ CR tablet, Take 2 tablets by mouth 3 (Three) Times a Day. (Patient taking differently: Take 2 tablets by mouth 2 (Two) Times a Day.), Disp: 540 tablet, Rfl: 3    Probiotic Product (PROBIOTIC PO), Take 1 tablet by mouth Daily., Disp: , Rfl:     Spiriva Respimat 2.5 MCG/ACT aerosol solution inhaler, Inhale 2 puffs Daily., Disp: 3 each, Rfl: 3    tamsulosin (FLOMAX) 0.4 MG capsule 24 hr capsule, Take 1 capsule by mouth Every Night for 180 days., Disp: 90 capsule, Rfl: 1    valsartan-hydrochlorothiazide (DIOVAN-HCT) 320-25 MG per tablet, Take 1 tablet by mouth Every Morning., Disp: , Rfl:     Januvia 100 MG tablet, , Disp: , Rfl:   No current facility-administered medications for this visit.     No Known Allergies            Objective    OBJECTIVE:     VITAL SIGNS:  /78 (BP Location: Left arm, Patient Position: Sitting, Cuff Size: Adult)   Pulse 55   Ht 172.7 cm (68\")   Wt 114 kg (251 lb)   SpO2 93%   BMI 38.16 kg/m²     PHYSICAL EXAM:  Constitutional:       Appearance: Normal appearance.   HENT:      Head: Normocephalic.      Right Ear: External ear normal.      Left Ear: External ear normal.      Nose: Nose normal.      Mouth/Throat: No obvious deformity     Pharynx: Oropharynx is clear.   Eyes:      Conjunctiva/sclera: Conjunctivae normal.   Cardiovascular:      Rate and Rhythm: Normal rate.      Pulses: Normal pulses.   Pulmonary:      Effort: Pulmonary effort is normal. "  Incision clean, dry, intact.  Abdominal:      Palpations: Abdomen is soft.   Musculoskeletal:         General: Normal range of motion.      Cervical back: Normal range of motion.   Skin:     General: Skin is warm.   Neurological:      General: No focal deficit present.      Mental Status: He is alert and oriented to person, place, and time.     LAB RESULTS:  I have reviewed all the available laboratory results in the chart.    RESULTS REVIEW:  I have reviewed the patient's all relevant laboratory and imaging findings.     IMAGING RESULTS:    CT Chest Without Contrast Diagnostic (08/10/2022 17:11)    XR Chest 2 View (07/05/2023 19:17)    CT Chest Without Contrast Diagnostic (08/16/2023 11:23)        ASSESSMENT & PLAN:  Alexy Ram is a 68 y.o. male with significant medical conditions as mentioned above presented to my clinic.    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    He has new masslike consolidation along the anterior suture line which measures 5.4 cm which are PET activity.  I performed ION bronchoscopy.  The area were hyperemic and narrowed.  I was unable to obtain satisfactory specimen for biopsy.  There was no evidence of malignancy in the biopsy specimen.  Due to the high suspicion of malignancy, I recommended repeating CT-guided biopsy.  He will follow-up in our clinic after the CT-guided biopsy.    I discussed the patients findings and my recommendations with the patient. The patient was given adequate time to ask questions and all questions were answered to patient satisfaction.     Taye Chavira MD  Thoracic Surgeon  [facility]        Dictated utilizing Dragon dictation    I spent 40 minutes caring for Alexy on this date of service. This time includes time spent by me in the following activities:preparing for the visit, reviewing tests, obtaining and/or reviewing a separately obtained history, performing a medically  appropriate examination and/or evaluation , counseling and educating the patient/family/caregiver, ordering medications, tests, or procedures, referring and communicating with other health care professionals , documenting information in the medical record, independently interpreting results and communicating that information with the patient/family/caregiver, and care coordination and more than half the time was spent in direct face to face evaluation and decision making.       Transcribed from ambient dictation for Taye Chavira MD by Urvashi Wong.  09/14/23   15:13 EDT    Patient or patient representative verbalized consent to the visit recording.  I have personally performed the services described in this document as transcribed by the above individual, and it is both accurate and complete.

## 2023-09-15 ENCOUNTER — PREP FOR SURGERY (OUTPATIENT)
Dept: OTHER | Facility: HOSPITAL | Age: 68
End: 2023-09-15
Payer: MEDICARE

## 2023-09-15 DIAGNOSIS — J98.11 ATELECTASIS OF LEFT LUNG: Primary | ICD-10-CM

## 2023-09-16 LAB — VIRUS SPEC CULT: NORMAL

## 2023-09-21 LAB — FUNGUS WND CULT: NORMAL

## 2023-09-22 DIAGNOSIS — E11.9 TYPE 2 DIABETES MELLITUS WITHOUT COMPLICATION, WITHOUT LONG-TERM CURRENT USE OF INSULIN: ICD-10-CM

## 2023-09-22 NOTE — TELEPHONE ENCOUNTER
Discontinued on 8/30/2023 by Dr. Simons     Rx Refill Note  Requested Prescriptions     Pending Prescriptions Disp Refills    metFORMIN (GLUCOPHAGE) 500 MG tablet [Pharmacy Med Name: METFORMIN  MG TABLET] 30 tablet 0     Sig: TAKE 1 TABLET BY MOUTH EVERY DAY WITH BREAKFAST      Last office visit with prescribing clinician: 8/30/2023   Last telemedicine visit with prescribing clinician: Visit date not found   Next office visit with prescribing clinician: 10/2/2023                         Would you like a call back once the refill request has been completed: [] Yes [] No    If the office needs to give you a call back, can they leave a voicemail: [] Yes [] No    Beatriz Samaniego MA  09/22/23, 11:44 EDT

## 2023-09-24 NOTE — PROGRESS NOTES
10/03/23 0001   Pre-Procedure Phone Call   Procedure Time Verified Yes   Arrival Time 0830   Procedure Location Verified Yes   Medical History Reviewed No   NPO Status Reinforced Yes   Ride and Caregiver Arranged Yes   Phone Number for Ride/Caregiver hold eliquis x 2 days prior to procedure. Last dose should be 9/30/23. Pt voiced understanding.   Patient Knows to Bring Current Medications No   Bring Outside Films Requested No

## 2023-09-28 LAB — FUNGUS WND CULT: NORMAL

## 2023-10-03 ENCOUNTER — HOSPITAL ENCOUNTER (OUTPATIENT)
Dept: CT IMAGING | Facility: HOSPITAL | Age: 68
Discharge: HOME | End: 2023-10-03
Payer: MEDICARE

## 2023-10-03 VITALS
TEMPERATURE: 98.4 F | HEIGHT: 68 IN | OXYGEN SATURATION: 93 % | RESPIRATION RATE: 16 BRPM | DIASTOLIC BLOOD PRESSURE: 68 MMHG | HEART RATE: 55 BPM | WEIGHT: 255 LBS | BODY MASS INDEX: 38.65 KG/M2 | SYSTOLIC BLOOD PRESSURE: 115 MMHG

## 2023-10-03 DIAGNOSIS — J98.11 ATELECTASIS OF LEFT LUNG: ICD-10-CM

## 2023-10-03 LAB
INR PPP: 1.1 (ref 0.8–1.2)
PLATELET # BLD AUTO: 269 10*3/MM3 (ref 140–450)
PROTHROMBIN TIME: 13 SECONDS (ref 12.8–15.2)

## 2023-10-03 PROCEDURE — 25010000002 MIDAZOLAM PER 1 MG: Performed by: RADIOLOGY

## 2023-10-03 PROCEDURE — 88341 IMHCHEM/IMCYTCHM EA ADD ANTB: CPT | Performed by: SURGERY

## 2023-10-03 PROCEDURE — 87070 CULTURE OTHR SPECIMN AEROBIC: CPT | Performed by: SURGERY

## 2023-10-03 PROCEDURE — 85610 PROTHROMBIN TIME: CPT

## 2023-10-03 PROCEDURE — 85049 AUTOMATED PLATELET COUNT: CPT | Performed by: RADIOLOGY

## 2023-10-03 PROCEDURE — 88312 SPECIAL STAINS GROUP 1: CPT | Performed by: SURGERY

## 2023-10-03 PROCEDURE — 87102 FUNGUS ISOLATION CULTURE: CPT | Performed by: SURGERY

## 2023-10-03 PROCEDURE — 88305 TISSUE EXAM BY PATHOLOGIST: CPT | Performed by: SURGERY

## 2023-10-03 PROCEDURE — 99153 MOD SED SAME PHYS/QHP EA: CPT

## 2023-10-03 PROCEDURE — 99152 MOD SED SAME PHYS/QHP 5/>YRS: CPT

## 2023-10-03 PROCEDURE — 88342 IMHCHEM/IMCYTCHM 1ST ANTB: CPT | Performed by: SURGERY

## 2023-10-03 PROCEDURE — 87205 SMEAR GRAM STAIN: CPT | Performed by: SURGERY

## 2023-10-03 PROCEDURE — 25010000002 FENTANYL CITRATE (PF) 50 MCG/ML SOLUTION: Performed by: RADIOLOGY

## 2023-10-03 RX ORDER — SODIUM CHLORIDE 9 MG/ML
25 INJECTION, SOLUTION INTRAVENOUS ONCE
Status: COMPLETED | OUTPATIENT
Start: 2023-10-03 | End: 2023-10-03

## 2023-10-03 RX ORDER — SODIUM CHLORIDE 0.9 % (FLUSH) 0.9 %
10 SYRINGE (ML) INJECTION AS NEEDED
Status: DISCONTINUED | OUTPATIENT
Start: 2023-10-03 | End: 2023-10-04 | Stop reason: HOSPADM

## 2023-10-03 RX ORDER — LIDOCAINE HYDROCHLORIDE 10 MG/ML
20 INJECTION, SOLUTION INFILTRATION; PERINEURAL ONCE
Status: DISCONTINUED | OUTPATIENT
Start: 2023-10-03 | End: 2023-10-04 | Stop reason: HOSPADM

## 2023-10-03 RX ORDER — SODIUM CHLORIDE 9 MG/ML
40 INJECTION, SOLUTION INTRAVENOUS AS NEEDED
Status: DISCONTINUED | OUTPATIENT
Start: 2023-10-03 | End: 2023-10-04 | Stop reason: HOSPADM

## 2023-10-03 RX ORDER — SITAGLIPTIN 100 MG/1
TABLET, FILM COATED ORAL
COMMUNITY
Start: 2023-09-20

## 2023-10-03 RX ORDER — FENTANYL CITRATE 50 UG/ML
INJECTION, SOLUTION INTRAMUSCULAR; INTRAVENOUS AS NEEDED
Status: COMPLETED | OUTPATIENT
Start: 2023-10-03 | End: 2023-10-03

## 2023-10-03 RX ORDER — SODIUM CHLORIDE 0.9 % (FLUSH) 0.9 %
3 SYRINGE (ML) INJECTION EVERY 12 HOURS SCHEDULED
Status: DISCONTINUED | OUTPATIENT
Start: 2023-10-03 | End: 2023-10-04 | Stop reason: HOSPADM

## 2023-10-03 RX ORDER — MIDAZOLAM HYDROCHLORIDE 1 MG/ML
INJECTION INTRAMUSCULAR; INTRAVENOUS AS NEEDED
Status: COMPLETED | OUTPATIENT
Start: 2023-10-03 | End: 2023-10-03

## 2023-10-03 RX ADMIN — Medication 3 ML: at 10:44

## 2023-10-03 RX ADMIN — MIDAZOLAM 1 MG: 1 INJECTION INTRAMUSCULAR; INTRAVENOUS at 10:44

## 2023-10-03 RX ADMIN — FENTANYL CITRATE 25 MCG: 50 INJECTION INTRAMUSCULAR; INTRAVENOUS at 10:44

## 2023-10-03 RX ADMIN — FENTANYL CITRATE 25 MCG: 50 INJECTION INTRAMUSCULAR; INTRAVENOUS at 10:59

## 2023-10-03 RX ADMIN — FENTANYL CITRATE 25 MCG: 50 INJECTION INTRAMUSCULAR; INTRAVENOUS at 10:48

## 2023-10-03 RX ADMIN — SODIUM CHLORIDE 25 ML/HR: 9 INJECTION, SOLUTION INTRAVENOUS at 10:20

## 2023-10-03 RX ADMIN — MIDAZOLAM 0.5 MG: 1 INJECTION INTRAMUSCULAR; INTRAVENOUS at 10:59

## 2023-10-03 NOTE — POST-PROCEDURE NOTE
POST PROCEDURE NOTE    Procedure: CT guided lung biopsy    Pre-Procedure Diagnosis: mass    Post-procedure Diagnosis: same    Findings: successful bx    Complications: none    Blood loss: min    Specimen Removed: 4x20G core passes    Disposition:   Discharge home

## 2023-10-03 NOTE — DISCHARGE INSTRUCTIONS
EDUCATION /DISCHARGE INSTRUCTIONS  CT/US guided biopsy:  A biopsy is a procedure done to remove tissue for further analysis.  Before images are taken to locate the target area.  Images can be obtained using ultrasound, CT or MRI.  A physician will clean your skin with antiseptic soap, place a sterile towel around the site and administer a local anesthetic to numb the area.  The physician will then insert a special needle.  Sometimes images are taken of the needle after it is inserted to ensure the needle is in the correct area to be biopsied.   A sample is obtained and sent to the laboratory for study.  Occasionally the laboratory is unable to make a diagnosis from the sample and the procedure may need to be repeated.  Within a week the radiologist will send a report to your physician.  A pathologist will also examine the tissue and send a report.    Risks of the procedure include but are not limited to:   *  Bleeding    *  Infection   *  Puncture of surrounding organs *  Death     *  Lung collapse if the biopsy is near the chest which may require insertion of a      chest tube to re-inflate the lung if severe.    Benefits of the procedure:  Using x-ray helps to locate the area that requires a biopsy. The procedure is less invasive than a surgical procedure, there are no large incisions and it does not require anesthesia.    Alternatives to the procedure:  A biopsy can be performed surgically.  Risks of a surgical biopsy include exposure to anesthesia, infection, excessive bleeding and injury to abdominal organs.  A benefit of surgical biopsy is the ability to see the area to be biopsied and remove of a larger piece of tissue.    THIS EDUCATION INFORMATION WAS REVIEWED PRIOR TO PROCEDURE AND CONSENT. Patient initials__________________Time___________________    Post Procedure:    *  Expect the biopsy site may be tender up to one week.    *  Rest today (no pushing pulling or straining).   *  Slowly increase activity  tomorrow.    *  If you received sedation do not drive for 24 hours.   *  Keep dressing clean and dry.   *  Leave dressing on puncture site for 24 hours.    *  You may shower when dressing removed.  Call your doctor if experiencing:   *  Signs of infection such as redness, swelling, excessive pain and / or foul        smelling drainage from the puncture site.   *  Chills or fever over 101 degrees (by mouth).   *  Unrelieved pain.   *  Any new or severe symptoms.   *  If experiencing sudden / severe shortness of breath or chest pain go to the       nearest emergency room.   Following the procedure:     Follow-up with the ordering physician as directed.    Continue to take other medications as directed by your physician unless    otherwise instructed.   If applicable, resume taking your blood thinners or Aspirin on 10/4/23 @ 2:00.    If you have any concerns please call the Radiology Nurses Desk at [phone].  You are the most important factor in your recovery.  Follow the above instructions carefully.

## 2023-10-04 LAB
LAB AP CASE REPORT: NORMAL
LAB AP CLINICAL INFORMATION: NORMAL
LAB AP INTRADEPARTMENTAL CONSULT: NORMAL
LAB AP SPECIAL STAINS: NORMAL
PATH REPORT.FINAL DX SPEC: NORMAL
PATH REPORT.GROSS SPEC: NORMAL

## 2023-10-05 LAB — FUNGUS WND CULT: NORMAL

## 2023-10-06 ENCOUNTER — TELEPHONE (OUTPATIENT)
Dept: SURGERY | Facility: CLINIC | Age: 68
End: 2023-10-06
Payer: MEDICARE

## 2023-10-06 LAB
BACTERIA FLD CULT: NORMAL
GRAM STN SPEC: NORMAL

## 2023-10-08 LAB — FUNGUS WND CULT: NORMAL

## 2023-10-09 RX ORDER — PIOGLITAZONEHYDROCHLORIDE 30 MG/1
TABLET ORAL
Qty: 90 TABLET | Refills: 3 | Status: SHIPPED | OUTPATIENT
Start: 2023-10-09

## 2023-10-09 RX ORDER — AMLODIPINE BESYLATE 10 MG/1
TABLET ORAL
Qty: 90 TABLET | Refills: 1 | Status: SHIPPED | OUTPATIENT
Start: 2023-10-09

## 2023-10-10 LAB — FUNGUS WND CULT: NORMAL

## 2023-10-12 ENCOUNTER — OFFICE VISIT (OUTPATIENT)
Dept: SURGERY | Facility: CLINIC | Age: 68
End: 2023-10-12
Payer: MEDICARE

## 2023-10-12 VITALS
HEIGHT: 68 IN | DIASTOLIC BLOOD PRESSURE: 72 MMHG | HEART RATE: 72 BPM | BODY MASS INDEX: 38.65 KG/M2 | WEIGHT: 255 LBS | SYSTOLIC BLOOD PRESSURE: 126 MMHG | OXYGEN SATURATION: 91 %

## 2023-10-12 DIAGNOSIS — C34.92 SQUAMOUS CELL CARCINOMA OF LEFT LUNG: Primary | ICD-10-CM

## 2023-10-12 NOTE — PROGRESS NOTES
THORACIC SURGERY INPATIENT CONSULT NOTE    REASON FOR CONSULT: Follow up.     REFERRING PROVIDER: No ref. provider found    Subjective   HISTORY OF PRESENTING ILLNESS:   Alexy Ram is a 68 y.o. male has significant medical problems as mentioned below.     The patient presents today for a follow-up visit and is accompanied by an adult female. He is doing well overall and is here for review of his CT scan. He has COPD and his breathing is restricted because of it, but he is not having any abnormal breathing or coughing.     Past Medical History:   Diagnosis Date    Arthritis     OSTEOARTHRITIS    At risk for sleep apnea     5    COPD (chronic obstructive pulmonary disease) July 2018    Diabetes mellitus     Dyspnea on exertion     Fatty liver     Hip pain     History of low potassium     History of transfusion     Hyperlipidemia     Hypertension     Hypoglycemia     Hypoglycemia     Lung cancer     lung    Pacemaker     Second degree AV block     Sinus node dysfunction     Sleep apnea     WEARS CPAP    Slow to wake up after anesthesia     Status post lobectomy of lung 03/18/2019    Squamous cell carcinoma    Urine output low        Past Surgical History:   Procedure Laterality Date    BRONCHOSCOPY N/A 10/11/2018    Procedure: BRONCHOSCOPY WITH FLUORO, LEFT LOWER LOBE BRUSHINGS WET AND DRY. WITH BX'S AND BAL (IN LLL).;  Surgeon: Akil Ortiz MD;  Location: [facility]COPY;  Service: Pulmonary    BRONCHOSCOPY WITH ION ROBOTIC ASSIST N/A 9/7/2023    Procedure: BRONCHOSCOPY WITH ION ROBOT AND ENDOBRONCHIAL ULTRASOUND with brushing and FNA;  Surgeon: Taye Chavira MD;  Location: [facility] ENDOSCOPY;  Service: Robotics - Pulmonary;  Laterality: N/A;  PRE/POST - left upper lobe mass    COLONOSCOPY  2021    COLONOSCOPY W/ POLYPECTOMY N/A 10/22/2021    Procedure: COLONOSCOPY WITH POLYPECTOMY;  Surgeon: Lan Solis MD;  Location: [facility] OR;  Service: Gastroenterology;  Laterality: N/A;  cecal polyp  x1 (cold snare)  ascending polyp x1 (hot snare)  transverse polyp x3 (hot snare x 1), (cold snare x2)  sigmoid polyp x1 (hot snare, clip applied)  rectal polyp x3 (cold snare)    INSERT / REPLACE / REMOVE PACEMAKER  2005    replaced Oct 2014    JOINT REPLACEMENT  Hip    2023    KNEE ARTHROSCOPY Left     LUNG LOBECTOMY Left 2019    Squamous cell carcinoma    PACEMAKER IMPLANTATION  ,     THORACOSCOPY Left 2018    Procedure: BRONCHOSCOPY, DAVINCI ROBOT ASSISTED VIDEIO ASSISTED THORACOSCOPY  WITH CONVERT TO OPEN THORACOTOMY,LEFT LOWER LOBECTOMY,INTERCOSTAL NERVE BLOCK;  Surgeon: Michael Ring III, MD;  Location: [facility] MAIN OR;  Service: DaVinci    TOTAL HIP ARTHROPLASTY Left 2023    Procedure: TOTAL HIP ARTHROPLASTY;  Surgeon: Nikko Staton MD;  Location:  [facility];  Service: Orthopedics;  Laterality: Left;       Family History   Problem Relation Age of Onset    Diabetes Mother     Stroke Father     Heart disease Father     Stroke Sister     Cancer Sister     Heart disease Brother     Diabetes Brother     Stroke Sister     Diabetes Sister     Diabetes Brother     Malig Hyperthermia Neg Hx        Social History     Socioeconomic History    Marital status:    Tobacco Use    Smoking status: Former     Packs/day: 1.00     Years: 33.00     Additional pack years: 0.00     Total pack years: 33.00     Types: Cigarettes     Quit date: 2018     Years since quittin.7    Smokeless tobacco: Never    Tobacco comments:     started cigars 3-4 daily in 2018 and has quit a month ago   Vaping Use    Vaping Use: Never used   Substance and Sexual Activity    Alcohol use: Not Currently     Comment: rare    Drug use: No    Sexual activity: Defer         Current Outpatient Medications:     albuterol sulfate  (90 Base) MCG/ACT inhaler, Inhale 2 puffs Every 4 (Four) Hours As Needed for Wheezing., Disp: 1 inhaler, Rfl: 0    amLODIPine (NORVASC) 10 MG tablet, TAKE 1 TABLET BY  "MOUTH EVERY DAY, Disp: 90 tablet, Rfl: 1    atorvastatin (LIPITOR) 40 MG tablet, TAKE 1 TABLET BY MOUTH EVERYDAY AT BEDTIME, Disp: 90 tablet, Rfl: 3    carvedilol (COREG) 25 MG tablet, TAKE 2 TABLETS BY MOUTH 2 TIMES A DAY WITH MEALS. (Patient taking differently: Take 2 tablets by mouth 2 (Two) Times a Day With Meals.), Disp: 360 tablet, Rfl: 3    Eliquis 5 MG tablet tablet, Take 1 tablet by mouth 2 (Two) Times a Day. (Patient taking differently: Take 1 tablet by mouth 2 (Two) Times a Day. HOLD X 48 HRS FOR ENDO  Indications: Prevention of Unwanted Clot in Veins), Disp: 60 tablet, Rfl: 0    gabapentin (NEURONTIN) 100 MG capsule, TAKE TWO TABLETS BY MOUTH EVERY MORNING AND AT BEDTIME. TAKE ONE TABLET BY MOUTH IN MIDDLE OF THE DAY.M, Disp: 150 capsule, Rfl: 0    hydrALAZINE (APRESOLINE) 25 MG tablet, Take 1 tablet by mouth 2 (Two) Times a Day., Disp: , Rfl:     Januvia 100 MG tablet, , Disp: , Rfl:     pioglitazone (ACTOS) 30 MG tablet, TAKE 1 TABLET BY MOUTH EVERY DAY, Disp: 90 tablet, Rfl: 3    potassium chloride 10 MEQ CR tablet, Take 2 tablets by mouth 3 (Three) Times a Day. (Patient taking differently: Take 2 tablets by mouth 2 (Two) Times a Day.), Disp: 540 tablet, Rfl: 3    Probiotic Product (PROBIOTIC PO), Take 1 tablet by mouth Daily., Disp: , Rfl:     Spiriva Respimat 2.5 MCG/ACT aerosol solution inhaler, Inhale 2 puffs Daily., Disp: 3 each, Rfl: 3    tamsulosin (FLOMAX) 0.4 MG capsule 24 hr capsule, Take 1 capsule by mouth Every Night for 180 days., Disp: 90 capsule, Rfl: 1    valsartan-hydrochlorothiazide (DIOVAN-HCT) 320-25 MG per tablet, Take 1 tablet by mouth Every Morning., Disp: , Rfl:      No Known Allergies          Objective    OBJECTIVE:     VITAL SIGNS:  /72 (BP Location: Right arm, Patient Position: Sitting, Cuff Size: Adult)   Pulse 72   Ht 172.7 cm (68\")   Wt 116 kg (255 lb)   SpO2 91%   BMI 38.77 kg/m²     PHYSICAL EXAM:  Constitutional:    Appearance: Normal appearance.   HENT: "   Head: Normocephalic.   Right Ear: External ear normal.   Left Ear: External ear normal.   Nose: Nose normal.   Mouth/Throat: No obvious deformity  Pharynx: Oropharynx is clear.   Eyes:   Conjunctiva/sclera: Conjunctivae normal.   Cardiovascular:   Rate and Rhythm: Normal rate.   Pulses: Normal pulses.   Pulmonary:   Effort: Pulmonary effort is normal.   Abdominal:   Palpations: Abdomen is soft.   Musculoskeletal:      General: Normal range of motion.   Cervical back: Normal range of motion.   Skin:  General: Skin is warm.   Neurological:   General: No focal deficit present.   Mental Status: He is alert and oriented to person, place, and time.    LAB RESULTS:  I have reviewed all the available laboratory results in the chart.    RESULTS REVIEW:  CT Biopsy Lung Mediastinum Left (10/03/2023 11:17)           ASSESSMENT & PLAN:  Alexy Ram is a 68 y.o. male with significant medical conditions as mentioned above.    Diagnosis: Left lung inflammation.   Staging: ***    There is inflammation in the left lung but no indication of cancer or infection. If he has increased coughing, hemoptysis, difficulty breathing, or fever, he will call. The patient will follow up in 3 months with another CT scan.    I discussed the patients findings and my recommendations with the patient. The patient was given adequate time to ask questions and all questions were answered to patient satisfaction. Thank you for this consult and allowing us to participate in the care of your patient.      Taye Chavira MD   Thoracic Surgeon  [facility]        Dictated utilizing Dragon dictation    I spent *** minutes caring for Alexy on this date of service. This time includes time spent by me in the following activities:reviewing tests, obtaining and/or reviewing a separately obtained history, performing a medically appropriate examination and/or evaluation , counseling and educating the patient/family/caregiver, ordering  "medications, tests, or procedures, referring and communicating with other health care professionals , documenting information in the medical record, independently interpreting results and communicating that information with the patient/family/caregiver, and care coordination and more than half the time was spent in direct face to face evaluation and decision making.     Transcribed from ambient dictation for Taye Chavira MD by Urvashi Wong.  10/12/23   09:15 EDT    {[facility] Provider Statement:[id number]::\"Patient or patient representative verbalized consent to the visit recording.\",\"I have personally performed the services described in this document as transcribed by the above individual, and it is both accurate and complete.\"}    "

## 2023-10-12 NOTE — PROGRESS NOTES
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  Now with left upper lobe consolidation    Subjective   HISTORY OF PRESENTING ILLNESS:   Alexy Ram is a 68-year-old male who has significant medical problems as mentioned below.     He presents today for further follow-up of a left lower lobectomy performed in November 19, 2018 for squamous cell carcinoma of the lung.  Overall, he is doing well. He had COPD but denies any unusual breathing difficulty but feels that he is doing better. He is still using his CPAP machine and denies any dyspnea when talking or sitting. He will become short of breath if he walks 1 block. He just had a hip arthroplasty and uses a cane to ambulate. He denies chronic coughing but has occasional days where he does cough. He denies any pneumonia or any infection in the chest that required antibiotics within the last year. He quit smoking when he was diagnosed with lung cancer.      He has new masslike consolidation along the anterior suture line which measures 5.4 cm with PET avidity.  The possibility of malignancy cannot be ruled out.  Therefore I recommended ION robotic navigational bronchoscopy to establish diagnosis.  He underwent the procedure on 9/7/2023.  The left upper lobe takeoff was narrowed.  There was significant hyperemia that led to bleeding into the orifice making navigation challenging to the target lesion.  I performed FNA and cold forcep biopsy from the left upper lobe consolation.  There was a 10 mm level 7 lymph node which was biopsied.  There is no evidence of malignancy.     Postprocedure he had complete whiteout of left lung that was likely due to narrowing/swelling of the airway.  He was admitted to the hospital for observation and discharged the next day. There was no evidence of malignancy in the biopsy specimen.  Due to the high suspicion of malignancy, I recommended  repeating CT-guided biopsy which was performed on 10/3/2023 and revealed organizing pneumonia with focal marked acute inflammation consistent with micro abscesses.  There was no evidence of malignancy.    He came to clinic for follow-up visit.  He denied any symptoms in the interim.     Past Medical History:   Diagnosis Date    Arthritis     OSTEOARTHRITIS    At risk for sleep apnea     5    COPD (chronic obstructive pulmonary disease) July 2018    Diabetes mellitus     Dyspnea on exertion     Fatty liver     Hip pain     History of low potassium     History of transfusion     Hyperlipidemia     Hypertension     Hypoglycemia     Hypoglycemia     Lung cancer     lung    Pacemaker     Second degree AV block     Sinus node dysfunction     Sleep apnea     WEARS CPAP    Slow to wake up after anesthesia     Status post lobectomy of lung 03/18/2019    Squamous cell carcinoma    Urine output low        Past Surgical History:   Procedure Laterality Date    BRONCHOSCOPY N/A 10/11/2018    Procedure: BRONCHOSCOPY WITH FLUORO, LEFT LOWER LOBE BRUSHINGS WET AND DRY. WITH BX'S AND BAL (IN LLL).;  Surgeon: Akil Ortiz MD;  [facility] [facility];  Service: Pulmonary    BRONCHOSCOPY WITH ION ROBOTIC ASSIST N/A 9/7/2023    Procedure: BRONCHOSCOPY WITH ION ROBOT AND ENDOBRONCHIAL ULTRASOUND with brushing and FNA;  Surgeon: Taye Chavira MD;  [facility] [facility];  Service: Robotics - Pulmonary;  Laterality: N/A;  PRE/POST - left upper lobe mass    COLONOSCOPY  2021    COLONOSCOPY W/ POLYPECTOMY N/A 10/22/2021    Procedure: COLONOSCOPY WITH POLYPECTOMY;  Surgeon: Lan Solis MD;  Location: [facility];  Service: Gastroenterology;  Laterality: N/A;  cecal polyp x1 (cold snare)  ascending polyp x1 (hot snare)  transverse polyp x3 (hot snare x 1), (cold snare x2)  sigmoid polyp x1 (hot snare, clip applied)  rectal polyp x3 (cold snare)    INSERT / REPLACE / REMOVE PACEMAKER  6/5/2005    replaced Oct 2014     JOINT REPLACEMENT  Hip    2023    KNEE ARTHROSCOPY Left     LUNG LOBECTOMY Left 2019    Squamous cell carcinoma    PACEMAKER IMPLANTATION  2014    THORACOSCOPY Left 2018    Procedure: BRONCHOSCOPY, DAVINCI ROBOT ASSISTED VIDEIO ASSISTED THORACOSCOPY  WITH CONVERT TO OPEN THORACOTOMY,LEFT LOWER LOBECTOMY,INTERCOSTAL NERVE BLOCK;  Surgeon: Michael Ring III, MD;  Location: [facility];  Service: DaVinci    TOTAL HIP ARTHROPLASTY Left 2023    Procedure: TOTAL HIP ARTHROPLASTY;  Surgeon: Nikko Staton MD;  Location:  [facility];  Service: Orthopedics;  Laterality: Left;       Family History   Problem Relation Age of Onset    Diabetes Mother     Stroke Father     Heart disease Father     Stroke Sister     Cancer Sister     Heart disease Brother     Diabetes Brother     Stroke Sister     Diabetes Sister     Diabetes Brother     Malig Hyperthermia Neg Hx        Social History     Socioeconomic History    Marital status:    Tobacco Use    Smoking status: Former     Packs/day: 1.00     Years: 33.00     Additional pack years: 0.00     Total pack years: 33.00     Types: Cigarettes     Quit date: 2018     Years since quittin.7    Smokeless tobacco: Never    Tobacco comments:     started cigars 3-4 daily in 2018 and has quit a month ago   Vaping Use    Vaping Use: Never used   Substance and Sexual Activity    Alcohol use: Not Currently     Comment: rare    Drug use: No    Sexual activity: Defer         Current Outpatient Medications:     albuterol sulfate  (90 Base) MCG/ACT inhaler, Inhale 2 puffs Every 4 (Four) Hours As Needed for Wheezing., Disp: 1 inhaler, Rfl: 0    amLODIPine (NORVASC) 10 MG tablet, TAKE 1 TABLET BY MOUTH EVERY DAY, Disp: 90 tablet, Rfl: 1    atorvastatin (LIPITOR) 40 MG tablet, TAKE 1 TABLET BY MOUTH EVERYDAY AT BEDTIME, Disp: 90 tablet, Rfl: 3    carvedilol (COREG) 25 MG tablet, TAKE 2 TABLETS BY MOUTH 2 TIMES A DAY WITH MEALS. (Patient taking  "differently: Take 2 tablets by mouth 2 (Two) Times a Day With Meals.), Disp: 360 tablet, Rfl: 3    Eliquis 5 MG tablet tablet, Take 1 tablet by mouth 2 (Two) Times a Day. (Patient taking differently: Take 1 tablet by mouth 2 (Two) Times a Day. HOLD X 48 HRS FOR ENDO  Indications: Prevention of Unwanted Clot in Veins), Disp: 60 tablet, Rfl: 0    gabapentin (NEURONTIN) 100 MG capsule, TAKE TWO TABLETS BY MOUTH EVERY MORNING AND AT BEDTIME. TAKE ONE TABLET BY MOUTH IN MIDDLE OF THE DAY.M, Disp: 150 capsule, Rfl: 0    hydrALAZINE (APRESOLINE) 25 MG tablet, Take 1 tablet by mouth 2 (Two) Times a Day., Disp: , Rfl:     Januvia 100 MG tablet, , Disp: , Rfl:     pioglitazone (ACTOS) 30 MG tablet, TAKE 1 TABLET BY MOUTH EVERY DAY, Disp: 90 tablet, Rfl: 3    potassium chloride 10 MEQ CR tablet, Take 2 tablets by mouth 3 (Three) Times a Day. (Patient taking differently: Take 2 tablets by mouth 2 (Two) Times a Day.), Disp: 540 tablet, Rfl: 3    Probiotic Product (PROBIOTIC PO), Take 1 tablet by mouth Daily., Disp: , Rfl:     Spiriva Respimat 2.5 MCG/ACT aerosol solution inhaler, Inhale 2 puffs Daily., Disp: 3 each, Rfl: 3    tamsulosin (FLOMAX) 0.4 MG capsule 24 hr capsule, Take 1 capsule by mouth Every Night for 180 days., Disp: 90 capsule, Rfl: 1    valsartan-hydrochlorothiazide (DIOVAN-HCT) 320-25 MG per tablet, Take 1 tablet by mouth Every Morning., Disp: , Rfl:      No Known Allergies            Objective    OBJECTIVE:     VITAL SIGNS:  /72 (BP Location: Right arm, Patient Position: Sitting, Cuff Size: Adult)   Pulse 72   Ht 172.7 cm (68\")   Wt 116 kg (255 lb)   SpO2 91%   BMI 38.77 kg/m²     PHYSICAL EXAM:  Constitutional:       Appearance: Normal appearance.   HENT:      Head: Normocephalic.      Right Ear: External ear normal.      Left Ear: External ear normal.      Nose: Nose normal.      Mouth/Throat: No obvious deformity     Pharynx: Oropharynx is clear.   Eyes:      Conjunctiva/sclera: Conjunctivae " normal.   Cardiovascular:      Rate and Rhythm: Normal rate.      Pulses: Normal pulses.   Pulmonary:      Effort: Pulmonary effort is normal.  Incision clean, dry, intact.  Abdominal:      Palpations: Abdomen is soft.   Musculoskeletal:         General: Normal range of motion.      Cervical back: Normal range of motion.   Skin:     General: Skin is warm.   Neurological:      General: No focal deficit present.      Mental Status: He is alert and oriented to person, place, and time.     LAB RESULTS:  I have reviewed all the available laboratory results in the chart.    RESULTS REVIEW:  I have reviewed the patient's all relevant laboratory and imaging findings.     IMAGING RESULTS:    CT Chest Without Contrast Diagnostic (08/10/2022 17:11)    XR Chest 2 View (07/05/2023 19:17)    CT Chest Without Contrast Diagnostic (08/16/2023 11:23)        ASSESSMENT & PLAN:  Alexy Ram is a 68 y.o. male with significant medical conditions as mentioned above presented to my clinic.    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    He has new masslike consolidation along the anterior suture line which measures 5.4 cm which are PET activity.  I performed ION bronchoscopy.  The airway were hyperemic and narrowed.  I was unable to obtain satisfactory specimen for biopsy.  There was no evidence of malignancy in the biopsy specimen.  Due to the high suspicion of malignancy, I recommended repeating CT-guided biopsy which was performed on 10/3/2023 and revealed organizing pneumonia with focal marked acute inflammation consistent with micro abscesses.  There was no evidence of malignancy.    I recommended follow-up in 3 months with repeat CT scan of the chest.    I discussed the patients findings and my recommendations with the patient. The patient was given adequate time to ask questions and all questions were answered to patient satisfaction.      Taye Chavira MD  Thoracic Surgeon  [facility]        Dictated utilizing Dragon dictation    I spent 40 minutes caring for Alexy on this date of service. This time includes time spent by me in the following activities:preparing for the visit, reviewing tests, obtaining and/or reviewing a separately obtained history, performing a medically appropriate examination and/or evaluation , counseling and educating the patient/family/caregiver, ordering medications, tests, or procedures, referring and communicating with other health care professionals , documenting information in the medical record, independently interpreting results and communicating that information with the patient/family/caregiver, and care coordination and more than half the time was spent in direct face to face evaluation and decision making.

## 2023-10-17 LAB — FUNGUS WND CULT: NORMAL

## 2023-10-24 LAB — FUNGUS WND CULT: NORMAL

## 2023-10-26 ENCOUNTER — PATIENT MESSAGE (OUTPATIENT)
Dept: ORTHOPEDIC SURGERY | Facility: CLINIC | Age: 68
End: 2023-10-26
Payer: MEDICARE

## 2023-10-26 RX ORDER — CEPHALEXIN 500 MG/1
CAPSULE ORAL
Qty: 4 CAPSULE | Refills: 3 | Status: SHIPPED | OUTPATIENT
Start: 2023-10-26

## 2023-10-26 NOTE — TELEPHONE ENCOUNTER
From: Alexy Ram  To: Nima Farmer  Sent: 10/26/2023 3:47 PM EDT  Subject: Antibiotic    I have a Dentist appointment on November 13 and need an antibiotic. I have tried to call a few times without an answer. Please call into [facility] in [place] so that I have it in time. Thank you.  Alexy Ram

## 2023-10-31 LAB — FUNGUS WND CULT: NORMAL

## 2023-11-12 DIAGNOSIS — G89.29 HIP PAIN, CHRONIC, LEFT: ICD-10-CM

## 2023-11-12 DIAGNOSIS — M25.552 HIP PAIN, CHRONIC, LEFT: ICD-10-CM

## 2023-11-13 ENCOUNTER — PATIENT MESSAGE (OUTPATIENT)
Dept: ORTHOPEDIC SURGERY | Facility: CLINIC | Age: 68
End: 2023-11-13
Payer: MEDICARE

## 2023-11-13 RX ORDER — GABAPENTIN 100 MG/1
CAPSULE ORAL
Qty: 150 CAPSULE | Refills: 0 | Status: SHIPPED | OUTPATIENT
Start: 2023-11-13

## 2023-11-13 NOTE — TELEPHONE ENCOUNTER
Rx Refill Note  Requested Prescriptions     Pending Prescriptions Disp Refills    gabapentin (NEURONTIN) 100 MG capsule 150 capsule 0     Sig: TAKE TWO TABLETS BY MOUTH EVERY MORNING AND AT BEDTIME. TAKE ONE TABLET BY MOUTH IN MIDDLE OF THE DAY.M      Last office visit with prescribing clinician: 8/30/2023   Last telemedicine visit with prescribing clinician: Visit date not found   Next office visit with prescribing clinician: 12/4/2023                         Would you like a call back once the refill request has been completed: [] Yes [] No    If the office needs to give you a call back, can they leave a voicemail: [] Yes [] No    Beatriz Samaniego MA  11/13/23, 07:52 EST

## 2023-11-14 RX ORDER — CEPHALEXIN 500 MG/1
CAPSULE ORAL
Qty: 4 CAPSULE | Refills: 3 | Status: SHIPPED | OUTPATIENT
Start: 2023-11-14

## 2023-11-15 DIAGNOSIS — I10 PRIMARY HYPERTENSION: Primary | ICD-10-CM

## 2023-11-15 DIAGNOSIS — R74.8 ELEVATED LIVER ENZYMES: ICD-10-CM

## 2023-11-15 DIAGNOSIS — E11.9 TYPE 2 DIABETES MELLITUS WITHOUT COMPLICATION, WITHOUT LONG-TERM CURRENT USE OF INSULIN: ICD-10-CM

## 2023-11-29 LAB
ALBUMIN SERPL-MCNC: 4.1 G/DL (ref 3.5–5.2)
ALBUMIN/CREAT UR: 131 MG/G CREAT (ref 0–29)
ALBUMIN/GLOB SERPL: 2.1 G/DL
ALP SERPL-CCNC: 73 U/L (ref 39–117)
ALT SERPL-CCNC: 16 U/L (ref 1–41)
APO B SERPL-MCNC: 76 MG/DL
AST SERPL-CCNC: 13 U/L (ref 1–40)
BASOPHILS # BLD AUTO: 0.03 10*3/MM3 (ref 0–0.2)
BASOPHILS NFR BLD AUTO: 0.4 % (ref 0–1.5)
BILIRUB SERPL-MCNC: 0.6 MG/DL (ref 0–1.2)
BUN SERPL-MCNC: 20 MG/DL (ref 8–23)
BUN/CREAT SERPL: 26.7 (ref 7–25)
CALCIUM SERPL-MCNC: 9.3 MG/DL (ref 8.6–10.5)
CHLORIDE SERPL-SCNC: 102 MMOL/L (ref 98–107)
CHOLEST SERPL-MCNC: 135 MG/DL (ref 0–200)
CO2 SERPL-SCNC: 28.7 MMOL/L (ref 22–29)
CREAT SERPL-MCNC: 0.75 MG/DL (ref 0.76–1.27)
CREAT UR-MCNC: 78.4 MG/DL
EGFRCR SERPLBLD CKD-EPI 2021: 98.3 ML/MIN/1.73
EOSINOPHIL # BLD AUTO: 0.15 10*3/MM3 (ref 0–0.4)
EOSINOPHIL NFR BLD AUTO: 2.2 % (ref 0.3–6.2)
ERYTHROCYTE [DISTWIDTH] IN BLOOD BY AUTOMATED COUNT: 15.8 % (ref 12.3–15.4)
GLOBULIN SER CALC-MCNC: 2 GM/DL
GLUCOSE SERPL-MCNC: 110 MG/DL (ref 65–99)
HBA1C MFR BLD: 6.2 % (ref 4.8–5.6)
HCT VFR BLD AUTO: 41.3 % (ref 37.5–51)
HDLC SERPL-MCNC: 37 MG/DL (ref 40–60)
HGB BLD-MCNC: 13.5 G/DL (ref 13–17.7)
IMM GRANULOCYTES # BLD AUTO: 0.02 10*3/MM3 (ref 0–0.05)
IMM GRANULOCYTES NFR BLD AUTO: 0.3 % (ref 0–0.5)
LDLC SERPL CALC-MCNC: 80 MG/DL (ref 0–100)
LDLC/HDLC SERPL: 2.15 {RATIO}
LYMPHOCYTES # BLD AUTO: 1.63 10*3/MM3 (ref 0.7–3.1)
LYMPHOCYTES NFR BLD AUTO: 23.8 % (ref 19.6–45.3)
MCH RBC QN AUTO: 27.8 PG (ref 26.6–33)
MCHC RBC AUTO-ENTMCNC: 32.7 G/DL (ref 31.5–35.7)
MCV RBC AUTO: 85.2 FL (ref 79–97)
MICROALBUMIN UR-MCNC: 102.5 UG/ML
MONOCYTES # BLD AUTO: 0.6 10*3/MM3 (ref 0.1–0.9)
MONOCYTES NFR BLD AUTO: 8.7 % (ref 5–12)
NEUTROPHILS # BLD AUTO: 4.43 10*3/MM3 (ref 1.7–7)
NEUTROPHILS NFR BLD AUTO: 64.6 % (ref 42.7–76)
NRBC BLD AUTO-RTO: 0 /100 WBC (ref 0–0.2)
PLATELET # BLD AUTO: 251 10*3/MM3 (ref 140–450)
POTASSIUM SERPL-SCNC: 3.4 MMOL/L (ref 3.5–5.2)
PROT SERPL-MCNC: 6.1 G/DL (ref 6–8.5)
RBC # BLD AUTO: 4.85 10*6/MM3 (ref 4.14–5.8)
SODIUM SERPL-SCNC: 139 MMOL/L (ref 136–145)
TRIGL SERPL-MCNC: 92 MG/DL (ref 0–150)
VLDLC SERPL CALC-MCNC: 18 MG/DL (ref 5–40)
WBC # BLD AUTO: 6.86 10*3/MM3 (ref 3.4–10.8)

## 2023-12-04 ENCOUNTER — OFFICE VISIT (OUTPATIENT)
Dept: FAMILY MEDICINE CLINIC | Facility: CLINIC | Age: 68
End: 2023-12-04
Payer: MEDICARE

## 2023-12-04 VITALS
BODY MASS INDEX: 39.34 KG/M2 | SYSTOLIC BLOOD PRESSURE: 136 MMHG | HEIGHT: 68 IN | RESPIRATION RATE: 16 BRPM | WEIGHT: 259.6 LBS | DIASTOLIC BLOOD PRESSURE: 70 MMHG | HEART RATE: 58 BPM | OXYGEN SATURATION: 96 %

## 2023-12-04 DIAGNOSIS — E11.9 TYPE 2 DIABETES MELLITUS WITHOUT COMPLICATION, WITHOUT LONG-TERM CURRENT USE OF INSULIN: Primary | ICD-10-CM

## 2023-12-04 DIAGNOSIS — J30.9 ALLERGIC RHINITIS, UNSPECIFIED SEASONALITY, UNSPECIFIED TRIGGER: ICD-10-CM

## 2023-12-04 DIAGNOSIS — Z23 NEEDS FLU SHOT: ICD-10-CM

## 2023-12-04 DIAGNOSIS — I10 PRIMARY HYPERTENSION: ICD-10-CM

## 2023-12-04 DIAGNOSIS — E78.00 HYPERCHOLESTEROLEMIA: ICD-10-CM

## 2023-12-04 RX ORDER — CETIRIZINE HYDROCHLORIDE 10 MG/1
10 TABLET ORAL DAILY
Qty: 90 TABLET | Refills: 3 | Status: SHIPPED | OUTPATIENT
Start: 2023-12-04

## 2023-12-04 RX ORDER — FLUTICASONE PROPIONATE 50 MCG
2 SPRAY, SUSPENSION (ML) NASAL DAILY
Qty: 16 G | Refills: 11 | Status: SHIPPED | OUTPATIENT
Start: 2023-12-04

## 2023-12-04 NOTE — ASSESSMENT & PLAN NOTE
Diabetes is unchanged.   Continue current treatment regimen.  Regular aerobic exercise.  Reminded to get yearly retinal exam.  Diabetes will be reassessed in 6 months.    A1c only slightly increased since stopping Januvia. Will continue pioglitazone monotherapy now. Check A1c again in 6 months.

## 2023-12-04 NOTE — PROGRESS NOTES
The ABCs of the Annual Wellness Visit  Subsequent Medicare Wellness Visit    Subjective    Alexy Ram is a 68 y.o. male who presents for a Subsequent Medicare Wellness Visit.    The following portions of the patient's history were reviewed and   updated as appropriate: allergies, current medications, past family history, past medical history, past social history, past surgical history, and problem list.    Compared to one year ago, the patient feels his physical   health is better.    Compared to one year ago, the patient feels his mental   health is the same.    Recent Hospitalizations:  He was admitted within the past 365 days at [facility].       Current Medical Providers:  Patient Care Team:  Pedro Luis Simons MD as PCP - General (Internal Medicine)  Steve Rice MD as Consulting Physician (Cardiac Electrophysiology)    Outpatient Medications Prior to Visit   Medication Sig Dispense Refill    albuterol sulfate  (90 Base) MCG/ACT inhaler Inhale 2 puffs Every 4 (Four) Hours As Needed for Wheezing. 1 inhaler 0    amLODIPine (NORVASC) 10 MG tablet TAKE 1 TABLET BY MOUTH EVERY DAY 90 tablet 1    atorvastatin (LIPITOR) 40 MG tablet TAKE 1 TABLET BY MOUTH EVERYDAY AT BEDTIME 90 tablet 3    carvedilol (COREG) 25 MG tablet TAKE 2 TABLETS BY MOUTH 2 TIMES A DAY WITH MEALS. (Patient taking differently: Take 2 tablets by mouth 2 (Two) Times a Day With Meals.) 360 tablet 3    cephalexin (Keflex) 500 MG capsule TAKE FOUR CAPSULES BY MOUTH 1 HOUR PRIOR TO PROCEDURE 4 capsule 3    Eliquis 5 MG tablet tablet Take 1 tablet by mouth 2 (Two) Times a Day. (Patient taking differently: Take 1 tablet by mouth 2 (Two) Times a Day. HOLD X 48 HRS FOR ENDO  Indications: Prevention of Unwanted Clot in Veins) 60 tablet 0    gabapentin (NEURONTIN) 100 MG capsule TAKE TWO TABLETS BY MOUTH EVERY MORNING AND AT BEDTIME. TAKE ONE TABLET BY MOUTH IN MIDDLE OF THE DAY.M 150 capsule 0    hydrALAZINE (APRESOLINE) 25 MG  tablet Take 1 tablet by mouth 2 (Two) Times a Day.      pioglitazone (ACTOS) 30 MG tablet TAKE 1 TABLET BY MOUTH EVERY DAY 90 tablet 3    potassium chloride 10 MEQ CR tablet Take 2 tablets by mouth 3 (Three) Times a Day. (Patient taking differently: Take 2 tablets by mouth 2 (Two) Times a Day.) 540 tablet 3    Probiotic Product (PROBIOTIC PO) Take 1 tablet by mouth Daily.      Spiriva Respimat 2.5 MCG/ACT aerosol solution inhaler Inhale 2 puffs Daily. 3 each 3    tamsulosin (FLOMAX) 0.4 MG capsule 24 hr capsule Take 1 capsule by mouth Every Night for 180 days. 90 capsule 1    valsartan-hydrochlorothiazide (DIOVAN-HCT) 320-25 MG per tablet Take 1 tablet by mouth Every Morning.      Januvia 100 MG tablet        No facility-administered medications prior to visit.       No opioid medication identified on active medication list. I have reviewed chart for other potential  high risk medication/s and harmful drug interactions in the elderly.        Aspirin is not on active medication list.  Aspirin use is contraindicated for this patient due to: current use of Eliquis.  .    Patient Active Problem List   Diagnosis    Hypercholesterolemia    Hypertension    Type 2 diabetes mellitus    Squamous cell carcinoma of left lung    Status post lobectomy of lung    Elevated liver enzymes    Class 3 severe obesity due to excess calories with body mass index (BMI) of 40.0 to 44.9 in adult    Encounter for screening for malignant neoplasm of colon    Family history of colon cancer    Obstructive sleep apnea of adult    Screening PSA (prostate specific antigen)    Arthritis    Primary osteoarthritis of left hip    Osteoarthritis of left hip    Atelectasis    Atelectasis of left lung     Advance Care Planning   Advance Care Planning     Advance Directive is not on file.  ACP discussion was held with the patient during this visit. Patient does not have an advance directive, information provided.     Objective    Vitals:    12/04/23 1103  "  BP: 136/70   BP Location: Right arm   Patient Position: Sitting   Cuff Size: Adult   Pulse: 58   Resp: 16   SpO2: 96%   Weight: 118 kg (259 lb 9.6 oz)   Height: 172.7 cm (67.99\")     Estimated body mass index is 39.48 kg/m² as calculated from the following:    Height as of this encounter: 172.7 cm (67.99\").    Weight as of this encounter: 118 kg (259 lb 9.6 oz).    Class 2 Severe Obesity (BMI >=35 and <=39.9). Obesity-related health conditions include the following: hypertension, diabetes mellitus, and dyslipidemias. Obesity is unchanged. BMI is is above average; BMI management plan is completed. We discussed portion control and increasing exercise.      Does the patient have evidence of cognitive impairment? No    Lab Results   Component Value Date    CHLPL 135 2023    TRIG 92 2023    HDL 37 (L) 2023    LDL 80 2023    VLDL 18 2023    HGBA1C 6.20 (H) 2023        HEALTH RISK ASSESSMENT    Smoking Status:  Social History     Tobacco Use   Smoking Status Former    Packs/day: 1.00    Years: 33.00    Additional pack years: 0.00    Total pack years: 33.00    Types: Cigarettes    Quit date: 2018    Years since quittin.9   Smokeless Tobacco Never   Tobacco Comments    started cigars 3-4 daily in 2018 and has quit a month ago     Alcohol Consumption:  Social History     Substance and Sexual Activity   Alcohol Use Not Currently    Comment: rare     Fall Risk Screen:    STEADI Fall Risk Assessment was completed, and patient is at LOW risk for falls.Assessment completed on:2023    Depression Screenin/4/2023    11:01 AM   PHQ-2/PHQ-9 Depression Screening   Little Interest or Pleasure in Doing Things 0-->not at all   Feeling Down, Depressed or Hopeless 0-->not at all   PHQ-9: Brief Depression Severity Measure Score 0       Health Habits and Functional and Cognitive Screenin/4/2023    10:57 AM   Functional & Cognitive Status   Do you have difficulty preparing " food and eating? No   Do you have difficulty bathing yourself, getting dressed or grooming yourself? No   Do you have difficulty using the toilet? No   Do you have difficulty moving around from place to place? No   Do you have trouble with steps or getting out of a bed or a chair? No   Current Diet Well Balanced Diet   Dental Exam Up to date   Eye Exam Not up to date   Exercise (times per week) 1 times per week   Current Exercises Include House Cleaning   Do you need help using the phone?  No   Are you deaf or do you have serious difficulty hearing?  Yes   Do you need help to go to places out of walking distance? No   Do you need help shopping? No   Do you need help preparing meals?  No   Do you need help with housework?  No   Do you need help with laundry? No   Do you need help taking your medications? No   Do you need help managing money? No   Do you ever drive or ride in a car without wearing a seat belt? Yes   Have you felt unusual stress, anger or loneliness in the last month? No   Who do you live with? Spouse   If you need help, do you have trouble finding someone available to you? No   Have you been bothered in the last four weeks by sexual problems? No   Do you have difficulty concentrating, remembering or making decisions? No       Age-appropriate Screening Schedule:  Refer to the list below for future screening recommendations based on patient's age, sex and/or medical conditions. Orders for these recommended tests are listed in the plan section. The patient has been provided with a written plan.    Health Maintenance   Topic Date Due    COLORECTAL CANCER SCREENING  10/22/2022    COVID-19 Vaccine (5 - 2023-24 season) 09/01/2023    DIABETIC EYE EXAM  10/03/2023    DIABETIC FOOT EXAM  02/16/2024    HEMOGLOBIN A1C  05/28/2024    LIPID PANEL  11/28/2024    URINE MICROALBUMIN  11/28/2024    ANNUAL WELLNESS VISIT  12/04/2024    BMI FOLLOWUP  12/04/2024    TDAP/TD VACCINES (3 - Td or Tdap) 07/08/2027    HEPATITIS  "C SCREENING  Completed    INFLUENZA VACCINE  Completed    Pneumococcal Vaccine 65+  Completed    AAA SCREEN (ONE-TIME)  Completed    ZOSTER VACCINE  Completed    LUNG CANCER SCREENING  Discontinued                  CMS Preventative Services Quick Reference  Risk Factors Identified During Encounter  Immunizations Discussed/Encouraged: Influenza and RSV (Respiratory Syncytial Virus)  Inactivity/Sedentary: Patient was advised to exercise at least 150 minutes a week per CDC recommendations.  Vision Screening Recommended  The above risks/problems have been discussed with the patient.  Pertinent information has been shared with the patient in the After Visit Summary.  An After Visit Summary and PPPS were made available to the patient.    Follow Up:   Next Medicare Wellness visit to be scheduled in 1 year.       Additional E&M Note during same encounter follows:  Patient has multiple medical problems which are significant and separately identifiable that require additional work above and beyond the Medicare Wellness Visit.      Chief Complaint  Medicare Wellness-subsequent    Subjective          Alexy Ram is also being seen today for HTN, HLD, and DMII. Patient is without complaint today. He is tolerating medications without problem.          Objective   Vital Signs:  /70 (BP Location: Right arm, Patient Position: Sitting, Cuff Size: Adult)   Pulse 58   Resp 16   Ht 172.7 cm (67.99\")   Wt 118 kg (259 lb 9.6 oz)   SpO2 96%   BMI 39.48 kg/m²     Physical Exam  Vitals reviewed.   Constitutional:       Appearance: Normal appearance. He is obese. He is not ill-appearing.   Pulmonary:      Effort: Pulmonary effort is normal.   Neurological:      Mental Status: He is alert and oriented to person, place, and time.   Psychiatric:         Mood and Affect: Mood normal.         Behavior: Behavior normal.          The following data was reviewed by: Pedro Luis Simons MD on 12/04/2023:  Common labs          " 8/22/2023    09:14 10/3/2023    09:01 11/28/2023    10:24   Common Labs   Glucose 107   110    BUN 17   20    Creatinine 0.73   0.75    Sodium 143   139    Potassium 3.5   3.4    Chloride 103   102    Calcium 9.2   9.3    Total Protein 6.2   6.1    Albumin 3.9   4.1    Total Bilirubin 0.4   0.6    Alkaline Phosphatase 77   73    AST (SGOT) 13   13    ALT (SGPT) 13   16    WBC 6.98   6.86    Hemoglobin 13.0   13.5    Hematocrit 40.1   41.3    Platelets 283  269  251    Total Cholesterol 138   135    Triglycerides 76   92    HDL Cholesterol 33   37    LDL Cholesterol  90   80    Hemoglobin A1C 5.80   6.20    Microalbumin, Urine   102.5      Data reviewed : 12/4/23       Assessment and Plan   Diagnoses and all orders for this visit:    1. Type 2 diabetes mellitus without complication, without long-term current use of insulin (Primary)  Assessment & Plan:  Diabetes is unchanged.   Continue current treatment regimen.  Regular aerobic exercise.  Reminded to get yearly retinal exam.  Diabetes will be reassessed in 6 months.    A1c only slightly increased since stopping Januvia. Will continue pioglitazone monotherapy now. Check A1c again in 6 months.      2. Allergic rhinitis, unspecified seasonality, unspecified trigger  -     fluticasone (FLONASE) 50 MCG/ACT nasal spray; 2 sprays into the nostril(s) as directed by provider Daily.  Dispense: 16 g; Refill: 11  -     cetirizine (zyrTEC) 10 MG tablet; Take 1 tablet by mouth Daily.  Dispense: 90 tablet; Refill: 3    3. Needs flu shot  -     Fluzone High-Dose 65+yrs ([id number])    4. Primary hypertension  Assessment & Plan:  Only slightly above goal (<130/80mmHg) today, will continue current medication regimen without change. Weight loss through better food choices and regular exercise would be helpful.       5. Hypercholesterolemia  Assessment & Plan:  Lipid abnormalities are improving with treatment.  Pharmacotherapy as ordered.  Lipids will be reassessed in 1 year.                Follow Up   No follow-ups on file.  Patient was given instructions and counseling regarding his condition or for health maintenance advice. Please see specific information pulled into the AVS if appropriate.

## 2023-12-04 NOTE — ASSESSMENT & PLAN NOTE
Only slightly above goal (<130/80mmHg) today, will continue current medication regimen without change. Weight loss through better food choices and regular exercise would be helpful.

## 2023-12-15 ENCOUNTER — OFFICE VISIT (OUTPATIENT)
Dept: ORTHOPEDIC SURGERY | Facility: CLINIC | Age: 68
End: 2023-12-15
Payer: MEDICARE

## 2023-12-15 VITALS — TEMPERATURE: 97.1 F | BODY MASS INDEX: 39.83 KG/M2 | HEIGHT: 68 IN | WEIGHT: 262.8 LBS

## 2023-12-15 DIAGNOSIS — M17.12 PRIMARY OSTEOARTHRITIS OF LEFT KNEE: Primary | ICD-10-CM

## 2023-12-15 DIAGNOSIS — R52 PAIN: ICD-10-CM

## 2023-12-15 RX ORDER — LIDOCAINE HYDROCHLORIDE 10 MG/ML
5 INJECTION, SOLUTION EPIDURAL; INFILTRATION; INTRACAUDAL; PERINEURAL
Status: COMPLETED | OUTPATIENT
Start: 2023-12-15 | End: 2023-12-15

## 2023-12-15 RX ORDER — METHYLPREDNISOLONE ACETATE 80 MG/ML
80 INJECTION, SUSPENSION INTRA-ARTICULAR; INTRALESIONAL; INTRAMUSCULAR; SOFT TISSUE
Status: COMPLETED | OUTPATIENT
Start: 2023-12-15 | End: 2023-12-15

## 2023-12-15 RX ADMIN — LIDOCAINE HYDROCHLORIDE 5 ML: 10 INJECTION, SOLUTION EPIDURAL; INFILTRATION; INTRACAUDAL; PERINEURAL at 09:11

## 2023-12-15 RX ADMIN — METHYLPREDNISOLONE ACETATE 80 MG: 80 INJECTION, SUSPENSION INTRA-ARTICULAR; INTRALESIONAL; INTRAMUSCULAR; SOFT TISSUE at 09:11

## 2023-12-15 NOTE — PROGRESS NOTES
Patient: Alexy Ram  YOB: 1955 68 y.o. male  Medical Record Number: [id number]    Chief Complaints:   Chief Complaint   Patient presents with    Left Knee - Initial Evaluation       History of Present Illness:Alexy Ram is a 68 y.o. male who presents with with complaints of having left knee pain that is chronic in nature.  Patient reports he has had issues off and on with his knee for several years however his symptoms worsened Saturday after trying to ride a recumbent bike.  Patient states that his knee was catching and kind of stiff and wanted to see if he could work some of it out.  After doing the exercise patient said he had difficulty walking for a few days.  Patient reports inflammation pain and swelling globally about the knee with it worse with prolonged walking or standing or certain physical activities.  Patient states that he is try to take some Tylenol for the pain with no significant relief.  Patient is on Eliquis for A-fib and cannot take anti-inflammatory medicines.  He does report that he has previously injured this knee in the past 30 years ago.  Ports he is having to use a cane for ambulatory purposes now.  Denies any signs or symptoms of infection, and he is without any other significant complaints today.    Allergies: No Known Allergies    Medications:   Current Outpatient Medications   Medication Sig Dispense Refill    albuterol sulfate  (90 Base) MCG/ACT inhaler Inhale 2 puffs Every 4 (Four) Hours As Needed for Wheezing. 1 inhaler 0    amLODIPine (NORVASC) 10 MG tablet TAKE 1 TABLET BY MOUTH EVERY DAY 90 tablet 1    atorvastatin (LIPITOR) 40 MG tablet TAKE 1 TABLET BY MOUTH EVERYDAY AT BEDTIME 90 tablet 3    carvedilol (COREG) 25 MG tablet TAKE 2 TABLETS BY MOUTH 2 TIMES A DAY WITH MEALS. (Patient taking differently: Take 2 tablets by mouth 2 (Two) Times a Day With Meals.) 360 tablet 3    cetirizine (zyrTEC) 10 MG tablet Take 1 tablet by mouth Daily. 90 tablet 3  "   Eliquis 5 MG tablet tablet Take 1 tablet by mouth 2 (Two) Times a Day. (Patient taking differently: Take 1 tablet by mouth 2 (Two) Times a Day. HOLD X 48 HRS FOR ENDO  Indications: Prevention of Unwanted Clot in Veins) 60 tablet 0    fluticasone (FLONASE) 50 MCG/ACT nasal spray 2 sprays into the nostril(s) as directed by provider Daily. 16 g 11    gabapentin (NEURONTIN) 100 MG capsule TAKE TWO TABLETS BY MOUTH EVERY MORNING AND AT BEDTIME. TAKE ONE TABLET BY MOUTH IN MIDDLE OF THE DAY.M 150 capsule 0    hydrALAZINE (APRESOLINE) 25 MG tablet Take 1 tablet by mouth 2 (Two) Times a Day.      pioglitazone (ACTOS) 30 MG tablet TAKE 1 TABLET BY MOUTH EVERY DAY 90 tablet 3    potassium chloride 10 MEQ CR tablet Take 2 tablets by mouth 3 (Three) Times a Day. (Patient taking differently: Take 2 tablets by mouth 2 (Two) Times a Day.) 540 tablet 3    Probiotic Product (PROBIOTIC PO) Take 1 tablet by mouth Daily.      Spiriva Respimat 2.5 MCG/ACT aerosol solution inhaler Inhale 2 puffs Daily. 3 each 3    tamsulosin (FLOMAX) 0.4 MG capsule 24 hr capsule Take 1 capsule by mouth Every Night for 180 days. 90 capsule 1    valsartan-hydrochlorothiazide (DIOVAN-HCT) 320-25 MG per tablet Take 1 tablet by mouth Every Morning.      cephalexin (Keflex) 500 MG capsule TAKE FOUR CAPSULES BY MOUTH 1 HOUR PRIOR TO PROCEDURE 4 capsule 3     No current facility-administered medications for this visit.         The following portions of the patient's history were reviewed and updated as appropriate: allergies, current medications, past family history, past medical history, past social history, past surgical history and problem list.    Review of Systems:   Pertinent positives/negatives listed in HPI above    Physical Exam:   Vitals:    12/15/23 0816   Temp: 97.1 °F (36.2 °C)   Weight: 119 kg (262 lb 12.8 oz)   Height: 172.7 cm (68\")   PainSc:   4   PainLoc: Knee       General: A and O x 3, ASA, NAD      Knee Exam List: Knee:  " left    ALIGNMENT:     Valgus      GAIT:    Antalgic    SKIN:    No abnormality    RANGE OF MOTION:   5  -  95   DEG    STRENGTH:   4  / 5    LIGAMENTS:    No varus / valgus instability.   Negative  Lachman.    MENISCUS:     Negative   Fawad       DISTAL PULSES:    Paplable    DISTAL SENSATION :   Intact    LYMPHATICS:     No   lymphadenopathy    OTHER:          - Positive   effusion      - Crepitance with ROM        Radiology:  Xrays 3views left knee (ap,lateral, sunrise) were ordered and reviewed for evaluation of knee pain demonstrating moderate joint space narrowing the medial and lateral compartments.  Patient does have periarticular osteophytes present in all 3 compartments.  Lateral compartment is noted with chondrocalcinosis present.  No other knee x-rays available for comparison purposes.    Assessment/Plan: Primary osteoarthritis left knee    Treatment options as well as imaging results were discussed in detail with the patient.  At this point in time I would like to proceed forward with conservative measures.  I have instructed the patient on the RICE method to help with inflammation and swelling.  He cannot take anti-inflammatory medicines due to being on Eliquis.  I will give the patient a prescription for physical therapy to work on range of motion and strengthening exercises.  I am also going to give him an intra-articular joint injection today.  Will have him follow back up in 3 months for reevaluation.       Large Joint Arthrocentesis: L knee  Date/Time: 12/15/2023 9:11 AM  Consent given by: patient  Site marked: site marked  Timeout: Immediately prior to procedure a time out was called to verify the correct patient, procedure, equipment, support staff and site/side marked as required   Supporting Documentation  Indications: pain and joint swelling   Procedure Details  Location: knee - L knee  Preparation: Patient was prepped and draped in the usual sterile fashion  Needle size: 22 G  Approach:  anterolateral  Medications administered: 80 mg methylPREDNISolone acetate 80 MG/ML; 5 mL lidocaine PF 1% 1 %  Patient tolerance: patient tolerated the procedure well with no immediate complications    (3 mL of lidocaine was used for anesthetic purposes)    ROME Ignacio  12/15/2023

## 2023-12-19 DIAGNOSIS — G89.29 HIP PAIN, CHRONIC, LEFT: ICD-10-CM

## 2023-12-19 DIAGNOSIS — M25.552 HIP PAIN, CHRONIC, LEFT: ICD-10-CM

## 2023-12-21 RX ORDER — GABAPENTIN 100 MG/1
CAPSULE ORAL
Qty: 150 CAPSULE | Refills: 0 | Status: SHIPPED | OUTPATIENT
Start: 2023-12-21

## 2023-12-21 NOTE — TELEPHONE ENCOUNTER
Rx Refill Note  Requested Prescriptions     Pending Prescriptions Disp Refills    gabapentin (NEURONTIN) 100 MG capsule 150 capsule 0     Sig: TAKE TWO TABLETS BY MOUTH EVERY MORNING AND AT BEDTIME. TAKE ONE TABLET BY MOUTH IN MIDDLE OF THE DAY.M      Last office visit with prescribing clinician: 12/4/2023   Last telemedicine visit with prescribing clinician: Visit date not found   Next office visit with prescribing clinician: 12/21/2023                         Would you like a call back once the refill request has been completed: [] Yes [] No    If the office needs to give you a call back, can they leave a voicemail: [] Yes [] No    Beatriz Samaniego MA  12/21/23, 13:18 EST

## 2023-12-26 ENCOUNTER — TREATMENT (OUTPATIENT)
Dept: PHYSICAL THERAPY | Facility: CLINIC | Age: 68
End: 2023-12-26
Payer: MEDICARE

## 2023-12-26 DIAGNOSIS — M25.562 ACUTE PAIN OF LEFT KNEE: Primary | ICD-10-CM

## 2023-12-26 DIAGNOSIS — R26.2 DIFFICULTY WALKING: ICD-10-CM

## 2023-12-26 PROCEDURE — 97530 THERAPEUTIC ACTIVITIES: CPT | Performed by: PHYSICAL THERAPIST

## 2023-12-26 PROCEDURE — 97162 PT EVAL MOD COMPLEX 30 MIN: CPT | Performed by: PHYSICAL THERAPIST

## 2023-12-26 PROCEDURE — 97110 THERAPEUTIC EXERCISES: CPT | Performed by: PHYSICAL THERAPIST

## 2023-12-26 NOTE — PROGRESS NOTES
Physical Therapy Initial Evaluation and Plan of Care    [facility]  [address] [place] [address]  [phone] (phone)  [phone] (fax)    Patient: Alexy Ram   : 1955  Diagnosis/ICD-10 Code:  Acute pain of left knee [M25.562]  Referring practitioner: ROME Ignacio  Date of Initial Visit: 2023  Today's Date: 2023  Patient seen for 1 sessions           Subjective Evaluation    History of Present Illness  Onset date: chronic.  Mechanism of injury: Patient complains of chronic B knee Pain (L>R). Recently the L knee stiffened up on him; He tried to work it out on a recumbent bike but the pain persisted with standing/walking. He recently had a cortisone injection (on Dec 15th) which has helped the pain somewhat and knee flexion has slightly improved.     Patient reports difficulty bending his L knee or navigating stairs. He also has pain if he walks longer distances (such as large stores). He uses a cane intermittently due to pain.     Patient has been told he needs knee replacements. He intend to have the L one replaced first.      PMH: L RD 2023. H/O lung cancer, COPD, Diabetic, hypertension, R knee OA    PLOF: Enjoys walking but leisure but unable past 5-6 years. 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 (2 and 7 y/o)      Patient Occupation: retired Quality of life: good    Pain  Current pain ratin  At best pain ratin  At worst pain ratin  Location: L knee  Quality: squeezing  Relieving factors: relaxation, rest, support, change in position and ice  Aggravating factors: stairs, movement, standing, ambulation and squatting  Progression: worsening    Social Support  Lives in: multiple-level home  Lives with: spouse and young children    Diagnostic Tests  X-ray: abnormal    Treatments  Previous treatment: injection treatment  Patient Goals  Patient goals for therapy: increased strength, decreased pain, increased motion, return to sport/leisure  activities, improved balance and decreased edema             Objective          Active Range of Motion   Left Knee   Flexion: 80 degrees   Extensor lag: 10 degrees     Right Knee   Flexion: 115 degrees   Extensor la degrees     Patellar Mobility   Left Knee Hypomobile in the left medial, left lateral, left superior and left inferior patellar tendon(s).     Right Knee Hypomobile in the medial, lateral, superior and inferior patellar tendon(s).     Strength/Myotome Testing     Left Hip   Planes of Motion   Flexion: 4-    Right Hip   Planes of Motion   Flexion: 4+    Left Knee   Flexion: 4-  Extension: 4    Right Knee   Flexion: 4+  Extension: 4+    Left Ankle/Foot   Dorsiflexion: 4+    Right Ankle/Foot   Dorsiflexion: 4+    Tests     Additional Tests Details  Special tests deferred due to known bone on bone knee OA    Swelling     Left Knee Girth Measurement (cm)   Joint line: 44 cm    Right Knee Girth Measurement (cm)   Joint line: 44 cm    Ambulation     Comments   B compensated trendelenburg (more prominent on L). Slightly circumducts L LE when advancing his leg. Lack of heel strike B at initial contact.         See Exercise, Manual, and Modality Logs for complete treatment.       Functional Outcome Score: LEFS=40/80 (50% disability)        Assessment & Plan       Assessment  Impairments: abnormal gait, abnormal muscle firing, abnormal muscle tone, abnormal or restricted ROM, activity intolerance, impaired balance, impaired physical strength, lacks appropriate home exercise program and pain with function   Functional limitations: sleeping, walking, uncomfortable because of pain, sitting and standing   Assessment details: Alexy Ram is a 68 y.o. year-old male referred to physical therapy for L knee pain due to bone on bone OA. Patient had a recent cortisone injection which somewhat helped his pain. He presents with a evolving clinical presentation.  He has comorbidities of diabetes, COPD, and R knee OA and  personal factors of helping watch young grandchildren 2x/week which may affect his progress in the plan of care.  Signs and symptoms are consistent with physical therapy diagnosis of L knee pain and difficulty walking. Objective findings include impaired knee AROM and strength, antalgic gait, and compromised balance. Pt was educated on course of treatment, possible reasons for knee pain, activity modifications, and use of ice/heat PRN. Pt was given a copy of HEP. Patient is appropriate for skilled physical therapy in order to reduce pain and increase ease with daily mobility.   Barriers to therapy: none identified  Prognosis: good    Goals  Plan Goals: STGs to be completed within 30 days:  -Patient will demonstrate compliance and independence with initial HEP  -Patient will increase L Knee AROM to 8-95 degrees to help normalize gait mechanics and increase ease with transfers  -Patient will increase L knee flex/ext strength to 4+/5 or greater to increase ease when negotiating stairs.      LTGs to be completed within 90 days:  -Patient will increase L Knee AROM to 5-100 degrees to help normalize gait mechanics and increase ease with transfers  -Patient will complete community mobility without AD and with even step length and heel-toe gait mechanics  -Patient will improve score on LEFS from 40 at eval to 50 or greater to improve quality of life    Plan  Therapy options: will be seen for skilled therapy services  Planned modality interventions: TENS, ultrasound, electrical stimulation/Russian stimulation, dry needling, cryotherapy, thermotherapy (hydrocollator packs) and iontophoresis  Planned therapy interventions: joint mobilization, stretching, strengthening, therapeutic activities, transfer training, postural training, manual therapy, ADL retraining, balance/weight-bearing training, dressing changes, flexibility, functional ROM exercises, gait training, home exercise program, neuromuscular re-education and motor  coordination training  Other planned therapy interventions: Aquatic Therapy  Frequency: 2x week (36 visits)  Treatment plan discussed with: patient  Plan details: Gait training, stairs, Balance, Knee ROM/strength, LE stability        Timed:  Manual Therapy:         mins  78389;  Therapeutic Exercise:    15     mins  17111;     Neuromuscular Christopher:        mins  78281;    Therapeutic Activity:     10     mins  17691;     Gait Training:           mins  22077;     Ultrasound:          mins  30079;    Iontophoresis         mins 83248    Untimed:  Electrical Stimulation:         mins  80310 ( );  Traction:       mins  02538;   Dry Needling   (1-2 muscles)   _     mins 07479 (Self-pay)  Dry Needling (3-4 muscles)  _     mins 46481 (Self-pay)  Dry Needling Trial    _     mins DRYNDLTRIAL  (No Charge)  Low Eval          Mins  13936  Mod Eval     25     Mins  68762  High Eval                            Mins  90306  Re- Eval                           Mins  79318    Timed Treatment:   25   mins   Total Treatment:     50   mins    PT SIGNATURE: Venessa Waite PT     License Number: [place] [id number]    Electronically signed by Venessa Waite PT, 12/26/23, 8:10 AM EST    DATE TREATMENT INITIATED: 12/26/2023    Initial Certification  Certification Period: 3/25/2024  I certify that the therapy services are furnished while this patient is under my care.  The services outlined above are required by this patient, and will be reviewed every 90 days.     PHYSICIAN: Nima Farmer APRN   NPI: [id number]                                         DATE:     Please sign and return via fax to [phone] Thank you, [facility].

## 2024-01-02 ENCOUNTER — HOSPITAL ENCOUNTER (OUTPATIENT)
Dept: CT IMAGING | Facility: HOSPITAL | Age: 69
Discharge: HOME | End: 2024-01-02
Admitting: SURGERY
Payer: MEDICARE

## 2024-01-02 DIAGNOSIS — C34.92 SQUAMOUS CELL CARCINOMA OF LEFT LUNG: ICD-10-CM

## 2024-01-02 PROCEDURE — 71250 CT THORAX DX C-: CPT

## 2024-01-03 ENCOUNTER — TREATMENT (OUTPATIENT)
Dept: PHYSICAL THERAPY | Facility: CLINIC | Age: 69
End: 2024-01-03
Payer: MEDICARE

## 2024-01-03 DIAGNOSIS — M25.562 ACUTE PAIN OF LEFT KNEE: Primary | ICD-10-CM

## 2024-01-03 DIAGNOSIS — R26.2 DIFFICULTY WALKING: ICD-10-CM

## 2024-01-03 PROCEDURE — 97530 THERAPEUTIC ACTIVITIES: CPT | Performed by: PHYSICAL THERAPIST

## 2024-01-03 PROCEDURE — 97110 THERAPEUTIC EXERCISES: CPT | Performed by: PHYSICAL THERAPIST

## 2024-01-03 NOTE — PROGRESS NOTES
Physical Therapy Daily Treatment Note    [facility]  [address], [place] [id number] [phone] (phone)  [phone] (fax)    Patient: Alexy Ram   : 1955  Diagnosis/ICD-10 Code:  Acute pain of left knee [M25.562]  Referring practitioner: No ref. provider found  Date of Initial Visit: Type: THERAPY  Noted: 2023  Today's Date: 1/3/2024  Patient seen for 2 sessions           Subjective   Knee exercises have been painful and seem to be making the other knee hurt.    Objective     See Exercise, Manual, and Modality Logs for complete treatment.     Assessment/Plan  Warmed up on NuStep to promote knee flexion/extension mobility which seemed well tolerated. Educated patient on only pushing himself to feeling a stretching pain during heel slides to help prevent exacerbation of knee pain. Tried to perform exercises bilaterally to address newer right knee pain.          Timed:    Manual Therapy:         mins  42056;  Therapeutic Exercise:    20     mins  15636;     Neuromuscular Christopher:        mins  92404;    Therapeutic Activity:     10     mins  31460;     Gait Training:           mins  68774;     Ultrasound:          mins  90753;    Electrical Stimulation:         mins  38486 ( );  Iontophoresis         mins 22724;  Aquatic Therapy         mins 83831;    Untimed:  Electrical Stimulation:         mins  92547 ( );  Traction:         mins  58523;   Dry Needling   (1-2 muscles)       mins 85841 (Self-pay)  Dry Needling (3-4 muscles)        mins  (Self-pay)  Dry Needling Trial          mins DRYNDLTRIAL  (No Charge)    Timed Treatment:   30   mins   Total Treatment:     45   mins    Venessa Waite PT  Physical Therapist    [place] License:[id number]

## 2024-01-11 ENCOUNTER — OFFICE VISIT (OUTPATIENT)
Dept: SURGERY | Facility: CLINIC | Age: 69
End: 2024-01-11
Payer: MEDICARE

## 2024-01-11 VITALS
HEART RATE: 62 BPM | HEIGHT: 68 IN | SYSTOLIC BLOOD PRESSURE: 118 MMHG | DIASTOLIC BLOOD PRESSURE: 60 MMHG | BODY MASS INDEX: 39.71 KG/M2 | WEIGHT: 262 LBS | OXYGEN SATURATION: 94 %

## 2024-01-11 DIAGNOSIS — C34.92 SQUAMOUS CELL CARCINOMA OF LEFT LUNG: Primary | ICD-10-CM

## 2024-01-11 PROCEDURE — 99215 OFFICE O/P EST HI 40 MIN: CPT | Performed by: SURGERY

## 2024-01-11 PROCEDURE — 3074F SYST BP LT 130 MM HG: CPT | Performed by: SURGERY

## 2024-01-11 PROCEDURE — 3078F DIAST BP <80 MM HG: CPT | Performed by: SURGERY

## 2024-01-11 NOTE — PROGRESS NOTES
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  Now with left upper lobe consolidation    Subjective   HISTORY OF PRESENTING ILLNESS:   Alexy Ram is a 68-year-old male who has significant medical problems as mentioned below.     He presents today for further follow-up of a left lower lobectomy performed in November 19, 2018 for squamous cell carcinoma of the lung.  Overall, he is doing well. He had COPD but denies any unusual breathing difficulty but feels that he is doing better. He is still using his CPAP machine and denies any dyspnea when talking or sitting. He will become short of breath if he walks 1 block. He just had a hip arthroplasty and uses a cane to ambulate. He denies chronic coughing but has occasional days where he does cough. He denies any pneumonia or any infection in the chest that required antibiotics within the last year. He quit smoking when he was diagnosed with lung cancer.      He has new masslike consolidation along the anterior suture line which measures 5.4 cm with PET avidity.  The possibility of malignancy cannot be ruled out.  Therefore I recommended ION robotic navigational bronchoscopy to establish diagnosis.  He underwent the procedure on 9/7/2023.  The left upper lobe takeoff was narrowed.  There was significant hyperemia that led to bleeding into the orifice making navigation challenging to the target lesion.  I performed FNA and cold forcep biopsy from the left upper lobe consolation.  There was a 10 mm level 7 lymph node which was biopsied.  There is no evidence of malignancy.     Postprocedure he had complete whiteout of left lung that was likely due to narrowing/swelling of the airway.  He was admitted to the hospital for observation and discharged the next day. There was no evidence of malignancy in the biopsy specimen.  Due to the high suspicion of malignancy, I recommended  repeating CT-guided biopsy which was performed on 10/3/2023 and revealed organizing pneumonia with focal marked acute inflammation consistent with micro abscesses.  There was no evidence of malignancy.    He had repeat CT scan of the chest on 1/7/2024 which reported decreased thickness of the masslike opacity of the anterior left upper lobe.    He came to clinic for follow-up visit.  The patient is doing well. His breathing is good. He denies having any cough, hemoptysis, fever, or chills. He denies having had any pneumonia or infection in the last 3 months. He still experiences dyspnea upon ambulation, but it is not new to him. He quit smoking approximately 5 years ago, when he discovered he had cancer.     Past Medical History:   Diagnosis Date    Arthritis     OSTEOARTHRITIS    At risk for sleep apnea     5    COPD (chronic obstructive pulmonary disease) July 2018    Diabetes mellitus     Dyspnea on exertion     Fatty liver     Hip pain     History of low potassium     History of transfusion     Hyperlipidemia     Hypertension     Hypoglycemia     Hypoglycemia     Lung cancer     lung    Pacemaker     Second degree AV block     Sinus node dysfunction     Sleep apnea     WEARS CPAP    Slow to wake up after anesthesia     Status post lobectomy of lung 03/18/2019    Squamous cell carcinoma    Urine output low        Past Surgical History:   Procedure Laterality Date    BRONCHOSCOPY N/A 10/11/2018    Procedure: BRONCHOSCOPY WITH FLUORO, LEFT LOWER LOBE BRUSHINGS WET AND DRY. WITH BX'S AND BAL (IN LLL).;  Surgeon: Akil Ortiz MD;  Location: [facility] ENDOSCOPY;  Service: Pulmonary    BRONCHOSCOPY WITH ION ROBOTIC ASSIST N/A 9/7/2023    Procedure: BRONCHOSCOPY WITH ION ROBOT AND ENDOBRONCHIAL ULTRASOUND with brushing and FNA;  Surgeon: Taye Chavira MD;  Location: [facility] ENDOSCOPY;  Service: Robotics - Pulmonary;  Laterality: N/A;  PRE/POST - left upper lobe mass    COLONOSCOPY  2021    COLONOSCOPY W/ POLYPECTOMY  N/A 10/22/2021    Procedure: COLONOSCOPY WITH POLYPECTOMY;  Surgeon: Lan Solis MD;  Location:  [facility];  Service: Gastroenterology;  Laterality: N/A;  cecal polyp x1 (cold snare)  ascending polyp x1 (hot snare)  transverse polyp x3 (hot snare x 1), (cold snare x2)  sigmoid polyp x1 (hot snare, clip applied)  rectal polyp x3 (cold snare)    INSERT / REPLACE / REMOVE PACEMAKER  2005    replaced Oct 2014    JOINT REPLACEMENT  Hip    2023    KNEE ARTHROSCOPY Left     LUNG LOBECTOMY Left 2019    Squamous cell carcinoma    PACEMAKER IMPLANTATION  ,     THORACOSCOPY Left 2018    Procedure: BRONCHOSCOPY, DAVINCI ROBOT ASSISTED VIDEIO ASSISTED THORACOSCOPY  WITH CONVERT TO OPEN THORACOTOMY,LEFT LOWER LOBECTOMY,INTERCOSTAL NERVE BLOCK;  Surgeon: Michael Ring III, MD;  Location:  [facility];  Service: DaVinci    TOTAL HIP ARTHROPLASTY Left 2023    Procedure: TOTAL HIP ARTHROPLASTY;  Surgeon: Nikko Staton MD;  Location:  [facility];  Service: Orthopedics;  Laterality: Left;       Family History   Problem Relation Age of Onset    Diabetes Mother     Stroke Father     Heart disease Father     Stroke Sister     Cancer Sister     Heart disease Brother     Diabetes Brother     Stroke Sister     Diabetes Sister     Diabetes Brother     Malig Hyperthermia Neg Hx        Social History     Socioeconomic History    Marital status:    Tobacco Use    Smoking status: Former     Packs/day: 1.00     Years: 33.00     Additional pack years: 0.00     Total pack years: 33.00     Types: Cigarettes     Quit date: 2018     Years since quittin.0    Smokeless tobacco: Never    Tobacco comments:     started cigars 3-4 daily in 2018 and has quit a month ago   Vaping Use    Vaping Use: Never used   Substance and Sexual Activity    Alcohol use: Not Currently     Comment: rare    Drug use: No    Sexual activity: Defer         Current Outpatient Medications:     albuterol  sulfate  (90 Base) MCG/ACT inhaler, Inhale 2 puffs Every 4 (Four) Hours As Needed for Wheezing., Disp: 1 inhaler, Rfl: 0    amLODIPine (NORVASC) 10 MG tablet, TAKE 1 TABLET BY MOUTH EVERY DAY, Disp: 90 tablet, Rfl: 1    carvedilol (COREG) 25 MG tablet, TAKE 2 TABLETS BY MOUTH 2 TIMES A DAY WITH MEALS. (Patient taking differently: Take 2 tablets by mouth 2 (Two) Times a Day With Meals.), Disp: 360 tablet, Rfl: 3    cetirizine (zyrTEC) 10 MG tablet, Take 1 tablet by mouth Daily., Disp: 90 tablet, Rfl: 3    Eliquis 5 MG tablet tablet, Take 1 tablet by mouth 2 (Two) Times a Day. (Patient taking differently: Take 1 tablet by mouth 2 (Two) Times a Day. HOLD X 48 HRS FOR ENDO  Indications: Prevention of Unwanted Clot in Veins), Disp: 60 tablet, Rfl: 0    fluticasone (FLONASE) 50 MCG/ACT nasal spray, 2 sprays into the nostril(s) as directed by provider Daily., Disp: 16 g, Rfl: 11    hydrALAZINE (APRESOLINE) 25 MG tablet, Take 1 tablet by mouth 2 (Two) Times a Day., Disp: , Rfl:     pioglitazone (ACTOS) 30 MG tablet, TAKE 1 TABLET BY MOUTH EVERY DAY, Disp: 90 tablet, Rfl: 3    potassium chloride 10 MEQ CR tablet, Take 2 tablets by mouth 3 (Three) Times a Day. (Patient taking differently: Take 2 tablets by mouth 2 (Two) Times a Day.), Disp: 540 tablet, Rfl: 3    Probiotic Product (PROBIOTIC PO), Take 1 tablet by mouth Daily., Disp: , Rfl:     Spiriva Respimat 2.5 MCG/ACT aerosol solution inhaler, Inhale 2 puffs Daily., Disp: 3 each, Rfl: 3    valsartan-hydrochlorothiazide (DIOVAN-HCT) 320-25 MG per tablet, Take 1 tablet by mouth Every Morning., Disp: , Rfl:     atorvastatin (LIPITOR) 40 MG tablet, Take 1 tablet by mouth every night at bedtime., Disp: 90 tablet, Rfl: 3    cephalexin (Keflex) 500 MG capsule, TAKE FOUR CAPSULES BY MOUTH 1 HOUR PRIOR TO PROCEDURE, Disp: 4 capsule, Rfl: 3    gabapentin (NEURONTIN) 100 MG capsule, TAKE TWO TABLETS BY MOUTH EVERY MORNING AND AT BEDTIME. TAKE ONE TABLET BY MOUTH IN MIDDLE OF  "THE DAY., Disp: 150 capsule, Rfl: 0    tamsulosin (FLOMAX) 0.4 MG capsule 24 hr capsule, Take 1 capsule by mouth Every Night., Disp: 90 capsule, Rfl: 1     No Known Allergies            Objective    OBJECTIVE:     VITAL SIGNS:  /60 (BP Location: Left arm, Patient Position: Sitting, Cuff Size: Adult)   Pulse 62   Ht 172.7 cm (67.99\")   Wt 119 kg (262 lb)   SpO2 94%   BMI 39.85 kg/m²     PHYSICAL EXAM:  Constitutional:       Appearance: Normal appearance.   HENT:      Head: Normocephalic.      Right Ear: External ear normal.      Left Ear: External ear normal.      Nose: Nose normal.      Mouth/Throat: No obvious deformity     Pharynx: Oropharynx is clear.   Eyes:      Conjunctiva/sclera: Conjunctivae normal.   Cardiovascular:      Rate and Rhythm: Normal rate.      Pulses: Normal pulses.   Pulmonary:      Effort: Pulmonary effort is normal.  Incision clean, dry, intact.  Abdominal:      Palpations: Abdomen is soft.   Musculoskeletal:         General: Normal range of motion.      Cervical back: Normal range of motion.   Skin:     General: Skin is warm.   Neurological:      General: No focal deficit present.      Mental Status: He is alert and oriented to person, place, and time.     LAB RESULTS:  I have reviewed all the available laboratory results in the chart.    RESULTS REVIEW:  I have reviewed the patient's all relevant laboratory and imaging findings.     IMAGING RESULTS:    CT Chest Without Contrast Diagnostic (08/10/2022 17:11)    XR Chest 2 View (07/05/2023 19:17)    CT Chest Without Contrast Diagnostic (08/16/2023 11:23)        ASSESSMENT & PLAN:  Alexy Ram is a 68 y.o. male with significant medical conditions as mentioned above presented to my clinic.    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    He has masslike consolidation along the anterior suture line which measures 5.4 cm which are PET activity.  " I performed ION bronchoscopy.  The airway were hyperemic and narrowed.  I was unable to obtain satisfactory specimen for biopsy.  There was no evidence of malignancy in the biopsy specimen.  Due to the high suspicion of malignancy, I recommended repeating CT-guided biopsy which was performed on 10/3/2023 and revealed organizing pneumonia with focal marked acute inflammation consistent with micro abscesses.  There was no evidence of malignancy.  Repeat CT scan of the chest in 3 months showed interval decrease in thickness of the left lower lobe consolation.    I recommended follow-up in 3 months with repeat CT scan of the chest.    I discussed the patients findings and my recommendations with the patient. The patient was given adequate time to ask questions and all questions were answered to patient satisfaction.     Taye Chavira MD  Thoracic Surgeon  [facility]        Dictated utilizing Dragon dictation    I spent 40 minutes caring for Alexy on this date of service. This time includes time spent by me in the following activities:preparing for the visit, reviewing tests, obtaining and/or reviewing a separately obtained history, performing a medically appropriate examination and/or evaluation , counseling and educating the patient/family/caregiver, ordering medications, tests, or procedures, referring and communicating with other health care professionals , documenting information in the medical record, independently interpreting results and communicating that information with the patient/family/caregiver, and care coordination and more than half the time was spent in direct face to face evaluation and decision making.     Transcribed from ambient dictation for Taye Chavira MD by Gerson Landeros.  01/11/24   11:03 EST    Patient or patient representative verbalized consent to the visit recording.  I have personally performed the services described in this document as transcribed by the  above individual, and it is both accurate and complete.

## 2024-01-12 DIAGNOSIS — R39.16 BENIGN PROSTATIC HYPERPLASIA (BPH) WITH STRAINING ON URINATION: ICD-10-CM

## 2024-01-12 DIAGNOSIS — N40.1 BENIGN PROSTATIC HYPERPLASIA (BPH) WITH STRAINING ON URINATION: ICD-10-CM

## 2024-01-12 RX ORDER — TAMSULOSIN HYDROCHLORIDE 0.4 MG/1
1 CAPSULE ORAL NIGHTLY
Qty: 90 CAPSULE | Refills: 1 | Status: SHIPPED | OUTPATIENT
Start: 2024-01-12

## 2024-01-15 ENCOUNTER — TELEPHONE (OUTPATIENT)
Dept: SURGERY | Facility: CLINIC | Age: 69
End: 2024-01-15
Payer: MEDICARE

## 2024-01-15 NOTE — TELEPHONE ENCOUNTER
Left detailed voicemail regarding after visit appointments. I left my name, medical group and call back number so he can receive his after visit appointment details.    DEVI 7:32  1/15/2024

## 2024-01-18 DIAGNOSIS — N40.1 BENIGN PROSTATIC HYPERPLASIA (BPH) WITH STRAINING ON URINATION: ICD-10-CM

## 2024-01-18 DIAGNOSIS — R39.16 BENIGN PROSTATIC HYPERPLASIA (BPH) WITH STRAINING ON URINATION: ICD-10-CM

## 2024-01-19 RX ORDER — TAMSULOSIN HYDROCHLORIDE 0.4 MG/1
1 CAPSULE ORAL NIGHTLY
Qty: 90 CAPSULE | Refills: 1 | Status: SHIPPED | OUTPATIENT
Start: 2024-01-19

## 2024-01-24 RX ORDER — ATORVASTATIN CALCIUM 40 MG/1
40 TABLET, FILM COATED ORAL
Qty: 90 TABLET | Refills: 3 | Status: SHIPPED | OUTPATIENT
Start: 2024-01-24 | End: 2024-01-28 | Stop reason: SDUPTHER

## 2024-01-25 ENCOUNTER — TELEPHONE (OUTPATIENT)
Dept: FAMILY MEDICINE CLINIC | Facility: CLINIC | Age: 69
End: 2024-01-25

## 2024-01-25 DIAGNOSIS — G89.29 HIP PAIN, CHRONIC, LEFT: ICD-10-CM

## 2024-01-25 DIAGNOSIS — M25.552 HIP PAIN, CHRONIC, LEFT: ICD-10-CM

## 2024-01-25 RX ORDER — GABAPENTIN 100 MG/1
CAPSULE ORAL
Qty: 150 CAPSULE | Refills: 0 | Status: SHIPPED | OUTPATIENT
Start: 2024-01-25

## 2024-01-25 NOTE — TELEPHONE ENCOUNTER
Rx Refill Note  Requested Prescriptions     Pending Prescriptions Disp Refills    gabapentin (NEURONTIN) 100 MG capsule [Pharmacy Med Name: GABAPENTIN 100MG] 150 capsule 0     Sig: TAKE TWO TABLETS BY MOUTH EVERY MORNING AND AT BEDTIME. TAKE ONE TABLET BY MOUTH IN MIDDLE OF THE DAY.      Last office visit with prescribing clinician: 12/4/2023   Last telemedicine visit with prescribing clinician: Visit date not found   Next office visit with prescribing clinician: 1/25/2024                         Would you like a call back once the refill request has been completed: [] Yes [] No    If the office needs to give you a call back, can they leave a voicemail: [] Yes [] No    Beatriz Samaniego MA  01/25/24, 14:12 EST

## 2024-01-25 NOTE — TELEPHONE ENCOUNTER
Caller: Alexy Ram    Relationship to patient: Self    Best call back number: [phone]    Patient is needing: PATIENT IS NO LONGER USING [facility]. HE IS NOW USING [facility]. PLEASE DO NOT SEND ANYTHING ELSE TO [facility].

## 2024-01-26 ENCOUNTER — DOCUMENTATION (OUTPATIENT)
Dept: PHYSICAL THERAPY | Facility: CLINIC | Age: 69
End: 2024-01-26
Payer: MEDICARE

## 2024-01-26 DIAGNOSIS — M25.562 ACUTE PAIN OF LEFT KNEE: Primary | ICD-10-CM

## 2024-01-26 DIAGNOSIS — R26.2 DIFFICULTY WALKING: ICD-10-CM

## 2024-01-26 NOTE — PROGRESS NOTES
Discharge Summary  Discharge Summary from Physical Therapy Report      Dates  PT visit: 12/26/23-1/3/24  Number of Visits: 2       Goals: Not Met    Goals  Plan Goals: STGs to be completed within 30 days:  -Patient will demonstrate compliance and independence with initial HEP  -Patient will increase L Knee AROM to 8-95 degrees to help normalize gait mechanics and increase ease with transfers  -Patient will increase L knee flex/ext strength to 4+/5 or greater to increase ease when negotiating stairs.        LTGs to be completed within 90 days:  -Patient will increase L Knee AROM to 5-100 degrees to help normalize gait mechanics and increase ease with transfers  -Patient will complete community mobility without AD and with even step length and heel-toe gait mechanics  -Patient will improve score on LEFS from 40 at eval to 50 or greater to improve quality of life    Discharge Plan: Patient to return to referring/providing physician    Comments : Patient returning to MD, knee pain is too severe for PT    Date of Discharge : 1/26/24        Venessa Waite, PT  Physical Therapist

## 2024-01-29 RX ORDER — ATORVASTATIN CALCIUM 40 MG/1
40 TABLET, FILM COATED ORAL
Qty: 90 TABLET | Refills: 3 | Status: SHIPPED | OUTPATIENT
Start: 2024-01-29

## 2024-02-12 RX ORDER — VALSARTAN AND HYDROCHLOROTHIAZIDE 320; 25 MG/1; MG/1
1 TABLET, FILM COATED ORAL EVERY MORNING
Qty: 90 TABLET | Refills: 1 | Status: SHIPPED | OUTPATIENT
Start: 2024-02-12

## 2024-02-12 RX ORDER — PIOGLITAZONEHYDROCHLORIDE 30 MG/1
30 TABLET ORAL DAILY
Qty: 90 TABLET | Refills: 1 | Status: SHIPPED | OUTPATIENT
Start: 2024-02-12

## 2024-03-07 DIAGNOSIS — G89.29 HIP PAIN, CHRONIC, LEFT: ICD-10-CM

## 2024-03-07 DIAGNOSIS — M25.552 HIP PAIN, CHRONIC, LEFT: ICD-10-CM

## 2024-03-07 RX ORDER — GABAPENTIN 100 MG/1
CAPSULE ORAL
Qty: 150 CAPSULE | Refills: 0 | Status: SHIPPED | OUTPATIENT
Start: 2024-03-07

## 2024-03-07 NOTE — TELEPHONE ENCOUNTER
Rx Refill Note  Requested Prescriptions     Pending Prescriptions Disp Refills    gabapentin (NEURONTIN) 100 MG capsule [Pharmacy Med Name: GABAPENTIN 100MG] 150 capsule 0     Sig: TAKE TWO CAPSULES BY MOUTH EVERY MORNING, ONE CAPSULE IN THE MIDDLE OF THE DAY AND 2 CAPSULES AT BEDTIME      Last office visit with prescribing clinician: 12/4/2023   Last telemedicine visit with prescribing clinician: Visit date not found   Next office visit with prescribing clinician: 3/28/2024

## 2024-03-20 RX ORDER — POTASSIUM CHLORIDE 750 MG/1
20 TABLET, FILM COATED, EXTENDED RELEASE ORAL 3 TIMES DAILY
Qty: 540 TABLET | Refills: 3 | OUTPATIENT
Start: 2024-03-20

## 2024-03-20 NOTE — TELEPHONE ENCOUNTER
Caller: Darlin Winslow    Relationship: Emergency Contact    Best call back number: [phone]    Requested Prescriptions:   Requested Prescriptions     Pending Prescriptions Disp Refills    potassium chloride 10 MEQ CR tablet 540 tablet 3     Sig: Take 2 tablets by mouth 3 (Three) Times a Day.        Pharmacy where request should be sent: [facility] [place] - [place], [place] - [address] [facility] - [phone]  - [phone]     Last office visit with prescribing clinician: 12/4/2023   Last telemedicine visit with prescribing clinician: Visit date not found   Next office visit with prescribing clinician: Visit date not found     Additional details provided by patient:     Does the patient have less than a 3 day supply:  [] Yes  [] No    Would you like a call back once the refill request has been completed: [] Yes [] No    If the office needs to give you a call back, can they leave a voicemail: [] Yes [] No    Laurence Garcia   03/20/24 12:51 EDT

## 2024-03-21 RX ORDER — HYDRALAZINE HYDROCHLORIDE 25 MG/1
25 TABLET, FILM COATED ORAL 2 TIMES DAILY
Status: CANCELLED | OUTPATIENT
Start: 2024-03-21

## 2024-03-21 RX ORDER — AMLODIPINE BESYLATE 10 MG/1
10 TABLET ORAL DAILY
Qty: 90 TABLET | Refills: 1 | Status: CANCELLED | OUTPATIENT
Start: 2024-03-21

## 2024-03-21 NOTE — TELEPHONE ENCOUNTER
Caller: Darlin Winslow    Relationship: Emergency Contact    Best call back number: [phone]     Requested Prescriptions:   Requested Prescriptions     Pending Prescriptions Disp Refills    amLODIPine (NORVASC) 10 MG tablet 90 tablet 1     Sig: Take 1 tablet by mouth Daily.    hydrALAZINE (APRESOLINE) 25 MG tablet       Sig: Take 1 tablet by mouth 2 (Two) Times a Day.        Pharmacy where request [place] be sent: [facility] [place] - [address] [facility] [phone] [facility] [phone]      Last office visit with prescribing clinician: Visit date not found   Last telemedicine visit with prescribing clinician: Visit date not found   Next office visit with prescribing clinician: Visit date not found     Additional details provided by patient: NEW PHARMACY     Does the patient have less than a 3 day supply:  [x] Yes  [] No    Would you like a call back once the refill request has been completed: [] Yes [] No    If the office needs to give you a call back, can they leave a voicemail: [] Yes [] No    Laurence Rizzo Rep   03/21/24 10:24 EDT

## 2024-03-27 ENCOUNTER — OFFICE VISIT (OUTPATIENT)
Dept: FAMILY MEDICINE CLINIC | Facility: CLINIC | Age: 69
End: 2024-03-27
Payer: MEDICARE

## 2024-03-27 VITALS
HEART RATE: 61 BPM | HEIGHT: 68 IN | WEIGHT: 267 LBS | DIASTOLIC BLOOD PRESSURE: 72 MMHG | SYSTOLIC BLOOD PRESSURE: 130 MMHG | TEMPERATURE: 97.7 F | BODY MASS INDEX: 40.47 KG/M2 | OXYGEN SATURATION: 98 %

## 2024-03-27 DIAGNOSIS — Z12.5 SCREENING PSA (PROSTATE SPECIFIC ANTIGEN): ICD-10-CM

## 2024-03-27 DIAGNOSIS — E11.65 TYPE 2 DIABETES MELLITUS WITH HYPERGLYCEMIA, WITHOUT LONG-TERM CURRENT USE OF INSULIN: ICD-10-CM

## 2024-03-27 DIAGNOSIS — E87.6 HYPOKALEMIA: ICD-10-CM

## 2024-03-27 DIAGNOSIS — I10 PRIMARY HYPERTENSION: Primary | ICD-10-CM

## 2024-03-27 PROBLEM — J98.11 ATELECTASIS OF LEFT LUNG: Status: RESOLVED | Noted: 2023-09-07 | Resolved: 2024-03-27

## 2024-03-27 PROBLEM — M16.12 PRIMARY OSTEOARTHRITIS OF LEFT HIP: Status: RESOLVED | Noted: 2023-05-04 | Resolved: 2024-03-27

## 2024-03-27 PROBLEM — Z45.018 ADJUSTMENT AND MANAGEMENT OF CARDIAC PACEMAKER: Status: ACTIVE | Noted: 2017-05-04

## 2024-03-27 PROBLEM — Z95.0 CARDIAC PACEMAKER IN SITU: Status: ACTIVE | Noted: 2018-12-07

## 2024-03-27 PROBLEM — Z79.01 CHRONIC ANTICOAGULATION: Status: ACTIVE | Noted: 2023-03-07

## 2024-03-27 PROBLEM — R74.8 ELEVATED LIVER ENZYMES: Status: RESOLVED | Noted: 2019-06-24 | Resolved: 2024-03-27

## 2024-03-27 PROBLEM — I48.0 PAF (PAROXYSMAL ATRIAL FIBRILLATION): Status: ACTIVE | Noted: 2018-12-07

## 2024-03-27 PROBLEM — J98.11 ATELECTASIS: Status: RESOLVED | Noted: 2023-09-07 | Resolved: 2024-03-27

## 2024-03-27 PROBLEM — I44.1 AV BLOCK, 2ND DEGREE: Status: ACTIVE | Noted: 2017-05-04

## 2024-03-27 PROCEDURE — 1159F MED LIST DOCD IN RCRD: CPT | Performed by: FAMILY MEDICINE

## 2024-03-27 PROCEDURE — 1160F RVW MEDS BY RX/DR IN RCRD: CPT | Performed by: FAMILY MEDICINE

## 2024-03-27 PROCEDURE — 3075F SYST BP GE 130 - 139MM HG: CPT | Performed by: FAMILY MEDICINE

## 2024-03-27 PROCEDURE — 3078F DIAST BP <80 MM HG: CPT | Performed by: FAMILY MEDICINE

## 2024-03-27 PROCEDURE — 99214 OFFICE O/P EST MOD 30 MIN: CPT | Performed by: FAMILY MEDICINE

## 2024-03-27 RX ORDER — POTASSIUM CHLORIDE 750 MG/1
20 TABLET, FILM COATED, EXTENDED RELEASE ORAL 2 TIMES DAILY
Qty: 360 TABLET | Refills: 3 | Status: SHIPPED | OUTPATIENT
Start: 2024-03-27 | End: 2025-03-22

## 2024-03-27 RX ORDER — HYDRALAZINE HYDROCHLORIDE 25 MG/1
25 TABLET, FILM COATED ORAL 2 TIMES DAILY
Qty: 180 TABLET | Refills: 3 | Status: SHIPPED | OUTPATIENT
Start: 2024-03-27

## 2024-03-27 RX ORDER — AMLODIPINE BESYLATE 10 MG/1
10 TABLET ORAL DAILY
Qty: 90 TABLET | Refills: 3 | Status: SHIPPED | OUTPATIENT
Start: 2024-03-27

## 2024-03-27 RX ORDER — CARVEDILOL 25 MG/1
50 TABLET ORAL 2 TIMES DAILY WITH MEALS
Qty: 360 TABLET | Refills: 3 | Status: SHIPPED | OUTPATIENT
Start: 2024-03-27

## 2024-03-27 NOTE — PROGRESS NOTES
"  Chief Complaint   Patient presents with    [facility]    Diabetes    Med Refill       Subjective   Alexy Ram is an 68 y.o. male who presents for follow up of diabetes. Current symptoms include: hyperglycemia. Patient denies foot ulcerations and hypoglycemia . Evaluation to date has included: fasting blood sugar, fasting lipid panel, hemoglobin A1C, and microalbuminuria. Home sugars: patient does not check sugars. Current treatments: Continued Actos which has been effective, Continued statin which has been effective, and Continued ACE inhibitor/ARB which has been effective. Discussed importance of yearly eye exams and checking feet for skin integrity.    New patient today  Previous MD left  Wife is a patient here in office  Chart reviewed  Due for refills        The following portions of the patient's history were reviewed and updated as appropriate: allergies, current medications, past family history, past medical history, past social history, past surgical history, and problem list.    Review of Systems  Pertinent items are noted in HPI.     Vitals:    03/27/24 1018   BP: 130/72   Pulse: 61   Temp: 97.7 °F (36.5 °C)   SpO2: 98%   Weight: 121 kg (267 lb)   Height: 172.7 cm (67.99\")             Objective    Gen:  Alert, pleasant  Ears: canals clear, TMs normal  Throat: clear , no thrush, teeth ok  Neck: no bruit, no LAD  Lungs: clear  Heart: RR no murmur  Feet:  No rash, no skin breakdown, sensation grossly normal.    Laboratory:  Results for orders placed or performed in visit on 11/15/23   Apolipoprotein B    Specimen: Blood   Result Value Ref Range    Apolipoprotein B 76 <90 mg/dL   Comprehensive Metabolic Panel    Specimen: Blood   Result Value Ref Range    Glucose 110 (H) 65 - 99 mg/dL    BUN 20 8 - 23 mg/dL    Creatinine 0.75 (L) 0.76 - 1.27 mg/dL    EGFR Result 98.3 >60.0 mL/min/1.73    BUN/Creatinine Ratio 26.7 (H) 7.0 - 25.0    Sodium 139 136 - 145 mmol/L    Potassium 3.4 (L) 3.5 - 5.2 mmol/L    " Chloride 102 98 - 107 mmol/L    Total CO2 28.7 22.0 - 29.0 mmol/L    Calcium 9.3 8.6 - 10.5 mg/dL    Total Protein 6.1 6.0 - 8.5 g/dL    Albumin 4.1 3.5 - 5.2 g/dL    Globulin 2.0 gm/dL    A/G Ratio 2.1 g/dL    Total Bilirubin 0.6 0.0 - 1.2 mg/dL    Alkaline Phosphatase 73 39 - 117 U/L    AST (SGOT) 13 1 - 40 U/L    ALT (SGPT) 16 1 - 41 U/L   Hemoglobin A1c    Specimen: Blood   Result Value Ref Range    Hemoglobin A1C 6.20 (H) 4.80 - 5.60 %   Lipid Panel With LDL / HDL Ratio    Specimen: Blood   Result Value Ref Range    Total Cholesterol 135 0 - 200 mg/dL    Triglycerides 92 0 - 150 mg/dL    HDL Cholesterol 37 (L) 40 - 60 mg/dL    VLDL Cholesterol Quique 18 5 - 40 mg/dL    LDL Chol Calc (NIH) 80 0 - 100 mg/dL    LDL/HDL RATIO 2.15    Microalbumin / Creatinine Urine Ratio - Urine, Clean Catch    Specimen: Urine, Clean Catch   Result Value Ref Range    Creatinine, Urine 78.4 Not Estab. mg/dL    Microalbumin, Urine 102.5 Not Estab. ug/mL    Microalbumin/Creatinine Ratio 131 (H) 0 - 29 mg/g creat   CBC & Differential    Specimen: Blood   Result Value Ref Range    WBC 6.86 3.40 - 10.80 10*3/mm3    RBC 4.85 4.14 - 5.80 10*6/mm3    Hemoglobin 13.5 13.0 - 17.7 g/dL    Hematocrit 41.3 37.5 - 51.0 %    MCV 85.2 79.0 - 97.0 fL    MCH 27.8 26.6 - 33.0 pg    MCHC 32.7 31.5 - 35.7 g/dL    RDW 15.8 (H) 12.3 - 15.4 %    Platelets 251 140 - 450 10*3/mm3    Neutrophil Rel % 64.6 42.7 - 76.0 %    Lymphocyte Rel % 23.8 19.6 - 45.3 %    Monocyte Rel % 8.7 5.0 - 12.0 %    Eosinophil Rel % 2.2 0.3 - 6.2 %    Basophil Rel % 0.4 0.0 - 1.5 %    Neutrophils Absolute 4.43 1.70 - 7.00 10*3/mm3    Lymphocytes Absolute 1.63 0.70 - 3.10 10*3/mm3    Monocytes Absolute 0.60 0.10 - 0.90 10*3/mm3    Eosinophils Absolute 0.15 0.00 - 0.40 10*3/mm3    Basophils Absolute 0.03 0.00 - 0.20 10*3/mm3    Immature Granulocyte Rel % 0.3 0.0 - 0.5 %    Immature Grans Absolute 0.02 0.00 - 0.05 10*3/mm3    nRBC 0.0 0.0 - 0.2 /100 WBC        Assessment & Plan         Continued Actos; see medication orders.  Continued statin drug see medication orders.  Continued ACE inhibitor; see medication orders.  Follow up in 2 months or as needed.    Diagnoses and all orders for this visit:    1. Primary hypertension (Primary)  -     amLODIPine (NORVASC) 10 MG tablet; Take 1 tablet by mouth Daily. Indications: High Blood Pressure Disorder  Dispense: 90 tablet; Refill: 3  -     carvedilol (COREG) 25 MG tablet; Take 2 tablets by mouth 2 (Two) Times a Day With Meals. Indications: High Blood Pressure Disorder  Dispense: 360 tablet; Refill: 3  -     hydrALAZINE (APRESOLINE) 25 MG tablet; Take 1 tablet by mouth 2 (Two) Times a Day. Indications: High Blood Pressure Disorder  Dispense: 180 tablet; Refill: 3  -     MicroAlbumin, Urine, Random - Urine, Clean Catch; Future  -     CBC (No Diff); Future  -     Comprehensive Metabolic Panel; Future  -     Lipid Panel; Future  -     TSH Rfx On Abnormal To Free T4; Future  -     Urinalysis With Microscopic If Indicated (No Culture) - Urine, Clean Catch; Future    2. Hypokalemia  -     potassium chloride 10 MEQ CR tablet; Take 2 tablets by mouth 2 (Two) Times a Day for 360 days. Indications: Low Amount of Potassium in the Blood  Dispense: 360 tablet; Refill: 3  -     Comprehensive Metabolic Panel; Future    3. Screening PSA (prostate specific antigen)  -     PSA Screen; Future    4. Type 2 diabetes mellitus with hyperglycemia, without long-term current use of insulin  -     MicroAlbumin, Urine, Random - Urine, Clean Catch; Future  -     CBC (No Diff); Future  -     Comprehensive Metabolic Panel; Future  -     Lipid Panel; Future  -     TSH Rfx On Abnormal To Free T4; Future  -     Urinalysis With Microscopic If Indicated (No Culture) - Urine, Clean Catch; Future  -     Hemoglobin A1c; Future        Discussed healthy diabetic eating plan.  May refer to ADA web site, diabetes.org    Return in about 2 months (around 5/27/2024) for Medicare Wellness visit,  blood pressure, diabetes.  There are no Patient Instructions on file for this visit.  Medications Discontinued During This Encounter   Medication Reason    albuterol sulfate  (90 Base) MCG/ACT inhaler *Therapy completed    hydrALAZINE (APRESOLINE) 25 MG tablet Reorder    carvedilol (COREG) 25 MG tablet Reorder    potassium chloride 10 MEQ CR tablet Reorder    amLODIPine (NORVASC) 10 MG tablet Reorder         Dr. Pipe Vaughn MD  [place], [place]  [facility].

## 2024-03-28 ENCOUNTER — OFFICE VISIT (OUTPATIENT)
Dept: ORTHOPEDIC SURGERY | Facility: CLINIC | Age: 69
End: 2024-03-28
Payer: MEDICARE

## 2024-03-28 VITALS — WEIGHT: 270.6 LBS | HEIGHT: 68 IN | BODY MASS INDEX: 41.01 KG/M2 | TEMPERATURE: 97.1 F

## 2024-03-28 DIAGNOSIS — M17.12 PRIMARY OSTEOARTHRITIS OF LEFT KNEE: Primary | ICD-10-CM

## 2024-03-28 PROCEDURE — 99214 OFFICE O/P EST MOD 30 MIN: CPT | Performed by: NURSE PRACTITIONER

## 2024-03-28 RX ORDER — PREGABALIN 150 MG/1
150 CAPSULE ORAL ONCE
OUTPATIENT
Start: 2024-03-28 | End: 2024-03-28

## 2024-03-28 RX ORDER — CHLORHEXIDINE GLUCONATE 500 MG/1
CLOTH TOPICAL 2 TIMES DAILY
OUTPATIENT
Start: 2024-03-28

## 2024-03-28 NOTE — LETTER
March 28, 2024       No Recipients    Patient: Alexy Ram   YOB: 1955   Date of Visit: 3/28/2024     Dear Akil Ortiz MD:       Alexy Ram is a mutual patient of ours and they are wanting to proceed forward with a total knee replacement.  Shemar is requesting pulmonary clearance to make sure you are okay with him proceeding forward with surgery.  Below are the relevant portions of my assessment and plan of care.    If you have questions, please do not hesitate to call me. I look forward to following Alexy along with you.         Sincerely,        ROME Ignacio        CC:   No Recipients    Nima Farmer APRN  03/28/24 1305  Sign when Signing Visit  Patient: Alexy Ram  YOB: 1955 68 y.o. male  Medical Record Number: [id number]    Chief Complaints:   Chief Complaint   Patient presents with   • Left Knee - Follow-up, Pain       History of Present Illness:Alexy Ram is a 68 y.o. male who presents for follow-up of left knee pain that is chronic in nature.  Patient has known advanced left knee osteoarthritis that we have been conservatively treating.  Patient reports that physical therapy actually has made his knee worse and it is now stiffer and has more pain.  He reports the cortisone injection lasted just for a few days and then wore off.  Patient reports that his pain level now is an 8 or 9 out of 10 and describes it as a constant ache.  States certain physical activities make it worse.  Patient reports he gets minimal relief taking Tylenol, and he cannot take NSAIDs due to being on Eliquis for A-fib.  Patient reports that this is greatly affecting his quality of life and he is unable to do certain activities of daily living.  Patient is ready to proceed forward with scheduling surgery.      Allergies: No Known Allergies    Medications:   Current Outpatient Medications   Medication Sig Dispense Refill   • amLODIPine (NORVASC) 10 MG tablet Take 1 tablet by  mouth Daily. Indications: High Blood Pressure Disorder 90 tablet 3   • atorvastatin (LIPITOR) 40 MG tablet Take 1 tablet by mouth every night at bedtime. 90 tablet 3   • carvedilol (COREG) 25 MG tablet Take 2 tablets by mouth 2 (Two) Times a Day With Meals. Indications: High Blood Pressure Disorder 360 tablet 3   • cetirizine (zyrTEC) 10 MG tablet Take 1 tablet by mouth Daily. 90 tablet 3   • Eliquis 5 MG tablet tablet Take 1 tablet by mouth 2 (Two) Times a Day. (Patient taking differently: Take 1 tablet by mouth 2 (Two) Times a Day. HOLD X 48 HRS FOR ENDO  Indications: Prevention of Unwanted Clot in Veins) 60 tablet 0   • fluticasone (FLONASE) 50 MCG/ACT nasal spray 2 sprays into the nostril(s) as directed by provider Daily. 16 g 11   • gabapentin (NEURONTIN) 100 MG capsule TAKE TWO CAPSULES BY MOUTH EVERY MORNING, ONE CAPSULE IN THE MIDDLE OF THE DAY AND 2 CAPSULES AT BEDTIME 150 capsule 0   • hydrALAZINE (APRESOLINE) 25 MG tablet Take 1 tablet by mouth 2 (Two) Times a Day. Indications: High Blood Pressure Disorder 180 tablet 3   • pioglitazone (ACTOS) 30 MG tablet Take 1 tablet by mouth Daily. Indications: Type 2 Diabetes 90 tablet 1   • potassium chloride 10 MEQ CR tablet Take 2 tablets by mouth 2 (Two) Times a Day for 360 days. Indications: Low Amount of Potassium in the Blood 360 tablet 3   • Probiotic Product (PROBIOTIC PO) Take 1 tablet by mouth Daily.     • Spiriva Respimat 2.5 MCG/ACT aerosol solution inhaler Inhale 2 puffs Daily. 3 each 3   • tamsulosin (FLOMAX) 0.4 MG capsule 24 hr capsule Take 1 capsule by mouth Every Night. 90 capsule 1   • valsartan-hydrochlorothiazide (DIOVAN-HCT) 320-25 MG per tablet Take 1 tablet by mouth Every Morning. 90 tablet 1     No current facility-administered medications for this visit.         The following portions of the patient's history were reviewed and updated as appropriate: allergies, current medications, past family history, past medical history, past social  "history, past surgical history and problem list.    Review of Systems:   Pertinent positives/negative listed in HPI above    Physical Exam:   Vitals:    03/28/24 1107   Temp: 97.1 °F (36.2 °C)   TempSrc: Temporal   Weight: 123 kg (270 lb 9.6 oz)   Height: 172.7 cm (68\")   PainSc:   4   PainLoc: Knee       General: A and O x 3, ASA, NAD    Knee Exam List: Knee:  left                          ALIGNMENT:     Valgus                            GAIT:    Antalgic                          SKIN:    No abnormality                          RANGE OF MOTION:   5  -  95   DEG                          STRENGTH:   4  / 5                          LIGAMENTS:    No varus / valgus instability.   Negative  Lachman.                          MENISCUS: Positive medial Fawad , positive Apley's                        DISTAL PULSES:    Paplable                          DISTAL SENSATION :   Intact                          LYMPHATICS:     No   lymphadenopathy                          OTHER:          - Positive   effusion                                                  - Crepitance with ROM            - Palpable Bakers cyst                     Radiology:  Xrays 3views left knee (ap,lateral, sunrise) taken previously were reviewed for evaluation of knee pain demonstrating moderate joint space narrowing the medial and lateral compartments.  Patient does have periarticular osteophytes present in all 3 compartments.  Lateral compartment is noted with significant chondrocalcinosis present.  No new knee x-rays were taken today for comparison purposes.    Assessment/Plan: Primary osteoarthritis left knee    Knee Plan List: Continuation of conservative management vs. TKA discussed.  The patient wishes to proceed with total knee replacement.  At this point the patient has failed the full compliment of conservative treatment and stating complete understanding of the risks/benefits/ anternatives wishes to proceed with surgical treatment.    Risk and " benefits of surgery were reviewed.  Including, but not limited to, blood clots or pulmonary embolism, anesthesia risk, infection, fracture, skin/leg numbness, persistent pain/crepitance/popping/catching, failure of the implant, need for future surgeries, hematoma, possible nerve or blood vessel injury, need for transfusion, and potential risk of stroke,heart attack or death, among others.  The patient understands and wishes to proceed.     It was explained that if tissue has been repaired or reconstructed, there is also an increased chance of failure which may require further management.  Following the completion of the discussion, the patient expressed understanding of this planned course of care, all their questions were answered and consent will be obtained preoperatively.    Operative Plan: [facility] Total Knee Replacement an overnight stay with home health rehab.  Patient does have a cardiac history of A-fib taking Eliquis with a pacemaker placement.  He also has a previous pulmonary issue which has had a left lower lobectomy in which she sees Dr. Ortiz.  We will need both cardiac and pulmonary clearance before proceeding forward with surgery.      Nima Farmer, APRN  3/28/2024

## 2024-03-28 NOTE — PROGRESS NOTES
Patient: Alexy Ram  YOB: 1955 68 y.o. male  Medical Record Number: [id number]    Chief Complaints:   Chief Complaint   Patient presents with    Left Knee - Follow-up, Pain       History of Present Illness:Alexy Ram is a 68 y.o. male who presents for follow-up of left knee pain that is chronic in nature.  Patient has known advanced left knee osteoarthritis that we have been conservatively treating.  Patient reports that physical therapy actually has made his knee worse and it is now stiffer and has more pain.  He reports the cortisone injection lasted just for a few days and then wore off.  Patient reports that his pain level now is an 8 or 9 out of 10 and describes it as a constant ache.  States certain physical activities make it worse.  Patient reports he gets minimal relief taking Tylenol, and he cannot take NSAIDs due to being on Eliquis for A-fib.  Patient reports that this is greatly affecting his quality of life and he is unable to do certain activities of daily living.  Patient is ready to proceed forward with scheduling surgery.      Allergies: No Known Allergies    Medications:   Current Outpatient Medications   Medication Sig Dispense Refill    amLODIPine (NORVASC) 10 MG tablet Take 1 tablet by mouth Daily. Indications: High Blood Pressure Disorder 90 tablet 3    atorvastatin (LIPITOR) 40 MG tablet Take 1 tablet by mouth every night at bedtime. 90 tablet 3    carvedilol (COREG) 25 MG tablet Take 2 tablets by mouth 2 (Two) Times a Day With Meals. Indications: High Blood Pressure Disorder 360 tablet 3    cetirizine (zyrTEC) 10 MG tablet Take 1 tablet by mouth Daily. 90 tablet 3    Eliquis 5 MG tablet tablet Take 1 tablet by mouth 2 (Two) Times a Day. (Patient taking differently: Take 1 tablet by mouth 2 (Two) Times a Day. HOLD X 48 HRS FOR ENDO  Indications: Prevention of Unwanted Clot in Veins) 60 tablet 0    fluticasone (FLONASE) 50 MCG/ACT nasal spray 2 sprays into the  "nostril(s) as directed by provider Daily. 16 g 11    gabapentin (NEURONTIN) 100 MG capsule TAKE TWO CAPSULES BY MOUTH EVERY MORNING, ONE CAPSULE IN THE MIDDLE OF THE DAY AND 2 CAPSULES AT BEDTIME 150 capsule 0    hydrALAZINE (APRESOLINE) 25 MG tablet Take 1 tablet by mouth 2 (Two) Times a Day. Indications: High Blood Pressure Disorder 180 tablet 3    pioglitazone (ACTOS) 30 MG tablet Take 1 tablet by mouth Daily. Indications: Type 2 Diabetes 90 tablet 1    potassium chloride 10 MEQ CR tablet Take 2 tablets by mouth 2 (Two) Times a Day for 360 days. Indications: Low Amount of Potassium in the Blood 360 tablet 3    Probiotic Product (PROBIOTIC PO) Take 1 tablet by mouth Daily.      Spiriva Respimat 2.5 MCG/ACT aerosol solution inhaler Inhale 2 puffs Daily. 3 each 3    tamsulosin (FLOMAX) 0.4 MG capsule 24 hr capsule Take 1 capsule by mouth Every Night. 90 capsule 1    valsartan-hydrochlorothiazide (DIOVAN-HCT) 320-25 MG per tablet Take 1 tablet by mouth Every Morning. 90 tablet 1     No current facility-administered medications for this visit.         The following portions of the patient's history were reviewed and updated as appropriate: allergies, current medications, past family history, past medical history, past social history, past surgical history and problem list.    Review of Systems:   Pertinent positives/negative listed in HPI above    Physical Exam:   Vitals:    03/28/24 1107   Temp: 97.1 °F (36.2 °C)   TempSrc: Temporal   Weight: 123 kg (270 lb 9.6 oz)   Height: 172.7 cm (68\")   PainSc:   4   PainLoc: Knee       General: A and O x 3, ASA, NAD    Knee Exam List: Knee:  left                          ALIGNMENT:     Valgus                            GAIT:    Antalgic                          SKIN:    No abnormality                          RANGE OF MOTION:   5  -  95   DEG                          STRENGTH:   4  / 5                          LIGAMENTS:    No varus / valgus instability.   Negative  Lachman.    "                       MENISCUS: Positive medial Fawad , positive Apley's                        DISTAL PULSES:    Paplable                          DISTAL SENSATION :   Intact                          LYMPHATICS:     No   lymphadenopathy                          OTHER:          - Positive   effusion                                                  - Crepitance with ROM            - Palpable Bakers cyst                     Radiology:  Xrays 3views left knee (ap,lateral, sunrise) taken previously were reviewed for evaluation of knee pain demonstrating moderate joint space narrowing the medial and lateral compartments.  Patient does have periarticular osteophytes present in all 3 compartments.  Lateral compartment is noted with significant chondrocalcinosis present.  No new knee x-rays were taken today for comparison purposes.    Assessment/Plan: Primary osteoarthritis left knee    Knee Plan List: Continuation of conservative management vs. TKA discussed.  The patient wishes to proceed with total knee replacement.  At this point the patient has failed the full compliment of conservative treatment and stating complete understanding of the risks/benefits/ anternatives wishes to proceed with surgical treatment.    Risk and benefits of surgery were reviewed.  Including, but not limited to, blood clots or pulmonary embolism, anesthesia risk, infection, fracture, skin/leg numbness, persistent pain/crepitance/popping/catching, failure of the implant, need for future surgeries, hematoma, possible nerve or blood vessel injury, need for transfusion, and potential risk of stroke,heart attack or death, among others.  The patient understands and wishes to proceed.     It was explained that if tissue has been repaired or reconstructed, there is also an increased chance of failure which may require further management.  Following the completion of the discussion, the patient expressed understanding of this planned course of care,  all their questions were answered and consent will be obtained preoperatively.    Operative Plan: [facility] Total Knee Replacement an overnight stay with home health rehab.  Patient does have a cardiac history of A-fib taking Eliquis with a pacemaker placement.  He also has a previous pulmonary issue which has had a left lower lobectomy in which she sees Dr. Ortiz.  We will need both cardiac and pulmonary clearance before proceeding forward with surgery.      Nima Farmer, APRN  3/28/2024

## 2024-03-28 NOTE — LETTER
March 28, 2024     Steve Rice MD  [address]  [address]  [facility] [address]    Patient: Alexy Ram   YOB: 1955   Date of Visit: 3/28/2024     Dear Steve Rice MD:        Alexy Ram is a mutual patient of ours and is presented to our office wanting to proceed forward with a left total knee replacement.  Patient has a cardiac history of A-fib with a pacemaker placed.  I see that you are currently on Eliquis.  Dr. Staton needs cardiac clearance and permission for them to be off Eliquis for 72 hours prior to surgery.  Below are the relevant portions of my assessment and plan of care.    If you have questions, please do not hesitate to call me. I look forward to following Alexy along with you.         Sincerely,        ROME Ignacio        CC: No Recipients    Nima Farmer APRN  03/28/24 1305  Sign when Signing Visit  Patient: Alexy Ram  YOB: 1955 68 y.o. male  Medical Record Number: [id number]    Chief Complaints:   Chief Complaint   Patient presents with   • Left Knee - Follow-up, Pain       History of Present Illness:Alexy Ram is a 68 y.o. male who presents for follow-up of left knee pain that is chronic in nature.  Patient has known advanced left knee osteoarthritis that we have been conservatively treating.  Patient reports that physical therapy actually has made his knee worse and it is now stiffer and has more pain.  He reports the cortisone injection lasted just for a few days and then wore off.  Patient reports that his pain level now is an 8 or 9 out of 10 and describes it as a constant ache.  States certain physical activities make it worse.  Patient reports he gets minimal relief taking Tylenol, and he cannot take NSAIDs due to being on Eliquis for A-fib.  Patient reports that this is greatly affecting his quality of life and he is unable to do certain activities of daily living.  Patient is ready to proceed forward with scheduling  surgery.      Allergies: No Known Allergies    Medications:   Current Outpatient Medications   Medication Sig Dispense Refill   • amLODIPine (NORVASC) 10 MG tablet Take 1 tablet by mouth Daily. Indications: High Blood Pressure Disorder 90 tablet 3   • atorvastatin (LIPITOR) 40 MG tablet Take 1 tablet by mouth every night at bedtime. 90 tablet 3   • carvedilol (COREG) 25 MG tablet Take 2 tablets by mouth 2 (Two) Times a Day With Meals. Indications: High Blood Pressure Disorder 360 tablet 3   • cetirizine (zyrTEC) 10 MG tablet Take 1 tablet by mouth Daily. 90 tablet 3   • Eliquis 5 MG tablet tablet Take 1 tablet by mouth 2 (Two) Times a Day. (Patient taking differently: Take 1 tablet by mouth 2 (Two) Times a Day. HOLD X 48 HRS FOR ENDO  Indications: Prevention of Unwanted Clot in Veins) 60 tablet 0   • fluticasone (FLONASE) 50 MCG/ACT nasal spray 2 sprays into the nostril(s) as directed by provider Daily. 16 g 11   • gabapentin (NEURONTIN) 100 MG capsule TAKE TWO CAPSULES BY MOUTH EVERY MORNING, ONE CAPSULE IN THE MIDDLE OF THE DAY AND 2 CAPSULES AT BEDTIME 150 capsule 0   • hydrALAZINE (APRESOLINE) 25 MG tablet Take 1 tablet by mouth 2 (Two) Times a Day. Indications: High Blood Pressure Disorder 180 tablet 3   • pioglitazone (ACTOS) 30 MG tablet Take 1 tablet by mouth Daily. Indications: Type 2 Diabetes 90 tablet 1   • potassium chloride 10 MEQ CR tablet Take 2 tablets by mouth 2 (Two) Times a Day for 360 days. Indications: Low Amount of Potassium in the Blood 360 tablet 3   • Probiotic Product (PROBIOTIC PO) Take 1 tablet by mouth Daily.     • Spiriva Respimat 2.5 MCG/ACT aerosol solution inhaler Inhale 2 puffs Daily. 3 each 3   • tamsulosin (FLOMAX) 0.4 MG capsule 24 hr capsule Take 1 capsule by mouth Every Night. 90 capsule 1   • valsartan-hydrochlorothiazide (DIOVAN-HCT) 320-25 MG per tablet Take 1 tablet by mouth Every Morning. 90 tablet 1     No current facility-administered medications for this visit.  "        The following portions of the patient's history were reviewed and updated as appropriate: allergies, current medications, past family history, past medical history, past social history, past surgical history and problem list.    Review of Systems:   Pertinent positives/negative listed in HPI above    Physical Exam:   Vitals:    03/28/24 1107   Temp: 97.1 °F (36.2 °C)   TempSrc: Temporal   Weight: 123 kg (270 lb 9.6 oz)   Height: 172.7 cm (68\")   PainSc:   4   PainLoc: Knee       General: A and O x 3, ASA, NAD    Knee Exam List: Knee:  left                          ALIGNMENT:     Valgus                            GAIT:    Antalgic                          SKIN:    No abnormality                          RANGE OF MOTION:   5  -  95   DEG                          STRENGTH:   4  / 5                          LIGAMENTS:    No varus / valgus instability.   Negative  Lachman.                          MENISCUS: Positive medial Fawad , positive Apley's                        DISTAL PULSES:    Paplable                          DISTAL SENSATION :   Intact                          LYMPHATICS:     No   lymphadenopathy                          OTHER:          - Positive   effusion                                                  - Crepitance with ROM            - Palpable Bakers cyst                     Radiology:  Xrays 3views left knee (ap,lateral, sunrise) taken previously were reviewed for evaluation of knee pain demonstrating moderate joint space narrowing the medial and lateral compartments.  Patient does have periarticular osteophytes present in all 3 compartments.  Lateral compartment is noted with significant chondrocalcinosis present.  No new knee x-rays were taken today for comparison purposes.    Assessment/Plan: Primary osteoarthritis left knee    Knee Plan List: Continuation of conservative management vs. TKA discussed.  The patient wishes to proceed with total knee replacement.  At this point the patient " has failed the full compliment of conservative treatment and stating complete understanding of the risks/benefits/ anternatives wishes to proceed with surgical treatment.    Risk and benefits of surgery were reviewed.  Including, but not limited to, blood clots or pulmonary embolism, anesthesia risk, infection, fracture, skin/leg numbness, persistent pain/crepitance/popping/catching, failure of the implant, need for future surgeries, hematoma, possible nerve or blood vessel injury, need for transfusion, and potential risk of stroke,heart attack or death, among others.  The patient understands and wishes to proceed.     It was explained that if tissue has been repaired or reconstructed, there is also an increased chance of failure which may require further management.  Following the completion of the discussion, the patient expressed understanding of this planned course of care, all their questions were answered and consent will be obtained preoperatively.    Operative Plan: [facility] Total Knee Replacement an overnight stay with home health rehab.  Patient does have a cardiac history of A-fib taking Eliquis with a pacemaker placement.  He also has a previous pulmonary issue which has had a left lower lobectomy in which she sees Dr. Ortiz.  We will need both cardiac and pulmonary clearance before proceeding forward with surgery.      Nima Farmer, APRN  3/28/2024

## 2024-03-29 ENCOUNTER — PATIENT ROUNDING (BHMG ONLY) (OUTPATIENT)
Dept: FAMILY MEDICINE CLINIC | Facility: CLINIC | Age: 69
End: 2024-03-29
Payer: MEDICARE

## 2024-03-29 PROBLEM — M17.12 PRIMARY OSTEOARTHRITIS OF LEFT KNEE: Status: ACTIVE | Noted: 2024-03-28

## 2024-04-01 ENCOUNTER — TELEPHONE (OUTPATIENT)
Dept: SURGERY | Facility: CLINIC | Age: 69
End: 2024-04-01
Payer: MEDICARE

## 2024-04-01 NOTE — TELEPHONE ENCOUNTER
Spoke with pt updating on new appt time, np etc. Reminder has been sent in the mail. Pt stated understanding and acceptable.

## 2024-04-03 ENCOUNTER — HOSPITAL ENCOUNTER (OUTPATIENT)
Dept: CT IMAGING | Facility: HOSPITAL | Age: 69
Discharge: HOME | End: 2024-04-03
Admitting: SURGERY
Payer: MEDICARE

## 2024-04-03 DIAGNOSIS — C34.92 SQUAMOUS CELL CARCINOMA OF LEFT LUNG: ICD-10-CM

## 2024-04-03 PROCEDURE — 71250 CT THORAX DX C-: CPT

## 2024-04-04 ENCOUNTER — TELEPHONE (OUTPATIENT)
Dept: ORTHOPEDIC SURGERY | Facility: CLINIC | Age: 69
End: 2024-04-04
Payer: MEDICARE

## 2024-04-04 NOTE — TELEPHONE ENCOUNTER
Patient's wife, Darlin, called to ask if a cardiac clearance was necessary. I called her back to let her know we had already faxed that request and received a response from patient's cardiologist.

## 2024-04-15 DIAGNOSIS — G89.29 HIP PAIN, CHRONIC, LEFT: ICD-10-CM

## 2024-04-15 DIAGNOSIS — M25.552 HIP PAIN, CHRONIC, LEFT: ICD-10-CM

## 2024-04-15 RX ORDER — GABAPENTIN 100 MG/1
CAPSULE ORAL
Qty: 150 CAPSULE | Refills: 0 | Status: CANCELLED | OUTPATIENT
Start: 2024-04-15

## 2024-04-22 NOTE — TELEPHONE ENCOUNTER
Caller: Alexy Ram    Relationship: Self    Best call back number: [phone]    Requested Prescriptions:   Requested Prescriptions     Pending Prescriptions Disp Refills    gabapentin (NEURONTIN) 100 MG capsule [Pharmacy Med Name: GABAPENTIN 100MG] 150 capsule 0     Sig: TAKE TWO CAPSULES BY MOUTH EVERY MORNING, ONE CAPSULE IN THE MIDDLE OF THE DAY AND 2 CAPSULES AT BEDTIME        Pharm[place] where [place] be sent[place] [facility] [place] [address] - [phone] [facility] [phone] FX     Last office visit with prescribing clinician: Visit date not found   Last telemedicine visit with prescribing clinician: Visit date not found   Next office visit with prescribing clinician: Visit date not found     Additional details provided by patient: STATED THAT THEY ARE OUT OF THE MEDICATION    Does the patient have less than a 3 day supply:  [x] Yes  [] No    Would you like a call back once the refill request has been completed: [x] Yes [] No    If the office needs to give you a call back, can they leave a voicemail: [x] Yes [] No    Laurence Issa Rep   04/22/24 11:36 EDT

## 2024-04-23 ENCOUNTER — TELEPHONE (OUTPATIENT)
Dept: FAMILY MEDICINE CLINIC | Facility: CLINIC | Age: 69
End: 2024-04-23
Payer: MEDICARE

## 2024-04-23 DIAGNOSIS — M25.552 HIP PAIN, CHRONIC, LEFT: ICD-10-CM

## 2024-04-23 DIAGNOSIS — G89.29 HIP PAIN, CHRONIC, LEFT: ICD-10-CM

## 2024-04-23 RX ORDER — GABAPENTIN 100 MG/1
CAPSULE ORAL
Qty: 150 CAPSULE | Refills: 0 | OUTPATIENT
Start: 2024-04-23

## 2024-04-23 RX ORDER — GABAPENTIN 100 MG/1
CAPSULE ORAL
Qty: 150 CAPSULE | Refills: 5 | Status: SHIPPED | OUTPATIENT
Start: 2024-04-23

## 2024-05-02 ENCOUNTER — OFFICE VISIT (OUTPATIENT)
Dept: SURGERY | Facility: CLINIC | Age: 69
End: 2024-05-02
Payer: MEDICARE

## 2024-05-02 VITALS
DIASTOLIC BLOOD PRESSURE: 88 MMHG | OXYGEN SATURATION: 97 % | HEIGHT: 68 IN | HEART RATE: 65 BPM | WEIGHT: 265.2 LBS | BODY MASS INDEX: 40.19 KG/M2 | SYSTOLIC BLOOD PRESSURE: 146 MMHG

## 2024-05-02 DIAGNOSIS — Z85.118 HISTORY OF LUNG CANCER: Primary | ICD-10-CM

## 2024-05-02 DIAGNOSIS — Z79.2 NEED FOR ANTIBIOTIC PROPHYLAXIS FOR DENTAL PROCEDURE: ICD-10-CM

## 2024-05-02 DIAGNOSIS — Z87.891 FORMER SMOKER: ICD-10-CM

## 2024-05-02 DIAGNOSIS — Z96.652 S/P TKR (TOTAL KNEE REPLACEMENT), LEFT: Primary | ICD-10-CM

## 2024-05-02 RX ORDER — CEPHALEXIN 500 MG/1
CAPSULE ORAL
Qty: 4 CAPSULE | Refills: 0 | Status: SHIPPED | OUTPATIENT
Start: 2024-05-02

## 2024-05-02 NOTE — PROGRESS NOTES
"Chief Complaint  Follow-up, NSCLC s/p resection        Subjective        Alexy Ram presents to [facility] THORACIC SURGERY     History of Present Illness     Mr. Ram is a pleasant 69 year-old gentleman who is status post left lower lobectomy (VATS converted to thoracotomy) by Dr. Ring in November 2018 for a stage I squamous cell carcinoma.  He continues to undergo surveillance and was found to have increased opacity in the anterior left upper lobe.  He underwent Ion bronchoscopy by Dr. Chavira in September 2023 and there was no evidence of malignancy.  He is also followed by Dr. Ortiz for history of COPD.  He has no new pulmonary symptoms.  He presents today in baseline state of health with CT chest.      Objective   Vital Signs:  /88 (BP Location: Left arm, Patient Position: Sitting, Cuff Size: Adult)   Pulse 65   Ht 172.7 cm (67.99\")   Wt 120 kg (265 lb 3.2 oz)   SpO2 97%   BMI 40.33 kg/m²   Estimated body mass index is 40.33 kg/m² as calculated from the following:    Height as of this encounter: 172.7 cm (67.99\").    Weight as of this encounter: 120 kg (265 lb 3.2 oz).          Physical Exam  Vitals and nursing note reviewed.   Constitutional:       Appearance: Normal appearance. He is obese. He is not ill-appearing.   HENT:      Head: Normocephalic and atraumatic.      Nose: Nose normal.   Cardiovascular:      Rate and Rhythm: Normal rate and regular rhythm.   Pulmonary:      Effort: Pulmonary effort is normal.      Breath sounds: Normal breath sounds. No wheezing, rhonchi or rales.   Musculoskeletal:         General: Normal range of motion.      Cervical back: Normal range of motion and neck supple.      Comments: Using cane to assist with ambulation   Skin:     General: Skin is warm and dry.   Neurological:      General: No focal deficit present.      Mental Status: He is alert. Mental status is at baseline.      Motor: Weakness present.   Psychiatric:         Mood and " Affect: Mood normal.         Thought Content: Thought content normal.        Result Review :    CT chest 4/3/2024: Postsurgical changes from left lower lobectomy.  The soft tissue density in the left upper lobe has improved since 2023.  Small mediastinal sternal lymph nodes are unchanged.  No pleural or pericardial effusion.  No acute intrathoracic process.  Imaging reviewed independently.         Assessment and Plan   Diagnoses and all orders for this visit:    1. History of lung cancer (Primary)    2. Former smoker      Mr. Ram is status post left lower lobectomy for a stage I non-small cell lung cancer in 2018.  He was found to have a left upper lobe soft tissue density that was biopsied by Dr. Chavira in September 2023 and was without evidence of malignancy.  This lesion has continued to decrease over time.  Patient request to defer follow-up to Dr. Costa of pulmonary medicine.  Recommend annual CT chest for lung cancer surveillance given his history.  We would be happy to see him in the future as needed.         I spent 34 minutes caring for Alexy on this date of service. This time includes time spent by me in the following activities:preparing for the visit, reviewing tests, obtaining and/or reviewing a separately obtained history, performing a medically appropriate examination and/or evaluation , counseling and educating the patient/family/caregiver, ordering medications, tests, or procedures, referring and communicating with other health care professionals , documenting information in the medical record, independently interpreting results and communicating that information with the patient/family/caregiver and care coordination  Follow Up   Return if symptoms worsen or fail to improve.  Patient was given instructions and counseling regarding his condition or for health maintenance advice. Please see specific information pulled into the AVS if appropriate.

## 2024-05-22 ENCOUNTER — OFFICE VISIT (OUTPATIENT)
Dept: FAMILY MEDICINE CLINIC | Facility: CLINIC | Age: 69
End: 2024-05-22
Payer: MEDICARE

## 2024-05-22 VITALS
WEIGHT: 264 LBS | BODY MASS INDEX: 40.01 KG/M2 | DIASTOLIC BLOOD PRESSURE: 74 MMHG | TEMPERATURE: 98.2 F | HEART RATE: 61 BPM | HEIGHT: 68 IN | OXYGEN SATURATION: 98 % | SYSTOLIC BLOOD PRESSURE: 118 MMHG

## 2024-05-22 DIAGNOSIS — J43.1 PANLOBULAR EMPHYSEMA: Primary | ICD-10-CM

## 2024-05-22 DIAGNOSIS — N40.1 BENIGN PROSTATIC HYPERPLASIA (BPH) WITH STRAINING ON URINATION: ICD-10-CM

## 2024-05-22 DIAGNOSIS — R39.16 BENIGN PROSTATIC HYPERPLASIA (BPH) WITH STRAINING ON URINATION: ICD-10-CM

## 2024-05-22 DIAGNOSIS — E11.65 TYPE 2 DIABETES MELLITUS WITH HYPERGLYCEMIA, WITHOUT LONG-TERM CURRENT USE OF INSULIN: ICD-10-CM

## 2024-05-22 PROBLEM — R35.0 BENIGN PROSTATIC HYPERPLASIA WITH URINARY FREQUENCY: Status: ACTIVE | Noted: 2024-05-22

## 2024-05-22 PROCEDURE — 3074F SYST BP LT 130 MM HG: CPT | Performed by: FAMILY MEDICINE

## 2024-05-22 PROCEDURE — 99214 OFFICE O/P EST MOD 30 MIN: CPT | Performed by: FAMILY MEDICINE

## 2024-05-22 PROCEDURE — 1160F RVW MEDS BY RX/DR IN RCRD: CPT | Performed by: FAMILY MEDICINE

## 2024-05-22 PROCEDURE — 1159F MED LIST DOCD IN RCRD: CPT | Performed by: FAMILY MEDICINE

## 2024-05-22 PROCEDURE — 3078F DIAST BP <80 MM HG: CPT | Performed by: FAMILY MEDICINE

## 2024-05-22 PROCEDURE — 3044F HG A1C LEVEL LT 7.0%: CPT | Performed by: FAMILY MEDICINE

## 2024-05-22 PROCEDURE — 1125F AMNT PAIN NOTED PAIN PRSNT: CPT | Performed by: FAMILY MEDICINE

## 2024-05-22 RX ORDER — TAMSULOSIN HYDROCHLORIDE 0.4 MG/1
1 CAPSULE ORAL NIGHTLY
Qty: 90 CAPSULE | Refills: 3 | Status: SHIPPED | OUTPATIENT
Start: 2024-05-22

## 2024-05-22 RX ORDER — TIOTROPIUM BROMIDE INHALATION SPRAY 3.12 UG/1
1 SPRAY, METERED RESPIRATORY (INHALATION)
Qty: 1 EACH | Refills: 11 | Status: SHIPPED | OUTPATIENT
Start: 2024-05-22

## 2024-06-11 ENCOUNTER — OFFICE VISIT (OUTPATIENT)
Dept: ORTHOPEDIC SURGERY | Facility: CLINIC | Age: 69
End: 2024-06-11
Payer: MEDICARE

## 2024-06-11 VITALS
SYSTOLIC BLOOD PRESSURE: 128 MMHG | DIASTOLIC BLOOD PRESSURE: 78 MMHG | TEMPERATURE: 97.3 F | HEIGHT: 67 IN | WEIGHT: 268.2 LBS | BODY MASS INDEX: 42.09 KG/M2

## 2024-06-11 DIAGNOSIS — R52 PAIN: Primary | ICD-10-CM

## 2024-06-11 PROCEDURE — 3074F SYST BP LT 130 MM HG: CPT | Performed by: NURSE PRACTITIONER

## 2024-06-11 PROCEDURE — 1159F MED LIST DOCD IN RCRD: CPT | Performed by: NURSE PRACTITIONER

## 2024-06-11 PROCEDURE — 3078F DIAST BP <80 MM HG: CPT | Performed by: NURSE PRACTITIONER

## 2024-06-11 PROCEDURE — 77077 JOINT SURVEY SINGLE VIEW: CPT | Performed by: ORTHOPAEDIC SURGERY

## 2024-06-11 PROCEDURE — 1160F RVW MEDS BY RX/DR IN RCRD: CPT | Performed by: NURSE PRACTITIONER

## 2024-06-11 PROCEDURE — S0260 H&P FOR SURGERY: HCPCS | Performed by: NURSE PRACTITIONER

## 2024-06-11 NOTE — H&P (VIEW-ONLY)
Patient: Alexy Ram    Date of Admission: 6/13/2024    YOB: 1955    Medical Record Number: [id number]    Admitting Physician: Dr. Nikko Staton    Reason for Admission: End Stage Left Knee OA    History of Present Illness: 69 y.o. male presents with severe end stage knee osteoarthritis which has not been responsive to the full compliment of conservative measures. Despite conservative attempts, there is still severe, constant activity-limiting pain. Given the severity of the pain, the patient has elected to proceed with knee replacement.    Allergies: No Known Allergies      Current Medications:  Home Medications:    Current Outpatient Medications on File Prior to Visit   Medication Sig    acetaminophen (TYLENOL) 500 MG tablet Take 1 tablet by mouth Every 6 (Six) Hours As Needed for Mild Pain.    albuterol sulfate  (90 Base) MCG/ACT inhaler Inhale 2 puffs Every 4 (Four) Hours As Needed for Wheezing.    amLODIPine (NORVASC) 10 MG tablet Take 1 tablet by mouth Daily. Indications: High Blood Pressure Disorder    atorvastatin (LIPITOR) 40 MG tablet Take 1 tablet by mouth every night at bedtime.    B Complex Vitamins (VITAMIN B COMPLEX PO) Take 1 tablet by mouth Daily.    carvedilol (COREG) 25 MG tablet Take 2 tablets by mouth 2 (Two) Times a Day With Meals. Indications: High Blood Pressure Disorder    cetirizine (zyrTEC) 10 MG tablet Take 1 tablet by mouth Daily. (Patient taking differently: Take 1 tablet by mouth Daily As Needed.)    Eliquis 5 MG tablet tablet Take 1 tablet by mouth 2 (Two) Times a Day. (Patient taking differently: Take 1 tablet by mouth 2 (Two) Times a Day. HOLD 72 HOURS PER CARDIO INSTRUCTIONS   Indications: Prevention of Unwanted Clot in Veins)    gabapentin (NEURONTIN) 100 MG capsule TAKE TWO CAPSULES BY MOUTH EVERY MORNING, ONE CAPSULE IN THE MIDDLE OF THE DAY AND 2 CAPSULES AT BEDTIME  Indications: Chronic pain (Patient taking differently: Take 2 capsules by mouth 2 (Two)  Times a Day. MAY TAKE ONE ADDITIONAL TABLET MIDDAY IF NEEDED   Indications: Chronic pain)    hydrALAZINE (APRESOLINE) 25 MG tablet Take 1 tablet by mouth 2 (Two) Times a Day. Indications: High Blood Pressure Disorder    Misc Natural Products (PROSTATE HEALTH PO) Take 1 tablet by mouth Daily. HELD FOR OR    Multiple Vitamins-Minerals (DAILY MULTIVITAMIN PO) Take 1 tablet by mouth Daily. HELD FOR OR    pioglitazone (ACTOS) 30 MG tablet Take 1 tablet by mouth Daily. Indications: Type 2 Diabetes (Patient taking differently: Take 1 tablet by mouth Every Evening. Indications: Type 2 Diabetes)    potassium chloride 10 MEQ CR tablet Take 2 tablets by mouth 2 (Two) Times a Day for 360 days. Indications: Low Amount of Potassium in the Blood (Patient taking differently: Take 2 tablets by mouth 2 (Two) Times a Day. MAY TAKE ADDITIONAL TABLET MIDDAY IF HAVING LEG CRAMPS   Indications: Low Amount of Potassium in the Blood)    Probiotic Product (PROBIOTIC DAILY PO) Take 1 tablet by mouth Daily.    Spiriva Respimat 2.5 MCG/ACT aerosol solution inhaler Inhale 1 puff every night at bedtime. Indications: Chronic Obstructive Lung Disease (Patient taking differently: Inhale 2 puffs As Needed. Indications: Chronic Obstructive Lung Disease)    tamsulosin (FLOMAX) 0.4 MG capsule 24 hr capsule Take 1 capsule by mouth Every Night. Indications: Benign Enlargement of Prostate    valsartan-hydrochlorothiazide (DIOVAN-HCT) 320-25 MG per tablet Take 1 tablet by mouth Every Morning.    cephalexin (KEFLEX) 500 MG capsule Take four capsules one hour before dental procedure (Patient not taking: Reported on 6/11/2024)     No current facility-administered medications on file prior to visit.     PRN Meds:.    PMH:     Past Medical History:   Diagnosis Date    Acromioclavicular separation     shoulder pulled out of place    Ankle sprain     Arthritis     OSTEOARTHRITIS    Arthritis of back     so long I don't know when it started    Arthritis of neck      COPD (chronic obstructive pulmonary disease)     Diabetes mellitus     Dislocation, shoulder     Enlarged prostate     Fatty liver     Frozen shoulder     Hip arthrosis     History of low potassium     History of lung cancer 2018    HX LEFT LOWER LOBECTOMY    History of MRSA infection 2018    History of transfusion     Hyperlipidemia     Hypertension     Knee swelling     Left upper lobe consolidation     REPEAT CT SCANS - FOLLOWED BY PULMONARY, MOST RECENT CT SCAN 4/3/24    Low back strain     Lumbosacral disc disease     Neck strain     Neuropathy     On anticoagulant therapy     Pacemaker     PAF (paroxysmal atrial fibrillation)     Periarthritis of shoulder     Second degree AV block     Sinus node dysfunction     Sleep apnea     WEARS CPAP    Slow to wake up after anesthesia     Thoracic disc disorder        PF/Surg/Soc Hx:     Past Surgical History:   Procedure Laterality Date    BRONCHOSCOPY N/A 10/11/2018    Procedure: BRONCHOSCOPY WITH FLUORO, LEFT LOWER LOBE BRUSHINGS WET AND DRY. WITH BX'S AND BAL (IN LLL).;  Surgeon: Akil Ortiz MD;  [facility];  Service: Pulmonary    BRONCHOSCOPY WITH ION ROBOTIC ASSIST N/A 09/07/2023    Procedure: BRONCHOSCOPY WITH ION ROBOT AND ENDOBRONCHIAL ULTRASOUND with brushing and FNA;  Surgeon: Taye Chavira MD;  [facility];  Service: Robotics - Pulmonary;  Laterality: N/A;  PRE/POST - left upper lobe mass    CATARACT EXTRACTION Bilateral 04/01/2024    COLONOSCOPY  2021    COLONOSCOPY W/ POLYPECTOMY N/A 10/22/2021    Procedure: COLONOSCOPY WITH POLYPECTOMY;  Surgeon: Lan Solis MD;  Location: [facility] OR;  Service: Gastroenterology;  Laterality: N/A;  cecal polyp x1 (cold snare)  ascending polyp x1 (hot snare)  transverse polyp x3 (hot snare x 1), (cold snare x2)  sigmoid polyp x1 (hot snare, clip applied)  rectal polyp x3 (cold snare)    INSERT / REPLACE / REMOVE PACEMAKER  6/5/2005    replaced Oct 2014    JOINT  "REPLACEMENT  Hip    Hip    KNEE ARTHROSCOPY Left     PACEMAKER IMPLANTATION  ,     THORACOSCOPY Left 2018    Procedure: BRONCHOSCOPY, DAVINCI ROBOT ASSISTED VIDEIO ASSISTED THORACOSCOPY  WITH CONVERT TO OPEN THORACOTOMY,LEFT LOWER LOBECTOMY,INTERCOSTAL NERVE BLOCK;  Surgeon: Michael Ring III, MD;  Location: [facility] OR;  Service: DaVinci    TOTAL HIP ARTHROPLASTY Left 2023    Procedure: TOTAL HIP ARTHROPLASTY;  Surgeon: Nikko Staton MD;  [place] [facility] [place] [facility]  Service: Orthopedics;  Laterality: Left;        Social History     Occupational History    Not on file   Tobacco Use    Smoking status: Former     Current packs/day: 0.00     Average packs/day: 1 pack/day for 33.0 years (33.0 ttl pk-yrs)     Types: Cigarettes     Start date: 1985     Quit date: 2018     Years since quittin.4     Passive exposure: Past    Smokeless tobacco: Never   Vaping Use    Vaping status: Never Used   Substance and Sexual Activity    Alcohol use: Not Currently    Drug use: No    Sexual activity: Not Currently     Partners: Female      Social History     Social History Narrative    Not on file        Family History   Problem Relation Age of Onset    Diabetes Mother     Stroke Father     Heart disease Father     Stroke Sister     Cancer Sister     Stroke Sister     Diabetes Sister     Heart disease Brother     Diabetes Brother     Diabetes Brother     Malig Hyperthermia Neg Hx          Review of Systems:   A 14 point review of systems was performed, pertinent positives discussed above, all other systems are negative    Physical Exam: 69 y.o. male  Vital Signs :   Vitals:    24 1620   BP: 128/78   BP Location: Right arm   Patient Position: Sitting   Cuff Size: Large Adult   Temp: 97.3 °F (36.3 °C)   TempSrc: Temporal   Weight: 122 kg (268 lb 3.2 oz)   Height: 170.2 cm (67\")   PainSc:   7   PainLoc: Knee     General: Alert and Oriented x 3, No acute distress.  Psych: mood and affect " appropriate; recent and remote memory intact  Eyes: conjunctivae clear; pupils equally round and reactive, sclerae anicteric  CV: no peripheral edema  Resp: normal respiratory effort  Skin: no rashes or wounds; normal turgor  Musculosketetal; pain and crepitance with knee range of motion  Vascular: palpable distal pulses    Xrays:  -3 views (AP, lateral, and sunrise) were reviewed demonstrating end-stage OA with bone on bone articulation.  -A full length AP xray was ordered and reviewed today for purposes of operative alignment demonstrating end stage arthritic findings. There are no previous full length films for review    Assessment:  End-stage Left knee osteoarthritis. Conservative measures have failed.      Plan:  The plan is to proceed with Left Total Knee Replacement. The patient voiced understanding of the risks, benefits, and alternative forms of treatment that were discussed with Dr Staton at the time of scheduling.  Same day home health, will resume Eliquis postoperatively    Dari Nelson, APRN  6/11/2024

## 2024-06-11 NOTE — H&P
Patient: Alexy Ram    Date of Admission: 6/13/2024    YOB: 1955    Medical Record Number: [id number]    Admitting Physician: Dr. Nikko Staton    Reason for Admission: End Stage Left Knee OA    History of Present Illness: 69 y.o. male presents with severe end stage knee osteoarthritis which has not been responsive to the full compliment of conservative measures. Despite conservative attempts, there is still severe, constant activity-limiting pain. Given the severity of the pain, the patient has elected to proceed with knee replacement.    Allergies: No Known Allergies      Current Medications:  Home Medications:    Current Outpatient Medications on File Prior to Visit   Medication Sig    acetaminophen (TYLENOL) 500 MG tablet Take 1 tablet by mouth Every 6 (Six) Hours As Needed for Mild Pain.    albuterol sulfate  (90 Base) MCG/ACT inhaler Inhale 2 puffs Every 4 (Four) Hours As Needed for Wheezing.    amLODIPine (NORVASC) 10 MG tablet Take 1 tablet by mouth Daily. Indications: High Blood Pressure Disorder    atorvastatin (LIPITOR) 40 MG tablet Take 1 tablet by mouth every night at bedtime.    B Complex Vitamins (VITAMIN B COMPLEX PO) Take 1 tablet by mouth Daily.    carvedilol (COREG) 25 MG tablet Take 2 tablets by mouth 2 (Two) Times a Day With Meals. Indications: High Blood Pressure Disorder    cetirizine (zyrTEC) 10 MG tablet Take 1 tablet by mouth Daily. (Patient taking differently: Take 1 tablet by mouth Daily As Needed.)    Eliquis 5 MG tablet tablet Take 1 tablet by mouth 2 (Two) Times a Day. (Patient taking differently: Take 1 tablet by mouth 2 (Two) Times a Day. HOLD 72 HOURS PER CARDIO INSTRUCTIONS   Indications: Prevention of Unwanted Clot in Veins)    gabapentin (NEURONTIN) 100 MG capsule TAKE TWO CAPSULES BY MOUTH EVERY MORNING, ONE CAPSULE IN THE MIDDLE OF THE DAY AND 2 CAPSULES AT BEDTIME  Indications: Chronic pain (Patient taking differently: Take 2 capsules by mouth 2 (Two)  Times a Day. MAY TAKE ONE ADDITIONAL TABLET MIDDAY IF NEEDED   Indications: Chronic pain)    hydrALAZINE (APRESOLINE) 25 MG tablet Take 1 tablet by mouth 2 (Two) Times a Day. Indications: High Blood Pressure Disorder    Misc Natural Products (PROSTATE HEALTH PO) Take 1 tablet by mouth Daily. HELD FOR OR    Multiple Vitamins-Minerals (DAILY MULTIVITAMIN PO) Take 1 tablet by mouth Daily. HELD FOR OR    pioglitazone (ACTOS) 30 MG tablet Take 1 tablet by mouth Daily. Indications: Type 2 Diabetes (Patient taking differently: Take 1 tablet by mouth Every Evening. Indications: Type 2 Diabetes)    potassium chloride 10 MEQ CR tablet Take 2 tablets by mouth 2 (Two) Times a Day for 360 days. Indications: Low Amount of Potassium in the Blood (Patient taking differently: Take 2 tablets by mouth 2 (Two) Times a Day. MAY TAKE ADDITIONAL TABLET MIDDAY IF HAVING LEG CRAMPS   Indications: Low Amount of Potassium in the Blood)    Probiotic Product (PROBIOTIC DAILY PO) Take 1 tablet by mouth Daily.    Spiriva Respimat 2.5 MCG/ACT aerosol solution inhaler Inhale 1 puff every night at bedtime. Indications: Chronic Obstructive Lung Disease (Patient taking differently: Inhale 2 puffs As Needed. Indications: Chronic Obstructive Lung Disease)    tamsulosin (FLOMAX) 0.4 MG capsule 24 hr capsule Take 1 capsule by mouth Every Night. Indications: Benign Enlargement of Prostate    valsartan-hydrochlorothiazide (DIOVAN-HCT) 320-25 MG per tablet Take 1 tablet by mouth Every Morning.    cephalexin (KEFLEX) 500 MG capsule Take four capsules one hour before dental procedure (Patient not taking: Reported on 6/11/2024)     No current facility-administered medications on file prior to visit.     PRN Meds:.    PMH:     Past Medical History:   Diagnosis Date    Acromioclavicular separation     shoulder pulled out of place    Ankle sprain     Arthritis     OSTEOARTHRITIS    Arthritis of back     so long I don't know when it started    Arthritis of neck      COPD (chronic obstructive pulmonary disease)     Diabetes mellitus     Dislocation, shoulder     Enlarged prostate     Fatty liver     Frozen shoulder     Hip arthrosis     History of low potassium     History of lung cancer 2018    HX LEFT LOWER LOBECTOMY    History of MRSA infection 2018    History of transfusion     Hyperlipidemia     Hypertension     Knee swelling     Left upper lobe consolidation     REPEAT CT SCANS - FOLLOWED BY PULMONARY, MOST RECENT CT SCAN 4/3/24    Low back strain     Lumbosacral disc disease     Neck strain     Neuropathy     On anticoagulant therapy     Pacemaker     PAF (paroxysmal atrial fibrillation)     Periarthritis of shoulder     Second degree AV block     Sinus node dysfunction     Sleep apnea     WEARS CPAP    Slow to wake up after anesthesia     Thoracic disc disorder        PF/Surg/Soc Hx:     Past Surgical History:   Procedure Laterality Date    BRONCHOSCOPY N/A 10/11/2018    Procedure: BRONCHOSCOPY WITH FLUORO, LEFT LOWER LOBE BRUSHINGS WET AND DRY. WITH BX'S AND BAL (IN LLL).;  Surgeon: Akil Ortiz MD;  [facility];  Service: Pulmonary    BRONCHOSCOPY WITH ION ROBOTIC ASSIST N/A 09/07/2023    Procedure: BRONCHOSCOPY WITH ION ROBOT AND ENDOBRONCHIAL ULTRASOUND with brushing and FNA;  Surgeon: Taye Chavira MD;  [facility];  Service: Robotics - Pulmonary;  Laterality: N/A;  PRE/POST - left upper lobe mass    CATARACT EXTRACTION Bilateral 04/01/2024    COLONOSCOPY  2021    COLONOSCOPY W/ POLYPECTOMY N/A 10/22/2021    Procedure: COLONOSCOPY WITH POLYPECTOMY;  Surgeon: Lan Solis MD;  Location: [facility] OR;  Service: Gastroenterology;  Laterality: N/A;  cecal polyp x1 (cold snare)  ascending polyp x1 (hot snare)  transverse polyp x3 (hot snare x 1), (cold snare x2)  sigmoid polyp x1 (hot snare, clip applied)  rectal polyp x3 (cold snare)    INSERT / REPLACE / REMOVE PACEMAKER  6/5/2005    replaced Oct 2014    JOINT  "REPLACEMENT  Hip    Hip    KNEE ARTHROSCOPY Left     PACEMAKER IMPLANTATION  ,     THORACOSCOPY Left 2018    Procedure: BRONCHOSCOPY, DAVINCI ROBOT ASSISTED VIDEIO ASSISTED THORACOSCOPY  WITH CONVERT TO OPEN THORACOTOMY,LEFT LOWER LOBECTOMY,INTERCOSTAL NERVE BLOCK;  Surgeon: Michael Ring III, MD;  Location: [facility] OR;  Service: DaVinci    TOTAL HIP ARTHROPLASTY Left 2023    Procedure: TOTAL HIP ARTHROPLASTY;  Surgeon: Nikko Staton MD;  [facility]: [facility];  Service: Orthopedics;  Laterality: Left;        Social History     Occupational History    Not on file   Tobacco Use    Smoking status: Former     Current packs/day: 0.00     Average packs/day: 1 pack/day for 33.0 years (33.0 ttl pk-yrs)     Types: Cigarettes     Start date: 1985     Quit date: 2018     Years since quittin.4     Passive exposure: Past    Smokeless tobacco: Never   Vaping Use    Vaping status: Never Used   Substance and Sexual Activity    Alcohol use: Not Currently    Drug use: No    Sexual activity: Not Currently     Partners: Female      Social History     Social History Narrative    Not on file        Family History   Problem Relation Age of Onset    Diabetes Mother     Stroke Father     Heart disease Father     Stroke Sister     Cancer Sister     Stroke Sister     Diabetes Sister     Heart disease Brother     Diabetes Brother     Diabetes Brother     Malig Hyperthermia Neg Hx          Review of Systems:   A 14 point review of systems was performed, pertinent positives discussed above, all other systems are negative    Physical Exam: 69 y.o. male  Vital Signs :   Vitals:    24 1620   BP: 128/78   BP Location: Right arm   Patient Position: Sitting   Cuff Size: Large Adult   Temp: 97.3 °F (36.3 °C)   TempSrc: Temporal   Weight: 122 kg (268 lb 3.2 oz)   Height: 170.2 cm (67\")   PainSc:   7   PainLoc: Knee     General: Alert and Oriented x 3, No acute distress.  Psych: mood and affect " appropriate; recent and remote memory intact  Eyes: conjunctivae clear; pupils equally round and reactive, sclerae anicteric  CV: no peripheral edema  Resp: normal respiratory effort  Skin: no rashes or wounds; normal turgor  Musculosketetal; pain and crepitance with knee range of motion  Vascular: palpable distal pulses    Xrays:  -3 views (AP, lateral, and sunrise) were reviewed demonstrating end-stage OA with bone on bone articulation.  -A full length AP xray was ordered and reviewed today for purposes of operative alignment demonstrating end stage arthritic findings. There are no previous full length films for review    Assessment:  End-stage Left knee osteoarthritis. Conservative measures have failed.      Plan:  The plan is to proceed with Left Total Knee Replacement. The patient voiced understanding of the risks, benefits, and alternative forms of treatment that were discussed with Dr Staton at the time of scheduling.  Same day home health, will resume Eliquis postoperatively    Dari Nelson, APRN  6/11/2024

## 2024-06-12 ENCOUNTER — TELEPHONE (OUTPATIENT)
Dept: ORTHOPEDIC SURGERY | Facility: CLINIC | Age: 69
End: 2024-06-12
Payer: MEDICARE

## 2024-06-12 ENCOUNTER — TELEPHONE (OUTPATIENT)
Dept: ORTHOPEDIC SURGERY | Facility: HOSPITAL | Age: 69
End: 2024-06-12
Payer: MEDICARE

## 2024-06-12 NOTE — TELEPHONE ENCOUNTER
Risk Factor yes no   Age >75  X   BMI <20 >40 X    Patient History     Chronic Pain (2 or more meds/Pain Management)  X   ETOH (more than 3 drinks Daily)  X   Uncontrolled Depression/Bipolar/Schizoaffective Disorder  X   Arrhythmias--  X   Stent placement/MI  X   DVT/PE  X   Pacemaker X    HTN (uncontrolled or requiring more than 2 medications) X    CHF/Retained fluids/Edema  X   Stroke with Residual   X   COPD/Asthma X    GIL--Non-compliant with CPAP  X   Diabetes (on insulin or more than 2 meds)         A1C:  X   BPH/Urinary retention (on medication)  X   CKD  X   Home environment and support     Current ambulation status (use of cane, walker, W/C, Multiple falls/weakness) X    Stairs to enter and throughout home X    Lives Alone  X   Doesn't have support at home  X   Outpatient Screening Assessment    Home needs: (Walker/BSC):  Has walker  ? Steps 2 steps   Caregiver 24-48hrs post-discharge:  Wife    Discharge Plan:   [facility]    Prescriptions: Meds to bed    Home medications:   ? Blood thinner/anti-coag therapy--Eliquis,    ? BPH or diuretic--Flomax  ? BP meds-- Norvasc, Coreg, Hydralazine,  Diovan/HCTZ  ? Pain/Anti-inflammatories--Gabapentin  DM--Actos,   Pre-op Education:  Educate patient on spinal anesthesia/pain control:  ? patient verbalize understanding    Educate patient on hospital course/timeline:  ?  patient verbalize understanding    Joint Care Class:  ?  yes [] no  Notes:

## 2024-06-12 NOTE — DISCHARGE PLACEMENT REQUEST
"Renard Rambrandon DASILVA (69 y.o. Male)       Date of Birth   1955    Social Security Number       Address   [address] [place] [address]    Home Phone   [phone]    MRN   [id number]       [facility]    Marital Status                               Admission Date       Admission Type   Elective    Admitting Provider   Nikko Staton MD    Attending Provider   Nikko Staton MD    Department, Room/Bed   [facility], --/--       Discharge Date       Discharge Disposition       Discharge Destination                                 Attending Provider: Nikko Staton MD    Allergies: No Known Allergies    Isolation: None   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)   Code Status: Prior    Ht: 170.2 cm (67\")   Wt: 122 kg (268 lb 3.2 oz)    Admission Cmt: None   Principal Problem: Primary osteoarthritis of left knee [M17.12]                   Active Insurance as of 6/13/2024       Primary Coverage       Payor Plan Insurance Group Employer/Plan Group    MEDICARE MEDICARE A & B        Payor Plan Address Payor Plan Phone Number Payor Plan Fax Number Effective Dates    [address] [phone]  3/1/2020 - None Entered    [facility] [id number]         Subscriber Name Subscriber Birth Date Member ID       PATRICIA RAM BROWN 1955 [id number]               Secondary Coverage       Payor Plan Insurance Group Employer/Plan Group    [facility]        Payor Plan Address Payor Plan Phone Number Payor Plan Fax Number Effective Dates    [facility] [phone]  3/1/2020 - None Entered    [address]       [facility] [address]         Subscriber Name Subscriber Birth Date Member ID       PATRICIA RAM BROWN 1955 [id number]                     Emergency Contacts        (Rel.) Home Phone Work Phone Mobile Phone    GoldyDarlin (Spouse) [phone] -- [phone]    MariiaDexter abarca (Son) -- -- [phone]                "

## 2024-06-13 ENCOUNTER — ANESTHESIA (OUTPATIENT)
Dept: PERIOP | Facility: HOSPITAL | Age: 69
End: 2024-06-13
Payer: MEDICARE

## 2024-06-13 ENCOUNTER — ANESTHESIA EVENT (OUTPATIENT)
Dept: PERIOP | Facility: HOSPITAL | Age: 69
End: 2024-06-13
Payer: MEDICARE

## 2024-06-13 ENCOUNTER — APPOINTMENT (OUTPATIENT)
Dept: GENERAL RADIOLOGY | Facility: HOSPITAL | Age: 69
End: 2024-06-13
Payer: MEDICARE

## 2024-06-13 ENCOUNTER — HOSPITAL ENCOUNTER (INPATIENT)
Facility: HOSPITAL | Age: 69
LOS: 1 days | Discharge: HOME HEALTH | End: 2024-06-14
Attending: ORTHOPAEDIC SURGERY | Admitting: ORTHOPAEDIC SURGERY
Payer: MEDICARE

## 2024-06-13 DIAGNOSIS — M17.12 PRIMARY OSTEOARTHRITIS OF LEFT KNEE: ICD-10-CM

## 2024-06-13 DIAGNOSIS — Z96.652 S/P TKR (TOTAL KNEE REPLACEMENT), LEFT: Primary | ICD-10-CM

## 2024-06-13 PROBLEM — Z96.659 STATUS POST TOTAL KNEE REPLACEMENT: Status: ACTIVE | Noted: 2024-06-13

## 2024-06-13 LAB — GLUCOSE BLDC GLUCOMTR-MCNC: 98 MG/DL (ref 70–130)

## 2024-06-13 PROCEDURE — 25810000003 SODIUM CHLORIDE 0.9 % SOLUTION: Performed by: NURSE PRACTITIONER

## 2024-06-13 PROCEDURE — 25010000002 ACETAMINOPHEN 10 MG/ML SOLUTION: Performed by: NURSE ANESTHETIST, CERTIFIED REGISTERED

## 2024-06-13 PROCEDURE — 97162 PT EVAL MOD COMPLEX 30 MIN: CPT

## 2024-06-13 PROCEDURE — 25010000002 KETOROLAC TROMETHAMINE PER 15 MG: Performed by: ORTHOPAEDIC SURGERY

## 2024-06-13 PROCEDURE — 25010000002 VANCOMYCIN 10 G RECONSTITUTED SOLUTION: Performed by: NURSE PRACTITIONER

## 2024-06-13 PROCEDURE — 25010000002 PROPOFOL 200 MG/20ML EMULSION: Performed by: NURSE ANESTHETIST, CERTIFIED REGISTERED

## 2024-06-13 PROCEDURE — 25010000002 FENTANYL CITRATE (PF) 50 MCG/ML SOLUTION: Performed by: STUDENT IN AN ORGANIZED HEALTH CARE EDUCATION/TRAINING PROGRAM

## 2024-06-13 PROCEDURE — 25810000003 LACTATED RINGERS PER 1000 ML: Performed by: STUDENT IN AN ORGANIZED HEALTH CARE EDUCATION/TRAINING PROGRAM

## 2024-06-13 PROCEDURE — 25010000002 ROPIVACAINE PER 1 MG: Performed by: ORTHOPAEDIC SURGERY

## 2024-06-13 PROCEDURE — C1713 ANCHOR/SCREW BN/BN,TIS/BN: HCPCS | Performed by: ORTHOPAEDIC SURGERY

## 2024-06-13 PROCEDURE — C1776 JOINT DEVICE (IMPLANTABLE): HCPCS | Performed by: ORTHOPAEDIC SURGERY

## 2024-06-13 PROCEDURE — 25010000002 HYDROMORPHONE PER 4 MG: Performed by: NURSE ANESTHETIST, CERTIFIED REGISTERED

## 2024-06-13 PROCEDURE — 0SRD069 REPLACEMENT OF LEFT KNEE JOINT WITH OXIDIZED ZIRCONIUM ON POLYETHYLENE SYNTHETIC SUBSTITUTE, CEMENTED, OPEN APPROACH: ICD-10-PCS | Performed by: ORTHOPAEDIC SURGERY

## 2024-06-13 PROCEDURE — 25010000002 SUGAMMADEX 200 MG/2ML SOLUTION

## 2024-06-13 PROCEDURE — 25010000002 MORPHINE PER 10 MG: Performed by: ORTHOPAEDIC SURGERY

## 2024-06-13 PROCEDURE — 82948 REAGENT STRIP/BLOOD GLUCOSE: CPT

## 2024-06-13 PROCEDURE — 25010000002 ROPIVACAINE PER 1 MG: Performed by: STUDENT IN AN ORGANIZED HEALTH CARE EDUCATION/TRAINING PROGRAM

## 2024-06-13 PROCEDURE — 25010000002 PROPOFOL 10 MG/ML EMULSION: Performed by: NURSE ANESTHETIST, CERTIFIED REGISTERED

## 2024-06-13 PROCEDURE — 25010000002 ONDANSETRON PER 1 MG

## 2024-06-13 PROCEDURE — 25010000002 FENTANYL CITRATE (PF) 100 MCG/2ML SOLUTION: Performed by: NURSE ANESTHETIST, CERTIFIED REGISTERED

## 2024-06-13 PROCEDURE — 25010000002 DEXAMETHASONE PER 1 MG: Performed by: STUDENT IN AN ORGANIZED HEALTH CARE EDUCATION/TRAINING PROGRAM

## 2024-06-13 PROCEDURE — 25010000002 EPINEPHRINE 1 MG/ML SOLUTION 30 ML VIAL: Performed by: ORTHOPAEDIC SURGERY

## 2024-06-13 PROCEDURE — 73560 X-RAY EXAM OF KNEE 1 OR 2: CPT

## 2024-06-13 PROCEDURE — 27447 TOTAL KNEE ARTHROPLASTY: CPT | Performed by: ORTHOPAEDIC SURGERY

## 2024-06-13 PROCEDURE — 25010000002 CEFAZOLIN 3 G RECONSTITUTED SOLUTION 1 EACH VIAL: Performed by: NURSE PRACTITIONER

## 2024-06-13 PROCEDURE — 25010000002 CEFAZOLIN PER 500 MG: Performed by: ORTHOPAEDIC SURGERY

## 2024-06-13 PROCEDURE — 25010000002 FENTANYL CITRATE (PF) 50 MCG/ML SOLUTION: Performed by: NURSE ANESTHETIST, CERTIFIED REGISTERED

## 2024-06-13 DEVICE — LEGION POSTERIOR STABILIZED                                    OXINIUM FEMORAL SIZE 6 LEFT
Type: IMPLANTABLE DEVICE | Site: KNEE | Status: FUNCTIONAL
Brand: LEGION

## 2024-06-13 DEVICE — CMT BONE PALACOS R HI/VISC 1X40: Type: IMPLANTABLE DEVICE | Site: KNEE | Status: FUNCTIONAL

## 2024-06-13 DEVICE — GENESIS II BICONVEX PATELLA 29MM
Type: IMPLANTABLE DEVICE | Site: KNEE | Status: FUNCTIONAL
Brand: GENESIS II

## 2024-06-13 DEVICE — GENESIS II NON-POROUS TIBIAL                                    BASEPLATE SIZE 6 LT
Type: IMPLANTABLE DEVICE | Site: KNEE | Status: FUNCTIONAL
Brand: GENESIS II

## 2024-06-13 DEVICE — GENESIS II POSTERIOR STABILIZED                                    HIGH FLEXION INSERT SIZE 5-6 15MM
Type: IMPLANTABLE DEVICE | Site: KNEE | Status: FUNCTIONAL
Brand: GENESIS II

## 2024-06-13 DEVICE — IMPLANTABLE DEVICE: Type: IMPLANTABLE DEVICE | Status: FUNCTIONAL

## 2024-06-13 DEVICE — DEV CONTRL TISS STRATAFIX SYMM PDS PLUS VIL CT-1 60CM: Type: IMPLANTABLE DEVICE | Site: KNEE | Status: FUNCTIONAL

## 2024-06-13 DEVICE — DEV CONTRL TISS STRATAFIXSPIRALMNCRYL PLSPS2 REV3/0 45CM: Type: IMPLANTABLE DEVICE | Site: KNEE | Status: FUNCTIONAL

## 2024-06-13 RX ORDER — PROMETHAZINE HYDROCHLORIDE 25 MG/1
25 SUPPOSITORY RECTAL ONCE AS NEEDED
Status: DISCONTINUED | OUTPATIENT
Start: 2024-06-13 | End: 2024-06-13 | Stop reason: HOSPADM

## 2024-06-13 RX ORDER — HYDROCODONE BITARTRATE AND ACETAMINOPHEN 7.5; 325 MG/1; MG/1
1 TABLET ORAL EVERY 4 HOURS PRN
Status: DISCONTINUED | OUTPATIENT
Start: 2024-06-13 | End: 2024-06-13 | Stop reason: HOSPADM

## 2024-06-13 RX ORDER — DROPERIDOL 2.5 MG/ML
0.62 INJECTION, SOLUTION INTRAMUSCULAR; INTRAVENOUS
Status: DISCONTINUED | OUTPATIENT
Start: 2024-06-13 | End: 2024-06-13 | Stop reason: HOSPADM

## 2024-06-13 RX ORDER — HYDROCODONE BITARTRATE AND ACETAMINOPHEN 5; 325 MG/1; MG/1
1 TABLET ORAL ONCE AS NEEDED
Status: DISCONTINUED | OUTPATIENT
Start: 2024-06-13 | End: 2024-06-13 | Stop reason: HOSPADM

## 2024-06-13 RX ORDER — ALBUTEROL SULFATE 90 UG/1
2 AEROSOL, METERED RESPIRATORY (INHALATION) EVERY 4 HOURS PRN
Status: DISCONTINUED | OUTPATIENT
Start: 2024-06-13 | End: 2024-06-14 | Stop reason: HOSPADM

## 2024-06-13 RX ORDER — HYDROCODONE BITARTRATE AND ACETAMINOPHEN 7.5; 325 MG/1; MG/1
1 TABLET ORAL EVERY 4 HOURS PRN
Qty: 40 TABLET | Refills: 0 | Status: SHIPPED | OUTPATIENT
Start: 2024-06-13 | End: 2024-06-19 | Stop reason: SDUPTHER

## 2024-06-13 RX ORDER — PROPOFOL 10 MG/ML
INJECTION, EMULSION INTRAVENOUS AS NEEDED
Status: DISCONTINUED | OUTPATIENT
Start: 2024-06-13 | End: 2024-06-13 | Stop reason: SURG

## 2024-06-13 RX ORDER — ROPIVACAINE HYDROCHLORIDE 5 MG/ML
INJECTION, SOLUTION EPIDURAL; INFILTRATION; PERINEURAL
Status: COMPLETED | OUTPATIENT
Start: 2024-06-13 | End: 2024-06-13

## 2024-06-13 RX ORDER — CARVEDILOL 25 MG/1
50 TABLET ORAL 2 TIMES DAILY WITH MEALS
Status: DISCONTINUED | OUTPATIENT
Start: 2024-06-13 | End: 2024-06-14 | Stop reason: HOSPADM

## 2024-06-13 RX ORDER — EPHEDRINE SULFATE 50 MG/ML
5 INJECTION, SOLUTION INTRAVENOUS ONCE AS NEEDED
Status: DISCONTINUED | OUTPATIENT
Start: 2024-06-13 | End: 2024-06-13 | Stop reason: HOSPADM

## 2024-06-13 RX ORDER — SODIUM CHLORIDE 0.9 % (FLUSH) 0.9 %
3 SYRINGE (ML) INJECTION EVERY 12 HOURS SCHEDULED
Status: DISCONTINUED | OUTPATIENT
Start: 2024-06-13 | End: 2024-06-13 | Stop reason: HOSPADM

## 2024-06-13 RX ORDER — PROMETHAZINE HYDROCHLORIDE 25 MG/1
25 TABLET ORAL ONCE AS NEEDED
Status: DISCONTINUED | OUTPATIENT
Start: 2024-06-13 | End: 2024-06-13 | Stop reason: HOSPADM

## 2024-06-13 RX ORDER — HYDROCODONE BITARTRATE AND ACETAMINOPHEN 7.5; 325 MG/1; MG/1
2 TABLET ORAL EVERY 4 HOURS PRN
Status: DISCONTINUED | OUTPATIENT
Start: 2024-06-13 | End: 2024-06-14 | Stop reason: HOSPADM

## 2024-06-13 RX ORDER — POTASSIUM CHLORIDE 750 MG/1
20 TABLET, FILM COATED, EXTENDED RELEASE ORAL 2 TIMES DAILY
Status: DISCONTINUED | OUTPATIENT
Start: 2024-06-13 | End: 2024-06-14 | Stop reason: HOSPADM

## 2024-06-13 RX ORDER — HYDROCODONE BITARTRATE AND ACETAMINOPHEN 7.5; 325 MG/1; MG/1
1 TABLET ORAL EVERY 4 HOURS PRN
Status: DISCONTINUED | OUTPATIENT
Start: 2024-06-13 | End: 2024-06-14 | Stop reason: HOSPADM

## 2024-06-13 RX ORDER — ONDANSETRON 4 MG/1
4 TABLET, ORALLY DISINTEGRATING ORAL EVERY 6 HOURS PRN
Status: DISCONTINUED | OUTPATIENT
Start: 2024-06-13 | End: 2024-06-14 | Stop reason: HOSPADM

## 2024-06-13 RX ORDER — TAMSULOSIN HYDROCHLORIDE 0.4 MG/1
0.4 CAPSULE ORAL NIGHTLY
Status: DISCONTINUED | OUTPATIENT
Start: 2024-06-13 | End: 2024-06-14 | Stop reason: HOSPADM

## 2024-06-13 RX ORDER — LABETALOL HYDROCHLORIDE 5 MG/ML
5 INJECTION, SOLUTION INTRAVENOUS
Status: DISCONTINUED | OUTPATIENT
Start: 2024-06-13 | End: 2024-06-13 | Stop reason: HOSPADM

## 2024-06-13 RX ORDER — AMLODIPINE BESYLATE 10 MG/1
10 TABLET ORAL DAILY
Status: DISCONTINUED | OUTPATIENT
Start: 2024-06-14 | End: 2024-06-14 | Stop reason: HOSPADM

## 2024-06-13 RX ORDER — PREGABALIN 75 MG/1
150 CAPSULE ORAL ONCE
Status: COMPLETED | OUTPATIENT
Start: 2024-06-13 | End: 2024-06-13

## 2024-06-13 RX ORDER — TRANEXAMIC ACID 100 MG/ML
INJECTION, SOLUTION INTRAVENOUS AS NEEDED
Status: DISCONTINUED | OUTPATIENT
Start: 2024-06-13 | End: 2024-06-13 | Stop reason: SURG

## 2024-06-13 RX ORDER — ACETAMINOPHEN 10 MG/ML
INJECTION, SOLUTION INTRAVENOUS AS NEEDED
Status: DISCONTINUED | OUTPATIENT
Start: 2024-06-13 | End: 2024-06-13 | Stop reason: SURG

## 2024-06-13 RX ORDER — ROCURONIUM BROMIDE 10 MG/ML
INJECTION, SOLUTION INTRAVENOUS AS NEEDED
Status: DISCONTINUED | OUTPATIENT
Start: 2024-06-13 | End: 2024-06-13 | Stop reason: SURG

## 2024-06-13 RX ORDER — UREA 10 %
2.5 LOTION (ML) TOPICAL NIGHTLY PRN
Status: DISCONTINUED | OUTPATIENT
Start: 2024-06-13 | End: 2024-06-14 | Stop reason: HOSPADM

## 2024-06-13 RX ORDER — NALOXONE HCL 0.4 MG/ML
0.2 VIAL (ML) INJECTION AS NEEDED
Status: DISCONTINUED | OUTPATIENT
Start: 2024-06-13 | End: 2024-06-13 | Stop reason: HOSPADM

## 2024-06-13 RX ORDER — FLUMAZENIL 0.1 MG/ML
0.2 INJECTION INTRAVENOUS AS NEEDED
Status: DISCONTINUED | OUTPATIENT
Start: 2024-06-13 | End: 2024-06-13 | Stop reason: HOSPADM

## 2024-06-13 RX ORDER — LIDOCAINE HYDROCHLORIDE 10 MG/ML
0.5 INJECTION, SOLUTION INFILTRATION; PERINEURAL ONCE AS NEEDED
Status: DISCONTINUED | OUTPATIENT
Start: 2024-06-13 | End: 2024-06-13 | Stop reason: HOSPADM

## 2024-06-13 RX ORDER — MAGNESIUM HYDROXIDE 1200 MG/15ML
LIQUID ORAL AS NEEDED
Status: DISCONTINUED | OUTPATIENT
Start: 2024-06-13 | End: 2024-06-13 | Stop reason: HOSPADM

## 2024-06-13 RX ORDER — VALSARTAN 320 MG/1
320 TABLET ORAL
Status: DISCONTINUED | OUTPATIENT
Start: 2024-06-13 | End: 2024-06-14 | Stop reason: HOSPADM

## 2024-06-13 RX ORDER — PIOGLITAZONEHYDROCHLORIDE 30 MG/1
30 TABLET ORAL EVERY EVENING
Status: DISCONTINUED | OUTPATIENT
Start: 2024-06-13 | End: 2024-06-14 | Stop reason: HOSPADM

## 2024-06-13 RX ORDER — POLYETHYLENE GLYCOL 3350 17 G/17G
17 POWDER, FOR SOLUTION ORAL 2 TIMES DAILY
Qty: 238 G | Refills: 0 | Status: SHIPPED | OUTPATIENT
Start: 2024-06-13 | End: 2024-06-20

## 2024-06-13 RX ORDER — SODIUM CHLORIDE 0.9 % (FLUSH) 0.9 %
3-10 SYRINGE (ML) INJECTION AS NEEDED
Status: DISCONTINUED | OUTPATIENT
Start: 2024-06-13 | End: 2024-06-13 | Stop reason: HOSPADM

## 2024-06-13 RX ORDER — LIDOCAINE HYDROCHLORIDE 20 MG/ML
INJECTION, SOLUTION EPIDURAL; INFILTRATION; INTRACAUDAL; PERINEURAL AS NEEDED
Status: DISCONTINUED | OUTPATIENT
Start: 2024-06-13 | End: 2024-06-13 | Stop reason: SURG

## 2024-06-13 RX ORDER — HYDROCHLOROTHIAZIDE 25 MG/1
25 TABLET ORAL
Status: DISCONTINUED | OUTPATIENT
Start: 2024-06-13 | End: 2024-06-14 | Stop reason: HOSPADM

## 2024-06-13 RX ORDER — ONDANSETRON 4 MG/1
4 TABLET, FILM COATED ORAL EVERY 8 HOURS PRN
Qty: 10 TABLET | Refills: 0 | Status: ON HOLD | OUTPATIENT
Start: 2024-06-13

## 2024-06-13 RX ORDER — SODIUM CHLORIDE, SODIUM LACTATE, POTASSIUM CHLORIDE, CALCIUM CHLORIDE 600; 310; 30; 20 MG/100ML; MG/100ML; MG/100ML; MG/100ML
9 INJECTION, SOLUTION INTRAVENOUS CONTINUOUS
Status: DISCONTINUED | OUTPATIENT
Start: 2024-06-13 | End: 2024-06-13

## 2024-06-13 RX ORDER — ONDANSETRON 2 MG/ML
INJECTION INTRAMUSCULAR; INTRAVENOUS AS NEEDED
Status: DISCONTINUED | OUTPATIENT
Start: 2024-06-13 | End: 2024-06-13 | Stop reason: SURG

## 2024-06-13 RX ORDER — CETIRIZINE HYDROCHLORIDE 10 MG/1
10 TABLET ORAL DAILY PRN
Status: DISCONTINUED | OUTPATIENT
Start: 2024-06-13 | End: 2024-06-14 | Stop reason: HOSPADM

## 2024-06-13 RX ORDER — FENTANYL CITRATE 50 UG/ML
50 INJECTION, SOLUTION INTRAMUSCULAR; INTRAVENOUS ONCE AS NEEDED
Status: COMPLETED | OUTPATIENT
Start: 2024-06-13 | End: 2024-06-13

## 2024-06-13 RX ORDER — DIPHENHYDRAMINE HYDROCHLORIDE 50 MG/ML
12.5 INJECTION INTRAMUSCULAR; INTRAVENOUS
Status: DISCONTINUED | OUTPATIENT
Start: 2024-06-13 | End: 2024-06-13 | Stop reason: HOSPADM

## 2024-06-13 RX ORDER — HYDRALAZINE HYDROCHLORIDE 25 MG/1
25 TABLET, FILM COATED ORAL 2 TIMES DAILY
Status: DISCONTINUED | OUTPATIENT
Start: 2024-06-13 | End: 2024-06-14 | Stop reason: HOSPADM

## 2024-06-13 RX ORDER — ATORVASTATIN CALCIUM 20 MG/1
40 TABLET, FILM COATED ORAL DAILY
Status: DISCONTINUED | OUTPATIENT
Start: 2024-06-13 | End: 2024-06-14 | Stop reason: HOSPADM

## 2024-06-13 RX ORDER — ONDANSETRON 2 MG/ML
4 INJECTION INTRAMUSCULAR; INTRAVENOUS ONCE AS NEEDED
Status: DISCONTINUED | OUTPATIENT
Start: 2024-06-13 | End: 2024-06-13 | Stop reason: HOSPADM

## 2024-06-13 RX ORDER — ONDANSETRON 2 MG/ML
4 INJECTION INTRAMUSCULAR; INTRAVENOUS ONCE AS NEEDED
Status: DISCONTINUED | OUTPATIENT
Start: 2024-06-13 | End: 2024-06-14 | Stop reason: HOSPADM

## 2024-06-13 RX ORDER — ACETAMINOPHEN 325 MG/1
650 TABLET ORAL EVERY 6 HOURS PRN
Status: DISCONTINUED | OUTPATIENT
Start: 2024-06-13 | End: 2024-06-14 | Stop reason: HOSPADM

## 2024-06-13 RX ORDER — PROMETHAZINE HYDROCHLORIDE 12.5 MG/1
12.5 TABLET ORAL EVERY 4 HOURS PRN
Status: DISCONTINUED | OUTPATIENT
Start: 2024-06-13 | End: 2024-06-14 | Stop reason: HOSPADM

## 2024-06-13 RX ORDER — GABAPENTIN 100 MG/1
200 CAPSULE ORAL 2 TIMES DAILY
Status: DISCONTINUED | OUTPATIENT
Start: 2024-06-13 | End: 2024-06-14 | Stop reason: HOSPADM

## 2024-06-13 RX ORDER — FENTANYL CITRATE 50 UG/ML
INJECTION, SOLUTION INTRAMUSCULAR; INTRAVENOUS AS NEEDED
Status: DISCONTINUED | OUTPATIENT
Start: 2024-06-13 | End: 2024-06-13 | Stop reason: SURG

## 2024-06-13 RX ORDER — HYDRALAZINE HYDROCHLORIDE 20 MG/ML
5 INJECTION INTRAMUSCULAR; INTRAVENOUS
Status: DISCONTINUED | OUTPATIENT
Start: 2024-06-13 | End: 2024-06-13 | Stop reason: HOSPADM

## 2024-06-13 RX ORDER — IPRATROPIUM BROMIDE AND ALBUTEROL SULFATE 2.5; .5 MG/3ML; MG/3ML
3 SOLUTION RESPIRATORY (INHALATION) ONCE AS NEEDED
Status: DISCONTINUED | OUTPATIENT
Start: 2024-06-13 | End: 2024-06-13 | Stop reason: HOSPADM

## 2024-06-13 RX ORDER — HYDROMORPHONE HYDROCHLORIDE 1 MG/ML
0.25 INJECTION, SOLUTION INTRAMUSCULAR; INTRAVENOUS; SUBCUTANEOUS
Status: DISCONTINUED | OUTPATIENT
Start: 2024-06-13 | End: 2024-06-13 | Stop reason: HOSPADM

## 2024-06-13 RX ORDER — FENTANYL CITRATE 50 UG/ML
25 INJECTION, SOLUTION INTRAMUSCULAR; INTRAVENOUS
Status: DISCONTINUED | OUTPATIENT
Start: 2024-06-13 | End: 2024-06-13 | Stop reason: HOSPADM

## 2024-06-13 RX ORDER — DEXAMETHASONE SODIUM PHOSPHATE 4 MG/ML
INJECTION, SOLUTION INTRA-ARTICULAR; INTRALESIONAL; INTRAMUSCULAR; INTRAVENOUS; SOFT TISSUE
Status: COMPLETED | OUTPATIENT
Start: 2024-06-13 | End: 2024-06-13

## 2024-06-13 RX ADMIN — DEXAMETHASONE SODIUM PHOSPHATE 4 MG: 4 INJECTION, SOLUTION INTRA-ARTICULAR; INTRALESIONAL; INTRAMUSCULAR; INTRAVENOUS; SOFT TISSUE at 13:55

## 2024-06-13 RX ADMIN — HYDROMORPHONE HYDROCHLORIDE 0.25 MG: 1 INJECTION, SOLUTION INTRAMUSCULAR; INTRAVENOUS; SUBCUTANEOUS at 16:58

## 2024-06-13 RX ADMIN — DEXAMETHASONE SODIUM PHOSPHATE 8 MG: 4 INJECTION, SOLUTION INTRA-ARTICULAR; INTRALESIONAL; INTRAMUSCULAR; INTRAVENOUS; SOFT TISSUE at 14:32

## 2024-06-13 RX ADMIN — FENTANYL CITRATE 25 MCG: 50 INJECTION, SOLUTION INTRAMUSCULAR; INTRAVENOUS at 16:38

## 2024-06-13 RX ADMIN — TRANEXAMIC ACID 1000 MG: 100 INJECTION, SOLUTION INTRAVENOUS at 15:19

## 2024-06-13 RX ADMIN — GABAPENTIN 200 MG: 100 CAPSULE ORAL at 21:21

## 2024-06-13 RX ADMIN — HYDROCHLOROTHIAZIDE 25 MG: 25 TABLET ORAL at 21:22

## 2024-06-13 RX ADMIN — HYDROCODONE BITARTRATE AND ACETAMINOPHEN 1 TABLET: 7.5; 325 TABLET ORAL at 21:26

## 2024-06-13 RX ADMIN — SUGAMMADEX 300 MG: 100 INJECTION, SOLUTION INTRAVENOUS at 16:01

## 2024-06-13 RX ADMIN — ROCURONIUM BROMIDE 70 MG: 10 INJECTION, SOLUTION INTRAVENOUS at 14:28

## 2024-06-13 RX ADMIN — LIDOCAINE HYDROCHLORIDE 100 MG: 20 INJECTION, SOLUTION EPIDURAL; INFILTRATION; INTRACAUDAL; PERINEURAL at 14:28

## 2024-06-13 RX ADMIN — PIOGLITAZONE HYDROCHLORIDE 30 MG: 30 TABLET ORAL at 21:21

## 2024-06-13 RX ADMIN — PROPOFOL 175 MCG/KG/MIN: 10 INJECTION, EMULSION INTRAVENOUS at 14:28

## 2024-06-13 RX ADMIN — PROPOFOL 250 MG: 10 INJECTION, EMULSION INTRAVENOUS at 14:28

## 2024-06-13 RX ADMIN — FENTANYL CITRATE 25 MCG: 50 INJECTION, SOLUTION INTRAMUSCULAR; INTRAVENOUS at 16:33

## 2024-06-13 RX ADMIN — FENTANYL CITRATE 50 MCG: 50 INJECTION, SOLUTION INTRAMUSCULAR; INTRAVENOUS at 13:53

## 2024-06-13 RX ADMIN — FENTANYL CITRATE 50 MCG: 50 INJECTION, SOLUTION INTRAMUSCULAR; INTRAVENOUS at 14:50

## 2024-06-13 RX ADMIN — CARVEDILOL 50 MG: 25 TABLET, FILM COATED ORAL at 21:22

## 2024-06-13 RX ADMIN — HYDRALAZINE HYDROCHLORIDE 25 MG: 25 TABLET ORAL at 21:22

## 2024-06-13 RX ADMIN — FENTANYL CITRATE 50 MCG: 50 INJECTION, SOLUTION INTRAMUSCULAR; INTRAVENOUS at 15:03

## 2024-06-13 RX ADMIN — SODIUM CHLORIDE 3 G: 900 INJECTION INTRAVENOUS at 14:16

## 2024-06-13 RX ADMIN — ACETAMINOPHEN 1000 MG: 1000 INJECTION INTRAVENOUS at 14:32

## 2024-06-13 RX ADMIN — PREGABALIN 150 MG: 75 CAPSULE ORAL at 12:51

## 2024-06-13 RX ADMIN — VALSARTAN 320 MG: 320 TABLET, FILM COATED ORAL at 21:22

## 2024-06-13 RX ADMIN — POTASSIUM CHLORIDE 20 MEQ: 750 TABLET, EXTENDED RELEASE ORAL at 21:21

## 2024-06-13 RX ADMIN — VANCOMYCIN HYDROCHLORIDE 1750 MG: 10 INJECTION, POWDER, LYOPHILIZED, FOR SOLUTION INTRAVENOUS at 13:15

## 2024-06-13 RX ADMIN — SODIUM CHLORIDE 2000 MG: 900 INJECTION INTRAVENOUS at 22:44

## 2024-06-13 RX ADMIN — ONDANSETRON 4 MG: 2 INJECTION INTRAMUSCULAR; INTRAVENOUS at 16:01

## 2024-06-13 RX ADMIN — HYDROMORPHONE HYDROCHLORIDE 0.25 MG: 1 INJECTION, SOLUTION INTRAMUSCULAR; INTRAVENOUS; SUBCUTANEOUS at 16:53

## 2024-06-13 RX ADMIN — ATORVASTATIN CALCIUM 40 MG: 20 TABLET, FILM COATED ORAL at 21:21

## 2024-06-13 RX ADMIN — TAMSULOSIN HYDROCHLORIDE 0.4 MG: 0.4 CAPSULE ORAL at 21:21

## 2024-06-13 RX ADMIN — ROPIVACAINE HYDROCHLORIDE 15 ML: 5 INJECTION EPIDURAL; INFILTRATION; PERINEURAL at 13:55

## 2024-06-13 NOTE — OP NOTE
Name: Alexy Ram    YOB: 1955    DATE OF SURGERY: 6/13/2024    PREOPERATIVE DIAGNOSIS: Left knee end-stage osteoarthritis    POSTOPERATIVE DIAGNOSIS: Left knee end-stage osteoarthritis    PROCEDURE PERFORMED: Left total knee replacement     SURGEON: Nikko Staton M.D.    ASSISTANT: NOLA WALL    A surgical assistant was integral in ensuring a successful outcome with this procedure.  The assistant was utilized to assist in positioning the patient, draping the patient, was used throughout the case to provide with retraction of tissues, suctioning of blood and body fluids for visualization, positioning of the extremity to allow for proper exposure so that I could perform the procedure.  Without the use of a surgical assistant during this procedure I feel that the outcome may have been compromised or would have been suboptimal or at risk for complications.    IMPLANTS: [employer]:     Implant Name Type Inv. Item Serial No.  Lot No. LRB No. Used Action   CMT BONE PALACOS R HI/VISC 1X40 - [id number] Implant CMT BONE PALACOS R HI/VISC 1X40  [facility] [id number] Left 2 Implanted   DEV CONTRL TISS STRATAFIX SYMM PDS PLUS NII CT-1 60CM - [id number] Implant DEV CONTRL TISS STRATAFIX SYMM PDS PLUS NII CT-1 60CM  [employer]  DIV [employer] J AND J [id number] Left 1 Implanted   DEV CONTRL TISS STRATAFIXSPIRALMNCRYL PLSPS2 REV3/0 45CM - [id number] Implant DEV CONTRL TISS STRATAFIXSPIRALMNCRYL PLSPS2 REV3/0 45CM  [employer]  DIV OF J AND J TPBCTE Left 1 Implanted   COMP FEM LEGION OXINIUM PS SZ6 LT - [id number] Implant COMP FEM LEGION OXINIUM PS SZ6 LT  [employer] [id number] Left 1 Implanted   BASE TIB/KN GEN2 NONPOR TI SZ6 LT - [id number] Implant BASE TIB/KN GEN2 NONPOR TI SZ6 LT  [employer] [id number] Left 1 Implanted   PAT GEN2 BICONVEX 14Y02XT - [id number] Implant PAT GEN2 BICONVEX [id number]  [employer] [id number] Left 1 Implanted   INSRT TIB FLX HI GEN2 PS SZ5TO6 15MM - [id number]  Implant INSRT TIB FLX HI GEN2 PS SZ5TO6 15MM  [employer] [id number] Left 1 Implanted       Estimated Blood Loss: 200cc  Specimens : none  Complications: none    DESCRIPTION OF PROCEDURE: The patient was taken to the operating room and placed in the supine position. A sequential compression device was carefully placed on the non-operative leg. Preoperative antibiotics were administered. Surgical time out was performed. After adequate induction of anesthesia, the leg was prepped and draped in the usual sterile fashion, exsanguinated with an Esmarch bandage and the tourniquet inflated to 250 mmHg. A midline incision was performed followed by a medial parapatellar arthrotomy. The patella was subluxed laterally.  A portion of the fat pad, ACL, and anterior horns of the meniscus were excised.  A drill hole was then placed in the center of the femoral canal in line with the canal.  It was irrigated and suctioned.  The intramedullary nallely was then placed and a 5 degree distal valgus cut was performed after the block pinned in place appropriately.  Cut surface was then removed.  The sizing and rotation guide was then placed and seated appropriately.  It was sized and then the drill holes for the 4-in-1 cutting guide were placed in 3 degrees of external rotation based off of the posterior condyles.  The 4-in-1 cutting block was then placed and the femoral cuts were performed.  The excess bone was removed and the cut surfaces looked good.  At this point we placed the retractors around the proximal tibia and a slight release of the PCL fibers off of the posterior proximal tibia was performed.  We used the extramedullary tibial alignment guide and it was aligned appropriately and then the depth was set and the block pinned in place.  The tibial cut was then performed and the alignment guide was removed.  The tibial cut was removed and the cut surface looked good.  The posterior horns of the menisci were then removed as well  as the posterior osteophytes.  Flexion extension blocks were then used to check the balance of the knee. The tibial cut surface was then sized with the sizing templates and the tibial and femoral trial were then placed. The knee was placed in full extension and then the tibial tray rotation was then matched to the femoral rotation and marked.    Attention was then placed to the patella. The patella was noted to track centrally through range of motion. The patella was then sized with the trials. The thickness of the patella was then measured. The patella was resurfaced and the surrounding osteophytes were removed. The preoperative thickness was reproduced. The patella tracked centrally through range of motion.  We then checked the balance with the trial implants in place and there was excellent medial lateral and flexion-extension balance.     At this point all trial components were removed, the knee was copiously irrigated with pulsed lavage, and the knee was injected with anesthetic cocktail solution. The cut surfaces were then dried with clean lap sponges, and the components were cemented tibia, followed by femur, then patella. The knee was held in full extension and all excess cement was removed. The knee was held still until the cement had completely hardened. We then placed the trial polyethylene spacer which resulted in full extension and excellent flexion-extension balance. We placed the final polyethylene spacer.   The knee was then copiously irrigated. The tourniquet was then released. There was excellent hemostasis. We placed a one-eighth inch Hemovac drain. We closed the knee in multiple layers in standard fashion. Sterile dressing were applied. At the end of the case, the sponge and needle counts were reported as being correct. There were no known complications. The patient was then transported to the recovery room.      Nikko Staton M.D.

## 2024-06-13 NOTE — ANESTHESIA PROCEDURE NOTES
Airway  Urgency: elective    Date/Time: 6/13/2024 2:31 PM  Airway not difficult    General Information and Staff    Patient location during procedure: OR  CRNA/CAA: Opal Gillespie CRNA    Indications and Patient Condition  Indications for airway management: airway protection    Preoxygenated: yes  Mask difficulty assessment: 1 - vent by mask    Final Airway Details  Final airway type: endotracheal airway      Successful airway: ETT  Cuffed: yes   Successful intubation technique: direct laryngoscopy  Facilitating devices/methods: intubating stylet  Endotracheal tube insertion site: oral  Blade: Sidra  Blade size: 4  ETT size (mm): 7.5  Cormack-Lehane Classification: grade I - full view of glottis  Placement verified by: chest auscultation and capnometry   Cuff volume (mL): 8  Measured from: lips  Number of attempts at approach: 1  Assessment: lips, teeth, and gum same as pre-op and atraumatic intubation

## 2024-06-13 NOTE — THERAPY EVALUATION
Patient Name: Alexy Ram  : 1955    MRN: [id number]                              Today's Date: 2024       Admit Date: 2024    Visit Dx:     ICD-10-CM ICD-9-CM   1. Primary osteoarthritis of left knee  M17.12 715.16     Patient Active Problem List   Diagnosis    Hypercholesterolemia    Hypertension    Type 2 diabetes mellitus with hyperglycemia, without long-term current use of insulin    Squamous cell carcinoma of left lung    Status post lobectomy of lung    Class 3 severe obesity due to excess calories with body mass index (BMI) of 40.0 to 44.9 in adult    Encounter for screening for malignant neoplasm of colon    Family history of colon cancer    Obstructive sleep apnea of adult    Screening PSA (prostate specific antigen)    Osteoarthritis of left hip    AV block, 2nd degree    Cardiac pacemaker in situ    Chronic anticoagulation    PAF (paroxysmal atrial fibrillation)    Adjustment and management of cardiac pacemaker    Primary osteoarthritis of left knee    Benign prostatic hyperplasia with urinary frequency     Past Medical History:   Diagnosis Date    Acromioclavicular separation     shoulder pulled out of place    Ankle sprain     Arthritis     OSTEOARTHRITIS    Arthritis of back     so long I don't know when it started    Arthritis of neck     COPD (chronic obstructive pulmonary disease)     Diabetes mellitus     Dislocation, shoulder     Enlarged prostate     Fatty liver     Frozen shoulder     Hip arthrosis     History of low potassium     History of lung cancer 2018    HX LEFT LOWER LOBECTOMY    History of MRSA infection 2018    History of transfusion     Hyperlipidemia     Hypertension     Knee swelling     Left upper lobe consolidation     REPEAT CT SCANS - FOLLOWED BY PULMONARY, MOST RECENT CT SCAN 4/3/24    Low back strain     Lumbosacral disc disease     Neck strain     Neuropathy     On anticoagulant therapy     Pacemaker     PAF (paroxysmal atrial fibrillation)      Periarthritis of shoulder     Second degree AV block     Sinus node dysfunction     Sleep apnea     WEARS CPAP    Slow to wake up after anesthesia     Thoracic disc disorder      Past Surgical History:   Procedure Laterality Date    BRONCHOSCOPY N/A 10/11/2018    Procedure: BRONCHOSCOPY WITH FLUORO, LEFT LOWER LOBE BRUSHINGS WET AND DRY. WITH BX'S AND BAL (IN LLL).;  Surgeon: Akil Ortiz MD;  Location: [facility] ENDOSCOPY;  Service: Pulmonary    BRONCHOSCOPY WITH ION ROBOTIC ASSIST N/A 09/07/2023    Procedure: BRONCHOSCOPY WITH ION ROBOT AND ENDOBRONCHIAL ULTRASOUND with brushing and FNA;  Surgeon: Taye Chavira MD;  [facility];  Service: Robotics - Pulmonary;  Laterality: N/A;  PRE/POST - left upper lobe mass    CATARACT EXTRACTION Bilateral 04/01/2024    COLONOSCOPY  2021    COLONOSCOPY W/ POLYPECTOMY N/A 10/22/2021    Procedure: COLONOSCOPY WITH POLYPECTOMY;  Surgeon: Lan Solis MD;  Location: [facility] OR;  Service: Gastroenterology;  Laterality: N/A;  cecal polyp x1 (cold snare)  ascending polyp x1 (hot snare)  transverse polyp x3 (hot snare x 1), (cold snare x2)  sigmoid polyp x1 (hot snare, clip applied)  rectal polyp x3 (cold snare)    INSERT / REPLACE / REMOVE PACEMAKER  6/5/2005    replaced Oct 2014    JOINT REPLACEMENT  Hip    Hip    KNEE ARTHROSCOPY Left     PACEMAKER IMPLANTATION  2005, 2014    THORACOSCOPY Left 11/19/2018    Procedure: BRONCHOSCOPY, DAVINCI ROBOT ASSISTED VIDEIO ASSISTED THORACOSCOPY  WITH CONVERT TO OPEN THORACOTOMY,LEFT LOWER LOBECTOMY,INTERCOSTAL NERVE BLOCK;  Surgeon: Michael Ring III, MD;  Location: [facility] MAIN OR;  Service: DaVinci    TOTAL HIP ARTHROPLASTY Left 07/14/2023    Procedure: TOTAL HIP ARTHROPLASTY;  Surgeon: Nikko Staton MD;  [facility];  Service: Orthopedics;  Laterality: Left;      General Information       Row Name 06/13/24 1417          Physical Therapy Time and Intention    Document Type evaluation  -RD      Mode of Treatment physical therapy  -RD       Row Name 06/13/24 1417          General Information    Prior Level of Function independent:;community mobility  -RD     Existing Precautions/Restrictions no known precautions/restrictions  -RD       Row Name 06/13/24 1417          Living Environment    People in Home spouse  -RD       Row Name 06/13/24 1417          Home Main Entrance    Number of Stairs, Main Entrance two  -RD       Row Name 06/13/24 1417          Cognition    Orientation Status (Cognition) oriented x 4  -RD       Row Name 06/13/24 1417          Safety Issues, Functional Mobility    Impairments Affecting Function (Mobility) balance;endurance/activity tolerance;pain;range of motion (ROM);strength  -RD               User Key  (r) = Recorded By, (t) = Taken By, (c) = Cosigned By      Initials Name Provider Type    Lesa Cordova, PT Physical Therapist                   Mobility       Row Name 06/13/24 1418          Transfers    Comment, (Transfers) verbalized technique, patient familiar from previous sx and walker use  -RD       Row Name 06/13/24 1418          Gait/Stairs (Locomotion)    Comment, (Gait/Stairs) verbalized technique, patient familiar from previous sx and walker use.  Reviewed current stair technique which is step to step using proper sequence.  No change needed in stair technique postop.  -RD               User Key  (r) = Recorded By, (t) = Taken By, (c) = Cosigned By      Initials Name Provider Type    Lesa Cordova, PT Physical Therapist                   Obj/Interventions       Row Name 06/13/24 1420          Range of Motion Comprehensive    General Range of Motion no range of motion deficits identified  -RD       Row Name 06/13/24 1420          Strength Comprehensive (MMT)    General Manual Muscle Testing (MMT) Assessment no strength deficits identified  -RD       Row Name 06/13/24 1420          Motor Skills    Therapeutic Exercise knee;other (see comments)  Performed 10  reps of tkr protocol with patient familiar with exercises from previous hip sx.  Reviewed importance of ankle pumps and quad sets.  -RD               User Key  (r) = Recorded By, (t) = Taken By, (c) = Cosigned By      Initials Name Provider Type    Lesa Cordova, PT Physical Therapist                   Goals/Plan       Row Name 06/13/24 1426          Gait Training Goal 1 (PT)    Activity/Assistive Device (Gait Training Goal 1, PT) gait (walking locomotion)  -RD     Fords Level (Gait Training Goal 1, PT) supervision required  -RD     Distance (Gait Training Goal 1, PT) 30  -RD     Time Frame (Gait Training Goal 1, PT) 3 days  -RD       Row Name 06/13/24 1426          Patient Education Goal (PT)    Activity (Patient Education Goal, PT) Patient and family indep with all PT education  -RD     Time Frame (Patient Education Goal, PT) 2 days  -RD       Row Name 06/13/24 1426          Therapy Assessment/Plan (PT)    Planned Therapy Interventions (PT) gait training;home exercise program;strengthening;stretching;stair training  -RD               User Key  (r) = Recorded By, (t) = Taken By, (c) = Cosigned By      Initials Name Provider Type    Lesa Cordova, PT Physical Therapist                   Clinical Impression       Row Name 06/13/24 1421          Pain    Pretreatment Pain Rating 0/10 - no pain  -RD     Posttreatment Pain Rating 0/10 - no pain  -RD       Row Name 06/13/24 1421          Plan of Care Review    Plan of Care Reviewed With patient;spouse  -RD     Outcome Evaluation Patient provided with all education needed from physical therapy prior to DC to home following TKA.  Patient has used walker before and has one available for home use.  Patient has been performing stairs using the same technique as he would use postop so no further education needed.  Patient demonstrated and verbalized understanding of TKA HEP with reinforcement of aps and qs as initial 2 important exercises.  Reviewed use  of ice, mix of exercise and rest.  As long as patient is safely ambulating in room with walker with nursing staff, patient would be appropriate to DC HOME TODAY.  If patient does stay overnight, PT will check in on patient in am to see if any questions arise but do not anticipate any further PT needs.  -       Row Name 06/13/24 1421          Therapy Assessment/Plan (PT)    Rehab Potential (PT) good, to achieve stated therapy goals  -RD     Criteria for Skilled Interventions Met (PT) skilled treatment is necessary  -RD     Therapy Frequency (PT) 6 times/wk  -       Row Name 06/13/24 1421          Positioning and Restraints    Pre-Treatment Position in bed  -RD     Post Treatment Position bed  -RD     In Bed supine;with nsg  -RD               User Key  (r) = Recorded By, (t) = Taken By, (c) = Cosigned By      Initials Name Provider Type    Lesa Cordova, PT Physical Therapist                   Outcome Measures       Row Name 06/13/24 1428          How much help from another person do you currently need...    Turning from your back to your side while in flat bed without using bedrails? 4  -RD     Moving from lying on back to sitting on the side of a flat bed without bedrails? 4  -RD     Moving to and from a bed to a chair (including a wheelchair)? 4  -RD     Standing up from a chair using your arms (e.g., wheelchair, bedside chair)? 4  -RD     Climbing 3-5 steps with a railing? 3  -RD     To walk in hospital room? 3  -RD     AM-PAC 6 Clicks Score (PT) 22  -RD     Highest Level of Mobility Goal 7 --> Walk 25 feet or more  -       Row Name 06/13/24 1428          Functional Assessment    Outcome Measure Options AM-PAC 6 Clicks Basic Mobility (PT)  -RD               User Key  (r) = Recorded By, (t) = Taken By, (c) = Cosigned By      Initials Name Provider Type    Lesa Cordova, PT Physical Therapist                                 Physical Therapy Education       Title: PT OT SLP Therapies (Done)        Topic: Physical Therapy (Done)       Point: Mobility training (Done)       Learning Progress Summary             Patient Acceptance, E,TB, VU by RD at 6/13/2024 1428                         Point: Home exercise program (Done)       Learning Progress Summary             Patient Acceptance, E,TB, VU by RD at 6/13/2024 1428                         Point: Body mechanics (Done)       Learning Progress Summary             Patient Acceptance, E,TB, VU by RD at 6/13/2024 1428                         Point: Precautions (Done)       Learning Progress Summary             Patient Acceptance, E,TB, VU by RD at 6/13/2024 1428                                         User Key       Initials Effective Dates Name Provider Type Discipline     06/16/21 -  Lesa Blanc, PT Physical Therapist PT                  PT Recommendation and Plan  Planned Therapy Interventions (PT): gait training, home exercise program, strengthening, stretching, stair training  Plan of Care Reviewed With: patient, spouse  Outcome Evaluation: Patient provided with all education needed from physical therapy prior to DC to home following TKA.  Patient has used walker before and has one available for home use.  Patient has been performing stairs using the same technique as he would use postop so no further education needed.  Patient demonstrated and verbalized understanding of TKA HEP with reinforcement of aps and qs as initial 2 important exercises.  Reviewed use of ice, mix of exercise and rest.  As long as patient is safely ambulating in room with walker with nursing staff, patient would be appropriate to DC HOME TODAY.  If patient does stay overnight, PT will check in on patient in am to see if any questions arise but do not anticipate any further PT needs.     Time Calculation:         PT Charges       Row Name 06/13/24 1430             Time Calculation    Start Time 1410  -RD      Stop Time 1420  -RD      Time Calculation (min) 10 min  -RD       PT Received On 06/13/24  -RD      PT - Next Appointment 06/14/24  -RD      PT Goal Re-Cert Due Date 06/16/24  -RD                User Key  (r) = Recorded By, (t) = Taken By, (c) = Cosigned By      Initials Name Provider Type    Lesa Cordova, PT Physical Therapist                  Therapy Charges for Today       Code Description Service Date Service Provider Modifiers Qty    60878855955 HC PT EVAL MOD COMPLEXITY 2 6/13/2024 Lesa Blanc, PT GP 1            PT G-Codes  Outcome Measure Options: AM-PAC 6 Clicks Basic Mobility (PT)  AM-PAC 6 Clicks Score (PT): 22  PT Discharge Summary  Anticipated Discharge Disposition (PT): home with home health    Lesa Blanc, PT  6/13/2024

## 2024-06-13 NOTE — CASE MANAGEMENT/SOCIAL WORK
Continued Stay Note  [facility]     Patient Name: Alexy Ram  MRN: [id number]  Today's Date: 6/13/2024    Admit Date: (Not on file)    Plan: Home with [facility]    Discharge Plan       Row Name 06/13/24 0928       Plan    Plan Home with [facility]     Patient/Family in Agreement with Plan yes    Provided Post Acute Provider List? Yes    Post Acute Provider List Home Health    Delivered To Patient    Method of Delivery Telephone                   Discharge Codes    No documentation.                       Shannon Epley, RN

## 2024-06-13 NOTE — PLAN OF CARE
Goal Outcome Evaluation:  Plan of Care Reviewed With: patient, spouse           Outcome Evaluation: Patient provided with all education needed from physical therapy prior to DC to home following TKA.  Patient has used walker before and has one available for home use.  Patient has been performing stairs using the same technique as he would use postop so no further education needed.  Patient demonstrated and verbalized understanding of TKA HEP with reinforcement of aps and qs as initial 2 important exercises.  Reviewed use of ice, mix of exercise and rest.  As long as patient is safely ambulating in room with walker with nursing staff, patient would be appropriate to DC HOME TODAY.  If patient does stay overnight, PT will check in on patient in am to see if any questions arise but do not anticipate any further PT needs.      Anticipated Discharge Disposition (PT): home with home health

## 2024-06-13 NOTE — ANESTHESIA POSTPROCEDURE EVALUATION
Patient: Alexy Ram    Procedure Summary       Date: 06/13/24 Room / Location:  [facility] [address] [place] [address] [facility]    Anesthesia Start: 1423 Anesthesia Stop: 1617    Procedure: TOTAL KNEE ARTHROPLASTY (Left: Knee) Diagnosis:       Primary osteoarthritis of left knee      (Primary osteoarthritis of left knee [M17.12])    Surgeons: Nikko Staton MD Provider: Buster Maria MD    Anesthesia Type: general with block ASA Status: 3            Anesthesia Type: general with block    Vitals  Vitals Value Taken Time   /65 06/13/24 1652   Temp 36.4 °C (97.5 °F) 06/13/24 1615   Pulse 57 06/13/24 1700   Resp 17 06/13/24 1630   SpO2 97 % 06/13/24 1700   Vitals shown include unfiled device data.        Post Anesthesia Care and Evaluation    Patient location during evaluation: bedside  Patient participation: complete - patient participated  Level of consciousness: awake and alert  Pain management: adequate    Airway patency: patent  Anesthetic complications: No anesthetic complications  PONV Status: controlled  Cardiovascular status: acceptable  Respiratory status: acceptable  Hydration status: acceptable

## 2024-06-13 NOTE — ANESTHESIA POSTPROCEDURE EVALUATION
Patient: Alexy Ram    Procedure Summary       Date: 06/13/24 Room / Location:  [facility] [address] [place] [address] [facility]    Anesthesia Start: 1423 Anesthesia Stop: 1617    Procedure: TOTAL KNEE ARTHROPLASTY (Left: Knee) Diagnosis:       Primary osteoarthritis of left knee      (Primary osteoarthritis of left knee [M17.12])    Surgeons: Nikko Staton MD Provider: Buster Maria MD    Anesthesia Type: general with block ASA Status: 3            Anesthesia Type: general with block    Vitals  Vitals Value Taken Time   /83 06/13/24 1800   Temp 36.4 °C (97.6 °F) 06/13/24 1700   Pulse 53 06/13/24 1803   Resp 14 06/13/24 1715   SpO2 98 % 06/13/24 1803   Vitals shown include unfiled device data.        Post Anesthesia Care and Evaluation    Patient location during evaluation: bedside  Patient participation: complete - patient participated  Level of consciousness: awake  Pain management: adequate    Airway patency: patent  Anesthetic complications: No anesthetic complications  PONV Status: none  Cardiovascular status: acceptable  Respiratory status: acceptable  Hydration status: acceptable  Post Neuraxial Block status: Motor and sensory function returned to baseline

## 2024-06-13 NOTE — ANESTHESIA PREPROCEDURE EVALUATION
Anesthesia Evaluation     Patient summary reviewed and Nursing notes reviewed   no history of anesthetic complications:   NPO Solid Status: > 8 hours  NPO Liquid Status: > 2 hours           Airway   Mallampati: II  TM distance: >3 FB  Neck ROM: full  Dental      Pulmonary    (+) lung cancer, COPD,sleep apnea on CPAP  Cardiovascular     (+) pacemaker, hypertension, dysrhythmias Atrial Fib      Neuro/Psych  GI/Hepatic/Renal/Endo    (+) morbid obesity, diabetes mellitus    Musculoskeletal     Abdominal   (+) obese   Substance History      OB/GYN          Other                          Anesthesia Plan    ASA 3     general with block     intravenous induction     Anesthetic plan, risks, benefits, and alternatives have been provided, discussed and informed consent has been obtained with: patient.        CODE STATUS:          Follow up in 6 months or sooner if needed

## 2024-06-13 NOTE — ANESTHESIA PROCEDURE NOTES
Peripheral Block    Pre-sedation assessment completed: 6/13/2024 1:45 PM    Patient reassessed immediately prior to procedure    Patient location during procedure: pre-op  Start time: 6/13/2024 1:50 PM  Stop time: 6/13/2024 1:55 PM  Reason for block: at surgeon's request and post-op pain management  Performed by  Anesthesiologist: Henok Holland MD  Preanesthetic Checklist  Completed: patient identified, IV checked, site marked, risks and benefits discussed, surgical consent, monitors and equipment checked, pre-op evaluation and timeout performed  Prep:  Pt Position: supine  Sterile barriers:cap, mask and washed/disinfected hands  Prep: ChloraPrep  Patient monitoring: blood pressure monitoring, continuous pulse oximetry and EKG  Procedure    Sedation: yes  Performed under: local infiltration  Guidance:ultrasound guided    ULTRASOUND INTERPRETATION.  Using ultrasound guidance a gauge needle was placed in close proximity to the femoral nerve, at which point, under ultrasound guidance anesthetic was injected in the area of the nerve and spread of the anesthesia was seen on ultrasound in close proximity thereto.  There were no abnormalities seen on ultrasound; a digital image was taken; and the patient tolerated the procedure with no complications. Images:still images obtained, printed/placed on chart    Laterality:left  Block Type:adductor canal block  Injection Technique:single-shot  Needle Type:echogenic  Needle Gauge:21 G  Resistance on Injection: none    Medications Used: dexamethasone (DECADRON) injection - Injection   4 mg - 6/13/2024 1:55:00 PM  ropivacaine (NAROPIN) 0.5 % injection - Injection   15 mL - 6/13/2024 1:55:00 PM      Post Assessment  Injection Assessment: negative aspiration for heme, no paresthesia on injection and incremental injection  Patient Tolerance:comfortable throughout block  Complications:no  Additional Notes  Ultrasound guidance used to visualize nerve anatomy, guide needle placement  and verify local anesthetic disbursement.       Performed by: Henok Holland MD

## 2024-06-14 ENCOUNTER — HOME HEALTH ADMISSION (OUTPATIENT)
Dept: HOME HEALTH SERVICES | Facility: HOME HEALTHCARE | Age: 69
End: 2024-06-14
Payer: MEDICARE

## 2024-06-14 ENCOUNTER — DOCUMENTATION (OUTPATIENT)
Dept: HOME HEALTH SERVICES | Facility: HOME HEALTHCARE | Age: 69
End: 2024-06-14
Payer: MEDICARE

## 2024-06-14 ENCOUNTER — READMISSION MANAGEMENT (OUTPATIENT)
Dept: CALL CENTER | Facility: HOSPITAL | Age: 69
End: 2024-06-14
Payer: MEDICARE

## 2024-06-14 VITALS
OXYGEN SATURATION: 100 % | DIASTOLIC BLOOD PRESSURE: 64 MMHG | BODY MASS INDEX: 43.23 KG/M2 | HEIGHT: 66 IN | WEIGHT: 268.96 LBS | RESPIRATION RATE: 17 BRPM | HEART RATE: 55 BPM | TEMPERATURE: 98.1 F | SYSTOLIC BLOOD PRESSURE: 117 MMHG

## 2024-06-14 LAB
HCT VFR BLD AUTO: 38.2 % (ref 37.5–51)
HGB BLD-MCNC: 12.6 G/DL (ref 13–17.7)

## 2024-06-14 PROCEDURE — 25010000002 CEFAZOLIN PER 500 MG: Performed by: ORTHOPAEDIC SURGERY

## 2024-06-14 PROCEDURE — 85018 HEMOGLOBIN: CPT | Performed by: NURSE PRACTITIONER

## 2024-06-14 PROCEDURE — 85014 HEMATOCRIT: CPT | Performed by: NURSE PRACTITIONER

## 2024-06-14 PROCEDURE — 99024 POSTOP FOLLOW-UP VISIT: CPT | Performed by: NURSE PRACTITIONER

## 2024-06-14 RX ADMIN — HYDROCODONE BITARTRATE AND ACETAMINOPHEN 1 TABLET: 7.5; 325 TABLET ORAL at 04:15

## 2024-06-14 RX ADMIN — VALSARTAN 320 MG: 320 TABLET, FILM COATED ORAL at 08:14

## 2024-06-14 RX ADMIN — GABAPENTIN 200 MG: 100 CAPSULE ORAL at 08:14

## 2024-06-14 RX ADMIN — POTASSIUM CHLORIDE 20 MEQ: 750 TABLET, EXTENDED RELEASE ORAL at 08:14

## 2024-06-14 RX ADMIN — APIXABAN 5 MG: 5 TABLET, FILM COATED ORAL at 08:14

## 2024-06-14 RX ADMIN — HYDROCHLOROTHIAZIDE 25 MG: 25 TABLET ORAL at 08:14

## 2024-06-14 RX ADMIN — HYDRALAZINE HYDROCHLORIDE 25 MG: 25 TABLET ORAL at 08:14

## 2024-06-14 RX ADMIN — SODIUM CHLORIDE 2000 MG: 900 INJECTION INTRAVENOUS at 06:28

## 2024-06-14 RX ADMIN — HYDROCODONE BITARTRATE AND ACETAMINOPHEN 1 TABLET: 7.5; 325 TABLET ORAL at 08:14

## 2024-06-14 RX ADMIN — CARVEDILOL 50 MG: 25 TABLET, FILM COATED ORAL at 08:14

## 2024-06-14 RX ADMIN — ATORVASTATIN CALCIUM 40 MG: 20 TABLET, FILM COATED ORAL at 08:14

## 2024-06-14 RX ADMIN — AMLODIPINE BESYLATE 10 MG: 10 TABLET ORAL at 08:14

## 2024-06-14 NOTE — PROGRESS NOTES
[facility] following for home care needs.  Patient with referral from Dr Staton in [facility].  Had our services a year ago and is agreeable again.  Verified all info.  D/Cing today

## 2024-06-14 NOTE — PLAN OF CARE
Goal Outcome Evaluation:           Progress: improving  Outcome Evaluation: 68 y/o s/POD 1 L TKA. AOX4. Pt up w/ assist x1. Voiding function intact. Neuro checks WNL. QUINN dsg c/d/i and flashing OK. Pain managed via PO pills. PIV removed. Needs met, VSS, RA. Educated pt on d/c instructions. D/C home w/ HH and family support. Meds to bed completed.

## 2024-06-14 NOTE — PLAN OF CARE
Goal Outcome Evaluation:  Plan of Care Reviewed With: patient        Progress: improving  Outcome Evaluation: Pt remains stable. Up with 1 assist and walker use. Voiding per BRP. QUINN is cdi with hv in place. Norco PRN for pain. Educated on sleep apnea management, CPAP worn through the night. Plans to dc home likely today.

## 2024-06-14 NOTE — DISCHARGE SUMMARY
Patient Name: Alexy Ram  Patient YOB: 1955    Date of Admission:  6/13/2024  Date of Discharge:  6/14/2024  Discharge Diagnosis: [place] ARTHRP KNE CONDYLE&PLATU MEDIAL&LAT COMPARTMENTS [12339] (TOTAL KNEE ARTHROPLASTY)  Presenting Problem/History of Present Illness: Primary osteoarthritis of left knee [M17.12]  Status post total knee replacement [Z96.659]  Admitting Physician: Dr iNkko Staton  Consults:   Consults       No orders found for last 30 day(s).            DETAILS OF HOSPITAL STAY:  Patient is a 69 y.o. male was admitted to the floor following the above procedure and underwent an uncomplicated hospital stay.  Patient did well with physical therapy and was ambulating well without problems.  On the day of discharge the wound was clean, dry and intact and calf was soft and nontender and Homans sign was negative.  Patient was tolerating  without problems.  Patient will be discharged home.    Condition on Discharge:  Stable    Vital Signs  Temp:  [97.5 °F (36.4 °C)-98.4 °F (36.9 °C)] 98.1 °F (36.7 °C)  Heart Rate:  [55-74] 55  Resp:  [14-18] 17  BP: (109-148)/(64-83) 117/64    LABS:      Admission on 06/13/2024   Component Date Value Ref Range Status    Glucose 06/13/2024 98  70 - 130 mg/dL Final    Hemoglobin 06/14/2024 12.6 (L)  13.0 - 17.7 g/dL Final    Hematocrit 06/14/2024 38.2  37.5 - 51.0 % Final       No results found.    Discharge Medications     Discharge Medications        New Medications        Instructions Start Date   HYDROcodone-acetaminophen 7.5-325 MG per tablet  Commonly known as: NORCO   1 tablet, Oral, Every 4 Hours PRN      ondansetron 4 MG tablet  Commonly known as: Zofran   4 mg, Oral, Every 8 Hours PRN      polyethylene glycol 17 GM/SCOOP powder  Commonly known as: MIRALAX   Mix 17 g (1 capful) in liquid and drink by mouth 2 (Two) Times a Day for 7 days.             Changes to Medications        Instructions Start Date   gabapentin 100 MG capsule  Commonly known as:  NEURONTIN  What changed:   how much to take  how to take this  when to take this  additional instructions   TAKE TWO CAPSULES BY MOUTH EVERY MORNING, ONE CAPSULE IN THE MIDDLE OF THE DAY AND 2 CAPSULES AT BEDTIME      pioglitazone 30 MG tablet  Commonly known as: ACTOS  What changed: when to take this   30 mg, Oral, Daily             Continue These Medications        Instructions Start Date   albuterol sulfate  (90 Base) MCG/ACT inhaler  Commonly known as: PROVENTIL HFA;VENTOLIN HFA;PROAIR HFA   2 puffs, Inhalation, Every 4 Hours PRN      amLODIPine 10 MG tablet  Commonly known as: NORVASC   10 mg, Oral, Daily      atorvastatin 40 MG tablet  Commonly known as: LIPITOR   40 mg, Oral, Every Night at Bedtime      carvedilol 25 MG tablet  Commonly known as: COREG   50 mg, Oral, 2 Times Daily With Meals      cetirizine 10 MG tablet  Commonly known as: zyrTEC   10 mg, Oral, Daily      Eliquis 5 MG tablet tablet  Generic drug: apixaban   5 mg, Oral, 2 Times Daily      hydrALAZINE 25 MG tablet  Commonly known as: APRESOLINE   25 mg, Oral, 2 Times Daily      potassium chloride 10 MEQ CR tablet   20 mEq, Oral, 2 Times Daily      Spiriva Respimat 2.5 MCG/ACT aerosol solution inhaler  Generic drug: tiotropium bromide monohydrate   1 puff, Inhalation, Every Night at Bedtime      tamsulosin 0.4 MG capsule 24 hr capsule  Commonly known as: FLOMAX   0.4 mg, Oral, Nightly      valsartan-hydrochlorothiazide 320-25 MG per tablet  Commonly known as: DIOVAN-HCT   1 tablet, Oral, Every Morning             Stop These Medications      acetaminophen 500 MG tablet  Commonly known as: TYLENOL     cephalexin 500 MG capsule  Commonly known as: KEFLEX     DAILY MULTIVITAMIN PO     PROBIOTIC DAILY PO     PROSTATE HEALTH PO     VITAMIN B COMPLEX PO              Discharge Instructions: Patient is to continue with physical therapy exercises daily and continue working with the physical therapist as ordered. Patient may weight bear as  tolerated. Apply ice regularly. Patient may ice for long periods of time as long as ice is not directly on the skin. Patient instructed on frequent calf pumping exercises.  Patient also instructed on incentive spirometer during hospitalization and encouraged to continue to use at home regularly.    Dressing: The dressing is designed to be left in place until you return to the office in 2 weeks.  The suction unit should stop functioning at 7 days and the green light will switch to yellow.  At that point the suction unit and tubing can be disconnected at the port closest to the dressing.  The suction unit and tubing may be discarded.  You may shower immediately upon return home, you will need to turn the pump off by depressing the orange button once and then you may disconnect the pump and tubing at the connection port.  After showering, shake off the excess water and reattach the tubing.  Restart the pump by depressing the orange button one time and you will notice the green light flashing again.  If the dressing becomes dislodged or saturated it should be changed. Please refer to the QUINN information sheet if you have any questions about the dressing.  If you have a home health nurse or therapist they can be contacted to assist with dressing change or repair. You may also call the QUINN dressing hotline for questions related to the dressing (1-771.448.2290). If there are still other problems or questions related to the dressing despite these measures then you can contact Olena at our office [phone].  If for some reason the QUINN dressing is removed, after 7 days the wound can be gently cleaned with antibacterial soap then allowed to dry and covered with a dry sterile dressing. The wound should be covered at all times except while showering.  Patient may change dressings daily and prn using sterile 4x4 and paper tape, and should call if any unusual drainage, redness or swelling.*  Follow up appointment in 2 weeks -  patient to call the office at [phone] to schedule.  Patient will be discharged on Eliquis as directed    Discharge Diagnosis:    Primary osteoarthritis of left knee    Status post total knee replacement      Follow-up Appointments  Future Appointments   Date Time Provider Department Center   6/27/2024  1:30 PM Nikko Staton MD [facility]   7/16/2024  3:50 PM Nima Farmer APRN [facility]   11/12/2024  9:20 AM [facility]   11/19/2024 11:00 AM Pipe Vaughn MD [facility]          ROME Varner  06/14/24  06:47 EDT

## 2024-06-14 NOTE — SIGNIFICANT NOTE
06/14/24 1005   OTHER   Discipline physical therapist   Therapy Assessment/Plan (PT)   Criteria for Skilled Interventions Met (PT) does not meet criteria for skilled intervention  (Pt requested to amb in hallway - pt amb 400' indep with rwx with safe use of walker and good balance.  No skilled tx provided.  Patient ready for DC at anytime.)

## 2024-06-14 NOTE — PROGRESS NOTES
Continued Stay Note  [facility]     Patient Name: Alexy Ram  MRN: [id number]  Today's Date: 6/14/2024    Admit Date: 6/13/2024    Plan: Home with [facility]    Discharge Plan       Row Name 06/14/24 1531       Plan    Final Discharge Disposition Code 06 - home with home health care    Final Note [facility]                   Discharge Codes    No documentation.                 Expected Discharge Date and Time       Expected Discharge Date Expected Discharge Time    Jun 13, 2024               Katiuska Trujillo RN

## 2024-06-14 NOTE — OUTREACH NOTE
Prep Survey      Flowsheet Row Responses   [facility] patient discharged from? May   Is LACE score < 7 ? Yes   Eligibility [facility]   Date of Admission 06/13/24   Date of Discharge 06/14/24   Discharge Disposition Home or Self Care   Discharge diagnosis total knee replacement   Does the patient have one of the following disease processes/diagnoses(primary or secondary)? Total Joint Replacement   Does the patient have Home health ordered? Yes   What is the Home health agency?  [facility]   Is there a DME ordered? No   Prep survey completed? Yes            Venessa DASILVA - Registered Nurse

## 2024-06-15 ENCOUNTER — HOME CARE VISIT (OUTPATIENT)
Dept: HOME HEALTH SERVICES | Facility: HOME HEALTHCARE | Age: 69
End: 2024-06-15
Payer: MEDICARE

## 2024-06-15 VITALS
RESPIRATION RATE: 18 BRPM | TEMPERATURE: 97.4 F | SYSTOLIC BLOOD PRESSURE: 128 MMHG | OXYGEN SATURATION: 96 % | HEART RATE: 84 BPM | DIASTOLIC BLOOD PRESSURE: 63 MMHG

## 2024-06-15 PROCEDURE — G0151 HHCP-SERV OF PT,EA 15 MIN: HCPCS

## 2024-06-15 NOTE — HOME HEALTH
"Reason for referral/Focus of Care:  68 yo M referred to home health physical therapy with decreased ability to transfer and amb related to recent L TKR    Subjective: \"Things were going really well yesterday but it is a little rougher today\" per patient    Patient's PT Goal: \"get back to walking on my own\" per patient    Pertinent Medical History: HTN, type 2 diabetes meelity, s/p lobectomy of L lung, GIL, OA of L hip s/p L THR, OA of L knee, COPD    Previous level of function: IND with amb with/without cane, IND with ADLs, driving    Social Environment/DME/Potential Barriers for Goal Attainment: lives with spouse and teenage grandchildren who provide assist as needed. 4-5 steps to etner home. Bed/bath on main floor. Has FWW, cane, grab bars.    Skin Integrity/wound status: see wound care screen for details.    Code Status: Full    Medication issues/concerns: none identified    HB status: yes. See homebound screen for details.    Problems Identified: see Care Plan    Functional Status/Fall Risk/Safety: see PT evaluation/care plan    POC notification to  Dr. Staton    on  6/15/24   via case communication.    Assessment: Skilled physical therapy is needed for 1w1, 3w2 due to decreased ability to transfer and amb related to recent L TKR for evaluation, gait training, ther ex, ROM progression, fall prevention, pain/jean carlos management    Plan for next visit: gait training with FWW, pain/edema management instruction, progress HEP"

## 2024-06-17 ENCOUNTER — TRANSITIONAL CARE MANAGEMENT TELEPHONE ENCOUNTER (OUTPATIENT)
Dept: CALL CENTER | Facility: HOSPITAL | Age: 69
End: 2024-06-17
Payer: MEDICARE

## 2024-06-17 ENCOUNTER — HOME CARE VISIT (OUTPATIENT)
Dept: HOME HEALTH SERVICES | Facility: HOME HEALTHCARE | Age: 69
End: 2024-06-17
Payer: MEDICARE

## 2024-06-17 ENCOUNTER — TELEPHONE (OUTPATIENT)
Dept: ORTHOPEDIC SURGERY | Facility: HOSPITAL | Age: 69
End: 2024-06-17
Payer: MEDICARE

## 2024-06-17 VITALS
HEART RATE: 63 BPM | DIASTOLIC BLOOD PRESSURE: 62 MMHG | OXYGEN SATURATION: 95 % | SYSTOLIC BLOOD PRESSURE: 132 MMHG | RESPIRATION RATE: 18 BRPM | TEMPERATURE: 97.5 F

## 2024-06-17 PROCEDURE — G0151 HHCP-SERV OF PT,EA 15 MIN: HCPCS

## 2024-06-17 NOTE — OUTREACH NOTE
Patient is eligible for TCM visit but declined to schedule during today's call. Please reach out to patient with appt that satisfies TCM guidelines. TCM complete.

## 2024-06-17 NOTE — OUTREACH NOTE
Call Center TCM Note      Flowsheet Row Responses   [facility] patient discharged from? [place]   Does the patient have one of the following disease processes/diagnoses(primary or secondary)? Total Joint Replacement   Joint surgery performed? Knee   TCM attempt successful? Yes   Call start time 1053   Call end time 1057   Discharge diagnosis total knee replacement   Does the patient have all medications related to this admission filled (includes all antibiotics, pain medications, etc.) Yes   Is the patient taking all medications as directed (includes completed medication regime)? Yes   Is the patient able to teach back alternate methods of pain control? Ice, Reposition, Short, frequent activity   Does the patient have an appointment with their PCP within 7-14 days of discharge? No   Nursing Interventions Patient declined scheduling/rescheduling appointment at this time, Routed TCM call to PCP office   What is the Home health agency?  [facility]   Has home health visited the patient within 72 hours of discharge? Yes   Psychosocial issues? No   Has the patient began therapy sessions (either in the home or as an out patient)? Yes   If the patient has started attending therapy, what post op day did they begin to attend (either in home or as an out patient)?   PO day #2   Has the patient fallen since discharge? No   Did the patient receive a copy of their discharge instructions? Yes   Nursing interventions Reviewed instructions with patient   What is the patient's perception of their functional status since discharge? Improving   Is the patient able to teach back signs and symptoms of infection? Incisional drainage, Temp >100.4 for 24h or longer, Blisters around incision, Severe discomfort or pain, Changes in mobility, Shortness of breath or chest pain   Is the patient able to teach back how to prevent infection? Check incision daily, Wash hands before and after touching incision, Eat well-balanced diet,  No tub baths, hot tub or swimming, Keep incision covered if drainage   Is the patient able to teach back signs and symptoms of DVT? Redness in calf, Shortness of breath or chest pain, Area hot to touch   Did the patient implement home safety suggestions from pre-surgery classes if attended? N/A   Is the patient/caregiver able to teach back the hierarchy of who to call/visit for symptoms/problems? PCP, Specialist, Home health nurse, Urgent Care, ED, 911 Yes   TCM call completed? Yes   Wrap up additional comments Patient reports he is doing well, pain well controlled.   Call end time 1057   Would this patient benefit from a Referral to [facility] Social Work? No   Is the patient interested in additional calls from an ambulatory ? No            Karen Olivares RN    6/17/2024, 11:03 EDT

## 2024-06-17 NOTE — TELEPHONE ENCOUNTER
Post op day 4  Discharge Instructions:  Ask patient about his or her discharge instructions  ?  Patient confirmed understanding   []  Further instruction needed   What, if any, recommendations, teaching, or interventions did you provide? Click or tap here to enter text.  Health status:  Pain controlled Yes   Taking the pain medication regularly and it does help.   Recommended interventions:  Yes  incision/dressing status   ?  Clean without redness, drainage, odor  []  Redness    []  Drainage - color Click or tap here to enter text.  []  Odor  QUINN - Green light blinking Yes  Difficulties urination No  Last BM 6/17/2024 (if no BM by day 3-recommend OTC suppository or fleets enema)  Taking the medication, no issues   Medications:  ?Medications reviewed with patient/family/caregiver  Patient taking medications as prescribed?   Yes  If not taking medications as prescribed, note specific medicine(s) and reason for each:  Click or tap here to enter text.  Hospital Follow Up Plan:  Follow up Appointment with Orthopedic surgeon:  ?Has f/u appointment                []Scheduled f/u appointment  Home Care ordered at discharge?    Yes        Home Care started, or contact made?    Yes   If no, action taken:   DME obtained/used in home?         Yes   Using IS  Choose an item.   Other information: Mr. Ram said she is doing ok. Yesterday was rough, but he is feeling much better today and feels he has turned a corner. He was to be a same day, but stayed overnight as his surgery was later and the MD felt it would be best to stay. PT came out to see him this morning and the session went well. He is walking and doing his exercises. He does have some bleeding on the dressing he said about 2/3 but the light is still blinking and therapy said they would check it and change it if needed. He does have some increased pain but the pain medication does help. BM's are good, no issues. Mr. Ram doesn't have any questions for me at this  time. He has my contact information should he need anything.

## 2024-06-17 NOTE — HOME HEALTH
"Subjective: \"It was really hurting yesterday\" per patient    no new med changes  no recent falls    Skill/education provided: see interventions for details    Patient/caregiver response: see interventions for details    Assessment: patient with c/o increased pain and noted increased drainage today. Good follow through with HEP and exercise bike. Ongoing skilled physical therapy is needed due to impaired gait and increased fall risk s/p L TKR for gait training, ther ex, HEP, fall preventin and pain/edema management    Plan for next visit: gait training, progress HEP, fall prevention education"

## 2024-06-19 ENCOUNTER — HOME CARE VISIT (OUTPATIENT)
Dept: HOME HEALTH SERVICES | Facility: HOME HEALTHCARE | Age: 69
End: 2024-06-19
Payer: MEDICARE

## 2024-06-19 VITALS
OXYGEN SATURATION: 93 % | DIASTOLIC BLOOD PRESSURE: 68 MMHG | RESPIRATION RATE: 18 BRPM | TEMPERATURE: 97.3 F | HEART RATE: 64 BPM | SYSTOLIC BLOOD PRESSURE: 124 MMHG

## 2024-06-19 DIAGNOSIS — Z96.652 S/P TKR (TOTAL KNEE REPLACEMENT), LEFT: ICD-10-CM

## 2024-06-19 PROCEDURE — G0151 HHCP-SERV OF PT,EA 15 MIN: HCPCS

## 2024-06-19 RX ORDER — HYDROCODONE BITARTRATE AND ACETAMINOPHEN 7.5; 325 MG/1; MG/1
1 TABLET ORAL EVERY 4 HOURS PRN
Qty: 40 TABLET | Refills: 0 | Status: ON HOLD | OUTPATIENT
Start: 2024-06-19

## 2024-06-21 ENCOUNTER — OFFICE VISIT (OUTPATIENT)
Dept: FAMILY MEDICINE CLINIC | Facility: CLINIC | Age: 69
End: 2024-06-21
Payer: MEDICARE

## 2024-06-21 ENCOUNTER — HOME CARE VISIT (OUTPATIENT)
Dept: HOME HEALTH SERVICES | Facility: HOME HEALTHCARE | Age: 69
End: 2024-06-21
Payer: MEDICARE

## 2024-06-21 VITALS
DIASTOLIC BLOOD PRESSURE: 60 MMHG | WEIGHT: 270 LBS | BODY MASS INDEX: 43.39 KG/M2 | OXYGEN SATURATION: 96 % | HEART RATE: 68 BPM | TEMPERATURE: 97.7 F | SYSTOLIC BLOOD PRESSURE: 110 MMHG | HEIGHT: 66 IN

## 2024-06-21 VITALS
SYSTOLIC BLOOD PRESSURE: 135 MMHG | DIASTOLIC BLOOD PRESSURE: 77 MMHG | HEART RATE: 65 BPM | RESPIRATION RATE: 18 BRPM | OXYGEN SATURATION: 96 % | TEMPERATURE: 99.4 F

## 2024-06-21 DIAGNOSIS — Z09 HOSPITAL DISCHARGE FOLLOW-UP: Primary | ICD-10-CM

## 2024-06-21 DIAGNOSIS — Z96.652 STATUS POST TOTAL LEFT KNEE REPLACEMENT: ICD-10-CM

## 2024-06-21 PROCEDURE — 3074F SYST BP LT 130 MM HG: CPT | Performed by: FAMILY MEDICINE

## 2024-06-21 PROCEDURE — 1125F AMNT PAIN NOTED PAIN PRSNT: CPT | Performed by: FAMILY MEDICINE

## 2024-06-21 PROCEDURE — 1159F MED LIST DOCD IN RCRD: CPT | Performed by: FAMILY MEDICINE

## 2024-06-21 PROCEDURE — 3044F HG A1C LEVEL LT 7.0%: CPT | Performed by: FAMILY MEDICINE

## 2024-06-21 PROCEDURE — 1111F DSCHRG MED/CURRENT MED MERGE: CPT | Performed by: FAMILY MEDICINE

## 2024-06-21 PROCEDURE — 99213 OFFICE O/P EST LOW 20 MIN: CPT | Performed by: FAMILY MEDICINE

## 2024-06-21 PROCEDURE — 1160F RVW MEDS BY RX/DR IN RCRD: CPT | Performed by: FAMILY MEDICINE

## 2024-06-21 PROCEDURE — G0151 HHCP-SERV OF PT,EA 15 MIN: HCPCS | Performed by: PHYSICAL THERAPIST

## 2024-06-21 PROCEDURE — 3078F DIAST BP <80 MM HG: CPT | Performed by: FAMILY MEDICINE

## 2024-06-21 NOTE — PROGRESS NOTES
"Transitional Care Follow Up Visit  Subjective     Alexy Ram is a 69 y.o. male who presents for a transitional care management visit.    Within 48 business hours after discharge our office contacted him via telephone to coordinate his care and needs.      I reviewed and discussed the details of that call along with the discharge summary, hospital problems, inpatient lab results, inpatient diagnostic studies, and consultation reports with Alexy.     Current outpatient and discharge medications have been reconciled for the patient.  Reviewed by: Pipe Vaughn MD          6/14/2024     3:23 PM   Date of TCM Phone Call   [facility]   Date of Admission 6/13/2024   Date of Discharge 6/14/2024   Discharge Disposition Home or Self Care     Risk for Readmission (LACE) Score: 7 (6/14/2024  6:00 AM)      History of Present Illness   Course During Hospital Stay:   Patient is 8 days postop left total knee replacement at [facility] with Dr. Staton.  Surgery and hospital stay went well.  He is now at home.  He is getting in-home physical therapy.  No fair amount of pain in the left knee and leg.  Left leg is also swollen.  No change in medications,  On Eliquis for DVT prevention     The following portions of the patient's history were reviewed and updated as appropriate: allergies, current medications, past family history, past medical history, past social history, past surgical history, and problem list.    Review of Systems    Objective   /60 (BP Location: Right arm, Patient Position: Sitting, Cuff Size: Large Adult)   Pulse 68   Temp 97.7 °F (36.5 °C)   Ht 167.6 cm (65.98\")   Wt 122 kg (270 lb)   SpO2 96%   BMI 43.60 kg/m²   Physical Exam  Vitals reviewed.   Cardiovascular:      Rate and Rhythm: Normal rate.   Pulmonary:      Effort: Pulmonary effort is normal.   Musculoskeletal:      Left knee: Swelling, erythema and ecchymosis present.   Neurological:      Mental Status: " He is alert.         Current outpatient and discharge medications have been reconciled for the patient.  Reviewed by: Pipe Vaughn MD    Assessment & Plan   Diagnoses and all orders for this visit:    1. Hospital discharge follow-up (Primary)    2. Status post total left knee replacement    Doing okay in physical therapy  Hydrocodone is treating the pain.  On anticoagulation to prevent postop DVT

## 2024-06-24 ENCOUNTER — APPOINTMENT (OUTPATIENT)
Dept: CT IMAGING | Facility: HOSPITAL | Age: 69
End: 2024-06-24
Payer: MEDICARE

## 2024-06-24 ENCOUNTER — HOSPITAL ENCOUNTER (INPATIENT)
Facility: HOSPITAL | Age: 69
LOS: 6 days | Discharge: HOME | End: 2024-06-30
Attending: EMERGENCY MEDICINE
Payer: MEDICARE

## 2024-06-24 ENCOUNTER — DOCUMENTATION (OUTPATIENT)
Dept: HOME HEALTH SERVICES | Facility: HOME HEALTHCARE | Age: 69
End: 2024-06-24
Payer: MEDICARE

## 2024-06-24 ENCOUNTER — APPOINTMENT (OUTPATIENT)
Dept: CARDIOLOGY | Facility: HOSPITAL | Age: 69
End: 2024-06-24
Payer: MEDICARE

## 2024-06-24 DIAGNOSIS — I50.811 ACUTE RIGHT-SIDED CONGESTIVE HEART FAILURE: ICD-10-CM

## 2024-06-24 DIAGNOSIS — E87.6 HYPOKALEMIA: Primary | ICD-10-CM

## 2024-06-24 DIAGNOSIS — R09.02 HYPOXIA: ICD-10-CM

## 2024-06-24 DIAGNOSIS — Z96.652 STATUS POST TOTAL LEFT KNEE REPLACEMENT: ICD-10-CM

## 2024-06-24 PROBLEM — I50.9 NEW ONSET OF CONGESTIVE HEART FAILURE: Status: ACTIVE | Noted: 2024-06-24

## 2024-06-24 PROBLEM — I50.9 CHF (CONGESTIVE HEART FAILURE): Status: ACTIVE | Noted: 2024-06-24

## 2024-06-24 LAB
ALBUMIN SERPL-MCNC: 3.4 G/DL (ref 3.5–5.2)
ALBUMIN/GLOB SERPL: 1.4 G/DL
ALP SERPL-CCNC: 54 U/L (ref 39–117)
ALT SERPL W P-5'-P-CCNC: 11 U/L (ref 1–41)
ANION GAP SERPL CALCULATED.3IONS-SCNC: 9.8 MMOL/L (ref 5–15)
AORTIC ARCH: 3.1 CM
AORTIC DIMENSIONLESS INDEX: 0.4 (DI)
ASCENDING AORTA: 3.3 CM
AST SERPL-CCNC: 12 U/L (ref 1–40)
BASOPHILS # BLD AUTO: 0.03 10*3/MM3 (ref 0–0.2)
BASOPHILS NFR BLD AUTO: 0.3 % (ref 0–1.5)
BH CV ECHO MEAS - ACS: 1.97 CM
BH CV ECHO MEAS - AO MAX PG: 20.3 MMHG
BH CV ECHO MEAS - AO MEAN PG: 9.7 MMHG
BH CV ECHO MEAS - AO ROOT DIAM: 3.8 CM
BH CV ECHO MEAS - AO V2 MAX: 225.3 CM/SEC
BH CV ECHO MEAS - AO V2 VTI: 46.1 CM
BH CV ECHO MEAS - AVA(I,D): 1.81 CM2
BH CV ECHO MEAS - EDV(CUBED): 278.1 ML
BH CV ECHO MEAS - EDV(MOD-SP2): 234 ML
BH CV ECHO MEAS - EDV(MOD-SP4): 217 ML
BH CV ECHO MEAS - EF(MOD-BP): 67.2 %
BH CV ECHO MEAS - EF(MOD-SP2): 64.1 %
BH CV ECHO MEAS - EF(MOD-SP4): 71 %
BH CV ECHO MEAS - ESV(CUBED): 52.7 ML
BH CV ECHO MEAS - ESV(MOD-SP2): 84 ML
BH CV ECHO MEAS - ESV(MOD-SP4): 63 ML
BH CV ECHO MEAS - FS: 42.6 %
BH CV ECHO MEAS - IVS/LVPW: 0.85 CM
BH CV ECHO MEAS - IVSD: 1.18 CM
BH CV ECHO MEAS - LAT PEAK E' VEL: 14.5 CM/SEC
BH CV ECHO MEAS - LV DIASTOLIC VOL/BSA (35-75): 96.3 CM2
BH CV ECHO MEAS - LV MASS(C)D: 392.3 GRAMS
BH CV ECHO MEAS - LV MAX PG: 4.7 MMHG
BH CV ECHO MEAS - LV MEAN PG: 2.44 MMHG
BH CV ECHO MEAS - LV SYSTOLIC VOL/BSA (12-30): 27.9 CM2
BH CV ECHO MEAS - LV V1 MAX: 109 CM/SEC
BH CV ECHO MEAS - LV V1 VTI: 20.7 CM
BH CV ECHO MEAS - LVIDD: 6.5 CM
BH CV ECHO MEAS - LVIDS: 3.7 CM
BH CV ECHO MEAS - LVOT AREA: 4 CM2
BH CV ECHO MEAS - LVOT DIAM: 2.27 CM
BH CV ECHO MEAS - LVPWD: 1.38 CM
BH CV ECHO MEAS - MED PEAK E' VEL: 10.8 CM/SEC
BH CV ECHO MEAS - MV A DUR: 0.22 SEC
BH CV ECHO MEAS - MV A MAX VEL: 61.8 CM/SEC
BH CV ECHO MEAS - MV DEC SLOPE: 632.6 CM/SEC2
BH CV ECHO MEAS - MV DEC TIME: 0.2 SEC
BH CV ECHO MEAS - MV E MAX VEL: 122 CM/SEC
BH CV ECHO MEAS - MV E/A: 1.97
BH CV ECHO MEAS - MV MAX PG: 8.1 MMHG
BH CV ECHO MEAS - MV MEAN PG: 2.3 MMHG
BH CV ECHO MEAS - MV P1/2T: 72.3 MSEC
BH CV ECHO MEAS - MV V2 VTI: 33.7 CM
BH CV ECHO MEAS - MVA(P1/2T): 3 CM2
BH CV ECHO MEAS - MVA(VTI): 2.48 CM2
BH CV ECHO MEAS - PA ACC TIME: 0.11 SEC
BH CV ECHO MEAS - PA V2 MAX: 131.7 CM/SEC
BH CV ECHO MEAS - PI END-D VEL: 130.3 CM/SEC
BH CV ECHO MEAS - PULM A REVS DUR: 0.14 SEC
BH CV ECHO MEAS - PULM A REVS VEL: 32.6 CM/SEC
BH CV ECHO MEAS - PULM DIAS VEL: 51.8 CM/SEC
BH CV ECHO MEAS - PULM S/D: 0.82
BH CV ECHO MEAS - PULM SYS VEL: 42.4 CM/SEC
BH CV ECHO MEAS - QP/QS: 2.46
BH CV ECHO MEAS - RAP SYSTOLE: 3 MMHG
BH CV ECHO MEAS - RV MAX PG: 6.3 MMHG
BH CV ECHO MEAS - RV V1 MAX: 125.5 CM/SEC
BH CV ECHO MEAS - RV V1 VTI: 22.8 CM
BH CV ECHO MEAS - RVOT DIAM: 3.4 CM
BH CV ECHO MEAS - RVSP: 16 MMHG
BH CV ECHO MEAS - SV(LVOT): 83.6 ML
BH CV ECHO MEAS - SV(MOD-SP2): 150 ML
BH CV ECHO MEAS - SV(MOD-SP4): 154 ML
BH CV ECHO MEAS - SV(RVOT): 205.8 ML
BH CV ECHO MEAS - SVI(LVOT): 37.1 ML/M2
BH CV ECHO MEAS - SVI(MOD-SP2): 66.5 ML/M2
BH CV ECHO MEAS - SVI(MOD-SP4): 68.3 ML/M2
BH CV ECHO MEAS - TAPSE (>1.6): 3.2 CM
BH CV ECHO MEAS - TR MAX PG: 12.6 MMHG
BH CV ECHO MEAS - TR MAX VEL: 177.8 CM/SEC
BH CV ECHO MEASUREMENTS AVERAGE E/E' RATIO: 9.64
BH CV ECHO SHUNT ASSESSMENT PERFORMED (HIDDEN SCRIPTING): 1
BH CV XLRA - RV BASE: 5.4 CM
BH CV XLRA - RV LENGTH: 7.8 CM
BH CV XLRA - RV MID: 4.4 CM
BH CV XLRA - TDI S': 12.5 CM/SEC
BILIRUB SERPL-MCNC: 0.8 MG/DL (ref 0–1.2)
BUN SERPL-MCNC: 20 MG/DL (ref 8–23)
BUN/CREAT SERPL: 25 (ref 7–25)
CALCIUM SPEC-SCNC: 9.4 MG/DL (ref 8.6–10.5)
CHLORIDE SERPL-SCNC: 104 MMOL/L (ref 98–107)
CO2 SERPL-SCNC: 27.2 MMOL/L (ref 22–29)
CREAT SERPL-MCNC: 0.8 MG/DL (ref 0.76–1.27)
DEPRECATED RDW RBC AUTO: 50.7 FL (ref 37–54)
EGFRCR SERPLBLD CKD-EPI 2021: 95.8 ML/MIN/1.73
EOSINOPHIL # BLD AUTO: 0.05 10*3/MM3 (ref 0–0.4)
EOSINOPHIL NFR BLD AUTO: 0.5 % (ref 0.3–6.2)
ERYTHROCYTE [DISTWIDTH] IN BLOOD BY AUTOMATED COUNT: 15.6 % (ref 12.3–15.4)
GLOBULIN UR ELPH-MCNC: 2.4 GM/DL
GLUCOSE BLDC GLUCOMTR-MCNC: 126 MG/DL (ref 70–130)
GLUCOSE BLDC GLUCOMTR-MCNC: 134 MG/DL (ref 70–130)
GLUCOSE BLDC GLUCOMTR-MCNC: 171 MG/DL (ref 70–130)
GLUCOSE SERPL-MCNC: 159 MG/DL (ref 65–99)
HCT VFR BLD AUTO: 34.8 % (ref 37.5–51)
HGB BLD-MCNC: 11 G/DL (ref 13–17.7)
IMM GRANULOCYTES # BLD AUTO: 0.02 10*3/MM3 (ref 0–0.05)
IMM GRANULOCYTES NFR BLD AUTO: 0.2 % (ref 0–0.5)
LEFT ATRIUM VOLUME INDEX: 80 ML/M2
LYMPHOCYTES # BLD AUTO: 1.35 10*3/MM3 (ref 0.7–3.1)
LYMPHOCYTES NFR BLD AUTO: 12.5 % (ref 19.6–45.3)
MAGNESIUM SERPL-MCNC: 2.5 MG/DL (ref 1.6–2.4)
MCH RBC QN AUTO: 27.8 PG (ref 26.6–33)
MCHC RBC AUTO-ENTMCNC: 31.6 G/DL (ref 31.5–35.7)
MCV RBC AUTO: 88.1 FL (ref 79–97)
MONOCYTES # BLD AUTO: 0.99 10*3/MM3 (ref 0.1–0.9)
MONOCYTES NFR BLD AUTO: 9.1 % (ref 5–12)
NEUTROPHILS NFR BLD AUTO: 77.4 % (ref 42.7–76)
NEUTROPHILS NFR BLD AUTO: 8.39 10*3/MM3 (ref 1.7–7)
PLATELET # BLD AUTO: 338 10*3/MM3 (ref 140–450)
PMV BLD AUTO: 10.7 FL (ref 6–12)
POTASSIUM SERPL-SCNC: 3.1 MMOL/L (ref 3.5–5.2)
PROT SERPL-MCNC: 5.8 G/DL (ref 6–8.5)
RBC # BLD AUTO: 3.95 10*6/MM3 (ref 4.14–5.8)
SINUS: 3.6 CM
SODIUM SERPL-SCNC: 141 MMOL/L (ref 136–145)
STJ: 2.9 CM
WBC NRBC COR # BLD AUTO: 10.83 10*3/MM3 (ref 3.4–10.8)

## 2024-06-24 PROCEDURE — 94761 N-INVAS EAR/PLS OXIMETRY MLT: CPT

## 2024-06-24 PROCEDURE — 80053 COMPREHEN METABOLIC PANEL: CPT | Performed by: EMERGENCY MEDICINE

## 2024-06-24 PROCEDURE — 94799 UNLISTED PULMONARY SVC/PX: CPT

## 2024-06-24 PROCEDURE — 25010000002 FUROSEMIDE PER 20 MG: Performed by: EMERGENCY MEDICINE

## 2024-06-24 PROCEDURE — 99285 EMERGENCY DEPT VISIT HI MDM: CPT

## 2024-06-24 PROCEDURE — 25510000001 IOPAMIDOL PER 1 ML: Performed by: EMERGENCY MEDICINE

## 2024-06-24 PROCEDURE — 25510000001 PERFLUTREN (DEFINITY) 8.476 MG IN SODIUM CHLORIDE (PF) 0.9 % 10 ML INJECTION: Performed by: STUDENT IN AN ORGANIZED HEALTH CARE EDUCATION/TRAINING PROGRAM

## 2024-06-24 PROCEDURE — 83735 ASSAY OF MAGNESIUM: CPT | Performed by: EMERGENCY MEDICINE

## 2024-06-24 PROCEDURE — 71275 CT ANGIOGRAPHY CHEST: CPT

## 2024-06-24 PROCEDURE — 97161 PT EVAL LOW COMPLEX 20 MIN: CPT

## 2024-06-24 PROCEDURE — 93306 TTE W/DOPPLER COMPLETE: CPT

## 2024-06-24 PROCEDURE — 99285 EMERGENCY DEPT VISIT HI MDM: CPT | Performed by: EMERGENCY MEDICINE

## 2024-06-24 PROCEDURE — 93306 TTE W/DOPPLER COMPLETE: CPT | Performed by: INTERNAL MEDICINE

## 2024-06-24 PROCEDURE — 25010000002 FUROSEMIDE PER 20 MG: Performed by: STUDENT IN AN ORGANIZED HEALTH CARE EDUCATION/TRAINING PROGRAM

## 2024-06-24 PROCEDURE — 97110 THERAPEUTIC EXERCISES: CPT

## 2024-06-24 PROCEDURE — 94664 DEMO&/EVAL PT USE INHALER: CPT

## 2024-06-24 PROCEDURE — 94760 N-INVAS EAR/PLS OXIMETRY 1: CPT

## 2024-06-24 PROCEDURE — 85025 COMPLETE CBC W/AUTO DIFF WBC: CPT | Performed by: EMERGENCY MEDICINE

## 2024-06-24 PROCEDURE — 94640 AIRWAY INHALATION TREATMENT: CPT

## 2024-06-24 PROCEDURE — 63710000001 INSULIN LISPRO (HUMAN) PER 5 UNITS: Performed by: STUDENT IN AN ORGANIZED HEALTH CARE EDUCATION/TRAINING PROGRAM

## 2024-06-24 PROCEDURE — 82948 REAGENT STRIP/BLOOD GLUCOSE: CPT

## 2024-06-24 PROCEDURE — 25810000003 SODIUM CHLORIDE 0.9 % SOLUTION: Performed by: EMERGENCY MEDICINE

## 2024-06-24 PROCEDURE — 25010000002 POTASSIUM CHLORIDE 10 MEQ/100ML SOLUTION: Performed by: EMERGENCY MEDICINE

## 2024-06-24 RX ORDER — ONDANSETRON 4 MG/1
4 TABLET, ORALLY DISINTEGRATING ORAL EVERY 6 HOURS PRN
Status: DISCONTINUED | OUTPATIENT
Start: 2024-06-24 | End: 2024-06-30 | Stop reason: HOSPADM

## 2024-06-24 RX ORDER — ATORVASTATIN CALCIUM 20 MG/1
40 TABLET, FILM COATED ORAL NIGHTLY
Status: DISCONTINUED | OUTPATIENT
Start: 2024-06-24 | End: 2024-06-30 | Stop reason: HOSPADM

## 2024-06-24 RX ORDER — ONDANSETRON 2 MG/ML
4 INJECTION INTRAMUSCULAR; INTRAVENOUS EVERY 6 HOURS PRN
Status: DISCONTINUED | OUTPATIENT
Start: 2024-06-24 | End: 2024-06-30 | Stop reason: HOSPADM

## 2024-06-24 RX ORDER — TAMSULOSIN HYDROCHLORIDE 0.4 MG/1
0.4 CAPSULE ORAL NIGHTLY
Status: DISCONTINUED | OUTPATIENT
Start: 2024-06-24 | End: 2024-06-30 | Stop reason: HOSPADM

## 2024-06-24 RX ORDER — SODIUM CHLORIDE 0.9 % (FLUSH) 0.9 %
10 SYRINGE (ML) INJECTION EVERY 12 HOURS SCHEDULED
Status: DISCONTINUED | OUTPATIENT
Start: 2024-06-24 | End: 2024-06-30 | Stop reason: HOSPADM

## 2024-06-24 RX ORDER — ACETAMINOPHEN 160 MG/5ML
650 SOLUTION ORAL EVERY 4 HOURS PRN
Status: DISCONTINUED | OUTPATIENT
Start: 2024-06-24 | End: 2024-06-30 | Stop reason: HOSPADM

## 2024-06-24 RX ORDER — ALBUTEROL SULFATE 2.5 MG/3ML
2.5 SOLUTION RESPIRATORY (INHALATION) ONCE
Status: COMPLETED | OUTPATIENT
Start: 2024-06-24 | End: 2024-06-24

## 2024-06-24 RX ORDER — HYDROCODONE BITARTRATE AND ACETAMINOPHEN 7.5; 325 MG/1; MG/1
1 TABLET ORAL EVERY 4 HOURS PRN
Status: DISCONTINUED | OUTPATIENT
Start: 2024-06-24 | End: 2024-06-30 | Stop reason: HOSPADM

## 2024-06-24 RX ORDER — BISACODYL 10 MG
10 SUPPOSITORY, RECTAL RECTAL DAILY PRN
Status: DISCONTINUED | OUTPATIENT
Start: 2024-06-24 | End: 2024-06-30 | Stop reason: HOSPADM

## 2024-06-24 RX ORDER — DEXTROSE MONOHYDRATE 25 G/50ML
25 INJECTION, SOLUTION INTRAVENOUS
Status: DISCONTINUED | OUTPATIENT
Start: 2024-06-24 | End: 2024-06-30 | Stop reason: HOSPADM

## 2024-06-24 RX ORDER — ALBUTEROL SULFATE 2.5 MG/3ML
2.5 SOLUTION RESPIRATORY (INHALATION) EVERY 6 HOURS PRN
Status: DISCONTINUED | OUTPATIENT
Start: 2024-06-24 | End: 2024-06-30 | Stop reason: HOSPADM

## 2024-06-24 RX ORDER — POTASSIUM CHLORIDE 750 MG/1
40 TABLET, FILM COATED, EXTENDED RELEASE ORAL EVERY 4 HOURS
Status: COMPLETED | OUTPATIENT
Start: 2024-06-24 | End: 2024-06-25

## 2024-06-24 RX ORDER — NICOTINE POLACRILEX 4 MG
15 LOZENGE BUCCAL
Status: DISCONTINUED | OUTPATIENT
Start: 2024-06-24 | End: 2024-06-30 | Stop reason: HOSPADM

## 2024-06-24 RX ORDER — POLYETHYLENE GLYCOL 3350 17 G/17G
17 POWDER, FOR SOLUTION ORAL DAILY PRN
Status: DISCONTINUED | OUTPATIENT
Start: 2024-06-24 | End: 2024-06-30 | Stop reason: HOSPADM

## 2024-06-24 RX ORDER — BISACODYL 5 MG/1
5 TABLET, DELAYED RELEASE ORAL DAILY PRN
Status: DISCONTINUED | OUTPATIENT
Start: 2024-06-24 | End: 2024-06-30 | Stop reason: HOSPADM

## 2024-06-24 RX ORDER — SODIUM CHLORIDE 9 MG/ML
40 INJECTION, SOLUTION INTRAVENOUS AS NEEDED
Status: DISCONTINUED | OUTPATIENT
Start: 2024-06-24 | End: 2024-06-30 | Stop reason: HOSPADM

## 2024-06-24 RX ORDER — POTASSIUM CHLORIDE 750 MG/1
20 TABLET, FILM COATED, EXTENDED RELEASE ORAL ONCE
Status: COMPLETED | OUTPATIENT
Start: 2024-06-24 | End: 2024-06-24

## 2024-06-24 RX ORDER — MAGNESIUM SULFATE HEPTAHYDRATE 40 MG/ML
2 INJECTION, SOLUTION INTRAVENOUS ONCE
Status: DISCONTINUED | OUTPATIENT
Start: 2024-06-24 | End: 2024-06-24

## 2024-06-24 RX ORDER — CETIRIZINE HYDROCHLORIDE 10 MG/1
10 TABLET ORAL DAILY PRN
Status: DISCONTINUED | OUTPATIENT
Start: 2024-06-24 | End: 2024-06-30 | Stop reason: HOSPADM

## 2024-06-24 RX ORDER — ACETAMINOPHEN 650 MG/1
650 SUPPOSITORY RECTAL EVERY 4 HOURS PRN
Status: DISCONTINUED | OUTPATIENT
Start: 2024-06-24 | End: 2024-06-30 | Stop reason: HOSPADM

## 2024-06-24 RX ORDER — UREA 10 %
5 LOTION (ML) TOPICAL NIGHTLY
Status: DISCONTINUED | OUTPATIENT
Start: 2024-06-24 | End: 2024-06-30 | Stop reason: HOSPADM

## 2024-06-24 RX ORDER — CARVEDILOL 25 MG/1
50 TABLET ORAL 2 TIMES DAILY WITH MEALS
Status: DISCONTINUED | OUTPATIENT
Start: 2024-06-24 | End: 2024-06-30 | Stop reason: HOSPADM

## 2024-06-24 RX ORDER — FUROSEMIDE 10 MG/ML
80 INJECTION INTRAMUSCULAR; INTRAVENOUS ONCE
Status: COMPLETED | OUTPATIENT
Start: 2024-06-24 | End: 2024-06-24

## 2024-06-24 RX ORDER — HYDRALAZINE HYDROCHLORIDE 25 MG/1
25 TABLET, FILM COATED ORAL 2 TIMES DAILY
Status: DISCONTINUED | OUTPATIENT
Start: 2024-06-24 | End: 2024-06-26

## 2024-06-24 RX ORDER — GABAPENTIN 100 MG/1
200 CAPSULE ORAL 2 TIMES DAILY
Status: DISCONTINUED | OUTPATIENT
Start: 2024-06-24 | End: 2024-06-30 | Stop reason: HOSPADM

## 2024-06-24 RX ORDER — POTASSIUM CHLORIDE 7.45 MG/ML
10 INJECTION INTRAVENOUS ONCE
Status: COMPLETED | OUTPATIENT
Start: 2024-06-24 | End: 2024-06-24

## 2024-06-24 RX ORDER — ACETAMINOPHEN 325 MG/1
650 TABLET ORAL EVERY 4 HOURS PRN
Status: DISCONTINUED | OUTPATIENT
Start: 2024-06-24 | End: 2024-06-30 | Stop reason: HOSPADM

## 2024-06-24 RX ORDER — AMOXICILLIN 250 MG
2 CAPSULE ORAL 2 TIMES DAILY PRN
Status: DISCONTINUED | OUTPATIENT
Start: 2024-06-24 | End: 2024-06-30 | Stop reason: HOSPADM

## 2024-06-24 RX ORDER — IPRATROPIUM BROMIDE AND ALBUTEROL SULFATE 2.5; .5 MG/3ML; MG/3ML
3 SOLUTION RESPIRATORY (INHALATION) ONCE
Status: COMPLETED | OUTPATIENT
Start: 2024-06-24 | End: 2024-06-24

## 2024-06-24 RX ORDER — POTASSIUM CHLORIDE 750 MG/1
40 TABLET, FILM COATED, EXTENDED RELEASE ORAL DAILY
Status: DISCONTINUED | OUTPATIENT
Start: 2024-06-24 | End: 2024-06-24

## 2024-06-24 RX ORDER — FUROSEMIDE 10 MG/ML
40 INJECTION INTRAMUSCULAR; INTRAVENOUS 2 TIMES DAILY
Status: DISCONTINUED | OUTPATIENT
Start: 2024-06-24 | End: 2024-06-28

## 2024-06-24 RX ORDER — AMLODIPINE BESYLATE 10 MG/1
10 TABLET ORAL DAILY
Status: DISCONTINUED | OUTPATIENT
Start: 2024-06-24 | End: 2024-06-26

## 2024-06-24 RX ORDER — INSULIN LISPRO 100 [IU]/ML
2-7 INJECTION, SOLUTION INTRAVENOUS; SUBCUTANEOUS
Status: DISCONTINUED | OUTPATIENT
Start: 2024-06-24 | End: 2024-06-30 | Stop reason: HOSPADM

## 2024-06-24 RX ORDER — SODIUM CHLORIDE 0.9 % (FLUSH) 0.9 %
10 SYRINGE (ML) INJECTION AS NEEDED
Status: DISCONTINUED | OUTPATIENT
Start: 2024-06-24 | End: 2024-06-30 | Stop reason: HOSPADM

## 2024-06-24 RX ORDER — IBUPROFEN 600 MG/1
1 TABLET ORAL
Status: DISCONTINUED | OUTPATIENT
Start: 2024-06-24 | End: 2024-06-30 | Stop reason: HOSPADM

## 2024-06-24 RX ADMIN — APIXABAN 5 MG: 5 TABLET, FILM COATED ORAL at 21:28

## 2024-06-24 RX ADMIN — TIOTROPIUM BROMIDE INHALATION SPRAY 2 PUFF: 3.12 SPRAY, METERED RESPIRATORY (INHALATION) at 11:18

## 2024-06-24 RX ADMIN — SODIUM CHLORIDE 1000 ML: 9 INJECTION, SOLUTION INTRAVENOUS at 02:21

## 2024-06-24 RX ADMIN — IOPAMIDOL 100 ML: 755 INJECTION, SOLUTION INTRAVENOUS at 03:20

## 2024-06-24 RX ADMIN — FUROSEMIDE 40 MG: 10 INJECTION, SOLUTION INTRAMUSCULAR; INTRAVENOUS at 17:14

## 2024-06-24 RX ADMIN — CARVEDILOL 50 MG: 25 TABLET, FILM COATED ORAL at 17:15

## 2024-06-24 RX ADMIN — IPRATROPIUM BROMIDE AND ALBUTEROL SULFATE 3 ML: .5; 3 SOLUTION RESPIRATORY (INHALATION) at 01:44

## 2024-06-24 RX ADMIN — ATORVASTATIN CALCIUM 40 MG: 20 TABLET, FILM COATED ORAL at 21:28

## 2024-06-24 RX ADMIN — POTASSIUM CHLORIDE 40 MEQ: 750 TABLET, EXTENDED RELEASE ORAL at 21:28

## 2024-06-24 RX ADMIN — ALBUTEROL SULFATE 2.5 MG: 2.5 SOLUTION RESPIRATORY (INHALATION) at 01:44

## 2024-06-24 RX ADMIN — APIXABAN 5 MG: 5 TABLET, FILM COATED ORAL at 09:52

## 2024-06-24 RX ADMIN — PERFLUTREN 2 ML: 6.52 INJECTION, SUSPENSION INTRAVENOUS at 09:43

## 2024-06-24 RX ADMIN — POTASSIUM CHLORIDE 20 MEQ: 750 TABLET, EXTENDED RELEASE ORAL at 04:38

## 2024-06-24 RX ADMIN — Medication 5 MG: at 21:28

## 2024-06-24 RX ADMIN — TAMSULOSIN HYDROCHLORIDE 0.4 MG: 0.4 CAPSULE ORAL at 21:27

## 2024-06-24 RX ADMIN — CARVEDILOL 50 MG: 25 TABLET, FILM COATED ORAL at 09:52

## 2024-06-24 RX ADMIN — HYDRALAZINE HYDROCHLORIDE 25 MG: 25 TABLET ORAL at 21:28

## 2024-06-24 RX ADMIN — POTASSIUM CHLORIDE 10 MEQ: 7.46 INJECTION, SOLUTION INTRAVENOUS at 04:39

## 2024-06-24 RX ADMIN — AMLODIPINE BESYLATE 10 MG: 10 TABLET ORAL at 09:52

## 2024-06-24 RX ADMIN — POTASSIUM CHLORIDE 40 MEQ: 750 TABLET, EXTENDED RELEASE ORAL at 17:14

## 2024-06-24 RX ADMIN — Medication 10 ML: at 21:29

## 2024-06-24 RX ADMIN — FUROSEMIDE 80 MG: 10 INJECTION, SOLUTION INTRAMUSCULAR; INTRAVENOUS at 04:38

## 2024-06-24 RX ADMIN — GABAPENTIN 200 MG: 100 CAPSULE ORAL at 09:52

## 2024-06-24 RX ADMIN — GABAPENTIN 200 MG: 100 CAPSULE ORAL at 21:28

## 2024-06-24 RX ADMIN — INSULIN LISPRO 2 UNITS: 100 INJECTION, SOLUTION INTRAVENOUS; SUBCUTANEOUS at 12:23

## 2024-06-24 RX ADMIN — Medication 10 ML: at 09:55

## 2024-06-24 NOTE — THERAPY EVALUATION
Patient Name: Alexy Ram  : 1955    MRN: [id number]                              Today's Date: 2024       Admit Date: 2024    Visit Dx:     ICD-10-CM ICD-9-CM   1. Hypokalemia  E87.6 276.8   2. Hypoxia  R09.02 799.02   3. Acute right-sided congestive heart failure  I50.811 428.0     Patient Active Problem List   Diagnosis    Hypercholesterolemia    Hypertension    Type 2 diabetes mellitus with hyperglycemia, without long-term current use of insulin    Squamous cell carcinoma of left lung    Status post lobectomy of lung    Class 3 severe obesity due to excess calories with body mass index (BMI) of 40.0 to 44.9 in adult    Encounter for screening for malignant neoplasm of colon    Family history of colon cancer    Obstructive sleep apnea of adult    Screening PSA (prostate specific antigen)    Osteoarthritis of left hip    AV block, 2nd degree    Cardiac pacemaker in situ    Chronic anticoagulation    PAF (paroxysmal atrial fibrillation)    Adjustment and management of cardiac pacemaker    Primary osteoarthritis of left knee    Benign prostatic hyperplasia with urinary frequency    Status post total knee replacement    New onset of congestive heart failure    CHF (congestive heart failure)     Past Medical History:   Diagnosis Date    Acromioclavicular separation     shoulder pulled out of place    Ankle sprain     Arthritis     OSTEOARTHRITIS    Arthritis of back     so long I don't know when it started    Arthritis of neck     COPD (chronic obstructive pulmonary disease)     Diabetes mellitus     Dislocation, shoulder     Enlarged prostate     Fatty liver     Frozen shoulder     Hip arthrosis     History of low potassium     History of lung cancer 2018    HX LEFT LOWER LOBECTOMY    History of MRSA infection 2018    History of transfusion     Hyperlipidemia     Hypertension     Knee swelling     Left upper lobe consolidation     REPEAT CT SCANS - FOLLOWED BY PULMONARY, MOST RECENT CT SCAN  4/3/24    Low back strain     Lumbosacral disc disease     Neck strain     Neuropathy     On anticoagulant therapy     Pacemaker     PAF (paroxysmal atrial fibrillation)     Periarthritis of shoulder     Second degree AV block     Sinus node dysfunction     Sleep apnea     WEARS CPAP    Slow to wake up after anesthesia     Thoracic disc disorder      Past Surgical History:   Procedure Laterality Date    BRONCHOSCOPY N/A 10/11/2018    Procedure: BRONCHOSCOPY WITH FLUORO, LEFT LOWER LOBE BRUSHINGS WET AND DRY. WITH BX'S AND BAL (IN LLL).;  Surgeon: Akil Ortiz MD;  Location: [facility];  Service: Pulmonary    BRONCHOSCOPY WITH ION ROBOTIC ASSIST N/A 09/07/2023    Procedure: BRONCHOSCOPY WITH ION ROBOT AND ENDOBRONCHIAL ULTRASOUND with brushing and FNA;  Surgeon: Taye Chavira MD;  Location: [facility];  Service: Robotics - Pulmonary;  Laterality: N/A;  PRE/POST - left upper lobe mass    CATARACT EXTRACTION Bilateral 04/01/2024    COLONOSCOPY  2021    COLONOSCOPY W/ POLYPECTOMY N/A 10/22/2021    Procedure: COLONOSCOPY WITH POLYPECTOMY;  Surgeon: Lan Solis MD;  Location: [facility] OR;  Service: Gastroenterology;  Laterality: N/A;  cecal polyp x1 (cold snare)  ascending polyp x1 (hot snare)  transverse polyp x3 (hot snare x 1), (cold snare x2)  sigmoid polyp x1 (hot snare, clip applied)  rectal polyp x3 (cold snare)    INSERT / REPLACE / REMOVE PACEMAKER  6/5/2005    replaced Oct 2014    JOINT REPLACEMENT  Hip    Hip    KNEE ARTHROSCOPY Left     PACEMAKER IMPLANTATION  2005, 2014    THORACOSCOPY Left 11/19/2018    Procedure: BRONCHOSCOPY, DAVINCI ROBOT ASSISTED VIDEIO ASSISTED THORACOSCOPY  WITH CONVERT TO OPEN THORACOTOMY,LEFT LOWER LOBECTOMY,INTERCOSTAL NERVE BLOCK;  Surgeon: Michael Ring III, MD;  Location: [facility] MAIN OR;  Service: DaVinci    TOTAL HIP ARTHROPLASTY Left 07/14/2023    Procedure: TOTAL HIP ARTHROPLASTY;  Surgeon: Nikko Staton MD;  Location: [facility] OR [facility];   Service: Orthopedics;  Laterality: Left;    TOTAL KNEE ARTHROPLASTY Left 6/13/2024    Procedure: TOTAL KNEE ARTHROPLASTY;  Surgeon: Nikko Staton MD;  [place] [facility] [place] [facility] [place]  Service: [place]opedics;  Laterality: Left;      General Information       Row Name 06/24/24 1515          Physical Therapy Time and Intention    Document Type evaluation  -EF     Mode of Treatment individual therapy;physical therapy  -EF       Row Name 06/24/24 1515          General Information    Patient Profile Reviewed yes  -EF     Prior Level of Function independent:;all household mobility;community mobility  initially using rwx, now cane since recent L TKA  -EF     Existing Precautions/Restrictions fall  -EF     Barriers to Rehab none identified  -EF       Row Name 06/24/24 1515          Living Environment    People in Home spouse  -EF       Row Name 06/24/24 1515          Cognition    Orientation Status (Cognition) oriented x 4  -EF       Row Name 06/24/24 1515          Safety Issues, Functional Mobility    Impairments Affecting Function (Mobility) endurance/activity tolerance;strength;range of motion (ROM);shortness of breath  -EF               User Key  (r) = Recorded By, (t) = Taken By, (c) = Cosigned By      Initials Name Provider Type    EF Bekah Cuba, PT Physical Therapist                   Mobility       Row Name 06/24/24 1517          Bed Mobility    Comment, (Bed Mobility) not tested; pt sitting up EOB upon PT arrival  -EF       Row Name 06/24/24 1517          Sit-Stand Transfer    Sit-Stand [place] (Transfers) supervision  -EF     Assistive Device (Sit-Stand Transfers) walker, front-wheeled  -EF       Row Name 06/24/24 1517          Gait/Stairs (Locomotion)    [place] Level (Gait) standby assist  -EF     Assistive Device (Gait) walker, front-wheeled  -EF     Distance in Feet (Gait) 150  -EF     Deviations/Abnormal Patterns (Gait) blayne decreased  -EF     Left Sided Gait Deviations heel strike  decreased;decreased knee extension  -EF     Comment, (Gait/Stairs) increased SOA noted with ambulation  -EF               User Key  (r) = Recorded By, (t) = Taken By, (c) = Cosigned By      Initials Name Provider Type    EF Bekah Cuba PT Physical Therapist                   Obj/Interventions       Row Name 06/24/24 1518          Range of Motion Comprehensive    General Range of Motion other (see comments)  -EF     Comment, General Range of Motion L knee AROM grossly 0-95 degrees  -EF       Row Name 06/24/24 1518          Strength Comprehensive (MMT)    General Manual Muscle Testing (MMT) Assessment lower extremity strength deficits identified  -EF     Comment, General Manual Muscle Testing (MMT) Assessment L LE grossly 3+/5  -EF       Row Name 06/24/24 1518          Motor Skills    Therapeutic Exercise --  Seated L LAQ x10 reps, L heel slides with stretch into flexion x10 reps  -EF       Row Name 06/24/24 1518          Balance    Balance Assessment sitting static balance;standing static balance;standing dynamic balance  -EF     Static Sitting Balance independent  -EF     Position, Sitting Balance unsupported;sitting edge of bed  -EF     Static Standing Balance independent  -EF     Dynamic Standing Balance supervision  -EF     Position/Device Used, Standing Balance walker, front-wheeled  -EF       Row Name 06/24/24 1518          Sensory Assessment (Somatosensory)    Sensory Assessment (Somatosensory) LE sensation intact  -EF               User Key  (r) = Recorded By, (t) = Taken By, (c) = Cosigned By      Initials Name Provider Type    EF Bekah Cuba, PT Physical Therapist                   Goals/Plan       Row Name 06/24/24 1523          Gait Training Goal 1 (PT)    Activity/Assistive Device (Gait Training Goal 1, PT) gait (walking locomotion);assistive device use  with none or LRAD  -EF     [place] Level (Gait Training Goal 1, PT) modified independence  -EF     Distance (Gait Training Goal 1,  PT) 300  -EF     Time Frame (Gait Training Goal 1, PT) 1 week;long term goal (LTG)  -EF     Progress/Outcome (Gait Training Goal 1, PT) goal ongoing  -EF       Row Name 06/24/24 1523          Problem Specific Goal 1 (PT)    Problem Specific Goal 1 (PT) Pt will demonstrate L knee AROM 0-110  -EF     Time Frame (Problem Specific Goal 1, PT) 1 week;long-term goal (LTG)  -EF     Progress/Outcome (Problem Specific Goal 1, PT) goal ongoing  -EF       Row Name 06/24/24 1523          Therapy Assessment/Plan (PT)    Planned Therapy Interventions (PT) balance training;bed mobility training;gait training;home exercise program;patient/family education;ROM (range of motion);stair training;strengthening;stretching;transfer training  -EF               User Key  (r) = Recorded By, (t) = Taken By, (c) = Cosigned By      Initials Name Provider Type    EF Bekah Cuba, PT Physical Therapist                   Clinical Impression       Row Name 06/24/24 1519          Pain    Pretreatment Pain Rating 0/10 - no pain  -EF     Posttreatment Pain Rating 0/10 - no pain  -EF       Row Name 06/24/24 1519          Plan of Care Review    Plan of Care Reviewed With patient  -EF     Outcome Evaluation Pt is a 68 yo male who presents from home with worsening SOA, acute onset CHF. Pt with recent history of L TKA on 6/13. Pt has been working with  PT since surgery and mobilizing with cane. Upon exam, pt demonstrates expected post op weakness and impairment of L knee ROM, as well as decreased endurance/activity tolerance. Pt performed transfers independently and ambulated 150' with rwx and SBA. Pt able to complete L TKA exercises for ROM and strengthening. Pt does demo increased SOA after ambulation. SpO2 decreased to 86% after ambulation on room air and returned to > 90% after being replaced on 2 L O2 per nc. Pt will continue to benefit from PT to assist with return to PLOF. Recommend continuing with HH PT at [place].  -EF       Row Name 06/24/24  1519          Therapy Assessment/Plan (PT)    Rehab Potential (PT) good, to achieve stated therapy goals  -EF     Criteria for Skilled Interventions Met (PT) yes;meets criteria;skilled treatment is necessary  -EF     Therapy Frequency (PT) 3 times/wk  -EF       Row Name 06/24/24 1519          Vital Signs    Pre SpO2 (%) 96  -EF     O2 Delivery Pre Treatment supplemental O2  -EF     Intra SpO2 (%) 86  -EF     O2 Delivery Intra Treatment supplemental O2  -EF     Post SpO2 (%) 95  -EF     O2 Delivery Post Treatment supplemental O2  -EF     Pre Patient Position Sitting  -EF     Intra Patient Position Standing  -EF     Post Patient Position Sitting  -EF       Row Name 06/24/24 1519          Positioning and Restraints    Pre-Treatment Position in bed  -EF     Post Treatment Position bed  -EF     In Bed sitting EOB;call light within reach;encouraged to call for assist;with family/caregiver;notified nsg  -EF               User Key  (r) = Recorded By, (t) = Taken By, (c) = Cosigned By      Initials Name Provider Type    Bekah Perez, PT Physical Therapist                   Outcome Measures       Row Name 06/24/24 1523 06/24/24 0645       How much help from another person do you currently need...    Turning from your back to your side while in flat bed without using bedrails? 4  -EF --    Moving from lying on back to sitting on the side of a flat bed without bedrails? 4  -EF 4  -BT    Moving to and from a bed to a chair (including a wheelchair)? 4  -EF 4  -BT    Standing up from a chair using your arms (e.g., wheelchair, bedside chair)? 4  -EF 3  -BT    Climbing 3-5 steps with a railing? 3  -EF 2  -BT    To walk in hospital room? 3  -EF 3  -BT    AM-PAC 6 Clicks Score (PT) 22  -EF --    Highest Level of Mobility Goal 7 --> Walk 25 feet or more  -EF --      Row Name 06/24/24 1523          Functional Assessment    Outcome Measure Options AM-PAC 6 Clicks Basic Mobility (PT)  -EF               User Key  (r) = Recorded  By, (t) = Taken By, (c) = Cosigned By      Initials Name Provider Type     Bekah Cuba, JAMI Physical Therapist    Katya Workman RN Registered Nurse                                 Physical Therapy Education       Title: PT OT SLP Therapies (In Progress)       Topic: Physical Therapy (In Progress)       Point: Mobility training (Done)       Learning Progress Summary             Patient Acceptance, E, VU,NR by  at 6/24/2024 1524                         Point: Home exercise program (Done)       Learning Progress Summary             Patient Acceptance, E, VU,NR by  at 6/24/2024 1524                         Point: Body mechanics (Not Started)       Learner Progress:  Not documented in this visit.              Point: Precautions (Not Started)       Learner Progress:  Not documented in this visit.                              User Key       Initials Effective Dates Name Provider Type Discipline     05/31/24 -  Bekah Cuba PT Physical Therapist PT                  PT Recommendation and Plan  Planned Therapy Interventions (PT): balance training, bed mobility training, gait training, home exercise program, patient/family education, ROM (range of motion), stair training, strengthening, stretching, transfer training  Plan of Care Reviewed With: patient  Outcome Evaluation: Pt is a 68 yo male who presents from home with worsening SOA, acute onset CHF. Pt with recent history of L TKA on 6/13. Pt has been working with HH PT since surgery and mobilizing with cane. Upon exam, pt demonstrates expected post op weakness and impairment of L knee ROM, as well as decreased endurance/activity tolerance. Pt performed transfers independently and ambulated 150' with rwx and SBA. Pt able to complete L TKA exercises for ROM and strengthening. Pt does demo increased SOA after ambulation. SpO2 decreased to 86% after ambulation on room air and returned to > 90% after being replaced on 2 L O2 per nc. Pt will continue to  benefit from PT to assist with return to PLOF. Recommend continuing with  PT at [place].     Time Calculation:         PT Charges       Row Name 06/24/24 1524             Time Calculation    Start Time 1445  -EF      Stop Time 1504  -EF      Time Calculation (min) 19 min  -EF      PT Received On 06/24/24  -EF      PT - Next Appointment 06/26/24  -EF      PT Goal Re-Cert Due Date 07/01/24  -EF         Time Calculation- PT    Total Timed Code Minutes- PT 10 minute(s)  -EF         Timed Charges    61440 - PT Therapeutic Exercise Minutes 10  -EF         Total Minutes    Timed Charges Total Minutes 10  -EF       Total Minutes 10  -EF                User Key  (r) = Recorded By, (t) = Taken By, (c) = Cosigned By      Initials Name Provider Type    EF Bekah Cuba, PT Physical Therapist                  Therapy Charges for Today       Code Description Service Date Service Provider Modifiers Qty    27246242908 HC PT THER PROC EA 15 MIN 6/24/2024 Bekah Cuba, PT GP 1    [id number] HC PT EVAL LOW COMPLEXITY 2 6/24/2024 Bekah Cuba, PT GP 1            PT G-Codes  Outcome Measure Options: AM-PAC 6 Clicks Basic Mobility (PT)  AM-PAC 6 Clicks Score (PT): 22  PT Discharge Summary  Anticipated Discharge Disposition (PT): home with home health    Bekah Cuba PT  6/24/2024

## 2024-06-24 NOTE — PLAN OF CARE
Goal Outcome Evaluation:  Plan of Care Reviewed With: patient, spouse           Outcome Evaluation: pt admitted for new onset CHF. S/p TKR on 6/13. Ortho sx and PT consulted, pt ambulated in [place] with PT and is ambulating in room up ab alicia. No c/o pain, L knee elevated with ice pack. 2L NC, NSR/Apaced on tele, BP stable. K replacement protocol ordered and administered per MAR. Continue with current POC and update as needed.

## 2024-06-24 NOTE — ED NOTES
Pt presents to facility with reports of SOA since 1900 last night. Pt reports recent L knee replacement, and is on pain medication but has not taken a pill since the AM. Pt also reports hx of lung cancer, with LLL removal, and COPD but does not require oxygen at home.    Joana Turk RN

## 2024-06-24 NOTE — H&P
Patient Name:  Alexy Ram  YOB: 1955  MRN:  [id number]  Admit Date:  6/24/2024  Patient Care Team:  Pipe Vaughn MD as PCP - General (Family Medicine)  Steve Rice MD as Consulting Physician (Cardiac Electrophysiology)  Taye Chavira MD as Surgeon (Thoracic Surgery)  Akil Ortiz MD as Consulting Physician (Pulmonary Disease)  Nikko Staton MD as Surgeon (Orthopedic Surgery)      Subjective   History Present Illness     Chief Complaint   Patient presents with    Shortness of Breath       Mr. Ram is a 69 y.o. with a history of PAF, HTN, AV block/syndope s/p PPM, HLD, DM2, COPD, BPH that presents to [facility] complaining of shortness of breath.     History of Present Illness  Pleasant 69-year-old gentleman with history as above who presents to the hospital for shortness of breath.  Patient states he was outside yesterday around 7 in the evening and thought that his shortness of breath was related to the heat, he went inside to try and catch his breath/get some rest but his shortness of breath continued to worsen.  Patient states he was unable to find any relief, he attempted to lay down but found that that worsened his shortness of breath.  Nothing made it better until he received Lasix in the ER last night.  He denies chest pain, nausea, vomiting, diaphoresis, chills, fever.  Denies any history of CHF, follows with a cardiologist at [place].  He did recently have a knee replacement with Dr. Nikko Staton on the 13th of last week.    Review of Systems   Constitutional:  Negative for fatigue and fever.   Respiratory:  Positive for shortness of breath. Negative for cough.    Cardiovascular:  Positive for leg swelling (Left leg- chronic). Negative for chest pain.   Gastrointestinal:  Negative for abdominal pain, nausea and vomiting.   Neurological:  Negative for weakness.        Personal History     Past Medical History:   Diagnosis Date    Acromioclavicular  separation     shoulder pulled out of place    Ankle sprain     Arthritis     OSTEOARTHRITIS    Arthritis of back     so long I don't know when it started    Arthritis of neck     COPD (chronic obstructive pulmonary disease)     Diabetes mellitus     Dislocation, shoulder     Enlarged prostate     Fatty liver     Frozen shoulder     Hip arthrosis     History of low potassium     History of lung cancer 2018    HX LEFT LOWER LOBECTOMY    History of MRSA infection 2018    History of transfusion     Hyperlipidemia     Hypertension     Knee swelling     Left upper lobe consolidation     REPEAT CT SCANS - FOLLOWED BY PULMONARY, MOST RECENT CT SCAN 4/3/24    Low back strain     Lumbosacral disc disease     Neck strain     Neuropathy     On anticoagulant therapy     Pacemaker     PAF (paroxysmal atrial fibrillation)     Periarthritis of shoulder     Second degree AV block     Sinus node dysfunction     Sleep apnea     WEARS CPAP    Slow to wake up after anesthesia     Thoracic disc disorder      Past Surgical History:   Procedure Laterality Date    BRONCHOSCOPY N/A 10/11/2018    Procedure: BRONCHOSCOPY WITH FLUORO, LEFT LOWER LOBE BRUSHINGS WET AND DRY. WITH BX'S AND BAL (IN LLL).;  Surgeon: Akil Ortiz MD;  Location: [facility] ENDOSCOPY;  Service: Pulmonary    BRONCHOSCOPY WITH ION ROBOTIC ASSIST N/A 09/07/2023    Procedure: BRONCHOSCOPY WITH ION ROBOT AND ENDOBRONCHIAL ULTRASOUND with brushing and FNA;  Surgeon: Taye Chavira MD;  Location: [facility] ENDOSCOPY;  Service: Robotics - Pulmonary;  Laterality: N/A;  PRE/POST - left upper lobe mass    CATARACT EXTRACTION Bilateral 04/01/2024    COLONOSCOPY  2021    COLONOSCOPY W/ POLYPECTOMY N/A 10/22/2021    Procedure: COLONOSCOPY WITH POLYPECTOMY;  Surgeon: Lan Solis MD;  Location: [facility];  Service: Gastroenterology;  Laterality: N/A;  cecal polyp x1 (cold snare)  ascending polyp x1 (hot snare)  transverse polyp x3 (hot snare x 1), (cold snare  x2)  sigmoid polyp x1 (hot snare, clip applied)  rectal polyp x3 (cold snare)    INSERT / REPLACE / REMOVE PACEMAKER  2005    replaced Oct 2014    JOINT REPLACEMENT  Hip    Hip    KNEE ARTHROSCOPY Left     PACEMAKER IMPLANTATION  2014    THORACOSCOPY Left 2018    Procedure: BRONCHOSCOPY, DAVINCI ROBOT ASSISTED VIDEIO ASSISTED THORACOSCOPY  WITH CONVERT TO OPEN THORACOTOMY,LEFT LOWER LOBECTOMY,INTERCOSTAL NERVE BLOCK;  Surgeon: Michael Ring III, MD;  Location: [facility] MAIN OR;  Service: DaVinci    TOTAL HIP ARTHROPLASTY Left 2023    Procedure: TOTAL HIP ARTHROPLASTY;  Surgeon: Nikko Staton MD;  [facility];  Service: Orthopedics;  Laterality: Left;    TOTAL KNEE ARTHROPLASTY Left 2024    Procedure: TOTAL KNEE ARTHROPLASTY;  Surgeon: Nikko Staton MD;  Location:  [facility];  Service: Orthopedics;  Laterality: Left;     Family History   Problem Relation Age of Onset    Diabetes Mother     Stroke Father     Heart disease Father     Stroke Sister     Cancer Sister     Stroke Sister     Diabetes Sister     Heart disease Brother     Diabetes Brother     Diabetes Brother     Malig Hyperthermia Neg Hx      Social History     Tobacco Use    Smoking status: Former     Current packs/day: 0.00     Average packs/day: 1 pack/day for 33.0 years (33.0 ttl pk-yrs)     Types: Cigarettes     Start date: 1985     Quit date: 2018     Years since quittin.4     Passive exposure: Past    Smokeless tobacco: Never   Vaping Use    Vaping status: Never Used   Substance Use Topics    Alcohol use: Not Currently    Drug use: No     No current facility-administered medications on file prior to encounter.     Current Outpatient Medications on File Prior to Encounter   Medication Sig Dispense Refill    albuterol sulfate  (90 Base) MCG/ACT inhaler Inhale 2 puffs Every 4 (Four) Hours As Needed for Wheezing.      amLODIPine (NORVASC) 10 MG tablet Take 1 tablet by mouth Daily.  Indications: High Blood Pressure Disorder 90 tablet 3    atorvastatin (LIPITOR) 40 MG tablet Take 1 tablet by mouth every night at bedtime. 90 tablet 3    carvedilol (COREG) 25 MG tablet Take 2 tablets by mouth 2 (Two) Times a Day With Meals. Indications: High Blood Pressure Disorder 360 tablet 3    cetirizine (zyrTEC) 10 MG tablet Take 1 tablet by mouth Daily. (Patient taking differently: Take 1 tablet by mouth Daily As Needed.) 90 tablet 3    Eliquis 5 MG tablet tablet Take 1 tablet by mouth 2 (Two) Times a Day. (Patient taking differently: Take 1 tablet by mouth 2 (Two) Times a Day. HOLD 72 HOURS PER CARDIO INSTRUCTIONS   Indications: Prevention of Unwanted Clot in Veins) 60 tablet 0    gabapentin (NEURONTIN) 100 MG capsule TAKE TWO CAPSULES BY MOUTH EVERY MORNING, ONE CAPSULE IN THE MIDDLE OF THE DAY AND 2 CAPSULES AT BEDTIME  Indications: Chronic pain (Patient taking differently: Take 2 capsules by mouth 2 (Two) Times a Day. MAY TAKE ONE ADDITIONAL TABLET MIDDAY IF NEEDED   Indications: Chronic pain) 150 capsule 5    hydrALAZINE (APRESOLINE) 25 MG tablet Take 1 tablet by mouth 2 (Two) Times a Day. Indications: High Blood Pressure Disorder 180 tablet 3    HYDROcodone-acetaminophen (NORCO) 7.5-325 MG per tablet Take 1 tablet by mouth Every 4 (Four) Hours As Needed for Moderate Pain or Severe Pain. Indications: Pain 40 tablet 0    ondansetron (Zofran) 4 MG tablet Take 1 tablet by mouth Every 8 (Eight) Hours As Needed for Nausea or Vomiting for up to 10 doses. 10 tablet 0    pioglitazone (ACTOS) 30 MG tablet Take 1 tablet by mouth Daily. Indications: Type 2 Diabetes (Patient taking differently: Take 1 tablet by mouth Every Evening. Indications: Type 2 Diabetes) 90 tablet 1    potassium chloride 10 MEQ CR tablet Take 2 tablets by mouth 2 (Two) Times a Day for 360 days. Indications: Low Amount of Potassium in the Blood (Patient taking differently: Take 2 tablets by mouth 2 (Two) Times a Day. MAY TAKE ADDITIONAL  TABLET MIDDAY IF HAVING LEG CRAMPS   Indications: Low Amount of Potassium in the Blood) 360 tablet 3    Spiriva Respimat 2.5 MCG/ACT aerosol solution inhaler Inhale 1 puff every night at bedtime. Indications: Chronic Obstructive Lung Disease (Patient taking differently: Inhale 2 puffs As Needed. Indications: Chronic Obstructive Lung Disease) 1 each 11    tamsulosin (FLOMAX) 0.4 MG capsule 24 hr capsule Take 1 capsule by mouth Every Night. Indications: Benign Enlargement of Prostate 90 capsule 3    valsartan-hydrochlorothiazide (DIOVAN-HCT) 320-25 MG per tablet Take 1 tablet by mouth Every Morning. 90 tablet 1     No Known Allergies    Objective    Objective     Vital Signs  Temp:  [98.4 °F (36.9 °C)-98.7 °F (37.1 °C)] 98.4 °F (36.9 °C)  Heart Rate:  [64-82] 65  Resp:  [18-26] 18  BP: (100-151)/(57-86) 100/57  SpO2:  [88 %-96 %] 93 %  on  Flow (L/min):  [2-3] 2;   Device (Oxygen Therapy): nasal cannula  Body mass index is 42.77 kg/m².    Physical Exam  Constitutional:       General: He is not in acute distress.     Appearance: He is obese. He is ill-appearing. He is not toxic-appearing.   Cardiovascular:      Rate and Rhythm: Normal rate and regular rhythm.   Pulmonary:      Effort: Pulmonary effort is normal. No respiratory distress.      Breath sounds: No wheezing.      Comments: Diminished bilaterally, crackles  Abdominal:      General: Abdomen is flat.      Palpations: Abdomen is soft.      Tenderness: There is no abdominal tenderness.   Musculoskeletal:         General: No tenderness.      Right lower leg: Edema present.      Left lower leg: Edema present.      Comments: Left lower extremity greater than right.  Dressing overlying left knee with dried blood, peeling around the edges of dressing.   Skin:     General: Skin is warm and dry.   Neurological:      General: No focal deficit present.      Mental Status: He is alert and oriented to person, place, and time. Mental status is at baseline.      Motor: No  weakness.         Results Review:  I reviewed the patient's new clinical results.  I reviewed the patient's new imaging results and agree with the interpretation.  I reviewed the patient's other test results and agree with the interpretation  I personally viewed and interpreted the patient's EKG/Telemetry data  Discussed with ED provider.    Lab Results (last 24 hours)       Procedure Component Value Units Date/Time    CBC & Differential [[id number]]  (Abnormal) Collected: 06/24/24 0219    Specimen: Blood Updated: 06/24/24 0239    Narrative:      The following orders were created for panel order CBC & Differential.  Procedure                               Abnormality         Status                     ---------                               -----------         ------                     CBC Auto Differential[[id number]]        Abnormal            Final result                 Please view results for these tests on the individual orders.    Comprehensive Metabolic Panel [[id number]]  (Abnormal) Collected: 06/24/24 0219    Specimen: Blood Updated: 06/24/24 0256     Glucose 159 mg/dL      BUN 20 mg/dL      Creatinine 0.80 mg/dL      Sodium 141 mmol/L      Potassium 3.1 mmol/L      Chloride 104 mmol/L      CO2 27.2 mmol/L      Calcium 9.4 mg/dL      Total Protein 5.8 g/dL      Albumin 3.4 g/dL      ALT (SGPT) 11 U/L      AST (SGOT) 12 U/L      Alkaline Phosphatase 54 U/L      Total Bilirubin 0.8 mg/dL      Globulin 2.4 gm/dL      A/G Ratio 1.4 g/dL      BUN/Creatinine Ratio 25.0     Anion Gap 9.8 mmol/L      eGFR 95.8 mL/min/1.73     Narrative:      GFR Normal >60  Chronic Kidney Disease <60  Kidney Failure <15      CBC Auto Differential [[id number]]  (Abnormal) Collected: 06/24/24 0219    Specimen: Blood Updated: 06/24/24 0239     WBC 10.83 10*3/mm3      RBC 3.95 10*6/mm3      Hemoglobin 11.0 g/dL      Hematocrit 34.8 %      MCV 88.1 fL      MCH 27.8 pg      MCHC 31.6 g/dL      RDW 15.6 %      RDW-SD 50.7 fl      MPV 10.7  fL      Platelets 338 10*3/mm3      Neutrophil % 77.4 %      Lymphocyte % 12.5 %      Monocyte % 9.1 %      Eosinophil % 0.5 %      Basophil % 0.3 %      Immature Grans % 0.2 %      Neutrophils, Absolute 8.39 10*3/mm3      Lymphocytes, Absolute 1.35 10*3/mm3      Monocytes, Absolute 0.99 10*3/mm3      Eosinophils, Absolute 0.05 10*3/mm3      Basophils, Absolute 0.03 10*3/mm3      Immature Grans, Absolute 0.02 10*3/mm3     Magnesium [[id number]]  (Abnormal) Collected: 06/24/24 0219    Specimen: Blood Updated: 06/24/24 0446     Magnesium 2.5 mg/dL     POC Glucose Once [[id number]]  (Abnormal) Collected: 06/24/24 1217    Specimen: Blood Updated: 06/24/24 1218     Glucose 171 mg/dL             Imaging Results (Last 24 Hours)       Procedure Component Value Units Date/Time    CT Angiogram Chest [[id number]] Collected: 06/24/24 0339     Updated: 06/24/24 0353    Narrative:      CT ANGIOGRAM OF THE CHEST     HISTORY: Shortness of air and hypoxia     COMPARISON: April 3, 2024     TECHNIQUE: Axial CT imaging was obtained through the thorax. IV contrast  was administered. Three-D reformatted images were obtained.     FINDINGS:  No acute pulmonary thromboembolus is seen. Examination was not optimized  for assessment of the thoracic aorta. Proximal descending thoracic aorta  measures up to 3.3 cm. Distal descending aorta measures 2.8 cm. No  obvious dissection is seen. There is tortuosity of the aorta. There are  coronary artery calcifications, and calcification of the aorta. The main  pulmonary artery is enlarged, which can be seen in setting of pulmonary  chill hypertension. There are changes of prior left lower lobectomy.  Thyroid gland, trachea, and esophagus appear unremarkable. There are  background emphysematous changes. There is interlobular septal  thickening, as well as areas of groundglass attenuation. There are  bilateral pleural effusions, right greater than left. The heart is  enlarged. Constellation of findings  likely reflects congestive heart  failure. There is dependent atelectasis within the right lower lobe.  Soft tissue density along the anteromedial aspect of the left upper  lobe, adjacent to the suture line is stable in appearance when compared  to the prior study. However, mediastinal lymph nodes are larger than on  prior exam. For example, a lower left paratracheal node measures up to  2.2 cm in short axis dimensions, previously measuring 1.6 cm. A  subcarinal lymph node measures up to 1.7 cm, previously measuring 8 mm.  There is also a prominent right hilar node, which measures up to 1.3 cm  previously, it measured approximately 9 mm. Evaluation for hepatic  lesions is limited, due to technique. Bulky appearance to the adrenal  glands bilaterally is stable. Old deformity of the left ribs is again  seen. No acute osseous abnormalities are seen. Right-sided pacemaker is  present.       Impression:         1. Cardiomegaly, interlobular septal thickening, and bilateral pleural  effusions, right greater than left, suggesting congestive heart failure.  2. Mediastinal and hilar lymph nodes appear larger than on prior exam.  While these may be reactive, short-term follow-up exam is recommended to  document resolution.     Radiation dose reduction techniques were utilized, including automated  exposure control and exposure modulation based on body size.        This report was finalized on 6/24/2024 3:49 AM by Dr. Alexandria Abbasi M.D on Workstation: [id number]                   Telemetry Scan   Final Result      Telemetry Scan   Final Result           Assessment/Plan     Active Hospital Problems    Diagnosis  POA    **New onset of congestive heart failure [I50.9]  Yes    CHF (congestive heart failure) [I50.9]  Yes    Chronic anticoagulation [Z79.01]  Not Applicable    Obstructive sleep apnea of adult [G47.33]  Yes    Class 3 severe obesity due to excess calories with body mass index (BMI) of 40.0 to 44.9 in adult  [E66.01, Z68.41]  Not Applicable    Status post lobectomy of lung [Z90.2]  Not Applicable    PAF (paroxysmal atrial fibrillation) [I48.0]  Yes    Squamous cell carcinoma of left lung [C34.92]  Yes    AV block, 2nd degree [I44.1]  Yes    Hypertension [I10]  Yes    Type 2 diabetes mellitus with hyperglycemia, without long-term current use of insulin [E11.65]  Yes      Resolved Hospital Problems   No resolved problems to display.       Mr. Ram is a 69 y.o. with a history of PAF, HTN, AV block/syndope s/p PPM, HLD, DM2, COPD, BPH who presents with shortness of breath and evidence of heart failure.    Acute heart failure  History of 2nd degree AV block/syncope s/p PPM  PAF on AC  HTN  HLD  - Echo from January 2023 reviewed, EF 61%-mild LVH with normal systolic function  -CT with cardiomegaly, interlobular septal thickening and bilateral pleural effusions suggestive of CHF, BNP pending  -Continue amlodipine, Coreg, hydralazine, holding his valsartan-HCTZ while getting diuresis  -Continue Eliquis  -New echo pending, continue IV lasix; monitor on tele, strict Is/Os     Hypokalemia  - replete per protocol    DM2  - A1c 6.1% last month  - hold home oral meds; start SSI    COPD  SCC of the left lung, s/p lobectomy  - cont home inhalers    BPH- cont tamsulosin  GIL- ok for home CPAP if available    Recent knee replacement with Dr. Staton  - pt reports dressing was supposed to stay in place until his follow up on Thursday but it got wet and has been peeling off  -will consult ortho to see while here    Will need short term follow up of LAD noted on CT.     I discussed the patient's findings and my recommendations with patient, family, and nursing staff.    VTE Prophylaxis - Eliquis (home med).  Code Status - Full code.       Susan Melton MD  [facility]  06/24/24  14:22 EDT

## 2024-06-24 NOTE — PLAN OF CARE
Goal Outcome Evaluation:  Plan of Care Reviewed With: patient           Outcome Evaluation: Pt is a 68 yo male who presents from home with worsening SOA, acute onset CHF. Pt with recent history of L TKA on 6/13. Pt has been working with HH PT since surgery and mobilizing with cane. Upon exam, pt demonstrates expected post op weakness and impairment of L knee ROM, as well as decreased endurance/activity tolerance. Pt performed transfers independently and ambulated 150' with rwx and SBA. Pt able to complete L TKA exercises for ROM and strengthening. Pt does demo increased SOA after ambulation. SpO2 decreased to 86% after ambulation on room air and returned to > 90% after being replaced on 2 L O2 per nc. Pt will continue to benefit from PT to assist with return to PLOF. Recommend continuing with HH PT at [facility].      Anticipated Discharge Disposition (PT): home with home health

## 2024-06-24 NOTE — DISCHARGE PLACEMENT REQUEST
"Patricia Meyers (69 y.o. Male)       Date of Birth   1955    Social Security Number       Address   [address] [place] [address]    Home Phone   [phone]    MRN   [id number]       [facility]    Marital Status                               Admission Date   6/24/24    Admission Type   Emergency    Admitting Provider   Aquilino Phillips MD    Attending Provider   Susan Melton MD    [facility]       Discharge Date       Discharge Disposition       Discharge Destination                                 Attending Provider: Susan Melton MD    Allergies: No Known Allergies    Isolation: None   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)   Code Status: CPR    Ht: 167.6 cm (66\")   Wt: 120 kg (265 lb)    Admission Cmt: None   Principal Problem: New onset of congestive heart failure [I50.9]                   Active Insurance as of 6/24/2024       Primary Coverage       Payor Plan Insurance Group Employer/Plan Group    MEDICARE MEDICARE A & B        Payor Plan Address Payor Plan Phone Number Payor Plan Fax Number Effective Dates    [address] [phone]  3/1/2020 - None Entered    [facility] [id number]         Subscriber Name Subscriber Birth Date Member ID       PATRICIA MEYERS 1955 [id number]               Secondary Coverage       Payor Plan Insurance Group Employer/Plan Group    AARP [employer] SUP A[employer] HEALTH CARE OPTIONS        Payor Plan Address Payor Plan Phone Number Payor Plan Fax Number Effective Dates    [facility] [phone]  3/1/2020 - None Entered    [address]       [facility] [address]         Subscriber Name Subscriber Birth Date Member ID       PATRICIA MEYERS 1955 [id number]                     Emergency Contacts        (Rel.) Home Phone Work Phone Mobile Phone    Darlin Meyers (Spouse) [phone] -- [phone]    Dexter Meyers (Son) [phone] -- [phone]                "

## 2024-06-24 NOTE — ED NOTES
"Nursing report ED to floor  lAexy Ram  69 y.o.  male    HPI :  HPI (Adult)  Stated Reason for Visit: soa since 1900 today    Chief Complaint  Chief Complaint   Patient presents with    Shortness of Breath       Admitting doctor:   Aquilino Phillips MD    Admitting diagnosis:   The primary encounter diagnosis was Hypokalemia. Diagnoses of Hypoxia and Acute right-sided congestive heart failure were also pertinent to this visit.    Code status:   Current Code Status       Date Active Code Status Order ID Comments User Context       Prior            Allergies:   Patient has no known allergies.    Isolation:   No active isolations    Intake and Output    Intake/Output Summary (Last 24 hours) at 6/24/2024 0513  Last data filed at 6/24/2024 0457  Gross per 24 hour   Intake 1000 ml   Output --   Net 1000 ml       Weight:       06/24/24  0124   Weight: 120 kg (265 lb)       Most recent vitals:   Vitals:    06/24/24 0124 06/24/24 0241 06/24/24 0420 06/24/24 0428   BP:   136/75    BP Location:       Patient Position:       Pulse:  68 64 64   Resp:       Temp:       TempSrc:       SpO2:  93% 95% 95%   Weight: 120 kg (265 lb)      Height: 167.6 cm (66\")          Active LDAs/IV Access:   Lines, Drains & Airways       Active LDAs       Name Placement date Placement time Site Days    Peripheral IV 06/24/24 0219 Right Antecubital 06/24/24 0219  Antecubital  less than 1                    Labs (abnormal labs have a star):   Labs Reviewed   COMPREHENSIVE METABOLIC PANEL - Abnormal; Notable for the following components:       Result Value    Glucose 159 (*)     Potassium 3.1 (*)     Total Protein 5.8 (*)     Albumin 3.4 (*)     All other components within normal limits    Narrative:     GFR Normal >60  Chronic Kidney Disease <60  Kidney Failure <15     CBC WITH AUTO DIFFERENTIAL - Abnormal; Notable for the following components:    WBC 10.83 (*)     RBC 3.95 (*)     Hemoglobin 11.0 (*)     Hematocrit 34.8 (*)     RDW 15.6 (*)     " Neutrophil % 77.4 (*)     Lymphocyte % 12.5 (*)     Neutrophils, Absolute 8.39 (*)     Monocytes, Absolute 0.99 (*)     All other components within normal limits   MAGNESIUM - Abnormal; Notable for the following components:    Magnesium 2.5 (*)     All other components within normal limits   CBC AND DIFFERENTIAL    Narrative:     The following orders were created for panel order CBC & Differential.  Procedure                               Abnormality         Status                     ---------                               -----------         ------                     CBC Auto Differential[[id number]]        Abnormal            Final result                 Please view results for these tests on the individual orders.       EKG:   No orders to display       Meds given in ED:   Medications   potassium chloride 10 mEq in 100 mL IVPB (10 mEq Intravenous New Bag 6/24/24 0439)   albuterol (PROVENTIL) nebulizer solution 0.083% 2.5 mg/3mL (2.5 mg Nebulization Given 6/24/24 0144)   ipratropium-albuterol (DUO-NEB) nebulizer solution 3 mL (3 mL Nebulization Given 6/24/24 0144)   sodium chloride 0.9 % bolus 1,000 mL (0 mL Intravenous Stopped 6/24/24 0457)   iopamidol (ISOVUE-370) 76 % injection 100 mL (100 mL Intravenous Given 6/24/24 0320)   furosemide (LASIX) injection 80 mg (80 mg Intravenous Given 6/24/24 0438)   potassium chloride (K-DUR,KLOR-CON) ER tablet 20 mEq (20 mEq Oral Given 6/24/24 0438)       Imaging results:  CT Angiogram Chest    Result Date: 6/24/2024   1. Cardiomegaly, interlobular septal thickening, and bilateral pleural effusions, right greater than left, suggesting congestive heart failure. 2. Mediastinal and hilar lymph nodes appear larger than on prior exam. While these may be reactive, short-term follow-up exam is recommended to document resolution.  Radiation dose reduction techniques were utilized, including automated exposure control and exposure modulation based on body size.   This report was  finalized on 2024 3:49 AM by Dr. Alexandria Abbasi M.D on Workstation: [id number]       Ambulatory status:   - up with assistance, oxygen     Social issues:   Social History     Socioeconomic History    Marital status:    Tobacco Use    Smoking status: Former     Current packs/day: 0.00     Average packs/day: 1 pack/day for 33.0 years (33.0 ttl pk-yrs)     Types: Cigarettes     Start date: 1985     Quit date: 2018     Years since quittin.4     Passive exposure: Past    Smokeless tobacco: Never   Vaping Use    Vaping status: Never Used   Substance and Sexual Activity    Alcohol use: Not Currently    Drug use: No    Sexual activity: Not Currently     Partners: Female       Peripheral Neurovascular  Peripheral Neurovascular (Adult)  Peripheral Neurovascular WDL: .WDL except, neurovascular assessment lower  LLE Neurovascular Assessment  Color LLE: other (see comments) (post op L knee replacement, bruising)    Neuro Cognitive  Neuro Cognitive (Adult)  Cognitive/Neuro/Behavioral WDL: WDL, orientation  Orientation: oriented x 4    Learning  Learning Assessment (Adult)  Learning Readiness and Ability: no barriers identified  Education Provided  Person Taught: patient    Respiratory  Respiratory (Adult)  Airway WDL: .WDL except  Respiratory WDL  Respiratory WDL: .WDL except, rhythm/pattern, expansion/retractions  Rhythm/Pattern, Respiratory: labored, shallow, tachypneic, shortness of breath  Expansion/Accessory Muscles/Retractions: abdominal muscle use    Abdominal Pain       Pain Assessments  Pain (Adult)  (0-10) Pain Rating: Rest: 0    NIH Stroke Scale       Joana Turk RN  24 05:13 EDT

## 2024-06-24 NOTE — ED NOTES
EMS paged at [phone]. Javi from [facility] returned call and reports they do have availability this AM, this RN confirming bed assignment.    EMS notified of bed assigment at [phone], they report they will come to transport.     Joana Turk RN

## 2024-06-24 NOTE — FSED PROVIDER NOTE
Subjective   History of Present Illness  Patient has a history of COPD he had a recent surgery on the 13th on his knee and he had some trouble with breathing when he was laying down they tried the CPAP and it would go to the low 90s but it would not stay up.  The patient arrived at 88 pulse ox however talking to me on 3 L he is jumps up to 96% on the 3 L.  The patient has had no cough he has no chest pain.      Review of Systems   Respiratory:  Positive for shortness of breath and wheezing.        Past Medical History:   Diagnosis Date    Acromioclavicular separation     shoulder pulled out of place    Ankle sprain     Arthritis     OSTEOARTHRITIS    Arthritis of back     so long I don't know when it started    Arthritis of neck     COPD (chronic obstructive pulmonary disease)     Diabetes mellitus     Dislocation, shoulder     Enlarged prostate     Fatty liver     Frozen shoulder     Hip arthrosis     History of low potassium     History of lung cancer 2018    HX LEFT LOWER LOBECTOMY    History of MRSA infection 2018    History of transfusion     Hyperlipidemia     Hypertension     Knee swelling     Left upper lobe consolidation     REPEAT CT SCANS - FOLLOWED BY PULMONARY, MOST RECENT CT SCAN 4/3/24    Low back strain     Lumbosacral disc disease     Neck strain     Neuropathy     On anticoagulant therapy     Pacemaker     PAF (paroxysmal atrial fibrillation)     Periarthritis of shoulder     Second degree AV block     Sinus node dysfunction     Sleep apnea     WEARS CPAP    Slow to wake up after anesthesia     Thoracic disc disorder        No Known Allergies    Past Surgical History:   Procedure Laterality Date    BRONCHOSCOPY N/A 10/11/2018    Procedure: BRONCHOSCOPY WITH FLUORO, LEFT LOWER LOBE BRUSHINGS WET AND DRY. WITH BX'S AND BAL (IN LLL).;  Surgeon: Akil Ortiz MD;  [facility]: [facility];  Service: Pulmonary    BRONCHOSCOPY WITH ION ROBOTIC ASSIST N/A 09/07/2023    Procedure: BRONCHOSCOPY  WITH ION ROBOT AND ENDOBRONCHIAL ULTRASOUND with brushing and FNA;  Surgeon: Taye Chavira MD;  Location:  [facility];  Service: Robotics - Pulmonary;  Laterality: N/A;  PRE/POST - left upper lobe mass    CATARACT EXTRACTION Bilateral 04/01/2024    COLONOSCOPY  2021    COLONOSCOPY W/ POLYPECTOMY N/A 10/22/2021    Procedure: COLONOSCOPY WITH POLYPECTOMY;  Surgeon: Lan Solis MD;  Location:  [facility];  Service: Gastroenterology;  Laterality: N/A;  cecal polyp x1 (cold snare)  ascending polyp x1 (hot snare)  transverse polyp x3 (hot snare x 1), (cold snare x2)  sigmoid polyp x1 (hot snare, clip applied)  rectal polyp x3 (cold snare)    INSERT / REPLACE / REMOVE PACEMAKER  6/5/2005    replaced Oct 2014    JOINT REPLACEMENT  Hip    Hip    KNEE ARTHROSCOPY Left     PACEMAKER IMPLANTATION  2005, 2014    THORACOSCOPY Left 11/19/2018    Procedure: BRONCHOSCOPY, DAVINCI ROBOT ASSISTED VIDEIO ASSISTED THORACOSCOPY  WITH CONVERT TO OPEN THORACOTOMY,LEFT LOWER LOBECTOMY,INTERCOSTAL NERVE BLOCK;  Surgeon: Michael Ring III, MD;  Location: [facility];  Service: DaVinci    TOTAL HIP ARTHROPLASTY Left 07/14/2023    Procedure: TOTAL HIP ARTHROPLASTY;  Surgeon: Nikko Staton MD;  Location:  [facility];  Service: Orthopedics;  Laterality: Left;    TOTAL KNEE ARTHROPLASTY Left 6/13/2024    Procedure: TOTAL KNEE ARTHROPLASTY;  Surgeon: Nikko Staton MD;  Location:  [facility];  Service: Orthopedics;  Laterality: Left;       Family History   Problem Relation Age of Onset    Diabetes Mother     Stroke Father     Heart disease Father     Stroke Sister     Cancer Sister     Stroke Sister     Diabetes Sister     Heart disease Brother     Diabetes Brother     Diabetes Brother     Malig Hyperthermia Neg Hx        Social History     Socioeconomic History    Marital status:    Tobacco Use    Smoking status: Former     Current packs/day: 0.00     Average packs/day: 1 pack/day for 33.0 years (33.0 ttl  pk-yrs)     Types: Cigarettes     Start date: 1985     Quit date: 2018     Years since quittin.4     Passive exposure: Past    Smokeless tobacco: Never   Vaping Use    Vaping status: Never Used   Substance and Sexual Activity    Alcohol use: Not Currently    Drug use: No    Sexual activity: Not Currently     Partners: Female           Objective   Physical Exam  Vitals and nursing note reviewed.   Constitutional:       General: He is not in acute distress.     Appearance: He is well-developed. He is not ill-appearing, toxic-appearing or diaphoretic.   HENT:      Head: Normocephalic and atraumatic.      Mouth/Throat:      Mouth: Mucous membranes are moist.   Eyes:      Extraocular Movements: Extraocular movements intact.      Pupils: Pupils are equal, round, and reactive to light.   Cardiovascular:      Rate and Rhythm: Normal rate and regular rhythm.   Pulmonary:      Effort: Pulmonary effort is normal.      Breath sounds: Examination of the right-lower field reveals decreased breath sounds. Examination of the left-lower field reveals decreased breath sounds. No wheezing, rhonchi or rales.   Abdominal:      General: Bowel sounds are normal.      Palpations: Abdomen is soft.   Musculoskeletal:         General: Normal range of motion.      Cervical back: Normal range of motion and neck supple.   Skin:     General: Skin is warm and dry.      Capillary Refill: Capillary refill takes less than 2 seconds.   Neurological:      Mental Status: He is alert and oriented to person, place, and time.   Psychiatric:         Mood and Affect: Mood normal.         Behavior: Behavior normal.         Procedures           ED Course                                           Medical Decision Making  Patient has hypoxia that was unexplained.  However after the CT scan was done because of his recent surgery.  No pulmonary embolism was found however the patient is in heart failure and has bilateral pleural effusions.  Further  talking with the patient was revealed that he does take quite a bit of potassium for the last 30 years and it is always low despite that.  However because it is low they hesitate to give him more water pill because it will lower his potassium.  Hence since the surgery he has gone into heart failure.  Patient is requiring oxygen which she is not on.  He will have to be admitted I am calling the hospitalist he will require an echo I am giving him Lasix potassium and magnesium replacement.  Patient will be admitted to Dr. Peter and they may transport him there.    Amount and/or Complexity of Data Reviewed  Labs: ordered.     Details: Potassium is only 3.1 magnesium will be obtained white count is minimally elevated 10.83 and hemoglobin is 11.0.  Sugar slightly elevated 159.  Radiology: ordered.     Details: CT of the chest demonstrates bilateral pleural effusions and and findings consistent with congestive heart failure.    Risk  Prescription drug management.        Final diagnoses:   Hypokalemia   Hypoxia   Acute right-sided congestive heart failure       ED Disposition  ED Disposition       ED Disposition   Decision to Admit    Condition   --    Comment   Level of Care: Med/Surg [1]   Diagnosis: New onset of congestive heart failure [[id number]]   Admitting Physician: MAHENDRA LE [[id number]]   Isolate for COVID?: No [0]   Certification: I Certify That Inpatient Hospital Services Are Medically Necessary For Greater Than 2 Midnights                 No follow-up provider specified.       Medication List      No changes were made to your prescriptions during this visit.

## 2024-06-24 NOTE — Clinical Note
Level of Care: Med/Surg [1]   Diagnosis: New onset of congestive heart failure [[id number]]   Admitting Physician: MAHENDRA LE [[id number]]   Isolate for COVID?: No [0]   Certification: I Certify That Inpatient Hospital Services Are Medically Necessary For Greater Than 2 Midnights

## 2024-06-25 ENCOUNTER — HOME CARE VISIT (OUTPATIENT)
Dept: HOME HEALTH SERVICES | Facility: HOME HEALTHCARE | Age: 69
End: 2024-06-25
Payer: MEDICARE

## 2024-06-25 LAB
ANION GAP SERPL CALCULATED.3IONS-SCNC: 13.3 MMOL/L (ref 5–15)
BASOPHILS # BLD AUTO: 0.04 10*3/MM3 (ref 0–0.2)
BASOPHILS NFR BLD AUTO: 0.5 % (ref 0–1.5)
BUN SERPL-MCNC: 16 MG/DL (ref 8–23)
BUN/CREAT SERPL: 21.1 (ref 7–25)
CALCIUM SPEC-SCNC: 8.6 MG/DL (ref 8.6–10.5)
CHLORIDE SERPL-SCNC: 106 MMOL/L (ref 98–107)
CO2 SERPL-SCNC: 25.7 MMOL/L (ref 22–29)
CREAT SERPL-MCNC: 0.76 MG/DL (ref 0.76–1.27)
DEPRECATED RDW RBC AUTO: 45.5 FL (ref 37–54)
EGFRCR SERPLBLD CKD-EPI 2021: 97.3 ML/MIN/1.73
EOSINOPHIL # BLD AUTO: 0.25 10*3/MM3 (ref 0–0.4)
EOSINOPHIL NFR BLD AUTO: 3 % (ref 0.3–6.2)
ERYTHROCYTE [DISTWIDTH] IN BLOOD BY AUTOMATED COUNT: 14.3 % (ref 12.3–15.4)
GLUCOSE BLDC GLUCOMTR-MCNC: 122 MG/DL (ref 70–130)
GLUCOSE BLDC GLUCOMTR-MCNC: 125 MG/DL (ref 70–130)
GLUCOSE BLDC GLUCOMTR-MCNC: 130 MG/DL (ref 70–130)
GLUCOSE BLDC GLUCOMTR-MCNC: 141 MG/DL (ref 70–130)
GLUCOSE SERPL-MCNC: 113 MG/DL (ref 65–99)
HCT VFR BLD AUTO: 35.2 % (ref 37.5–51)
HGB BLD-MCNC: 11.2 G/DL (ref 13–17.7)
IMM GRANULOCYTES # BLD AUTO: 0.03 10*3/MM3 (ref 0–0.05)
IMM GRANULOCYTES NFR BLD AUTO: 0.4 % (ref 0–0.5)
LYMPHOCYTES # BLD AUTO: 1.65 10*3/MM3 (ref 0.7–3.1)
LYMPHOCYTES NFR BLD AUTO: 19.6 % (ref 19.6–45.3)
MAGNESIUM SERPL-MCNC: 1.9 MG/DL (ref 1.6–2.4)
MCH RBC QN AUTO: 27.9 PG (ref 26.6–33)
MCHC RBC AUTO-ENTMCNC: 31.8 G/DL (ref 31.5–35.7)
MCV RBC AUTO: 87.8 FL (ref 79–97)
MONOCYTES # BLD AUTO: 0.91 10*3/MM3 (ref 0.1–0.9)
MONOCYTES NFR BLD AUTO: 10.8 % (ref 5–12)
NEUTROPHILS NFR BLD AUTO: 5.55 10*3/MM3 (ref 1.7–7)
NEUTROPHILS NFR BLD AUTO: 65.7 % (ref 42.7–76)
NRBC BLD AUTO-RTO: 0 /100 WBC (ref 0–0.2)
NT-PROBNP SERPL-MCNC: 901 PG/ML (ref 0–900)
PHOSPHATE SERPL-MCNC: 4.1 MG/DL (ref 2.5–4.5)
PLATELET # BLD AUTO: 332 10*3/MM3 (ref 140–450)
PMV BLD AUTO: 10.1 FL (ref 6–12)
POTASSIUM SERPL-SCNC: 3.2 MMOL/L (ref 3.5–5.2)
POTASSIUM SERPL-SCNC: 3.4 MMOL/L (ref 3.5–5.2)
RBC # BLD AUTO: 4.01 10*6/MM3 (ref 4.14–5.8)
SODIUM SERPL-SCNC: 145 MMOL/L (ref 136–145)
WBC NRBC COR # BLD AUTO: 8.43 10*3/MM3 (ref 3.4–10.8)

## 2024-06-25 PROCEDURE — 83880 ASSAY OF NATRIURETIC PEPTIDE: CPT | Performed by: STUDENT IN AN ORGANIZED HEALTH CARE EDUCATION/TRAINING PROGRAM

## 2024-06-25 PROCEDURE — 82948 REAGENT STRIP/BLOOD GLUCOSE: CPT

## 2024-06-25 PROCEDURE — 84100 ASSAY OF PHOSPHORUS: CPT | Performed by: STUDENT IN AN ORGANIZED HEALTH CARE EDUCATION/TRAINING PROGRAM

## 2024-06-25 PROCEDURE — 84132 ASSAY OF SERUM POTASSIUM: CPT | Performed by: STUDENT IN AN ORGANIZED HEALTH CARE EDUCATION/TRAINING PROGRAM

## 2024-06-25 PROCEDURE — 94760 N-INVAS EAR/PLS OXIMETRY 1: CPT

## 2024-06-25 PROCEDURE — 83735 ASSAY OF MAGNESIUM: CPT | Performed by: STUDENT IN AN ORGANIZED HEALTH CARE EDUCATION/TRAINING PROGRAM

## 2024-06-25 PROCEDURE — 85025 COMPLETE CBC W/AUTO DIFF WBC: CPT | Performed by: STUDENT IN AN ORGANIZED HEALTH CARE EDUCATION/TRAINING PROGRAM

## 2024-06-25 PROCEDURE — 99221 1ST HOSP IP/OBS SF/LOW 40: CPT | Performed by: NURSE PRACTITIONER

## 2024-06-25 PROCEDURE — 94799 UNLISTED PULMONARY SVC/PX: CPT

## 2024-06-25 PROCEDURE — 94761 N-INVAS EAR/PLS OXIMETRY MLT: CPT

## 2024-06-25 PROCEDURE — 25010000002 FUROSEMIDE PER 20 MG: Performed by: STUDENT IN AN ORGANIZED HEALTH CARE EDUCATION/TRAINING PROGRAM

## 2024-06-25 PROCEDURE — 94664 DEMO&/EVAL PT USE INHALER: CPT

## 2024-06-25 PROCEDURE — 80048 BASIC METABOLIC PNL TOTAL CA: CPT | Performed by: STUDENT IN AN ORGANIZED HEALTH CARE EDUCATION/TRAINING PROGRAM

## 2024-06-25 RX ORDER — POTASSIUM CHLORIDE 750 MG/1
40 TABLET, FILM COATED, EXTENDED RELEASE ORAL EVERY 4 HOURS
Status: COMPLETED | OUTPATIENT
Start: 2024-06-25 | End: 2024-06-25

## 2024-06-25 RX ADMIN — POTASSIUM CHLORIDE 40 MEQ: 750 TABLET, EXTENDED RELEASE ORAL at 12:21

## 2024-06-25 RX ADMIN — Medication 10 ML: at 08:13

## 2024-06-25 RX ADMIN — Medication 10 ML: at 21:43

## 2024-06-25 RX ADMIN — ATORVASTATIN CALCIUM 40 MG: 20 TABLET, FILM COATED ORAL at 21:42

## 2024-06-25 RX ADMIN — APIXABAN 5 MG: 5 TABLET, FILM COATED ORAL at 21:43

## 2024-06-25 RX ADMIN — FUROSEMIDE 40 MG: 10 INJECTION, SOLUTION INTRAMUSCULAR; INTRAVENOUS at 08:12

## 2024-06-25 RX ADMIN — FUROSEMIDE 40 MG: 10 INJECTION, SOLUTION INTRAMUSCULAR; INTRAVENOUS at 17:17

## 2024-06-25 RX ADMIN — GABAPENTIN 200 MG: 100 CAPSULE ORAL at 08:12

## 2024-06-25 RX ADMIN — Medication 5 MG: at 21:42

## 2024-06-25 RX ADMIN — HYDRALAZINE HYDROCHLORIDE 25 MG: 25 TABLET ORAL at 08:12

## 2024-06-25 RX ADMIN — POTASSIUM CHLORIDE 40 MEQ: 750 TABLET, EXTENDED RELEASE ORAL at 21:43

## 2024-06-25 RX ADMIN — POTASSIUM CHLORIDE 40 MEQ: 750 TABLET, EXTENDED RELEASE ORAL at 17:44

## 2024-06-25 RX ADMIN — GABAPENTIN 200 MG: 100 CAPSULE ORAL at 21:42

## 2024-06-25 RX ADMIN — CARVEDILOL 50 MG: 25 TABLET, FILM COATED ORAL at 08:12

## 2024-06-25 RX ADMIN — POTASSIUM CHLORIDE 40 MEQ: 750 TABLET, EXTENDED RELEASE ORAL at 06:38

## 2024-06-25 RX ADMIN — CARVEDILOL 50 MG: 25 TABLET, FILM COATED ORAL at 17:08

## 2024-06-25 RX ADMIN — POTASSIUM CHLORIDE 40 MEQ: 750 TABLET, EXTENDED RELEASE ORAL at 01:26

## 2024-06-25 RX ADMIN — AMLODIPINE BESYLATE 10 MG: 10 TABLET ORAL at 08:12

## 2024-06-25 RX ADMIN — APIXABAN 5 MG: 5 TABLET, FILM COATED ORAL at 08:12

## 2024-06-25 RX ADMIN — TAMSULOSIN HYDROCHLORIDE 0.4 MG: 0.4 CAPSULE ORAL at 21:43

## 2024-06-25 RX ADMIN — TIOTROPIUM BROMIDE INHALATION SPRAY 2 PUFF: 3.12 SPRAY, METERED RESPIRATORY (INHALATION) at 06:53

## 2024-06-25 RX ADMIN — HYDRALAZINE HYDROCHLORIDE 25 MG: 25 TABLET ORAL at 21:42

## 2024-06-25 NOTE — PROGRESS NOTES
Name: Alexy Ram ADMIT: 2024   : 1955  PCP: Pipe Vaughn MD    MRN: [id number] LOS: 1 days   AGE/SEX: 69 y.o. male  ROOM: [facility]/     Subjective   Subjective   Sitting up in his recliner, left lower extremity pitting edema improved from yesterday but still significantly greater than right.  Wife massaging leg.       Objective   Objective   Vital Signs  Temp:  [97.9 °F (36.6 °C)-98.5 °F (36.9 °C)] 98.2 °F (36.8 °C)  Heart Rate:  [59-67] 67  Resp:  [17-18] 17  BP: (115-140)/(67-82) 115/67  SpO2:  [93 %-96 %] 93 %  on  Flow (L/min):  [2] 2;   Device (Oxygen Therapy): nasal cannula  Body mass index is 42.77 kg/m².  Physical Exam  Constitutional:       General: He is not in acute distress.     Appearance: He is obese. He is ill-appearing. He is not toxic-appearing.   Cardiovascular:      Rate and Rhythm: Normal rate and regular rhythm.   Pulmonary:      Effort: Pulmonary effort is normal. No respiratory distress.      Breath sounds: No wheezing.      Comments: Diminished bilaterally/symmetrically  Abdominal:      General: Bowel sounds are normal.      Palpations: Abdomen is soft.      Tenderness: There is no abdominal tenderness.   Musculoskeletal:         General: No tenderness.      Right lower leg: Edema present.      Left lower leg: Edema present.      Comments: L > R edema    Left knee wrapped   Skin:     General: Skin is warm and dry.   Neurological:      General: No focal deficit present.      Mental Status: He is alert and oriented to person, place, and time. Mental status is at baseline.      Motor: No weakness.         Results Review     I reviewed the patient's new clinical results.  Results from last 7 days   Lab Units 24  0519 24  0219   WBC 10*3/mm3 8.43 10.83*   HEMOGLOBIN g/dL 11.2* 11.0*   PLATELETS 10*3/mm3 332 338     Results from last 7 days   Lab Units 24  0519 24  0219   SODIUM mmol/L 145 141   POTASSIUM mmol/L 3.2* 3.1*   CHLORIDE mmol/L 106 104    CO2 mmol/L 25.7 27.2   BUN mg/dL 16 20   CREATININE mg/dL 0.76 0.80   GLUCOSE mg/dL 113* 159*   Estimated Creatinine Clearance: 112 mL/min (by C-G formula based on SCr of 0.76 mg/dL).  Results from last 7 days   Lab Units 06/24/24  0219   ALBUMIN g/dL 3.4*   BILIRUBIN mg/dL 0.8   ALK PHOS U/L 54   AST (SGOT) U/L 12   ALT (SGPT) U/L 11     Results from last 7 days   Lab Units 06/25/24  0519 06/24/24  0219   CALCIUM mg/dL 8.6 9.4   ALBUMIN g/dL  --  3.4*   MAGNESIUM mg/dL 1.9 2.5*   PHOSPHORUS mg/dL 4.1  --        COVID19   Date Value Ref Range Status   12/26/2022 Not Detected  Final   10/20/2021 Not Detected Not Detected - Ref. Range Final     Glucose   Date/Time Value Ref Range Status   06/25/2024 1118 141 (H) 70 - 130 mg/dL Final   06/25/2024 0622 125 70 - 130 mg/dL Final   06/24/2024 2010 134 (H) 70 - 130 mg/dL Final   06/24/2024 1612 126 70 - 130 mg/dL Final   06/24/2024 1217 171 (H) 70 - 130 mg/dL Final       Adult Transthoracic Echo Complete W/ Cont if Necessary Per Protocol    Left ventricular systolic function is normal. Calculated left   ventricular EF = 67.2%; visually estimated LVEF 55-60%.    The left ventricular cavity is mildly dilated.    Left ventricular wall thickness is consistent with mild concentric   hypertrophy.    Left ventricular diastolic function was normal.    RV mildly dilated with grossly normal function.    Aortic valve sclerosis without hemodynamically significant stenosis.    There is a small (<1cm) pericardial effusion. There is no evidence of   cardiac tamponade.    Pacemaker lead noted.    Saline test results are negative.    Biatrial enlargement, normal IVC size.  CT Angiogram Chest  Narrative: CT ANGIOGRAM OF THE CHEST     HISTORY: Shortness of air and hypoxia     COMPARISON: April 3, 2024     TECHNIQUE: Axial CT imaging was obtained through the thorax. IV contrast  was administered. Three-D reformatted images were obtained.     FINDINGS:  No acute pulmonary thromboembolus is  seen. Examination was not optimized  for assessment of the thoracic aorta. Proximal descending thoracic aorta  measures up to 3.3 cm. Distal descending aorta measures 2.8 cm. No  obvious dissection is seen. There is tortuosity of the aorta. There are  coronary artery calcifications, and calcification of the aorta. The main  pulmonary artery is enlarged, which can be seen in setting of pulmonary  chill hypertension. There are changes of prior left lower lobectomy.  Thyroid gland, trachea, and esophagus appear unremarkable. There are  background emphysematous changes. There is interlobular septal  thickening, as well as areas of groundglass attenuation. There are  bilateral pleural effusions, right greater than left. The heart is  enlarged. Constellation of findings likely reflects congestive heart  failure. There is dependent atelectasis within the right lower lobe.  Soft tissue density along the anteromedial aspect of the left upper  lobe, adjacent to the suture line is stable in appearance when compared  to the prior study. However, mediastinal lymph nodes are larger than on  prior exam. For example, a lower left paratracheal node measures up to  2.2 cm in short axis dimensions, previously measuring 1.6 cm. A  subcarinal lymph node measures up to 1.7 cm, previously measuring 8 mm.  There is also a prominent right hilar node, which measures up to 1.3 cm  previously, it measured approximately 9 mm. Evaluation for hepatic  lesions is limited, due to technique. Bulky appearance to the adrenal  glands bilaterally is stable. Old deformity of the left ribs is again  seen. No acute osseous abnormalities are seen. Right-sided pacemaker is  present.     Impression:    1. Cardiomegaly, interlobular septal thickening, and bilateral pleural  effusions, right greater than left, suggesting congestive heart failure.  2. Mediastinal and hilar lymph nodes appear larger than on prior exam.  While these may be reactive, short-term  follow-up exam is recommended to  document resolution.     Radiation dose reduction techniques were utilized, including automated  exposure control and exposure modulation based on body size.        This report was finalized on 6/24/2024 3:49 AM by Dr. Alexandria Abbasi M.D on Workstation: [id number]       Scheduled Medications  amLODIPine, 10 mg, Oral, Daily  apixaban, 5 mg, Oral, BID  atorvastatin, 40 mg, Oral, Nightly  carvedilol, 50 mg, Oral, BID With Meals  furosemide, 40 mg, Intravenous, BID  gabapentin, 200 mg, Oral, BID  hydrALAZINE, 25 mg, Oral, BID  insulin lispro, 2-7 Units, Subcutaneous, 4x Daily AC & at Bedtime  melatonin, 5 mg, Oral, Nightly  sodium chloride, 10 mL, Intravenous, Q12H  tamsulosin, 0.4 mg, Oral, Nightly  tiotropium bromide monohydrate, 2 puff, Inhalation, Daily - RT    Infusions   Diet  Diet: Cardiac, Diabetic; Healthy Heart (2-3 Na+); Consistent Carbohydrate; Fluid Consistency: Thin (IDDSI 0)         I have personally reviewed:  [x]  Laboratory   []  Microbiology   [x]  Radiology   [x]  EKG/Telemetry   []  Cardiology/Vascular   []  Pathology   [x]  Records    Assessment/Plan     Active Hospital Problems    Diagnosis  POA    **New onset of congestive heart failure [I50.9]  Yes    CHF (congestive heart failure) [I50.9]  Yes    Chronic anticoagulation [Z79.01]  Not Applicable    Obstructive sleep apnea of adult [G47.33]  Yes    Class 3 severe obesity due to excess calories with body mass index (BMI) of 40.0 to 44.9 in adult [E66.01, Z68.41]  Not Applicable    Status post lobectomy of lung [Z90.2]  Not Applicable    PAF (paroxysmal atrial fibrillation) [I48.0]  Yes    Squamous cell carcinoma of left lung [C34.92]  Yes    AV block, 2nd degree [I44.1]  Yes    Hypertension [I10]  Yes    Type 2 diabetes mellitus with hyperglycemia, without long-term current use of insulin [E11.65]  Yes      Resolved Hospital Problems   No resolved problems to display.       Mr. Ram is a 69 y.o. with a  "history of PAF, HTN, AV block/syndope s/p PPM, HLD, DM2, COPD, BPH who presents with shortness of breath and evidence of heart failure.     Acute heart failure  History of 2nd degree AV block/syncope s/p PPM  PAF on AC  HTN  HLD  - Echo from January 2023 reviewed, EF 61%-mild LVH with normal systolic function  -CT with cardiomegaly, interlobular septal thickening and bilateral pleural effusions suggestive of CHF,   -Continue amlodipine, Coreg, hydralazine, holding his valsartan-HCTZ while getting diuresis  -Continue Eliquis  - echo w/ Dilated RV with normal function  - continue IV lasix; monitor on tele, strict Is/Os      Hypokalemia  - replete per protocol  - pt reports chronic issues with hypokalemia- has been told he should \"never be on 'water pill'\"   - d/w pt/wife to dw his PC or cardiologist about aldactone     DM2  - A1c 6.1% last month  - hold home oral meds; start SSI  - noted rec to dc actos given chf     COPD  SCC of the left lung, s/p lobectomy  - cont home inhalers     BPH- cont tamsulosin  GIL- ok for home CPAP if available     Recent knee replacement with Dr. Staton  - pt reports dressing was supposed to stay in place until his follow up on Thursday but it got wet and has been peeling off  -will consult ortho to see while here- appreciate them seeing patient  - needs 6 wk clinic f/u     Will need short term follow up of LAD noted on CT chest.     Eliquis (home med) for DVT prophylaxis.  Full code.  Discussed with patient and spouse and RN.  Anticipated discharge home, TBD        Susan Melton MD  [facility]  06/25/24  14:34 EDT    I wore protective equipment throughout this patient encounter including gloves.  Hand hygiene was performed before donning protective equipment and after removal when leaving the room.    Expected Discharge Date and Time       Expected Discharge Date Expected Discharge Time    Jun 26, 2024              Copied text in this note has been " reviewed and is accurate as of 06/25/24.

## 2024-06-25 NOTE — PLAN OF CARE
Problem: Adult Inpatient Plan of Care  Goal: Plan of Care Review  Outcome: Ongoing, Progressing  Flowsheets (Taken 6/25/2024 0442)  Progress: improving  Plan of Care Reviewed With: patient   Goal Outcome Evaluation:  Plan of Care Reviewed With: patient        Progress: improving     Pt is A-paced on demand with a HR in the 50-60's overnight. Pt on 2L n/c with CPAP at night. TKA on 6/13. Ortho consulted to assess dressing placed on left knee. Pain treated with ice. PO Eliquis given per order. Potassium replaced per protocol. Continue with plan of care.

## 2024-06-25 NOTE — CONSULTS
Date of Hospital Visit:24  Encounter Provider: ROME Nance  Place of Service: [facility] CARDIOLOGY  Patient Name: Alexy Ram  :1955  Referral Provider: Provider, No Known    Chief complaint: Shortness of air    History of Present Illness    Alexy Ram is a 69-year-old gentleman normally followed by [facility].  He has history of hypertension, lung cancer with left lower lobe removal, COPD, dilated ascending aorta, hyperlipidemia, right bundle branch block, paroxysmal atrial fibrillation, second-degree AV block status post cardiac pacemaker..    He had dual-chamber [employer] pacemaker implanted in , initially.    Transthoracic echocardiogram 2023  - Mild left ventricular hypertrophy with normal systolic function.    -The LVEF is 61%.   -Moderately dilated left atrium.   - Trace-to-mild mitral regurgitation.   -Borderline right ventricular enlargement with normal systolic function.   - Mild pulmonic insufficiency.   -Mild dilation of the ascending aorta at 3.9 cm.   -Right ventricular systolic pressure of 45 mmHg.     Myocardial PET perfusion study 2023  -There is a small sized mild severity fixed perfusion defect(s) of the apex    wall consistent with apical thinning.    -No evidence of stress induced myocardial ischemia or infarction.     He was last seen by Dr. Steve Rice 3/14/2024.  His device interrogation appears up-to-date    He had recent left knee surgery 24 with Dr. Nikko Staton.     He then presented with acute onset of shortness of breath last night.  proBNP was slightly elevated at 901.  CT angiogram of the chest showed bilateral pleural effusion with right greater than left suggestive of CHF.  He received IV Lasix in the ER and had some relief.  Echocardiogram was completed yesterday 2024.  Left ventricular systolic function is normal. Calculated left ventricular EF = 67.2%; visually estimated LVEF 55-60%.     The left ventricular cavity is mildly dilated.    Left ventricular wall thickness is consistent with mild concentric hypertrophy.    Left ventricular diastolic function was normal.    RV mildly dilated with grossly normal function.    Aortic valve sclerosis without hemodynamically significant stenosis.    There is a small (<1cm) pericardial effusion. There is no evidence of cardiac tamponade.    Pacemaker lead noted.    Saline test results are negative.    Biatrial enlargement, normal IVC size.    Cardiology has been asked to see to help manage new heart failure.  Orthopedic surgery has also been consulted    EKG showed no ischemic change.    Past Medical History:   Diagnosis Date    Acromioclavicular separation     shoulder pulled out of place    Ankle sprain     Arthritis     OSTEOARTHRITIS    Arthritis of back     so long I don't know when it started    Arthritis of neck     COPD (chronic obstructive pulmonary disease)     Diabetes mellitus     Dislocation, shoulder     Enlarged prostate     Fatty liver     Frozen shoulder     Hip arthrosis     History of low potassium     History of lung cancer 2018    HX LEFT LOWER LOBECTOMY    History of MRSA infection 2018    History of transfusion     Hyperlipidemia     Hypertension     Knee swelling     Left upper lobe consolidation     REPEAT CT SCANS - FOLLOWED BY PULMONARY, MOST RECENT CT SCAN 4/3/24    Low back strain     Lumbosacral disc disease     Neck strain     Neuropathy     On anticoagulant therapy     Pacemaker     PAF (paroxysmal atrial fibrillation)     Periarthritis of shoulder     Second degree AV block     Sinus node dysfunction     Sleep apnea     WEARS CPAP    Slow to wake up after anesthesia     Thoracic disc disorder        Past Surgical History:   Procedure Laterality Date    BRONCHOSCOPY N/A 10/11/2018    Procedure: BRONCHOSCOPY WITH FLUORO, LEFT LOWER LOBE BRUSHINGS WET AND DRY. WITH BX'S AND BAL (IN LLL).;  Surgeon: Akil Ortiz MD;   [facility];  Service: Pulmonary    BRONCHOSCOPY WITH ION ROBOTIC ASSIST N/A 09/07/2023    Procedure: BRONCHOSCOPY WITH ION ROBOT AND ENDOBRONCHIAL ULTRASOUND with brushing and FNA;  Surgeon: Taye Chavira MD;  Location: [facility] ENDOSCOPY;  Service: Robotics - Pulmonary;  Laterality: N/A;  PRE/POST - left upper lobe mass    CATARACT EXTRACTION Bilateral 04/01/2024    COLONOSCOPY  2021    COLONOSCOPY W/ POLYPECTOMY N/A 10/22/2021    Procedure: COLONOSCOPY WITH POLYPECTOMY;  Surgeon: Lan Solis MD;  Location: [facility] OR;  Service: Gastroenterology;  Laterality: N/A;  cecal polyp x1 (cold snare)  ascending polyp x1 (hot snare)  transverse polyp x3 (hot snare x 1), (cold snare x2)  sigmoid polyp x1 (hot snare, clip applied)  rectal polyp x3 (cold snare)    INSERT / REPLACE / REMOVE PACEMAKER  6/5/2005    replaced Oct 2014    JOINT REPLACEMENT  Hip    Hip    KNEE ARTHROSCOPY Left     PACEMAKER IMPLANTATION  2005, 2014    THORACOSCOPY Left 11/19/2018    Procedure: BRONCHOSCOPY, DAVINCI ROBOT ASSISTED VIDEIO ASSISTED THORACOSCOPY  WITH CONVERT TO OPEN THORACOTOMY,LEFT LOWER LOBECTOMY,INTERCOSTAL NERVE BLOCK;  Surgeon: Michael Ring III, MD;  Location: [facility] MAIN OR;  Service: DaVinci    TOTAL HIP ARTHROPLASTY Left 07/14/2023    Procedure: TOTAL HIP ARTHROPLASTY;  Surgeon: Nikko Staton MD;  Location: [facility] OR OSC;  Service: Orthopedics;  Laterality: Left;    TOTAL KNEE ARTHROPLASTY Left 6/13/2024    Procedure: TOTAL KNEE ARTHROPLASTY;  Surgeon: Nikko Staton MD;  Location: [facility] OR OSC;  Service: Orthopedics;  Laterality: Left;       No current facility-administered medications on file prior to encounter.     Current Outpatient Medications on File Prior to Encounter   Medication Sig Dispense Refill    albuterol sulfate  (90 Base) MCG/ACT inhaler Inhale 2 puffs Every 4 (Four) Hours As Needed for Wheezing.      amLODIPine (NORVASC) 10 MG tablet Take 1 tablet by mouth Daily.  Indications: High Blood Pressure Disorder 90 tablet 3    atorvastatin (LIPITOR) 40 MG tablet Take 1 tablet by mouth every night at bedtime. 90 tablet 3    carvedilol (COREG) 25 MG tablet Take 2 tablets by mouth 2 (Two) Times a Day With Meals. Indications: High Blood Pressure Disorder 360 tablet 3    cetirizine (zyrTEC) 10 MG tablet Take 1 tablet by mouth Daily. (Patient taking differently: Take 1 tablet by mouth Daily As Needed.) 90 tablet 3    Eliquis 5 MG tablet tablet Take 1 tablet by mouth 2 (Two) Times a Day. (Patient taking differently: Take 1 tablet by mouth 2 (Two) Times a Day. HOLD 72 HOURS PER CARDIO INSTRUCTIONS   Indications: Prevention of Unwanted Clot in Veins) 60 tablet 0    gabapentin (NEURONTIN) 100 MG capsule TAKE TWO CAPSULES BY MOUTH EVERY MORNING, ONE CAPSULE IN THE MIDDLE OF THE DAY AND 2 CAPSULES AT BEDTIME  Indications: Chronic pain (Patient taking differently: Take 2 capsules by mouth 2 (Two) Times a Day. MAY TAKE ONE ADDITIONAL TABLET MIDDAY IF NEEDED   Indications: Chronic pain) 150 capsule 5    hydrALAZINE (APRESOLINE) 25 MG tablet Take 1 tablet by mouth 2 (Two) Times a Day. Indications: High Blood Pressure Disorder 180 tablet 3    HYDROcodone-acetaminophen (NORCO) 7.5-325 MG per tablet Take 1 tablet by mouth Every 4 (Four) Hours As Needed for Moderate Pain or Severe Pain. Indications: Pain 40 tablet 0    ondansetron (Zofran) 4 MG tablet Take 1 tablet by mouth Every 8 (Eight) Hours As Needed for Nausea or Vomiting for up to 10 doses. 10 tablet 0    pioglitazone (ACTOS) 30 MG tablet Take 1 tablet by mouth Daily. Indications: Type 2 Diabetes (Patient taking differently: Take 1 tablet by mouth Every Evening. Indications: Type 2 Diabetes) 90 tablet 1    potassium chloride 10 MEQ CR tablet Take 2 tablets by mouth 2 (Two) Times a Day for 360 days. Indications: Low Amount of Potassium in the Blood (Patient taking differently: Take 2 tablets by mouth 2 (Two) Times a Day. MAY TAKE ADDITIONAL  TABLET MIDDAY IF HAVING LEG CRAMPS   Indications: Low Amount of Potassium in the Blood) 360 tablet 3    Spiriva Respimat 2.5 MCG/ACT aerosol solution inhaler Inhale 1 puff every night at bedtime. Indications: Chronic Obstructive Lung Disease (Patient taking differently: Inhale 2 puffs As Needed. Indications: Chronic Obstructive Lung Disease) 1 each 11    tamsulosin (FLOMAX) 0.4 MG capsule 24 hr capsule Take 1 capsule by mouth Every Night. Indications: Benign Enlargement of Prostate 90 capsule 3    valsartan-hydrochlorothiazide (DIOVAN-HCT) 320-25 MG per tablet Take 1 tablet by mouth Every Morning. 90 tablet 1       Social History     Socioeconomic History    Marital status:    Tobacco Use    Smoking status: Former     Current packs/day: 0.00     Average packs/day: 1 pack/day for 33.0 years (33.0 ttl pk-yrs)     Types: Cigarettes     Start date: 1985     Quit date: 2018     Years since quittin.4     Passive exposure: Past    Smokeless tobacco: Never   Vaping Use    Vaping status: Never Used   Substance and Sexual Activity    Alcohol use: Not Currently    Drug use: No    Sexual activity: Not Currently     Partners: Female       Family History   Problem Relation Age of Onset    Diabetes Mother     Stroke Father     Heart disease Father     Stroke Sister     Cancer Sister     Stroke Sister     Diabetes Sister     Heart disease Brother     Diabetes Brother     Diabetes Brother     Malig Hyperthermia Neg Hx         REVIEW OF SYSTEMS:   All ROS was performed and is Negative except as outlined in HPI     Objective:     Vitals:    24 0020 24 0405 24 0653 24 0755   BP: 116/75 128/75  135/71   BP Location: Left arm Right arm     Patient Position: Lying Lying  Lying   Pulse: 62 59 66 64   Resp: 18 18 18 18   Temp: 98 °F (36.7 °C) 98.5 °F (36.9 °C)  98.3 °F (36.8 °C)   TempSrc: Oral Oral  Oral   SpO2:   96%    Weight:       Height:         Body mass index is 42.77 kg/m².  Flowsheet Rows  "     Flowsheet Row First Filed Value   Admission Height 167.6 cm (66\") Documented at 06/24/2024 0124   Admission Weight 120 kg (265 lb) Documented at 06/24/2024 0124            Physical Exam   Pulmonary:      Effort: Pulmonary effort is normal.      Breath sounds: Examination of the left-middle field reveals decreased breath sounds. Examination of the left-lower field reveals decreased breath sounds. Decreased breath sounds present.   Cardiovascular:      Normal rate. Regular rhythm.   Edema:     Pretibial: 1+ edema of the left pretibial area.     Ankle: 1+ edema of the left ankle.     Feet: 1+ edema of the left foot.        Lab Review:                Results from last 7 days   Lab Units 06/25/24  0519   SODIUM mmol/L 145   POTASSIUM mmol/L 3.2*   CHLORIDE mmol/L 106   CO2 mmol/L 25.7   BUN mg/dL 16   CREATININE mg/dL 0.76   GLUCOSE mg/dL 113*   CALCIUM mg/dL 8.6         Results from last 7 days   Lab Units 06/25/24  0519   WBC 10*3/mm3 8.43   HEMOGLOBIN g/dL 11.2*   HEMATOCRIT % 35.2*   PLATELETS 10*3/mm3 332             Results from last 7 days   Lab Units 06/25/24  0519   MAGNESIUM mg/dL 1.9             I personally viewed and interpreted the patient's EKG/Telemetry data- NSR     Assessment:    1.  Hypoxia in the setting of heart failure preserved ejection fraction. Continue IV lasix   2.  Dilated RV with normal function on echocardiogram 6/20/2024- stable  3.  History of secondary heart block status post permanent pacemaker-he is followed at [facility]  4.  Hypertension- BP stable on current amlodipine and coreg  5.  Hyperlipidemia- continue lipitor 40  6.  COPD  7.  Hypokalemia-to be repleted  8.  History of lung cancer status post left lower lobe resection  9.   BPH on flomax  10. Status post left knee replacement 6/13/2024 by Dr. Nikko Staton. Consult pending       Plan:      Agree with IV furosemide 40 mg twice daily and reassess fluid status tomorrow.      I would consider oral furosemide at discharge. " Consider spironolactone given issues with low K per family request for a potassium sparring form.     Continue Eliquis 5 mg BID for PAF and carvedilol 50 BID    Would consider stopping actos at discharge given new CHF dx.     He will follow up outpatient with Dr. Steve Rice/ Scout layton    Thank you for consult, we will follow         ROME Nance  [facility]   [address] [place] [address] [address]  [place] [address]  Ph: [phone]  Fax: [phone]

## 2024-06-25 NOTE — PLAN OF CARE
Goal Outcome Evaluation:  Plan of Care Reviewed With: patient           Outcome Evaluation: Diuresed per MAR, no c/o pain. L knee elevated and ice packs applied per Dr Staton. NSR/Apaced on tele, 2L NC, all other VSS. Pt ambulating in room and in halls. K replacement protocol ordered and administered per MAR. Continue with current POC and update as needed.

## 2024-06-25 NOTE — PROGRESS NOTES
Orthopedic Progress Note      Patient: Alexy Ram    YOB: 1955    Medical Record Number: [id number]    Attending Physician: Susan Melton MD    Date of Admission: 6/24/2024  1:16 AM    Admitting Dx:  Acute right-sided congestive heart failure [I50.811]  Hypokalemia [E87.6]  CHF (congestive heart failure) [I50.9]  Hypoxia [R09.02]  New onset of congestive heart failure [I50.9]      Current Problem List:   New onset of congestive heart failure    Hypertension    Type 2 diabetes mellitus with hyperglycemia, without long-term current use of insulin    Squamous cell carcinoma of left lung    Status post lobectomy of lung    Class 3 severe obesity due to excess calories with body mass index (BMI) of 40.0 to 44.9 in adult    Obstructive sleep apnea of adult    AV block, 2nd degree    Chronic anticoagulation    PAF (paroxysmal atrial fibrillation)    CHF (congestive heart failure)      Past Medical History:   Diagnosis Date    Acromioclavicular separation     shoulder pulled out of place    Ankle sprain     Arthritis     OSTEOARTHRITIS    Arthritis of back     so long I don't know when it started    Arthritis of neck     COPD (chronic obstructive pulmonary disease)     Diabetes mellitus     Dislocation, shoulder     Enlarged prostate     Fatty liver     Frozen shoulder     Hip arthrosis     History of low potassium     History of lung cancer 2018    HX LEFT LOWER LOBECTOMY    History of MRSA infection 2018    History of transfusion     Hyperlipidemia     Hypertension     Knee swelling     Left upper lobe consolidation     REPEAT CT SCANS - FOLLOWED BY PULMONARY, MOST RECENT CT SCAN 4/3/24    Low back strain     Lumbosacral disc disease     Neck strain     Neuropathy     On anticoagulant therapy     Pacemaker     PAF (paroxysmal atrial fibrillation)     Periarthritis of shoulder     Second degree AV block     Sinus node dysfunction     Sleep apnea     WEARS CPAP    Slow to wake up after  anesthesia     Thoracic disc disorder        Current Medications:  Scheduled Meds:amLODIPine, 10 mg, Oral, Daily  apixaban, 5 mg, Oral, BID  atorvastatin, 40 mg, Oral, Nightly  carvedilol, 50 mg, Oral, BID With Meals  furosemide, 40 mg, Intravenous, BID  gabapentin, 200 mg, Oral, BID  hydrALAZINE, 25 mg, Oral, BID  insulin lispro, 2-7 Units, Subcutaneous, 4x Daily AC & at Bedtime  melatonin, 5 mg, Oral, Nightly  potassium chloride ER, 40 mEq, Oral, Q4H  sodium chloride, 10 mL, Intravenous, Q12H  tamsulosin, 0.4 mg, Oral, Nightly  tiotropium bromide monohydrate, 2 puff, Inhalation, Daily - RT      PRN Meds:.  acetaminophen **OR** acetaminophen **OR** acetaminophen    albuterol    senna-docusate sodium **AND** polyethylene glycol **AND** bisacodyl **AND** bisacodyl    cetirizine    dextrose    dextrose    glucagon (human recombinant)    HYDROcodone-acetaminophen    ondansetron ODT **OR** ondansetron    Potassium Replacement - Follow Nurse / BPA Driven Protocol    sodium chloride    sodium chloride    SUBJECTIVE: 69 y.o.  male awake and alert.  No complaints    OBJECTIVE:   Vitals:    06/25/24 0020 06/25/24 0405 06/25/24 0653 06/25/24 0755   BP: 116/75 128/75  135/71   BP Location: Left arm Right arm     Patient Position: Lying Lying  Lying   Pulse: 62 59 66 64   Resp: 18 18 18 18   Temp: 98 °F (36.7 °C) 98.5 °F (36.9 °C)  98.3 °F (36.8 °C)   TempSrc: Oral Oral  Oral   SpO2:   96%    Weight:       Height:         I/O last 3 completed shifts:  In: 1100 [IV Piggyback:1100]  Out: 1550 [Urine:1550]    Diagnostic Tests:  Lab Results (last 72 hours)       Procedure Component Value Units Date/Time    BNP [[id number]]  (Abnormal) Collected: 06/25/24 0519    Specimen: Blood Updated: 06/25/24 0804     proBNP 901.0 pg/mL     Narrative:      This assay is used as an aid in the diagnosis of individuals suspected of having heart failure. It can be used as an aid in the diagnosis of acute decompensated heart failure (ADHF) in patients  presenting with signs and symptoms of ADHF to the emergency department (ED). In addition, NT-proBNP of <300 pg/mL indicates ADHF is not likely.    Age Range Result Interpretation  NT-proBNP Concentration (pg/mL:      <50             Positive            >450                   Gray                 300-450                    Negative             <300    50-75           Positive            >900                  Gray                300-900                  Negative            <300      >75             Positive            >1800                  Gray                300-1800                  Negative            <300    POC Glucose Once [[id number]]  (Normal) Collected: 06/25/24 0622    Specimen: Blood Updated: 06/25/24 0624     Glucose 125 mg/dL     Basic Metabolic Panel [[id number]]  (Abnormal) Collected: 06/25/24 0519    Specimen: Blood Updated: 06/25/24 0617     Glucose 113 mg/dL      BUN 16 mg/dL      Creatinine 0.76 mg/dL      Sodium 145 mmol/L      Potassium 3.2 mmol/L      Comment: Slight hemolysis detected by analyzer. Result may be falsely elevated.        Chloride 106 mmol/L      CO2 25.7 mmol/L      Calcium 8.6 mg/dL      BUN/Creatinine Ratio 21.1     Anion Gap 13.3 mmol/L      eGFR 97.3 mL/min/1.73     Narrative:      GFR Normal >60  Chronic Kidney Disease <60  Kidney Failure <15      Magnesium [[id number]]  (Normal) Collected: 06/25/24 0519    Specimen: Blood Updated: 06/25/24 0617     Magnesium 1.9 mg/dL     Phosphorus [[id number]]  (Normal) Collected: 06/25/24 0519    Specimen: Blood Updated: 06/25/24 0614     Phosphorus 4.1 mg/dL     CBC Auto Differential [[id number]]  (Abnormal) Collected: 06/25/24 0519    Specimen: Blood Updated: 06/25/24 0553     WBC 8.43 10*3/mm3      RBC 4.01 10*6/mm3      Hemoglobin 11.2 g/dL      Hematocrit 35.2 %      MCV 87.8 fL      MCH 27.9 pg      MCHC 31.8 g/dL      RDW 14.3 %      RDW-SD 45.5 fl      MPV 10.1 fL      Platelets 332 10*3/mm3      Neutrophil % 65.7 %      Lymphocyte %  19.6 %      Monocyte % 10.8 %      Eosinophil % 3.0 %      Basophil % 0.5 %      Immature Grans % 0.4 %      Neutrophils, Absolute 5.55 10*3/mm3      Lymphocytes, Absolute 1.65 10*3/mm3      Monocytes, Absolute 0.91 10*3/mm3      Eosinophils, Absolute 0.25 10*3/mm3      Basophils, Absolute 0.04 10*3/mm3      Immature Grans, Absolute 0.03 10*3/mm3      nRBC 0.0 /100 WBC     POC Glucose Once [[id number]]  (Abnormal) Collected: 06/24/24 2010    Specimen: Blood Updated: 06/24/24 2011     Glucose 134 mg/dL     POC Glucose Once [[id number]]  (Normal) Collected: 06/24/24 1612    Specimen: Blood Updated: 06/24/24 1613     Glucose 126 mg/dL     POC Glucose Once [[id number]]  (Abnormal) Collected: 06/24/24 1217    Specimen: Blood Updated: 06/24/24 1218     Glucose 171 mg/dL     Magnesium [[id number]]  (Abnormal) Collected: 06/24/24 0219    Specimen: Blood Updated: 06/24/24 0446     Magnesium 2.5 mg/dL     Comprehensive Metabolic Panel [[id number]]  (Abnormal) Collected: 06/24/24 0219    Specimen: Blood Updated: 06/24/24 0256     Glucose 159 mg/dL      BUN 20 mg/dL      Creatinine 0.80 mg/dL      Sodium 141 mmol/L      Potassium 3.1 mmol/L      Chloride 104 mmol/L      CO2 27.2 mmol/L      Calcium 9.4 mg/dL      Total Protein 5.8 g/dL      Albumin 3.4 g/dL      ALT (SGPT) 11 U/L      AST (SGOT) 12 U/L      Alkaline Phosphatase 54 U/L      Total Bilirubin 0.8 mg/dL      Globulin 2.4 gm/dL      A/G Ratio 1.4 g/dL      BUN/Creatinine Ratio 25.0     Anion Gap 9.8 mmol/L      eGFR 95.8 mL/min/1.73     Narrative:      GFR Normal >60  Chronic Kidney Disease <60  Kidney Failure <15      CBC & Differential [[id number]]  (Abnormal) Collected: 06/24/24 0219    Specimen: Blood Updated: 06/24/24 0239    Narrative:      The following orders were created for panel order CBC & Differential.  Procedure                               Abnormality         Status                     ---------                               -----------         ------                      CBC Auto Differential[[id number]]        Abnormal            Final result                 Please view results for these tests on the individual orders.    CBC Auto Differential [[id number]]  (Abnormal) Collected: 06/24/24 0219    Specimen: Blood Updated: 06/24/24 0239     WBC 10.83 10*3/mm3      RBC 3.95 10*6/mm3      Hemoglobin 11.0 g/dL      Hematocrit 34.8 %      MCV 88.1 fL      MCH 27.8 pg      MCHC 31.6 g/dL      RDW 15.6 %      RDW-SD 50.7 fl      MPV 10.7 fL      Platelets 338 10*3/mm3      Neutrophil % 77.4 %      Lymphocyte % 12.5 %      Monocyte % 9.1 %      Eosinophil % 0.5 %      Basophil % 0.3 %      Immature Grans % 0.2 %      Neutrophils, Absolute 8.39 10*3/mm3      Lymphocytes, Absolute 1.35 10*3/mm3      Monocytes, Absolute 0.99 10*3/mm3      Eosinophils, Absolute 0.05 10*3/mm3      Basophils, Absolute 0.03 10*3/mm3      Immature Grans, Absolute 0.02 10*3/mm3              PHYSICAL EXAM: Patient is now 12 days post left total knee arthroplasty.  Goyo dressing has been removed.  Incision is intact without any erythema or drainage.  Steri-Strips applied to the incision.  Patient does have 1-2+ pitting edema in that left lower extremity.  Calf is soft and nontender he has good motion sensation in his foot and ankle.  Patient has active range of motion of 0 to 95 degrees.  Denies any pain.       ASSESSMENT & PLAN:  Have encouraged the patient to keep the leg elevated is much as possible.  Would also discourage from wrapping his leg with an Ace bandage.  He is to be aggressive with ankle pumps.  Ice to the knee as needed.  Will continue PT for mobilization and strengthening.  Patient will follow-up in the office in 6 weeks for x-ray and wound check      Date: 6/25/2024    Olena López RN

## 2024-06-25 NOTE — CASE MANAGEMENT/SOCIAL WORK
Discharge Planning Assessment  [facility]     Patient Name: Alexy Ram  MRN: [id number]  Today's Date: 6/25/2024    Admit Date: 6/24/2024    Plan: Home w/ continued Druze    Discharge Needs Assessment       Row Name 06/25/24 1345       Living Environment    People in Home spouse;grandchild(jefry)    Name(s) of People in Home Darlin Stoes/wife; Two grandsons (ages 14 & 17)    Current Living Arrangements home    Potentially Unsafe Housing Conditions none    In the past 12 months has the electric, gas, oil, or water company threatened to shut off services in your home? No    Primary Care Provided by self    Provides Primary Care For grandchild(jefry)    Family Caregiver if Needed spouse    Family Caregiver Names DARLIN    Quality of Family Relationships involved;helpful;supportive    Able to Return to Prior Arrangements yes       Resource/Environmental Concerns    Resource/Environmental Concerns none    Transportation Concerns none       Transportation Needs    In the past 12 months, has lack of transportation kept you from medical appointments or from getting medications? no    In the past 12 months, has lack of transportation kept you from meetings, work, or from getting things needed for daily living? No       Food Insecurity    Within the past 12 months, you worried that your food would run out before you got the money to buy more. Never true    Within the past 12 months, the food you bought just didn't last and you didn't have money to get more. Never true       Transition Planning    Patient/Family Anticipates Transition to home with family    Patient/Family Anticipated Services at Transition home health care    Transportation Anticipated family or friend will provide       Discharge Needs Assessment    Readmission Within the Last 30 Days current reason for admission unrelated to previous admission    Equipment Currently Used at Home cpap;cane, straight;walker, rolling;grab bar;scales;shower chair     Concerns to be Addressed no discharge needs identified;denies needs/concerns at this time    Concerns Comments Pt already current with home health    Anticipated Changes Related to Illness none    Equipment Needed After Discharge none    Provided Post Acute Provider List? N/A    N/A Provider List Comment Pt is current with [facility] s/p right knee replacement few wks ago                   Discharge Plan       Row Name 06/25/24 1348       Plan    Plan Home w/ continued Mandaen     Plan Comments CCP spoke with pleasant patient and spouse/Darlin Ram, at bedside.  CCP role explained and discharge planning discussed.  Face sheet verified.  Patient stated he is IADL's, retired and normally drives.  Pt is recuperating from right knee replacement that was done few weeks ago.  Patient lives in a ranch home with walk-out basement.  Pt two grandsons (ages 14 & 17) live with him and assist with yard work.  Patients PCP confirmed as, Pipe Vaughn.  Patient's pharmacy confirmed as, [facility] in [place].  Patient has the following DME- CPAP ([employer]), straight cane, rolling walker, shower chair, grab bars and scale.  Patient denies going to a sub-acute rehab in the past.  Pt is current with [facility].  Pt plan is to return home at discharge with continued [facility].  [facility] is following.  CCP will continue to follow….…Symone WILKS /[facility].      Row Name 06/25/24 1216       Plan    Plan Home w/ spouse; Current with [facility]                  Continued Care and Services - Admitted Since 6/24/2024       Home Medical Care Coordination complete.      Service Provider Request Status Selected Services Address Phone Fax Patient Preferred    [facility]  Selected Home Health Services [address] [place] [id number] [phone] --                  Selected Continued Care - Prior Encounters Includes continued care and service providers with selected services from prior encounters from  3/26/2024 to 6/25/2024      Discharged on 6/14/2024 Admission date: 6/13/2024 - Discharge disposition: Home-Health Care Svc      Home Medical Care       Service Provider Selected Services Address Phone Fax Patient Preferred    [facility] [address] [id number] [phone] --                          Expected Discharge Date and Time       Expected Discharge Date Expected Discharge Time    Jun 26, 2024            Demographic Summary       Row Name 06/25/24 1340       General Information    Admission Type inpatient    Arrived From emergency department    Required Notices Provided Important Message from Medicare    Referral Source admission list    Reason for Consult discharge planning    Preferred Language English       Contact Information    Permission Granted to Share Info With family/designee                   Functional Status       Row Name 06/25/24 1345       Functional Status    Usual Activity Tolerance moderate    Current Activity Tolerance fair       Assessment of Health Literacy    How often do you have someone help you read hospital materials? Never    Health Literacy Good       Functional Status, IADL    Medications independent    Meal Preparation independent    Housekeeping independent    Laundry independent    Shopping independent       Mental Status    General Appearance WDL WDL       Mental Status Summary    Recent Changes in Mental Status/Cognitive Functioning no changes       Employment/    Employment Status retired                   Psychosocial    No documentation.                  Abuse/Neglect    No documentation.                  Legal    No documentation.                  Substance Abuse    No documentation.                  Patient Forms    No documentation.                     Symone Batista RN

## 2024-06-26 ENCOUNTER — TELEPHONE (OUTPATIENT)
Dept: FAMILY MEDICINE CLINIC | Facility: CLINIC | Age: 69
End: 2024-06-26

## 2024-06-26 LAB
ANION GAP SERPL CALCULATED.3IONS-SCNC: 11 MMOL/L (ref 5–15)
BASOPHILS # BLD AUTO: 0.05 10*3/MM3 (ref 0–0.2)
BASOPHILS NFR BLD AUTO: 0.5 % (ref 0–1.5)
BUN SERPL-MCNC: 16 MG/DL (ref 8–23)
BUN/CREAT SERPL: 22.2 (ref 7–25)
CALCIUM SPEC-SCNC: 8.6 MG/DL (ref 8.6–10.5)
CHLORIDE SERPL-SCNC: 109 MMOL/L (ref 98–107)
CO2 SERPL-SCNC: 26 MMOL/L (ref 22–29)
CREAT SERPL-MCNC: 0.72 MG/DL (ref 0.76–1.27)
DEPRECATED RDW RBC AUTO: 44.5 FL (ref 37–54)
EGFRCR SERPLBLD CKD-EPI 2021: 98.9 ML/MIN/1.73
EOSINOPHIL # BLD AUTO: 0.26 10*3/MM3 (ref 0–0.4)
EOSINOPHIL NFR BLD AUTO: 2.8 % (ref 0.3–6.2)
ERYTHROCYTE [DISTWIDTH] IN BLOOD BY AUTOMATED COUNT: 14.1 % (ref 12.3–15.4)
GLUCOSE BLDC GLUCOMTR-MCNC: 116 MG/DL (ref 70–130)
GLUCOSE BLDC GLUCOMTR-MCNC: 122 MG/DL (ref 70–130)
GLUCOSE BLDC GLUCOMTR-MCNC: 125 MG/DL (ref 70–130)
GLUCOSE BLDC GLUCOMTR-MCNC: 130 MG/DL (ref 70–130)
GLUCOSE SERPL-MCNC: 110 MG/DL (ref 65–99)
HCT VFR BLD AUTO: 34.3 % (ref 37.5–51)
HGB BLD-MCNC: 11.2 G/DL (ref 13–17.7)
IMM GRANULOCYTES # BLD AUTO: 0.03 10*3/MM3 (ref 0–0.05)
IMM GRANULOCYTES NFR BLD AUTO: 0.3 % (ref 0–0.5)
LYMPHOCYTES # BLD AUTO: 1.87 10*3/MM3 (ref 0.7–3.1)
LYMPHOCYTES NFR BLD AUTO: 20.4 % (ref 19.6–45.3)
MAGNESIUM SERPL-MCNC: 1.8 MG/DL (ref 1.6–2.4)
MCH RBC QN AUTO: 28.1 PG (ref 26.6–33)
MCHC RBC AUTO-ENTMCNC: 32.7 G/DL (ref 31.5–35.7)
MCV RBC AUTO: 86.2 FL (ref 79–97)
MONOCYTES # BLD AUTO: 0.86 10*3/MM3 (ref 0.1–0.9)
MONOCYTES NFR BLD AUTO: 9.4 % (ref 5–12)
NEUTROPHILS NFR BLD AUTO: 6.08 10*3/MM3 (ref 1.7–7)
NEUTROPHILS NFR BLD AUTO: 66.6 % (ref 42.7–76)
NRBC BLD AUTO-RTO: 0 /100 WBC (ref 0–0.2)
PHOSPHATE SERPL-MCNC: 3.7 MG/DL (ref 2.5–4.5)
PLATELET # BLD AUTO: 357 10*3/MM3 (ref 140–450)
PMV BLD AUTO: 10.2 FL (ref 6–12)
POTASSIUM SERPL-SCNC: 3.4 MMOL/L (ref 3.5–5.2)
POTASSIUM SERPL-SCNC: 3.5 MMOL/L (ref 3.5–5.2)
POTASSIUM SERPL-SCNC: 3.5 MMOL/L (ref 3.5–5.2)
RBC # BLD AUTO: 3.98 10*6/MM3 (ref 4.14–5.8)
SODIUM SERPL-SCNC: 146 MMOL/L (ref 136–145)
WBC NRBC COR # BLD AUTO: 9.15 10*3/MM3 (ref 3.4–10.8)

## 2024-06-26 PROCEDURE — 82948 REAGENT STRIP/BLOOD GLUCOSE: CPT

## 2024-06-26 PROCEDURE — 83735 ASSAY OF MAGNESIUM: CPT | Performed by: STUDENT IN AN ORGANIZED HEALTH CARE EDUCATION/TRAINING PROGRAM

## 2024-06-26 PROCEDURE — 94664 DEMO&/EVAL PT USE INHALER: CPT

## 2024-06-26 PROCEDURE — 85025 COMPLETE CBC W/AUTO DIFF WBC: CPT | Performed by: STUDENT IN AN ORGANIZED HEALTH CARE EDUCATION/TRAINING PROGRAM

## 2024-06-26 PROCEDURE — 94799 UNLISTED PULMONARY SVC/PX: CPT

## 2024-06-26 PROCEDURE — 99232 SBSQ HOSP IP/OBS MODERATE 35: CPT | Performed by: INTERNAL MEDICINE

## 2024-06-26 PROCEDURE — 84132 ASSAY OF SERUM POTASSIUM: CPT | Performed by: STUDENT IN AN ORGANIZED HEALTH CARE EDUCATION/TRAINING PROGRAM

## 2024-06-26 PROCEDURE — 84132 ASSAY OF SERUM POTASSIUM: CPT | Performed by: HOSPITALIST

## 2024-06-26 PROCEDURE — 84100 ASSAY OF PHOSPHORUS: CPT | Performed by: STUDENT IN AN ORGANIZED HEALTH CARE EDUCATION/TRAINING PROGRAM

## 2024-06-26 PROCEDURE — 25010000002 FUROSEMIDE PER 20 MG: Performed by: STUDENT IN AN ORGANIZED HEALTH CARE EDUCATION/TRAINING PROGRAM

## 2024-06-26 PROCEDURE — 80048 BASIC METABOLIC PNL TOTAL CA: CPT | Performed by: STUDENT IN AN ORGANIZED HEALTH CARE EDUCATION/TRAINING PROGRAM

## 2024-06-26 PROCEDURE — 97530 THERAPEUTIC ACTIVITIES: CPT

## 2024-06-26 PROCEDURE — 94761 N-INVAS EAR/PLS OXIMETRY MLT: CPT

## 2024-06-26 PROCEDURE — 97110 THERAPEUTIC EXERCISES: CPT

## 2024-06-26 RX ORDER — POTASSIUM CHLORIDE 750 MG/1
40 TABLET, FILM COATED, EXTENDED RELEASE ORAL EVERY 4 HOURS
Status: COMPLETED | OUTPATIENT
Start: 2024-06-26 | End: 2024-06-26

## 2024-06-26 RX ADMIN — POTASSIUM CHLORIDE 40 MEQ: 750 TABLET, EXTENDED RELEASE ORAL at 12:34

## 2024-06-26 RX ADMIN — APIXABAN 5 MG: 5 TABLET, FILM COATED ORAL at 08:13

## 2024-06-26 RX ADMIN — POTASSIUM CHLORIDE 40 MEQ: 750 TABLET, EXTENDED RELEASE ORAL at 06:18

## 2024-06-26 RX ADMIN — Medication 5 MG: at 22:50

## 2024-06-26 RX ADMIN — CARVEDILOL 50 MG: 25 TABLET, FILM COATED ORAL at 16:35

## 2024-06-26 RX ADMIN — Medication 10 ML: at 08:13

## 2024-06-26 RX ADMIN — AMLODIPINE BESYLATE 10 MG: 10 TABLET ORAL at 08:12

## 2024-06-26 RX ADMIN — POTASSIUM CHLORIDE 40 MEQ: 750 TABLET, EXTENDED RELEASE ORAL at 22:50

## 2024-06-26 RX ADMIN — HYDRALAZINE HYDROCHLORIDE 25 MG: 25 TABLET ORAL at 08:12

## 2024-06-26 RX ADMIN — APIXABAN 5 MG: 5 TABLET, FILM COATED ORAL at 22:50

## 2024-06-26 RX ADMIN — GABAPENTIN 200 MG: 100 CAPSULE ORAL at 22:50

## 2024-06-26 RX ADMIN — HYDRALAZINE HYDROCHLORIDE 75 MG: 50 TABLET ORAL at 22:51

## 2024-06-26 RX ADMIN — TAMSULOSIN HYDROCHLORIDE 0.4 MG: 0.4 CAPSULE ORAL at 22:51

## 2024-06-26 RX ADMIN — FUROSEMIDE 40 MG: 10 INJECTION, SOLUTION INTRAMUSCULAR; INTRAVENOUS at 08:13

## 2024-06-26 RX ADMIN — GABAPENTIN 200 MG: 100 CAPSULE ORAL at 08:12

## 2024-06-26 RX ADMIN — ATORVASTATIN CALCIUM 40 MG: 20 TABLET, FILM COATED ORAL at 22:51

## 2024-06-26 RX ADMIN — CARVEDILOL 50 MG: 25 TABLET, FILM COATED ORAL at 08:12

## 2024-06-26 RX ADMIN — POTASSIUM CHLORIDE 40 MEQ: 750 TABLET, EXTENDED RELEASE ORAL at 16:34

## 2024-06-26 RX ADMIN — TIOTROPIUM BROMIDE INHALATION SPRAY 2 PUFF: 3.12 SPRAY, METERED RESPIRATORY (INHALATION) at 08:29

## 2024-06-26 RX ADMIN — FUROSEMIDE 40 MG: 10 INJECTION, SOLUTION INTRAMUSCULAR; INTRAVENOUS at 16:34

## 2024-06-26 RX ADMIN — Medication 10 ML: at 22:52

## 2024-06-26 NOTE — PLAN OF CARE
Goal Outcome Evaluation:  Plan of Care Reviewed With: patient, spouse        Progress: improving  Outcome Evaluation: Pt seen for PT this PM - improved overall mobility ambulates up to 150' with rwx, supervision with no unsteadiness noted. Tolerates seated L knee exercises to target flex/ext as pt feels his knee is getting stiff. Encouraged pt to continues with seated exercises and ambulate 2-3x in hallway with nsg or family. PT to continue to follow peripherally to ensure continued mobility, recommend home and OP PT to continue TKA rehab.      Anticipated Discharge Disposition (PT): home with outpatient therapy services

## 2024-06-26 NOTE — PLAN OF CARE
Goal Outcome Evaluation:      Pt alert and oriented and follows commands. Vitals stable. Iv diuressing in progress. No c/o pain. Ice pack to lt knee from previous surgery in place on and off plan of care in progress

## 2024-06-26 NOTE — PROGRESS NOTES
LOS: 2 days   Patient Care Team:  Pipe Vaughn MD as PCP - General (Family Medicine)  Steve Rice MD as Consulting Physician (Cardiac Electrophysiology)  Taye Chavira MD as Surgeon (Thoracic Surgery)  Akil Ortiz MD as Consulting Physician (Pulmonary Disease)  Nikko Staton MD as Surgeon (Orthopedic Surgery)    Chief Complaint: Follow-up acute diastolic CHF, pacemaker placement.    Interval History: He is doing better.  He feels less short of breath.  Lower extremity edema has improved per his account.  No chest pain.    Vital Signs:  Temp:  [97.9 °F (36.6 °C)-98.6 °F (37 °C)] 98.5 °F (36.9 °C)  Heart Rate:  [56-73] 68  Resp:  [17-18] 18  BP: (121-131)/(65-80) 124/70    Intake/Output Summary (Last 24 hours) at 6/26/2024 1812  Last data filed at 6/26/2024 1639  Gross per 24 hour   Intake 840 ml   Output 1800 ml   Net -960 ml       Physical Exam:   General Appearance:    No acute distress, alert and oriented x4   Lungs:     Decreased breath sounds in the bases bilaterally.    Heart:    Regular rhythm and normal rate.  No murmurs, gallops, or rubs.   Abdomen:     Soft, nontender, nondistended.    Extremities:   2+ edema of the left lower extremity, 1+ edema right lower extremity.     Results Review:    Results from last 7 days   Lab Units 06/26/24  1311 06/26/24  0237   SODIUM mmol/L  --  146*   POTASSIUM mmol/L 3.4* 3.5  3.5   CHLORIDE mmol/L  --  109*   CO2 mmol/L  --  26.0   BUN mg/dL  --  16   CREATININE mg/dL  --  0.72*   GLUCOSE mg/dL  --  110*   CALCIUM mg/dL  --  8.6         Results from last 7 days   Lab Units 06/26/24  0237   WBC 10*3/mm3 9.15   HEMOGLOBIN g/dL 11.2*   HEMATOCRIT % 34.3*   PLATELETS 10*3/mm3 357             Results from last 7 days   Lab Units 06/26/24  0237   MAGNESIUM mg/dL 1.8           I reviewed the patient's new clinical results.        Assessment:  1.  Acute diastolic CHF (unclear trigger)  2.  Recent left TKA on 6/13/2024 by Dr. Staton  3.  History of left lower  lobe squamous cell carcinoma, status post left lower lobectomy in 2018  4.  Morbid obesity, complicating all aspects of care  5.  History of second-degree AV block, status post permanent pacemaker ([employer] dual-chamber)  6.  Acute hypoxic respiratory failure secondary to #1  7.  COPD without acute exacerbation  8.  Obstructive sleep apnea, on home CPAP  9.  Paroxysmal atrial fibrillation  10.  Diabetes  11.  Hypertension    Plan:  -He seems to be clinically improving.  I would continue the Lasix at 40 mg IV twice daily for now.    -Continue Eliquis 5 mg twice daily for atrial fibrillation.  Continue Coreg 50 mg twice daily.    -I am going to stop the amlodipine and increase the hydralazine.  The amlodipine may be contributing to the significant lower extremity edema, especially at the 10 mg dose.  I will increase the hydralazine from 25 mg twice daily to 75 mg twice daily.    -He has had issues with hypokalemia.  Agree that spironolactone will be a good long-term medication for him.  He may also be an ideal candidate for Jardiance or Farxiga at discharge.    Horacio Guaman MD  06/26/24  18:12 EDT

## 2024-06-26 NOTE — PLAN OF CARE
Problem: Adult Inpatient Plan of Care  Goal: Plan of Care Review  Outcome: Ongoing, Progressing  Flowsheets (Taken 6/26/2024 9003)  Progress: improving  Plan of Care Reviewed With: patient   Goal Outcome Evaluation:  Plan of Care Reviewed With: patient        Progress: improving     Pt has had no complaints of pain. Incision on left knee open to air. Ice packs applied. A-paced on demand HR in the 50-60's. VSS on 2L n/c with CPAP at night. Pt being diuresed with IV Lasix. Potassium replaced per protocol. Continue with plan of care.

## 2024-06-26 NOTE — THERAPY TREATMENT NOTE
Patient Name: Alexy Ram  : 1955    MRN: [id number]                              Today's Date: 2024       Admit Date: 2024    Visit Dx:     ICD-10-CM ICD-9-CM   1. Hypokalemia  E87.6 276.8   2. Hypoxia  R09.02 799.02   3. Acute right-sided congestive heart failure  I50.811 428.0     Patient Active Problem List   Diagnosis    Hypercholesterolemia    Hypertension    Type 2 diabetes mellitus with hyperglycemia, without long-term current use of insulin    Squamous cell carcinoma of left lung    Status post lobectomy of lung    Class 3 severe obesity due to excess calories with body mass index (BMI) of 40.0 to 44.9 in adult    Encounter for screening for malignant neoplasm of colon    Family history of colon cancer    Obstructive sleep apnea of adult    Screening PSA (prostate specific antigen)    Osteoarthritis of left hip    AV block, 2nd degree    Cardiac pacemaker in situ    Chronic anticoagulation    PAF (paroxysmal atrial fibrillation)    Adjustment and management of cardiac pacemaker    Primary osteoarthritis of left knee    Benign prostatic hyperplasia with urinary frequency    Status post total knee replacement    New onset of congestive heart failure    CHF (congestive heart failure)     Past Medical History:   Diagnosis Date    Acromioclavicular separation     shoulder pulled out of place    Ankle sprain     Arthritis     OSTEOARTHRITIS    Arthritis of back     so long I don't know when it started    Arthritis of neck     COPD (chronic obstructive pulmonary disease)     Diabetes mellitus     Dislocation, shoulder     Enlarged prostate     Fatty liver     Frozen shoulder     Hip arthrosis     History of low potassium     History of lung cancer 2018    HX LEFT LOWER LOBECTOMY    History of MRSA infection 2018    History of transfusion     Hyperlipidemia     Hypertension     Knee swelling     Left upper lobe consolidation     REPEAT CT SCANS - FOLLOWED BY PULMONARY, MOST RECENT CT SCAN  4/3/24    Low back strain     Lumbosacral disc disease     Neck strain     Neuropathy     On anticoagulant therapy     Pacemaker     PAF (paroxysmal atrial fibrillation)     Periarthritis of shoulder     Second degree AV block     Sinus node dysfunction     Sleep apnea     WEARS CPAP    Slow to wake up after anesthesia     Thoracic disc disorder      Past Surgical History:   Procedure Laterality Date    BRONCHOSCOPY N/A 10/11/2018    Procedure: BRONCHOSCOPY WITH FLUORO, LEFT LOWER LOBE BRUSHINGS WET AND DRY. WITH BX'S AND BAL (IN LLL).;  Surgeon: Akil Ortiz MD;  [facility];  Service: Pulmonary    BRONCHOSCOPY WITH ION ROBOTIC ASSIST N/A 09/07/2023    Procedure: BRONCHOSCOPY WITH ION ROBOT AND ENDOBRONCHIAL ULTRASOUND with brushing and FNA;  Surgeon: Taye Chavira MD;  [facility];  Service: Robotics - Pulmonary;  Laterality: N/A;  PRE/POST - left upper lobe mass    CATARACT EXTRACTION Bilateral 04/01/2024    COLONOSCOPY  2021    COLONOSCOPY W/ POLYPECTOMY N/A 10/22/2021    Procedure: COLONOSCOPY WITH POLYPECTOMY;  Surgeon: Lan Solis MD;  [facility];  Service: Gastroenterology;  Laterality: N/A;  cecal polyp x1 (cold snare)  ascending polyp x1 (hot snare)  transverse polyp x3 (hot snare x 1), (cold snare x2)  sigmoid polyp x1 (hot snare, clip applied)  rectal polyp x3 (cold snare)    INSERT / REPLACE / REMOVE PACEMAKER  6/5/2005    replaced Oct 2014    JOINT REPLACEMENT  Hip    Hip    KNEE ARTHROSCOPY Left     PACEMAKER IMPLANTATION  2005, 2014    THORACOSCOPY Left 11/19/2018    Procedure: BRONCHOSCOPY, DAVINCI ROBOT ASSISTED VIDEIO ASSISTED THORACOSCOPY  WITH CONVERT TO OPEN THORACOTOMY,LEFT LOWER LOBECTOMY,INTERCOSTAL NERVE BLOCK;  Surgeon: Michael Ring III, MD;  Location: [facility] MAIN OR;  Service: DaVinci    TOTAL HIP ARTHROPLASTY Left 07/14/2023    Procedure: TOTAL HIP ARTHROPLASTY;  Surgeon: Nikko Staton MD;  Location: [facility] OR [facility];   Service: Orthopedics;  Laterality: Left;    TOTAL KNEE ARTHROPLASTY Left 6/13/2024    Procedure: TOTAL KNEE ARTHROPLASTY;  Surgeon: Nikko Staton MD;  Location: [facility];  Service: Orthopedics;  Laterality: Left;      General Information       Row Name 06/26/24 1530          Physical Therapy Time and Intention    Document Type therapy note (daily note)  -     Mode of Treatment individual therapy;physical therapy  -       Row Name 06/26/24 1530          General Information    Patient Profile Reviewed yes  -ST     Existing Precautions/Restrictions fall  -       Row Name 06/26/24 1530          Cognition    Orientation Status (Cognition) oriented x 4  -       Row Name 06/26/24 1530          Safety Issues, Functional Mobility    Impairments Affecting Function (Mobility) endurance/activity tolerance;strength;range of motion (ROM);shortness of breath  -     Comment, Safety Issues/Impairments (Mobility) gait belt, nonskid socks donned  -               User Key  (r) = Recorded By, (t) = Taken By, (c) = Cosigned By      Initials Name Provider Type    ST Lisbeth Bo PT Physical Therapist                   Mobility       Row Name 06/26/24 1530          Bed Mobility    Comment, (Bed Mobility) NT, sitting EOB upon arrival  -[facility] Name 06/26/24 1530          Sit-Stand Transfer    Sit-Stand [place] (Transfers) supervision  -     Assistive Device (Sit-Stand Transfers) walker, front-wheeled  -[facility] Name 06/26/24 1530          Gait/Stairs (Locomotion)    [place] Level (Gait) supervision  -     Assistive Device (Gait) walker, front-wheeled  -     Distance in Feet (Gait) 150  -ST     Deviations/Abnormal Patterns (Gait) blayne decreased;antalgic;left sided deviations  -ST     Left Sided Gait Deviations heel strike decreased;decreased knee extension  -               User Key  (r) = Recorded By, (t) = Taken By, (c) = Cosigned By      Initials Name Provider Type    ST Bo  Lisbeth, PT Physical Therapist                   Obj/Interventions       Row Name 06/26/24 1532          Motor Skills    Therapeutic Exercise --  seated heel slides for L knee flexion and seated L quad set 2 x 10 L side only  -       Row Name 06/26/24 1532          Balance    Comment, Balance improved balance with no LOB or overt unsteadiness  -               User Key  (r) = Recorded By, (t) = Taken By, (c) = Cosigned By      Initials Name Provider Type    Lisbeth Shipley PT Physical Therapist                   Goals/Plan    No documentation.                  Clinical Impression       Row Name 06/26/24 1532          Pain    Pretreatment Pain Rating 0/10 - no pain  -ST     Posttreatment Pain Rating 0/10 - no pain  -ST       Row Name 06/26/24 1532          Plan of Care Review    Plan of Care Reviewed With patient;spouse  -     Progress improving  -     Outcome Evaluation Pt seen for PT this PM - improved overall mobility ambulates up to 150' with rwx, supervision with no unsteadiness noted. Tolerates seated L knee exercises to target flex/ext as pt feels his knee is getting stiff. Encouraged pt to continues with seated exercises and ambulate 2-3x in hallway with nsg or family. PT to continue to follow peripherally to ensure continued mobility, recommend home and OP PT to continue TKA rehab.  -       Row Name 06/26/24 1532          Therapy Assessment/Plan (PT)    Rehab Potential (PT) good, to achieve stated therapy goals  -     Criteria for Skilled Interventions Met (PT) yes;meets criteria;skilled treatment is necessary  -     Therapy Frequency (PT) 3 times/wk  -       Row Name 06/26/24 1532          Positioning and Restraints    Pre-Treatment Position in bed  -ST     Post Treatment Position chair  -ST     In Chair reclined;call light within reach;encouraged to call for assist;with family/caregiver  -               User Key  (r) = Recorded By, (t) = Taken By, (c) = Cosigned By      Initials  Name Provider Type    Lisbeth Shipley PT Physical Therapist                   Outcome Measures       Row Name 06/26/24 1534 06/26/24 0825       How much help from another person do you currently need...    Turning from your back to your side while in flat bed without using bedrails? 4  -ST 3  -JS    Moving from lying on back to sitting on the side of a flat bed without bedrails? 4  -ST 3  -JS    Moving to and from a bed to a chair (including a wheelchair)? 3  -ST 3  -JS    Standing up from a chair using your arms (e.g., wheelchair, bedside chair)? 4  -ST 3  -JS    Climbing 3-5 steps with a railing? 3  -ST 3  -JS    To walk in hospital room? 3  -ST 3  -JS    AM-PAC 6 Clicks Score (PT) 21  -ST 18  -JS    Highest Level of Mobility Goal 6 --> Walk 10 steps or more  -ST 6 --> Walk 10 steps or more  -JS      Row Name 06/26/24 1534          Functional Assessment    Outcome Measure Options AM-PAC 6 Clicks Basic Mobility (PT)  -ST               User Key  (r) = Recorded By, (t) = Taken By, (c) = Cosigned By      Initials Name Provider Type    Twyla Kan, RN Registered Nurse    Lisbeth Shipley PT Physical Therapist                                 Physical Therapy Education       Title: PT OT SLP Therapies (Done)       Topic: Physical Therapy (Done)       Point: Mobility training (Done)       Learning Progress Summary             Patient Acceptance, E,TB, VU,NR by  at 6/26/2024 1534    Acceptance, E, VU,NR by EF at 6/24/2024 1524                         Point: Home exercise program (Done)       Learning Progress Summary             Patient Acceptance, E,TB, VU,NR by  at 6/26/2024 1534    Acceptance, E, VU,NR by EF at 6/24/2024 1524                         Point: Body mechanics (Done)       Learning Progress Summary             Patient Acceptance, E,TB, VU,NR by  at 6/26/2024 1534                         Point: Precautions (Done)       Learning Progress Summary             Patient Acceptance,  E,TB, VU,NR by ST at 6/26/2024 1534                                         User Key       Initials Effective Dates Name Provider Type Discipline    EF 05/31/24 -  Bekah Cuba, PT Physical Therapist PT    ST 09/22/22 -  Lisbeth Bo PT Physical Therapist PT                  PT Recommendation and Plan     Plan of Care Reviewed With: patient, spouse  Progress: improving  Outcome Evaluation: Pt seen for PT this PM - improved overall mobility ambulates up to 150' with rwx, supervision with no unsteadiness noted. Tolerates seated L knee exercises to target flex/ext as pt feels his knee is getting stiff. Encouraged pt to continues with seated exercises and ambulate 2-3x in hallway with nsg or family. PT to continue to follow peripherally to ensure continued mobility, recommend home and OP PT to continue TKA rehab.     Time Calculation:         PT Charges       Row Name 06/26/24 1535             Time Calculation    Start Time 1433  -ST      Stop Time 1456  -ST      Time Calculation (min) 23 min  -ST      PT Received On 06/26/24  -ST      PT - Next Appointment 06/27/24  -ST         Time Calculation- PT    Total Timed Code Minutes- PT 23 minute(s)  -ST         Timed Charges    72284 - PT Therapeutic Exercise Minutes 8  -ST      36114 - PT Therapeutic Activity Minutes 15  -ST         Total Minutes    Timed Charges Total Minutes 23  -ST       Total Minutes 23  -ST                User Key  (r) = Recorded By, (t) = Taken By, (c) = Cosigned By      Initials Name Provider Type    ST Lisbeth Bo, PT Physical Therapist                  Therapy Charges for Today       Code Description Service Date Service Provider Modifiers Qty    [id number]  PT THER PROC EA 15 MIN 6/26/2024 Lisbeth Bo, PT GP 1    [id number]  PT THERAPEUTIC ACT EA 15 MIN 6/26/2024 Lisbeth Bo, PT GP 1            PT G-Codes  Outcome Measure Options: AM-PAC 6 Clicks Basic Mobility (PT)  AM-PAC 6 Clicks Score (PT): 21  PT  Discharge Summary  Anticipated Discharge Disposition (PT): home with outpatient therapy services    Lisbeth Bo, PT  6/26/2024

## 2024-06-26 NOTE — TELEPHONE ENCOUNTER
Caller: ANISHA    Relationship: [facility]    Best call back number:     [phone]       What orders are you requesting (i.e. lab or imaging): VERBAL ORDERS      Additional notes:   [facility] IS ASKING FOR ORDERS TO DO EVALUATION ON PATIENT FOR NURSING TO TEACH HIM ABOUT CHF      PLEASE ADVISE

## 2024-06-26 NOTE — PROGRESS NOTES
Name: Alexy Ram ADMIT: 2024   : 1955  PCP: Pipe Vaughn MD    MRN: [id number] LOS: 2 days   AGE/SEX: 69 y.o. male  ROOM: [facility]     Subjective   Subjective   Swelling is improving. He is able to lie flat.     Objective   Objective   Vital Signs  Temp:  [97.9 °F (36.6 °C)-98.6 °F (37 °C)] 98.6 °F (37 °C)  Heart Rate:  [56-73] 56  Resp:  [17-18] 18  BP: (121-131)/(65-80) 130/75  SpO2:  [90 %-97 %] 97 %  on  Flow (L/min):  [2] 2;   Device (Oxygen Therapy): room air  Body mass index is 42.2 kg/m².    Physical Exam  Constitutional:       General: He is not in acute distress.     Appearance: He is obese. He is not toxic-appearing.   Cardiovascular:      Rate and Rhythm: Normal rate and regular rhythm.   Pulmonary:      Effort: Pulmonary effort is normal. No respiratory distress.      Breath sounds: No wheezing.      Comments: Diminished bilaterally/symmetrically  Abdominal:      General: Bowel sounds are normal.      Palpations: Abdomen is soft.      Tenderness: There is no abdominal tenderness.   Musculoskeletal:         General: No tenderness.      Right lower leg: Edema present.      Left lower leg: Edema present.      Comments: L > R edema. Left knee incision no erythema or drainage   Skin:     General: Skin is warm and dry.   Neurological:      General: No focal deficit present.      Mental Status: He is alert and oriented to person, place, and time. Mental status is at baseline.      Motor: No weakness.         Results Review     I reviewed the patient's new clinical results.  Results from last 7 days   Lab Units 24  0237 24  0519 24  0219   WBC 10*3/mm3 9.15 8.43 10.83*   HEMOGLOBIN g/dL 11.2* 11.2* 11.0*   PLATELETS 10*3/mm3 357 332 338     Results from last 7 days   Lab Units 24  1311 24  0237 24  1517 24  0519 24  0219   SODIUM mmol/L  --  146*  --  145 141   POTASSIUM mmol/L 3.4* 3.5  3.5 3.4* 3.2* 3.1*   CHLORIDE mmol/L  --  109*  --   106 104   CO2 mmol/L  --  26.0  --  25.7 27.2   BUN mg/dL  --  16  --  16 20   CREATININE mg/dL  --  0.72*  --  0.76 0.80   GLUCOSE mg/dL  --  110*  --  113* 159*   Estimated Creatinine Clearance: 117.6 mL/min (A) (by C-G formula based on SCr of 0.72 mg/dL (L)).  Results from last 7 days   Lab Units 06/24/24  0219   ALBUMIN g/dL 3.4*   BILIRUBIN mg/dL 0.8   ALK PHOS U/L 54   AST (SGOT) U/L 12   ALT (SGPT) U/L 11     Results from last 7 days   Lab Units 06/26/24  0237 06/25/24  0519 06/24/24  0219   CALCIUM mg/dL 8.6 8.6 9.4   ALBUMIN g/dL  --   --  3.4*   MAGNESIUM mg/dL 1.8 1.9 2.5*   PHOSPHORUS mg/dL 3.7 4.1  --        COVID19   Date Value Ref Range Status   12/26/2022 Not Detected  Final   10/20/2021 Not Detected Not Detected - Ref. Range Final     Glucose   Date/Time Value Ref Range Status   06/26/2024 1056 125 70 - 130 mg/dL Final   06/26/2024 0637 122 70 - 130 mg/dL Final   06/25/2024 2053 122 70 - 130 mg/dL Final   06/25/2024 1614 130 70 - 130 mg/dL Final   06/25/2024 1118 141 (H) 70 - 130 mg/dL Final   06/25/2024 0622 125 70 - 130 mg/dL Final   06/24/2024 2010 134 (H) 70 - 130 mg/dL Final       Adult Transthoracic Echo Complete W/ Cont if Necessary Per Protocol    Left ventricular systolic function is normal. Calculated left   ventricular EF = 67.2%; visually estimated LVEF 55-60%.    The left ventricular cavity is mildly dilated.    Left ventricular wall thickness is consistent with mild concentric   hypertrophy.    Left ventricular diastolic function was normal.    RV mildly dilated with grossly normal function.    Aortic valve sclerosis without hemodynamically significant stenosis.    There is a small (<1cm) pericardial effusion. There is no evidence of   cardiac tamponade.    Pacemaker lead noted.    Saline test results are negative.    Biatrial enlargement, normal IVC size.  CT Angiogram Chest  Narrative: CT ANGIOGRAM OF THE CHEST     HISTORY: Shortness of air and hypoxia     COMPARISON: April 3,  2024     TECHNIQUE: Axial CT imaging was obtained through the thorax. IV contrast  was administered. Three-D reformatted images were obtained.     FINDINGS:  No acute pulmonary thromboembolus is seen. Examination was not optimized  for assessment of the thoracic aorta. Proximal descending thoracic aorta  measures up to 3.3 cm. Distal descending aorta measures 2.8 cm. No  obvious dissection is seen. There is tortuosity of the aorta. There are  coronary artery calcifications, and calcification of the aorta. The main  pulmonary artery is enlarged, which can be seen in setting of pulmonary  chill hypertension. There are changes of prior left lower lobectomy.  Thyroid gland, trachea, and esophagus appear unremarkable. There are  background emphysematous changes. There is interlobular septal  thickening, as well as areas of groundglass attenuation. There are  bilateral pleural effusions, right greater than left. The heart is  enlarged. Constellation of findings likely reflects congestive heart  failure. There is dependent atelectasis within the right lower lobe.  Soft tissue density along the anteromedial aspect of the left upper  lobe, adjacent to the suture line is stable in appearance when compared  to the prior study. However, mediastinal lymph nodes are larger than on  prior exam. For example, a lower left paratracheal node measures up to  2.2 cm in short axis dimensions, previously measuring 1.6 cm. A  subcarinal lymph node measures up to 1.7 cm, previously measuring 8 mm.  There is also a prominent right hilar node, which measures up to 1.3 cm  previously, it measured approximately 9 mm. Evaluation for hepatic  lesions is limited, due to technique. Bulky appearance to the adrenal  glands bilaterally is stable. Old deformity of the left ribs is again  seen. No acute osseous abnormalities are seen. Right-sided pacemaker is  present.     Impression:    1. Cardiomegaly, interlobular septal thickening, and bilateral  pleural  effusions, right greater than left, suggesting congestive heart failure.  2. Mediastinal and hilar lymph nodes appear larger than on prior exam.  While these may be reactive, short-term follow-up exam is recommended to  document resolution.     Radiation dose reduction techniques were utilized, including automated  exposure control and exposure modulation based on body size.        This report was finalized on 6/24/2024 3:49 AM by Dr. Alexandria Abbasi M.D on Workstation: [id number]       Scheduled Meds  amLODIPine, 10 mg, Oral, Daily  apixaban, 5 mg, Oral, BID  atorvastatin, 40 mg, Oral, Nightly  carvedilol, 50 mg, Oral, BID With Meals  furosemide, 40 mg, Intravenous, BID  gabapentin, 200 mg, Oral, BID  hydrALAZINE, 25 mg, Oral, BID  insulin lispro, 2-7 Units, Subcutaneous, 4x Daily AC & at Bedtime  melatonin, 5 mg, Oral, Nightly  sodium chloride, 10 mL, Intravenous, Q12H  tamsulosin, 0.4 mg, Oral, Nightly  tiotropium bromide monohydrate, 2 puff, Inhalation, Daily - RT    Continuous Infusions   PRN Meds    acetaminophen **OR** acetaminophen **OR** acetaminophen    albuterol    senna-docusate sodium **AND** polyethylene glycol **AND** bisacodyl **AND** bisacodyl    cetirizine    dextrose    dextrose    glucagon (human recombinant)    HYDROcodone-acetaminophen    ondansetron ODT **OR** ondansetron    Potassium Replacement - Follow Nurse / BPA Driven Protocol    sodium chloride    sodium chloride    Diet: Cardiac, Diabetic; Healthy Heart (2-3 Na+); Consistent Carbohydrate; Fluid Consistency: Thin (IDDSI 0)            Assessment/Plan     Active Hospital Problems    Diagnosis  POA    **New onset of congestive heart failure [I50.9]  Yes    CHF (congestive heart failure) [I50.9]  Yes    Chronic anticoagulation [Z79.01]  Not Applicable    Obstructive sleep apnea of adult [G47.33]  Yes    Class 3 severe obesity due to excess calories with body mass index (BMI) of 40.0 to 44.9 in adult [E66.01, Z68.41]  Not  Applicable    Status post lobectomy of lung [Z90.2]  Not Applicable    PAF (paroxysmal atrial fibrillation) [I48.0]  Yes    Squamous cell carcinoma of left lung [C34.92]  Yes    AV block, 2nd degree [I44.1]  Yes    Hypertension [I10]  Yes    Type 2 diabetes mellitus with hyperglycemia, without long-term current use of insulin [E11.65]  Yes      Resolved Hospital Problems   No resolved problems to display.       Mr. Ram is a 69 y.o. with a history of PAF, HTN, AV block/syndope s/p PPM, HLD, DM2, COPD, BPH who presents with shortness of breath and evidence of heart failure.     Acute heart failure  History of 2nd degree AV block/syncope s/p PPM  PAF on AC  HTN  HLD  - Echo from January 2023 reviewed, EF 61%-mild LVH with normal systolic function  -CT with cardiomegaly, interlobular septal thickening and bilateral pleural effusions suggestive of CHF,   -Continue amlodipine, Coreg, hydralazine, holding his valsartan-HCTZ while getting diuresis  -Stopping Actos due to CHF  -Continue Eliquis  - echo w/ Dilated RV with normal function  - continue IV lasix; monitor on tele, strict Is/Os      Hypokalemia  -Replacing per protocol  - pt reports chronic issues with hypokalemia     DM2  - A1c 6.1% last month  - hold home oral meds  -As above stopping Actos  -Acceptable control here     COPD  SCC of the left lung, s/p lobectomy  - cont home inhalers  - Will need short term follow up of LAD noted on CT chest.     BPH- cont tamsulosin  GIL- ok for home CPAP if available     Recent knee replacement with Dr. Staton  -Ortho following: Elevate leg, no wrapping with Ace bandage, aggressive ankle pumps, ice as needed. Therapies, ankle pumps, ice as needed, physical therapy  - needs 6 wk clinic f/u     Eliquis (home med) for DVT prophylaxis.  Full code.  Discussed with patient, family, and nursing staff   Anticipated discharge home, TBD    Héctor Stallings MD  [facility]  06/26/24

## 2024-06-27 LAB
ANION GAP SERPL CALCULATED.3IONS-SCNC: 9.2 MMOL/L (ref 5–15)
BASOPHILS # BLD AUTO: 0.04 10*3/MM3 (ref 0–0.2)
BASOPHILS NFR BLD AUTO: 0.5 % (ref 0–1.5)
BUN SERPL-MCNC: 18 MG/DL (ref 8–23)
BUN/CREAT SERPL: 22.5 (ref 7–25)
CALCIUM SPEC-SCNC: 8.7 MG/DL (ref 8.6–10.5)
CHLORIDE SERPL-SCNC: 109 MMOL/L (ref 98–107)
CO2 SERPL-SCNC: 24.8 MMOL/L (ref 22–29)
CREAT SERPL-MCNC: 0.8 MG/DL (ref 0.76–1.27)
DEPRECATED RDW RBC AUTO: 46.6 FL (ref 37–54)
EGFRCR SERPLBLD CKD-EPI 2021: 95.8 ML/MIN/1.73
EOSINOPHIL # BLD AUTO: 0.26 10*3/MM3 (ref 0–0.4)
EOSINOPHIL NFR BLD AUTO: 3 % (ref 0.3–6.2)
ERYTHROCYTE [DISTWIDTH] IN BLOOD BY AUTOMATED COUNT: 14.5 % (ref 12.3–15.4)
GLUCOSE BLDC GLUCOMTR-MCNC: 105 MG/DL (ref 70–130)
GLUCOSE BLDC GLUCOMTR-MCNC: 125 MG/DL (ref 70–130)
GLUCOSE BLDC GLUCOMTR-MCNC: 152 MG/DL (ref 70–130)
GLUCOSE SERPL-MCNC: 131 MG/DL (ref 65–99)
HCT VFR BLD AUTO: 35 % (ref 37.5–51)
HGB BLD-MCNC: 11.4 G/DL (ref 13–17.7)
IMM GRANULOCYTES # BLD AUTO: 0.03 10*3/MM3 (ref 0–0.05)
IMM GRANULOCYTES NFR BLD AUTO: 0.3 % (ref 0–0.5)
LYMPHOCYTES # BLD AUTO: 1.67 10*3/MM3 (ref 0.7–3.1)
LYMPHOCYTES NFR BLD AUTO: 19.1 % (ref 19.6–45.3)
MAGNESIUM SERPL-MCNC: 2 MG/DL (ref 1.6–2.4)
MCH RBC QN AUTO: 28.5 PG (ref 26.6–33)
MCHC RBC AUTO-ENTMCNC: 32.6 G/DL (ref 31.5–35.7)
MCV RBC AUTO: 87.5 FL (ref 79–97)
MONOCYTES # BLD AUTO: 0.7 10*3/MM3 (ref 0.1–0.9)
MONOCYTES NFR BLD AUTO: 8 % (ref 5–12)
NEUTROPHILS NFR BLD AUTO: 6.03 10*3/MM3 (ref 1.7–7)
NEUTROPHILS NFR BLD AUTO: 69.1 % (ref 42.7–76)
NRBC BLD AUTO-RTO: 0 /100 WBC (ref 0–0.2)
PHOSPHATE SERPL-MCNC: 3.2 MG/DL (ref 2.5–4.5)
PLATELET # BLD AUTO: 340 10*3/MM3 (ref 140–450)
PMV BLD AUTO: 10.2 FL (ref 6–12)
POTASSIUM SERPL-SCNC: 3.6 MMOL/L (ref 3.5–5.2)
POTASSIUM SERPL-SCNC: 4 MMOL/L (ref 3.5–5.2)
RBC # BLD AUTO: 4 10*6/MM3 (ref 4.14–5.8)
SODIUM SERPL-SCNC: 143 MMOL/L (ref 136–145)
WBC NRBC COR # BLD AUTO: 8.73 10*3/MM3 (ref 3.4–10.8)

## 2024-06-27 PROCEDURE — 25010000002 FUROSEMIDE PER 20 MG: Performed by: STUDENT IN AN ORGANIZED HEALTH CARE EDUCATION/TRAINING PROGRAM

## 2024-06-27 PROCEDURE — 80048 BASIC METABOLIC PNL TOTAL CA: CPT | Performed by: STUDENT IN AN ORGANIZED HEALTH CARE EDUCATION/TRAINING PROGRAM

## 2024-06-27 PROCEDURE — 84100 ASSAY OF PHOSPHORUS: CPT | Performed by: STUDENT IN AN ORGANIZED HEALTH CARE EDUCATION/TRAINING PROGRAM

## 2024-06-27 PROCEDURE — 83735 ASSAY OF MAGNESIUM: CPT | Performed by: STUDENT IN AN ORGANIZED HEALTH CARE EDUCATION/TRAINING PROGRAM

## 2024-06-27 PROCEDURE — 85025 COMPLETE CBC W/AUTO DIFF WBC: CPT | Performed by: STUDENT IN AN ORGANIZED HEALTH CARE EDUCATION/TRAINING PROGRAM

## 2024-06-27 PROCEDURE — 82948 REAGENT STRIP/BLOOD GLUCOSE: CPT

## 2024-06-27 PROCEDURE — 94799 UNLISTED PULMONARY SVC/PX: CPT

## 2024-06-27 PROCEDURE — 84132 ASSAY OF SERUM POTASSIUM: CPT | Performed by: HOSPITALIST

## 2024-06-27 PROCEDURE — 63710000001 INSULIN LISPRO (HUMAN) PER 5 UNITS: Performed by: STUDENT IN AN ORGANIZED HEALTH CARE EDUCATION/TRAINING PROGRAM

## 2024-06-27 PROCEDURE — 99232 SBSQ HOSP IP/OBS MODERATE 35: CPT

## 2024-06-27 RX ORDER — POTASSIUM CHLORIDE 750 MG/1
40 TABLET, FILM COATED, EXTENDED RELEASE ORAL EVERY 4 HOURS
Status: COMPLETED | OUTPATIENT
Start: 2024-06-27 | End: 2024-06-27

## 2024-06-27 RX ADMIN — APIXABAN 5 MG: 5 TABLET, FILM COATED ORAL at 22:00

## 2024-06-27 RX ADMIN — Medication 5 MG: at 22:00

## 2024-06-27 RX ADMIN — INSULIN LISPRO 2 UNITS: 100 INJECTION, SOLUTION INTRAVENOUS; SUBCUTANEOUS at 22:01

## 2024-06-27 RX ADMIN — HYDRALAZINE HYDROCHLORIDE 75 MG: 50 TABLET ORAL at 22:00

## 2024-06-27 RX ADMIN — HYDRALAZINE HYDROCHLORIDE 75 MG: 50 TABLET ORAL at 08:25

## 2024-06-27 RX ADMIN — FUROSEMIDE 40 MG: 10 INJECTION, SOLUTION INTRAMUSCULAR; INTRAVENOUS at 17:25

## 2024-06-27 RX ADMIN — POTASSIUM CHLORIDE 40 MEQ: 750 TABLET, EXTENDED RELEASE ORAL at 10:35

## 2024-06-27 RX ADMIN — CARVEDILOL 50 MG: 25 TABLET, FILM COATED ORAL at 17:25

## 2024-06-27 RX ADMIN — CARVEDILOL 50 MG: 25 TABLET, FILM COATED ORAL at 08:25

## 2024-06-27 RX ADMIN — Medication 10 ML: at 22:01

## 2024-06-27 RX ADMIN — ATORVASTATIN CALCIUM 40 MG: 20 TABLET, FILM COATED ORAL at 22:00

## 2024-06-27 RX ADMIN — Medication 10 ML: at 08:26

## 2024-06-27 RX ADMIN — APIXABAN 5 MG: 5 TABLET, FILM COATED ORAL at 08:25

## 2024-06-27 RX ADMIN — POTASSIUM CHLORIDE 40 MEQ: 750 TABLET, EXTENDED RELEASE ORAL at 06:56

## 2024-06-27 RX ADMIN — TIOTROPIUM BROMIDE INHALATION SPRAY 2 PUFF: 3.12 SPRAY, METERED RESPIRATORY (INHALATION) at 07:16

## 2024-06-27 RX ADMIN — GABAPENTIN 200 MG: 100 CAPSULE ORAL at 08:25

## 2024-06-27 RX ADMIN — INSULIN LISPRO 2 UNITS: 100 INJECTION, SOLUTION INTRAVENOUS; SUBCUTANEOUS at 17:27

## 2024-06-27 RX ADMIN — TAMSULOSIN HYDROCHLORIDE 0.4 MG: 0.4 CAPSULE ORAL at 22:01

## 2024-06-27 RX ADMIN — GABAPENTIN 200 MG: 100 CAPSULE ORAL at 22:00

## 2024-06-27 RX ADMIN — FUROSEMIDE 40 MG: 10 INJECTION, SOLUTION INTRAMUSCULAR; INTRAVENOUS at 08:25

## 2024-06-27 NOTE — PLAN OF CARE
Goal Outcome Evaluation:  Plan of Care Reviewed With: patient        Progress: improving       Daily Care Plan Summary: Heart Failure    Diuretic in use (IV or PO):   IV        Daily weight (up or down):    loss: 4lbs      Output > Intake (yes/no):Yes      O2 Requirements (current, any change?): room air      Symptoms noted with Activity (Respiratory Tolerance, functional state):     no symptoms noted with activity      Anticipated Discharge Plans:     1-2 days

## 2024-06-27 NOTE — PROGRESS NOTES
Name: Alexy Ram ADMIT: 2024   : 1955  PCP: Pipe Vaughn MD    MRN: [id number] LOS: 3 days   AGE/SEX: 69 y.o. male  ROOM: [facility]/     Subjective   Subjective   He is feeling much better. Able to ambulate in the halls and hoping to go home soon.     Objective   Objective   Vital Signs  Temp:  [97.9 °F (36.6 °C)-98.6 °F (37 °C)] 97.9 °F (36.6 °C)  Heart Rate:  [58-74] 65  Resp:  [18] 18  BP: (108-134)/(64-87) 111/87  SpO2:  [91 %-95 %] 95 %  on   ;   Device (Oxygen Therapy): room air  Body mass index is 42.2 kg/m².    Physical Exam  Constitutional:       General: He is not in acute distress.     Appearance: He is obese. He is not toxic-appearing.   Cardiovascular:      Rate and Rhythm: Normal rate and regular rhythm.   Pulmonary:      Effort: Pulmonary effort is normal. No respiratory distress.      Breath sounds: No wheezing.   Abdominal:      General: Bowel sounds are normal.      Palpations: Abdomen is soft.      Tenderness: There is no abdominal tenderness.   Musculoskeletal:         General: No tenderness.      Right lower leg: No edema.      Left lower leg: Edema present.      Comments: L > R edema. Swelling is improving. Left knee incision no erythema or drainage   Skin:     General: Skin is warm and dry.   Neurological:      General: No focal deficit present.      Mental Status: He is alert and oriented to person, place, and time. Mental status is at baseline.      Motor: No weakness.         Results Review     I reviewed the patient's new clinical results.  Results from last 7 days   Lab Units 24  0406 24  0237 24  0519 24  0219   WBC 10*3/mm3 8.73 9.15 8.43 10.83*   HEMOGLOBIN g/dL 11.4* 11.2* 11.2* 11.0*   PLATELETS 10*3/mm3 340 357 332 338     Results from last 7 days   Lab Units 24  0406 24  1311 24  0237 24  1517 24  0519 24  0219   SODIUM mmol/L 143  --  146*  --  145 141   POTASSIUM mmol/L 3.6 3.4* 3.5  3.5 3.4*  3.2* 3.1*   CHLORIDE mmol/L 109*  --  109*  --  106 104   CO2 mmol/L 24.8  --  26.0  --  25.7 27.2   BUN mg/dL 18  --  16  --  16 20   CREATININE mg/dL 0.80  --  0.72*  --  0.76 0.80   GLUCOSE mg/dL 131*  --  110*  --  113* 159*   Estimated Creatinine Clearance: 105.9 mL/min (by C-G formula based on SCr of 0.8 mg/dL).  Results from last 7 days   Lab Units 06/24/24  0219   ALBUMIN g/dL 3.4*   BILIRUBIN mg/dL 0.8   ALK PHOS U/L 54   AST (SGOT) U/L 12   ALT (SGPT) U/L 11     Results from last 7 days   Lab Units 06/27/24  0406 06/26/24  0237 06/25/24  0519 06/24/24  0219   CALCIUM mg/dL 8.7 8.6 8.6 9.4   ALBUMIN g/dL  --   --   --  3.4*   MAGNESIUM mg/dL 2.0 1.8 1.9 2.5*   PHOSPHORUS mg/dL 3.2 3.7 4.1  --        COVID19   Date Value Ref Range Status   12/26/2022 Not Detected  Final   10/20/2021 Not Detected Not Detected - Ref. Range Final     Glucose   Date/Time Value Ref Range Status   06/27/2024 1133 105 70 - 130 mg/dL Final   06/27/2024 0630 125 70 - 130 mg/dL Final   06/26/2024 2029 130 70 - 130 mg/dL Final   06/26/2024 1611 116 70 - 130 mg/dL Final   06/26/2024 1056 125 70 - 130 mg/dL Final   06/26/2024 0637 122 70 - 130 mg/dL Final   06/25/2024 2053 122 70 - 130 mg/dL Final       Adult Transthoracic Echo Complete W/ Cont if Necessary Per Protocol    Left ventricular systolic function is normal. Calculated left   ventricular EF = 67.2%; visually estimated LVEF 55-60%.    The left ventricular cavity is mildly dilated.    Left ventricular wall thickness is consistent with mild concentric   hypertrophy.    Left ventricular diastolic function was normal.    RV mildly dilated with grossly normal function.    Aortic valve sclerosis without hemodynamically significant stenosis.    There is a small (<1cm) pericardial effusion. There is no evidence of   cardiac tamponade.    Pacemaker lead noted.    Saline test results are negative.    Biatrial enlargement, normal IVC size.  CT Angiogram Chest  Narrative: CT ANGIOGRAM OF  THE CHEST     HISTORY: Shortness of air and hypoxia     COMPARISON: April 3, 2024     TECHNIQUE: Axial CT imaging was obtained through the thorax. IV contrast  was administered. Three-D reformatted images were obtained.     FINDINGS:  No acute pulmonary thromboembolus is seen. Examination was not optimized  for assessment of the thoracic aorta. Proximal descending thoracic aorta  measures up to 3.3 cm. Distal descending aorta measures 2.8 cm. No  obvious dissection is seen. There is tortuosity of the aorta. There are  coronary artery calcifications, and calcification of the aorta. The main  pulmonary artery is enlarged, which can be seen in setting of pulmonary  chill hypertension. There are changes of prior left lower lobectomy.  Thyroid gland, trachea, and esophagus appear unremarkable. There are  background emphysematous changes. There is interlobular septal  thickening, as well as areas of groundglass attenuation. There are  bilateral pleural effusions, right greater than left. The heart is  enlarged. Constellation of findings likely reflects congestive heart  failure. There is dependent atelectasis within the right lower lobe.  Soft tissue density along the anteromedial aspect of the left upper  lobe, adjacent to the suture line is stable in appearance when compared  to the prior study. However, mediastinal lymph nodes are larger than on  prior exam. For example, a lower left paratracheal node measures up to  2.2 cm in short axis dimensions, previously measuring 1.6 cm. A  subcarinal lymph node measures up to 1.7 cm, previously measuring 8 mm.  There is also a prominent right hilar node, which measures up to 1.3 cm  previously, it measured approximately 9 mm. Evaluation for hepatic  lesions is limited, due to technique. Bulky appearance to the adrenal  glands bilaterally is stable. Old deformity of the left ribs is again  seen. No acute osseous abnormalities are seen. Right-sided pacemaker is  present.      Impression:    1. Cardiomegaly, interlobular septal thickening, and bilateral pleural  effusions, right greater than left, suggesting congestive heart failure.  2. Mediastinal and hilar lymph nodes appear larger than on prior exam.  While these may be reactive, short-term follow-up exam is recommended to  document resolution.     Radiation dose reduction techniques were utilized, including automated  exposure control and exposure modulation based on body size.        This report was finalized on 6/24/2024 3:49 AM by Dr. Alexandria Abbasi M.D on Workstation: [id number]       Scheduled Meds  apixaban, 5 mg, Oral, BID  atorvastatin, 40 mg, Oral, Nightly  carvedilol, 50 mg, Oral, BID With Meals  furosemide, 40 mg, Intravenous, BID  gabapentin, 200 mg, Oral, BID  hydrALAZINE, 75 mg, Oral, BID  insulin lispro, 2-7 Units, Subcutaneous, 4x Daily AC & at Bedtime  melatonin, 5 mg, Oral, Nightly  sodium chloride, 10 mL, Intravenous, Q12H  tamsulosin, 0.4 mg, Oral, Nightly  tiotropium bromide monohydrate, 2 puff, Inhalation, Daily - RT    Continuous Infusions   PRN Meds    acetaminophen **OR** acetaminophen **OR** acetaminophen    albuterol    senna-docusate sodium **AND** polyethylene glycol **AND** bisacodyl **AND** bisacodyl    cetirizine    dextrose    dextrose    glucagon (human recombinant)    HYDROcodone-acetaminophen    ondansetron ODT **OR** ondansetron    Potassium Replacement - Follow Nurse / BPA Driven Protocol    sodium chloride    sodium chloride    Diet: Cardiac, Diabetic; Healthy Heart (2-3 Na+); Consistent Carbohydrate; Fluid Consistency: Thin (IDDSI 0)       Intake/Output Summary (Last 24 hours) at 6/27/2024 1409  Last data filed at 6/27/2024 0858  Gross per 24 hour   Intake 960 ml   Output 3600 ml   Net -2640 ml             Assessment/Plan     Active Hospital Problems    Diagnosis  POA    **New onset of congestive heart failure [I50.9]  Yes    CHF (congestive heart failure) [I50.9]  Yes    Chronic  anticoagulation [Z79.01]  Not Applicable    Obstructive sleep apnea of adult [G47.33]  Yes    Class 3 severe obesity due to excess calories with body mass index (BMI) of 40.0 to 44.9 in adult [E66.01, Z68.41]  Not Applicable    Status post lobectomy of lung [Z90.2]  Not Applicable    PAF (paroxysmal atrial fibrillation) [I48.0]  Yes    Squamous cell carcinoma of left lung [C34.92]  Yes    AV block, 2nd degree [I44.1]  Yes    Hypertension [I10]  Yes    Type 2 diabetes mellitus with hyperglycemia, without long-term current use of insulin [E11.65]  Yes      Resolved Hospital Problems   No resolved problems to display.       Mr. Ram is a 69 y.o. with a history of PAF, HTN, AV block/syndope s/p PPM, HLD, DM2, COPD, BPH who presents with shortness of breath and evidence of heart failure.     Acute heart failure  History of 2nd degree AV block/syncope s/p PPM  PAF on AC  HTN  HLD  - Echo from January 2023 reviewed, EF 61%-mild LVH with normal systolic function  -CT with cardiomegaly, interlobular septal thickening and bilateral pleural effusions suggestive of CHF,   -Continue amlodipine, Coreg, hydralazine, holding his valsartan-HCTZ while getting diuresis  -Stopping Actos and amlodipine due to CHF  -Continue Eliquis  - echo w/ Dilated RV with normal function  -IV PO furosemide per cardiology. 2.6 L over the last 24 hours     Hypokalemia  -Replacing per protocol  - pt reports chronic issues with hypokalemia     DM2  - A1c 6.1% last month  - hold home oral meds  -As above stopping Actos  -controlled here     COPD  SCC of the left lung, s/p lobectomy  - cont home inhalers  - Will need short term follow up of LAD noted on CT chest.     BPH- cont tamsulosin  GIL- ok for home CPAP if available     Recent knee replacement with Dr. Staton  -Ortho evaluated: Elevate leg, no wrapping with Ace bandage, aggressive ankle pumps, ice as needed. Therapies, ankle pumps, ice as needed, physical therapy  - needs 6 wk clinic  f/u  -He walked in the halls [facility] (home med) for DVT prophylaxis.  Full code.  Discussed with patient, family, and nursing staff   Anticipated discharge home, 1-2 days    Héctor Stallings MD  [facility]  06/27/24

## 2024-06-28 LAB
ANION GAP SERPL CALCULATED.3IONS-SCNC: 11.2 MMOL/L (ref 5–15)
BASOPHILS # BLD AUTO: 0.04 10*3/MM3 (ref 0–0.2)
BASOPHILS NFR BLD AUTO: 0.5 % (ref 0–1.5)
BUN SERPL-MCNC: 18 MG/DL (ref 8–23)
BUN/CREAT SERPL: 22.2 (ref 7–25)
CALCIUM SPEC-SCNC: 8.8 MG/DL (ref 8.6–10.5)
CHLORIDE SERPL-SCNC: 107 MMOL/L (ref 98–107)
CO2 SERPL-SCNC: 24.8 MMOL/L (ref 22–29)
CREAT SERPL-MCNC: 0.81 MG/DL (ref 0.76–1.27)
DEPRECATED RDW RBC AUTO: 45.3 FL (ref 37–54)
EGFRCR SERPLBLD CKD-EPI 2021: 95.4 ML/MIN/1.73
EOSINOPHIL # BLD AUTO: 0.27 10*3/MM3 (ref 0–0.4)
EOSINOPHIL NFR BLD AUTO: 3.1 % (ref 0.3–6.2)
ERYTHROCYTE [DISTWIDTH] IN BLOOD BY AUTOMATED COUNT: 14.4 % (ref 12.3–15.4)
GLUCOSE BLDC GLUCOMTR-MCNC: 110 MG/DL (ref 70–130)
GLUCOSE BLDC GLUCOMTR-MCNC: 110 MG/DL (ref 70–130)
GLUCOSE BLDC GLUCOMTR-MCNC: 114 MG/DL (ref 70–130)
GLUCOSE BLDC GLUCOMTR-MCNC: 118 MG/DL (ref 70–130)
GLUCOSE SERPL-MCNC: 114 MG/DL (ref 65–99)
HCT VFR BLD AUTO: 34.3 % (ref 37.5–51)
HGB BLD-MCNC: 11.3 G/DL (ref 13–17.7)
IMM GRANULOCYTES # BLD AUTO: 0.03 10*3/MM3 (ref 0–0.05)
IMM GRANULOCYTES NFR BLD AUTO: 0.3 % (ref 0–0.5)
LYMPHOCYTES # BLD AUTO: 1.72 10*3/MM3 (ref 0.7–3.1)
LYMPHOCYTES NFR BLD AUTO: 20 % (ref 19.6–45.3)
MAGNESIUM SERPL-MCNC: 1.9 MG/DL (ref 1.6–2.4)
MCH RBC QN AUTO: 28.5 PG (ref 26.6–33)
MCHC RBC AUTO-ENTMCNC: 32.9 G/DL (ref 31.5–35.7)
MCV RBC AUTO: 86.4 FL (ref 79–97)
MONOCYTES # BLD AUTO: 0.77 10*3/MM3 (ref 0.1–0.9)
MONOCYTES NFR BLD AUTO: 9 % (ref 5–12)
NEUTROPHILS NFR BLD AUTO: 5.76 10*3/MM3 (ref 1.7–7)
NEUTROPHILS NFR BLD AUTO: 67.1 % (ref 42.7–76)
NRBC BLD AUTO-RTO: 0 /100 WBC (ref 0–0.2)
PHOSPHATE SERPL-MCNC: 4.4 MG/DL (ref 2.5–4.5)
PLATELET # BLD AUTO: 340 10*3/MM3 (ref 140–450)
PMV BLD AUTO: 10.1 FL (ref 6–12)
POTASSIUM SERPL-SCNC: 3.3 MMOL/L (ref 3.5–5.2)
POTASSIUM SERPL-SCNC: 4 MMOL/L (ref 3.5–5.2)
RBC # BLD AUTO: 3.97 10*6/MM3 (ref 4.14–5.8)
SODIUM SERPL-SCNC: 143 MMOL/L (ref 136–145)
WBC NRBC COR # BLD AUTO: 8.59 10*3/MM3 (ref 3.4–10.8)

## 2024-06-28 PROCEDURE — 84100 ASSAY OF PHOSPHORUS: CPT | Performed by: STUDENT IN AN ORGANIZED HEALTH CARE EDUCATION/TRAINING PROGRAM

## 2024-06-28 PROCEDURE — 97530 THERAPEUTIC ACTIVITIES: CPT

## 2024-06-28 PROCEDURE — 94799 UNLISTED PULMONARY SVC/PX: CPT

## 2024-06-28 PROCEDURE — 84132 ASSAY OF SERUM POTASSIUM: CPT | Performed by: INTERNAL MEDICINE

## 2024-06-28 PROCEDURE — 85025 COMPLETE CBC W/AUTO DIFF WBC: CPT | Performed by: STUDENT IN AN ORGANIZED HEALTH CARE EDUCATION/TRAINING PROGRAM

## 2024-06-28 PROCEDURE — 80048 BASIC METABOLIC PNL TOTAL CA: CPT | Performed by: STUDENT IN AN ORGANIZED HEALTH CARE EDUCATION/TRAINING PROGRAM

## 2024-06-28 PROCEDURE — 97110 THERAPEUTIC EXERCISES: CPT

## 2024-06-28 PROCEDURE — 83735 ASSAY OF MAGNESIUM: CPT | Performed by: STUDENT IN AN ORGANIZED HEALTH CARE EDUCATION/TRAINING PROGRAM

## 2024-06-28 PROCEDURE — 82948 REAGENT STRIP/BLOOD GLUCOSE: CPT

## 2024-06-28 PROCEDURE — 25010000002 FUROSEMIDE PER 20 MG: Performed by: STUDENT IN AN ORGANIZED HEALTH CARE EDUCATION/TRAINING PROGRAM

## 2024-06-28 PROCEDURE — 94664 DEMO&/EVAL PT USE INHALER: CPT

## 2024-06-28 PROCEDURE — 99232 SBSQ HOSP IP/OBS MODERATE 35: CPT | Performed by: NURSE PRACTITIONER

## 2024-06-28 PROCEDURE — 94761 N-INVAS EAR/PLS OXIMETRY MLT: CPT

## 2024-06-28 RX ORDER — SPIRONOLACTONE 25 MG/1
25 TABLET ORAL DAILY
Status: DISCONTINUED | OUTPATIENT
Start: 2024-06-28 | End: 2024-06-30 | Stop reason: HOSPADM

## 2024-06-28 RX ORDER — POTASSIUM CHLORIDE 750 MG/1
40 TABLET, FILM COATED, EXTENDED RELEASE ORAL EVERY 4 HOURS
Status: COMPLETED | OUTPATIENT
Start: 2024-06-28 | End: 2024-06-28

## 2024-06-28 RX ADMIN — SPIRONOLACTONE 25 MG: 25 TABLET, FILM COATED ORAL at 12:02

## 2024-06-28 RX ADMIN — POTASSIUM CHLORIDE 40 MEQ: 750 TABLET, EXTENDED RELEASE ORAL at 12:02

## 2024-06-28 RX ADMIN — ATORVASTATIN CALCIUM 40 MG: 20 TABLET, FILM COATED ORAL at 23:11

## 2024-06-28 RX ADMIN — ACETAMINOPHEN 325MG 650 MG: 325 TABLET ORAL at 12:42

## 2024-06-28 RX ADMIN — APIXABAN 5 MG: 5 TABLET, FILM COATED ORAL at 23:11

## 2024-06-28 RX ADMIN — TIOTROPIUM BROMIDE INHALATION SPRAY 2 PUFF: 3.12 SPRAY, METERED RESPIRATORY (INHALATION) at 06:51

## 2024-06-28 RX ADMIN — CARVEDILOL 50 MG: 25 TABLET, FILM COATED ORAL at 17:35

## 2024-06-28 RX ADMIN — Medication 10 ML: at 23:12

## 2024-06-28 RX ADMIN — TAMSULOSIN HYDROCHLORIDE 0.4 MG: 0.4 CAPSULE ORAL at 23:11

## 2024-06-28 RX ADMIN — FUROSEMIDE 40 MG: 10 INJECTION, SOLUTION INTRAMUSCULAR; INTRAVENOUS at 08:46

## 2024-06-28 RX ADMIN — POTASSIUM CHLORIDE 40 MEQ: 750 TABLET, EXTENDED RELEASE ORAL at 08:46

## 2024-06-28 RX ADMIN — APIXABAN 5 MG: 5 TABLET, FILM COATED ORAL at 08:46

## 2024-06-28 RX ADMIN — EMPAGLIFLOZIN 10 MG: 10 TABLET, FILM COATED ORAL at 12:02

## 2024-06-28 RX ADMIN — HYDRALAZINE HYDROCHLORIDE 75 MG: 50 TABLET ORAL at 08:46

## 2024-06-28 RX ADMIN — Medication 10 ML: at 08:54

## 2024-06-28 RX ADMIN — Medication 5 MG: at 23:11

## 2024-06-28 RX ADMIN — HYDRALAZINE HYDROCHLORIDE 75 MG: 50 TABLET ORAL at 23:12

## 2024-06-28 RX ADMIN — GABAPENTIN 200 MG: 100 CAPSULE ORAL at 23:11

## 2024-06-28 RX ADMIN — CARVEDILOL 50 MG: 25 TABLET, FILM COATED ORAL at 08:46

## 2024-06-28 RX ADMIN — GABAPENTIN 200 MG: 100 CAPSULE ORAL at 08:46

## 2024-06-28 NOTE — THERAPY DISCHARGE NOTE
Patient Name: Alexy Ram  : 1955    MRN: [id number]                              Today's Date: 2024       Admit Date: 2024    Visit Dx:     ICD-10-CM ICD-9-CM   1. Hypokalemia  E87.6 276.8   2. Hypoxia  R09.02 799.02   3. Acute right-sided congestive heart failure  I50.811 428.0     Patient Active Problem List   Diagnosis    Hypercholesterolemia    Hypertension    Type 2 diabetes mellitus with hyperglycemia, without long-term current use of insulin    Squamous cell carcinoma of left lung    Status post lobectomy of lung    Class 3 severe obesity due to excess calories with body mass index (BMI) of 40.0 to 44.9 in adult    Encounter for screening for malignant neoplasm of colon    Family history of colon cancer    Obstructive sleep apnea of adult    Screening PSA (prostate specific antigen)    Osteoarthritis of left hip    AV block, 2nd degree    Cardiac pacemaker in situ    Chronic anticoagulation    PAF (paroxysmal atrial fibrillation)    Adjustment and management of cardiac pacemaker    Primary osteoarthritis of left knee    Benign prostatic hyperplasia with urinary frequency    Status post total knee replacement    New onset of congestive heart failure    CHF (congestive heart failure)     Past Medical History:   Diagnosis Date    Acromioclavicular separation     shoulder pulled out of place    Ankle sprain     Arthritis     OSTEOARTHRITIS    Arthritis of back     so long I don't know when it started    Arthritis of neck     COPD (chronic obstructive pulmonary disease)     Diabetes mellitus     Dislocation, shoulder     Enlarged prostate     Fatty liver     Frozen shoulder     Hip arthrosis     History of low potassium     History of lung cancer 2018    HX LEFT LOWER LOBECTOMY    History of MRSA infection 2018    History of transfusion     Hyperlipidemia     Hypertension     Knee swelling     Left upper lobe consolidation     REPEAT CT SCANS - FOLLOWED BY PULMONARY, MOST RECENT CT SCAN  4/3/24    Low back strain     Lumbosacral disc disease     Neck strain     Neuropathy     On anticoagulant therapy     Pacemaker     PAF (paroxysmal atrial fibrillation)     Periarthritis of shoulder     Second degree AV block     Sinus node dysfunction     Sleep apnea     WEARS CPAP    Slow to wake up after anesthesia     Thoracic disc disorder      Past Surgical History:   Procedure Laterality Date    BRONCHOSCOPY N/A 10/11/2018    Procedure: BRONCHOSCOPY WITH FLUORO, LEFT LOWER LOBE BRUSHINGS WET AND DRY. WITH BX'S AND BAL (IN LLL).;  Surgeon: Akil Ortiz MD;  [facility];  Service: Pulmonary    BRONCHOSCOPY WITH ION ROBOTIC ASSIST N/A 09/07/2023    Procedure: BRONCHOSCOPY WITH ION ROBOT AND ENDOBRONCHIAL ULTRASOUND with brushing and FNA;  Surgeon: Taye Chavira MD;  [facility];  Service: Robotics - Pulmonary;  Laterality: N/A;  PRE/POST - left upper lobe mass    CATARACT EXTRACTION Bilateral 04/01/2024    COLONOSCOPY  2021    COLONOSCOPY W/ POLYPECTOMY N/A 10/22/2021    Procedure: COLONOSCOPY WITH POLYPECTOMY;  Surgeon: Lan Solis MD;  Location: [facility] OR;  Service: Gastroenterology;  Laterality: N/A;  cecal polyp x1 (cold snare)  ascending polyp x1 (hot snare)  transverse polyp x3 (hot snare x 1), (cold snare x2)  sigmoid polyp x1 (hot snare, clip applied)  rectal polyp x3 (cold snare)    INSERT / REPLACE / REMOVE PACEMAKER  6/5/2005    replaced Oct 2014    JOINT REPLACEMENT  Hip    Hip    KNEE ARTHROSCOPY Left     PACEMAKER IMPLANTATION  2005, 2014    THORACOSCOPY Left 11/19/2018    Procedure: BRONCHOSCOPY, DAVINCI ROBOT ASSISTED VIDEIO ASSISTED THORACOSCOPY  WITH CONVERT TO OPEN THORACOTOMY,LEFT LOWER LOBECTOMY,INTERCOSTAL NERVE BLOCK;  Surgeon: Michael Ring III, MD;  Location: [facility] MAIN OR;  Service: DaVinci    TOTAL HIP ARTHROPLASTY Left 07/14/2023    Procedure: TOTAL HIP ARTHROPLASTY;  Surgeon: Nikko Staton MD;  Location: [facility] OR OSC;   Service: Orthopedics;  Laterality: Left;    TOTAL KNEE ARTHROPLASTY Left 6/13/2024    Procedure: TOTAL KNEE ARTHROPLASTY;  Surgeon: Nikko Staton MD;  [place] [facility] [place] [facility]  Service: Orthopedics;  Laterality: Left;      General Information       Row Name 06/28/24 1035          Physical Therapy Time and Intention    Document Type therapy note (daily note);discharge treatment  -ST     Mode of Treatment individual therapy;physical therapy  -       Row Name 06/28/24 1035          General Information    Patient Profile Reviewed yes  -ST     Existing Precautions/Restrictions fall  -ST       Row Name 06/28/24 1035          Cognition    Orientation Status (Cognition) oriented x 4  -ST       Row Name 06/28/24 1035          Safety Issues, Functional Mobility    Impairments Affecting Function (Mobility) endurance/activity tolerance;strength;range of motion (ROM);shortness of breath  -ST     Comment, Safety Issues/Impairments (Mobility) gait belt, nonskid socks donned  -ST               User Key  (r) = Recorded By, (t) = Taken By, (c) = Cosigned By      Initials Name Provider Type    ST Lisbeth Bo, JAMI Physical Therapist                   Mobility       Row Name 06/28/24 1036          Sit-Stand Transfer    Sit-Stand [place] (Transfers) modified independence  -ST     Assistive Device (Sit-Stand Transfers) walker, front-wheeled  -ST       Row Name 06/28/24 1036          Gait/Stairs (Locomotion)    [place] Level (Gait) modified independence  -ST     Assistive Device (Gait) walker, front-wheeled  -ST     Distance in Feet (Gait) 150  -ST     Deviations/Abnormal Patterns (Gait) blayne decreased;antalgic;left sided deviations  -ST     Left Sided Gait Deviations heel strike decreased;decreased knee extension  -ST               User Key  (r) = Recorded By, (t) = Taken By, (c) = Cosigned By      Initials Name Provider Type    ST Lisbeth Bo, PT Physical Therapist                   Obj/Interventions        Row Name 06/28/24 1036          Motor Skills    Therapeutic Exercise --  seated LAQ 2 x 10, seated resisted isometrics 3 sec hold knee flex/ext 2 x 5, reclined heel slides with overpressure in flexion 3 sec holds 2 x 10 - all L LE only  -ST       Row Name 06/28/24 1036          Balance    Comment, Balance improved balance to mod I with rwx, supervision without AD for a few steps in the room  -ST               User Key  (r) = Recorded By, (t) = Taken By, (c) = Cosigned By      Initials Name Provider Type    ST Lisbeth Bo, PT Physical Therapist                   Goals/Plan       Row Name 06/28/24 1039          Gait Training Goal 1 (PT)    Activity/Assistive Device (Gait Training Goal 1, PT) gait (walking locomotion);assistive device use  -ST     [place] Level (Gait Training Goal 1, PT) modified independence  -ST     Distance (Gait Training Goal 1, PT) 300  -ST     Progress/Outcome (Gait Training Goal 1, PT) goal met  -ST       Row Name 06/28/24 1039          Problem Specific Goal 1 (PT)    Problem Specific Goal 1 (PT) Pt will demonstrate L knee AROM 0-110  -ST     Time Frame (Problem Specific Goal 1, PT) 1 week;long-term goal (LTG)  -ST     Progress/Outcome (Problem Specific Goal 1, PT) good progress toward goal;goal met  -ST               User Key  (r) = Recorded By, (t) = Taken By, (c) = Cosigned By      Initials Name Provider Type    ST Lisbeth Bo, PT Physical Therapist                   Clinical Impression       Row Name 06/28/24 1037          Pain    Pretreatment Pain Rating 0/10 - no pain  -ST     Posttreatment Pain Rating 0/10 - no pain  -ST       [facility] Name 06/28/24 1037          Plan of Care Review    Plan of Care Reviewed With patient  -ST     Progress improving  -ST     Outcome Evaluation Pt seen for PT this AM with improved overall mobility, up ad alicia at this time. Ambulated mod I with rwx up to 150' and tolerated seated L TKA exercises with overpressure in flex to improve overall  AROM. PT has met all PT goals at this time with plan to d/c later today. Recommend OP PT at d/c for L knee rehab  -ST       Row Name 06/28/24 1037          Therapy Assessment/Plan (PT)    Therapy Frequency (PT) other (see comments)  DC  -ST       Row Name 06/28/24 1037          Positioning and Restraints    Pre-Treatment Position sitting in chair/recliner  -ST     Post Treatment Position chair  -ST     In Chair reclined;call light within reach;encouraged to call for assist;with family/caregiver  -ST               User Key  (r) = Recorded By, (t) = Taken By, (c) = Cosigned By      Initials Name Provider Type    Lisbeth Shipley, JAMI Physical Therapist                   Outcome Measures       Row Name 06/28/24 1039 06/28/24 0330       How much help from another person do you currently need...    Turning from your back to your side while in flat bed without using bedrails? 4  -ST 4  -EA    Moving from lying on back to sitting on the side of a flat bed without bedrails? 4  -ST 4  -EA    Moving to and from a bed to a chair (including a wheelchair)? 4  -ST 4  -EA    Standing up from a chair using your arms (e.g., wheelchair, bedside chair)? 4  -ST 4  -EA    Climbing 3-5 steps with a railing? 4  -ST 3  -EA    To walk in hospital room? 4  -ST 4  -EA    AM-PAC 6 Clicks Score (PT) 24  -ST 23  -EA    Highest Level of Mobility Goal 8 --> Walked 250 feet or more  -ST 7 --> Walk 25 feet or more  -ANGELLA              User Key  (r) = Recorded By, (t) = Taken By, (c) = Cosigned By      Initials Name Provider Type    Fide Briseno RN Registered Nurse    Lisbeth Shipley PT Physical Therapist                  Physical Therapy Education       Title: PT OT SLP Therapies (Done)       Topic: Physical Therapy (Done)       Point: Mobility training (Done)       Learning Progress Summary             Patient Acceptance, E,TB, VU,NR by  at 6/28/2024 1039    Acceptance, E,TB, VU,NR by  at 6/26/2024 1534    Acceptance, E, VU,NR  by EF at 6/24/2024 1524                         Point: Home exercise program (Done)       Learning Progress Summary             Patient Acceptance, E,TB, VU,NR by ST at 6/28/2024 1039    Acceptance, E,TB, VU,NR by ST at 6/26/2024 1534    Acceptance, E, VU,NR by EF at 6/24/2024 1524                         Point: Body mechanics (Done)       Learning Progress Summary             Patient Acceptance, E,TB, VU,NR by ST at 6/28/2024 1039    Acceptance, E,TB, VU,NR by ST at 6/26/2024 1534                         Point: Precautions (Done)       Learning Progress Summary             Patient Acceptance, E,TB, VU,NR by ST at 6/28/2024 1039    Acceptance, E,TB, VU,NR by ST at 6/26/2024 1534                                         User Key       Initials Effective Dates Name Provider Type Discipline     05/31/24 -  Bekah Cuba, PT Physical Therapist PT    ST 09/22/22 -  Lisbeth Bo, PT Physical Therapist PT                  PT Recommendation and Plan     Plan of Care Reviewed With: patient  Progress: improving  Outcome Evaluation: Pt seen for PT this AM with improved overall mobility, up ad alicia at this time. Ambulated mod I with rwx up to 150' and tolerated seated L TKA exercises with overpressure in flex to improve overall AROM. PT has met all PT goals at this time with plan to d/c later today. Recommend OP PT at d/c for L knee rehab     Time Calculation:         PT Charges       Row Name 06/28/24 1039             Time Calculation    Start Time 0859  -ST      Stop Time 0925  -ST      Time Calculation (min) 26 min  -ST      PT Received On 06/28/24  -ST         Time Calculation- PT    Total Timed Code Minutes- PT 26 minute(s)  -ST         Timed Charges    55830 - PT Therapeutic Exercise Minutes 12  -ST      60637 - PT Therapeutic Activity Minutes 14  -ST         Total Minutes    Timed Charges Total Minutes 26  -ST       Total Minutes 26  -ST                User Key  (r) = Recorded By, (t) = Taken By, (c) =  Cosigned By      Initials Name Provider Type     Lisbeth Bo, PT Physical Therapist                  Therapy Charges for Today       Code Description Service Date Service Provider Modifiers Qty    47283089484 HC PT THER PROC EA 15 MIN 6/28/2024 Lisbeth Bo, PT GP 1    20383098159 HC PT THERAPEUTIC ACT EA 15 MIN 6/28/2024 Lisbeth Bo PT GP 1            PT G-Codes  Outcome Measure Options: AM-PAC 6 Clicks Basic Mobility (PT)  AM-PAC 6 Clicks Score (PT): 24    PT Discharge Summary  Anticipated Discharge Disposition (PT): home with outpatient therapy services    Lisbeth Bo PT  6/28/2024

## 2024-06-28 NOTE — PROGRESS NOTES
"CC: Follow-up acute diastolic ingestive heart failure and presence of permanent pacemaker.    Interval History: He is sitting up in chair.  Breathing is much better.  He is asking can go home today      Vital Signs  Temp:  [97.9 °F (36.6 °C)-98.8 °F (37.1 °C)] 98.8 °F (37.1 °C)  Heart Rate:  [64-71] 71  Resp:  [16-18] 16  BP: (111-134)/(68-87) 126/78    Intake/Output Summary (Last 24 hours) at 6/28/2024 0909  Last data filed at 6/28/2024 0501  Gross per 24 hour   Intake 360 ml   Output 1800 ml   Net -1440 ml     Flowsheet Rows      Flowsheet Row First Filed Value   Admission Height 167.6 cm (66\") Documented at 06/24/2024 0124   Admission Weight 120 kg (265 lb) Documented at 06/24/2024 0124            PHYSICAL EXAM:  General: No acute distress  Resp:NL Rate, unlabored, clear  CV:NL rate and paced rhythm, NL PMI, Nl S1 and S2, no Murmur, no gallop, no rub, No JVD. Normal pedal pulses  ABD:Nl sounds, no masses or tenderness, nondistended, no guarding or rebound  Neuro: alert,cooperative and oriented  Extr: No edema or cyanosis, moves all extremities      Results Review:    Results from last 7 days   Lab Units 06/28/24  0405   SODIUM mmol/L 143   POTASSIUM mmol/L 3.3*   CHLORIDE mmol/L 107   CO2 mmol/L 24.8   BUN mg/dL 18   CREATININE mg/dL 0.81   GLUCOSE mg/dL 114*   CALCIUM mg/dL 8.8         Results from last 7 days   Lab Units 06/28/24  0405   WBC 10*3/mm3 8.59   HEMOGLOBIN g/dL 11.3*   HEMATOCRIT % 34.3*   PLATELETS 10*3/mm3 340             Results from last 7 days   Lab Units 06/28/24  0405   MAGNESIUM mg/dL 1.9         I reviewed the patient's new clinical results.  I personally viewed and interpreted the patient's EKG/Telemetry data        Medication Review:   Meds reviewed         Assessment/Plan    Acute diastolic CHF, unclear trigger  He has responded well to IV furosemide  Will start spironolactone 25 mg daily  SGLT2 inhibitor should be considered next  Recent left TKA on 6/13/2024 by Dr. Staton  History of " left lower lobe squamous cell carcinoma, status post left lower lobectomy in 2018  Morbid obesity, complicating all aspects of care  History of second-degree AV block, status post permanent pacemaker ([employer] dual-chamber)  Acute hypoxic respiratory failure secondary to #1  COPD without acute exacerbation  Obstructive sleep apnea, on home CPAP  Paroxysmal atrial fibrillation  Continue Eliquis 5 mg twice daily, carvedilol 50 mg twice daily.  Diabetes- off actos  Hypertension  His amlodipine was stopped as it was felt to be contributing to increased lower extremity edema and his hydralazine has been increased this admission.      His potassium is being replaced.  Off Actos. I will add Jardiance to replace it.    I will start spironolactone 25 mg daily.   He will need both first doses prior to discharge later today.  No opposition to discharge.    He would like to transition cardiac care to our practice.  I he is now scheduled to see me 7/12/2024 to follow-up.  Will need follow-up BMP outpatient as well as his device transition to our device clinic.        ROME Nance  06/28/24  09:09 EDT

## 2024-06-28 NOTE — PLAN OF CARE
Goal Outcome Evaluation:  Plan of Care Reviewed With: patient, spouse           Outcome Evaluation: K of 4 after replacement protocol. Tylenol given for c/o knee pain after working with PT. Room air, cpap when sleeping. NSR/A paced on tele, BP stable. Pt ambulating in room to bathroom. Pt encouraged to elevate LLE and ice packs applied. Anticipate DC tomorrow per MD orders. Continue with current POC and update as needed.

## 2024-06-28 NOTE — PLAN OF CARE
Goal Outcome Evaluation:  Plan of Care Reviewed With: patient        Progress: improving  Outcome Evaluation: Pt seen for PT this AM with improved overall mobility, up ad alicia at this time. Ambulated mod I with rwx up to 150' and tolerated seated L TKA exercises with overpressure in flex to improve overall AROM. PT has met all PT goals at this time with plan to d/c later today. Recommend OP PT at d/c for L knee rehab      Anticipated Discharge Disposition (PT): home with outpatient therapy services

## 2024-06-28 NOTE — PLAN OF CARE
Goal Outcome Evaluation:  Plan of Care Reviewed With: patient        Progress: improving  Outcome Evaluation: Pt continuing treatment for CHF exacerbation with IV diuretics. Electrolytes replaced per Protocol. Pt does have left knee incision from A in June 2024, Ice packs in fridge for pain & swelling management. Ambulating to bathroom with Standby assist, tolerating well. May possibly transition to oral diuretics on today. [id number] on demand on monitor, other VSS. No acute complaints overnight, will continue to monitor

## 2024-06-28 NOTE — CASE MANAGEMENT/SOCIAL WORK
Continued Stay Note  [facility]     Patient Name: Alexy Ram  MRN: [id number]  Today's Date: 6/28/2024    Admit Date: 6/24/2024    Plan: Home with [facility] (following)   Discharge Plan       Row Name 06/28/24 0857       Plan    Plan Home with [facility] (following)    Patient/Family in Agreement with Plan yes    Plan Comments Patient from home with [facility] and plans to return. CCP following. Sumeet CRENSHAW CCP                   Discharge Codes    No documentation.                 Expected Discharge Date and Time       Expected Discharge Date Expected Discharge Time    Jun 28, 2024               Sumeet Dewey RN

## 2024-06-29 LAB
ANION GAP SERPL CALCULATED.3IONS-SCNC: 11.1 MMOL/L (ref 5–15)
BASOPHILS # BLD AUTO: 0.04 10*3/MM3 (ref 0–0.2)
BASOPHILS NFR BLD AUTO: 0.5 % (ref 0–1.5)
BUN SERPL-MCNC: 18 MG/DL (ref 8–23)
BUN/CREAT SERPL: 20.5 (ref 7–25)
CALCIUM SPEC-SCNC: 9.4 MG/DL (ref 8.6–10.5)
CHLORIDE SERPL-SCNC: 107 MMOL/L (ref 98–107)
CO2 SERPL-SCNC: 24.9 MMOL/L (ref 22–29)
CREAT SERPL-MCNC: 0.88 MG/DL (ref 0.76–1.27)
DEPRECATED RDW RBC AUTO: 45.9 FL (ref 37–54)
EGFRCR SERPLBLD CKD-EPI 2021: 93.1 ML/MIN/1.73
EOSINOPHIL # BLD AUTO: 0.25 10*3/MM3 (ref 0–0.4)
EOSINOPHIL NFR BLD AUTO: 3.2 % (ref 0.3–6.2)
ERYTHROCYTE [DISTWIDTH] IN BLOOD BY AUTOMATED COUNT: 14.4 % (ref 12.3–15.4)
GLUCOSE BLDC GLUCOMTR-MCNC: 105 MG/DL (ref 70–130)
GLUCOSE BLDC GLUCOMTR-MCNC: 121 MG/DL (ref 70–130)
GLUCOSE BLDC GLUCOMTR-MCNC: 123 MG/DL (ref 70–130)
GLUCOSE BLDC GLUCOMTR-MCNC: 145 MG/DL (ref 70–130)
GLUCOSE SERPL-MCNC: 107 MG/DL (ref 65–99)
HCT VFR BLD AUTO: 38.2 % (ref 37.5–51)
HGB BLD-MCNC: 12.3 G/DL (ref 13–17.7)
IMM GRANULOCYTES # BLD AUTO: 0.03 10*3/MM3 (ref 0–0.05)
IMM GRANULOCYTES NFR BLD AUTO: 0.4 % (ref 0–0.5)
LYMPHOCYTES # BLD AUTO: 1.54 10*3/MM3 (ref 0.7–3.1)
LYMPHOCYTES NFR BLD AUTO: 19.4 % (ref 19.6–45.3)
MAGNESIUM SERPL-MCNC: 2.1 MG/DL (ref 1.6–2.4)
MCH RBC QN AUTO: 27.9 PG (ref 26.6–33)
MCHC RBC AUTO-ENTMCNC: 32.2 G/DL (ref 31.5–35.7)
MCV RBC AUTO: 86.6 FL (ref 79–97)
MONOCYTES # BLD AUTO: 0.67 10*3/MM3 (ref 0.1–0.9)
MONOCYTES NFR BLD AUTO: 8.4 % (ref 5–12)
NEUTROPHILS NFR BLD AUTO: 5.4 10*3/MM3 (ref 1.7–7)
NEUTROPHILS NFR BLD AUTO: 68.1 % (ref 42.7–76)
NRBC BLD AUTO-RTO: 0 /100 WBC (ref 0–0.2)
PHOSPHATE SERPL-MCNC: 3.7 MG/DL (ref 2.5–4.5)
PLATELET # BLD AUTO: 383 10*3/MM3 (ref 140–450)
PMV BLD AUTO: 10.2 FL (ref 6–12)
POTASSIUM SERPL-SCNC: 4 MMOL/L (ref 3.5–5.2)
RBC # BLD AUTO: 4.41 10*6/MM3 (ref 4.14–5.8)
SODIUM SERPL-SCNC: 143 MMOL/L (ref 136–145)
WBC NRBC COR # BLD AUTO: 7.93 10*3/MM3 (ref 3.4–10.8)

## 2024-06-29 PROCEDURE — 94799 UNLISTED PULMONARY SVC/PX: CPT

## 2024-06-29 PROCEDURE — 99232 SBSQ HOSP IP/OBS MODERATE 35: CPT | Performed by: STUDENT IN AN ORGANIZED HEALTH CARE EDUCATION/TRAINING PROGRAM

## 2024-06-29 PROCEDURE — 83735 ASSAY OF MAGNESIUM: CPT | Performed by: STUDENT IN AN ORGANIZED HEALTH CARE EDUCATION/TRAINING PROGRAM

## 2024-06-29 PROCEDURE — 82948 REAGENT STRIP/BLOOD GLUCOSE: CPT

## 2024-06-29 PROCEDURE — 25010000002 FUROSEMIDE PER 20 MG: Performed by: STUDENT IN AN ORGANIZED HEALTH CARE EDUCATION/TRAINING PROGRAM

## 2024-06-29 PROCEDURE — 85025 COMPLETE CBC W/AUTO DIFF WBC: CPT | Performed by: STUDENT IN AN ORGANIZED HEALTH CARE EDUCATION/TRAINING PROGRAM

## 2024-06-29 PROCEDURE — 84100 ASSAY OF PHOSPHORUS: CPT | Performed by: STUDENT IN AN ORGANIZED HEALTH CARE EDUCATION/TRAINING PROGRAM

## 2024-06-29 PROCEDURE — 80048 BASIC METABOLIC PNL TOTAL CA: CPT | Performed by: STUDENT IN AN ORGANIZED HEALTH CARE EDUCATION/TRAINING PROGRAM

## 2024-06-29 RX ORDER — FUROSEMIDE 10 MG/ML
40 INJECTION INTRAMUSCULAR; INTRAVENOUS ONCE
Status: COMPLETED | OUTPATIENT
Start: 2024-06-29 | End: 2024-06-29

## 2024-06-29 RX ADMIN — ATORVASTATIN CALCIUM 40 MG: 20 TABLET, FILM COATED ORAL at 21:07

## 2024-06-29 RX ADMIN — GABAPENTIN 200 MG: 100 CAPSULE ORAL at 08:46

## 2024-06-29 RX ADMIN — EMPAGLIFLOZIN 10 MG: 10 TABLET, FILM COATED ORAL at 08:46

## 2024-06-29 RX ADMIN — Medication 10 ML: at 21:07

## 2024-06-29 RX ADMIN — GABAPENTIN 200 MG: 100 CAPSULE ORAL at 21:07

## 2024-06-29 RX ADMIN — CARVEDILOL 50 MG: 25 TABLET, FILM COATED ORAL at 08:46

## 2024-06-29 RX ADMIN — Medication 10 ML: at 08:46

## 2024-06-29 RX ADMIN — HYDRALAZINE HYDROCHLORIDE 75 MG: 50 TABLET ORAL at 21:07

## 2024-06-29 RX ADMIN — TIOTROPIUM BROMIDE INHALATION SPRAY 2 PUFF: 3.12 SPRAY, METERED RESPIRATORY (INHALATION) at 08:12

## 2024-06-29 RX ADMIN — APIXABAN 5 MG: 5 TABLET, FILM COATED ORAL at 21:06

## 2024-06-29 RX ADMIN — FUROSEMIDE 40 MG: 10 INJECTION, SOLUTION INTRAMUSCULAR; INTRAVENOUS at 12:04

## 2024-06-29 RX ADMIN — TAMSULOSIN HYDROCHLORIDE 0.4 MG: 0.4 CAPSULE ORAL at 21:07

## 2024-06-29 RX ADMIN — HYDRALAZINE HYDROCHLORIDE 75 MG: 50 TABLET ORAL at 08:45

## 2024-06-29 RX ADMIN — APIXABAN 5 MG: 5 TABLET, FILM COATED ORAL at 08:46

## 2024-06-29 RX ADMIN — Medication 5 MG: at 21:07

## 2024-06-29 RX ADMIN — SPIRONOLACTONE 25 MG: 25 TABLET, FILM COATED ORAL at 08:46

## 2024-06-29 RX ADMIN — CARVEDILOL 50 MG: 25 TABLET, FILM COATED ORAL at 18:15

## 2024-06-29 NOTE — PROGRESS NOTES
LOS: 5 days   Patient Care Team:  Pipe Vaughn MD as PCP - General (Family Medicine)  Steve Rice MD as Consulting Physician (Cardiac Electrophysiology)  Taye Chavira MD as Surgeon (Thoracic Surgery)  Akil Ortiz MD as Consulting Physician (Pulmonary Disease)  Nikko Staton MD as Surgeon (Orthopedic Surgery)    Chief Complaint:  Following for diastolic CHF    Interval History:   He feels well.  Some weeping edema around the lower portion of his knee.  From recent surgery.  Otherwise he is doing well.  Breathing much better.      Objective   Vital Signs  Temp:  [98 °F (36.7 °C)-98.6 °F (37 °C)] 98.6 °F (37 °C)  Heart Rate:  [66-73] 69  Resp:  [15-18] 18  BP: (103-140)/(61-83) 140/83    Intake/Output Summary (Last 24 hours) at 6/29/2024 1030  Last data filed at 6/29/2024 0830  Gross per 24 hour   Intake 820 ml   Output --   Net 820 ml       Comfortable NAD  PERRL, conjunctivae clear  Neck supple, no JVD or thyromegaly appreciated  S1/S2 RRR, no m/r/g  Lungs CTA B, normal effort  Abdomen S/NT/ND (+) BS, no HSM appreciated  Extremities warm, no clubbing, cyanosis, trace RLE edema, 1+ LLE edema  No visible or palpable skin lesions  A/O x 3, mood and affect appropriate    Results Review:      Results from last 7 days   Lab Units 06/29/24  0519 06/28/24  1514 06/28/24  0405 06/27/24  1438 06/27/24  0406   SODIUM mmol/L 143  --  143  --  143   POTASSIUM mmol/L 4.0 4.0 3.3*   < > 3.6   CHLORIDE mmol/L 107  --  107  --  109*   CO2 mmol/L 24.9  --  24.8  --  24.8   BUN mg/dL 18  --  18  --  18   CREATININE mg/dL 0.88  --  0.81  --  0.80   GLUCOSE mg/dL 107*  --  114*  --  131*   CALCIUM mg/dL 9.4  --  8.8  --  8.7    < > = values in this interval not displayed.         Results from last 7 days   Lab Units 06/29/24  0519 06/28/24  0405 06/27/24  0406   WBC 10*3/mm3 7.93 8.59 8.73   HEMOGLOBIN g/dL 12.3* 11.3* 11.4*   HEMATOCRIT % 38.2 34.3* 35.0*   PLATELETS 10*3/mm3 383 340 340             Results from  last 7 days   Lab Units 06/29/24  0519   MAGNESIUM mg/dL 2.1           I reviewed the patient's new clinical results.  I personally viewed and interpreted the patient's EKG/Telemetry data        Medication Review:   apixaban, 5 mg, Oral, BID  atorvastatin, 40 mg, Oral, Nightly  carvedilol, 50 mg, Oral, BID With Meals  empagliflozin, 10 mg, Oral, Daily  gabapentin, 200 mg, Oral, BID  hydrALAZINE, 75 mg, Oral, BID  insulin lispro, 2-7 Units, Subcutaneous, 4x Daily AC & at Bedtime  melatonin, 5 mg, Oral, Nightly  sodium chloride, 10 mL, Intravenous, Q12H  spironolactone, 25 mg, Oral, Daily  tamsulosin, 0.4 mg, Oral, Nightly  tiotropium bromide monohydrate, 2 puff, Inhalation, Daily - RT             Assessment & Plan       New onset of congestive heart failure    Hypertension    Type 2 diabetes mellitus with hyperglycemia, without long-term current use of insulin    Squamous cell carcinoma of left lung    Status post lobectomy of lung    Class 3 severe obesity due to excess calories with body mass index (BMI) of 40.0 to 44.9 in adult    Obstructive sleep apnea of adult    AV block, 2nd degree    Chronic anticoagulation    PAF (paroxysmal atrial fibrillation)    CHF (congestive heart failure)    Acute diastolic CHF, unclear trigger  On spironolactone 25 mg daily  Probably worsened by Actos use  Start Jardiance 10, increase to 25 if needed for diabetes management  Recent left TKA on 6/13/2024 by Dr. Staton  History of left lower lobe squamous cell carcinoma, status post left lower lobectomy in 2018  Morbid obesity, complicating all aspects of care  History of second-degree AV block, status post permanent pacemaker ([employer] dual-chamber)  Acute hypoxic respiratory failure secondary to #1  COPD without acute exacerbation  Obstructive sleep apnea, on home CPAP  Paroxysmal atrial fibrillation  Continue Eliquis 5 mg twice daily, carvedilol 50 mg twice daily.  Diabetes- off actos  Hypertension     Edema multifactorial,  amlodipine and most certainly Actos are contributing.  Actos was stopped in favor of Jardiance, and he is tolerating well.      He has some weeping edema at the lower portion of his incision of his left lower extremity and is mildly swollen.  I think he could benefit from 1 more additional IV diuretic dose today, although he is certainly approaching euvolemia.    Otherwise, recommendations per above.  No barriers to discharge from the cardiac standpoint this evening, he has outpatient follow-up on July 12.    Vlad Flanagan MD  06/29/24  10:30 EDT      Part of this note may be an electronic transcription/translation of spoken language to printed text using the Dragon Dictation System.

## 2024-06-29 NOTE — PROGRESS NOTES
Name: Alexy Ram ADMIT: 2024   : 1955  PCP: Pipe Vaughn MD    MRN: [id number] LOS: 5 days   AGE/SEX: 69 y.o. male  ROOM: [facility]     Subjective   Subjective   Patient feels much better.  Shortness of breath is back to baseline.  Positive occasional dry cough.  No chest pain.  No palpitation.  Positive left lower extremity edema.  No fever or chills.  No hemoptysis.  No PND orthopnea    Review of Systems  GI.  No abdominal pain no nausea or vomiting.  .  No dysuria or hematuria.       Objective   Objective   Vital Signs  Temp:  [98 °F (36.7 °C)-98.6 °F (37 °C)] 98.6 °F (37 °C)  Heart Rate:  [64-72] 64  Resp:  [15-18] 18  BP: (106-140)/(61-83) 108/73  SpO2:  [96 %] 96 %  on   ;   Device (Oxygen Therapy): room air    Intake/Output Summary (Last 24 hours) at 2024 1319  Last data filed at 2024 1311  Gross per 24 hour   Intake 820 ml   Output 1800 ml   Net -980 ml     Body mass index is 42.2 kg/m².      24  0124 24  0901 24  0824   Weight: 120 kg (265 lb) 120 kg (265 lb) 119 kg (261 lb 7.5 oz)     Physical Exam  General.  Middle-aged to elderly gentleman.  Alert and oriented x 4.  No apparent pain/distress/diaphoresis.  Normal mood and affect.  Obese.  Eyes.  Pupils equal round and reactive.  Intact extraocular musculature.  No pallor or jaundice.  Oral cavity.  Moist mucous membrane.  Neck.  Supple.  No JVD.  No lymphadenopathy or thyromegaly.  Cardiovascular.  Regular rate and rhythm and grade 2 systolic murmur.  Occasional ectopic beats  Chest.  Clear to auscultation bilaterally with poor bilateral air entry and no added sounds.  Abdomen.  Obese.  Soft lax.  No tenderness.  No organomegaly.  No guarding or rebound.  Extremities.  +1 left lower extremity edema.  Recent surgical scar over the right knee with mild serous fluid oozing from the lower aspect of the scar.  No clubbing or cyanosis  CNS.  No acute focal neurological deficits.      Results Review:   "    Results from last 7 days   Lab Units 06/29/24  0519 06/28/24  1514 06/28/24  0405 06/27/24  1438 06/27/24  0406 06/26/24  1311 06/26/24  0237 06/25/24  1517 06/25/24  0519 06/24/24  0219   SODIUM mmol/L 143  --  143  --  143  --  146*  --  145 141   POTASSIUM mmol/L 4.0 4.0 3.3* 4.0 3.6 3.4* 3.5  3.5   < > 3.2* 3.1*   CHLORIDE mmol/L 107  --  107  --  109*  --  109*  --  106 104   CO2 mmol/L 24.9  --  24.8  --  24.8  --  26.0  --  25.7 27.2   BUN mg/dL 18  --  18  --  18  --  16  --  16 20   CREATININE mg/dL 0.88  --  0.81  --  0.80  --  0.72*  --  0.76 0.80   GLUCOSE mg/dL 107*  --  114*  --  131*  --  110*  --  113* 159*   CALCIUM mg/dL 9.4  --  8.8  --  8.7  --  8.6  --  8.6 9.4   AST (SGOT) U/L  --   --   --   --   --   --   --   --   --  12   ALT (SGPT) U/L  --   --   --   --   --   --   --   --   --  11    < > = values in this interval not displayed.     Estimated Creatinine Clearance: 96.3 mL/min (by C-G formula based on SCr of 0.88 mg/dL).      Results from last 7 days   Lab Units 06/29/24  1128 06/29/24  0640 06/28/24  2133 06/28/24  1616 06/28/24  1102 06/28/24  0636 06/27/24  1606 06/27/24  1133   GLUCOSE mg/dL 105 145* 110 114 110 118 152* 105         Results from last 7 days   Lab Units 06/25/24 0519   PROBNP pg/mL 901.0*         Results from last 7 days   Lab Units 06/29/24  0519 06/28/24  0405 06/27/24  0406 06/26/24  0237 06/25/24  0519 06/25/24  0519 06/24/24  0219   MAGNESIUM mg/dL 2.1 1.9 2.0 1.8  --  1.9 2.5*   PHOSPHORUS mg/dL 3.7 4.4 3.2 3.7   < > 4.1  --     < > = values in this interval not displayed.           Invalid input(s): \"LDLCALC\"  Results from last 7 days   Lab Units 06/29/24  0519 06/28/24  0405 06/27/24  0406 06/26/24  0237 06/25/24  0519 06/24/24  0219 06/24/24  0219   WBC 10*3/mm3 7.93 8.59 8.73 9.15 8.43  --  10.83*   HEMOGLOBIN g/dL 12.3* 11.3* 11.4* 11.2* 11.2*  --  11.0*   HEMATOCRIT % 38.2 34.3* 35.0* 34.3* 35.2*  --  34.8*   PLATELETS 10*3/mm3 383 340 340 357 332  --  " 338   MCV fL 86.6 86.4 87.5 86.2 87.8  --  88.1   MCH pg 27.9 28.5 28.5 28.1 27.9  --  27.8   MCHC g/dL 32.2 32.9 32.6 32.7 31.8  --  31.6   RDW % 14.4 14.4 14.5 14.1 14.3  --  15.6*   RDW-SD fl 45.9 45.3 46.6 44.5 45.5  --  50.7   MPV fL 10.2 10.1 10.2 10.2 10.1  --  10.7   NEUTROPHIL % % 68.1 67.1 69.1 66.6 65.7  --  77.4*   LYMPHOCYTE % % 19.4* 20.0 19.1* 20.4 19.6  --  12.5*   MONOCYTES % % 8.4 9.0 8.0 9.4 10.8  --  9.1   EOSINOPHIL % % 3.2 3.1 3.0 2.8 3.0  --  0.5   BASOPHIL % % 0.5 0.5 0.5 0.5 0.5  --  0.3   IMM GRAN % % 0.4 0.3 0.3 0.3 0.4  --  0.2   NEUTROS ABS 10*3/mm3 5.40 5.76 6.03 6.08 5.55  --  8.39*   LYMPHS ABS 10*3/mm3 1.54 1.72 1.67 1.87 1.65  --  1.35   MONOS ABS 10*3/mm3 0.67 0.77 0.70 0.86 0.91*  --  0.99*   EOS ABS 10*3/mm3 0.25 0.27 0.26 0.26 0.25  --  0.05   BASOS ABS 10*3/mm3 0.04 0.04 0.04 0.05 0.04  --  0.03   IMMATURE GRANS (ABS) 10*3/mm3 0.03 0.03 0.03 0.03 0.03  --  0.02   NRBC /100 WBC 0.0 0.0 0.0 0.0 0.0   < >  --     < > = values in this interval not displayed.                                           Imaging:  Imaging Results (Last 24 Hours)       ** No results found for the last 24 hours. **               I reviewed the patient's new clinical results / labs / tests / procedures      Assessment/Plan     Active Hospital Problems    Diagnosis  POA    **New onset of congestive heart failure [I50.9]  Yes    CHF (congestive heart failure) [I50.9]  Yes    Chronic anticoagulation [Z79.01]  Not Applicable    Obstructive sleep apnea of adult [G47.33]  Yes    Class 3 severe obesity due to excess calories with body mass index (BMI) of 40.0 to 44.9 in adult [E66.01, Z68.41]  Not Applicable    Status post lobectomy of lung [Z90.2]  Not Applicable    PAF (paroxysmal atrial fibrillation) [I48.0]  Yes    Squamous cell carcinoma of left lung [C34.92]  Yes    AV block, 2nd degree [I44.1]  Yes    Hypertension [I10]  Yes    Type 2 diabetes mellitus with hyperglycemia, without long-term current use of  insulin [E11.65]  Yes      Resolved Hospital Problems   No resolved problems to display.           Acute diastolic congestive heart failure exacerbations leading to acute hypoxemic respiratory failure in a patient with a history of second-degree AV block status post pacer/atrial fibrillation/hypertension.  Patient is s/p IV Lasix and being IV diuresed intermittently by cardiology.  There is no evidence of angina.  Diastolic heart failure appears to be compensated with good blood pressure and pulse control on Aldactone/Eliquis/Coreg/Jardiance/hydralazine.  Echo with normal ejection fraction and no significant valvular disease  Dyslipidemia.  Continue Lipitor.  Type 2 diabetes.  A1c 6.11-month ago.  Actos DC'd secondary to volume overload.  Jardiance initiated.  Hypokalemia.  Secondary to IV diuresis.  Resolved with substitution.  History of squamous cell carcinoma of the lung status post left lower lobectomy/COPD/obstructive sleep apnea.  Stable respiratory status now.  Continue Spiriva and CPAP.  Status post recent left knee replacement.  Status post orthopedic consultation.  Wound is healthy.  PT recommends outpatient physical therapy.  Anemia of chronic disease.  Hemoglobin is stable.  VTE prophylaxis.  Patient anticoagulated.      Discussed my findings and plan of treatment with the patient/wife.  Disposition.  Anticipate home tomorrow.        Damon Pryor MD  [facility]  06/29/24  13:19 EDT

## 2024-06-29 NOTE — PLAN OF CARE
Goal Outcome Evaluation:  Plan of Care Reviewed With: patient        Progress: improving  Outcome Evaluation: Pt with ongoing diuresing, transitioned to oral diuretic, labs monitored, results pending. Ice packs to LLE. Started on Jardiance. No acute complaints at this time. Will continue to monitor

## 2024-06-30 ENCOUNTER — READMISSION MANAGEMENT (OUTPATIENT)
Dept: CALL CENTER | Facility: HOSPITAL | Age: 69
End: 2024-06-30
Payer: MEDICARE

## 2024-06-30 ENCOUNTER — TRANSCRIBE ORDERS (OUTPATIENT)
Dept: HOME HEALTH SERVICES | Facility: HOME HEALTHCARE | Age: 69
End: 2024-06-30
Payer: MEDICARE

## 2024-06-30 VITALS
WEIGHT: 261.47 LBS | TEMPERATURE: 98.7 F | RESPIRATION RATE: 18 BRPM | OXYGEN SATURATION: 95 % | SYSTOLIC BLOOD PRESSURE: 126 MMHG | HEART RATE: 65 BPM | DIASTOLIC BLOOD PRESSURE: 69 MMHG | HEIGHT: 66 IN | BODY MASS INDEX: 42.02 KG/M2

## 2024-06-30 DIAGNOSIS — I50.9 ACUTE CONGESTIVE HEART FAILURE, UNSPECIFIED HEART FAILURE TYPE: Primary | ICD-10-CM

## 2024-06-30 DIAGNOSIS — Z96.652 TOTAL KNEE REPLACEMENT STATUS, LEFT: ICD-10-CM

## 2024-06-30 PROBLEM — I50.31 ACUTE DIASTOLIC CHF (CONGESTIVE HEART FAILURE): Status: ACTIVE | Noted: 2024-06-30

## 2024-06-30 PROBLEM — E78.5 DYSLIPIDEMIA: Status: ACTIVE | Noted: 2024-06-30

## 2024-06-30 PROBLEM — I50.31 ACUTE HEART FAILURE WITH PRESERVED EJECTION FRACTION (HFPEF): Status: ACTIVE | Noted: 2024-06-30

## 2024-06-30 PROBLEM — D63.8 CHRONIC DISEASE ANEMIA: Status: ACTIVE | Noted: 2024-06-30

## 2024-06-30 PROBLEM — E87.6 HYPOKALEMIA: Status: RESOLVED | Noted: 2024-06-30 | Resolved: 2024-06-30

## 2024-06-30 PROBLEM — E87.6 HYPOKALEMIA: Status: ACTIVE | Noted: 2024-06-30

## 2024-06-30 LAB
ANION GAP SERPL CALCULATED.3IONS-SCNC: 9 MMOL/L (ref 5–15)
BASOPHILS # BLD AUTO: 0.05 10*3/MM3 (ref 0–0.2)
BASOPHILS NFR BLD AUTO: 0.6 % (ref 0–1.5)
BUN SERPL-MCNC: 24 MG/DL (ref 8–23)
BUN/CREAT SERPL: 24.7 (ref 7–25)
CALCIUM SPEC-SCNC: 9.2 MG/DL (ref 8.6–10.5)
CHLORIDE SERPL-SCNC: 108 MMOL/L (ref 98–107)
CO2 SERPL-SCNC: 25 MMOL/L (ref 22–29)
CREAT SERPL-MCNC: 0.97 MG/DL (ref 0.76–1.27)
DEPRECATED RDW RBC AUTO: 46.1 FL (ref 37–54)
EGFRCR SERPLBLD CKD-EPI 2021: 84.5 ML/MIN/1.73
EOSINOPHIL # BLD AUTO: 0.2 10*3/MM3 (ref 0–0.4)
EOSINOPHIL NFR BLD AUTO: 2.5 % (ref 0.3–6.2)
ERYTHROCYTE [DISTWIDTH] IN BLOOD BY AUTOMATED COUNT: 14.5 % (ref 12.3–15.4)
GLUCOSE BLDC GLUCOMTR-MCNC: 121 MG/DL (ref 70–130)
GLUCOSE SERPL-MCNC: 108 MG/DL (ref 65–99)
HCT VFR BLD AUTO: 36.7 % (ref 37.5–51)
HGB BLD-MCNC: 11.8 G/DL (ref 13–17.7)
IMM GRANULOCYTES # BLD AUTO: 0.03 10*3/MM3 (ref 0–0.05)
IMM GRANULOCYTES NFR BLD AUTO: 0.4 % (ref 0–0.5)
LYMPHOCYTES # BLD AUTO: 1.72 10*3/MM3 (ref 0.7–3.1)
LYMPHOCYTES NFR BLD AUTO: 21.6 % (ref 19.6–45.3)
MAGNESIUM SERPL-MCNC: 2.5 MG/DL (ref 1.6–2.4)
MCH RBC QN AUTO: 28 PG (ref 26.6–33)
MCHC RBC AUTO-ENTMCNC: 32.2 G/DL (ref 31.5–35.7)
MCV RBC AUTO: 87 FL (ref 79–97)
MONOCYTES # BLD AUTO: 0.75 10*3/MM3 (ref 0.1–0.9)
MONOCYTES NFR BLD AUTO: 9.4 % (ref 5–12)
NEUTROPHILS NFR BLD AUTO: 5.23 10*3/MM3 (ref 1.7–7)
NEUTROPHILS NFR BLD AUTO: 65.5 % (ref 42.7–76)
NRBC BLD AUTO-RTO: 0 /100 WBC (ref 0–0.2)
PHOSPHATE SERPL-MCNC: 4.3 MG/DL (ref 2.5–4.5)
PLATELET # BLD AUTO: 361 10*3/MM3 (ref 140–450)
PMV BLD AUTO: 10.2 FL (ref 6–12)
POTASSIUM SERPL-SCNC: 3.7 MMOL/L (ref 3.5–5.2)
RBC # BLD AUTO: 4.22 10*6/MM3 (ref 4.14–5.8)
SODIUM SERPL-SCNC: 142 MMOL/L (ref 136–145)
WBC NRBC COR # BLD AUTO: 7.98 10*3/MM3 (ref 3.4–10.8)

## 2024-06-30 PROCEDURE — 85025 COMPLETE CBC W/AUTO DIFF WBC: CPT | Performed by: STUDENT IN AN ORGANIZED HEALTH CARE EDUCATION/TRAINING PROGRAM

## 2024-06-30 PROCEDURE — 83735 ASSAY OF MAGNESIUM: CPT | Performed by: STUDENT IN AN ORGANIZED HEALTH CARE EDUCATION/TRAINING PROGRAM

## 2024-06-30 PROCEDURE — 94799 UNLISTED PULMONARY SVC/PX: CPT

## 2024-06-30 PROCEDURE — 84100 ASSAY OF PHOSPHORUS: CPT | Performed by: STUDENT IN AN ORGANIZED HEALTH CARE EDUCATION/TRAINING PROGRAM

## 2024-06-30 PROCEDURE — 82948 REAGENT STRIP/BLOOD GLUCOSE: CPT

## 2024-06-30 PROCEDURE — 80048 BASIC METABOLIC PNL TOTAL CA: CPT | Performed by: STUDENT IN AN ORGANIZED HEALTH CARE EDUCATION/TRAINING PROGRAM

## 2024-06-30 RX ORDER — TIOTROPIUM BROMIDE INHALATION SPRAY 3.12 UG/1
2 SPRAY, METERED RESPIRATORY (INHALATION)
Qty: 4 G | Refills: 3 | Status: SHIPPED | OUTPATIENT
Start: 2024-07-01

## 2024-06-30 RX ORDER — SPIRONOLACTONE 25 MG/1
25 TABLET ORAL DAILY
Qty: 30 TABLET | Refills: 3 | Status: SHIPPED | OUTPATIENT
Start: 2024-07-01 | End: 2024-07-08

## 2024-06-30 RX ORDER — POTASSIUM CHLORIDE 750 MG/1
20 TABLET, FILM COATED, EXTENDED RELEASE ORAL ONCE
Status: COMPLETED | OUTPATIENT
Start: 2024-06-30 | End: 2024-06-30

## 2024-06-30 RX ORDER — HYDRALAZINE HYDROCHLORIDE 25 MG/1
75 TABLET, FILM COATED ORAL 2 TIMES DAILY
Qty: 60 TABLET | Refills: 3 | Status: SHIPPED | OUTPATIENT
Start: 2024-06-30

## 2024-06-30 RX ADMIN — HYDRALAZINE HYDROCHLORIDE 75 MG: 50 TABLET ORAL at 08:07

## 2024-06-30 RX ADMIN — TIOTROPIUM BROMIDE INHALATION SPRAY 2 PUFF: 3.12 SPRAY, METERED RESPIRATORY (INHALATION) at 07:23

## 2024-06-30 RX ADMIN — POTASSIUM CHLORIDE 20 MEQ: 750 TABLET, EXTENDED RELEASE ORAL at 06:50

## 2024-06-30 RX ADMIN — EMPAGLIFLOZIN 10 MG: 10 TABLET, FILM COATED ORAL at 08:07

## 2024-06-30 RX ADMIN — SPIRONOLACTONE 25 MG: 25 TABLET, FILM COATED ORAL at 08:07

## 2024-06-30 RX ADMIN — APIXABAN 5 MG: 5 TABLET, FILM COATED ORAL at 08:07

## 2024-06-30 RX ADMIN — Medication 10 ML: at 08:07

## 2024-06-30 RX ADMIN — GABAPENTIN 200 MG: 100 CAPSULE ORAL at 08:07

## 2024-06-30 RX ADMIN — CARVEDILOL 50 MG: 25 TABLET, FILM COATED ORAL at 08:07

## 2024-06-30 NOTE — PLAN OF CARE
Goal Outcome Evaluation:  Plan of Care Reviewed With: patient        Progress: no change  Outcome Evaluation: A&O, no complaints of pain, up ad alicia, left knee incision, ice packs prn, receiving diuretic, monitoring labs, VSS, possible dc today, continue plan of care.

## 2024-06-30 NOTE — OUTREACH NOTE
Prep Survey      Flowsheet Row Responses   [facility] patient discharged from? [place]   Is LACE score < 7 ? No   Eligibility [facility]   Date of Admission 06/24/24   Date of Discharge 06/30/24   Discharge Disposition Home-Health Care Sv   Discharge diagnosis New onset of congestive heart failure   Does the patient have one of the following disease processes/diagnoses(primary or secondary)? CHF   Does the patient have Home health ordered? Yes   What is the Home health agency?  [facility]   Is there a DME ordered? No   Comments regarding appointments Referral to Physical Therapy   Medication alerts for this patient see AVS   Prep survey completed? Yes            Blank [employer] - Registered Nurse

## 2024-06-30 NOTE — CASE MANAGEMENT/SOCIAL WORK
Case Management Discharge Note      Final Note: home w/HH    Provided Post Acute Provider List?: N/A  N/A Provider List Comment: Pt is current with [facility] s/p right knee replacement few wks ago    Selected Continued Care - Discharged on 6/30/2024 Admission date: 6/24/2024 - Discharge disposition: Home or Self Care      Destination    No services have been selected for the patient.                Durable Medical Equipment    No services have been selected for the patient.                Dialysis/Infusion    No services have been selected for the patient.                Home Medical Care Coordination complete.      Service Provider Selected Services Address Phone Fax Patient Preferred     [facility] [id number] [address] [phone] --              Therapy    No services have been selected for the patient.                Community Resources    No services have been selected for the patient.                Community & DME    No services have been selected for the patient.                    Selected Continued Care - Prior Encounters Includes continued care and service providers with selected services from prior encounters from 3/26/2024 to 6/30/2024      Discharged on 6/14/2024 Admission date: 6/13/2024 - Discharge disposition: Home-Health Care [facility] [place] [facility]      Home Medical Care       Service Provider Selected Services Address Phone Fax Patient Preferred     [facility] [address] [address] [phone] --                               Final Discharge Disposition Code: 06 - home with home health care

## 2024-06-30 NOTE — DISCHARGE SUMMARY
Patient Name: Alexy Ram  : 1955  MRN: [id number]    Date of Admission: 2024  Date of Discharge:  2024  Primary Care Physician: Pipe Vaughn MD      Discharge Diagnoses     Active Hospital Problems    Diagnosis  POA    Acute diastolic CHF (congestive heart failure) [I50.31]  Yes    Chronic disease anemia [D63.8]  Yes    Dyslipidemia [E78.5]  Yes    Acute heart failure with preserved ejection fraction (HFpEF) [I50.31]  Yes    Chronic anticoagulation [Z79.01]  Not Applicable    Obstructive sleep apnea of adult [G47.33]  Yes    Class 3 severe obesity due to excess calories with body mass index (BMI) of 40.0 to 44.9 in adult [E66.01, Z68.41]  Not Applicable    Status post lobectomy of lung [Z90.2]  Not Applicable    PAF (paroxysmal atrial fibrillation) [I48.0]  Yes    Squamous cell carcinoma of left lung [C34.92]  Yes    AV block, 2nd degree [I44.1]  Yes    Hypertension [I10]  Yes    Type 2 diabetes mellitus with hyperglycemia, without long-term current use of insulin [E11.65]  Yes      Resolved Hospital Problems    Diagnosis Date Resolved POA    **New onset of congestive heart failure [I50.9] 2024 Yes    Hypokalemia [E87.6] 2024 No    CHF (congestive heart failure) [I50.9] 2024 Yes        Hospital Course     Brief admission history and physical.  Please refer to the H&P for full details.  A pleasant 69 years old gentleman with a past history of hypertension/AV block status post pacer/A-fib/hyperlipidemia/COPD/lung cancer s/p lobectomy/benign prostatic hypertrophy/type 2 diabetes who presents to the emergency department with dyspnea associated with lower extremity edema left more than the right.  Physical examination on admission remarkable for an afebrile patient with stable vital signs/obesity/chronically ill appearing gentleman/poor bilateral air entry with bibasilar crackles/+2 right lower extremity edema and +3 left lower extremity edema/fresh surgical wound over  the left knee  Hospital course.  Initial ER evaluation included a CBC that was normal except a white count of 10.8, hemoglobin 11.  Magnesium was 2.5.  CT angio of the chest revealed cardiomegaly and bilateral pleural effusion right greater than the left suggestive of heart failure with borderline mediastinal and hilar lymph node enlargement that is mostly reactive with short-term follow-up recommended.  CMP normal except random blood sugar of 159, potassium 3.1, total protein 5.8, albumin 3.4.  proBNP elevated at 901.  Phosphorus and magnesium were normal.  I will summarize hospital course and a problem oriented manner as below.  1.  Acute diastolic congestive heart failure exacerbation leading to acute hypoxemic respiratory failure in a patient with a history of second-degree AV block status post pacer/A-fib/hypertension.  Patient was initiated on oxygen and IV Lasix with good diuretic result.  There was no evidence of angina throughout the hospital stay.  He was switched subsequently to p.o. Aldactone.  He was maintained on his Eliquis/Coreg and his Actos was DC'd and his Norvasc was DC'd secondary to the edema and he was initiated on Jardiance and his hydralazine dose was increased.  Echo revealed a normal ejection fraction with no significant valvular disease.  His edema and shortness of breath greatly improved.  He remained with +1 left lower extremity edema which is mostly secondary to his recent left knee replacement.  Cardiology followed up with the patient and they will continue follow-up as an outpatient.  2 dyslipidemia.  Lipitor was maintained.  3.  Type 2 diabetes.  His A1c was 6.1 approximately a month ago.  Actos was DC'd secondary to volume overload and Jardiance was initiated Accu-Cheks remained acceptable.  4.  Hypokalemia.  Magnesium was normal.  This has resolved with substitution.  5.  History of squamous cell carcinoma of the left lung status post left lower lobectomy/COPD/obstructive sleep  apnea.  Although this remained stable with no evidence of recurrence of the lung cancer by CAT scan of the chest and while on Spiriva and CPAP.  He is to follow-up with his pulmonologist as an outpatient.  6.  Status post recent left knee replacement.  Wound appears to be healthy and he had some minimal serous oozing from the lower aspect of the left knee without any evidence of infection.  Dry dressing was applied.  He is to follow-up with outpatient physical therapy and also follow-up with his orthopedic as scheduled.  Physical therapy worked with the patient during the hospital stay.  7.  Anemia of chronic disease.  His hemoglobin remained stable during this admission and this will be followed up as an outpatient.    At the time of discharge the patient was hemodynamically stable.  He is to follow-up with outpatient physical therapy/orthopedics/pulmonary/cardiology/primary MD.  Consultants     Consult Orders (all) (From admission, onward)       Start     Ordered    06/24/24 1424  Inpatient Orthopedic Surgery Consult  Once        Specialty:  Orthopedic Surgery  Provider:  Nikko Staton MD    06/24/24 1424    06/24/24 0746  Inpatient Cardiology Consult  Once        Specialty:  Cardiology  Provider:  Horacio Guaman MD    06/24/24 0746                  Procedures     Imaging Results (All)       Procedure Component Value Units Date/Time    CT Angiogram Chest [[id number]] Collected: 06/24/24 0339     Updated: 06/24/24 0353    Narrative:      CT ANGIOGRAM OF THE CHEST     HISTORY: Shortness of air and hypoxia     COMPARISON: April 3, 2024     TECHNIQUE: Axial CT imaging was obtained through the thorax. IV contrast  was administered. Three-D reformatted images were obtained.     FINDINGS:  No acute pulmonary thromboembolus is seen. Examination was not optimized  for assessment of the thoracic aorta. Proximal descending thoracic aorta  measures up to 3.3 cm. Distal descending aorta measures 2.8 cm. No  obvious  dissection is seen. There is tortuosity of the aorta. There are  coronary artery calcifications, and calcification of the aorta. The main  pulmonary artery is enlarged, which can be seen in setting of pulmonary  chill hypertension. There are changes of prior left lower lobectomy.  Thyroid gland, trachea, and esophagus appear unremarkable. There are  background emphysematous changes. There is interlobular septal  thickening, as well as areas of groundglass attenuation. There are  bilateral pleural effusions, right greater than left. The heart is  enlarged. Constellation of findings likely reflects congestive heart  failure. There is dependent atelectasis within the right lower lobe.  Soft tissue density along the anteromedial aspect of the left upper  lobe, adjacent to the suture line is stable in appearance when compared  to the prior study. However, mediastinal lymph nodes are larger than on  prior exam. For example, a lower left paratracheal node measures up to  2.2 cm in short axis dimensions, previously measuring 1.6 cm. A  subcarinal lymph node measures up to 1.7 cm, previously measuring 8 mm.  There is also a prominent right hilar node, which measures up to 1.3 cm  previously, it measured approximately 9 mm. Evaluation for hepatic  lesions is limited, due to technique. Bulky appearance to the adrenal  glands bilaterally is stable. Old deformity of the left ribs is again  seen. No acute osseous abnormalities are seen. Right-sided pacemaker is  present.       Impression:         1. Cardiomegaly, interlobular septal thickening, and bilateral pleural  effusions, right greater than left, suggesting congestive heart failure.  2. Mediastinal and hilar lymph nodes appear larger than on prior exam.  While these may be reactive, short-term follow-up exam is recommended to  document resolution.     Radiation dose reduction techniques were utilized, including automated  exposure control and exposure modulation based on  "body size.        This report was finalized on 6/24/2024 3:49 AM by Dr. Alexandria Abbasi M.D on Workstation: [id number]               Pertinent Labs     Results from last 7 days   Lab Units 06/30/24  0359 06/29/24  0519 06/28/24  0405 06/27/24  0406   WBC 10*3/mm3 7.98 7.93 8.59 8.73   HEMOGLOBIN g/dL 11.8* 12.3* 11.3* 11.4*   PLATELETS 10*3/mm3 361 383 340 340     Results from last 7 days   Lab Units 06/30/24  0359 06/29/24  0519 06/28/24  1514 06/28/24  0405 06/27/24  1438 06/27/24  0406   SODIUM mmol/L 142 143  --  143  --  143   POTASSIUM mmol/L 3.7 4.0 4.0 3.3*   < > 3.6   CHLORIDE mmol/L 108* 107  --  107  --  109*   CO2 mmol/L 25.0 24.9  --  24.8  --  24.8   BUN mg/dL 24* 18  --  18  --  18   CREATININE mg/dL 0.97 0.88  --  0.81  --  0.80   GLUCOSE mg/dL 108* 107*  --  114*  --  131*    < > = values in this interval not displayed.   Estimated Creatinine Clearance: 87.3 mL/min (by C-G formula based on SCr of 0.97 mg/dL).  Results from last 7 days   Lab Units 06/24/24  0219   ALBUMIN g/dL 3.4*   BILIRUBIN mg/dL 0.8   ALK PHOS U/L 54   AST (SGOT) U/L 12   ALT (SGPT) U/L 11     Results from last 7 days   Lab Units 06/30/24  0359 06/29/24  0519 06/28/24  0405 06/27/24  0406 06/25/24  0519 06/24/24  0219   CALCIUM mg/dL 9.2 9.4 8.8 8.7   < > 9.4   ALBUMIN g/dL  --   --   --   --   --  3.4*   MAGNESIUM mg/dL 2.5* 2.1 1.9 2.0   < > 2.5*   PHOSPHORUS mg/dL 4.3 3.7 4.4 3.2   < >  --     < > = values in this interval not displayed.       Results from last 7 days   Lab Units 06/25/24  0519   PROBNP pg/mL 901.0*           Invalid input(s): \"LDLCALC\"      Imaging Results (Last 24 Hours)       ** No results found for the last 24 hours. **            Test Results Pending at Discharge         Discharge Exam   Physical Exam  Vitals.  Temperature is 98.7 a pulse of 65 respirate rate of 18 and blood pressure 126/69 and an O2 sats of 95% on room air  General.  Middle-aged to elderly gentleman.  Alert and oriented x 4.  No " apparent pain/distress/diaphoresis.  Normal mood and affect.  Obese.  Eyes.  Pupils equal round and reactive.  Intact extraocular musculature.  No pallor or jaundice.  Oral cavity.  Moist mucous membrane.  Neck.  Supple.  No JVD.  No lymphadenopathy or thyromegaly.  Cardiovascular.  Regular rate and rhythm and grade 2 systolic murmur.   Chest.  Clear to auscultation bilaterally with poor bilateral air entry and no added sounds.  Abdomen.  Obese.  Soft lax.  No tenderness.  No organomegaly.  No guarding or rebound.  Extremities.  +1 left lower extremity edema.  Recent surgical scar over the right knee with mild serous fluid oozing from the lower aspect of the scar.  No clubbing or cyanosis  CNS.  No acute focal neurological deficits.     Discharge Details        Discharge Medications        New Medications        Instructions Start Date   empagliflozin 10 MG tablet tablet  Commonly known as: JARDIANCE   10 mg, Oral, Daily   Start Date: July 1, 2024     spironolactone 25 MG tablet  Commonly known as: ALDACTONE   25 mg, Oral, Daily   Start Date: July 1, 2024            Changes to Medications        Instructions Start Date   cetirizine 10 MG tablet  Commonly known as: zyrTEC  What changed:   when to take this  reasons to take this   10 mg, Oral, Daily      Eliquis 5 MG tablet tablet  Generic drug: apixaban  What changed: additional instructions   5 mg, Oral, 2 Times Daily      gabapentin 100 MG capsule  Commonly known as: NEURONTIN  What changed:   how much to take  how to take this  when to take this  additional instructions   TAKE TWO CAPSULES BY MOUTH EVERY MORNING, ONE CAPSULE IN THE MIDDLE OF THE DAY AND 2 CAPSULES AT BEDTIME      hydrALAZINE 25 MG tablet  Commonly known as: APRESOLINE  What changed: how much to take   75 mg, Oral, 2 Times Daily      Spiriva Respimat 2.5 MCG/ACT aerosol solution inhaler  Generic drug: tiotropium bromide monohydrate  What changed:   how much to take  when to take this   2 puffs,  Inhalation, Daily - RT   Start Date: July 1, 2024            Continue These Medications        Instructions Start Date   albuterol sulfate  (90 Base) MCG/ACT inhaler  Commonly known as: PROVENTIL HFA;VENTOLIN HFA;PROAIR HFA   2 puffs, Inhalation, Every 4 Hours PRN      atorvastatin 40 MG tablet  Commonly known as: LIPITOR   40 mg, Oral, Every Night at Bedtime      carvedilol 25 MG tablet  Commonly known as: COREG   50 mg, Oral, 2 Times Daily With Meals      HYDROcodone-acetaminophen 7.5-325 MG per tablet  Commonly known as: NORCO   1 tablet, Oral, Every 4 Hours PRN      ondansetron 4 MG tablet  Commonly known as: Zofran   4 mg, Oral, Every 8 Hours PRN      tamsulosin 0.4 MG capsule 24 hr capsule  Commonly known as: FLOMAX   0.4 mg, Oral, Nightly             Stop These Medications      amLODIPine 10 MG tablet  Commonly known as: NORVASC     pioglitazone 30 MG tablet  Commonly known as: ACTOS     potassium chloride 10 MEQ CR tablet     valsartan-hydrochlorothiazide 320-25 MG per tablet  Commonly known as: DIOVAN-HCT              No Known Allergies      Discharge Disposition:  Condition: Stable    Diet:   Diet Order   Procedures    Diet: Cardiac, Diabetic; Healthy Heart (2-3 Na+); Consistent Carbohydrate; Fluid Consistency: Thin (IDDSI 0)       Activity:   Activity Instructions       Activity as Tolerated      Other Activity Instructions      Activity Instructions:    Per Outpatient PT            Counseling : No nonsteroidal anti-inflammatory medications    CODE STATUS:    Code Status and Medical Interventions:   Ordered at: 06/24/24 1411     Level Of Support Discussed With:    Patient     Code Status (Patient has no pulse and is not breathing):    CPR (Attempt to Resuscitate)     Medical Interventions (Patient has pulse or is breathing):    Full Support       Future Appointments   Date Time Provider Department Center   7/12/2024  3:00 PM Gloria Donald APRN [facility]   8/1/2024  3:50 PM Nima Farmer  APRN [facility]   11/12/2024  9:20 AM [facility]   11/19/2024 11:00 AM Pipe Vaughn MD [facility]     Additional Instructions for the Follow-ups that You Need to Schedule       Call MD With Problems / Concerns   As directed      Instructions: Call MD or return to ER if chest pain/shortness of breath/palpitations/worsening lower extremity edema/fever or chills/purulent discharge from left knee wound/nausea and vomiting    Order Comments: Instructions: Call MD or return to ER if chest pain/shortness of breath/palpitations/worsening lower extremity edema/fever or chills/purulent discharge from left knee wound/nausea and vomiting         Discharge Follow-up with PCP   As directed       Currently Documented PCP:    Pipe Vaughn MD    PCP Phone Number:    [phone]     Follow Up Details: Primary MD.  1 week.  Acute diastolic congestive heart failure/acute hypoxemic respiratory failure/history of hypertension, A-fib, cardiac block status post pacer/dyslipidemia/type 2 diabetes/lung cancer/COPD/GIL/left knee replacement/anemia        Discharge Follow-up with Specified Provider: Cardiology.  As scheduled.   As directed      To: Cardiology.  As scheduled.   Follow Up Details: Acute diastolic congestive heart failure/second-degree AV block/A-fib/status post pacer/hypertension        Discharge Follow-up with Specified Provider: Orthopedic.  As scheduled.   As directed      To: Orthopedic.  As scheduled.   Follow Up Details: Status post left knee replacement        Discharge Follow-up with Specified Provider: Pulmonary.  As scheduled.   As directed      To: Pulmonary.  As scheduled.   Follow Up Details: History of lung cancer/COPD/obstructive sleep apnea        Referral to Physical Therapy   As directed      Specialty needed: Evaluate and treat POST OP   Follow-up needed: Yes               Contact information for follow-up providers       Pipe Vaughn MD .    Specialty:  Family Medicine  Why: Primary MD.  1 week.  Acute diastolic congestive heart failure/acute hypoxemic respiratory failure/history of hypertension, A-fib, cardiac block status post pacer/dyslipidemia/type 2 diabetes/lung cancer/COPD/GIL/left knee replacement/anemia  Contact information:  [address] [facility] [address]  [phone]                       Contact information for after-discharge care       Home Medical Care       [facility] .    Service: Home Health Services  Contact information:  [address] [facility] [address]  [place] [address] [phone]                                     Time Spent on Discharge:  Greater than 30 minutes      Damon Pryor MD  [facility]  06/30/24  08:23 EDT

## 2024-07-01 ENCOUNTER — TRANSITIONAL CARE MANAGEMENT TELEPHONE ENCOUNTER (OUTPATIENT)
Dept: CALL CENTER | Facility: HOSPITAL | Age: 69
End: 2024-07-01
Payer: MEDICARE

## 2024-07-01 NOTE — OUTREACH NOTE
Call Center TCM Note      Flowsheet Row Responses   [facility] patient discharged from? [place]   Does the patient have one of the following disease processes/diagnoses(primary or secondary)? CHF   TCM attempt successful? Yes   Call start time 0902   Call end time 0909   Discharge diagnosis New onset of congestive heart failure   Is patient permission given to speak with other caregiver? Yes   List who call center can speak with Darlin spouse   Person spoke with today (if not patient) and relationship Darlin spouse   Meds reviewed with patient/caregiver? Yes   Is the patient having any side effects they believe may be caused by any medication additions or changes? No   Does the patient have all medications ordered at discharge? Yes   Is the patient taking all medications as directed (includes completed medication regime)? Yes   Comments Hosp dc fu apt on 7/9/24,  Cards apt on 7/12/24-spouse politely declined scheduling an apt with the HF clinic   Does the patient have an appointment with their PCP within 7-14 days of discharge? Yes   What is the Home health agency?  [facility]   Home health comments (PT was working with pt prior to hospital visit)   Pulse Ox monitoring Intermittent   Pulse Ox device source Patient   O2 Sat comments RA-in the 90's   O2 Sat: education provided Sat levels, When to seek care, Monitoring frequency   Psychosocial issues? No   Did the patient receive a copy of their discharge instructions? Yes   Nursing interventions Reviewed instructions with patient   What is the patient's perception of their health status since discharge? Improving   Nursing interventions Nurse provided patient education   Is the patient able to teach back signs and symptoms of worsening condition? (i.e. weight gain, shortness of air, etc.) Yes   If the patient is a current smoker, are they able to teach back resources for cessation? Not a smoker   Is the patient/caregiver able to teach back the hierarchy of  who to call/visit for symptoms/problems? PCP, Specialist, Home health nurse, Urgent Care, ED, 911 Yes   Notified Case Management Education issues   Is the patient able to teach back Heart Failure Zones? Yes   CHF Zone this Call Green Zone   Green Zone Patient reports doing well, No new or worsening shortness of breath, Physical activity level is normal for you, No new swelling -  feet, ankles and legs look normal for you, Weight check stable, No chest pain   Green Zone Interventions Daily weight check, Follow up visits planned, Meds as directed   TCM call completed? Yes   Call end time 0909             Wendy Johnson, RN    7/1/2024, 09:10 EDT

## 2024-07-02 ENCOUNTER — HOME CARE VISIT (OUTPATIENT)
Dept: HOME HEALTH SERVICES | Facility: HOME HEALTHCARE | Age: 69
End: 2024-07-02
Payer: MEDICARE

## 2024-07-02 PROCEDURE — G0299 HHS/HOSPICE OF RN EA 15 MIN: HCPCS

## 2024-07-03 ENCOUNTER — TREATMENT (OUTPATIENT)
Dept: PHYSICAL THERAPY | Facility: CLINIC | Age: 69
End: 2024-07-03
Payer: MEDICARE

## 2024-07-03 VITALS
HEART RATE: 73 BPM | OXYGEN SATURATION: 95 % | RESPIRATION RATE: 18 BRPM | DIASTOLIC BLOOD PRESSURE: 78 MMHG | TEMPERATURE: 97.4 F | SYSTOLIC BLOOD PRESSURE: 118 MMHG

## 2024-07-03 DIAGNOSIS — M25.562 ACUTE PAIN OF LEFT KNEE: Primary | ICD-10-CM

## 2024-07-03 DIAGNOSIS — R26.2 DIFFICULTY WALKING: ICD-10-CM

## 2024-07-03 NOTE — PROGRESS NOTES
Physical Therapy Initial Evaluation and Plan of Care    [facility]  [address] [place] [address]  [phone] (phone)  [phone] (fax)    Patient: Alexy Ram   : 1955  Diagnosis/ICD-10 Code:  Acute pain of left knee [M25.562]  Referring practitioner: Damon Pryor MD  Date of Initial Visit: 7/3/2024  Today's Date: 7/3/2024  Patient seen for 1 sessions           Subjective Evaluation    History of Present Illness  Date of surgery: 2024  Mechanism of injury: Pt is a 68 yo male who presents to PT with complaints of L knee pain following L TKA surgery on 24. Patient was re-admitted after surgery on 24 due to worsening SOA and acute onset CHF. Prior to readmission patient had been working with  PT since surgery and ambulating with cane. Currently he is using a cane. Prior to surgery he was using a cane due to knee pain/buckling. Patient also suffers from R knee pain due to OA.    PLOF: used to enjoy daily walks (~ 1 mile)    PMH: lung cancer, COPD, emphysema (quit smoking 6 years ago), diabetes, R knee OA      Patient Occupation: retired Quality of life: good    Pain  Current pain ratin  At best pain ratin  At worst pain ratin  Location: L knee  Quality: tight  Relieving factors: ice, relaxation, rest, support and change in position  Aggravating factors: stairs, standing, ambulation and squatting  Progression: improved    Social Support  Lives in: one-story house  Lives with: spouse and adult children    Diagnostic Tests  X-ray: abnormal    Treatments  Previous treatment: injection treatment, home therapy and medication  Discharged from (in last 30 days): inpatient hospitalization and home health care  Patient Goals  Patient goals for therapy: decreased edema, decreased pain, improved balance, increased motion, return to sport/leisure activities and increased strength             Objective          Observations   Left Knee   Positive for  edema and incision.       Neurological Testing     Sensation     Knee   Left Knee   Diminished: Light touch     Right Knee   Intact: light touch     Reflexes   Left   Patellar (L4): normal (2+)    Right   Patellar (L4): normal (2+)    Active Range of Motion   Left Knee   Flexion: 95 degrees with pain  Extensor lag: 10 degrees with pain    Right Knee   Flexion: 120 degrees   Extensor la degrees     Patellar Mobility   Left Knee Hypomobile in the left medial, left lateral, left superior and left inferior patellar tendon(s).     Right Knee Hypomobile in the medial, lateral, superior and inferior patellar tendon(s).     Strength/Myotome Testing     Left Hip   Planes of Motion   Flexion: 4    Right Hip   Planes of Motion   Flexion: 4+    Left Knee   Flexion: 4  Extension: 4    Right Knee   Flexion: 4+  Extension: 4+    Left Ankle/Foot   Dorsiflexion: 4    Right Ankle/Foot   Dorsiflexion: 4+    Tests     Left Knee   Negative patella-femoral grind.     Right Knee   Negative patella-femoral grind.     Additional Tests Details  - homans sign    Swelling     Left Knee Girth Measurement (cm)   Joint line: 44 cm    Right Knee Girth Measurement (cm)   Joint line: 41 cm    Ambulation     Comments   Using Cane: Lack of L knee flexion during swing phase, L lateral trunk lean        See Exercise, Manual, and Modality Logs for complete treatment.       Functional Outcome Score: LEFS=33/80        Assessment & Plan       Assessment  Impairments: abnormal gait, abnormal muscle firing, abnormal or restricted ROM, activity intolerance, impaired balance, impaired physical strength, lacks appropriate home exercise program and pain with function   Functional limitations: sleeping, walking, uncomfortable because of pain, sitting and standing   Assessment details: Alexy Ram is a 69 y.o. year-old male referred to physical therapy for L knee pain due to chronic OA. He presents with a evolving clinical presentation.  He has  comorbidities of COPD, emphysema, diabetes, and R knee OA and personal factors of wanting to get back to daily walks which may affect his progress in the plan of care.  Signs and symptoms are consistent with physical therapy diagnosis of L knee pain and difficulty walking. Objective findings include impaired knee AROM and strength, antalgic gait, and compromised balance. Pt was educated on course of treatment, possible reasons for knee pain, activity modifications, and use of ice/heat PRN. Pt was given a copy of HEP. Patient is appropriate for skilled physical therapy in order to reduce pain and increase ease with daily mobility.   Barriers to therapy: none identified  Prognosis: good    Goals  Plan Goals: STGs to be completed within 30 days:  -Patient will demonstrate compliance and independence with initial HEP  -Patient will increase L Knee AROM to 5-110 degrees to help normalize gait mechanics and increase ease with transfers  -Patient will perform sit to stand transfer with equilateral WB and no UE assistance to increase functional strength  -Patient will ambulate household distances without AD in order to reduce reliance on UE support with gait    LTGs to be completed within 90 days:  -Patient will increase L Knee AROM to 0-120 degrees to help normalize gait mechanics and increase ease with transfers  -Patient will complete community mobility without AD and with even step length and heel-toe gait mechanics to return to daily walks for leisure  -Patient will reduce edema in L knee by 2 cm to help reduce pain/discomfort  -Patient will improve score on LEFS from 33 at eval to 50 or greater to improve quality of life    Plan  Therapy options: will be seen for skilled therapy services  Planned modality interventions: TENS, ultrasound, electrical stimulation/Russian stimulation, dry needling, cryotherapy, iontophoresis and thermotherapy (hydrocollator packs)  Planned therapy interventions: joint mobilization,  stretching, strengthening, therapeutic activities, transfer training, postural training, manual therapy, ADL retraining, balance/weight-bearing training, dressing changes, flexibility, functional ROM exercises, gait training, home exercise program, neuromuscular re-education and motor coordination training  Other planned therapy interventions: Aquatic Therapy  Frequency: 3x week (36 visits)  Treatment plan discussed with: patient  Plan details: Gait training, stairs, Balance, Knee ROM/strength, LE stability        Timed:  Manual Therapy:         mins  01727;  Therapeutic Exercise:    15     mins  68691;     Neuromuscular Christopher:        mins  18797;    Therapeutic Activity:     10     mins  19855;     Gait Training:           mins  53128;     Ultrasound:          mins  56796;    Iontophoresis         mins 80951    Untimed:  Electrical Stimulation:         mins  84053 ( );  Traction:       mins  61592;   Dry Needling   (1-2 muscles)   _     mins 35077 (Self-pay)  Dry Needling (3-4 muscles)  _     mins 21616 (Self-pay)  Dry Needling Trial    _     mins DRYNDLTRIAL  (No Charge)  Low Eval          Mins  06847  Mod Eval     20     Mins  63576  High Eval                            Mins  78756  Re- Eval                           Mins  22654    Timed Treatment:   25   mins   Total Treatment:     55   mins    PT SIGNATURE: Venessa Waite PT     License Number: [place] [id number]    Electronically signed by Venessa Waite PT, 07/03/24, 1:28 PM EDT    DATE TREATMENT INITIATED: 7/3/2024    Initial Certification  Certification Period: 10/1/2024  I certify that the therapy services are furnished while this patient is under my care.  The services outlined above are required by this patient, and will be reviewed every 90 days.     PHYSICIAN: Damon Pryor MD   NPI: [id number]                                         DATE:     Please sign and return via fax to [phone] Thank you, [facility].

## 2024-07-03 NOTE — HOME HEALTH
Discharge Summary/Summary of Care Provided: Patient was seen by physical therapy for a left knee until being hospitalized for Acute CHF. Nursing was added for the PETERSON but will not be readmitted [facility]. He will be discharged so he can go to outpatient physical therapy.  Patient received home health for diagnosis: Left Total Knee Replacepent   Current level of functional ability: Independent   Living arrangements: Lives at home with his wife  Follow-up appointment plans and community resources provided: Discussed 7/2/2024

## 2024-07-05 ENCOUNTER — TELEPHONE (OUTPATIENT)
Dept: CARDIOLOGY | Facility: CLINIC | Age: 69
End: 2024-07-05
Payer: MEDICARE

## 2024-07-05 DIAGNOSIS — Z51.81 ENCOUNTER FOR THERAPEUTIC DRUG LEVEL MONITORING: ICD-10-CM

## 2024-07-05 DIAGNOSIS — I50.31 ACUTE DIASTOLIC CHF (CONGESTIVE HEART FAILURE): ICD-10-CM

## 2024-07-05 DIAGNOSIS — R06.09 DOE (DYSPNEA ON EXERTION): Primary | ICD-10-CM

## 2024-07-05 NOTE — TELEPHONE ENCOUNTER
Gloria,    Pt's wife called this afternoon. She said pt started taking the Jardiance and Spironolactone in the hospital and has continued taking them at home. A couple days ago, the right side of his face swelled up and he had trouble chewing. So, they looked up side effects and saw that the spironolactone can cause face swelling. He stopped the spironolactone yesterday and is already starting to feel better.    Do you have any recommendations for them?    Thank you,    Mera Adam, DEN  Triage [facility]  07/05/24 16:25 EDT

## 2024-07-08 ENCOUNTER — TELEPHONE (OUTPATIENT)
Dept: CARDIOLOGY | Facility: CLINIC | Age: 69
End: 2024-07-08

## 2024-07-08 ENCOUNTER — TREATMENT (OUTPATIENT)
Dept: PHYSICAL THERAPY | Facility: CLINIC | Age: 69
End: 2024-07-08
Payer: MEDICARE

## 2024-07-08 DIAGNOSIS — R26.2 DIFFICULTY WALKING: ICD-10-CM

## 2024-07-08 DIAGNOSIS — M25.562 ACUTE PAIN OF LEFT KNEE: Primary | ICD-10-CM

## 2024-07-08 NOTE — TELEPHONE ENCOUNTER
Called and left a VM. Will continue to try to reach pt.    Mera Adam RN  Triage [facility]  07/08/24 10:10 EDT

## 2024-07-08 NOTE — TELEPHONE ENCOUNTER
See other telephone encounter.    Thank you,    Mera Adam, RN  Triage [facility]  07/08/24 13:21 EDT

## 2024-07-08 NOTE — TELEPHONE ENCOUNTER
Caller: Alexy Ram    Relationship: Self    Best call back number: [phone] (home)      What is the best time to reach you: ANYTIME    Who are you requesting to speak with (clinical staff, provider,  specific staff member): PAPA    Do you know the name of the person who called: PAPA     What was the call regarding: PT RETURNING A CALL TO PAPA, UNABLE TO WT TO OFFICE. PLEASE TRY REACHING PT BACK WHEN POSSIBLE.

## 2024-07-08 NOTE — PROGRESS NOTES
Physical Therapy Daily Treatment Note    [facility]  [address] [place], [place] [id number] [phone] (phone)  [phone] (fax)    Patient: Alexy Ram   : 1955  Diagnosis/ICD-10 Code:  Acute pain of left knee [M25.562]  Referring practitioner: No ref. provider found  Date of Initial Visit: Type: THERAPY  Noted: 7/3/2024  Today's Date: 2024  Patient seen for 2 sessions           Subjective   Patient reports he has been getting a burning type pain in his L thigh.     Objective       Active Range of Motion   Left Knee   Flexion: 110 degrees with pain  Extensor lag: 10 degrees with pain    See Exercise, Manual, and Modality Logs for complete treatment.     Assessment/Plan  Patient was able to demonstrate good improvement in his L knee flexion AROM following manual PROM stretches and joint mobs. Added step ups to increase functional strength and to work towards reciprocal ascension on stairs. There continues to be a notable knee extensor lag which is causing gait to be more antalgic.          Timed:    Manual Therapy:    10     mins  79779;  Therapeutic Exercise:    20     mins  65841;     Neuromuscular Christopher:        mins  35411;    Therapeutic Activity:     10     mins  15561;     Gait Training:           mins  65277;     Ultrasound:          mins  06420;    Electrical Stimulation:         mins  93190 ( );  Iontophoresis         mins 06032;  Aquatic Therapy         mins 52492;    Untimed:  Electrical Stimulation:         mins  08159 ( );  Traction:         mins  87205;   Dry Needling   (1-2 muscles)       mins  (Self-pay)  Dry Needling (3-4 muscles)        mins  (Self-pay)  Dry Needling Trial          mins DRYNDLTRIAL  (No Charge)    Timed Treatment:   40   mins   Total Treatment:     50   mins    Venessa Waite PT  Physical Therapist    [place] License:[id number]

## 2024-07-08 NOTE — TELEPHONE ENCOUNTER
Stay off spironolactone due to facial swelling and we will add this to the medicine allergy list.  Please advise that they had blood work prior to the appointment with me scheduled later this week at the main hospital.  I will be rechecking kidney function and potassium level.  They should arrive at that lab approximately 30 minutes before his appointment

## 2024-07-08 NOTE — TELEPHONE ENCOUNTER
Notified pt of orders and recommendations from Gloria. Pt verbalized understanding.    Thank you,    Mera Adam, RN  Triage [facility]  07/08/24 13:20 EDT

## 2024-07-09 ENCOUNTER — OFFICE VISIT (OUTPATIENT)
Dept: FAMILY MEDICINE CLINIC | Facility: CLINIC | Age: 69
End: 2024-07-09
Payer: MEDICARE

## 2024-07-09 VITALS
HEART RATE: 75 BPM | SYSTOLIC BLOOD PRESSURE: 132 MMHG | DIASTOLIC BLOOD PRESSURE: 72 MMHG | BODY MASS INDEX: 41.95 KG/M2 | WEIGHT: 261 LBS | TEMPERATURE: 97.3 F | HEIGHT: 66 IN | OXYGEN SATURATION: 95 %

## 2024-07-09 DIAGNOSIS — I50.31 ACUTE DIASTOLIC CHF (CONGESTIVE HEART FAILURE): Primary | ICD-10-CM

## 2024-07-09 DIAGNOSIS — Z09 HOSPITAL DISCHARGE FOLLOW-UP: ICD-10-CM

## 2024-07-09 PROCEDURE — 1125F AMNT PAIN NOTED PAIN PRSNT: CPT | Performed by: FAMILY MEDICINE

## 2024-07-09 PROCEDURE — 3044F HG A1C LEVEL LT 7.0%: CPT | Performed by: FAMILY MEDICINE

## 2024-07-09 PROCEDURE — 99495 TRANSJ CARE MGMT MOD F2F 14D: CPT | Performed by: FAMILY MEDICINE

## 2024-07-09 PROCEDURE — 3078F DIAST BP <80 MM HG: CPT | Performed by: FAMILY MEDICINE

## 2024-07-09 PROCEDURE — 1111F DSCHRG MED/CURRENT MED MERGE: CPT | Performed by: FAMILY MEDICINE

## 2024-07-09 PROCEDURE — 3075F SYST BP GE 130 - 139MM HG: CPT | Performed by: FAMILY MEDICINE

## 2024-07-09 NOTE — PROGRESS NOTES
Transitional Care Follow Up Visit  Subjective     Alexy Ram is a 69 y.o. male who presents for a transitional care management visit.    Within 48 business hours after discharge our office contacted him via telephone to coordinate his care and needs.      I reviewed and discussed the details of that call along with the discharge summary, hospital problems, inpatient lab results, inpatient diagnostic studies, and consultation reports with Alexy.     Current outpatient and discharge medications have been reconciled for the patient.  Reviewed by: Pipe Vaughn MD          6/30/2024    11:03 AM   Date of TCM Phone Call   [facility]   Date of Admission 6/24/2024   Date of Discharge 6/30/2024   [place] [place]     Risk for Readmission (LACE) Score: 12 (6/30/2024  6:00 AM)    Adult Transthoracic Echo Complete W/ Cont if Necessary Per Protocol (06/24/2024 09:42)   CT Angiogram Chest (06/24/2024 03:21)   History of Present Illness   Course During Hospital Stay:    Admitted to [facility] for acute congestive heart failure, with preserved ejection fraction.  We reviewed his echocardiogram  We also reviewed his CT angiogram chest.  There are no pulmonary emboli.  Pulmonary edema    Actos was stopped  Jardiance was started  Spironolactone was started  Norvasc stopped  Hydralazine increased  Diovan HCTz stopped    Feeling better today.  No chest pain or shortness of air  He had physical therapy for his left knee replacement today.      Current outpatient and discharge medications have been reconciled for the patient.  Reviewed by: Pipe Vaughn MD     The following portions of the patient's history were reviewed and updated as appropriate: allergies, current medications, past family history, past medical history, past social history, past surgical history, and problem list.    Review of Systems    Objective   /72   Pulse 75   Temp 97.3 °F (36.3  "°C)   Ht 167.6 cm (66\")   Wt 118 kg (261 lb)   SpO2 95%   BMI 42.13 kg/m²   Physical Exam  Vitals reviewed.   Cardiovascular:      Rate and Rhythm: Normal rate.   Pulmonary:      Effort: Pulmonary effort is normal.   Musculoskeletal:      Right lower leg: Edema present.      Left lower leg: Edema present.   Neurological:      Mental Status: He is alert.     Continued soft tissue swelling left knee left lower leg    Assessment & Plan   Diagnoses and all orders for this visit:    1. Acute diastolic CHF (congestive heart failure) (Primary)    2. Hospital discharge follow-up    Reviewed all of his medication changes  Spironolactone had to be stopped due to a left facial rash, no rash today  This will leave him without a diuretic going forward  He will see cardiology Friday.  BNP and BMP ordered  He will likely need some type of diuretic going forward.  His home health orders reviewed and signed                 "

## 2024-07-10 ENCOUNTER — TREATMENT (OUTPATIENT)
Dept: PHYSICAL THERAPY | Facility: CLINIC | Age: 69
End: 2024-07-10
Payer: MEDICARE

## 2024-07-10 DIAGNOSIS — M25.562 ACUTE PAIN OF LEFT KNEE: Primary | ICD-10-CM

## 2024-07-10 DIAGNOSIS — R26.2 DIFFICULTY WALKING: ICD-10-CM

## 2024-07-10 NOTE — PROGRESS NOTES
Physical Therapy Daily Treatment Note    [facility]  [address] [place] [address]  [phone] (phone)  [phone] (fax)    Patient: Alexy Ram   : 1955  Diagnosis/ICD-10 Code:  Acute pain of left knee [M25.562]  Referring practitioner: Damon Pryor MD  Date of Initial Visit: Type: THERAPY  Noted: 7/3/2024  Today's Date: 7/10/2024  Patient seen for 3 sessions           Subjective   Patient reports his knee is doing well overall. He's getting a little burning pain in his lateral L thigh.     Objective     See Exercise, Manual, and Modality Logs for complete treatment.       Active Range of Motion   Left Knee   Flexion: 110 degrees with pain (115 degrees with overpressure)  Extensor la degrees        Assessment/Plan  Patient is still having a little more trouble performing lateral step ups compared to forward step ups. Added squats to increase functional strength and emphasized equal WB. He is making steady progress in regards to his L knee ROM. He continues to utilize a cane with ambulation/community mobility.          Timed:    Manual Therapy:    10     mins  87908;  Therapeutic Exercise:    20     mins  84226;     Neuromuscular Christopher:        mins  73257;    Therapeutic Activity:     10     mins  58933;     Gait Training:           mins  78436;     Ultrasound:          mins  22688;    Electrical Stimulation:         mins  44341 ( );  Iontophoresis         mins 53742;  Aquatic Therapy         mins 06284;    Untimed:  Electrical Stimulation:         mins  41898 ( );  Traction:         mins  98592;   Dry Needling   (1-2 muscles)       mins  (Self-pay)  Dry Needling (3-4 muscles)        mins  (Self-pay)  Dry Needling Trial          mins DRYNDLTRIAL  (No Charge)    Timed Treatment:   40   mins   Total Treatment:     50   mins    Venessa Waite PT  Physical Therapist    [place] License:[id number]

## 2024-07-11 ENCOUNTER — TELEPHONE (OUTPATIENT)
Dept: ORTHOPEDIC SURGERY | Facility: HOSPITAL | Age: 69
End: 2024-07-11
Payer: MEDICARE

## 2024-07-11 NOTE — TELEPHONE ENCOUNTER
Called and spoke with Mr. Ram to see how he has been doing since his LTK 6/13. He said he is doing ok. He's getting there still a work in progress. He is working with PT and they say he's doing well. Pain is controlled. Incision looks good. Mr. Ram doesn't have any questions for me at this time. He has my contact information should he need anything.

## 2024-07-12 ENCOUNTER — LAB (OUTPATIENT)
Dept: LAB | Facility: HOSPITAL | Age: 69
End: 2024-07-12
Payer: MEDICARE

## 2024-07-12 ENCOUNTER — TELEPHONE (OUTPATIENT)
Dept: CARDIOLOGY | Facility: CLINIC | Age: 69
End: 2024-07-12

## 2024-07-12 ENCOUNTER — OFFICE VISIT (OUTPATIENT)
Dept: CARDIOLOGY | Facility: CLINIC | Age: 69
End: 2024-07-12
Payer: MEDICARE

## 2024-07-12 ENCOUNTER — CLINICAL SUPPORT NO REQUIREMENTS (OUTPATIENT)
Age: 69
End: 2024-07-12
Payer: MEDICARE

## 2024-07-12 ENCOUNTER — TREATMENT (OUTPATIENT)
Dept: PHYSICAL THERAPY | Facility: CLINIC | Age: 69
End: 2024-07-12
Payer: MEDICARE

## 2024-07-12 VITALS
SYSTOLIC BLOOD PRESSURE: 142 MMHG | BODY MASS INDEX: 40.02 KG/M2 | WEIGHT: 255 LBS | OXYGEN SATURATION: 95 % | HEART RATE: 60 BPM | HEIGHT: 67 IN | DIASTOLIC BLOOD PRESSURE: 80 MMHG

## 2024-07-12 DIAGNOSIS — I50.31 ACUTE HEART FAILURE WITH PRESERVED EJECTION FRACTION (HFPEF): ICD-10-CM

## 2024-07-12 DIAGNOSIS — Z51.81 ENCOUNTER FOR THERAPEUTIC DRUG LEVEL MONITORING: ICD-10-CM

## 2024-07-12 DIAGNOSIS — I48.0 PAF (PAROXYSMAL ATRIAL FIBRILLATION): Primary | ICD-10-CM

## 2024-07-12 DIAGNOSIS — R06.09 DOE (DYSPNEA ON EXERTION): ICD-10-CM

## 2024-07-12 DIAGNOSIS — E87.6 HYPOKALEMIA: Primary | ICD-10-CM

## 2024-07-12 DIAGNOSIS — I50.31 ACUTE DIASTOLIC CHF (CONGESTIVE HEART FAILURE): ICD-10-CM

## 2024-07-12 DIAGNOSIS — I50.31 ACUTE DIASTOLIC CHF (CONGESTIVE HEART FAILURE): Primary | ICD-10-CM

## 2024-07-12 DIAGNOSIS — G47.33 OBSTRUCTIVE SLEEP APNEA OF ADULT: ICD-10-CM

## 2024-07-12 DIAGNOSIS — R26.2 DIFFICULTY WALKING: ICD-10-CM

## 2024-07-12 DIAGNOSIS — M25.562 ACUTE PAIN OF LEFT KNEE: Primary | ICD-10-CM

## 2024-07-12 LAB
ANION GAP SERPL CALCULATED.3IONS-SCNC: 13.8 MMOL/L (ref 5–15)
BUN SERPL-MCNC: 16 MG/DL (ref 8–23)
BUN/CREAT SERPL: 18.4 (ref 7–25)
CALCIUM SPEC-SCNC: 9.1 MG/DL (ref 8.6–10.5)
CHLORIDE SERPL-SCNC: 106 MMOL/L (ref 98–107)
CO2 SERPL-SCNC: 22.2 MMOL/L (ref 22–29)
CREAT SERPL-MCNC: 0.87 MG/DL (ref 0.76–1.27)
EGFRCR SERPLBLD CKD-EPI 2021: 93.4 ML/MIN/1.73
GLUCOSE SERPL-MCNC: 90 MG/DL (ref 65–99)
NT-PROBNP SERPL-MCNC: 937 PG/ML (ref 0–900)
POTASSIUM SERPL-SCNC: 3.4 MMOL/L (ref 3.5–5.2)
SODIUM SERPL-SCNC: 142 MMOL/L (ref 136–145)

## 2024-07-12 PROCEDURE — 83880 ASSAY OF NATRIURETIC PEPTIDE: CPT | Performed by: NURSE PRACTITIONER

## 2024-07-12 PROCEDURE — 80048 BASIC METABOLIC PNL TOTAL CA: CPT

## 2024-07-12 PROCEDURE — 36415 COLL VENOUS BLD VENIPUNCTURE: CPT

## 2024-07-12 RX ORDER — APIXABAN 5 MG/1
5 TABLET, FILM COATED ORAL 2 TIMES DAILY
Qty: 180 TABLET | Refills: 3 | Status: SHIPPED | OUTPATIENT
Start: 2024-07-12

## 2024-07-12 RX ORDER — POTASSIUM CHLORIDE 750 MG/1
10 CAPSULE, EXTENDED RELEASE ORAL 3 TIMES WEEKLY
COMMUNITY

## 2024-07-12 NOTE — PROGRESS NOTES
"Physical Therapy Daily Treatment Note    [facility]  [address], [place] [id number] [phone] (phone)  [phone] (fax)    Patient: Alexy Ram   : 1955  Diagnosis/ICD-10 Code:  Acute pain of left knee [M25.562]  Referring practitioner: Damon Pryor MD  Date of Initial Visit: Type: THERAPY  Noted: 7/3/2024  Today's Date: 2024  Patient seen for 4 sessions           Subjective   Knee is starting to bend more easily. Feeling easier to get around.    Objective     See Exercise, Manual, and Modality Logs for complete treatment.     Assessment/Plan  Progressed to 6\" height for forward step ups. Lateral step ups are still more challenging therefore stayed at 4\" height to avoid compensation. Squats are causing a little discomfort in his L anterior thigh but no pain.          Timed:    Manual Therapy:    8     mins  90618;  Therapeutic Exercise:    22     mins  44801;     Neuromuscular Christopher:        mins  30568;    Therapeutic Activity:          mins  90634;     Gait Training:           mins  13131;     Ultrasound:          mins  97373;    Electrical Stimulation:         mins  80472 ( );  Iontophoresis         mins 43764;  Aquatic Therapy         mins 01252;    Untimed:  Electrical Stimulation:         mins  56834 ( );  Traction:         mins  01383;   Dry Needling   (1-2 muscles)       mins 58120 (Self-pay)  Dry Needling (3-4 muscles)        mins  (Self-pay)  Dry Needling Trial          mins DRYNDLTRIAL  (No Charge)    Timed Treatment:   30   mins   Total Treatment:     50   mins    Venessa Waite PT  Physical Therapist    [place] License:[id number]  "

## 2024-07-12 NOTE — PROGRESS NOTES
Date of Office Visit: 24  Encounter Provider: ROME Nance  Place of Service: [facility]  Patient Name: Alexy Ram  :1955    Chief Complaint   Patient presents with    Congestive Heart Failure    Follow-up   :     HPI: Alexy Ram is a 69 y.o. male  with hypertension, lung cancer with left lower lobe removal, COPD, obstructive sleep apnea, dilated ascending aorta, hyperlipidemia, right bundle branch block, paroxysmal atrial fibrillation, second-degree AV block status post cardiac pacemaker..  He is a former patient of [facility].  He is now followed by Dr. Vlad Flanagan.  I will visit with him in follow-up.      He had dual-chamber [employer] pacemaker implanted in , initially.     He had transthoracic echocardiogram 2023 showing EF 61%, moderately dilated left atrium,Trace-to-mild mitral regurgitation, borderline right ventricular enlargement with normal systolic function, Mild pulmonic insufficiency, Mild dilation of the ascending aorta at 3.9 cm and Right ventricular systolic pressure of 45 mmHg.      He had myocardial PET perfusion study 2023 showing a small sized mild severity fixed perfusion defect(s) of the apex    wall consistent with apical thinning.  There was no evidence of stress induced myocardial ischemia or infarction.       He then was hospitalized with acute onset of shortness of breath in 2024.  CT angiogram showed bilateral pleural effusion with right greater than left.  He received IV Lasix in the ER and had some relief.  He had left knee orthopedic surgery roughly 10 days prior.  He had repeat echocardiogram 2024 which showed left ventricular systolic function is normal. Calculated left ventricular EF = 67.2%; visually estimated LVEF 55-60%.    The left ventricular cavity is mildly dilated.    Left ventricular wall thickness is consistent with mild concentric hypertrophy.    Left ventricular  "diastolic function was normal.    RV mildly dilated with grossly normal function.    Aortic valve sclerosis without hemodynamically significant stenosis.    There is a small (<1cm) pericardial effusion. There is no evidence of cardiac tamponade.    Pacemaker lead noted.    Saline test results are negative.    Biatrial enlargement, normal IVC size.  He received IV furosemide.  He ultimately was taken off Actos due to CHF diagnoses.  He was then treated with Jardiance and prescribed spironolactone.  His potassium was stopped.  He received 3 doses for Laractone prior to discharge however after discharge she had some left facial swelling so we advised that he stopped it last week.  He presents today in follow-up.  He has been feeling good.  No chest pain or shortness of breath or edema or dizziness or palpitation.  No blood in the urine or stool with Eliquis.  He is tolerating Jardiance.  He had blood work prior to our visit today.  He is ambulating with a cane.  He is accompanied by his spouse.    Allergies   Allergen Reactions    Spironolactone Swelling     Facial swelling           Family and social history reviewed.     ROS  All other systems were reviewed and are negative          Objective:     Vitals:    07/12/24 1457   BP: 142/80   Pulse: 60   SpO2: 95%   Weight: 116 kg (255 lb)   Height: 170.2 cm (67\")     Body mass index is 39.94 kg/m².    PHYSICAL EXAM:  Cardiovascular:      Normal rate. Regular rhythm.      Murmurs: There is a systolic murmur.      Comments: Left greater than right  Edema:     Ankle: bilateral trace edema of the ankle.        Procedures      Current Outpatient Medications   Medication Sig Dispense Refill    albuterol sulfate  (90 Base) MCG/ACT inhaler Inhale 2 puffs Every 4 (Four) Hours As Needed for Wheezing.      atorvastatin (LIPITOR) 40 MG tablet Take 1 tablet by mouth every night at bedtime. 90 tablet 3    carvedilol (COREG) 25 MG tablet Take 2 tablets by mouth 2 (Two) Times a " Day With Meals. Indications: High Blood Pressure Disorder 360 tablet 3    cetirizine (zyrTEC) 10 MG tablet Take 1 tablet by mouth Daily. 90 tablet 3    Eliquis 5 MG tablet tablet Take 1 tablet by mouth 2 (Two) Times a Day. Indications: Prevention of Unwanted Clot in Veins 180 tablet 3    empagliflozin (JARDIANCE) 10 MG tablet tablet Take 1 tablet by mouth Daily. Indications: Cardiac Failure 90 tablet 3    gabapentin (NEURONTIN) 100 MG capsule TAKE TWO CAPSULES BY MOUTH EVERY MORNING, ONE CAPSULE IN THE MIDDLE OF THE DAY AND 2 CAPSULES AT BEDTIME  Indications: Chronic pain (Patient taking differently: Take 2 capsules by mouth 2 (Two) Times a Day. MAY TAKE ONE ADDITIONAL TABLET MIDDAY IF NEEDED   Indications: Chronic pain) 150 capsule 5    hydrALAZINE (APRESOLINE) 25 MG tablet Take 3 tablets by mouth 2 (Two) Times a Day. 60 tablet 3    HYDROcodone-acetaminophen (NORCO) 7.5-325 MG per tablet Take 1 tablet by mouth Every 4 (Four) Hours As Needed for Moderate Pain or Severe Pain. Indications: Pain 40 tablet 0    tamsulosin (FLOMAX) 0.4 MG capsule 24 hr capsule Take 1 capsule by mouth Every Night. Indications: Benign Enlargement of Prostate 90 capsule 3    tiotropium bromide monohydrate (Spiriva Respimat) 2.5 MCG/ACT aerosol solution inhaler Inhale 2 puffs Daily. 4 g 3    potassium chloride (MICRO-K) 10 MEQ CR capsule Take 1 capsule by mouth 3 (Three) Times a Week.       No current facility-administered medications for this visit.     Assessment:       Diagnosis Plan   1. PAF (paroxysmal atrial fibrillation)        2. Acute heart failure with preserved ejection fraction (HFpEF)        3. Obstructive sleep apnea of adult             No orders of the defined types were placed in this encounter.        Plan:       1.  69-year-old gentleman with heart failure preserved ejection fraction.  He is not on spironolactone due to left facial swelling.  He will continue carvedilol 25 mg twice daily, Jardiance 10 mg daily and at this  time he is not on loop diuretic and appears euvolemic with no symptoms   2. Dilated RV with normal function on echocardiogram 6/20/2024- stable  3.  History of secondary heart block status post permanent pacemaker-he is previously followed at [facility] however I had his device established in our pacemaker clinic today.  Device functioning normally.  We will follow  4.  Hypertension-blood pressure is a little elevated today.  He is currently taking carvedilol 25 mg twice daily and hydralazine 75 mg twice daily.  We will follow outpatient but may need to put him back on amlodipine.  5.  Hyperlipidemia- continue lipitor 40  6.  COPD  7.  Hypokalemia-his lab work today shows mildly low potassium.  He will take potassium 10 mEq 3 times a week and he will have repeat BMP in 2 weeks.  8.  History of lung cancer status post left lower lobe resection  9.   BPH on flomax  10.  Obstructive sleep apnea reports compliance with CPAP  11.  Status post left knee replacement 6/13/2024 by Dr. Nikko Staton.  He is in physical therapy and progressing well    He will follow-up with Dr. Vlad Flanagan in approximately 8 weeks at [facility]        It has been a pleasure to participate in this patient's care.      Thank you,  ROME Nance      **I used Dragon to dictate this note:**

## 2024-07-12 NOTE — TELEPHONE ENCOUNTER
I spoke with patient regarding blood work.  proBNP stable and without significant change.  He continues to feel well.  Potassium slightly low.  He will restart potassium chloride 10 meq, once daily  three times a week.  Explained I would like a repeat lab, nonfasting in 2 weeks to make sure his potassium normalizes.  He verbalized understanding.

## 2024-07-15 ENCOUNTER — TREATMENT (OUTPATIENT)
Dept: PHYSICAL THERAPY | Facility: CLINIC | Age: 69
End: 2024-07-15
Payer: MEDICARE

## 2024-07-15 DIAGNOSIS — M25.562 ACUTE PAIN OF LEFT KNEE: Primary | ICD-10-CM

## 2024-07-15 DIAGNOSIS — R26.2 DIFFICULTY WALKING: ICD-10-CM

## 2024-07-15 NOTE — PROGRESS NOTES
Physical Therapy Daily Treatment Note    [facility]  [address] [place] [address]  [phone] (phone)  [phone] (fax)    Patient: Alexy Ram   : 1955  Diagnosis/ICD-10 Code:  Acute pain of left knee [M25.562]  Referring practitioner: Damon Pryor MD  Date of Initial Visit: Type: THERAPY  Noted: 7/3/2024  Today's Date: 7/15/2024  Patient seen for 5 sessions           Subjective   Knee is feeling better, getting a little looser     Objective     See Exercise, Manual, and Modality Logs for complete treatment.     Active Range of Motion   Left Knee   Flexion: 115 degrees with pain (120 degrees with overpressure)  Extensor la degrees     Assessment/Plan  Added leg press to promote increased glute/quad strength. Patient demonstrates less compensation with step ups and reports he is navigating stairs reciprocally at home. Still some tightness with end range L knee flexion but knee mobility has improved.          Timed:    Manual Therapy:    4     mins  67642;  Therapeutic Exercise:    16     mins  84484;     Neuromuscular Christopher:        mins  59748;    Therapeutic Activity:     10     mins  75221;     Gait Training:           mins  48715;     Ultrasound:          mins  13146;    Electrical Stimulation:         mins  27804 ( );  Iontophoresis         mins 14993;  Aquatic Therapy         mins 62091;    Untimed:  Electrical Stimulation:         mins  81517 ( );  Traction:         mins  17437;   Dry Needling   (1-2 muscles)       mins  (Self-pay)  Dry Needling (3-4 muscles)        mins  (Self-pay)  Dry Needling Trial          mins DRYNDLTRIAL  (No Charge)    Timed Treatment:   30   mins   Total Treatment:     50   mins    Venessa Waite PT  Physical Therapist    [place] License:[id number]

## 2024-07-17 ENCOUNTER — TREATMENT (OUTPATIENT)
Dept: PHYSICAL THERAPY | Facility: CLINIC | Age: 69
End: 2024-07-17
Payer: MEDICARE

## 2024-07-17 DIAGNOSIS — M25.562 ACUTE PAIN OF LEFT KNEE: Primary | ICD-10-CM

## 2024-07-17 DIAGNOSIS — R26.2 DIFFICULTY WALKING: ICD-10-CM

## 2024-07-17 NOTE — PROGRESS NOTES
Physical Therapy Daily Treatment Note    Patient: Alexy Ram   : 1955  Referring practitioner: Damon Pryor MD  Date of Initial Visit: Type: THERAPY  Noted: 7/3/2024  Today's Date: 2024  Patient seen for 6 sessions       Visit Diagnoses:    ICD-10-CM ICD-9-CM   1. Acute pain of left knee  M25.562 719.46   2. Difficulty walking  R26.2 719.7       Alexy Ram reports: he is doing okay.  States his knee is feeling better.     Subjective       Objective          Active Range of Motion   Left Knee   Flexion: 117 degrees   Extension: 0 degrees     Additional Active Range of Motion Details  Supine left knee AAROM 121 degrees       See Exercise, Manual, and Modality Logs for complete treatment.         Assessment & Plan       Assessment  Assessment details: Pt is doing well with therapy.  Pt continues to have improving knee AROM and strength.  Pt requires minimal passive stretching to reach his end range of motion with flex and ext.  Pt still has some left LE edema.  Pt tolerates all strengthening and stretching exercises without pain.  May add single leg press next visit.  Will continue to see pt 3x/week for stretching, strengthening, and modalities PRN for pain.          Progress per Plan of Care      Timed:         Manual Therapy:    8    mins  35014;     Therapeutic Exercise:   22      mins  08381;     Neuromuscular Christopher:        mins  67771;    Therapeutic Activity:    15      mins  93416;     Gait Training:           mins  03724;     Ultrasound:          mins  08123;    Ionto                                   mins   67509  Self Care                            mins   62637  Canalith Repos         mins 10878      Un-Timed:  Electrical Stimulation:         mins  23547 ( );  Dry Needling          mins self-pay  Traction          mins 02740      Timed Treatment:  45   mins   Total Treatment:    60    mins    Meron Watters, PT  [place] License: [id number]

## 2024-07-19 ENCOUNTER — TREATMENT (OUTPATIENT)
Dept: PHYSICAL THERAPY | Facility: CLINIC | Age: 69
End: 2024-07-19
Payer: MEDICARE

## 2024-07-19 DIAGNOSIS — R26.2 DIFFICULTY WALKING: ICD-10-CM

## 2024-07-19 DIAGNOSIS — M25.562 ACUTE PAIN OF LEFT KNEE: Primary | ICD-10-CM

## 2024-07-19 PROCEDURE — 97530 THERAPEUTIC ACTIVITIES: CPT | Performed by: PHYSICAL THERAPIST

## 2024-07-19 PROCEDURE — 97110 THERAPEUTIC EXERCISES: CPT | Performed by: PHYSICAL THERAPIST

## 2024-07-20 NOTE — PROGRESS NOTES
"Physical Therapy Daily Treatment Note    [facility]  [address], [place] [id number] [phone] (phone)  [phone] (fax)    Patient: Alexy Ram   : 1955  Diagnosis/ICD-10 Code:  Acute pain of left knee [M25.562]  Referring practitioner: Damon Pryor MD  Date of Initial Visit: Type: THERAPY  Noted: 7/3/2024  Today's Date: 2024  Patient seen for 7 sessions           Subjective   Patient reports the knee is doing pretty well overall.     Objective     See Exercise, Manual, and Modality Logs for complete treatment.     Active Range of Motion   Left Knee   Flexion: 120 degrees with pain   Extensor lag: 3 degrees    Assessment/Plan  Alexy was able to achieve 120 degrees of L knee flexion AROM during heel slides. He also reports increased ease navigating stairs at home. Added single leg press to try and better isolate L quad and he maintained good muscle control through the full ROM. Also progressed to 6\" height for lateral step ups which was well tolerated.         Timed:    Manual Therapy:         mins  42166;  Therapeutic Exercise:    20     mins  13607;     Neuromuscular Christopher:        mins  87002;    Therapeutic Activity:     10     mins  46248;     Gait Training:           mins  83950;     Ultrasound:          mins  91641;    Electrical Stimulation:         mins  27928 ( );  Iontophoresis         mins 28228;  Aquatic Therapy         mins 06137;    Untimed:  Electrical Stimulation:         mins  28585 ( );  Traction:         mins  15864;   Dry Needling   (1-2 muscles)       mins  (Self-pay)  Dry Needling (3-4 muscles)        mins  (Self-pay)  Dry Needling Trial          mins DRYNDLTRIAL  (No Charge)    Timed Treatment:   30   mins   Total Treatment:     45   mins    Venessa Waite PT  Physical Therapist    [place] License:[id number]  "

## 2024-07-22 ENCOUNTER — TREATMENT (OUTPATIENT)
Dept: PHYSICAL THERAPY | Facility: CLINIC | Age: 69
End: 2024-07-22
Payer: MEDICARE

## 2024-07-22 DIAGNOSIS — R26.2 DIFFICULTY WALKING: ICD-10-CM

## 2024-07-22 DIAGNOSIS — M25.562 ACUTE PAIN OF LEFT KNEE: Primary | ICD-10-CM

## 2024-07-22 PROCEDURE — 97530 THERAPEUTIC ACTIVITIES: CPT | Performed by: PHYSICAL THERAPIST

## 2024-07-22 PROCEDURE — 97110 THERAPEUTIC EXERCISES: CPT | Performed by: PHYSICAL THERAPIST

## 2024-07-22 NOTE — PROGRESS NOTES
Physical Therapy Daily Treatment Note    [facility]  [address] [place] [address]  [phone] (phone)  [phone] (fax)    Patient: Alexy Ram   : 1955  Diagnosis/ICD-10 Code:  Acute pain of left knee [M25.562]  Referring practitioner: Damon Pryor MD  Date of Initial Visit: Type: THERAPY  Noted: 7/3/2024  Today's Date: 2024  Patient seen for 8 sessions           Subjective   Alexy reports he had some sinus issue this weekend and today he has been having trouble breathing    Objective     See Exercise, Manual, and Modality Logs for complete treatment.     Assessment/Plan  Took more frequent rest breaks today to allow patient to catch his breath. Added foam for alternating marches to improve stability on compliant surfaces. Knee is feeling less tight during flexion based movements.          Timed:    Manual Therapy:         mins  83546;  Therapeutic Exercise:    30     mins  06535;     Neuromuscular Christopher:        mins  15674;    Therapeutic Activity:     10     mins  12948;     Gait Training:           mins  46560;     Ultrasound:          mins  49241;    Electrical Stimulation:         mins  20091 ( );  Iontophoresis         mins 73245;  Aquatic Therapy         mins 36458;    Untimed:  Electrical Stimulation:         mins  76623 ( );  Traction:         mins  29118;   Dry Needling   (1-2 muscles)       mins  (Self-pay)  Dry Needling (3-4 muscles)        mins  (Self-pay)  Dry Needling Trial          mins DRYNDLTRIAL  (No Charge)    Timed Treatment:   40   mins   Total Treatment:     50   mins    Venessa Waite PT  Physical Therapist    [place] License:[id number]

## 2024-07-23 ENCOUNTER — OFFICE VISIT (OUTPATIENT)
Dept: FAMILY MEDICINE CLINIC | Facility: CLINIC | Age: 69
End: 2024-07-23
Payer: MEDICARE

## 2024-07-23 VITALS
BODY MASS INDEX: 40.65 KG/M2 | HEART RATE: 61 BPM | SYSTOLIC BLOOD PRESSURE: 180 MMHG | WEIGHT: 259 LBS | DIASTOLIC BLOOD PRESSURE: 90 MMHG | TEMPERATURE: 97.3 F | HEIGHT: 67 IN | OXYGEN SATURATION: 91 %

## 2024-07-23 DIAGNOSIS — J01.01 ACUTE RECURRENT MAXILLARY SINUSITIS: Primary | ICD-10-CM

## 2024-07-23 PROCEDURE — 1160F RVW MEDS BY RX/DR IN RCRD: CPT | Performed by: FAMILY MEDICINE

## 2024-07-23 PROCEDURE — 3080F DIAST BP >= 90 MM HG: CPT | Performed by: FAMILY MEDICINE

## 2024-07-23 PROCEDURE — 3077F SYST BP >= 140 MM HG: CPT | Performed by: FAMILY MEDICINE

## 2024-07-23 PROCEDURE — 99213 OFFICE O/P EST LOW 20 MIN: CPT | Performed by: FAMILY MEDICINE

## 2024-07-23 PROCEDURE — 3044F HG A1C LEVEL LT 7.0%: CPT | Performed by: FAMILY MEDICINE

## 2024-07-23 PROCEDURE — 1125F AMNT PAIN NOTED PAIN PRSNT: CPT | Performed by: FAMILY MEDICINE

## 2024-07-23 PROCEDURE — 1159F MED LIST DOCD IN RCRD: CPT | Performed by: FAMILY MEDICINE

## 2024-07-23 RX ORDER — CEFDINIR 300 MG/1
300 CAPSULE ORAL 2 TIMES DAILY
Qty: 20 CAPSULE | Refills: 0 | Status: SHIPPED | OUTPATIENT
Start: 2024-07-23

## 2024-07-23 NOTE — PROGRESS NOTES
"  Chief Complaint   Patient presents with    Cough    Nasal Congestion       Upper Respiratory Infection: Patient complains of symptoms of a URI, possible sinusitis. Symptoms include congestion, cough, and sore throat. Onset of symptoms was 3 days ago, gradually worsening since that time. He also c/o low grade fever, sinus pressure, and wheezing for the past 3 days .  He is drinking plenty of fluids. Evaluation to date: none. Treatment to date: none.  Ill contacts at home  discussed.none  Lung cancer survivor  Chronic sinus      Vitals:    07/23/24 1554   BP: 180/90   Pulse: 61   Temp: 97.3 °F (36.3 °C)   SpO2: 91%   Weight: 117 kg (259 lb)   Height: 170.2 cm (67\")     Gen: mildly ill appearing, alert  Ears: Tm's  without redness  Nose:  Congestion  Throat:  Red without exudate, some drainage, tonsils okay  Neck: no LAD  Lung: upper airway wheeze, good air movement, regular RR  Heart: RR without murmur  Skin: no rash.      Assessment & Plan   Diagnoses and all orders for this visit:    1. Acute recurrent maxillary sinusitis (Primary)  -     cefdinir (OMNICEF) 300 MG capsule; Take 1 capsule by mouth 2 (Two) Times a Day.  Dispense: 20 capsule; Refill: 0             There are no Patient Instructions on file for this visit.    Tylenol or Advil as needed for pain, fever, muscle aches  Plenty of fluids  Hand washing discussed    Warm tea for throat.  Pros and cons of antibiotic use discussed    Dr. Pipe Vaughn MD  [place], [place]  [facility]  "

## 2024-07-26 ENCOUNTER — TREATMENT (OUTPATIENT)
Dept: PHYSICAL THERAPY | Facility: CLINIC | Age: 69
End: 2024-07-26
Payer: MEDICARE

## 2024-07-26 DIAGNOSIS — M25.562 ACUTE PAIN OF LEFT KNEE: Primary | ICD-10-CM

## 2024-07-26 DIAGNOSIS — R26.2 DIFFICULTY WALKING: ICD-10-CM

## 2024-07-26 NOTE — PROGRESS NOTES
Re-Assessment / Re-Certification      Patient: Alexy Ram   : 1955  Diagnosis/ICD-10 Code:  Acute pain of left knee [M25.562]  Referring practitioner: Damon Pryor MD  Date of Initial Visit: 2024  Today's Date: 2024  Patient seen for 9 sessions      Subjective:   Alexy Ram reports: he is not having any pain in the knee.    Subjective Questionnaire: LEFS: 63/80  Clinical Progress: improved  Home Program Compliance: Yes  Treatment has included: therapeutic exercise, neuromuscular re-education, manual therapy, therapeutic activity, cryotherapy, and pt instructed in a HEP.      Subjective   Objective          Active Range of Motion   Left Knee   Flexion: 121 degrees   Extension: 0 degrees     Strength/Myotome Testing     Left Hip   Planes of Motion   Flexion: 5  Abduction: 5    Left Knee   Flexion: 5  Extension: 5  Quadriceps contraction: good    Swelling     Left Knee Girth Measurement (cm)   Joint line: 42.6.  10 cm above joint line: SP 46.  10 cm below joint line: IP 38.5.  Left Ankle/Foot   [place]: 26 cm    Ambulation     Observational Gait   Gait: within functional limits   Walking speed, stride length, left swing time and left step length within functional limits. Decreased left stance time.   Left foot contact pattern: heel to toe    Additional Observational Gait Details  Pt ambulates into the clinic without an AD minimal antalgic gait left LE       Assessment & Plan       Assessment  Assessment details: Pt is doing well with therapy.  Pt is improving knee AROM, strength, decreasing knee effusion, improving gait, and pt denies having any knee pain.  Pt with good knee AROM in flex and ext requiring minimal passive stretching.  Pt still has some left LE edema, but it has decreased by 1.4 cm since the initial evaluation.  Pt tolerated all strengthening and stretching exercises without pain.  Pt has met 3/4 STGs and 2/4 LTGs.  Pt is showing progress towards accomplishing his other  goals.  Will continue to see pt for strengthening, stretching, and modalities for decreased knee effusion, but decrease frequency to 2x/week for another 4 weeks.       Progress toward previous goals: Partially Met      Goals  Plan Goals: STGs to be completed within 30 days:  -Patient will demonstrate compliance and independence with initial HEP   PROGRESSING   -Patient will increase L Knee AROM to 5-110 degrees to help normalize gait mechanics and increase ease with transfers    MET  -Patient will perform sit to stand transfer with equilateral WB and no UE assistance to increase functional strength   MET  -Patient will ambulate household distances without AD in order to reduce reliance on UE support with gait   MET     LTGs to be completed within 90 days:  -Patient will increase L Knee AROM to 0-120 degrees to help normalize gait mechanics and increase ease with transfers  MET   -Patient will complete community mobility without AD and with even step length and heel-toe gait mechanics to return to daily walks for leisure   PROGRESSING   -Patient will reduce edema in L knee by 2 cm to help reduce pain/discomfort   PROGRESSING   -Patient will improve score on LEFS from 33 at eval to 50 or greater to improve quality of life   MET        Recommendations: Continue as planned  Timeframe: 1 month  Prognosis to achieve goals: good    PT Signature: Meron Watters, PT [place] # [id number]  Electronically signed 7/26/2024   [facility].    Manual Therapy:    5     mins  43659;  Therapeutic Exercise:    29     mins  49329;     Neuromuscular Christopher:        mins  36205;    Therapeutic Activity:    18      mins  97354;     Gait Training:           mins  19697;     Ultrasound:          mins  18946;    Electrical Stimulation:         mins  39171 (MC );  Traction                       ___ mins  16241    Timed Treatment:  52    mins   Total Treatment:     67   mins

## 2024-07-29 ENCOUNTER — TREATMENT (OUTPATIENT)
Dept: PHYSICAL THERAPY | Facility: CLINIC | Age: 69
End: 2024-07-29
Payer: MEDICARE

## 2024-07-29 DIAGNOSIS — M25.562 ACUTE PAIN OF LEFT KNEE: Primary | ICD-10-CM

## 2024-07-29 DIAGNOSIS — R26.2 DIFFICULTY WALKING: ICD-10-CM

## 2024-07-29 PROCEDURE — 97530 THERAPEUTIC ACTIVITIES: CPT | Performed by: PHYSICAL THERAPIST

## 2024-07-29 PROCEDURE — 97110 THERAPEUTIC EXERCISES: CPT | Performed by: PHYSICAL THERAPIST

## 2024-07-29 NOTE — PROGRESS NOTES
Physical Therapy Daily Treatment Note    [facility]  [address] [place] [address]  [phone] (phone)  [phone] (fax)    Patient: Alexy Ram   : 1955  Diagnosis/ICD-10 Code:  Acute pain of left knee [M25.562]  Referring practitioner: Damon Pryor MD  Date of Initial Visit: Type: THERAPY  Noted: 7/3/2024  Today's Date: 2024  Patient seen for 10 sessions           Subjective   Patient reports he is breathing a little better today (compared to last week and his sinus issues).    Objective     See Exercise, Manual, and Modality Logs for complete treatment.     Assessment/Plan  Incision is mostly healed. There is still a small scab at distal incision, therefore recommended patient check back in another week prior to getting in a pool. He is still slightly favoring his R LE during squats. There is also still some fatigue with step ups but patient was able to perform more reps today. Still some tightness with knee flexion during heel slides (comfortably at 110 degrees today).          Timed:    Manual Therapy:         mins  84554;  Therapeutic Exercise:    22     mins  28795;     Neuromuscular Christopher:        mins  55539;    Therapeutic Activity:     8     mins  08394;     Gait Training:           mins  93237;     Ultrasound:          mins  16378;    Electrical Stimulation:         mins  14848 ( );  Iontophoresis         mins 51928;  Aquatic Therapy         mins 50572;    Untimed:  Electrical Stimulation:         mins  59666 ( );  Traction:         mins  13119;   Dry Needling   (1-2 muscles)       mins  (Self-pay)  Dry Needling (3-4 muscles)        mins  (Self-pay)  Dry Needling Trial          mins DRYNDLTRIAL  (No Charge)    Timed Treatment:   30   mins   Total Treatment:     45   mins    Venessa Waite PT  Physical Therapist    [place] License:[id number]

## 2024-07-30 ENCOUNTER — LAB (OUTPATIENT)
Dept: LAB | Facility: HOSPITAL | Age: 69
End: 2024-07-30
Payer: MEDICARE

## 2024-07-30 ENCOUNTER — TELEPHONE (OUTPATIENT)
Dept: CARDIOLOGY | Facility: CLINIC | Age: 69
End: 2024-07-30
Payer: MEDICARE

## 2024-07-30 DIAGNOSIS — E87.6 HYPOKALEMIA: Primary | ICD-10-CM

## 2024-07-30 DIAGNOSIS — E87.6 HYPOKALEMIA: ICD-10-CM

## 2024-07-30 LAB
ANION GAP SERPL CALCULATED.3IONS-SCNC: 13.3 MMOL/L (ref 5–15)
BUN SERPL-MCNC: 13 MG/DL (ref 8–23)
BUN/CREAT SERPL: 14.9 (ref 7–25)
CALCIUM SPEC-SCNC: 8.9 MG/DL (ref 8.6–10.5)
CHLORIDE SERPL-SCNC: 103 MMOL/L (ref 98–107)
CO2 SERPL-SCNC: 23.7 MMOL/L (ref 22–29)
CREAT SERPL-MCNC: 0.87 MG/DL (ref 0.76–1.27)
EGFRCR SERPLBLD CKD-EPI 2021: 93.4 ML/MIN/1.73
GLUCOSE SERPL-MCNC: 117 MG/DL (ref 65–99)
POTASSIUM SERPL-SCNC: 3.3 MMOL/L (ref 3.5–5.2)
SODIUM SERPL-SCNC: 140 MMOL/L (ref 136–145)

## 2024-07-30 PROCEDURE — 80048 BASIC METABOLIC PNL TOTAL CA: CPT

## 2024-07-30 PROCEDURE — 36415 COLL VENOUS BLD VENIPUNCTURE: CPT

## 2024-07-30 RX ORDER — POTASSIUM CHLORIDE 750 MG/1
10 CAPSULE, EXTENDED RELEASE ORAL DAILY
Qty: 90 CAPSULE | Refills: 1 | Status: SHIPPED | OUTPATIENT
Start: 2024-07-30

## 2024-07-30 NOTE — TELEPHONE ENCOUNTER
Left voicemail on both patient and his spouse, Darlin's phone regarding low potassium.  Need to increase potassium from 3 times a week to every day and repeat labs in 2 weeks.  Placed a lab order and sent more potassium to local pharmacy.  I asked him to call me with any questions or concerns.

## 2024-07-31 ENCOUNTER — TREATMENT (OUTPATIENT)
Dept: PHYSICAL THERAPY | Facility: CLINIC | Age: 69
End: 2024-07-31
Payer: MEDICARE

## 2024-07-31 DIAGNOSIS — M25.562 ACUTE PAIN OF LEFT KNEE: Primary | ICD-10-CM

## 2024-07-31 DIAGNOSIS — R26.2 DIFFICULTY WALKING: ICD-10-CM

## 2024-07-31 NOTE — PROGRESS NOTES
Physical Therapy Daily Treatment Note    [facility]  [address] [place] [address]  [phone] (phone)  [phone] (fax)    Patient: Alexy Ram   : 1955  Diagnosis/ICD-10 Code:  Acute pain of left knee [M25.562]  Referring practitioner: Damon Pryor MD  Date of Initial Visit: Type: THERAPY  Noted: 7/3/2024  Today's Date: 2024  Patient seen for 11 sessions           Subjective   Knee is doing well overall. Been trying to stretch it as much as possible.    Objective     See Exercise, Manual, and Modality Logs for complete treatment.     Assessment/Plan  Patient presents with good L knee flexion PROM. Still some stiffness that limits last few degrees with AROM but he was able to get to 120 with just slight overpressure today. Added bridges to increase hip extensor and hamstring strength. Still working on avoiding knees coming forward past toes during squats.         Timed:    Manual Therapy:         mins  27034;  Therapeutic Exercise:    30     mins  16317;     Neuromuscular Christopher:        mins  08733;    Therapeutic Activity:     8     mins  11165;     Gait Training:           mins  74917;     Ultrasound:          mins  18987;    Electrical Stimulation:         mins  89718 ( );  Iontophoresis         mins 02568;  Aquatic Therapy         mins 20211;    Untimed:  Electrical Stimulation:         mins  10350 ( );  Traction:         mins  67098;   Dry Needling   (1-2 muscles)       mins  (Self-pay)  Dry Needling (3-4 muscles)        mins  (Self-pay)  Dry Needling Trial          mins DRYNDLTRIAL  (No Charge)    Timed Treatment:   38   mins   Total Treatment:     50   mins    Venessa Waite PT  Physical Therapist    [place] License:[id number]

## 2024-08-01 ENCOUNTER — OFFICE VISIT (OUTPATIENT)
Dept: ORTHOPEDIC SURGERY | Facility: CLINIC | Age: 69
End: 2024-08-01
Payer: MEDICARE

## 2024-08-01 VITALS — TEMPERATURE: 97.7 F | HEIGHT: 67 IN | BODY MASS INDEX: 40.43 KG/M2 | WEIGHT: 257.6 LBS

## 2024-08-01 DIAGNOSIS — Z96.652 S/P TKR (TOTAL KNEE REPLACEMENT), LEFT: ICD-10-CM

## 2024-08-01 DIAGNOSIS — Z96.642 S/P HIP REPLACEMENT, LEFT: Primary | ICD-10-CM

## 2024-08-01 PROCEDURE — 99212 OFFICE O/P EST SF 10 MIN: CPT | Performed by: NURSE PRACTITIONER

## 2024-08-01 PROCEDURE — 73502 X-RAY EXAM HIP UNI 2-3 VIEWS: CPT | Performed by: NURSE PRACTITIONER

## 2024-08-01 PROCEDURE — 99024 POSTOP FOLLOW-UP VISIT: CPT | Performed by: NURSE PRACTITIONER

## 2024-08-01 PROCEDURE — 73562 X-RAY EXAM OF KNEE 3: CPT | Performed by: NURSE PRACTITIONER

## 2024-08-05 NOTE — PROGRESS NOTES
Alexy Ram : 1955 MRN: [id number] DATE: 2024    DIAGNOSIS: 8 week follow up left total knee /follow-up left total hip posterior approach    SUBJECTIVE:Patient returns today for 8 week follow up of left total knee replacement as well as a 1 year follow-up from a left total hip replacement posterior approach.  In regards to the patient's knee patient reports doing well with no unusual complaints.  Patient reports that his left knee his pain is minimal and currently rates it as a 0 on a scale of 10.  States that he is still going to outpatient physical therapy with [facility] and making good progress.  Appears to be progressing appropriately he is weightbearing without any assistive device.  Denies any instability or buckling issues.  Patient's hip he denies any pain and states he is very happy with the surgical outcome.  Denies any instability or limitations.  He is without any other significant complaints today.    OBJECTIVE:   Exam:. The incisions are well healed. No sign of infection. Range of motion pain is measured at 0 to 120. The calf is soft and nontender with a negative Homans sign. Strength is progressing and the patient is ambulating appropriately.    DIAGNOSTIC STUDIES  Xrays: 3 views of the left knee (AP, lateral, and sunrise) were ordered and reviewed for evaluation of recent knee replacement. They demonstrate a well positioned, well aligned knee replacement without complicating factors noted. In comparison with previous films there has been no change.    2 views of the left hip (AP and lateral) were ordered and reviewed for evaluation of a left hip replacement. They demonstrate a well positioned, well aligned hip replacement without complicating factors noted. In comparison with previous films there has been no change.      ASSESSMENT: 8 week status post left knee replacement /annual follow-up left posterior total hip    PLAN: 1) Continue with PT exercises as prescribed in regards to the  knee   2) Follow up in 10 months for annual follow-up in regards to the left knee replacement    ROME Ignacio  8/5/2024

## 2024-08-07 ENCOUNTER — TREATMENT (OUTPATIENT)
Dept: PHYSICAL THERAPY | Facility: CLINIC | Age: 69
End: 2024-08-07
Payer: MEDICARE

## 2024-08-07 DIAGNOSIS — M25.562 ACUTE PAIN OF LEFT KNEE: Primary | ICD-10-CM

## 2024-08-07 DIAGNOSIS — R26.2 DIFFICULTY WALKING: ICD-10-CM

## 2024-08-07 RX ORDER — ALBUTEROL SULFATE 90 UG/1
2 AEROSOL, METERED RESPIRATORY (INHALATION) EVERY 4 HOURS PRN
Qty: 18 G | Refills: 1 | Status: SHIPPED | OUTPATIENT
Start: 2024-08-07

## 2024-08-07 NOTE — PROGRESS NOTES
Physical Therapy Daily Treatment Note    [facility]  [address] [place] [address]  [phone] (phone)  [phone] (fax)    Patient: Alexy Ram   : 1955  Diagnosis/ICD-10 Code:  Acute pain of left knee [M25.562]  Referring practitioner: Damon Pryor MD  Date of Initial Visit: Type: THERAPY  Noted: 7/3/2024  Today's Date: 2024  Patient seen for 12 sessions           Subjective   Knee is doing well but still having trouble breathing. Seeing my lung doctor at the end of the month    Objective     See Exercise, Manual, and Modality Logs for complete treatment.     Assessment/Plan  Patient continues to demonstrate good muscle control with step ups (both front and lateral). There is still some very mild tightness noted with end range knee flexion but with some overpressure patient is able to consistently reach 120 degrees of flexion. At this time will transition to more self management of condition with HEP and drop down to weekly appointments.          Timed:    Manual Therapy:         mins  57605;  Therapeutic Exercise:    20     mins  20754;     Neuromuscular Christopher:        mins  85623;    Therapeutic Activity:     10     mins  46146;     Gait Training:           mins  82172;     Ultrasound:          mins  54382;    Electrical Stimulation:         mins  01120 ( );  Iontophoresis         mins 30170;  Aquatic Therapy         mins 77016;    Untimed:  Electrical Stimulation:         mins  32780 ( );  Traction:         mins  47734;   Dry Needling   (1-2 muscles)       mins  (Self-pay)  Dry Needling (3-4 muscles)        mins  (Self-pay)  Dry Needling Trial          mins DRYNDLTRIAL  (No Charge)    Timed Treatment:   30   mins   Total Treatment:     55   mins    Venessa Waite PT  Physical Therapist    [place] License:[id number]

## 2024-08-14 ENCOUNTER — TELEPHONE (OUTPATIENT)
Dept: CARDIOLOGY | Facility: CLINIC | Age: 69
End: 2024-08-14
Payer: MEDICARE

## 2024-08-14 ENCOUNTER — TREATMENT (OUTPATIENT)
Dept: PHYSICAL THERAPY | Facility: CLINIC | Age: 69
End: 2024-08-14
Payer: MEDICARE

## 2024-08-14 ENCOUNTER — LAB (OUTPATIENT)
Dept: LAB | Facility: HOSPITAL | Age: 69
End: 2024-08-14
Payer: MEDICARE

## 2024-08-14 DIAGNOSIS — E87.6 HYPOKALEMIA: ICD-10-CM

## 2024-08-14 DIAGNOSIS — R26.2 DIFFICULTY WALKING: ICD-10-CM

## 2024-08-14 DIAGNOSIS — M25.562 ACUTE PAIN OF LEFT KNEE: Primary | ICD-10-CM

## 2024-08-14 LAB
ANION GAP SERPL CALCULATED.3IONS-SCNC: 10.6 MMOL/L (ref 5–15)
BUN SERPL-MCNC: 14 MG/DL (ref 8–23)
BUN/CREAT SERPL: 16.3 (ref 7–25)
CALCIUM SPEC-SCNC: 9.2 MG/DL (ref 8.6–10.5)
CHLORIDE SERPL-SCNC: 104 MMOL/L (ref 98–107)
CO2 SERPL-SCNC: 25.4 MMOL/L (ref 22–29)
CREAT SERPL-MCNC: 0.86 MG/DL (ref 0.76–1.27)
EGFRCR SERPLBLD CKD-EPI 2021: 93.7 ML/MIN/1.73
GLUCOSE SERPL-MCNC: 122 MG/DL (ref 65–99)
POTASSIUM SERPL-SCNC: 3.6 MMOL/L (ref 3.5–5.2)
SODIUM SERPL-SCNC: 140 MMOL/L (ref 136–145)

## 2024-08-14 PROCEDURE — 80048 BASIC METABOLIC PNL TOTAL CA: CPT

## 2024-08-14 PROCEDURE — 36415 COLL VENOUS BLD VENIPUNCTURE: CPT

## 2024-08-14 NOTE — PROGRESS NOTES
Physical Therapy Daily Treatment Note    [facility]  [address] [place], [place] [id number] [phone] (phone)  [phone] (fax)    Patient: Alexy Ram   : 1955  Diagnosis/ICD-10 Code:  Acute pain of left knee [M25.562]  Referring practitioner: Damon Pryor MD  Date of Initial Visit: Type: THERAPY  Noted: 7/3/2024  Today's Date: 2024  Patient seen for 13 sessions           Subjective   Alexy reports that his knee is feeling good. Feeling a little bit more pain in the R knee (which may require eventual TKA surgery).    Objective     See Exercise, Manual, and Modality Logs for complete treatment.     Assessment/Plan  Alexy was able to warm up on recumbent bike today and maintained a consistent blayne with full revolutions. Still trying to ensure he sits back at his hips and maintains equal WB during squats. Overall he is ambulating well in the community and he presents with functional AROM.          Timed:    Manual Therapy:         mins  57820;  Therapeutic Exercise:    20     mins  82664;     Neuromuscular Christopher:    10    mins  64723;    Therapeutic Activity:     15     mins  32401;     Gait Training:           mins  50761;     Ultrasound:          mins  08108;    Electrical Stimulation:         mins  26077 ( );  Iontophoresis         mins 34035;  Aquatic Therapy         mins 41554;    Untimed:  Electrical Stimulation:         mins  03745 ( );  Traction:         mins  56066;   Dry Needling   (1-2 muscles)       mins  (Self-pay)  Dry Needling (3-4 muscles)        mins  (Self-pay)  Dry Needling Trial          mins DRYNDLTRIAL  (No Charge)    Timed Treatment:   45   mins   Total Treatment:     55   mins    Venessa Waite PT  Physical Therapist    [place] License:[id number]

## 2024-08-14 NOTE — TELEPHONE ENCOUNTER
I spoke with patient. Discussed normal potassium and to continue current regimen with daily potassium replacement.

## 2024-09-04 ENCOUNTER — LAB (OUTPATIENT)
Dept: LAB | Facility: HOSPITAL | Age: 69
End: 2024-09-04
Payer: MEDICARE

## 2024-09-04 ENCOUNTER — OFFICE VISIT (OUTPATIENT)
Dept: CARDIOLOGY | Facility: CLINIC | Age: 69
End: 2024-09-04
Payer: MEDICARE

## 2024-09-04 VITALS
HEIGHT: 67 IN | SYSTOLIC BLOOD PRESSURE: 140 MMHG | RESPIRATION RATE: 18 BRPM | DIASTOLIC BLOOD PRESSURE: 70 MMHG | WEIGHT: 252.4 LBS | OXYGEN SATURATION: 93 % | BODY MASS INDEX: 39.62 KG/M2 | HEART RATE: 65 BPM

## 2024-09-04 DIAGNOSIS — E78.5 DYSLIPIDEMIA: ICD-10-CM

## 2024-09-04 DIAGNOSIS — I10 ESSENTIAL HYPERTENSION: ICD-10-CM

## 2024-09-04 DIAGNOSIS — I48.0 PAF (PAROXYSMAL ATRIAL FIBRILLATION): ICD-10-CM

## 2024-09-04 DIAGNOSIS — I50.31 ACUTE DIASTOLIC CHF (CONGESTIVE HEART FAILURE): ICD-10-CM

## 2024-09-04 DIAGNOSIS — I10 ESSENTIAL HYPERTENSION: Primary | ICD-10-CM

## 2024-09-04 DIAGNOSIS — E87.6 HYPOKALEMIA: ICD-10-CM

## 2024-09-04 DIAGNOSIS — I50.31 ACUTE HEART FAILURE WITH PRESERVED EJECTION FRACTION (HFPEF): ICD-10-CM

## 2024-09-04 DIAGNOSIS — Z95.0 CARDIAC PACEMAKER IN SITU: ICD-10-CM

## 2024-09-04 LAB
ANION GAP SERPL CALCULATED.3IONS-SCNC: 10.2 MMOL/L (ref 5–15)
BUN SERPL-MCNC: 17 MG/DL (ref 8–23)
BUN/CREAT SERPL: 21 (ref 7–25)
CALCIUM SPEC-SCNC: 9.3 MG/DL (ref 8.6–10.5)
CHLORIDE SERPL-SCNC: 107 MMOL/L (ref 98–107)
CO2 SERPL-SCNC: 25.8 MMOL/L (ref 22–29)
CREAT SERPL-MCNC: 0.81 MG/DL (ref 0.76–1.27)
EGFRCR SERPLBLD CKD-EPI 2021: 95.4 ML/MIN/1.73
GLUCOSE SERPL-MCNC: 107 MG/DL (ref 65–99)
POTASSIUM SERPL-SCNC: 3.3 MMOL/L (ref 3.5–5.2)
SODIUM SERPL-SCNC: 143 MMOL/L (ref 136–145)

## 2024-09-04 PROCEDURE — 80048 BASIC METABOLIC PNL TOTAL CA: CPT

## 2024-09-04 PROCEDURE — 36415 COLL VENOUS BLD VENIPUNCTURE: CPT

## 2024-09-04 RX ORDER — VALSARTAN AND HYDROCHLOROTHIAZIDE 160; 12.5 MG/1; MG/1
2 TABLET, FILM COATED ORAL DAILY
Qty: 60 TABLET | Refills: 11 | Status: SHIPPED | OUTPATIENT
Start: 2024-09-04 | End: 2025-09-04

## 2024-09-04 RX ORDER — HYDRALAZINE HYDROCHLORIDE 25 MG/1
75 TABLET, FILM COATED ORAL 2 TIMES DAILY
Qty: 180 TABLET | Refills: 11 | Status: SHIPPED | OUTPATIENT
Start: 2024-09-04 | End: 2025-09-04

## 2024-09-04 NOTE — PATIENT INSTRUCTIONS
I think we should go back on the valsartan/HCTZ medication, which helps with blood pressure and fluid removal. If you are NOT taking this medication at home, let us know as we will probably need to reduce the hydralazine if you are currently not taking it.

## 2024-09-04 NOTE — PROGRESS NOTES
[facility]    Subjective:     Encounter Date:09/04/24      Patient ID: Alexy Ram is a 69 y.o. male.    Chief Complaint:   Chief Complaint   Patient presents with    Follow-up     2 mths      History of Present Illness    Alexy Ram is a pleasant 69-year-old gentleman who presents for follow-up.  He previously followed with [facility] heart specialist for transition his care to [facility].  He followed with Dr. Steve Rice.  He had a history of second-degree AV block, sinus bradycardia with syncope and he had a pacemaker implanted in 2005, device was changed in 2014.  He is about 30% atrial paced and 3% ventricular paced historically.  He follows with our device clinic here.  Pacing burden is about the same.  He has 2 years of battery longevity left.    We saw him in consultation for volume overload that occurred in the postoperative setting.He had some acute onset CHF that occurred in late June of this year.  He had a knee surgery on June 13, in a few weeks after he then had rather acute onset of shortness of breath that occurred and brought him to the ER.  He was noted to be volume overloaded.  He underwent an echocardiogram which revealed a small pericardial effusion, mild concentric hypertrophy, and a grossly normal LVEF.    He did have previous testing at [facility] where he followed up with his previous history of AV block listed below.    Medications were adjusted when he was seen in the hospital setting.  He was diuresed, his Actos was stopped, and he was started on Jardiance.  He feels great.  He continues to lose weight.  He is recovering well from his knee surgery.  He has no other complaints today.      Previous Cardiac Testing:  Transthoracic echocardiogram 1/4/2023  - Mild left ventricular hypertrophy with normal systolic function.    -The LVEF is 61%.   -Moderately dilated left atrium.   - Trace-to-mild mitral regurgitation.   -Borderline right ventricular enlargement  with normal systolic function.   - Mild pulmonic insufficiency.   -Mild dilation of the ascending aorta at 3.9 cm.   -Right ventricular systolic pressure of 45 mmHg.      Myocardial PET perfusion study 2/13/2023  -There is a small sized mild severity fixed perfusion defect(s) of the apex    wall consistent with apical thinning.    -No evidence of stress induced myocardial ischemia or infarction.     The following portions of the patient's history were reviewed and updated as appropriate: allergies, current medications, past family history, past medical history, past social history, past surgical history and problem list.    Past Medical History:   Diagnosis Date    Acromioclavicular separation     shoulder pulled out of place    Ankle sprain     Arthritis     OSTEOARTHRITIS    Arthritis of back     so long I don't know when it started    Arthritis of neck     Cancer     Lung    COPD (chronic obstructive pulmonary disease)     Diabetes mellitus     Dislocation, shoulder     Enlarged prostate     Fatty liver     Frozen shoulder     Hip arthrosis     History of low potassium     History of lung cancer 2018    HX LEFT LOWER LOBECTOMY    History of MRSA infection 2018    History of transfusion     Hyperlipidemia     Hypertension     Knee swelling     Left upper lobe consolidation     REPEAT CT SCANS - FOLLOWED BY PULMONARY, MOST RECENT CT SCAN 4/3/24    Low back strain     Lumbosacral disc disease     Neck strain     Neuropathy     On anticoagulant therapy     Pacemaker     PAF (paroxysmal atrial fibrillation)     Periarthritis of shoulder     Scoliosis     Second degree AV block     Sinus node dysfunction     Sleep apnea     WEARS CPAP    Slow to wake up after anesthesia     Thoracic disc disorder        Past Surgical History:   Procedure Laterality Date    BRONCHOSCOPY N/A 10/11/2018    Procedure: BRONCHOSCOPY WITH FLUORO, LEFT LOWER LOBE BRUSHINGS WET AND DRY. WITH BX'S AND BAL (IN LLL).;  Surgeon: Akil Ortiz  "MD Reji;  Location: [facility] ENDOSCOPY;  Service: Pulmonary    BRONCHOSCOPY WITH ION ROBOTIC ASSIST N/A 09/07/2023    Procedure: BRONCHOSCOPY WITH ION ROBOT AND ENDOBRONCHIAL ULTRASOUND with brushing and FNA;  Surgeon: Taye Chavira MD;  Location: [facility] ENDOSCOPY;  Service: Robotics - Pulmonary;  Laterality: N/A;  PRE/POST - left upper lobe mass    CATARACT EXTRACTION Bilateral 04/01/2024    COLONOSCOPY  2021    COLONOSCOPY W/ POLYPECTOMY N/A 10/22/2021    Procedure: COLONOSCOPY WITH POLYPECTOMY;  Surgeon: Lan Solis MD;  Location: [facility] OR;  Service: Gastroenterology;  Laterality: N/A;  cecal polyp x1 (cold snare)  ascending polyp x1 (hot snare)  transverse polyp x3 (hot snare x 1), (cold snare x2)  sigmoid polyp x1 (hot snare, clip applied)  rectal polyp x3 (cold snare)    INSERT / REPLACE / REMOVE PACEMAKER  6/5/2005    replaced Oct 2014    JOINT REPLACEMENT  Hip    Hip    KNEE ARTHROSCOPY Left     PACEMAKER IMPLANTATION  2005, 2014    THORACOSCOPY Left 11/19/2018    Procedure: BRONCHOSCOPY, DAVINCI ROBOT ASSISTED VIDEIO ASSISTED THORACOSCOPY  WITH CONVERT TO OPEN THORACOTOMY,LEFT LOWER LOBECTOMY,INTERCOSTAL NERVE BLOCK;  Surgeon: Michael Ring III, MD;  Location: [facility] MAIN OR;  Service: DaVinci    TOTAL HIP ARTHROPLASTY Left 07/14/2023    Procedure: TOTAL HIP ARTHROPLASTY;  Surgeon: Nikko Staton MD;  Location: [facility] OR [facility];  Service: Orthopedics;  Laterality: Left;    TOTAL KNEE ARTHROPLASTY Left 6/13/2024    Procedure: TOTAL KNEE ARTHROPLASTY;  Surgeon: Nikko Staton MD;  [facility];  Service: Orthopedics;  Laterality: Left;         Procedures       Objective:     Vitals:    09/04/24 1504   BP: 140/70   BP Location: Right arm   Patient Position: Sitting   Cuff Size: Adult   Pulse: 65   Resp: 18   SpO2: 93%   Weight: 114 kg (252 lb 6.4 oz)   Height: 170.2 cm (67\")         Constitutional:       Appearance: Healthy appearance. Not in distress.   Neck:      Vascular: JVD " normal.   Pulmonary:      Effort: Pulmonary effort is normal.      Breath sounds: Normal breath sounds.   Cardiovascular:      PMI at left midclavicular line. Normal rate. Regular rhythm. Normal S2.       Murmurs: There is no murmur.   Pulses:     Intact distal pulses.   Edema:     Peripheral edema present.     Pretibial: bilateral 1+ edema of the pretibial area.     Ankle: bilateral 1+ edema of the ankle.  Skin:     General: Skin is warm and dry.   Neurological:      General: No focal deficit present.      Mental Status: Alert, oriented to person, place, and time and oriented to person, place and time.   Psychiatric:         Mood and Affect: Mood and affect normal.         Lab Review:     Lipid Panel          11/28/2023    10:24 5/13/2024    08:16   Lipid Panel   Total Cholesterol 135  131    Triglycerides 92  101    HDL Cholesterol 37  36    VLDL Cholesterol 18  19    LDL Cholesterol  80  76    LDL/HDL Ratio 2.15       BUN   Date Value Ref Range Status   08/14/2024 14 8 - 23 mg/dL Final     Creatinine   Date Value Ref Range Status   08/14/2024 0.86 0.76 - 1.27 mg/dL Final   07/09/2020 0.70 0.60 - 1.30 mg/dL Final     Comment:     Serial Number: [id number]:  [id number]     Potassium   Date Value Ref Range Status   08/14/2024 3.6 3.5 - 5.2 mmol/L Final     ALT (SGPT)   Date Value Ref Range Status   06/24/2024 11 1 - 41 U/L Final     AST (SGOT)   Date Value Ref Range Status   06/24/2024 12 1 - 40 U/L Final         Performed        Assessment:          Diagnosis Plan   1. Essential hypertension  Basic Metabolic Panel    Aldosterone / Renin Ratio    valsartan-hydrochlorothiazide (Diovan HCT) 160-12.5 MG per tablet    hydrALAZINE (APRESOLINE) 25 MG tablet    empagliflozin (JARDIANCE) 25 MG tablet tablet      2. Hypokalemia  Basic Metabolic Panel    Aldosterone / Renin Ratio    valsartan-hydrochlorothiazide (Diovan HCT) 160-12.5 MG per tablet    hydrALAZINE (APRESOLINE) 25 MG tablet    empagliflozin (JARDIANCE) 25 MG  tablet tablet      3. Acute heart failure with preserved ejection fraction (HFpEF)        4. Acute diastolic CHF (congestive heart failure)               Plan:         Hypertension: Subsequent hypokalemia.  Body habitus certainly influences this but he would benefit from a secondary workup.  Will arrange for renin /aldosterone ratio with BMP today  Continue carvedilol  Continue valsartan 320/HCTZ 25.  Appears this fell off his medication list, the patient's wife today does state that he is still taking this at home  Continue hydralazine 75 twice daily  He is off amlodipine due to his history of lower extremity edema  Chronic diastolic CHF.  No evidence of acute exacerbation today.  Jardiance per above  Diuretic with hydrochlorothiazide, he does not appear to need a loop or diuretics  He was trialed on spironolactone but did have a facial rash which resolved with cessation.  Diabetes mellitus: He is off Actos.  Will increase his Jardiance to 25 given his diabetes mellitus.  He was cautioned on side effects but denies any issues at this time.  GIL.  Continue CPAP.  Atrial fibrillation, paroxysmal.  Low burden, however he has been maintained on Eliquis 5 twice daily, continue  Status post left TKA June 2024 with Dr. Staton.   History of lung cancer status post left lower lobe resection.       RTC 6 months, sooner if any new issues arise.  There was a question without the patient was taking valsartan/HCTZ, if off his medication list, however his wife still thinks he is getting at home.  I would encourage him to stay on this medication I think it has numerous benefits in this circumstance    Vlad Flanagan MD  [facility]  09/04/24  15:42 EDT       Current Outpatient Medications:     albuterol sulfate  (90 Base) MCG/ACT inhaler, Inhale 2 puffs Every 4 (Four) Hours As Needed for Wheezing., Disp: 18 g, Rfl: 1    atorvastatin (LIPITOR) 40 MG tablet, Take 1 tablet by mouth every night at bedtime., Disp:  90 tablet, Rfl: 3    carvedilol (COREG) 25 MG tablet, Take 2 tablets by mouth 2 (Two) Times a Day With Meals. Indications: High Blood Pressure Disorder, Disp: 360 tablet, Rfl: 3    cetirizine (zyrTEC) 10 MG tablet, Take 1 tablet by mouth Daily., Disp: 90 tablet, Rfl: 3    Eliquis 5 MG tablet tablet, Take 1 tablet by mouth 2 (Two) Times a Day. Indications: Prevention of Unwanted Clot in Veins, Disp: 180 tablet, Rfl: 3    empagliflozin (JARDIANCE) 25 MG tablet tablet, Take 1 tablet by mouth Daily. Indications: Cardiac Failure, Disp: 30 tablet, Rfl: 11    gabapentin (NEURONTIN) 100 MG capsule, TAKE TWO CAPSULES BY MOUTH EVERY MORNING, ONE CAPSULE IN THE MIDDLE OF THE DAY AND 2 CAPSULES AT BEDTIME  Indications: Chronic pain (Patient taking differently: Take 2 capsules by mouth 2 (Two) Times a Day. MAY TAKE ONE ADDITIONAL TABLET MIDDAY IF NEEDED   Indications: Chronic pain), Disp: 150 capsule, Rfl: 5    hydrALAZINE (APRESOLINE) 25 MG tablet, Take 3 tablets by mouth 2 (Two) Times a Day. Indications: High Blood Pressure Disorder, Disp: 180 tablet, Rfl: 11    potassium chloride (MICRO-K) 10 MEQ CR capsule, Take 1 capsule by mouth Daily., Disp: 90 capsule, Rfl: 1    tamsulosin (FLOMAX) 0.4 MG capsule 24 hr capsule, Take 1 capsule by mouth Every Night. Indications: Benign Enlargement of Prostate, Disp: 90 capsule, Rfl: 3    tiotropium bromide monohydrate (Spiriva Respimat) 2.5 MCG/ACT aerosol solution inhaler, Inhale 2 puffs Daily., Disp: 4 g, Rfl: 3    valsartan-hydrochlorothiazide (Diovan HCT) 160-12.5 MG per tablet, Take 2 tablets by mouth Daily., Disp: 60 tablet, Rfl: 11         No follow-ups on file.      Part of this note may be an electronic transcription/translation of spoken language to printed text using the Dragon Dictation System.

## 2024-09-05 ENCOUNTER — LAB (OUTPATIENT)
Dept: LAB | Facility: HOSPITAL | Age: 69
End: 2024-09-05
Payer: MEDICARE

## 2024-09-05 ENCOUNTER — TELEPHONE (OUTPATIENT)
Dept: CARDIOLOGY | Facility: CLINIC | Age: 69
End: 2024-09-05
Payer: MEDICARE

## 2024-09-05 DIAGNOSIS — I10 ESSENTIAL HYPERTENSION: ICD-10-CM

## 2024-09-05 DIAGNOSIS — E87.6 HYPOKALEMIA: ICD-10-CM

## 2024-09-05 PROCEDURE — 82088 ASSAY OF ALDOSTERONE: CPT

## 2024-09-05 PROCEDURE — 84244 ASSAY OF RENIN: CPT

## 2024-09-05 RX ORDER — VALSARTAN AND HYDROCHLOROTHIAZIDE 320; 25 MG/1; MG/1
1 TABLET, FILM COATED ORAL DAILY
Qty: 30 TABLET | Refills: 11 | Status: CANCELLED | OUTPATIENT
Start: 2024-09-05 | End: 2025-09-05

## 2024-09-05 NOTE — TELEPHONE ENCOUNTER
We can hold off on the eplerenone then.    He will need to monitor his blood pressure when he goes back on the valsartan/HCTZ Z.  We may need to come off of or stop the hydralazine altogether.

## 2024-09-05 NOTE — TELEPHONE ENCOUNTER
I tried to call Alexy Ram but there was no answer.  Left a voicemail asking patient to call back.  Will continue to try to reach pt.    HUB- if pt calls back, please transfer through to triage.    Thank you,    Deb HERNANDEZ RN  Triage [facility] [place]  09/05/24 10:16 EDT

## 2024-09-05 NOTE — TELEPHONE ENCOUNTER
Reviewed recommendations with Alexy Ram and he verbalized understanding of recommendations.  Requested patient keep BP log and provide to office next week.  He stated he will do this.    Patient reports he tried to  valsartan/HCTZ 160-12.5 mg 2 tablets daily and was told insurance would not cover this.    Called [facility] and spoke with Josephine.  Josephine stated that insurance will not cover the RX as currently entered as they would prefer provider order the stronger tablet.  Insurance will cover valsartan-HCTZ 320/25 mg 1 tablet daily.    Pended RX for signature.    Please let me know how you would like to proceed.    Thank you,  Eileen DAVIS RN  Triage Nurse [facility]  09/05/24  11:07 EDT

## 2024-09-05 NOTE — TELEPHONE ENCOUNTER
I spoke with Alexy Ram and gave them message from the provider.  They verbalized understanding.    Pt says he has not been taking valsartan/HCTZ because this was stopped a couple of months ago when he was in the hospital.  I told pt that you had sent this rx in yesterday to his [facility] pharmacy and he will need to begin taking this.  Was there any further message/instructions you wanted me to provide pt?  I did not mention the eplerenone since pt hadn't been taking Diovan.    Thank you,    Deb HERNANDEZ RN  Triage [facility] [place]  09/05/24 09:44 EDT

## 2024-09-05 NOTE — PROGRESS NOTES
Can we please call patient/patient's wife and verify if he was taking the valsartan/hydrochlorothiazide medication.    His potassium was low on the blood work again.  Valsartan should improve the potassium level so I do wonder if he is taking that or not.    If he is, indeed, taking valsartan/hydrochlorothiazide, then I think we may need to add a new medication, similar to spironolactone, to his regimen.  This should, hopefully, be less likely to cause a facial rash.  If it causes a rash he should stop it and call us.    This medication is called eplerenone.  I can send this to his pharmacy.  This helps remove a little bit of water but can treat the low potassium, and possibly treat a condition that is causing his blood pressure to be high, something called primary hyperaldosteronism.  We are waiting on the blood test to come back, may take a week.  Thank you very much.

## 2024-09-05 NOTE — TELEPHONE ENCOUNTER
Called [facility] pharmacy and spoke with Josephine.  Gave verbal order for valsartan/Hydrochlorothiazide 320/25 mg 1 tablet daily. #90, 3 refills per Dr. Flanagan to Josephine.  Josephine verbalized understanding with repeat back.    Called Alexy Ram and notified that RX for valsartan/Hydrochlorothiazide 320/25 mg 1 tablet daily has been sent to his pharmacy.  Patient verbalized understanding.    Thank you,  Eileen DAVIS RN  [place] [facility] [place]  09/05/24 12:15 EDT

## 2024-09-05 NOTE — TELEPHONE ENCOUNTER
Called and left VM. Will continue to try to reach patient. HUB transfer call to triage.     Prema Haney RN  [place] [facility] [place]

## 2024-09-05 NOTE — TELEPHONE ENCOUNTER
----- Message from Vlad Flanagan sent at 9/5/2024  8:58 AM EDT -----  Can we please call patient/patient's wife and verify if he was taking the valsartan/hydrochlorothiazide medication.    His potassium was low on the blood work again.  Valsartan should improve the potassium level so I do wonder if he is taking that or not.    If he is, indeed, taking valsartan/hydrochlorothiazide, then I think we may need to add a new medication, similar to spironolactone, to his regimen.  This should, hopefully, be less likely to cause a facial rash.  If it causes a rash he should stop it and call us.    This medication is called eplerenone.  I can send this to his pharmacy.  This helps remove a little bit of water but can treat the low potassium, and possibly treat a condition that is causing his blood pressure to be high, something called primary hyperaldosteronism.  We are waiting on the blood test to come back, may take a week.  Thank you very much.

## 2024-09-10 ENCOUNTER — DOCUMENTATION (OUTPATIENT)
Dept: PHYSICAL THERAPY | Facility: CLINIC | Age: 69
End: 2024-09-10
Payer: MEDICARE

## 2024-09-10 DIAGNOSIS — E87.6 HYPOKALEMIA: Primary | ICD-10-CM

## 2024-09-10 NOTE — PROGRESS NOTES
Discharge Summary  Discharge Summary from Physical Therapy Report      Dates  PT visit: 6/13/24- 8/14/24  Number of Visits: 13       Goals: Partially Met    Goals  Plan Goals: STGs to be completed within 30 days:  -Patient will demonstrate compliance and independence with initial HEP   PROGRESSING   -Patient will increase L Knee AROM to 5-110 degrees to help normalize gait mechanics and increase ease with transfers    MET  -Patient will perform sit to stand transfer with equilateral WB and no UE assistance to increase functional strength   MET  -Patient will ambulate household distances without AD in order to reduce reliance on UE support with gait   MET     LTGs to be completed within 90 days:  -Patient will increase L Knee AROM to 0-120 degrees to help normalize gait mechanics and increase ease with transfers  MET   -Patient will complete community mobility without AD and with even step length and heel-toe gait mechanics to return to daily walks for leisure   MET  -Patient will reduce edema in L knee by 2 cm to help reduce pain/discomfort   NOT MET  -Patient will improve score on LEFS from 33 at eval to 50 or greater to improve quality of life   MET    Discharge Plan: Continue with current home exercise program as instructed    Comments : D/C to HEP    Date of Discharge : 9/10/24        Venessa Waite, PT  Physical Therapist

## 2024-09-13 ENCOUNTER — LAB (OUTPATIENT)
Dept: LAB | Facility: HOSPITAL | Age: 69
End: 2024-09-13
Payer: MEDICARE

## 2024-09-13 ENCOUNTER — TELEPHONE (OUTPATIENT)
Dept: CARDIOLOGY | Facility: CLINIC | Age: 69
End: 2024-09-13
Payer: MEDICARE

## 2024-09-13 DIAGNOSIS — E87.6 HYPOKALEMIA: ICD-10-CM

## 2024-09-13 LAB
ANION GAP SERPL CALCULATED.3IONS-SCNC: 10.5 MMOL/L (ref 5–15)
BUN SERPL-MCNC: 17 MG/DL (ref 8–23)
BUN/CREAT SERPL: 18.9 (ref 7–25)
CALCIUM SPEC-SCNC: 9.3 MG/DL (ref 8.6–10.5)
CHLORIDE SERPL-SCNC: 100 MMOL/L (ref 98–107)
CO2 SERPL-SCNC: 28.5 MMOL/L (ref 22–29)
CREAT SERPL-MCNC: 0.9 MG/DL (ref 0.76–1.27)
EGFRCR SERPLBLD CKD-EPI 2021: 92.5 ML/MIN/1.73
GLUCOSE SERPL-MCNC: 143 MG/DL (ref 65–99)
POTASSIUM SERPL-SCNC: 3.1 MMOL/L (ref 3.5–5.2)
SODIUM SERPL-SCNC: 139 MMOL/L (ref 136–145)

## 2024-09-13 PROCEDURE — 36415 COLL VENOUS BLD VENIPUNCTURE: CPT

## 2024-09-13 PROCEDURE — 80048 BASIC METABOLIC PNL TOTAL CA: CPT

## 2024-09-13 RX ORDER — NIFEDIPINE 30 MG/1
30 TABLET, EXTENDED RELEASE ORAL DAILY
Qty: 30 TABLET | Refills: 11 | Status: SHIPPED | OUTPATIENT
Start: 2024-09-13 | End: 2025-09-13

## 2024-09-13 NOTE — TELEPHONE ENCOUNTER
I spoke with Alexy Ram and gave them message from the provider.  They verbalized understanding & have no further questions at this time.    I added pt onto Oz schedule for Thursday next week.    Thank you,    Deb HERNANDEZ RN  Triage [facility] [place]  09/13/24 10:42 EDT

## 2024-09-13 NOTE — TELEPHONE ENCOUNTER
I received a call from the  office since this pt had dropped off his BP log:        TE from a couple days ago:        Per TE, he started valsartan/HCTZ on 9/5.  Dr. Flanagan, did you want to make any further adjustments to his regimen?    Thank you,    Deb HERNANDEZ RN  Triage [facility] [place]  09/13/24 10:09 EDT

## 2024-09-13 NOTE — TELEPHONE ENCOUNTER
I would like to start him on nifedipine 30 mg once per day.  This can cause some lower extremity swelling like amlodipine but since he is no longer taking the Actos as this should be less likely.  If he has some swelling with this medication I would recommend compression stockings, it is a very, very helpful medication with blood pressure.    I would like for him to see Oz next week and for follow-up, we can consider adding eplerenone at that time, but hesitant at that just yet given the rash that occurred with spironolactone

## 2024-09-16 ENCOUNTER — TELEPHONE (OUTPATIENT)
Dept: CARDIOLOGY | Facility: CLINIC | Age: 69
End: 2024-09-16
Payer: MEDICARE

## 2024-09-16 RX ORDER — POTASSIUM CHLORIDE 750 MG/1
CAPSULE, EXTENDED RELEASE ORAL
Qty: 270 CAPSULE | Refills: 3 | Status: SHIPPED | OUTPATIENT
Start: 2024-09-16 | End: 2024-09-17 | Stop reason: SDUPTHER

## 2024-09-16 NOTE — PROGRESS NOTES
The potassium level still remains a bit low.  Can we please reach out the patient and verify his potassium tablet dose?  We may need to increase it to 20 mEq daily if he is only taking 1, 10 mEq tablet.

## 2024-09-17 ENCOUNTER — OFFICE VISIT (OUTPATIENT)
Dept: FAMILY MEDICINE CLINIC | Facility: CLINIC | Age: 69
End: 2024-09-17
Payer: MEDICARE

## 2024-09-17 ENCOUNTER — HOSPITAL ENCOUNTER (OUTPATIENT)
Dept: GENERAL RADIOLOGY | Facility: HOSPITAL | Age: 69
Discharge: HOME | End: 2024-09-17
Payer: MEDICARE

## 2024-09-17 VITALS
SYSTOLIC BLOOD PRESSURE: 130 MMHG | HEIGHT: 67 IN | BODY MASS INDEX: 39.53 KG/M2 | DIASTOLIC BLOOD PRESSURE: 70 MMHG | HEART RATE: 82 BPM | OXYGEN SATURATION: 90 % | TEMPERATURE: 98 F

## 2024-09-17 DIAGNOSIS — E26.09 PRIMARY HYPERALDOSTERONISM: ICD-10-CM

## 2024-09-17 DIAGNOSIS — E87.6 HYPOKALEMIA: ICD-10-CM

## 2024-09-17 DIAGNOSIS — R60.0 EDEMA OF RIGHT LOWER LEG: Primary | ICD-10-CM

## 2024-09-17 DIAGNOSIS — M25.579 ACUTE ANKLE PAIN, UNSPECIFIED LATERALITY: Primary | ICD-10-CM

## 2024-09-17 DIAGNOSIS — M25.579 ACUTE ANKLE PAIN, UNSPECIFIED LATERALITY: ICD-10-CM

## 2024-09-17 DIAGNOSIS — E26.09 PRIMARY HYPERALDOSTERONISM: Primary | ICD-10-CM

## 2024-09-17 DIAGNOSIS — I10 ESSENTIAL HYPERTENSION: ICD-10-CM

## 2024-09-17 LAB
ALDOST SERPL-MCNC: 29.8 NG/DL (ref 0–30)
ALDOST/RENIN PLAS-RTO: >178.4 {RATIO} (ref 0–30)
RENIN PLAS-CCNC: <0.167 NG/ML/HR (ref 0.17–5.38)

## 2024-09-17 PROCEDURE — 3075F SYST BP GE 130 - 139MM HG: CPT | Performed by: FAMILY MEDICINE

## 2024-09-17 PROCEDURE — 1160F RVW MEDS BY RX/DR IN RCRD: CPT | Performed by: FAMILY MEDICINE

## 2024-09-17 PROCEDURE — 99213 OFFICE O/P EST LOW 20 MIN: CPT | Performed by: FAMILY MEDICINE

## 2024-09-17 PROCEDURE — 73610 X-RAY EXAM OF ANKLE: CPT

## 2024-09-17 PROCEDURE — 1159F MED LIST DOCD IN RCRD: CPT | Performed by: FAMILY MEDICINE

## 2024-09-17 PROCEDURE — 3078F DIAST BP <80 MM HG: CPT | Performed by: FAMILY MEDICINE

## 2024-09-17 PROCEDURE — 1126F AMNT PAIN NOTED NONE PRSNT: CPT | Performed by: FAMILY MEDICINE

## 2024-09-17 PROCEDURE — 3044F HG A1C LEVEL LT 7.0%: CPT | Performed by: FAMILY MEDICINE

## 2024-09-17 RX ORDER — BUMETANIDE 2 MG/1
2 TABLET ORAL DAILY
Qty: 30 TABLET | Refills: 0 | Status: SHIPPED | OUTPATIENT
Start: 2024-09-17 | End: 2024-09-20 | Stop reason: ALTCHOICE

## 2024-09-17 RX ORDER — POTASSIUM CHLORIDE 750 MG/1
20 CAPSULE, EXTENDED RELEASE ORAL 2 TIMES DAILY
Qty: 120 CAPSULE | Refills: 11 | Status: SHIPPED | OUTPATIENT
Start: 2024-09-17 | End: 2024-09-20 | Stop reason: ALTCHOICE

## 2024-09-17 RX ORDER — EPLERENONE 25 MG/1
25 TABLET, FILM COATED ORAL DAILY
Qty: 30 TABLET | Refills: 11 | Status: SHIPPED | OUTPATIENT
Start: 2024-09-17 | End: 2025-09-17

## 2024-09-17 RX ORDER — POTASSIUM CHLORIDE 750 MG/1
20 CAPSULE, EXTENDED RELEASE ORAL DAILY
Qty: 60 CAPSULE | Refills: 11 | Status: SHIPPED | OUTPATIENT
Start: 2024-09-17 | End: 2024-09-17

## 2024-09-19 ENCOUNTER — OFFICE VISIT (OUTPATIENT)
Dept: CARDIOLOGY | Facility: CLINIC | Age: 69
End: 2024-09-19
Payer: MEDICARE

## 2024-09-19 VITALS
WEIGHT: 251.4 LBS | DIASTOLIC BLOOD PRESSURE: 66 MMHG | BODY MASS INDEX: 39.46 KG/M2 | HEIGHT: 67 IN | HEART RATE: 76 BPM | SYSTOLIC BLOOD PRESSURE: 120 MMHG | OXYGEN SATURATION: 93 %

## 2024-09-19 DIAGNOSIS — I50.31 ACUTE DIASTOLIC CHF (CONGESTIVE HEART FAILURE): ICD-10-CM

## 2024-09-19 DIAGNOSIS — I10 ESSENTIAL HYPERTENSION: Primary | ICD-10-CM

## 2024-09-19 DIAGNOSIS — I50.31 ACUTE HEART FAILURE WITH PRESERVED EJECTION FRACTION (HFPEF): ICD-10-CM

## 2024-09-19 DIAGNOSIS — E87.6 HYPOKALEMIA: ICD-10-CM

## 2024-09-27 ENCOUNTER — TELEPHONE (OUTPATIENT)
Dept: GASTROENTEROLOGY | Facility: CLINIC | Age: 69
End: 2024-09-27
Payer: MEDICARE

## 2024-09-27 DIAGNOSIS — Z86.0100 PERSONAL HISTORY OF COLONIC POLYPS: Primary | ICD-10-CM

## 2024-10-03 ENCOUNTER — TELEPHONE (OUTPATIENT)
Dept: CARDIOLOGY | Facility: CLINIC | Age: 69
End: 2024-10-03
Payer: MEDICARE

## 2024-10-03 NOTE — TELEPHONE ENCOUNTER
Patient called in today to talk to someone about his low blood pressure he has been having and he is also dizzy and very tired. Patient would like a call back.

## 2024-10-03 NOTE — TELEPHONE ENCOUNTER
Patient called office stating that he has been fatigued for the past 5 days.  He says blood pressure has been running low in the upper 90s systolic.  Today he did have a cough earlier in the week and tested himself for COVID which was negative.  He has not had any vomiting, diarrhea, chest pain, shortness of breath, or palpitations.  Told him to take half of his Diovan to help with his blood pressure and to increase fluids.  He has follow-up tomorrow with his labs at home to see his primary in 1 to 2 days.  He is to go to the ER if he feels any worse.

## 2024-10-04 ENCOUNTER — HOSPITAL ENCOUNTER (OUTPATIENT)
Dept: CT IMAGING | Facility: HOSPITAL | Age: 69
Discharge: HOME | End: 2024-10-04
Payer: MEDICARE

## 2024-10-04 ENCOUNTER — TELEPHONE (OUTPATIENT)
Dept: CARDIOLOGY | Facility: CLINIC | Age: 69
End: 2024-10-04
Payer: MEDICARE

## 2024-10-04 DIAGNOSIS — E26.09 PRIMARY HYPERALDOSTERONISM: ICD-10-CM

## 2024-10-04 PROCEDURE — 25510000001 IOPAMIDOL PER 1 ML: Performed by: STUDENT IN AN ORGANIZED HEALTH CARE EDUCATION/TRAINING PROGRAM

## 2024-10-04 PROCEDURE — 74170 CT ABD WO CNTRST FLWD CNTRST: CPT

## 2024-10-04 RX ORDER — IOPAMIDOL 755 MG/ML
100 INJECTION, SOLUTION INTRAVASCULAR
Status: COMPLETED | OUTPATIENT
Start: 2024-10-04 | End: 2024-10-04

## 2024-10-04 RX ADMIN — IOPAMIDOL 100 ML: 755 INJECTION, SOLUTION INTRAVENOUS at 08:48

## 2024-10-04 NOTE — TELEPHONE ENCOUNTER
Results and recommendations called to pt.  Instructed to call with any further questions or concerns.  Verbalized understanding.      Dr. Flanagan- Pt states that he will stop the hydralazine as recommended.  Pt states that he does not have BP readings with him, but will send them to you via [employer].  Pt also states that he see's endocrinologist next week.  Pt asks how long he should wait before stopping the nifedipine after stopping the hydralazine?    Kimmie Alfredo, DEN  Triage Nurse, [facility] [place]  10/04/24 10:34 EDT

## 2024-10-04 NOTE — TELEPHONE ENCOUNTER
----- Message from Vlad Flanagan sent at 10/4/2024  9:59 AM EDT -----  Can we please call patient and let him know that his CT scan does demonstrate that one of his adrenal glands has something called hyperplasia, which likely suggest that the affected adrenal gland is secreting the excess aldosterone hormone which is causing his blood pressure to be high.  Thankfully, this is a benign process.  It also appears that since he has been increasingly fatigued and his blood pressures been lower, that the eplerenone might in fact cure his high blood pressure.  It looks like he is had some low blood pressures, can we get a few blood pressure recordings?  Would like for him to stop the hydralazine first, and then stop the nifedipine, and we may be able to slowly peel back all of his blood pressure medications and settled on eplerenone.  The eplerenone here is going to be his Lifeline, as this essentially will suppress the abnormal hormone is causing the high blood pressure, he must continue this.

## 2024-10-04 NOTE — TELEPHONE ENCOUNTER
Called and left VM, will continue to try to reach pt.    HUB- please put patient straight through to triage    Kimmie Alfredo, RN  Triage RN  10/04/24 10:04 EDT

## 2024-10-04 NOTE — PROGRESS NOTES
Can we please call patient and let him know that his CT scan does demonstrate that one of his adrenal glands has something called hyperplasia, which likely suggest that the affected adrenal gland is secreting the excess aldosterone hormone which is causing his blood pressure to be high.  Thankfully, this is a benign process.  It also appears that since he has been increasingly fatigued and his blood pressures been lower, that the eplerenone might in fact cure his high blood pressure.  It looks like he is had some low blood pressures, can we get a few blood pressure recordings?  Would like for him to stop the hydralazine first, and then stop the nifedipine, and we may be able to slowly peel back all of his blood pressure medications and settled on eplerenone.  The eplerenone here is going to be his Lifeline, as this essentially will suppress the abnormal hormone is causing the high blood pressure, he must continue this.

## 2024-10-04 NOTE — TELEPHONE ENCOUNTER
Hub staff attempted to follow warm transfer process and was unsuccessful       PLEASE REACH BACK OUT WHEN YOU CAN

## 2024-10-04 NOTE — TELEPHONE ENCOUNTER
I would say just a few days.  Maybe by Monday if his blood pressure still has systolics less than 110, he can stop the nifedipine.

## 2024-10-07 DIAGNOSIS — I10 PRIMARY HYPERTENSION: ICD-10-CM

## 2024-10-07 RX ORDER — CARVEDILOL 25 MG/1
25 TABLET ORAL 2 TIMES DAILY WITH MEALS
Qty: 180 TABLET | Refills: 3 | Status: SHIPPED | OUTPATIENT
Start: 2024-10-07 | End: 2025-10-07

## 2024-10-07 NOTE — TELEPHONE ENCOUNTER
Notified patient of recommendations. Patient verbalized understanding.    Prema Haney RN  Triage [facility] [place]

## 2024-10-08 NOTE — TELEPHONE ENCOUNTER
Called and left VM, will continue to try to reach pt.    HUB- please put patient straight through to triage    Kimmie Alfredo, RN  Triage RN  10/04/24 10:54 EDT     [FreeTextEntry1] : Ms. STEFANI RIVERA is an 86 year female, ECOG 0 (Independent), never smoker, with PMHX: HTN, HF, AFIB on Xarelto (7 years), Right Breast Cancer, s/p mastectomy and reconstruction with muscle flap from abdomen, tx with chemo (1995), Lymphoma tx with chemo 2010, ruptured bowel in 2022, s/p colostomy (via Dr. Palencia). Patient was referred by pulmonologist, Dr. Caraballo for consultation for a RLL lung density 1.5 X 1.3 cm FDG avid on PET/CT. Patient presented on 9/23/24 for robotic assisted right VATs for Right lower lobe wedge resection, MLND, and R chest tube placement. Post-operatively in PACU, patient was in afib with HR in the 110's requiring 2x IV metoprolol 5mg and home diltiazem given. HR improved to 60-80's. Patient restarted on home metoprolol and diltiazem to start the following morning. Patient became bradycardic during the day on POD1 with HR 40's, BP 80 systolic. Patient asymptomatic at that time. Patient bolused 250 cc LR. HR increased to 50s, systolic 90's. Bradycardia/hypotension likely 2/2 patient receiving home dose of diltiazem in AM after receiving a dose the afternoon prior, effectively overdosing patient. Cardiology consulted, recommended holding diltiazem and metoprolol and to give calcium gluconate, which was done. Overnight, HR was in the high 50-70s, -110's systolic. Cardiology re-consulted regarding restarting home medications. Recommended: holding Aldactone until potassium <4.5, trial of short acting diltiazem and if patient tolerates, transitioning back to long acting on discharge as well as resuming Toprol 50 BID. Patient tolerated short acting diltiazem and patient restarted on long acting diltiazem on D/C.mChest tube was d/c'd POD 1. Post pull CXR showed unchanged right apical pneumothorax. Patient weaned off NC. BMP POD1 showed hyperkalemia to 5.7. EKG done which showed afib/flutter, HR 72. Patient asymptomatic. Repeat BMP 4.8. No intervention required. At time of discharge, patient's pain was well controlled, tolerating diet, and medically stable for discharge. K on day of discharge 4.5, so home Aldactone discontinued.  Chest Xray obtained prior to visit. Images and path reviewed and discussed revealing focal granulomatous inflammation with necrosis, langhan-type multinucleate giant cells reaction and surrounding fibrosis.  -Plan CT Chest in  F/U CTS F/U Pulm: Dr. Caraballo for continued management  I, Shawn Mcguire saw, examined and reviewed the diagnostic images on patient:  STEFANI RIVERA on 10/08/2024 and agreed with my Nurse Practitioner's clinical note, physical exam findings and treatment plan. Patient presented for postoperative follow-up.  She underwent robotic assisted right thoracoscopy right lower lobe wedge resection and mediastinal lymph node dissection.  Procedure date 9/23/2024.  Procedure without complications.  Patient is recovering well, surgical wounds are healing fine.  Chest x-ray with no abnormal findings.  Pathology report reviewed with the patient: No evidence of malignancy.  Benign lung parenchyma showing focal granulomatous inflammation with necrosis, lung and type multinucleated giant cell.  No additional intervention indicated.  Patient is to continue care with pulmonary Dr. Caraballo.

## 2024-10-09 ENCOUNTER — OFFICE VISIT (OUTPATIENT)
Dept: ENDOCRINOLOGY | Age: 69
End: 2024-10-09
Payer: MEDICARE

## 2024-10-09 VITALS
HEIGHT: 67 IN | BODY MASS INDEX: 39.87 KG/M2 | SYSTOLIC BLOOD PRESSURE: 124 MMHG | HEART RATE: 63 BPM | DIASTOLIC BLOOD PRESSURE: 76 MMHG | OXYGEN SATURATION: 95 % | WEIGHT: 254 LBS

## 2024-10-09 DIAGNOSIS — E26.09 PRIMARY HYPERALDOSTERONISM: Primary | ICD-10-CM

## 2024-10-09 PROCEDURE — 3074F SYST BP LT 130 MM HG: CPT | Performed by: INTERNAL MEDICINE

## 2024-10-09 PROCEDURE — 1160F RVW MEDS BY RX/DR IN RCRD: CPT | Performed by: INTERNAL MEDICINE

## 2024-10-09 PROCEDURE — 3044F HG A1C LEVEL LT 7.0%: CPT | Performed by: INTERNAL MEDICINE

## 2024-10-09 PROCEDURE — 3078F DIAST BP <80 MM HG: CPT | Performed by: INTERNAL MEDICINE

## 2024-10-09 PROCEDURE — 99203 OFFICE O/P NEW LOW 30 MIN: CPT | Performed by: INTERNAL MEDICINE

## 2024-10-09 PROCEDURE — 1159F MED LIST DOCD IN RCRD: CPT | Performed by: INTERNAL MEDICINE

## 2024-10-09 NOTE — PROGRESS NOTES
Referring provider: Vlad Flanagan MD     Chief complaint/Reason for consult: primary hyperaldosteronism    HPI:   - 69 year old male here for hyperaldosteronism and hypokalemia  - Has had issues with issues with hypertension for over 20 years  - Has had issues with low potassium for several years and is on 20 mEq of potassium bid  - Was started on eplerenone 25 mg about 3 weeks ago  - He states his carvedilol is being reduced  - CT abdomen 10/2024 did not show an adrenal adenoma but showed stable mild thickening of the left adrenal gland    The following portions of the patient's history were reviewed and updated as appropriate: allergies, current medications, past family history, past medical history, past social history, past surgical history, and problem list.      Objective     Vitals:    10/09/24 0914   BP: 124/76   Pulse: 63   SpO2: 95%        Physical Exam  Vitals reviewed.   Constitutional:       Appearance: Normal appearance.   HENT:      Head: Normocephalic and atraumatic.   Eyes:      General: No scleral icterus.  Pulmonary:      Effort: Pulmonary effort is normal. No respiratory distress.   Neurological:      Mental Status: He is alert.      Gait: Gait normal.   Psychiatric:         Mood and Affect: Mood normal.         Behavior: Behavior normal.         Thought Content: Thought content normal.         Judgment: Judgment normal.         Assessment & Plan   Primary hyperaldosteronism  - He most likely has primary hyperaldosteronism probably from adrenal hyperplasia given no adenoma was seen on recent CT  - After discussion with patient about whether potential surgical treatment versus medical management we agreed that medical management with eplerenone makes the most sense given age and comorbidities and the fact that outcomes are essentially the same with both medical and surgical treatments  - Would recommend uptitrating eplerenone until potassium is normal without the need for potassium  supplementation  - He will likely require less of his other hypertensive medications once eplerenone is uptitrated to normalize potassium    - Return to clinic PRN

## 2024-10-11 ENCOUNTER — TELEPHONE (OUTPATIENT)
Dept: GASTROENTEROLOGY | Facility: CLINIC | Age: 69
End: 2024-10-11
Payer: MEDICARE

## 2024-10-11 NOTE — TELEPHONE ENCOUNTER
VAN CALLED  SAID MARLENA ASKED HER TO LEAVE A MESSAGE      PT DOESN'T NEED TO RESCHEDULE HIS DEC PROCEDURE  THEY WOULD LIKE HIS PAPERWORK MAILED OUT WHEN MARLENA RETURNS

## 2024-10-22 ENCOUNTER — TELEPHONE (OUTPATIENT)
Dept: ORTHOPEDIC SURGERY | Facility: CLINIC | Age: 69
End: 2024-10-22

## 2024-10-22 NOTE — TELEPHONE ENCOUNTER
Caller: Darlin Ram    Relationship to patient: Emergency Contact    Best call back number: [phone]    Chief complaint: RIGHT KNEE     Type of visit: KNEE REPLACEMENT    Requested date: ASAP     Additional notes:PT IS WANTING TO GO AHEAD AND START THE PROCESS OF GETTING HIS RIGHT KNEE REPLACED.. PLEASE ADVISE..

## 2024-10-24 ENCOUNTER — OFFICE VISIT (OUTPATIENT)
Dept: CARDIOLOGY | Facility: CLINIC | Age: 69
End: 2024-10-24
Payer: MEDICARE

## 2024-10-24 VITALS
SYSTOLIC BLOOD PRESSURE: 140 MMHG | HEIGHT: 67 IN | BODY MASS INDEX: 39.49 KG/M2 | RESPIRATION RATE: 22 BRPM | WEIGHT: 251.6 LBS | DIASTOLIC BLOOD PRESSURE: 80 MMHG | HEART RATE: 65 BPM | OXYGEN SATURATION: 97 %

## 2024-10-24 DIAGNOSIS — E87.6 HYPOKALEMIA: ICD-10-CM

## 2024-10-24 DIAGNOSIS — E26.09 PRIMARY HYPERALDOSTERONISM: ICD-10-CM

## 2024-10-24 DIAGNOSIS — I10 PRIMARY HYPERTENSION: Primary | ICD-10-CM

## 2024-10-24 DIAGNOSIS — I50.31 ACUTE HEART FAILURE WITH PRESERVED EJECTION FRACTION (HFPEF): ICD-10-CM

## 2024-10-24 RX ORDER — EPLERENONE 25 MG/1
37.5 TABLET, FILM COATED ORAL DAILY
Start: 2024-10-24 | End: 2024-10-28 | Stop reason: SDUPTHER

## 2024-10-24 NOTE — PROGRESS NOTES
CARDIOLOGY        Patient Name: Alexy Ram  :1955  Age: 69 y.o.  Primary Cardiologist: Vlad Flanagan MD  Encounter Provider:  Oz Rutledge PA-C    Date of Service: 10/24/24            CHIEF COMPLAINT / REASON FOR OFFICE VISIT     Blood pressure follow-up      HISTORY OF PRESENT ILLNESS       HPI  Alexy Ram is a 69 y.o. male who presents today for blood pressure follow-up.     Pt has a  history significant for hypertension, hypokalemia,HFpEF, paroxysmal atrial fibrillation, cardiac pacemaker, and dyslipidemia presents for close follow-up.  Patient was seen in office on 2024 for 2-month follow-up.  He had labs ordered for secondary causes of hypertension.  He has some medication changes at that time.  He was to continue taking his cardiac medications along with addition of Jardiance 25 mg (increase from 10 mg).  He continues to have blood pressure readings and was started on nifedipine 30 mg daily.  He was to follow-up with me in the office.     Patient says blood pressure has improved and is now 120s systolic.  Patient denies any chest pain, palpitations, shortness of breath, or orthopnea.  Patient does have occasional swelling to his legs.  He does mention that he has had some swelling to his right ankle which she thought was gout and is being followed by his PCP.  He has not picked up his eplerenone yet.  He did schedule his CT scan for possible adenoma to adrenal gland.  His CT scan is scheduled for .     His carvedilol was decreased to 25 mg twice daily.  Patient blood pressure has been doing much better.  He has not had any swelling to his legs.  Patient denies any chest pain, shortness of breath, dizziness, lightheadedness, palpitations, or fatigue.        Brief summary of patient's pertinent cardiac history  He previously followed with [facility] heart specialist for transition his care to [facility].  He followed with Dr. Steve Rice.  He had a history of  "second-degree AV block, sinus bradycardia with syncope and he had a pacemaker implanted in 2005, device was changed in 2014.  He is about 30% atrial paced and 3% ventricular paced historically.  He follows with our device clinic here.  Pacing burden is about the same.  He has 2 years of battery longevity left.     We saw him in consultation for volume overload that occurred in the postoperative setting.He had some acute onset CHF that occurred in late June of this year.  He had a knee surgery on June 13, in a few weeks after he then had rather acute onset of shortness of breath that occurred and brought him to the ER.  He was noted to be volume overloaded.  He underwent an echocardiogram which revealed a small pericardial effusion, mild concentric hypertrophy, and a grossly normal LVEF.     He did have previous testing at [facility] where he followed up with his previous history of AV block listed below.    The following portions of the patient's history were reviewed and updated as appropriate: allergies, current medications, past family history, past medical history, past social history, past surgical history and problem list.      VITAL SIGNS     Visit Vitals  /80 (BP Location: Left arm, Patient Position: Sitting, Cuff Size: Adult)   Pulse 65   Resp 22   Ht 170.2 cm (67\")   Wt 114 kg (251 lb 9.6 oz)   SpO2 97%   BMI 39.41 kg/m²       @[email or web address]  Wt Readings from Last 3 Encounters:   10/24/24 114 kg (251 lb 9.6 oz)   10/09/24 115 kg (254 lb)   09/19/24 114 kg (251 lb 6.4 oz)     Body mass index is 39.41 kg/m².        PHYSICAL EXAMINATION     Constitutional:       General: Awake.      Appearance: Not in distress.   Pulmonary:      Effort: Pulmonary effort is normal.      Breath sounds: Normal breath sounds.   Cardiovascular:      Normal rate. Regular rhythm.      Murmurs: There is no murmur.   Skin:     General: Skin is warm.   Neurological:      Mental Status: Alert.   Psychiatric:         Behavior: " Behavior is cooperative.           REVIEWED DATA     Procedures    Cardiac Procedures:    Transthoracic echo on 1/4/23  - Mild left ventricular hypertrophy with normal systolic function.    -The LVEF is 61%.   -Moderately dilated left atrium.   - Trace-to-mild mitral regurgitation.   -Borderline right ventricular enlargement with normal systolic function.   - Mild pulmonic insufficiency.   -Mild dilation of the ascending aorta at 3.9 cm.   -Right ventricular systolic pressure of 45 mmHg.      Myocardial PET perfusion study 2/13/2023  -There is a small sized mild severity fixed perfusion defect(s) of the apex    wall consistent with apical thinning.    -No evidence of stress induced myocardial ischemia or infarction.     Lipid Panel          11/28/2023    10:24 5/13/2024    08:16   Lipid Panel   Total Cholesterol 135  131    Triglycerides 92  101    HDL Cholesterol 37  36    VLDL Cholesterol 18  19    LDL Cholesterol  80  76    LDL/HDL Ratio 2.15         Lab Results   Component Value Date     09/13/2024     09/04/2024    K 3.1 (L) 09/13/2024    K 3.3 (L) 09/04/2024     09/13/2024     09/04/2024    CO2 28.5 09/13/2024    CO2 25.8 09/04/2024    BUN 17 09/13/2024    BUN 17 09/04/2024    CREATININE 0.90 09/13/2024    CREATININE 0.81 09/04/2024    EGFRIFNONA 93 01/10/2022    EGFRIFNONA 87 07/01/2021    EGFRIFAFRI 107 01/10/2022    EGFRIFAFRI 105 07/01/2021    GLUCOSE 143 (H) 09/13/2024    GLUCOSE 107 (H) 09/04/2024    CALCIUM 9.3 09/13/2024    CALCIUM 9.3 09/04/2024    PROTENTOTREF 6.2 05/13/2024    PROTENTOTREF 6.1 11/28/2023    ALBUMIN 3.4 (L) 06/24/2024    ALBUMIN 4.0 05/13/2024    BILITOT 0.8 06/24/2024    BILITOT 0.5 05/13/2024    AST 12 06/24/2024    AST 17 05/13/2024    ALT 11 06/24/2024    ALT 17 05/13/2024     Lab Results   Component Value Date    WBC 7.98 06/30/2024    WBC 7.93 06/29/2024    HGB 11.8 (L) 06/30/2024    HGB 12.3 (L) 06/29/2024    HCT 36.7 (L) 06/30/2024    HCT 38.2  06/29/2024    MCV 87.0 06/30/2024    MCV 86.6 06/29/2024     06/30/2024     06/29/2024     Lab Results   Component Value Date    PROBNP 937.0 (H) 07/12/2024    PROBNP 901.0 (H) 06/25/2024     Lab Results   Component Value Date    TROPONINT 18 (H) 07/05/2023     Lab Results   Component Value Date    TSH 3.890 05/13/2024    TSH 2.540 08/22/2023             ASSESSMENT & PLAN     Diagnoses and all orders for this visit:    1. Essential hypertension (Primary)     2. Hypokalemia     3. Acute heart failure with preserved ejection fraction (HFpEF)     4. Acute diastolic CHF (congestive heart failure)     Hypertension: Subsequent hypokalemia.  Body habitus certainly influences this but he would benefit from a secondary workup.  Elevated renin /aldosterone ratio with concerns of hyperaldosteronism.  He saw Dr. Braden after his CT scan of his abdomen pelvis and did not recommend surgery at this time.  He did agree with patient's current medications.  2.   Continue carvedilol 25 mg twice daily  3.   Continue valsartan 320/HCTZ 25.    4.   He is off his hydralazine  5.   Tolerating nifedipine Xl 30 mg.   6.   His eplerenone will be increase to 37.5 daily.   7.   He is off amlodipine due to his history of lower extremity edema  Chronic diastolic CHF.  No evidence of acute exacerbation today.  Jardiance per above  Diuretic with hydrochlorothiazide, he does not appear to need a loop or diuretics  He was trialed on spironolactone but did have a facial rash which resolved with cessation.  Diabetes mellitus: He is off Actos.  Will increase his Jardiance to 25 given his diabetes mellitus.   GIL.  Continue CPAP.  Atrial fibrillation, paroxysmal.  Low burden, however he has been maintained on Eliquis 5 twice daily, continue  Status post left TKA June 2024 with Dr. Staton.   History of lung cancer status post left lower lobe resection.  Pain to ankle recently seen by his PCP.  This is resolved.  Likely was gout.         Patient's blood pressure has been doing well.  Patient has no symptoms in office today.  This blood pressure being recorded in the 140s we will increase his eplerenone.  Log his blood pressure for next 2 weeks and let us know what they are running.  I will have him follow-up with Dr. Flanagan and 3 months.    Return in about 3 months (around 1/24/2025) for Dr. Flanagan.    Future Appointments         Provider Department Center    12/4/2024 10:20 AM [facility] PRIMARY CARE [facility]    12/10/2024 10:20 AM Nima Farmer APRN [facility] ORTHOPEDICS [facility]    12/11/2024 10:30 AM Pipe Vaughn MD [facility] PRIMARY CARE [facility]    1/21/2025 3:00 PM Vlad Flanagan MD [facility] CARDIOLOGY [facility]    6/2/2025 4:10 PM Nima Farmer APRN [facility] ORTHOPEDICS [facility]                MEDICATIONS         Discharge Medications            Accurate as of October 24, 2024 11:19 AM. If you have any questions, ask your nurse or doctor.                Changes to Medications        Instructions Start Date   eplerenone 25 MG tablet  Commonly known as: INSPRA  What changed: Another medication with the same name was added. Make sure you understand how and when to take each.  Changed by: Oz Palenciasteadt   25 mg, Oral, Daily      eplerenone 25 MG tablet  Commonly known as: INSPRA  What changed: You were already taking a medication with the same name, and this prescription was added. Make sure you understand how and when to take each.  Changed by: Oz Palenciasteadt   37.5 mg, Oral, Daily      gabapentin 100 MG capsule  Commonly known as: NEURONTIN  What changed:   how much to take  how to take this  when to take this  additional instructions   TAKE TWO CAPSULES BY MOUTH EVERY MORNING, ONE CAPSULE IN THE MIDDLE OF THE DAY AND 2 CAPSULES AT BEDTIME             Continue These Medications        Instructions Start Date   albuterol sulfate   (90 Base) MCG/ACT inhaler  Commonly known as: PROVENTIL HFA;VENTOLIN HFA;PROAIR HFA   2 puffs, Inhalation, Every 4 Hours PRN      atorvastatin 40 MG tablet  Commonly known as: LIPITOR   40 mg, Oral, Every Night at Bedtime      carvedilol 25 MG tablet  Commonly known as: COREG   25 mg, Oral, 2 Times Daily With Meals      cetirizine 10 MG tablet  Commonly known as: zyrTEC   10 mg, Oral, Daily      Eliquis 5 MG tablet tablet  Generic drug: apixaban   5 mg, Oral, 2 Times Daily      empagliflozin 25 MG tablet tablet  Commonly known as: JARDIANCE   25 mg, Oral, Daily      NIFEdipine XL 30 MG 24 hr tablet  Commonly known as: PROCARDIA XL   30 mg, Oral, Daily      potassium chloride 10 MEQ CR tablet   20 mEq, 2 Times Daily      Spiriva Respimat 2.5 MCG/ACT aerosol solution inhaler  Generic drug: tiotropium bromide monohydrate   Inhale 2 puffs Daily.      tamsulosin 0.4 MG capsule 24 hr capsule  Commonly known as: FLOMAX   0.4 mg, Oral, Nightly      valsartan-hydrochlorothiazide 160-12.5 MG per tablet  Commonly known as: Diovan HCT   2 tablets, Oral, Daily                   **[place]ylon Disclaimer:   Much of this encounter note is an electronic transcription/translation of spoken language to printed text. The electronic translation of spoken language may permit erroneous, or at times, nonsensical words or phrases to be inadvertently transcribed. Although I have reviewed the note for such errors, some may still exist.

## 2024-10-28 DIAGNOSIS — I10 PRIMARY HYPERTENSION: ICD-10-CM

## 2024-10-28 DIAGNOSIS — E26.09 PRIMARY HYPERALDOSTERONISM: ICD-10-CM

## 2024-10-28 DIAGNOSIS — I50.31 ACUTE HEART FAILURE WITH PRESERVED EJECTION FRACTION (HFPEF): ICD-10-CM

## 2024-10-28 DIAGNOSIS — E87.6 HYPOKALEMIA: ICD-10-CM

## 2024-10-28 RX ORDER — EPLERENONE 25 MG/1
37.5 TABLET, FILM COATED ORAL DAILY
Start: 2024-10-28 | End: 2024-11-01

## 2024-10-30 DIAGNOSIS — G89.29 HIP PAIN, CHRONIC, LEFT: ICD-10-CM

## 2024-10-30 DIAGNOSIS — M25.552 HIP PAIN, CHRONIC, LEFT: ICD-10-CM

## 2024-10-30 RX ORDER — GABAPENTIN 100 MG/1
CAPSULE ORAL
Qty: 150 CAPSULE | Refills: 5 | Status: SHIPPED | OUTPATIENT
Start: 2024-10-30

## 2024-11-01 ENCOUNTER — TELEPHONE (OUTPATIENT)
Dept: CARDIOLOGY | Facility: CLINIC | Age: 69
End: 2024-11-01

## 2024-11-01 RX ORDER — EPLERENONE 25 MG/1
37.5 TABLET, FILM COATED ORAL DAILY
Qty: 135 TABLET | Refills: 1 | Status: SHIPPED | OUTPATIENT
Start: 2024-11-01

## 2024-11-01 NOTE — TELEPHONE ENCOUNTER
Caller: Alexy Ram    Relationship: Self    Best call back number: [phone]    What was the call regarding: PATIENT MISSED CALL ROM PRACTICE, NO NOTES IN CHART, NO VOICE MAIL PLEASE ADVISE.

## 2024-11-06 ENCOUNTER — TELEPHONE (OUTPATIENT)
Dept: GASTROENTEROLOGY | Facility: CLINIC | Age: 69
End: 2024-11-06
Payer: MEDICARE

## 2024-11-06 NOTE — TELEPHONE ENCOUNTER
PT LAST COLONOSCOPY-2021  DR CAMARENA  FAMILY HISTORY: SISTER/CANCER/75  DID NOT SAMANTHA ANY  SCHEDULE AT [facility]

## 2024-11-07 ENCOUNTER — TELEPHONE (OUTPATIENT)
Dept: GASTROENTEROLOGY | Facility: CLINIC | Age: 69
End: 2024-11-07
Payer: MEDICARE

## 2024-11-07 NOTE — TELEPHONE ENCOUNTER
Our patient is scheduled for a colonoscopy on 12-9-24. Patient is taking Eliquis, could you please provide guidance on the management of these medications before the procedure?   Specifically, should these medications be held prior to the procedure, and if so, for how long?     Best regards, Brianna

## 2024-11-07 NOTE — TELEPHONE ENCOUNTER
It is fine for the patient to hold his apixaban/Eliquis medication 48 hours prior to the colonoscopy and resume as soon as safe afterward.

## 2024-11-08 DIAGNOSIS — Z96.642 S/P HIP REPLACEMENT, LEFT: Primary | ICD-10-CM

## 2024-11-08 DIAGNOSIS — Z96.652 S/P TKR (TOTAL KNEE REPLACEMENT), LEFT: ICD-10-CM

## 2024-11-08 NOTE — TELEPHONE ENCOUNTER
Caller: Darlin Ram    Relationship: Emergency Contact    Best call back number:    Requested Prescriptions:   ANTIBIOTICS FOR DENTAL WORK     Pharmacy where request should be sent: [facility] - [place], [place] - [address] - [phone]  - [phone]  [phone]      Last office visit with prescribing clinician: 8/1/2023   Last telemedicine visit with prescribing clinician: Visit date not found   Next office visit with prescribing clinician: Visit date not found     Additional details provided by patient: HAS APPOINTMENT WEDNESDAY     Does the patient have less than a 3 day supply:  [x] Yes  [] No    Would you like a call back once the refill request has been completed: [] Yes [] No    If the office needs to give you a call back, can they leave a voicemail: [] Yes [] No    Laurence Wetzel Rep   11/08/24 16:25 EST

## 2024-11-11 DIAGNOSIS — E26.09 PRIMARY HYPERALDOSTERONISM: ICD-10-CM

## 2024-11-11 RX ORDER — EPLERENONE 50 MG/1
50 TABLET, FILM COATED ORAL DAILY
Qty: 90 TABLET | Refills: 3 | Status: SHIPPED | OUTPATIENT
Start: 2024-11-11 | End: 2025-11-11

## 2024-11-11 RX ORDER — CEPHALEXIN 500 MG/1
CAPSULE ORAL
Qty: 4 CAPSULE | Refills: 0 | Status: SHIPPED | OUTPATIENT
Start: 2024-11-11

## 2024-11-14 ENCOUNTER — LAB (OUTPATIENT)
Dept: LAB | Facility: HOSPITAL | Age: 69
End: 2024-11-14
Payer: MEDICARE

## 2024-11-14 DIAGNOSIS — E26.09 PRIMARY HYPERALDOSTERONISM: ICD-10-CM

## 2024-11-14 LAB
ANION GAP SERPL CALCULATED.3IONS-SCNC: 11 MMOL/L (ref 5–15)
BUN SERPL-MCNC: 24 MG/DL (ref 8–23)
BUN/CREAT SERPL: 23.3 (ref 7–25)
CALCIUM SPEC-SCNC: 9.5 MG/DL (ref 8.6–10.5)
CHLORIDE SERPL-SCNC: 105 MMOL/L (ref 98–107)
CO2 SERPL-SCNC: 26 MMOL/L (ref 22–29)
CREAT SERPL-MCNC: 1.03 MG/DL (ref 0.76–1.27)
EGFRCR SERPLBLD CKD-EPI 2021: 78.6 ML/MIN/1.73
GLUCOSE SERPL-MCNC: 138 MG/DL (ref 65–99)
POTASSIUM SERPL-SCNC: 4.2 MMOL/L (ref 3.5–5.2)
SODIUM SERPL-SCNC: 142 MMOL/L (ref 136–145)

## 2024-11-14 PROCEDURE — 36415 COLL VENOUS BLD VENIPUNCTURE: CPT

## 2024-11-14 PROCEDURE — 80048 BASIC METABOLIC PNL TOTAL CA: CPT

## 2024-11-14 NOTE — PROGRESS NOTES
Please let patient know that his blood work looks great.  It looks like that the potassium levels have normalized, and the eplerenone is doing its job.  Kidney function is stable.  Looks great.

## 2024-11-26 DIAGNOSIS — E26.09 PRIMARY HYPERALDOSTERONISM: ICD-10-CM

## 2024-11-26 DIAGNOSIS — E87.6 HYPOKALEMIA: ICD-10-CM

## 2024-11-26 DIAGNOSIS — I10 PRIMARY HYPERTENSION: Primary | ICD-10-CM

## 2024-11-26 RX ORDER — EPLERENONE 50 MG/1
50 TABLET, FILM COATED ORAL 2 TIMES DAILY
Start: 2024-11-26 | End: 2025-11-26

## 2024-12-02 ENCOUNTER — TELEPHONE (OUTPATIENT)
Dept: GASTROENTEROLOGY | Facility: CLINIC | Age: 69
End: 2024-12-02
Payer: MEDICARE

## 2024-12-03 ENCOUNTER — CLINICAL SUPPORT (OUTPATIENT)
Dept: CARDIOLOGY | Facility: CLINIC | Age: 69
End: 2024-12-03
Payer: MEDICARE

## 2024-12-03 VITALS
OXYGEN SATURATION: 92 % | SYSTOLIC BLOOD PRESSURE: 110 MMHG | WEIGHT: 251 LBS | HEART RATE: 82 BPM | BODY MASS INDEX: 39.31 KG/M2 | DIASTOLIC BLOOD PRESSURE: 64 MMHG

## 2024-12-03 RX ORDER — POTASSIUM CHLORIDE 750 MG/1
10 CAPSULE, EXTENDED RELEASE ORAL 2 TIMES DAILY
COMMUNITY
Start: 2024-11-23

## 2024-12-03 NOTE — PROGRESS NOTES
Pt present today for a BP Check. Allergies, Hx, and Medications reviewed and confirmed.     BP/HR are as follows: 110/64 HR 82     Pt states no c/o     Per EK  >>>, pt okay to go home today.    Recommendations:    No changes at this time. Continue current therapy.

## 2024-12-06 RX ORDER — SODIUM CHLORIDE 0.9 % (FLUSH) 0.9 %
10 SYRINGE (ML) INJECTION EVERY 12 HOURS SCHEDULED
Status: CANCELLED | OUTPATIENT
Start: 2024-12-06

## 2024-12-06 RX ORDER — SODIUM CHLORIDE 9 MG/ML
40 INJECTION, SOLUTION INTRAVENOUS AS NEEDED
Status: CANCELLED | OUTPATIENT
Start: 2024-12-06 | End: 2024-12-07

## 2024-12-06 RX ORDER — SODIUM CHLORIDE, SODIUM LACTATE, POTASSIUM CHLORIDE, CALCIUM CHLORIDE 600; 310; 30; 20 MG/100ML; MG/100ML; MG/100ML; MG/100ML
9 INJECTION, SOLUTION INTRAVENOUS CONTINUOUS
Status: CANCELLED | OUTPATIENT
Start: 2024-12-06 | End: 2024-12-07

## 2024-12-06 RX ORDER — LIDOCAINE HYDROCHLORIDE 10 MG/ML
0.5 INJECTION, SOLUTION INFILTRATION; PERINEURAL ONCE AS NEEDED
Status: CANCELLED | OUTPATIENT
Start: 2024-12-06

## 2024-12-06 RX ORDER — SODIUM CHLORIDE 0.9 % (FLUSH) 0.9 %
10 SYRINGE (ML) INJECTION AS NEEDED
Status: CANCELLED | OUTPATIENT
Start: 2024-12-06

## 2024-12-06 NOTE — PRE-PROCEDURE INSTRUCTIONS
Education provided to the patient on the following:    - You will need to have someone drive you home after your procedure and remain with you for 24 hours after. The  will need to remain on site during your visit.  - Please remove all jewelry, including body piercing's, and leave any valuables at home. Only bring your drivers license and insurance card on day of procedure.  - Do not wear contact lenses; wear glasses and bring your case.  - Be prepared to provide your last dose of all home medications.  - You will need to arrive at [facility] located at [address] [place] [address]. You will enter through the Emergency Room and be registered at the .  -Pt verified they have received instructions from Doctor's office regarding prep and NPO status.  -Pt unsure of medications he is allowed to take DOS. This RN referred him to his  Office for further instructions on his medications.

## 2024-12-07 ENCOUNTER — HOSPITAL ENCOUNTER (INPATIENT)
Facility: HOSPITAL | Age: 69
LOS: 2 days | Discharge: HOME | End: 2024-12-10
Attending: EMERGENCY MEDICINE | Admitting: INTERNAL MEDICINE
Payer: MEDICARE

## 2024-12-07 ENCOUNTER — APPOINTMENT (OUTPATIENT)
Dept: GENERAL RADIOLOGY | Facility: HOSPITAL | Age: 69
End: 2024-12-07
Payer: MEDICARE

## 2024-12-07 ENCOUNTER — APPOINTMENT (OUTPATIENT)
Dept: CT IMAGING | Facility: HOSPITAL | Age: 69
End: 2024-12-07
Payer: MEDICARE

## 2024-12-07 DIAGNOSIS — J98.09 BRONCHIAL OBSTRUCTION: ICD-10-CM

## 2024-12-07 DIAGNOSIS — Z90.2 STATUS POST LOBECTOMY OF LUNG: ICD-10-CM

## 2024-12-07 DIAGNOSIS — Z79.01 CHRONIC ANTICOAGULATION: ICD-10-CM

## 2024-12-07 DIAGNOSIS — R93.89 ABNORMAL CHEST CT: ICD-10-CM

## 2024-12-07 DIAGNOSIS — C34.92 SQUAMOUS CELL CARCINOMA OF LEFT LUNG: ICD-10-CM

## 2024-12-07 DIAGNOSIS — I48.0 PAF (PAROXYSMAL ATRIAL FIBRILLATION): ICD-10-CM

## 2024-12-07 DIAGNOSIS — R05.1 ACUTE COUGH: Primary | ICD-10-CM

## 2024-12-07 DIAGNOSIS — J98.11 COLLAPSE OF LEFT LUNG: ICD-10-CM

## 2024-12-07 PROBLEM — R05.9 COUGH: Status: ACTIVE | Noted: 2024-12-07

## 2024-12-07 LAB
ALBUMIN SERPL-MCNC: 3.6 G/DL (ref 3.5–5.2)
ALBUMIN/GLOB SERPL: 1.2 G/DL
ALP SERPL-CCNC: 105 U/L (ref 39–117)
ALT SERPL W P-5'-P-CCNC: 28 U/L (ref 1–41)
ANION GAP SERPL CALCULATED.3IONS-SCNC: 7.7 MMOL/L (ref 5–15)
AST SERPL-CCNC: 12 U/L (ref 1–40)
BASOPHILS # BLD AUTO: 0.02 10*3/MM3 (ref 0–0.2)
BASOPHILS NFR BLD AUTO: 0.1 % (ref 0–1.5)
BILIRUB SERPL-MCNC: 0.4 MG/DL (ref 0–1.2)
BUN SERPL-MCNC: 24 MG/DL (ref 8–23)
BUN/CREAT SERPL: 21.6 (ref 7–25)
CALCIUM SPEC-SCNC: 9.4 MG/DL (ref 8.6–10.5)
CHLORIDE SERPL-SCNC: 105 MMOL/L (ref 98–107)
CO2 SERPL-SCNC: 22.3 MMOL/L (ref 22–29)
CREAT SERPL-MCNC: 1.11 MG/DL (ref 0.76–1.27)
D-LACTATE SERPL-SCNC: 1.1 MMOL/L (ref 0.5–2)
DEPRECATED RDW RBC AUTO: 60.9 FL (ref 37–54)
EGFRCR SERPLBLD CKD-EPI 2021: 71.9 ML/MIN/1.73
EOSINOPHIL # BLD AUTO: 0.14 10*3/MM3 (ref 0–0.4)
EOSINOPHIL NFR BLD AUTO: 1 % (ref 0.3–6.2)
ERYTHROCYTE [DISTWIDTH] IN BLOOD BY AUTOMATED COUNT: 20.6 % (ref 12.3–15.4)
FLUAV SUBTYP SPEC NAA+PROBE: NOT DETECTED
FLUBV RNA ISLT QL NAA+PROBE: NOT DETECTED
GLOBULIN UR ELPH-MCNC: 2.9 GM/DL
GLUCOSE BLDC GLUCOMTR-MCNC: 112 MG/DL (ref 70–130)
GLUCOSE SERPL-MCNC: 179 MG/DL (ref 65–99)
HCT VFR BLD AUTO: 41.8 % (ref 37.5–51)
HGB BLD-MCNC: 13.4 G/DL (ref 13–17.7)
IMM GRANULOCYTES # BLD AUTO: 0.15 10*3/MM3 (ref 0–0.05)
IMM GRANULOCYTES NFR BLD AUTO: 1.1 % (ref 0–0.5)
LYMPHOCYTES # BLD AUTO: 1.72 10*3/MM3 (ref 0.7–3.1)
LYMPHOCYTES NFR BLD AUTO: 12.4 % (ref 19.6–45.3)
MCH RBC QN AUTO: 26.3 PG (ref 26.6–33)
MCHC RBC AUTO-ENTMCNC: 32.1 G/DL (ref 31.5–35.7)
MCV RBC AUTO: 82 FL (ref 79–97)
MONOCYTES # BLD AUTO: 1.16 10*3/MM3 (ref 0.1–0.9)
MONOCYTES NFR BLD AUTO: 8.4 % (ref 5–12)
NEUTROPHILS NFR BLD AUTO: 10.63 10*3/MM3 (ref 1.7–7)
NEUTROPHILS NFR BLD AUTO: 77 % (ref 42.7–76)
NT-PROBNP SERPL-MCNC: 667.1 PG/ML (ref 0–900)
PLATELET # BLD AUTO: 303 10*3/MM3 (ref 140–450)
PMV BLD AUTO: 9.9 FL (ref 6–12)
POTASSIUM SERPL-SCNC: 4 MMOL/L (ref 3.5–5.2)
PROT SERPL-MCNC: 6.5 G/DL (ref 6–8.5)
RBC # BLD AUTO: 5.1 10*6/MM3 (ref 4.14–5.8)
RSV RNA NPH QL NAA+NON-PROBE: NOT DETECTED
SARS-COV-2 RNA RESP QL NAA+PROBE: NOT DETECTED
SODIUM SERPL-SCNC: 135 MMOL/L (ref 136–145)
WBC NRBC COR # BLD AUTO: 13.82 10*3/MM3 (ref 3.4–10.8)

## 2024-12-07 PROCEDURE — 71046 X-RAY EXAM CHEST 2 VIEWS: CPT

## 2024-12-07 PROCEDURE — G0378 HOSPITAL OBSERVATION PER HR: HCPCS

## 2024-12-07 PROCEDURE — 85025 COMPLETE CBC W/AUTO DIFF WBC: CPT | Performed by: NURSE PRACTITIONER

## 2024-12-07 PROCEDURE — 80053 COMPREHEN METABOLIC PANEL: CPT | Performed by: NURSE PRACTITIONER

## 2024-12-07 PROCEDURE — 87637 SARSCOV2&INF A&B&RSV AMP PRB: CPT | Performed by: EMERGENCY MEDICINE

## 2024-12-07 PROCEDURE — 93010 ELECTROCARDIOGRAM REPORT: CPT | Performed by: INTERNAL MEDICINE

## 2024-12-07 PROCEDURE — 94760 N-INVAS EAR/PLS OXIMETRY 1: CPT

## 2024-12-07 PROCEDURE — 25010000002 PIPERACILLIN SOD-TAZOBACTAM PER 1 G: Performed by: INTERNAL MEDICINE

## 2024-12-07 PROCEDURE — 82948 REAGENT STRIP/BLOOD GLUCOSE: CPT

## 2024-12-07 PROCEDURE — 87040 BLOOD CULTURE FOR BACTERIA: CPT | Performed by: INTERNAL MEDICINE

## 2024-12-07 PROCEDURE — 99285 EMERGENCY DEPT VISIT HI MDM: CPT

## 2024-12-07 PROCEDURE — 93005 ELECTROCARDIOGRAM TRACING: CPT | Performed by: NURSE PRACTITIONER

## 2024-12-07 PROCEDURE — 71250 CT THORAX DX C-: CPT

## 2024-12-07 PROCEDURE — 83880 ASSAY OF NATRIURETIC PEPTIDE: CPT | Performed by: NURSE PRACTITIONER

## 2024-12-07 PROCEDURE — 94799 UNLISTED PULMONARY SVC/PX: CPT

## 2024-12-07 PROCEDURE — 94640 AIRWAY INHALATION TREATMENT: CPT

## 2024-12-07 PROCEDURE — 99283 EMERGENCY DEPT VISIT LOW MDM: CPT | Performed by: NURSE PRACTITIONER

## 2024-12-07 PROCEDURE — 83605 ASSAY OF LACTIC ACID: CPT | Performed by: NURSE PRACTITIONER

## 2024-12-07 RX ORDER — ALBUTEROL SULFATE 90 UG/1
2 INHALANT RESPIRATORY (INHALATION) EVERY 4 HOURS PRN
Status: DISCONTINUED | OUTPATIENT
Start: 2024-12-07 | End: 2024-12-10 | Stop reason: HOSPADM

## 2024-12-07 RX ORDER — IBUPROFEN 600 MG/1
1 TABLET ORAL
Status: DISCONTINUED | OUTPATIENT
Start: 2024-12-07 | End: 2024-12-10 | Stop reason: HOSPADM

## 2024-12-07 RX ORDER — MULTIVIT-MIN/IRON/FOLIC ACID/K 18-600-40
2000 CAPSULE ORAL DAILY
Status: DISCONTINUED | OUTPATIENT
Start: 2024-12-07 | End: 2024-12-08

## 2024-12-07 RX ORDER — NICOTINE POLACRILEX 4 MG
15 LOZENGE BUCCAL
Status: DISCONTINUED | OUTPATIENT
Start: 2024-12-07 | End: 2024-12-10 | Stop reason: HOSPADM

## 2024-12-07 RX ORDER — DEXTROSE MONOHYDRATE 25 G/50ML
25 INJECTION, SOLUTION INTRAVENOUS
Status: DISCONTINUED | OUTPATIENT
Start: 2024-12-07 | End: 2024-12-10 | Stop reason: HOSPADM

## 2024-12-07 RX ORDER — SODIUM CHLORIDE 0.9 % (FLUSH) 0.9 %
10 SYRINGE (ML) INJECTION AS NEEDED
Status: DISCONTINUED | OUTPATIENT
Start: 2024-12-07 | End: 2024-12-10 | Stop reason: HOSPADM

## 2024-12-07 RX ORDER — ACETAMINOPHEN 325 MG/1
650 TABLET ORAL EVERY 4 HOURS PRN
Status: DISCONTINUED | OUTPATIENT
Start: 2024-12-07 | End: 2024-12-10 | Stop reason: HOSPADM

## 2024-12-07 RX ORDER — CARVEDILOL 25 MG/1
25 TABLET ORAL 2 TIMES DAILY WITH MEALS
Status: DISCONTINUED | OUTPATIENT
Start: 2024-12-07 | End: 2024-12-10 | Stop reason: HOSPADM

## 2024-12-07 RX ORDER — INSULIN LISPRO 100 [IU]/ML
2-7 INJECTION, SOLUTION INTRAVENOUS; SUBCUTANEOUS
Status: DISCONTINUED | OUTPATIENT
Start: 2024-12-07 | End: 2024-12-10 | Stop reason: HOSPADM

## 2024-12-07 RX ORDER — VALSARTAN 160 MG/1
160 TABLET ORAL
Status: DISCONTINUED | OUTPATIENT
Start: 2024-12-07 | End: 2024-12-10 | Stop reason: HOSPADM

## 2024-12-07 RX ORDER — GABAPENTIN 100 MG/1
100 CAPSULE ORAL EVERY 12 HOURS SCHEDULED
Status: DISCONTINUED | OUTPATIENT
Start: 2024-12-07 | End: 2024-12-10 | Stop reason: HOSPADM

## 2024-12-07 RX ORDER — L.ACID,PARA/B.BIFIDUM/S.THERM 8B CELL
1 CAPSULE ORAL DAILY
Status: DISCONTINUED | OUTPATIENT
Start: 2024-12-07 | End: 2024-12-10 | Stop reason: HOSPADM

## 2024-12-07 RX ORDER — NITROGLYCERIN 0.4 MG/1
0.4 TABLET SUBLINGUAL
Status: DISCONTINUED | OUTPATIENT
Start: 2024-12-07 | End: 2024-12-10 | Stop reason: HOSPADM

## 2024-12-07 RX ORDER — MULTIPLE VITAMINS W/ MINERALS TAB 9MG-400MCG
1 TAB ORAL DAILY
Status: DISCONTINUED | OUTPATIENT
Start: 2024-12-07 | End: 2024-12-10 | Stop reason: HOSPADM

## 2024-12-07 RX ORDER — TAMSULOSIN HYDROCHLORIDE 0.4 MG/1
0.4 CAPSULE ORAL NIGHTLY
Status: DISCONTINUED | OUTPATIENT
Start: 2024-12-07 | End: 2024-12-10 | Stop reason: HOSPADM

## 2024-12-07 RX ORDER — ONDANSETRON 2 MG/ML
4 INJECTION INTRAMUSCULAR; INTRAVENOUS EVERY 6 HOURS PRN
Status: DISCONTINUED | OUTPATIENT
Start: 2024-12-07 | End: 2024-12-10 | Stop reason: HOSPADM

## 2024-12-07 RX ORDER — SODIUM CHLORIDE 9 MG/ML
40 INJECTION, SOLUTION INTRAVENOUS AS NEEDED
Status: DISCONTINUED | OUTPATIENT
Start: 2024-12-07 | End: 2024-12-10 | Stop reason: HOSPADM

## 2024-12-07 RX ORDER — ACETAMINOPHEN 650 MG/1
650 SUPPOSITORY RECTAL EVERY 4 HOURS PRN
Status: DISCONTINUED | OUTPATIENT
Start: 2024-12-07 | End: 2024-12-10 | Stop reason: HOSPADM

## 2024-12-07 RX ORDER — HYDROCHLOROTHIAZIDE 12.5 MG/1
12.5 TABLET ORAL
Status: DISCONTINUED | OUTPATIENT
Start: 2024-12-07 | End: 2024-12-08

## 2024-12-07 RX ORDER — ATORVASTATIN CALCIUM 20 MG/1
40 TABLET, FILM COATED ORAL DAILY
Status: DISCONTINUED | OUTPATIENT
Start: 2024-12-07 | End: 2024-12-10 | Stop reason: HOSPADM

## 2024-12-07 RX ORDER — EPLERENONE 25 MG/1
50 TABLET, FILM COATED ORAL 2 TIMES DAILY
Status: DISCONTINUED | OUTPATIENT
Start: 2024-12-07 | End: 2024-12-10 | Stop reason: HOSPADM

## 2024-12-07 RX ORDER — POTASSIUM CHLORIDE 750 MG/1
10 TABLET, FILM COATED, EXTENDED RELEASE ORAL 2 TIMES DAILY
Status: DISCONTINUED | OUTPATIENT
Start: 2024-12-07 | End: 2024-12-10 | Stop reason: HOSPADM

## 2024-12-07 RX ORDER — CETIRIZINE HYDROCHLORIDE 10 MG/1
10 TABLET ORAL DAILY
Status: DISCONTINUED | OUTPATIENT
Start: 2024-12-07 | End: 2024-12-10 | Stop reason: HOSPADM

## 2024-12-07 RX ORDER — SODIUM CHLORIDE 0.9 % (FLUSH) 0.9 %
10 SYRINGE (ML) INJECTION EVERY 12 HOURS SCHEDULED
Status: DISCONTINUED | OUTPATIENT
Start: 2024-12-07 | End: 2024-12-10 | Stop reason: HOSPADM

## 2024-12-07 RX ORDER — NIFEDIPINE 30 MG/1
30 TABLET, EXTENDED RELEASE ORAL DAILY
Status: DISCONTINUED | OUTPATIENT
Start: 2024-12-07 | End: 2024-12-10 | Stop reason: HOSPADM

## 2024-12-07 RX ORDER — ACETAMINOPHEN 160 MG/5ML
650 SOLUTION ORAL EVERY 4 HOURS PRN
Status: DISCONTINUED | OUTPATIENT
Start: 2024-12-07 | End: 2024-12-10 | Stop reason: HOSPADM

## 2024-12-07 RX ADMIN — POTASSIUM CHLORIDE 10 MEQ: 750 TABLET, EXTENDED RELEASE ORAL at 22:19

## 2024-12-07 RX ADMIN — CARVEDILOL 25 MG: 25 TABLET, FILM COATED ORAL at 18:15

## 2024-12-07 RX ADMIN — EPLERENONE 50 MG: 25 TABLET, FILM COATED ORAL at 22:19

## 2024-12-07 RX ADMIN — GABAPENTIN 100 MG: 100 CAPSULE ORAL at 22:19

## 2024-12-07 RX ADMIN — Medication 10 ML: at 22:22

## 2024-12-07 RX ADMIN — PIPERACILLIN AND TAZOBACTAM 3.38 G: 3; .375 INJECTION, POWDER, FOR SOLUTION INTRAVENOUS at 18:15

## 2024-12-07 RX ADMIN — PIPERACILLIN AND TAZOBACTAM 3.38 G: 3; .375 INJECTION, POWDER, FOR SOLUTION INTRAVENOUS at 22:18

## 2024-12-07 RX ADMIN — TIOTROPIUM BROMIDE INHALATION SPRAY 2 PUFF: 3.12 SPRAY, METERED RESPIRATORY (INHALATION) at 20:05

## 2024-12-07 RX ADMIN — TAMSULOSIN HYDROCHLORIDE 0.4 MG: 0.4 CAPSULE ORAL at 22:19

## 2024-12-07 NOTE — H&P
Internal medicine history and physical  INTERNAL MEDICINE   [facility]       Patient Identification:  Name: Alexy Ram  Age: 69 y.o.  Sex: male  :  1955  MRN: [id number]                   Primary Care Physician: Pipe Vaughn MD                               Date of admission:2024    Chief Complaint: Increasing cough greenish sputum production nasal congestion and shortness of breath with activity over the course of last couple of weeks.    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  With symptoms continue to persist and he feels that he was getting worse with cough worsening when he lays down he eventually presented to the emergency room at [facility] earlier today where workup revealed afebrile patient with heart rate in 70s to 90s and blood pressure ranging from 97/62 to 134/73 maintaining oxygen saturation between 92 to 95% on room air and CT scan of the chest which showed bronchial obstruction of the left upper lobe with atelectasis and midline shift.  Patient was not hypoxic at room air.  Patient does uses CPAP device.  Because of the abnormal chest imaging and his symptoms patient is being admitted for pulmonary and thoracic surgery evaluation.      Past Medical History:  Past  Medical History:   Diagnosis Date    Acromioclavicular separation     shoulder pulled out of place    Ankle sprain     Arthritis     OSTEOARTHRITIS    Arthritis of back     so long I don't know when it started    Arthritis of neck     Cancer     Lung    COPD (chronic obstructive pulmonary disease)     Diabetes mellitus     Dislocation, shoulder     Enlarged prostate     Fatty liver     Frozen shoulder     Hip arthrosis     History of low potassium     History of lung cancer 2018    HX LEFT LOWER LOBECTOMY    History of MRSA infection 2018    History of transfusion     Hyperlipidemia     Hypertension     Knee swelling     Left upper lobe consolidation     REPEAT CT SCANS - FOLLOWED BY PULMONARY, MOST RECENT CT SCAN 4/3/24    Low back strain     Lumbosacral disc disease     Neck strain     Neuropathy     On anticoagulant therapy     Pacemaker     PAF (paroxysmal atrial fibrillation)     Periarthritis of shoulder     Scoliosis     Second degree AV block     Sinus node dysfunction     Sleep apnea     WEARS CPAP    Slow to wake up after anesthesia     Thoracic disc disorder      Past Surgical History:  Past Surgical History:   Procedure Laterality Date    BRONCHOSCOPY N/A 10/11/2018    Procedure: BRONCHOSCOPY WITH FLUORO, LEFT LOWER LOBE BRUSHINGS WET AND DRY. WITH BX'S AND BAL (IN LLL).;  Surgeon: Akil Ortiz MD;  Location: [facility] ENDOSCOPY;  Service: Pulmonary    BRONCHOSCOPY WITH ION ROBOTIC ASSIST N/A 09/07/2023    Procedure: BRONCHOSCOPY WITH ION ROBOT AND ENDOBRONCHIAL ULTRASOUND with brushing and FNA;  Surgeon: Taye Chavira MD;  Location: [facility] ENDOSCOPY;  Service: Robotics - Pulmonary;  Laterality: N/A;  PRE/POST - left upper lobe mass    CATARACT EXTRACTION Bilateral 04/01/2024    COLONOSCOPY  2021    COLONOSCOPY W/ POLYPECTOMY N/A 10/22/2021    Procedure: COLONOSCOPY WITH POLYPECTOMY;  Surgeon: Lan Solis MD;  Location: [facility] OR;  Service: Gastroenterology;  Laterality: N/A;  cecal  polyp x1 (cold snare)  ascending polyp x1 (hot snare)  transverse polyp x3 (hot snare x 1), (cold snare x2)  sigmoid polyp x1 (hot snare, clip applied)  rectal polyp x3 (cold snare)    INSERT / REPLACE / REMOVE PACEMAKER  6/5/2005    replaced Oct 2014    JOINT REPLACEMENT  Hip    Hip    KNEE ARTHROSCOPY Left     PACEMAKER IMPLANTATION  2005, 2014    THORACOSCOPY Left 11/19/2018    Procedure: BRONCHOSCOPY, DAVINCI ROBOT ASSISTED VIDEIO ASSISTED THORACOSCOPY  WITH CONVERT TO OPEN THORACOTOMY,LEFT LOWER LOBECTOMY,INTERCOSTAL NERVE BLOCK;  Surgeon: Michael Ring III, MD;  Location[facility] MAIN OR;  [facility]    TOTAL HIP ARTHROPLASTY Left 07/14/2023    Procedure: TOTAL HIP ARTHROPLASTY;  Surgeon: Nikko Staton MD;  Location: [facility] OR [facility] [place] [place];  Service: Orthopedics;  Laterality: Left;    TOTAL KNEE ARTHROPLASTY Left 06/13/2024    Procedure: TOTAL KNEE ARTHROPLASTY;  Surgeon: Nikko Staton MD;  Location: [facility] OR [facility];  Service: Orthopedics;  Laterality: Left;      Home Meds:  Medications Prior to Admission   Medication Sig Dispense Refill Last Dose/Taking    albuterol sulfate  (90 Base) MCG/ACT inhaler Inhale 2 puffs Every 4 (Four) Hours As Needed for Wheezing. 18 g 1 Taking As Needed    atorvastatin (LIPITOR) 40 MG tablet Take 1 tablet by mouth every night at bedtime. 90 tablet 3 Taking    B Complex Vitamins (VITAMIN-B COMPLEX PO) Take  by mouth.   Taking    carvedilol (COREG) 25 MG tablet Take 1 tablet by mouth 2 (Two) Times a Day With Meals. Indications: High Blood Pressure 180 tablet 3 Taking    cetirizine (zyrTEC) 10 MG tablet Take 1 tablet by mouth Daily. 90 tablet 3 Taking    Digestive Enzymes (MULTI-ENZYME PO) Take  by mouth.   Taking    Eliquis 5 MG tablet tablet Take 1 tablet by mouth 2 (Two) Times a Day. Indications: Prevention of Unwanted Clot in Veins (Patient taking differently: Take 1 tablet by mouth Daily. Indications: Prevention of Unwanted Clot in Veins) 180 tablet 3 12/5/2024     empagliflozin (JARDIANCE) 25 MG tablet tablet Take 1 tablet by mouth Daily. Indications: Cardiac Failure 30 tablet 11 Taking    eplerenone (INSPRA) 50 MG tablet Take 1 tablet by mouth 2 (Two) Times a Day.   Taking    gabapentin (NEURONTIN) 100 MG capsule TAKE TWO CAPSULES BY MOUTH EVERY MORNING, ONE CAPSULE IN THE MIDDLE OF THE DAY AND 2 CAPSULES AT BEDTIME INDICATIONS: CHRONIC PAIN 150 capsule 5 Taking    Misc Natural Products (PROSTATE HEALTH PO) Take  by mouth.   Taking    Multiple Vitamins-Minerals (EYE VITAMINS PO) Take  by mouth.   Taking    multivitamin with minerals (MULTIVITAMIN ADULT PO) Take 1 tablet by mouth Daily.   Taking    NIFEdipine XL (PROCARDIA XL) 30 MG 24 hr tablet Take 1 tablet by mouth Daily. 30 tablet 11 Taking    potassium chloride (MICRO-K) 10 MEQ CR capsule Take 1 capsule by mouth 2 (Two) Times a Day.   Taking    Probiotic Product (PROBIOTIC DAILY PO) Take  by mouth.   Taking    tamsulosin (FLOMAX) 0.4 MG capsule 24 hr capsule Take 1 capsule by mouth Every Night. Indications: Benign Enlargement of Prostate 90 capsule 3 Taking    tiotropium bromide monohydrate (Spiriva Respimat) 2.5 MCG/ACT aerosol solution inhaler Inhale 2 puffs Daily. 4 g 3 Taking    valsartan-hydrochlorothiazide (Diovan HCT) 160-12.5 MG per tablet Take 2 tablets by mouth Daily. (Patient taking differently: Take 1 tablet by mouth Daily.) 60 tablet 11 Taking Differently    VITAMIN D PO Take  by mouth.   Taking    cephalexin (KEFLEX) 500 MG capsule Take four capsules one hour before dental procedure 4 capsule 0      Current Meds:     Current Facility-Administered Medications:     [COMPLETED] Insert peripheral IV, , , Once **AND** sodium chloride 0.9 % flush 10 mL, 10 mL, Intravenous, PRN, Bekah Horton, ROME  Allergies:  Allergies   Allergen Reactions    Spironolactone Swelling     Facial swelling     Social History:   Social History     Tobacco Use    Smoking status: Former     Current packs/day: 0.00     Average  "packs/day: 1 pack/day for 33.0 years (33.0 ttl pk-yrs)     Types: Cigarettes     Start date: 1985     Quit date: 2018     Years since quittin.9     Passive exposure: Past    Smokeless tobacco: Never   Substance Use Topics    Alcohol use: Not Currently      Family History:  Family History   Problem Relation Age of Onset    Diabetes Mother     Stroke Father     Heart disease Father     Stroke Sister     Cancer Sister         Colon    Stroke Sister     Diabetes Sister     Heart disease Brother     Diabetes Brother     Diabetes Brother     Malig Hyperthermia Neg Hx           Review of Systems  See history of present illness and past medical history.  As described in the history of presenting illness      Vitals:   /75 (BP Location: Right arm, Patient Position: Sitting)   Pulse 74   Temp 98 °F (36.7 °C) (Oral)   Resp 18   Ht 170.2 cm (67\")   Wt 116 kg (255 lb)   SpO2 92%   BMI 39.94 kg/m²   I/O: No intake or output data in the 24 hours ending 24 1617  Exam:  Patient is examined using the personal protective equipment as per guidelines from infection control for this particular patient as enacted.  Hand washing was performed before and after patient interaction.  General Appearance:    Alert, cooperative, no distress, appears stated age   Head:    Normocephalic, without obvious abnormality, atraumatic   Eyes:    PERRL, conjunctiva/corneas clear, EOM's intact, both eyes   Ears:    Normal external ear canals, both ears   Nose:   Nares normal, septum midline, mucosa normal, no drainage    or sinus tenderness   Throat:   Lips, tongue, gums normal; oral mucosa pink and moist   Neck: Trachea midline no adenopathy noted   Back:     Symmetric, no curvature, ROM normal, no CVA tenderness   Lungs:   No obvious use of accessory muscles of breathing noted decreased breath sounds at the left lung   Chest Wall:    No tenderness or deformity    Heart:  Paced rhythm   Abdomen:   Soft nontender "   Extremities: Trace ankle edema noted   Pulses:   Pulses palpable in all extremities; symmetric all extremities   Skin: No rash noted   Neurologic: Alert and oriented x 3 no cranial nerve abnormalities noted grossly nonfocal examination.       Data Review:      I reviewed the patient's new clinical results.  Results from last 7 days   Lab Units 12/07/24  1138   WBC 10*3/mm3 13.82*   HEMOGLOBIN g/dL 13.4   PLATELETS 10*3/mm3 303     Results from last 7 days   Lab Units 12/07/24  1138 12/04/24  1055   SODIUM mmol/L 135* 141   POTASSIUM mmol/L 4.0 4.0   CHLORIDE mmol/L 105 101   CO2 mmol/L 22.3 20   BUN mg/dL 24* 30*   CREATININE mg/dL 1.11 1.26   CALCIUM mg/dL 9.4 9.4   GLUCOSE mg/dL 179* 116*     Microbiology Results (last 10 days)       Procedure Component Value - Date/Time    RSV PCR - Swab, Nasopharynx [[id number]]  (Normal) Collected: 12/07/24 1111    Lab Status: Final result Specimen: Swab from Nasopharynx Updated: 12/07/24 1133     RSV, PCR Not Detected    COVID-19 and FLU A/B PCR, 1 HR TAT - Swab, Nasopharynx [[id number]]  (Normal) Collected: 12/07/24 1029    Lab Status: Final result Specimen: Swab from Nasopharynx Updated: 12/07/24 1054     COVID19 Not Detected     Influenza A PCR Not Detected     Influenza B PCR Not Detected    Narrative:      Fact sheet for providers: https://www.fda.gov/media/096235/download    Fact sheet for patients: https://www.fda.gov/media/855145/download    Test performed by PCR.          XR Chest 2 View    Result Date: 12/7/2024  There has developed a white out of the left lung in patient with previous left lower lobectomy. There is bronchial cut off at the left mainstem bronchus and this may be associate with mucous plugging or aspiration with left pulmonary opacification due to atelectasis or consolidation and suspected pleural fluid. Right to left cardiomediastinal shift.  This report was finalized on 12/7/2024 11:11 AM by Naren Newby M.D on Workstation: [id number]        Assessment:  Active Hospital Problems    Diagnosis  POA    **Cough [R05.9]  Yes    Bronchial obstruction [J98.09]  Unknown    Collapse of left lung [J98.11]  Unknown    Chronic disease anemia [D63.8]  Yes    Obstructive sleep apnea of adult [G47.33]  Yes     5      Status post lobectomy of lung [Z90.2]  Not Applicable    PAF (paroxysmal atrial fibrillation) [I48.0]  Yes    Cardiac pacemaker in situ [Z95.0]  Yes    Squamous cell carcinoma of left lung [C34.92]  Yes    Type 2 diabetes mellitus with hyperglycemia, without long-term current use of insulin [E11.65]  Yes    Hypertension [I10]  Yes       Medical decision making/care plan: See admitting orders  Cough congestion chills and greenish sputum production with symptoms getting worse upon laying down and with activity and overall no hypoxia at rest in the setting of multiple comorbidities and fairly stable imaging studies in September 2024 when he was evaluated for volume overload to dramatic changes in his imaging studies suggesting bronchial obstruction and atelectasis with midline shift-this presentation is concerning for obstructive pneumonia with mucous plugging given the timeline of development with these imaging abnormalities versus endobronchial process/malignancy.  Plan is to admit the patient treat with IV Zosyn for postobstructive pneumonia after blood cultures are drawn while awaiting pulmonary and thoracic surgery evaluation.  Continue with nebulizer treatment and as needed oxygen supplementation and allow him to use his CPAP device.  Paroxysmal atrial fibrillation with second-degree AV block-continue his current regimen for rate control but hold anticoagulation therapy in case bronchoscopy is planned by pulmonary service.  Type 2 diabetes-continue with his home regimen and watch for hypoglycemia and check hemoglobin A1c.  Hypertension-continue antihypertensive regimen and avoid hypotensive episode.  Emphysema and COPD with sleep apnea-continue  with his CPAP device and as needed nebulizer treatment.  History of squamous cell carcinoma of the left lung status post lobectomy as it was considered as localized disease and did not require adjuvant radiation or chemotherapy.  Plan is to monitor while awaiting pulmonary evaluation and possible bronchoscopy.      Dayan Jimenez MD   12/7/2024  16:17 EST    Parts of this note may be an electronic transcription/translation of spoken language to printed text using the Dragon dictation system.

## 2024-12-07 NOTE — FSED PROVIDER NOTE
EMERGENCY DEPARTMENT ENCOUNTER    Room Number:  BRANDON/BRANDON  Date seen:  12/7/2024  Time seen: 10:52 EST  PCP: Pipe Vaughn MD  Historian: patient    Discussed/obtained information from independent historians: n/a    HPI:  Chief complaint:cough, congestion  A complete HPI/ROS/PMH/PSH/SH/FH are unobtainable due to: n/a  Context:Alexy Ram is a 69 y.o. male with h/o squamous cell carcinoma left lung, lobectomy lung, obesity, obstructive sleep apnea, AV block, chronic anticoagulation, proximal atrial fibs, hypertension and type 2 diabetes who presents to the ED with c/o several days moderate cough, green productive sputum and nasal congestion.  He has had some chills but denies fever.  Denies any worse than usual shortness of breath or chest pain.  No GI symptoms.  His symptoms are not made better by anything and the cough and gagging is made worse by laying down.     He has h/o lung resection - Dr. Ring and his Pulmonary provider is Dr. Ortiz.     External (non-ED) record review: Reviewed recent visits in [facility].    Chronic or social conditions impacting care: Not applicable    ALLERGIES  Spironolactone    PAST MEDICAL HISTORY  Active Ambulatory Problems     Diagnosis Date Noted    Hypercholesterolemia 02/01/2016    Hypertension 02/01/2016    Type 2 diabetes mellitus with hyperglycemia, without long-term current use of insulin 02/01/2016    Squamous cell carcinoma of left lung 11/19/2018    Status post lobectomy of lung 03/18/2019    Class 3 severe obesity due to excess calories with body mass index (BMI) of 40.0 to 44.9 in adult 02/07/2020    Encounter for screening for malignant neoplasm of colon 04/19/2021    Family history of colon cancer 04/19/2021    Obstructive sleep apnea of adult     Screening PSA (prostate specific antigen) 08/03/2022    Osteoarthritis of left hip 07/14/2023    AV block, 2nd degree 05/04/2017    Cardiac pacemaker in situ 12/07/2018    Chronic anticoagulation 03/07/2023     PAF (paroxysmal atrial fibrillation) 12/07/2018    Adjustment and management of cardiac pacemaker 05/04/2017    Primary osteoarthritis of left knee 03/28/2024    Benign prostatic hyperplasia with urinary frequency 05/22/2024    Status post total knee replacement 06/13/2024    Acute diastolic CHF (congestive heart failure) 06/30/2024    Chronic disease anemia 06/30/2024    Dyslipidemia 06/30/2024    Acute heart failure with preserved ejection fraction (HFpEF) 06/30/2024    Primary hyperaldosteronism 09/17/2024    Edema of right lower leg 09/17/2024     Resolved Ambulatory Problems     Diagnosis Date Noted    Pulmonary infiltrate present on computed tomography 04/12/2018    Hemoptysis 04/12/2018    Squamous cell lung cancer, left 11/01/2018    Acute bronchitis with bronchospasm 03/18/2019    Elevated liver enzymes 06/24/2019    Arthritis     Primary osteoarthritis of left hip 05/04/2023    Atelectasis 09/07/2023    Atelectasis of left lung 09/07/2023    New onset of congestive heart failure 06/24/2024    CHF (congestive heart failure) 06/24/2024    Hypokalemia 06/30/2024     Past Medical History:   Diagnosis Date    Acromioclavicular separation     Ankle sprain     Arthritis of back     Arthritis of neck     Cancer     COPD (chronic obstructive pulmonary disease)     Diabetes mellitus     Dislocation, shoulder     Enlarged prostate     Fatty liver     Frozen shoulder     Hip arthrosis     History of low potassium     History of lung cancer 2018    History of MRSA infection 2018    History of transfusion     Hyperlipidemia     Knee swelling     Left upper lobe consolidation     Low back strain     Lumbosacral disc disease     Neck strain     Neuropathy     On anticoagulant therapy     Pacemaker     Periarthritis of shoulder     Scoliosis     Second degree AV block     Sinus node dysfunction     Sleep apnea     Slow to wake up after anesthesia     Thoracic disc disorder        PAST SURGICAL HISTORY  Past Surgical  History:   Procedure Laterality Date    BRONCHOSCOPY N/A 10/11/2018    Procedure: BRONCHOSCOPY WITH FLUORO, LEFT LOWER LOBE BRUSHINGS WET AND DRY. WITH BX'S AND BAL (IN LLL).;  Surgeon: Akil Ortiz MD;  Location: [facility] ENDOSCOPY;  Service: Pulmonary    BRONCHOSCOPY WITH ION ROBOTIC ASSIST N/A 09/07/2023    Procedure: BRONCHOSCOPY WITH ION ROBOT AND ENDOBRONCHIAL ULTRASOUND with brushing and FNA;  Surgeon: Taye Chavira MD;  Location: [facility] ENDOSCOPY;  Service: Robotics - Pulmonary;  Laterality: N/A;  PRE/POST - left upper lobe mass    CATARACT EXTRACTION Bilateral 04/01/2024    COLONOSCOPY  2021    COLONOSCOPY W/ POLYPECTOMY N/A 10/22/2021    Procedure: COLONOSCOPY WITH POLYPECTOMY;  Surgeon: Lan Solis MD;  Location: [facility] OR;  Service: Gastroenterology;  Laterality: N/A;  cecal polyp x1 (cold snare)  ascending polyp x1 (hot snare)  transverse polyp x3 (hot snare x 1), (cold snare x2)  sigmoid polyp x1 (hot snare, clip applied)  rectal polyp x3 (cold snare)    INSERT / REPLACE / REMOVE PACEMAKER  6/5/2005    replaced Oct 2014    JOINT REPLACEMENT  Hip    Hip    KNEE ARTHROSCOPY Left     PACEMAKER IMPLANTATION  2005, 2014    THORACOSCOPY Left 11/19/2018    Procedure: BRONCHOSCOPY, DAVINCI ROBOT ASSISTED VIDEIO ASSISTED THORACOSCOPY  WITH CONVERT TO OPEN THORACOTOMY,LEFT LOWER LOBECTOMY,INTERCOSTAL NERVE BLOCK;  Surgeon: Michael Ring III, MD;  Location: [facility] MAIN OR;  Service: DaVinci    TOTAL HIP ARTHROPLASTY Left 07/14/2023    Procedure: TOTAL HIP ARTHROPLASTY;  Surgeon: Nikko Staton MD;  Location: [facility] OR OSC;  Service: Orthopedics;  Laterality: Left;    TOTAL KNEE ARTHROPLASTY Left 06/13/2024    Procedure: TOTAL KNEE ARTHROPLASTY;  Surgeon: Nikko Staton MD;  Location[facility];  Service: Orthopedics;  Laterality: Left;       FAMILY HISTORY  Family History   Problem Relation Age of Onset    Diabetes Mother     Stroke Father     Heart disease Father     Stroke Sister      Cancer Sister         Colon    Stroke Sister     Diabetes Sister     Heart disease Brother     Diabetes Brother     Diabetes Brother     Malig Hyperthermia Neg Hx        SOCIAL HISTORY  Social History     Socioeconomic History    Marital status:    Tobacco Use    Smoking status: Former     Current packs/day: 0.00     Average packs/day: 1 pack/day for 33.0 years (33.0 ttl pk-yrs)     Types: Cigarettes     Start date: 1985     Quit date: 2018     Years since quittin.9     Passive exposure: Past    Smokeless tobacco: Never   Vaping Use    Vaping status: Never Used   Substance and Sexual Activity    Alcohol use: Not Currently    Drug use: No    Sexual activity: Not Currently     Partners: Female       REVIEW OF SYSTEMS  Review of Systems    All systems reviewed and negative except for those discussed in HPI.     PHYSICAL EXAM    I have reviewed the triage vital signs and nursing notes.  Vitals:    24 1420   BP: 97/62   Pulse: 74   Resp: 18   Temp: 98.2 °F (36.8 °C)   SpO2: 92%     Physical Exam    GENERAL: not distressed  HENT: nares patent  EYES: no scleral icterus  NECK: no ROM limitations  CV: regular rhythm, regular rate, no murmur  RESPIRATORY: Breath sounds on the left are essentially absent.  The breath sounds on the right are normal.  No tachypnea, no audible wheezing  ABDOMEN: soft  : deferred  MUSCULOSKELETAL: no deformity  NEURO: alert, moves all extremities, follows commands  SKIN: warm, dry    LAB RESULTS  Recent Results (from the past 24 hours)   COVID-19 and FLU A/B PCR, 1 HR TAT - Swab, Nasopharynx    Collection Time: 24 10:29 AM    Specimen: Nasopharynx; Swab   Result Value Ref Range    COVID19 Not Detected Not Detected - Ref. Range    Influenza A PCR Not Detected Not Detected    Influenza B PCR Not Detected Not Detected   RSV PCR - Swab, Nasopharynx    Collection Time: 24 11:11 AM    Specimen: Nasopharynx; Swab   Result Value Ref Range    RSV, PCR Not Detected  Not Detected   Comprehensive Metabolic Panel    Collection Time: 12/07/24 11:38 AM    Specimen: Blood   Result Value Ref Range    Glucose 179 (H) 65 - 99 mg/dL    BUN 24 (H) 8 - 23 mg/dL    Creatinine 1.11 0.76 - 1.27 mg/dL    Sodium 135 (L) 136 - 145 mmol/L    Potassium 4.0 3.5 - 5.2 mmol/L    Chloride 105 98 - 107 mmol/L    CO2 22.3 22.0 - 29.0 mmol/L    Calcium 9.4 8.6 - 10.5 mg/dL    Total Protein 6.5 6.0 - 8.5 g/dL    Albumin 3.6 3.5 - 5.2 g/dL    ALT (SGPT) 28 1 - 41 U/L    AST (SGOT) 12 1 - 40 U/L    Alkaline Phosphatase 105 39 - 117 U/L    Total Bilirubin 0.4 0.0 - 1.2 mg/dL    Globulin 2.9 gm/dL    A/G Ratio 1.2 g/dL    BUN/Creatinine Ratio 21.6 7.0 - 25.0    Anion Gap 7.7 5.0 - 15.0 mmol/L    eGFR 71.9 >60.0 mL/min/1.73   Lactic Acid, Plasma    Collection Time: 12/07/24 11:38 AM    Specimen: Blood   Result Value Ref Range    Lactate 1.1 0.5 - 2.0 mmol/L   BNP    Collection Time: 12/07/24 11:38 AM    Specimen: Blood   Result Value Ref Range    proBNP 667.1 0.0 - 900.0 pg/mL   CBC Auto Differential    Collection Time: 12/07/24 11:38 AM    Specimen: Blood   Result Value Ref Range    WBC 13.82 (H) 3.40 - 10.80 10*3/mm3    RBC 5.10 4.14 - 5.80 10*6/mm3    Hemoglobin 13.4 13.0 - 17.7 g/dL    Hematocrit 41.8 37.5 - 51.0 %    MCV 82.0 79.0 - 97.0 fL    MCH 26.3 (L) 26.6 - 33.0 pg    MCHC 32.1 31.5 - 35.7 g/dL    RDW 20.6 (H) 12.3 - 15.4 %    RDW-SD 60.9 (H) 37.0 - 54.0 fl    MPV 9.9 6.0 - 12.0 fL    Platelets 303 140 - 450 10*3/mm3    Neutrophil % 77.0 (H) 42.7 - 76.0 %    Lymphocyte % 12.4 (L) 19.6 - 45.3 %    Monocyte % 8.4 5.0 - 12.0 %    Eosinophil % 1.0 0.3 - 6.2 %    Basophil % 0.1 0.0 - 1.5 %    Immature Grans % 1.1 (H) 0.0 - 0.5 %    Neutrophils, Absolute 10.63 (H) 1.70 - 7.00 10*3/mm3    Lymphocytes, Absolute 1.72 0.70 - 3.10 10*3/mm3    Monocytes, Absolute 1.16 (H) 0.10 - 0.90 10*3/mm3    Eosinophils, Absolute 0.14 0.00 - 0.40 10*3/mm3    Basophils, Absolute 0.02 0.00 - 0.20 10*3/mm3    Immature Grans,  Absolute 0.15 (H) 0.00 - 0.05 10*3/mm3   ECG 12 Lead Dyspnea    Collection Time: 12/07/24 11:46 AM   Result Value Ref Range    QT Interval 440 ms    QTC Interval 454 ms       Ordered the above labs and independently interpreted results.  My findings will be discussed in the ED course or medical decision making section below    RADIOLOGY RESULTS  CT Chest Without Contrast Diagnostic    Result Date: 12/7/2024  CT CHEST WITHOUT CONTRAST  HISTORY: 69-year-old male with shortness of breath for 1 week. Left lower lobe lobectomy for lung cancer in 2018.  TECHNIQUE: Radiation dose reduction techniques were utilized, including automated exposure control and exposure modulation based on body size. 3 mm images were obtained through the chest without the administration of IV contrast. Compared with chest CT 6/24/2024.  FINDINGS: 1. The left upper lobe bronchus has become occluded and there is complete consolidation/collapse of the left upper lobe. There is greater mediastinal shift to the left.  2. There are no new pulmonary opacities on the right and there is no right pleural or pericardial effusion. Mediastinal nodes appear slightly larger and the largest AP window node measures 1.8 x 1.2 cm, previously 1.4 x 0.8 cm. The nodes may be reactive and reevaluation is recommended following treatment for the left upper lobe collapse with suspected possible superimposed pneumonia. Reevaluation of the nodes is recommended with a chest CT in 3-4 weeks.  Incidentally, there are many coronary artery calcifications.         XR Chest 2 View    Result Date: 12/7/2024  XR CHEST 2 VW-  HISTORY: 69 years of age, Male. Cough, fatigue  COMPARISON: CT angiogram chest 06/04/2024.  FINDINGS: There has developed white out of the left lung in patient with previous left lower lobectomy. There is focal bronchial cut off of the left mainstem bronchus plugging or aspiration the lesion is also in the differential diagnosis. There is opacification left  thorax which may be due to consolidation or atelectasis and/or pleural fluid right lung appears clear no focal airspace disease in the no pneumothorax is demonstrated. There is a right subclavian cardiac pacer. Right to left cardiomediastinal shift. Advanced osteoarthritis right shoulder.      There has developed a white out of the left lung in patient with previous left lower lobectomy. There is bronchial cut off at the left mainstem bronchus and this may be associate with mucous plugging or aspiration with left pulmonary opacification due to atelectasis or consolidation and suspected pleural fluid. Right to left cardiomediastinal shift.  This report was finalized on 12/7/2024 11:11 AM by Naren Newby M.D on Workstation: [id number]        Ordered the above noted radiological studies.  Independently interpreted by me.  My findings will be discussed in the medical decision section below.     PROGRESS, DATA ANALYSIS, CONSULTS AND MEDICAL DECISION MAKING    Please note that this section constitutes my independent interpretation of clinical data including lab results, radiology, EKG's.  This constitutes my independent professional opinion regarding differential diagnosis and management of this patient.  It may include any factors such as history from outside sources, review of external records, social determinants of health, management of medications, response to those treatments, and discussions with other providers.    ED Course as of 12/07/24 1501   Sat Dec 07, 2024   1214 EKG          EKG time: 1146  Rhythm/Rate: 64, sinus rhythm  P waves and CT: Prolonged EVA  QRS, axis: Normal QRS, RBBB  ST and T waves: No acute ST-T wave abnormalities    Interpreted Contemporaneously by me, independently viewed  Compared with prior dated 7/5/2023.  At that time the CT interval was short but the RBBB was present   [EW]   1338 Discussed admission with Dr. Jimenez.  He agrees to admit.  [EW]      ED Course User Index  [EW] Clifford  ROME Broussard     Orders placed during this visit:  Orders Placed This Encounter   Procedures    COVID-19 and FLU A/B PCR, 1 HR TAT - Swab, Nasopharynx    RSV PCR - Swab, Nasopharynx    XR Chest 2 View    CT Chest Without Contrast Diagnostic    Comprehensive Metabolic Panel    Lactic Acid, Plasma    BNP    CBC Auto Differential    ECG 12 Lead Dyspnea    Blood Draw With IV Start    Insert peripheral IV    Initiate Observation Status    CBC & Differential    ED Acknowledgement Form Needed;            Medical Decision Making  Pt presents with cough and chest congestion.  I considered Covid/Influenza and pneumonia.  Pt has h/o squamous cell carcinoma of lung with left lung resection.  His CXR abnormal.  I followed this up with CT chest which shows complete occlusion of left upper lobe bronchus with some mediastinal shift to left.  The patient is not requiring oxygen.  He is having moderate coughing, worse with laying down and appears more short of breath than what he reports.  Plan for admission for further workup and pulmonary consult.  Considered PE but not thought likely as he is on chronic anticoagulation and has no chest pain.  His EKG reassuring.         DIAGNOSIS  Final diagnoses:   Acute cough   Abnormal chest CT   PAF (paroxysmal atrial fibrillation)   Status post lobectomy of lung   Squamous cell carcinoma of left lung   Chronic anticoagulation          Medication List      No changes were made to your prescriptions during this visit.       Admission    Latest Documented Vital Signs:  As of 15:01 EST  BP- 97/62 HR- 74 Temp- 98.2 °F (36.8 °C) (Oral) O2 sat- 92%    Appropriate PPE utilized throughout this patient encounter to include mask, if indicated, per current protocol. Hand hygiene was performed before donning PPE and after removal when leaving the room.    Please note that portions of this were completed with a voice recognition program.     Note Disclaimer: At [facility], we believe that sharing  information builds trust and better relationships. You are receiving this note because you are receiving care at [facility] or recently visited. It is possible you will see health information before a provider has talked with you about it. This kind of information can be easy to misunderstand. To help you fully understand what it means for your health, we urge you to discuss this note with your provider.

## 2024-12-08 PROBLEM — Z72.0 TOBACCO ABUSE: Status: ACTIVE | Noted: 2024-12-08

## 2024-12-08 LAB
GLUCOSE BLDC GLUCOMTR-MCNC: 107 MG/DL (ref 70–130)
GLUCOSE BLDC GLUCOMTR-MCNC: 118 MG/DL (ref 70–130)
GLUCOSE BLDC GLUCOMTR-MCNC: 125 MG/DL (ref 70–130)
GLUCOSE BLDC GLUCOMTR-MCNC: 161 MG/DL (ref 70–130)
QT INTERVAL: 440 MS
QTC INTERVAL: 454 MS

## 2024-12-08 PROCEDURE — 94799 UNLISTED PULMONARY SVC/PX: CPT

## 2024-12-08 PROCEDURE — 63710000001 INSULIN LISPRO (HUMAN) PER 5 UNITS: Performed by: INTERNAL MEDICINE

## 2024-12-08 PROCEDURE — 25010000002 PIPERACILLIN SOD-TAZOBACTAM PER 1 G: Performed by: INTERNAL MEDICINE

## 2024-12-08 PROCEDURE — 94664 DEMO&/EVAL PT USE INHALER: CPT

## 2024-12-08 PROCEDURE — 94761 N-INVAS EAR/PLS OXIMETRY MLT: CPT

## 2024-12-08 PROCEDURE — 82948 REAGENT STRIP/BLOOD GLUCOSE: CPT

## 2024-12-08 RX ORDER — CHOLECALCIFEROL (VITAMIN D3) 25 MCG
2000 TABLET ORAL DAILY
Status: DISCONTINUED | OUTPATIENT
Start: 2024-12-08 | End: 2024-12-10 | Stop reason: HOSPADM

## 2024-12-08 RX ADMIN — Medication 1 TABLET: at 09:18

## 2024-12-08 RX ADMIN — Medication 10 ML: at 20:42

## 2024-12-08 RX ADMIN — Medication 2000 UNITS: at 10:26

## 2024-12-08 RX ADMIN — NIFEDIPINE 30 MG: 30 TABLET, FILM COATED, EXTENDED RELEASE ORAL at 09:19

## 2024-12-08 RX ADMIN — PIPERACILLIN AND TAZOBACTAM 3.38 G: 3; .375 INJECTION, POWDER, FOR SOLUTION INTRAVENOUS at 06:43

## 2024-12-08 RX ADMIN — TIOTROPIUM BROMIDE INHALATION SPRAY 2 PUFF: 3.12 SPRAY, METERED RESPIRATORY (INHALATION) at 07:01

## 2024-12-08 RX ADMIN — HYDROCHLOROTHIAZIDE 12.5 MG: 12.5 TABLET ORAL at 09:19

## 2024-12-08 RX ADMIN — MUPIROCIN 1 APPLICATION: 20 OINTMENT TOPICAL at 20:41

## 2024-12-08 RX ADMIN — GABAPENTIN 100 MG: 100 CAPSULE ORAL at 09:19

## 2024-12-08 RX ADMIN — POTASSIUM CHLORIDE 10 MEQ: 750 TABLET, EXTENDED RELEASE ORAL at 09:19

## 2024-12-08 RX ADMIN — POTASSIUM CHLORIDE 10 MEQ: 750 TABLET, EXTENDED RELEASE ORAL at 20:42

## 2024-12-08 RX ADMIN — VALSARTAN 160 MG: 160 TABLET, FILM COATED ORAL at 09:19

## 2024-12-08 RX ADMIN — TAMSULOSIN HYDROCHLORIDE 0.4 MG: 0.4 CAPSULE ORAL at 20:42

## 2024-12-08 RX ADMIN — MUPIROCIN 1 APPLICATION: 20 OINTMENT TOPICAL at 10:26

## 2024-12-08 RX ADMIN — CETIRIZINE HYDROCHLORIDE 10 MG: 10 TABLET, FILM COATED ORAL at 09:18

## 2024-12-08 RX ADMIN — EMPAGLIFLOZIN 25 MG: 10 TABLET, FILM COATED ORAL at 09:18

## 2024-12-08 RX ADMIN — EPLERENONE 50 MG: 25 TABLET, FILM COATED ORAL at 09:18

## 2024-12-08 RX ADMIN — Medication 1 CAPSULE: at 09:19

## 2024-12-08 RX ADMIN — INSULIN LISPRO 2 UNITS: 100 INJECTION, SOLUTION INTRAVENOUS; SUBCUTANEOUS at 23:05

## 2024-12-08 RX ADMIN — CARVEDILOL 25 MG: 25 TABLET, FILM COATED ORAL at 17:37

## 2024-12-08 RX ADMIN — PIPERACILLIN AND TAZOBACTAM 3.38 G: 3; .375 INJECTION, POWDER, FOR SOLUTION INTRAVENOUS at 16:48

## 2024-12-08 RX ADMIN — GABAPENTIN 100 MG: 100 CAPSULE ORAL at 20:42

## 2024-12-08 RX ADMIN — CARVEDILOL 25 MG: 25 TABLET, FILM COATED ORAL at 09:18

## 2024-12-08 RX ADMIN — ATORVASTATIN CALCIUM 40 MG: 20 TABLET, FILM COATED ORAL at 09:18

## 2024-12-08 RX ADMIN — PIPERACILLIN AND TAZOBACTAM 3.38 G: 3; .375 INJECTION, POWDER, FOR SOLUTION INTRAVENOUS at 23:05

## 2024-12-08 RX ADMIN — Medication 10 ML: at 09:21

## 2024-12-08 NOTE — PROGRESS NOTES
[facility]  [phone]  Dr. Raphael Osorio    Subjective:  LOS: 0    Chief Complaint: Pulmonary infiltrate    Sitting in a chair and on room air.  Cough is some better.    Objective   Vital Signs past 24hrs  Temp range: Temp (24hrs), Av.4 °F (36.9 °C), Min:97.7 °F (36.5 °C), Max:99.7 °F (37.6 °C)    BP range: BP: ()/(59-81) 109/62  Pulse range: Heart Rate:  [65-96] 65  Resp rate range: Resp:  [13-18] 16  Device (Oxygen Therapy): room air   Oxygen range:SpO2:  [92 %-95 %] 93 %   Mechanical Ventilator:     Physical Exam  Constitutional:       Appearance: He is obese.   Eyes:      Pupils: Pupils are equal, round, and reactive to light.   Cardiovascular:      Rate and Rhythm: Normal rate and regular rhythm.      Heart sounds: No murmur heard.  Pulmonary:      Effort: Pulmonary effort is normal.      Comments: Bronchial breath sounds left lung base  Abdominal:      General: Bowel sounds are normal.      Palpations: Abdomen is soft. There is no mass.      Tenderness: There is no abdominal tenderness.   Musculoskeletal:         General: No swelling.   Neurological:      Mental Status: He is alert.       Results Review:    I have reviewed the laboratory and imaging data since the last note by [facility] physician.  My annotations are noted in assessment and plan.      Result Review:  I have personally reviewed the results from last note by [facility] physician to 2024 08:08 EST and agree with these findings:  [x]  Laboratory list / accordion  [x]  Microbiology  [x]  Radiology  []  EKG/Telemetry   []  Cardiology/Vascular   []  Pathology  []  Old records  []  Other:      Medication Review:  I have reviewed the current MAR.  My annotations are noted in assessment and plan.    [Held by provider] apixaban, 5 mg, Oral, BID  atorvastatin, 40 mg, Oral, Daily  carvedilol, 25 mg, Oral, BID With Meals  cetirizine, 10 mg, Oral, Daily  empagliflozin, 25 mg, Oral, Daily  eplerenone, 50 mg, Oral, BID  gabapentin, 100 mg,  Oral, Q12H  valsartan, 160 mg, Oral, Q24H   And  hydroCHLOROthiazide, 12.5 mg, Oral, Q24H  insulin lispro, 2-7 Units, Subcutaneous, 4x Daily AC & at Bedtime  lactobacillus acidophilus, 1 capsule, Oral, Daily  multivitamin with minerals, 1 tablet, Oral, Daily  mupirocin, 1 Application, Each Nare, BID  NIFEdipine XL, 30 mg, Oral, Daily  piperacillin-tazobactam, 3.375 g, Intravenous, Q8H  potassium chloride, 10 mEq, Oral, BID  sodium chloride, 10 mL, Intravenous, Q12H  tamsulosin, 0.4 mg, Oral, Nightly  tiotropium bromide monohydrate, 2 puff, Inhalation, Daily - RT  Vitamin D, 2,000 Units, Oral, Daily           Lines, Drains & Airways       Active LDAs       Name Placement date Placement time Site Days    Peripheral IV 12/07/24 1137 Anterior;Distal;Left;Upper Arm 12/07/24  1137  Arm  less than 1                  Isolation status: Enhanced Droplet/Contact     Dietary Orders (From admission, onward)       Start     Ordered    12/07/24 1621  Diet: Cardiac, Diabetic, Renal; Healthy Heart (2-3 Na+); Consistent Carbohydrate; Low Sodium (2-3g), Low Potassium, Low Phosphorus; Fluid Consistency: Thin (IDDSI 0)  Diet Effective Now        References:    Diet Order Crosswalk   Question Answer Comment   Diets: Cardiac    Diets: Diabetic    Diets: Renal    Cardiac Diet: Healthy Heart (2-3 Na+)    Diabetic Diet: Consistent Carbohydrate    Renal Diet: Low Sodium (2-3g)    Renal Diet: Low Potassium    Renal Diet: Low Phosphorus    Fluid Consistency: Thin (IDDSI 0)        12/07/24 1621                    PCCM Problems  Occlusion of the left upper lobe bronchus and complete collapse of the left upper lobe with subsequent consolidation  Left lower lobectomy for squamous cell lung cancer, November 2018  Previous consolidation anterior suture line with left upper lobe partial collapse and bronchoscopy in 2023, negative for malignancy but showing organizing pneumonia  COPD  GIL on CPAP  Obesity  Type 2 diabetes mellitus  A-fib on  Eliquis  Permanent pacemaker for AV block  Chronic HFpEF      THESE ARE NEW MEDICAL PROBLEMS TO ME.    Plan of Treatment    Patient has complete occlusion of the left upper lobe bronchus.  This has occurred in the past as well and previously had development of tissue in the left upper lobe suture line causing partial collapse.  Previously biopsied in 2023 and was negative.  Last CT in June 2024 showed patent airway to the left upper lobe.  Unclear if something similar is occurred or if patient has a new lesion.  Requires clarification with bronchoscopy and repeat biopsy.  Explained to patient that he would like to do the bronchoscopy tomorrow provided he has been off the Eliquis for at least 48 hours.  Will try and schedule with one of our IP docs.    Underlying COPD without exacerbation.    GIL and on home CPAP.    Currently on Zosyn and agree for likely postobstructive pneumonia.    Raphael Osorio MD  12/08/24  08:08 EST      Part of this note may be an electronic transcription/translation of spoken language to printed text using the Dragon Dictation System.

## 2024-12-08 NOTE — CONSULTS
Pulmonary Consultation     Patient Name: Alexy Ram  Age/Sex: 69 y.o. male  : 1955  MRN: [id number]    Date of Admission: 2024  Date of Encounter Visit: 24  Encounter Provider: Ghassan Saldaña MD  Referring Provider: No Known Provider  [facility] of [facility]  Patient Care Team:  Pipe Vaughn MD as PCP - General (Family Medicine)  Steve Rice MD as Consulting Physician (Cardiac Electrophysiology)  Taye Chavira MD as Surgeon (Thoracic Surgery)  Akil Ortiz MD as Consulting Physician (Pulmonary Disease)  Nikko Staton MD as Surgeon (Orthopedic Surgery)      Subjective:     Consulted for: Abnormal chest imaging with complete opacification of the left chest    Chief Complaint: Cough and shortness of breath    History of Present Illness:  Alexy Ram is a 69 y.o. male with history of squamous cell carcinoma of the lung status post left lower lobectomy with other comorbidities including obstructive sleep apnea, second-degree AV block status post permanent pacemaker, paroxysmal atrial fibrillation on chronic anticoagulation, essential hypertension, type 2 diabetes, mild to moderate mitral regurgitation and mild dilatation of the ascending aorta, pulmonary hypertension with RVSP of 45 mmHg based on the echocardiogram from 2023, had a prior admission in 2024 with pulmonary edema and pleural effusion and requiring aggressive diuresis.  Patient follows with Dr. Akil Costa in our group, was last seen in the office on 2024.  Patient is underlying COPD moderate in severity with emphysema on Spiriva  He had squamous cell carcinoma of the lung that was initially treated by surgical resection and did not require any radiation or chemotherapy however repeat scan showed increased abnormality at the suture line which could be concerning for recurrent malignancy however biopsy was consistent with organizing pneumonia and inflammation with some change in the  "size on the follow-up imaging showing some regression.  This is being followed by serial CAT scan.  Patient is also on CPAP for the obstructive sleep apnea with good control and good adherence.  Patient still smoking  Most recent spirometry was done on 9/11/2024 showing FEV1/FVC ratio at 76% with FEV1 at 2.23 L / 76%.  Presented to the hospital through the emergency room with worsening cough and shortness of breath of 8-10 days duration with productive secretion of green/purulent sputum and nasal congestion associated with chills but no documented fever.  Patient denies any GI or  complaints  Workup in the ER showed elevated white blood cell count, chest imaging showed complete whiteout of the left chest with CT scan showing what looks like abrupt truncation of the left main bronchus suggestive of obstruction with either mucous plug or foreign body/tumor.  Patient is already on anticoagulation so did not require any DVT prophylaxis but was started on antibiotic with Zosyn, we were consulted for further recommendations.  Pulmonary Functions Testing Results:    No results found for: \"FEV1\", \"FVC\", \"ISP4FQI\", \"TLC\", \"DLCO\"    Review of Systems:   Review of Systems  As per above    Past Medical History:  Past Medical History:   Diagnosis Date    Acromioclavicular separation     shoulder pulled out of place    Ankle sprain     Arthritis     OSTEOARTHRITIS    Arthritis of back     so long I don't know when it started    Arthritis of neck     Cancer     Lung    COPD (chronic obstructive pulmonary disease)     Diabetes mellitus     Dislocation, shoulder     Enlarged prostate     Fatty liver     Frozen shoulder     Hip arthrosis     History of low potassium     History of lung cancer 2018    HX LEFT LOWER LOBECTOMY    History of MRSA infection 2018    History of transfusion     Hyperlipidemia     Hypertension     Knee swelling     Left upper lobe consolidation     REPEAT CT SCANS - FOLLOWED BY PULMONARY, MOST RECENT CT " SCAN 4/3/24    Low back strain     Lumbosacral disc disease     Neck strain     Neuropathy     On anticoagulant therapy     Pacemaker     PAF (paroxysmal atrial fibrillation)     Periarthritis of shoulder     Scoliosis     Second degree AV block     Sinus node dysfunction     Sleep apnea     WEARS CPAP    Slow to wake up after anesthesia     Thoracic disc disorder        Past Surgical History:   Procedure Laterality Date    BRONCHOSCOPY N/A 10/11/2018    Procedure: BRONCHOSCOPY WITH FLUORO, LEFT LOWER LOBE BRUSHINGS WET AND DRY. WITH BX'S AND BAL (IN LLL).;  Surgeon: Akil Ortiz MD;  Location: [facility] ENDOSCOPY;  Service: Pulmonary    BRONCHOSCOPY WITH ION ROBOTIC ASSIST N/A 09/07/2023    Procedure: BRONCHOSCOPY WITH ION ROBOT AND ENDOBRONCHIAL ULTRASOUND with brushing and FNA;  Surgeon: Taye Chavira MD;  [facility];  Service: Robotics - Pulmonary;  Laterality: N/A;  PRE/POST - left upper lobe mass    CATARACT EXTRACTION Bilateral 04/01/2024    COLONOSCOPY  2021    COLONOSCOPY W/ POLYPECTOMY N/A 10/22/2021    Procedure: COLONOSCOPY WITH POLYPECTOMY;  Surgeon: Lan Solis MD;  Location: [facility] OR;  Service: Gastroenterology;  Laterality: N/A;  cecal polyp x1 (cold snare)  ascending polyp x1 (hot snare)  transverse polyp x3 (hot snare x 1), (cold snare x2)  sigmoid polyp x1 (hot snare, clip applied)  rectal polyp x3 (cold snare)    INSERT / REPLACE / REMOVE PACEMAKER  6/5/2005    replaced Oct 2014    JOINT REPLACEMENT  Hip    Hip    KNEE ARTHROSCOPY Left     PACEMAKER IMPLANTATION  2005, 2014    THORACOSCOPY Left 11/19/2018    Procedure: BRONCHOSCOPY, DAVINCI ROBOT ASSISTED VIDEIO ASSISTED THORACOSCOPY  WITH CONVERT TO OPEN THORACOTOMY,LEFT LOWER LOBECTOMY,INTERCOSTAL NERVE BLOCK;  Surgeon: Michael Ring III, MD;  Location: [facility] MAIN OR;  Service: DaVinci    TOTAL HIP ARTHROPLASTY Left 07/14/2023    Procedure: TOTAL HIP ARTHROPLASTY;  Surgeon: Nikko Staton MD;  Location:  [facility];  Service: Orthopedics;  Laterality: Left;    TOTAL KNEE ARTHROPLASTY Left 06/13/2024    Procedure: TOTAL KNEE ARTHROPLASTY;  Surgeon: Nikko Staton MD;  Location: [facility];  Service: Orthopedics;  Laterality: Left;       Home Medications:   Medications Prior to Admission   Medication Sig Dispense Refill Last Dose/Taking    albuterol sulfate  (90 Base) MCG/ACT inhaler Inhale 2 puffs Every 4 (Four) Hours As Needed for Wheezing. 18 g 1 Taking As Needed    atorvastatin (LIPITOR) 40 MG tablet Take 1 tablet by mouth every night at bedtime. 90 tablet 3 Taking    B Complex Vitamins (VITAMIN-B COMPLEX PO) Take  by mouth.   Taking    carvedilol (COREG) 25 MG tablet Take 1 tablet by mouth 2 (Two) Times a Day With Meals. Indications: High Blood Pressure 180 tablet 3 Taking    cetirizine (zyrTEC) 10 MG tablet Take 1 tablet by mouth Daily. 90 tablet 3 Taking    Digestive Enzymes (MULTI-ENZYME PO) Take  by mouth.   Taking    Eliquis 5 MG tablet tablet Take 1 tablet by mouth 2 (Two) Times a Day. Indications: Prevention of Unwanted Clot in Veins (Patient taking differently: Take 1 tablet by mouth Daily. Indications: Prevention of Unwanted Clot in Veins) 180 tablet 3 12/5/2024    empagliflozin (JARDIANCE) 25 MG tablet tablet Take 1 tablet by mouth Daily. Indications: Cardiac Failure 30 tablet 11 Taking    eplerenone (INSPRA) 50 MG tablet Take 1 tablet by mouth 2 (Two) Times a Day.   Taking    gabapentin (NEURONTIN) 100 MG capsule TAKE TWO CAPSULES BY MOUTH EVERY MORNING, ONE CAPSULE IN THE MIDDLE OF THE DAY AND 2 CAPSULES AT BEDTIME INDICATIONS: CHRONIC PAIN 150 capsule 5 Taking    Misc Natural Products (PROSTATE HEALTH PO) Take  by mouth.   Taking    Multiple Vitamins-Minerals (EYE VITAMINS PO) Take  by mouth.   Taking    multivitamin with minerals (MULTIVITAMIN ADULT PO) Take 1 tablet by mouth Daily.   Taking    NIFEdipine XL (PROCARDIA XL) 30 MG 24 hr tablet Take 1 tablet by mouth Daily. 30 tablet 11  Taking    potassium chloride (MICRO-K) 10 MEQ CR capsule Take 1 capsule by mouth 2 (Two) Times a Day.   Taking    Probiotic Product (PROBIOTIC DAILY PO) Take  by mouth.   Taking    tamsulosin (FLOMAX) 0.4 MG capsule 24 hr capsule Take 1 capsule by mouth Every Night. Indications: Benign Enlargement of Prostate 90 capsule 3 Taking    tiotropium bromide monohydrate (Spiriva Respimat) 2.5 MCG/ACT aerosol solution inhaler Inhale 2 puffs Daily. 4 g 3 Taking    valsartan-hydrochlorothiazide (Diovan HCT) 160-12.5 MG per tablet Take 2 tablets by mouth Daily. (Patient taking differently: Take 1 tablet by mouth Daily.) 60 tablet 11 Taking Differently    VITAMIN D PO Take  by mouth.   Taking       Inpatient Medications:  Scheduled Meds:apixaban, 5 mg, Oral, BID  atorvastatin, 40 mg, Oral, Daily  carvedilol, 25 mg, Oral, BID With Meals  cetirizine, 10 mg, Oral, Daily  empagliflozin, 25 mg, Oral, Daily  eplerenone, 50 mg, Oral, BID  gabapentin, 100 mg, Oral, Q12H  valsartan, 160 mg, Oral, Q24H   And  hydroCHLOROthiazide, 12.5 mg, Oral, Q24H  insulin lispro, 2-7 Units, Subcutaneous, 4x Daily AC & at Bedtime  lactobacillus acidophilus, 1 capsule, Oral, Daily  multivitamin with minerals, 1 tablet, Oral, Daily  NIFEdipine XL, 30 mg, Oral, Daily  piperacillin-tazobactam, 3.375 g, Intravenous, Q8H  potassium chloride, 10 mEq, Oral, BID  sodium chloride, 10 mL, Intravenous, Q12H  tamsulosin, 0.4 mg, Oral, Nightly  tiotropium bromide monohydrate, 2 puff, Inhalation, Daily - RT  Vitamin D, 2,000 Units, Oral, Daily      Continuous Infusions:   PRN Meds:.  acetaminophen **OR** acetaminophen **OR** acetaminophen    albuterol sulfate HFA    Calcium Replacement - Follow Nurse / BPA Driven Protocol    dextrose    dextrose    glucagon (human recombinant)    Magnesium Standard Dose Replacement - Follow Nurse / BPA Driven Protocol    nitroglycerin    ondansetron    Phosphorus Replacement - Follow Nurse / BPA Driven Protocol    Potassium  Replacement - Follow Nurse / BPA Driven Protocol    [COMPLETED] Insert peripheral IV **AND** sodium chloride    sodium chloride    sodium chloride    Allergies:  Allergies   Allergen Reactions    Spironolactone Swelling     Facial swelling       Past Social History:  Social History     Socioeconomic History    Marital status:    Tobacco Use    Smoking status: Former     Current packs/day: 0.00     Average packs/day: 1 pack/day for 33.0 years (33.0 ttl pk-yrs)     Types: Cigarettes     Start date: 1985     Quit date: 2018     Years since quittin.9     Passive exposure: Past    Smokeless tobacco: Never   Vaping Use    Vaping status: Never Used   Substance and Sexual Activity    Alcohol use: Not Currently    Drug use: No    Sexual activity: Not Currently     Partners: Female       Past Family History:  Family History   Problem Relation Age of Onset    Diabetes Mother     Stroke Father     Heart disease Father     Stroke Sister     Cancer Sister         Colon    Stroke Sister     Diabetes Sister     Heart disease Brother     Diabetes Brother     Diabetes Brother     Malig Hyperthermia Neg Hx            Objective:   Temp:  [97.7 °F (36.5 °C)-98.2 °F (36.8 °C)] 98 °F (36.7 °C)  Heart Rate:  [67-96] 74  Resp:  [15-18] 18  BP: ()/(62-81) 120/75  SpO2:  [92 %-95 %] 92 %  on    Device (Oxygen Therapy): room air   No intake or output data in the 24 hours ending 24  Body mass index is 39.94 kg/m².      24  1038   Weight: 116 kg (255 lb)     Weight change:     Physical Exam:   Physical Exam   General:    No acute distress, alert and oriented x4, pleasant                   Head:    Normocephalic, atraumatic. External ears and nose are normal   Eyes:          Conjunctivae and sclerae normal, no icterus, PERRLA, no  discharge   Throat:   No oral lesions, no thrush, oral mucosa moist.    Neck:   Supple, trachea midline. No JVD, no cervical or supraclavicular lymphadenopathy    Lungs:      Normal chest on inspection, clear on the right, absent on the left. Respirations regular, even and unlabored.  Patient was on room air breathing comfortably talking in full sentences    Heart:    Regular rhythm and normal rate.  No murmurs, gallops, or rubs noted.   Abdomen:     Soft, nontender, nondistended, positive bowel sounds. No hepatosplenomegaly.  Truncal obesity   Extremities:   No clubbing, cyanosis, 1+ pitting edema.   Pulses:   Pulses palpable and equal bilaterally.    Skin:   No bleeding or rash. No bumps, good turgor pressure    Neuro:   Nonfocal.  Moves all extremities well. Strength 5/5 and symmetrical, no sensory deficit    Psychiatric:   Normal mood and affect.     Lab Review:   Results from last 7 days   Lab Units 12/07/24  1138 12/04/24  1055   SODIUM mmol/L 135* 141   POTASSIUM mmol/L 4.0 4.0   CHLORIDE mmol/L 105 101   CO2 mmol/L 22.3 20   BUN mg/dL 24* 30*   CREATININE mg/dL 1.11 1.26   GLUCOSE mg/dL 179* 116*   CALCIUM mg/dL 9.4 9.4   AST (SGOT) U/L 12  --    ALT (SGPT) U/L 28  --    ALBUMIN g/dL 3.6  --          Results from last 7 days   Lab Units 12/07/24  1138   WBC 10*3/mm3 13.82*   HEMOGLOBIN g/dL 13.4   HEMATOCRIT % 41.8   PLATELETS 10*3/mm3 303   MCV fL 82.0   MCH pg 26.3*   MCHC g/dL 32.1   RDW % 20.6*   RDW-SD fl 60.9*   MPV fL 9.9   NEUTROPHIL % % 77.0*   LYMPHOCYTE % % 12.4*   MONOCYTES % % 8.4   EOSINOPHIL % % 1.0   BASOPHIL % % 0.1   IMM GRAN % % 1.1*   NEUTROS ABS 10*3/mm3 10.63*   LYMPHS ABS 10*3/mm3 1.72   MONOS ABS 10*3/mm3 1.16*   EOS ABS 10*3/mm3 0.14   BASOS ABS 10*3/mm3 0.02   IMMATURE GRANS (ABS) 10*3/mm3 0.15*             Results from last 7 days   Lab Units 12/04/24  1055   TRIGLYCERIDES mg/dL 86   HDL CHOL mg/dL 39*     Results from last 7 days   Lab Units 12/07/24  1138   PROBNP pg/mL 667.1             Results from last 7 days   Lab Units 12/07/24  1138   LACTATE mmol/L 1.1                     Results from last 7 days   Lab Units 12/07/24  1111 12/07/24  1029    COVID19   --  Not Detected   INFLUENZA B PCR   --  Not Detected   RSV, PCR  Not Detected  --              Imaging:  Imaging Results (Most Recent)       Procedure Component Value Units Date/Time    CT Chest Without Contrast Diagnostic [[id number]] Collected: 12/07/24 1220     Updated: 12/07/24 1855    Narrative:      CT CHEST WITHOUT CONTRAST     HISTORY: 69-year-old male with shortness of breath for 1 week. Left  lower lobe lobectomy for lung cancer in 2018.     TECHNIQUE: Radiation dose reduction techniques were utilized, including  automated exposure control and exposure modulation based on body size.   3 mm images were obtained through the chest without the administration  of IV contrast. Compared with chest CT 6/24/2024.     FINDINGS:  1. The left upper lobe bronchus has become occluded and there is  complete consolidation/collapse of the left upper lobe. There is greater  mediastinal shift to the left.     2. There are no new pulmonary opacities on the right and there is no  right pleural or pericardial effusion. Mediastinal nodes appear slightly  larger and the largest AP window node measures 1.8 x 1.2 cm, previously  1.4 x 0.8 cm. The nodes may be reactive and reevaluation is recommended  following treatment for the left upper lobe collapse with suspected  possible superimposed pneumonia.  Reevaluation of the nodes is recommended with a chest CT in 3-4 weeks.     Incidentally, there are many coronary artery calcifications.              This report was finalized on 12/7/2024 6:52 PM by Dr. Carina Ryan M.D on  Workstation: [id number]       XR Chest 2 View [[id number]] Collected: 12/07/24 1045     Updated: 12/07/24 1114    Narrative:      XR CHEST 2 VW-     HISTORY: 69 years of age, Male. Cough, fatigue     COMPARISON: CT angiogram chest 06/04/2024.     FINDINGS: There has developed white out of the left lung in patient with  previous left lower lobectomy. There is focal bronchial cut off of the  left  mainstem bronchus plugging or aspiration the lesion is also in the  differential diagnosis. There is opacification left thorax which may be  due to consolidation or atelectasis and/or pleural fluid right lung  appears clear no focal airspace disease in the no pneumothorax is  demonstrated. There is a right subclavian cardiac pacer. Right to left  cardiomediastinal shift. Advanced osteoarthritis right shoulder.       Impression:      There has developed a white out of the left lung in patient  with previous left lower lobectomy. There is bronchial cut off at the  left mainstem bronchus and this may be associate with mucous plugging or  aspiration with left pulmonary opacification due to atelectasis or  consolidation and suspected pleural fluid. Right to left  cardiomediastinal shift.     This report was finalized on 12/7/2024 11:11 AM by Naren Newby M.D  on Workstation: [id number]               I personally viewed and interpreted the patient's imaging studies.    Assessment:   Left-sided pneumonia with complete left lung atelectasis, likely mucous plugging cannot rule out malignancy  Sepsis secondary to above  Obstructive sleep apnea on CPAP, patient brought with him his home CPAP machine  Squamous cell lung cancer status post left lower lobectomy with abnormal nodule on follow-up imaging with biopsy showing inflammation with possible organizing pneumonia  Type 2 diabetes mellitus  Morbid obesity  Paroxysmal atrial fibrillation on anticoagulation, has been off the Eliquis since yesterday evening (patient was supposed to have a colonoscopy on Monday and was told to stop the Eliquis as of Saturday morning)  Grade 2 AV block status post permanent pacemaker placement  COPD, moderate in severity on Spiriva and as needed short acting bronchodilator  Elevated aldosterone/renin ratio  Prior admission with pulmonary edema and pleural effusion and volume overload      Plan:     Patient was already started on  antibiotics, patient has significant changes on his CAT scan with what looks like significant obstruction of the left main bronchus with what may be a mucous plug however given his history of malignancy other possibility need to be considered.  Patient denies any choking and has no history of any dysphagia.  Given the history were suspecting mucous plugging from pneumonia  Patient needs to be set up for a bronchoscopy, I do not see the need for expedited procedure since patient is in no distress, on room air so we can wait for 8 hours of the Eliquis and do it on Monday.  Continue with Spiriva and as needed bronchodilators  Will defer the diabetes and hemodynamic management to the internal medicine team  Discussed with the patient with the family  Office records reviewed  Films from this admission and prior admission were reviewed  Discussed with internal medicine team        Thank you for allowing me to participate in the care of Alexy Ram. Feel free to contact me directly with any further questions or concerns.    Ghassan Saldaña MD  [facility]   12/07/24  19:27 EST    Dictated utilizing Dragon dictation

## 2024-12-08 NOTE — PROGRESS NOTES
Name: Alexy Ram ADMIT: 2024   : 1955  PCP: Pipe Vaughn MD    MRN: [id number] LOS: 0 days   AGE/SEX: 69 y.o. male  ROOM: [facility]     Subjective   Subjective   Feeling fine this AM. No SOA or CP. No cough this AM. No subjective fever or malaise. Voiding well. No N/V/D. No abd pain. Tolerating diet and appetite good.        Objective   Objective   Vital Signs  Temp:  [98 °F (36.7 °C)-99.7 °F (37.6 °C)] 98.8 °F (37.1 °C)  Heart Rate:  [65-96] 65  Resp:  [13-18] 16  BP: ()/(59-92) 104/92  SpO2:  [92 %-95 %] 93 %  on   ;   Device (Oxygen Therapy): room air  Body mass index is 39.94 kg/m².  Physical Exam  Vitals and nursing note reviewed. Exam conducted with a chaperone present (Dtr).   Constitutional:       General: He is not in acute distress.     Appearance: He is obese. He is not ill-appearing, toxic-appearing or diaphoretic.   HENT:      Head: Normocephalic.      Nose: Nose normal.      Mouth/Throat:      Mouth: Mucous membranes are moist.      Pharynx: Oropharynx is clear.   Eyes:      General: No scleral icterus.        Right eye: No discharge.         Left eye: No discharge.      Extraocular Movements: Extraocular movements intact.      Conjunctiva/sclera: Conjunctivae normal.   Cardiovascular:      Rate and Rhythm: Normal rate and regular rhythm.      Pulses: Normal pulses.   Pulmonary:      Effort: Pulmonary effort is normal. No respiratory distress.      Breath sounds: No wheezing or rales.      Comments: Decreased BS left lower lung field  Abdominal:      General: Bowel sounds are normal. There is no distension.      Palpations: Abdomen is soft.      Tenderness: There is no abdominal tenderness.   Musculoskeletal:         General: No swelling.      Cervical back: Neck supple.   Skin:     General: Skin is warm and dry.      Capillary Refill: Capillary refill takes less than 2 seconds.      Coloration: Skin is not jaundiced.   Neurological:      General: No focal deficit  present.      Mental Status: He is alert and oriented to person, place, and time. Mental status is at baseline.      Cranial Nerves: No cranial nerve deficit.      Coordination: Coordination normal.   Psychiatric:         Mood and Affect: Mood normal.         Behavior: Behavior normal.         Thought Content: Thought content normal.       Results Review     I reviewed the patient's new clinical results.  Results from last 7 days   Lab Units 12/07/24  1138   WBC 10*3/mm3 13.82*   HEMOGLOBIN g/dL 13.4   PLATELETS 10*3/mm3 303     Results from last 7 days   Lab Units 12/07/24  1138 12/04/24  1055   SODIUM mmol/L 135* 141   POTASSIUM mmol/L 4.0 4.0   CHLORIDE mmol/L 105 101   CO2 mmol/L 22.3 20   BUN mg/dL 24* 30*   CREATININE mg/dL 1.11 1.26   GLUCOSE mg/dL 179* 116*   EGFR mL/min/1.73 71.9  --      Results from last 7 days   Lab Units 12/07/24  1138   ALBUMIN g/dL 3.6   BILIRUBIN mg/dL 0.4   ALK PHOS U/L 105   AST (SGOT) U/L 12   ALT (SGPT) U/L 28     Results from last 7 days   Lab Units 12/07/24  1138 12/04/24  1055   CALCIUM mg/dL 9.4 9.4   ALBUMIN g/dL 3.6  --      Results from last 7 days   Lab Units 12/07/24  1138   LACTATE mmol/L 1.1     Glucose   Date/Time Value Ref Range Status   12/08/2024 1039 118 70 - 130 mg/dL Final   12/08/2024 0734 125 70 - 130 mg/dL Final   12/07/2024 2111 112 70 - 130 mg/dL Final       XR Chest 2 View    Result Date: 12/7/2024  There has developed a white out of the left lung in patient with previous left lower lobectomy. There is bronchial cut off at the left mainstem bronchus and this may be associate with mucous plugging or aspiration with left pulmonary opacification due to atelectasis or consolidation and suspected pleural fluid. Right to left cardiomediastinal shift.  This report was finalized on 12/7/2024 11:11 AM by Naren Newby M.D on Workstation: [id number]       I have personally reviewed all medications:  Scheduled Medications  [Held by provider] apixaban, 5 mg,  Oral, BID  atorvastatin, 40 mg, Oral, Daily  carvedilol, 25 mg, Oral, BID With Meals  cetirizine, 10 mg, Oral, Daily  cholecalciferol, 2,000 Units, Oral, Daily  empagliflozin, 25 mg, Oral, Daily  eplerenone, 50 mg, Oral, BID  gabapentin, 100 mg, Oral, Q12H  valsartan, 160 mg, Oral, Q24H   And  hydroCHLOROthiazide, 12.5 mg, Oral, Q24H  insulin lispro, 2-7 Units, Subcutaneous, 4x Daily AC & at Bedtime  lactobacillus acidophilus, 1 capsule, Oral, Daily  multivitamin with minerals, 1 tablet, Oral, Daily  mupirocin, 1 Application, Each Nare, BID  NIFEdipine XL, 30 mg, Oral, Daily  piperacillin-tazobactam, 3.375 g, Intravenous, Q8H  potassium chloride, 10 mEq, Oral, BID  sodium chloride, 10 mL, Intravenous, Q12H  tamsulosin, 0.4 mg, Oral, Nightly  tiotropium bromide monohydrate, 2 puff, Inhalation, Daily - RT    Infusions   Diet  Diet: Cardiac, Diabetic, Renal; Healthy Heart (2-3 Na+); Consistent Carbohydrate; Low Sodium (2-3g), Low Potassium, Low Phosphorus; Fluid Consistency: Thin (IDDSI 0)    I have personally reviewed:  [x]  Laboratory   [x]  Microbiology   []  Radiology   [x]  EKG/Telemetry  []  Cardiology/Vascular   []  Pathology    [x]  Records       Assessment/Plan     Active Hospital Problems    Diagnosis  POA    **Bronchial obstruction [J98.09]  Yes    Tobacco abuse [Z72.0]  Yes    Collapse of left lung [J98.11]  Yes    Primary hyperaldosteronism [E26.09]  Yes    Chronic disease anemia [D63.8]  Yes    Chronic anticoagulation [Z79.01]  Not Applicable    Obstructive sleep apnea of adult [G47.33]  Yes    Status post lobectomy of lung [Z90.2]  Not Applicable    PAF (paroxysmal atrial fibrillation) [I48.0]  Yes    Cardiac pacemaker in situ [Z95.0]  Yes    Squamous cell carcinoma of left lung [C34.92]  Yes    Type 2 diabetes mellitus with hyperglycemia, without long-term current use of insulin [E11.65]  Yes    Hypertension [I10]  Yes    Hypercholesterolemia [E78.00]  Yes      Resolved Hospital Problems   No resolved  problems to display.     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 He was admitted with post-obstructive PNA of PERI.    PERI bronchus occlusion  Post-obstructive PNA  H/o SCCA of left lung s/p lower lobectomy  Appreciate Pulm attention to pt  Continue IV Zosyn  Cultures neg so far  Bronch planned for tomorrow    COPD  Stable, no exacerbation, no wheeze, no hypoxia  Continue Spiriva    GIL/CPAP  Continue home CPAP use    DM2  Sugars acceptable  Continue SGLT-2 and SSI  A1c 7.0    PAF  2nd deg AVB/PPM  Chronic AC (Eliquis)  HRs fine on Coreg  Eliquis on hold for procedure (hasn't taken since Thursday as C/S was planned for Monday)    HTN  BPs a bit soft on current regimen  Will stop HCTZ and monitor    Primary hyperaldosteronism  Continue Inspra  Lytes fine    HLD  Continue Lipitor    Anemia of chronic disease  Hgb wnl      SCDs for DVT prophylaxis.  Full code.  Discussed with patient and dtr.  Anticipate discharge home with family, timing yet to be determined.  Expected discharge date/ time has not been documented.      Buster Crain MD  [facility]  12/08/24  10:52 EST

## 2024-12-08 NOTE — PLAN OF CARE
Goal Outcome Evaluation:  Plan of Care Reviewed With: patient        Progress: no change  Outcome Evaluation: VSS, no complaints of pain or SOA, remains on room air, holding Eliquis, Bronch on Monday, sat in chair all night, home CPAP at bedside, call light in reach

## 2024-12-08 NOTE — NURSING NOTE
Patient did not receive eloquis on 12/7. Discussed with patient it would be restarted but medication was held and not given. Patient said he stopped taking eloquis on 12/5 for colonoscopy.

## 2024-12-09 ENCOUNTER — ANESTHESIA (OUTPATIENT)
Dept: GASTROENTEROLOGY | Facility: HOSPITAL | Age: 69
End: 2024-12-09
Payer: MEDICARE

## 2024-12-09 ENCOUNTER — APPOINTMENT (OUTPATIENT)
Dept: GENERAL RADIOLOGY | Facility: HOSPITAL | Age: 69
End: 2024-12-09
Payer: MEDICARE

## 2024-12-09 ENCOUNTER — ANESTHESIA EVENT (OUTPATIENT)
Dept: GASTROENTEROLOGY | Facility: HOSPITAL | Age: 69
End: 2024-12-09
Payer: MEDICARE

## 2024-12-09 LAB
ALBUMIN SERPL-MCNC: 3.2 G/DL (ref 3.5–5.2)
ALBUMIN/GLOB SERPL: 0.9 G/DL
ALP SERPL-CCNC: 98 U/L (ref 39–117)
ALT SERPL W P-5'-P-CCNC: 27 U/L (ref 1–41)
ANION GAP SERPL CALCULATED.3IONS-SCNC: 12.1 MMOL/L (ref 5–15)
APPEARANCE FLD: ABNORMAL
AST SERPL-CCNC: 17 U/L (ref 1–40)
B PARAPERT DNA SPEC QL NAA+PROBE: NOT DETECTED
B PERT DNA SPEC QL NAA+PROBE: NOT DETECTED
BILIRUB SERPL-MCNC: 0.4 MG/DL (ref 0–1.2)
BUN SERPL-MCNC: 25 MG/DL (ref 8–23)
BUN/CREAT SERPL: 24.3 (ref 7–25)
C PNEUM DNA NPH QL NAA+NON-PROBE: NOT DETECTED
CALCIUM SPEC-SCNC: 9 MG/DL (ref 8.6–10.5)
CHLORIDE SERPL-SCNC: 106 MMOL/L (ref 98–107)
CO2 SERPL-SCNC: 19.9 MMOL/L (ref 22–29)
COLOR FLD: ABNORMAL
CREAT SERPL-MCNC: 1.03 MG/DL (ref 0.76–1.27)
DEPRECATED RDW RBC AUTO: 51.7 FL (ref 37–54)
EGFRCR SERPLBLD CKD-EPI 2021: 78.6 ML/MIN/1.73
ERYTHROCYTE [DISTWIDTH] IN BLOOD BY AUTOMATED COUNT: 17.9 % (ref 12.3–15.4)
FLUAV SUBTYP SPEC NAA+PROBE: NOT DETECTED
FLUBV RNA ISLT QL NAA+PROBE: NOT DETECTED
GIE STN SPEC: NORMAL
GIE STN SPEC: NORMAL
GLOBULIN UR ELPH-MCNC: 3.6 GM/DL
GLUCOSE BLDC GLUCOMTR-MCNC: 102 MG/DL (ref 70–130)
GLUCOSE BLDC GLUCOMTR-MCNC: 124 MG/DL (ref 70–130)
GLUCOSE BLDC GLUCOMTR-MCNC: 179 MG/DL (ref 70–130)
GLUCOSE SERPL-MCNC: 125 MG/DL (ref 65–99)
HADV DNA SPEC NAA+PROBE: NOT DETECTED
HCOV 229E RNA SPEC QL NAA+PROBE: NOT DETECTED
HCOV HKU1 RNA SPEC QL NAA+PROBE: NOT DETECTED
HCOV NL63 RNA SPEC QL NAA+PROBE: NOT DETECTED
HCOV OC43 RNA SPEC QL NAA+PROBE: NOT DETECTED
HCT VFR BLD AUTO: 41.6 % (ref 37.5–51)
HGB BLD-MCNC: 13.7 G/DL (ref 13–17.7)
HMPV RNA NPH QL NAA+NON-PROBE: NOT DETECTED
HPIV1 RNA ISLT QL NAA+PROBE: NOT DETECTED
HPIV2 RNA SPEC QL NAA+PROBE: NOT DETECTED
HPIV3 RNA NPH QL NAA+PROBE: NOT DETECTED
HPIV4 P GENE NPH QL NAA+PROBE: NOT DETECTED
LYMPHOCYTES NFR FLD MANUAL: 1 %
M PNEUMO IGG SER IA-ACNC: NOT DETECTED
MAGNESIUM SERPL-MCNC: 2.3 MG/DL (ref 1.6–2.4)
MCH RBC QN AUTO: 26.4 PG (ref 26.6–33)
MCHC RBC AUTO-ENTMCNC: 32.9 G/DL (ref 31.5–35.7)
MCV RBC AUTO: 80.3 FL (ref 79–97)
METHOD: ABNORMAL
MONOCYTES NFR FLD: 1 %
NEUTROPHILS NFR FLD MANUAL: 98 %
NUC CELL # FLD: 5000 /MM3
PHOSPHATE SERPL-MCNC: 4.5 MG/DL (ref 2.5–4.5)
PLATELET # BLD AUTO: 355 10*3/MM3 (ref 140–450)
PMV BLD AUTO: 9.7 FL (ref 6–12)
POTASSIUM SERPL-SCNC: 4.4 MMOL/L (ref 3.5–5.2)
PROT SERPL-MCNC: 6.8 G/DL (ref 6–8.5)
RBC # BLD AUTO: 5.18 10*6/MM3 (ref 4.14–5.8)
RBC # FLD AUTO: ABNORMAL /MM3
RHINOVIRUS RNA SPEC NAA+PROBE: DETECTED
RSV RNA NPH QL NAA+NON-PROBE: NOT DETECTED
SARS-COV-2 RNA NPH QL NAA+NON-PROBE: NOT DETECTED
SODIUM SERPL-SCNC: 138 MMOL/L (ref 136–145)
WBC NRBC COR # BLD AUTO: 13.26 10*3/MM3 (ref 3.4–10.8)

## 2024-12-09 PROCEDURE — 88305 TISSUE EXAM BY PATHOLOGIST: CPT | Performed by: HOSPITALIST

## 2024-12-09 PROCEDURE — 25010000002 PIPERACILLIN SOD-TAZOBACTAM PER 1 G: Performed by: INTERNAL MEDICINE

## 2024-12-09 PROCEDURE — 88112 CYTOPATH CELL ENHANCE TECH: CPT | Performed by: HOSPITALIST

## 2024-12-09 PROCEDURE — 87070 CULTURE OTHR SPECIMN AEROBIC: CPT | Performed by: HOSPITALIST

## 2024-12-09 PROCEDURE — 87116 MYCOBACTERIA CULTURE: CPT | Performed by: HOSPITALIST

## 2024-12-09 PROCEDURE — 87205 SMEAR GRAM STAIN: CPT | Performed by: HOSPITALIST

## 2024-12-09 PROCEDURE — 25810000003 LACTATED RINGERS PER 1000 ML

## 2024-12-09 PROCEDURE — 87102 FUNGUS ISOLATION CULTURE: CPT | Performed by: HOSPITALIST

## 2024-12-09 PROCEDURE — 88312 SPECIAL STAINS GROUP 1: CPT | Performed by: HOSPITALIST

## 2024-12-09 PROCEDURE — 82948 REAGENT STRIP/BLOOD GLUCOSE: CPT

## 2024-12-09 PROCEDURE — 25010000002 SUGAMMADEX 200 MG/2ML SOLUTION: Performed by: NURSE ANESTHETIST, CERTIFIED REGISTERED

## 2024-12-09 PROCEDURE — 25010000002 LIDOCAINE 2% SOLUTION: Performed by: NURSE ANESTHETIST, CERTIFIED REGISTERED

## 2024-12-09 PROCEDURE — 87206 SMEAR FLUORESCENT/ACID STAI: CPT | Performed by: HOSPITALIST

## 2024-12-09 PROCEDURE — 80053 COMPREHEN METABOLIC PANEL: CPT | Performed by: HOSPITALIST

## 2024-12-09 PROCEDURE — 25010000002 PROPOFOL 10 MG/ML EMULSION: Performed by: NURSE ANESTHETIST, CERTIFIED REGISTERED

## 2024-12-09 PROCEDURE — 0B9G8ZX DRAINAGE OF LEFT UPPER LUNG LOBE, VIA NATURAL OR ARTIFICIAL OPENING ENDOSCOPIC, DIAGNOSTIC: ICD-10-PCS | Performed by: HOSPITALIST

## 2024-12-09 PROCEDURE — 85027 COMPLETE CBC AUTOMATED: CPT | Performed by: HOSPITALIST

## 2024-12-09 PROCEDURE — 63710000001 INSULIN LISPRO (HUMAN) PER 5 UNITS: Performed by: INTERNAL MEDICINE

## 2024-12-09 PROCEDURE — 87071 CULTURE AEROBIC QUANT OTHER: CPT | Performed by: HOSPITALIST

## 2024-12-09 PROCEDURE — 83735 ASSAY OF MAGNESIUM: CPT | Performed by: HOSPITALIST

## 2024-12-09 PROCEDURE — 88341 IMHCHEM/IMCYTCHM EA ADD ANTB: CPT | Performed by: HOSPITALIST

## 2024-12-09 PROCEDURE — 89051 BODY FLUID CELL COUNT: CPT | Performed by: HOSPITALIST

## 2024-12-09 PROCEDURE — 87305 ASPERGILLUS AG IA: CPT | Performed by: HOSPITALIST

## 2024-12-09 PROCEDURE — 25010000002 ONDANSETRON PER 1 MG: Performed by: INTERNAL MEDICINE

## 2024-12-09 PROCEDURE — 88342 IMHCHEM/IMCYTCHM 1ST ANTB: CPT | Performed by: HOSPITALIST

## 2024-12-09 PROCEDURE — 0202U NFCT DS 22 TRGT SARS-COV-2: CPT | Performed by: HOSPITALIST

## 2024-12-09 PROCEDURE — 87385 HISTOPLASMA CAPSUL AG IA: CPT | Performed by: HOSPITALIST

## 2024-12-09 PROCEDURE — 84100 ASSAY OF PHOSPHORUS: CPT | Performed by: HOSPITALIST

## 2024-12-09 RX ORDER — LIDOCAINE HYDROCHLORIDE 10 MG/ML
0.5 INJECTION, SOLUTION INFILTRATION; PERINEURAL ONCE AS NEEDED
Status: DISCONTINUED | OUTPATIENT
Start: 2024-12-09 | End: 2024-12-09 | Stop reason: HOSPADM

## 2024-12-09 RX ORDER — SODIUM CHLORIDE 9 MG/ML
40 INJECTION, SOLUTION INTRAVENOUS AS NEEDED
Status: DISCONTINUED | OUTPATIENT
Start: 2024-12-09 | End: 2024-12-09 | Stop reason: HOSPADM

## 2024-12-09 RX ORDER — ROCURONIUM BROMIDE 10 MG/ML
INJECTION, SOLUTION INTRAVENOUS AS NEEDED
Status: DISCONTINUED | OUTPATIENT
Start: 2024-12-09 | End: 2024-12-09 | Stop reason: SURG

## 2024-12-09 RX ORDER — PROPOFOL 10 MG/ML
VIAL (ML) INTRAVENOUS AS NEEDED
Status: DISCONTINUED | OUTPATIENT
Start: 2024-12-09 | End: 2024-12-09 | Stop reason: SURG

## 2024-12-09 RX ORDER — SODIUM CHLORIDE 0.9 % (FLUSH) 0.9 %
10 SYRINGE (ML) INJECTION AS NEEDED
Status: DISCONTINUED | OUTPATIENT
Start: 2024-12-09 | End: 2024-12-09 | Stop reason: HOSPADM

## 2024-12-09 RX ORDER — SODIUM CHLORIDE 0.9 % (FLUSH) 0.9 %
10 SYRINGE (ML) INJECTION EVERY 12 HOURS SCHEDULED
Status: DISCONTINUED | OUTPATIENT
Start: 2024-12-09 | End: 2024-12-09 | Stop reason: HOSPADM

## 2024-12-09 RX ORDER — SODIUM CHLORIDE, SODIUM LACTATE, POTASSIUM CHLORIDE, CALCIUM CHLORIDE 600; 310; 30; 20 MG/100ML; MG/100ML; MG/100ML; MG/100ML
9 INJECTION, SOLUTION INTRAVENOUS CONTINUOUS
Status: ACTIVE | OUTPATIENT
Start: 2024-12-09 | End: 2024-12-10

## 2024-12-09 RX ORDER — LIDOCAINE HYDROCHLORIDE 20 MG/ML
INJECTION, SOLUTION INFILTRATION; PERINEURAL AS NEEDED
Status: DISCONTINUED | OUTPATIENT
Start: 2024-12-09 | End: 2024-12-09 | Stop reason: SURG

## 2024-12-09 RX ADMIN — Medication 1 CAPSULE: at 12:54

## 2024-12-09 RX ADMIN — PIPERACILLIN AND TAZOBACTAM 3.38 G: 3; .375 INJECTION, POWDER, FOR SOLUTION INTRAVENOUS at 22:58

## 2024-12-09 RX ADMIN — ROCURONIUM BROMIDE 50 MG: 10 INJECTION, SOLUTION INTRAVENOUS at 08:55

## 2024-12-09 RX ADMIN — SODIUM CHLORIDE, POTASSIUM CHLORIDE, SODIUM LACTATE AND CALCIUM CHLORIDE: 600; 310; 30; 20 INJECTION, SOLUTION INTRAVENOUS at 08:50

## 2024-12-09 RX ADMIN — Medication 10 ML: at 21:20

## 2024-12-09 RX ADMIN — SUGAMMADEX 200 MG: 100 INJECTION, SOLUTION INTRAVENOUS at 09:13

## 2024-12-09 RX ADMIN — CARVEDILOL 25 MG: 25 TABLET, FILM COATED ORAL at 19:00

## 2024-12-09 RX ADMIN — MUPIROCIN 1 APPLICATION: 20 OINTMENT TOPICAL at 21:20

## 2024-12-09 RX ADMIN — POTASSIUM CHLORIDE 10 MEQ: 750 TABLET, EXTENDED RELEASE ORAL at 12:53

## 2024-12-09 RX ADMIN — EPLERENONE 50 MG: 25 TABLET, FILM COATED ORAL at 12:52

## 2024-12-09 RX ADMIN — INSULIN LISPRO 2 UNITS: 100 INJECTION, SOLUTION INTRAVENOUS; SUBCUTANEOUS at 19:00

## 2024-12-09 RX ADMIN — CETIRIZINE HYDROCHLORIDE 10 MG: 10 TABLET, FILM COATED ORAL at 12:55

## 2024-12-09 RX ADMIN — Medication 2000 UNITS: at 12:54

## 2024-12-09 RX ADMIN — EPLERENONE 50 MG: 25 TABLET, FILM COATED ORAL at 21:20

## 2024-12-09 RX ADMIN — NIFEDIPINE 30 MG: 30 TABLET, FILM COATED, EXTENDED RELEASE ORAL at 12:55

## 2024-12-09 RX ADMIN — VALSARTAN 160 MG: 160 TABLET, FILM COATED ORAL at 12:55

## 2024-12-09 RX ADMIN — PIPERACILLIN AND TAZOBACTAM 3.38 G: 3; .375 INJECTION, POWDER, FOR SOLUTION INTRAVENOUS at 15:27

## 2024-12-09 RX ADMIN — GABAPENTIN 100 MG: 100 CAPSULE ORAL at 12:53

## 2024-12-09 RX ADMIN — LIDOCAINE HYDROCHLORIDE 100 MG: 20 INJECTION, SOLUTION INFILTRATION; PERINEURAL at 08:55

## 2024-12-09 RX ADMIN — EMPAGLIFLOZIN 25 MG: 10 TABLET, FILM COATED ORAL at 12:53

## 2024-12-09 RX ADMIN — PROPOFOL 140 MCG/KG/MIN: 10 INJECTION, EMULSION INTRAVENOUS at 08:59

## 2024-12-09 RX ADMIN — GABAPENTIN 100 MG: 100 CAPSULE ORAL at 21:20

## 2024-12-09 RX ADMIN — PROPOFOL 150 MG: 10 INJECTION, EMULSION INTRAVENOUS at 08:55

## 2024-12-09 RX ADMIN — Medication 1 TABLET: at 12:53

## 2024-12-09 RX ADMIN — ONDANSETRON 4 MG: 2 INJECTION INTRAMUSCULAR; INTRAVENOUS at 09:11

## 2024-12-09 RX ADMIN — POTASSIUM CHLORIDE 10 MEQ: 750 TABLET, EXTENDED RELEASE ORAL at 21:20

## 2024-12-09 RX ADMIN — TAMSULOSIN HYDROCHLORIDE 0.4 MG: 0.4 CAPSULE ORAL at 21:20

## 2024-12-09 RX ADMIN — ATORVASTATIN CALCIUM 40 MG: 20 TABLET, FILM COATED ORAL at 12:53

## 2024-12-09 NOTE — PROGRESS NOTES
[facility]  [phone]  Dr. Raphael Osorio    Subjective:  LOS: 1    Chief Complaint: Pulmonary infiltrate    In a chair and on room air.  Still with some cough.    Objective   Vital Signs past 24hrs  Temp range: Temp (24hrs), Av.1 °F (36.7 °C), Min:97.7 °F (36.5 °C), Max:98.7 °F (37.1 °C)    BP range: BP: ()/(63-79) 109/71  Pulse range: Heart Rate:  [66-87] 86  Resp rate range: Resp:  [14-20] 18  Device (Oxygen Therapy): nasal cannulaFlow (L/min) (Oxygen Therapy):  [2-8] 2  Oxygen range:SpO2:  [87 %-94 %] 91 %   Mechanical Ventilator:     Physical Exam  Constitutional:       Appearance: He is obese.   Eyes:      Pupils: Pupils are equal, round, and reactive to light.   Cardiovascular:      Rate and Rhythm: Normal rate and regular rhythm.      Heart sounds: No murmur heard.  Pulmonary:      Effort: Pulmonary effort is normal.      Comments: Bronchial breath sounds left lung base  Abdominal:      General: Bowel sounds are normal.      Palpations: Abdomen is soft. There is no mass.      Tenderness: There is no abdominal tenderness.   Musculoskeletal:         General: No swelling.   Neurological:      Mental Status: He is alert.       Results Review:    I have reviewed the laboratory and imaging data since the last note by [facility] physician.  My annotations are noted in assessment and plan.      Result Review:  I have personally reviewed the results from last note by [facility] physician to 2024 11:34 EST and agree with these findings:  [x]  Laboratory list / accordion  [x]  Microbiology  [x]  Radiology  []  EKG/Telemetry   []  Cardiology/Vascular   []  Pathology  []  Old records  []  Other:      Medication Review:  I have reviewed the current MAR.  My annotations are noted in assessment and plan.    [Held by provider] apixaban, 5 mg, Oral, BID  atorvastatin, 40 mg, Oral, Daily  carvedilol, 25 mg, Oral, BID With Meals  cetirizine, 10 mg, Oral, Daily  cholecalciferol, 2,000 Units, Oral,  Daily  empagliflozin, 25 mg, Oral, Daily  eplerenone, 50 mg, Oral, BID  gabapentin, 100 mg, Oral, Q12H  insulin lispro, 2-7 Units, Subcutaneous, 4x Daily AC & at Bedtime  lactobacillus acidophilus, 1 capsule, Oral, Daily  multivitamin with minerals, 1 tablet, Oral, Daily  mupirocin, 1 Application, Each Nare, BID  NIFEdipine XL, 30 mg, Oral, Daily  piperacillin-tazobactam, 3.375 g, Intravenous, Q8H  potassium chloride, 10 mEq, Oral, BID  sodium chloride, 10 mL, Intravenous, Q12H  tamsulosin, 0.4 mg, Oral, Nightly  tiotropium bromide monohydrate, 2 puff, Inhalation, Daily - RT  valsartan, 160 mg, Oral, Q24H        lactated ringers, 9 mL/hr, Last Rate: 75 mL/hr (12/09/24 0850)      Lines, Drains & Airways       Active LDAs       Name Placement date Placement time Site Days    Peripheral IV 12/07/24 1137 Anterior;Distal;Left;Upper Arm 12/07/24  1137  Arm  less than 1                  Isolation status: No active isolations    Dietary Orders (From admission, onward)       Start     Ordered    12/09/24 0001  NPO Diet NPO Type: Strict NPO  Diet Effective Midnight        Question:  NPO Type  Answer:  Strict NPO    12/08/24 1127                    PCCM Problems  Occlusion of the left upper lobe bronchus and complete collapse of the left upper lobe with subsequent consolidation  Left lower lobectomy for squamous cell lung cancer, November 2018  Previous consolidation anterior suture line with left upper lobe partial collapse and bronchoscopy in 2023, negative for malignancy but showing organizing pneumonia  COPD  GIL on CPAP  Obesity  Type 2 diabetes mellitus  A-fib on Eliquis  Permanent pacemaker for AV block  Chronic HFpEF        Plan of Treatment    Reviewed bronchoscopy report with Dr. Jaquez.  Thick secretions with erythematous and inflamed mucosa noted.  All appears to be infectious and without evidence for underlying malignancy.  However biopsies were taken and are pending.  Continue antibiotics for now.    Underlying  COPD without exacerbation.    GIL and on home CPAP.      Raphael Osorio MD  12/09/24  11:34 EST      Part of this note may be an electronic transcription/translation of spoken language to printed text using the Dragon Dictation System.

## 2024-12-09 NOTE — OP NOTE
Bronchoscopy Procedure Note    Procedure:  Bronchoscopy, Diagnostic  Bronchoscopy, Therapeutic aspiration of secretions  Bronchoalveolar lavage, BAL of left upper lobe  Endobronchial biopsy of left upper lobe    Pre-Operative Diagnosis: Left lung atelectasis    Post-Operative Diagnosis: Same    Indication: Left lung atelectasis    Anesthesia: General Anesthesia    Procedure Details: Patient was consented for the procedure with all risk and benefit of the procedure explained in detail.  Patient was given the opportunity to ask questions and all concerns were answered.  The bronchocope was inserted into the main airway via the endotracheal tube. An anatomical survey was done of the main airways and the subsegmental bronchus to at least the first subsegmental level of all 4 lobes of both lungs.  The findings are reported below.  A bronchoalveolar lavage was performed using aliquots of normal saline instilled into the airways then aspirated back.    Findings:  Bronchoscope passed through endotracheal tube into the trachea.  Immediately evident was significant mucoid secretions present emanating from the left mainstem bronchus to the shady and with overflow into the right mainstem bronchus.  Secretions were thick in nature and difficult to suction.  Required significant saline instillation to loosen and adequately suction into the trap.  Thick secretions were found to go distally all the way into the left upper lobe.  These were sent for bronchial washings.  After adequate suctioning was performed, left lower lobe stump from previous lobectomy was normal in appearance.  Left upper lobe was found to be with edematous mucosa that was hyperemic and friable on touch.  Bronchoalveolar lavage was performed in the left upper lobe with 120 cc of saline instilled and 40 cc returned.  Next endobronchial biopsy of the left upper lobe mucosa was performed.  Minor bleeding was noted and was easily controlled with suctioning.  The  airway continued to be boggy with significant narrowing of the caliber of the apical posterior and anterior segments with inability to advance bronchoscope through.  The right-sided airways were of normal caliber and without any endobronchial lesions or secretions noted distally.  Patient tolerated procedure well.  Sent to PACU postprocedure.    Estimated Blood Loss:  Minimal           Specimens:    Bronchial washings of left mainstem sent for cultures  Bronchoalveolar lavage of left upper lobe sent for cultures, fluid count, cytology  Endobronchial biopsy of left upper lobe sent for pathology                Complications:  None; patient tolerated the procedure well.           Disposition: PACU - hemodynamically stable.    Patient tolerated the procedure well.    Jamison Jaquez MD  12/9/2024  09:23 EST

## 2024-12-09 NOTE — TELEPHONE ENCOUNTER
WIFE CALLED  PT IS NOT GOING TO MAKE IT TO [facility]  HE IS IN HOSPITAL  SHE WILL CALL TO RESCHEDULE

## 2024-12-09 NOTE — ANESTHESIA POSTPROCEDURE EVALUATION
Patient: Alexy Ram    Procedure Summary       Date: 12/09/24 Room / Location: [facility] ENDOSCOPY 7 / [facility] ENDOSCOPY    Anesthesia Start: 0850 Anesthesia Stop: 0931    Procedure: BRONCHOSCOPY WITH WASHING, BAL, BIOPSIES (Bronchus) Diagnosis:       Collapse of left lung      Bronchial obstruction      (Collapse of left lung [J98.11])      (Bronchial obstruction [J98.09])    Surgeons: Jamison Jaquez MD Provider: José Antonio Rucker MD    Anesthesia Type: general ASA Status: 4            Anesthesia Type: general    Vitals  Vitals Value Taken Time   /63 12/09/24 0958   Temp     Pulse 85 12/09/24 1002   Resp 20 12/09/24 0947   SpO2 91 % 12/09/24 1000   Vitals shown include unfiled device data.        Post Anesthesia Care and Evaluation    Patient location during evaluation: PACU  Patient participation: complete - patient participated  Level of consciousness: awake and alert  Pain management: adequate    Airway patency: patent  Anesthetic complications: No anesthetic complications    Cardiovascular status: acceptable  Respiratory status: acceptable  Hydration status: acceptable    Comments: ---------------------------               12/09/24                      1057         ---------------------------   BP:          109/71         Pulse:                      Resp:          18           Temp:   36.8 °C (98.2 °F)   SpO2:                      ---------------------------

## 2024-12-09 NOTE — PROGRESS NOTES
Name: Alexy Ram ADMIT: 2024   : 1955  PCP: Pipe Vaughn MD    MRN: [id number] LOS: 1 days   AGE/SEX: 69 y.o. male  ROOM: [facility]     Subjective   Subjective   Feeling good since getting back from bronch. No SOA or CP. No cough. No subjective fever or malaise. Voiding well. No N/V/D. No abd pain. Tolerating diet and appetite good.        Objective   Objective   Vital Signs  Temp:  [97.7 °F (36.5 °C)-98.7 °F (37.1 °C)] 98.2 °F (36.8 °C)  Heart Rate:  [66-87] 86  Resp:  [14-20] 18  BP: ()/(63-79) 109/71  SpO2:  [87 %-94 %] 91 %  on  Flow (L/min) (Oxygen Therapy):  [2-8] 2;   Device (Oxygen Therapy): nasal cannula  Body mass index is 39.94 kg/m².  Physical Exam  Vitals and nursing note reviewed. Exam conducted with a chaperone present (Wife).   Constitutional:       General: He is not in acute distress.     Appearance: He is obese. He is not ill-appearing, toxic-appearing or diaphoretic.   HENT:      Head: Normocephalic.      Nose: Nose normal.      Mouth/Throat:      Mouth: Mucous membranes are moist.      Pharynx: Oropharynx is clear.   Eyes:      General: No scleral icterus.        Right eye: No discharge.         Left eye: No discharge.      Extraocular Movements: Extraocular movements intact.      Conjunctiva/sclera: Conjunctivae normal.   Cardiovascular:      Rate and Rhythm: Normal rate and regular rhythm.      Pulses: Normal pulses.   Pulmonary:      Effort: Pulmonary effort is normal. No respiratory distress.      Breath sounds: No wheezing or rales.      Comments: Decreased BS left lower lung field  Abdominal:      General: Bowel sounds are normal. There is no distension.      Palpations: Abdomen is soft.      Tenderness: There is no abdominal tenderness.   Musculoskeletal:         General: No swelling.      Cervical back: Neck supple.   Skin:     General: Skin is warm and dry.      Capillary Refill: Capillary refill takes less than 2 seconds.      Coloration: Skin is not  jaundiced.   Neurological:      General: No focal deficit present.      Mental Status: He is alert and oriented to person, place, and time. Mental status is at baseline.      Cranial Nerves: No cranial nerve deficit.      Coordination: Coordination normal.   Psychiatric:         Mood and Affect: Mood normal.         Behavior: Behavior normal.         Thought Content: Thought content normal.       Results Review     I reviewed the patient's new clinical results.  Results from last 7 days   Lab Units 12/09/24  0549 12/07/24  1138   WBC 10*3/mm3 13.26* 13.82*   HEMOGLOBIN g/dL 13.7 13.4   PLATELETS 10*3/mm3 355 303     Results from last 7 days   Lab Units 12/09/24  0549 12/07/24  1138 12/04/24  1055   SODIUM mmol/L 138 135* 141   POTASSIUM mmol/L 4.4 4.0 4.0   CHLORIDE mmol/L 106 105 101   CO2 mmol/L 19.9* 22.3 20   BUN mg/dL 25* 24* 30*   CREATININE mg/dL 1.03 1.11 1.26   GLUCOSE mg/dL 125* 179* 116*   EGFR mL/min/1.73 78.6 71.9  --      Results from last 7 days   Lab Units 12/09/24  0549 12/07/24  1138   ALBUMIN g/dL 3.2* 3.6   BILIRUBIN mg/dL 0.4 0.4   ALK PHOS U/L 98 105   AST (SGOT) U/L 17 12   ALT (SGPT) U/L 27 28     Results from last 7 days   Lab Units 12/09/24  0549 12/07/24  1138 12/04/24  1055   CALCIUM mg/dL 9.0 9.4 9.4   ALBUMIN g/dL 3.2* 3.6  --    MAGNESIUM mg/dL 2.3  --   --    PHOSPHORUS mg/dL 4.5  --   --      Results from last 7 days   Lab Units 12/07/24  1138   LACTATE mmol/L 1.1     Glucose   Date/Time Value Ref Range Status   12/09/2024 1100 124 70 - 130 mg/dL Final   12/08/2024 2157 161 (H) 70 - 130 mg/dL Final   12/08/2024 1515 107 70 - 130 mg/dL Final   12/08/2024 1039 118 70 - 130 mg/dL Final   12/08/2024 0734 125 70 - 130 mg/dL Final   12/07/2024 2111 112 70 - 130 mg/dL Final       No radiology results for the last day    I have personally reviewed all medications:  Scheduled Medications  [Held by provider] apixaban, 5 mg, Oral, BID  atorvastatin, 40 mg, Oral, Daily  carvedilol, 25 mg, Oral,  BID With Meals  cetirizine, 10 mg, Oral, Daily  cholecalciferol, 2,000 Units, Oral, Daily  empagliflozin, 25 mg, Oral, Daily  eplerenone, 50 mg, Oral, BID  gabapentin, 100 mg, Oral, Q12H  insulin lispro, 2-7 Units, Subcutaneous, 4x Daily AC & at Bedtime  lactobacillus acidophilus, 1 capsule, Oral, Daily  multivitamin with minerals, 1 tablet, Oral, Daily  mupirocin, 1 Application, Each Nare, BID  NIFEdipine XL, 30 mg, Oral, Daily  piperacillin-tazobactam, 3.375 g, Intravenous, Q8H  potassium chloride, 10 mEq, Oral, BID  sodium chloride, 10 mL, Intravenous, Q12H  tamsulosin, 0.4 mg, Oral, Nightly  tiotropium bromide monohydrate, 2 puff, Inhalation, Daily - RT  valsartan, 160 mg, Oral, Q24H    Infusions  lactated ringers, 9 mL/hr, Last Rate: 75 mL/hr (12/09/24 0850)    Diet  Diet: Regular/House, Cardiac, Diabetic; Healthy Heart (2-3 Na+); Consistent Carbohydrate; Fluid Consistency: Thin (IDDSI 0)    I have personally reviewed:  [x]  Laboratory   [x]  Microbiology   []  Radiology   [x]  EKG/Telemetry  []  Cardiology/Vascular   []  Pathology    []  Records       Assessment/Plan     Active Hospital Problems    Diagnosis  POA    **Bronchial obstruction [J98.09]  Yes    Tobacco abuse [Z72.0]  Yes    Collapse of left lung [J98.11]  Yes    Primary hyperaldosteronism [E26.09]  Yes    Chronic disease anemia [D63.8]  Yes    Chronic anticoagulation [Z79.01]  Not Applicable    Obstructive sleep apnea of adult [G47.33]  Yes    Status post lobectomy of lung [Z90.2]  Not Applicable    PAF (paroxysmal atrial fibrillation) [I48.0]  Yes    Cardiac pacemaker in situ [Z95.0]  Yes    Squamous cell carcinoma of left lung [C34.92]  Yes    Type 2 diabetes mellitus with hyperglycemia, without long-term current use of insulin [E11.65]  Yes    Hypertension [I10]  Yes    Hypercholesterolemia [E78.00]  Yes      Resolved Hospital Problems   No resolved problems to display.     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 He was admitted with post-obstructive PNA of PERI.    PERI bronchus occlusion  Post-obstructive PNA  H/o SCCA of left lung s/p lower lobectomy  Appreciate Pulm attention to pt  Continue IV Zosyn  Cultures neg so far  Afebrile, WBC unchanged at 13  S/p bronch today by Dr. Jaquez with therapeutic aspiration of secretions, BAL of PERI, and biopsy of PERI  Restart diet    COPD  Stable, no exacerbation, no wheeze, no hypoxia  Continue Spiriva    GIL/CPAP  Continue home CPAP use    DM2  Sugars acceptable  Continue SGLT-2 and SSI  A1c 7.0    PAF  2nd deg AVB/PPM  Chronic AC (Eliquis)  HRs fine on Coreg  Eliquis on hold for procedure (hasn't taken since Thursday as C/S was planned for Monday), restart when okay with Pulm    HTN  BPs a bit soft on home regimen  Stopped HCTZ with some improvement noted  Continue to monitor on telemetry    Primary hyperaldosteronism  Continue Inspra  Lytes fine    HLD  Continue Lipitor    Anemia of chronic disease  Hgb wnl      SCDs for DVT prophylaxis.  Full code.  Discussed with patient and wife.  Anticipate discharge home with family tomorrow if continues to do well.  Expected Discharge Date: 12/10/2024; Expected Discharge Time:       Buster Crain MD  [facility]  12/09/24  11:40 EST

## 2024-12-09 NOTE — ANESTHESIA PREPROCEDURE EVALUATION
Anesthesia Evaluation     Patient summary reviewed and Nursing notes reviewed                Airway   Mallampati: III  Dental      Pulmonary    (+) a smoker Former, lung cancer, COPD,sleep apnea on CPAP  Cardiovascular     ECG reviewed  PT is on anticoagulation therapy  Patient on routine beta blocker  Rhythm: regular  Rate: normal    (+) hypertension, dysrhythmias Paroxysmal Atrial Fib, CHF , hyperlipidemia      Neuro/Psych- negative ROS  GI/Hepatic/Renal/Endo    (+) morbid obesity, liver disease fatty liver disease, diabetes mellitus type 2 using insulin    Musculoskeletal     Abdominal    Substance History - negative use     OB/GYN negative ob/gyn ROS         Other   arthritis,   history of cancer (lung)                  Anesthesia Plan    ASA 4     general     (S/p LLL  Pacemaker    Chart check by [facility]  Personal pre -conversation by  CRNA  )  intravenous induction     Anesthetic plan, risks, benefits, and alternatives have been provided, discussed and informed consent has been obtained with: patient.      CODE STATUS:    Code Status (Patient has no pulse and is not breathing): CPR (Attempt to Resuscitate)  Medical Interventions (Patient has pulse or is breathing): Full Support

## 2024-12-10 ENCOUNTER — READMISSION MANAGEMENT (OUTPATIENT)
Dept: CALL CENTER | Facility: HOSPITAL | Age: 69
End: 2024-12-10
Payer: MEDICARE

## 2024-12-10 ENCOUNTER — APPOINTMENT (OUTPATIENT)
Dept: GENERAL RADIOLOGY | Facility: HOSPITAL | Age: 69
End: 2024-12-10
Payer: MEDICARE

## 2024-12-10 VITALS
SYSTOLIC BLOOD PRESSURE: 144 MMHG | WEIGHT: 262.2 LBS | DIASTOLIC BLOOD PRESSURE: 80 MMHG | TEMPERATURE: 97.2 F | OXYGEN SATURATION: 93 % | BODY MASS INDEX: 41.15 KG/M2 | HEIGHT: 67 IN | HEART RATE: 65 BPM | RESPIRATION RATE: 16 BRPM

## 2024-12-10 LAB
ALBUMIN SERPL-MCNC: 3.1 G/DL (ref 3.5–5.2)
ALBUMIN/GLOB SERPL: 0.9 G/DL
ALP SERPL-CCNC: 91 U/L (ref 39–117)
ALT SERPL W P-5'-P-CCNC: 24 U/L (ref 1–41)
ANION GAP SERPL CALCULATED.3IONS-SCNC: 10.7 MMOL/L (ref 5–15)
AST SERPL-CCNC: 19 U/L (ref 1–40)
BILIRUB SERPL-MCNC: 0.3 MG/DL (ref 0–1.2)
BUN SERPL-MCNC: 20 MG/DL (ref 8–23)
BUN/CREAT SERPL: 23 (ref 7–25)
CALCIUM SPEC-SCNC: 8.6 MG/DL (ref 8.6–10.5)
CHLORIDE SERPL-SCNC: 106 MMOL/L (ref 98–107)
CO2 SERPL-SCNC: 23.3 MMOL/L (ref 22–29)
CREAT SERPL-MCNC: 0.87 MG/DL (ref 0.76–1.27)
CYTO UR: NORMAL
DEPRECATED RDW RBC AUTO: 51.5 FL (ref 37–54)
EGFRCR SERPLBLD CKD-EPI 2021: 93.4 ML/MIN/1.73
ERYTHROCYTE [DISTWIDTH] IN BLOOD BY AUTOMATED COUNT: 17.5 % (ref 12.3–15.4)
GLOBULIN UR ELPH-MCNC: 3.4 GM/DL
GLUCOSE BLDC GLUCOMTR-MCNC: 106 MG/DL (ref 70–130)
GLUCOSE BLDC GLUCOMTR-MCNC: 144 MG/DL (ref 70–130)
GLUCOSE BLDC GLUCOMTR-MCNC: 158 MG/DL (ref 70–130)
GLUCOSE SERPL-MCNC: 167 MG/DL (ref 65–99)
HCT VFR BLD AUTO: 40.2 % (ref 37.5–51)
HGB BLD-MCNC: 13.1 G/DL (ref 13–17.7)
LAB AP CASE REPORT: NORMAL
MAGNESIUM SERPL-MCNC: 2.3 MG/DL (ref 1.6–2.4)
MCH RBC QN AUTO: 26.7 PG (ref 26.6–33)
MCHC RBC AUTO-ENTMCNC: 32.6 G/DL (ref 31.5–35.7)
MCV RBC AUTO: 81.9 FL (ref 79–97)
PATH REPORT.FINAL DX SPEC: NORMAL
PATH REPORT.GROSS SPEC: NORMAL
PHOSPHATE SERPL-MCNC: 3.9 MG/DL (ref 2.5–4.5)
PLATELET # BLD AUTO: 401 10*3/MM3 (ref 140–450)
PMV BLD AUTO: 10.1 FL (ref 6–12)
POTASSIUM SERPL-SCNC: 4.3 MMOL/L (ref 3.5–5.2)
PROT SERPL-MCNC: 6.5 G/DL (ref 6–8.5)
RBC # BLD AUTO: 4.91 10*6/MM3 (ref 4.14–5.8)
SODIUM SERPL-SCNC: 140 MMOL/L (ref 136–145)
WBC NRBC COR # BLD AUTO: 11.82 10*3/MM3 (ref 3.4–10.8)

## 2024-12-10 PROCEDURE — 94664 DEMO&/EVAL PT USE INHALER: CPT

## 2024-12-10 PROCEDURE — 85027 COMPLETE CBC AUTOMATED: CPT | Performed by: HOSPITALIST

## 2024-12-10 PROCEDURE — 63710000001 INSULIN LISPRO (HUMAN) PER 5 UNITS: Performed by: INTERNAL MEDICINE

## 2024-12-10 PROCEDURE — 94761 N-INVAS EAR/PLS OXIMETRY MLT: CPT

## 2024-12-10 PROCEDURE — 71045 X-RAY EXAM CHEST 1 VIEW: CPT

## 2024-12-10 PROCEDURE — 94760 N-INVAS EAR/PLS OXIMETRY 1: CPT

## 2024-12-10 PROCEDURE — 94799 UNLISTED PULMONARY SVC/PX: CPT

## 2024-12-10 PROCEDURE — 83735 ASSAY OF MAGNESIUM: CPT | Performed by: HOSPITALIST

## 2024-12-10 PROCEDURE — 82948 REAGENT STRIP/BLOOD GLUCOSE: CPT

## 2024-12-10 PROCEDURE — 94618 PULMONARY STRESS TESTING: CPT

## 2024-12-10 PROCEDURE — 25010000002 PIPERACILLIN SOD-TAZOBACTAM PER 1 G: Performed by: INTERNAL MEDICINE

## 2024-12-10 PROCEDURE — 84100 ASSAY OF PHOSPHORUS: CPT | Performed by: HOSPITALIST

## 2024-12-10 PROCEDURE — 80053 COMPREHEN METABOLIC PANEL: CPT | Performed by: HOSPITALIST

## 2024-12-10 RX ORDER — VALSARTAN 160 MG/1
160 TABLET ORAL
Qty: 30 TABLET | Refills: 0 | Status: SHIPPED | OUTPATIENT
Start: 2024-12-11

## 2024-12-10 RX ORDER — APIXABAN 5 MG/1
5 TABLET, FILM COATED ORAL DAILY
Start: 2024-12-10

## 2024-12-10 RX ORDER — MUPIROCIN 20 MG/G
1 OINTMENT TOPICAL 2 TIMES DAILY
Qty: 15 G | Refills: 0 | Status: SHIPPED | OUTPATIENT
Start: 2024-12-10 | End: 2024-12-18

## 2024-12-10 RX ADMIN — Medication 10 ML: at 08:45

## 2024-12-10 RX ADMIN — MUPIROCIN 1 APPLICATION: 20 OINTMENT TOPICAL at 08:43

## 2024-12-10 RX ADMIN — PIPERACILLIN AND TAZOBACTAM 3.38 G: 3; .375 INJECTION, POWDER, FOR SOLUTION INTRAVENOUS at 08:47

## 2024-12-10 RX ADMIN — EMPAGLIFLOZIN 25 MG: 10 TABLET, FILM COATED ORAL at 08:42

## 2024-12-10 RX ADMIN — TIOTROPIUM BROMIDE INHALATION SPRAY 2 PUFF: 3.12 SPRAY, METERED RESPIRATORY (INHALATION) at 07:05

## 2024-12-10 RX ADMIN — APIXABAN 5 MG: 5 TABLET, FILM COATED ORAL at 09:49

## 2024-12-10 RX ADMIN — GABAPENTIN 100 MG: 100 CAPSULE ORAL at 08:42

## 2024-12-10 RX ADMIN — Medication 2000 UNITS: at 08:42

## 2024-12-10 RX ADMIN — ATORVASTATIN CALCIUM 40 MG: 20 TABLET, FILM COATED ORAL at 08:42

## 2024-12-10 RX ADMIN — INSULIN LISPRO 2 UNITS: 100 INJECTION, SOLUTION INTRAVENOUS; SUBCUTANEOUS at 08:44

## 2024-12-10 RX ADMIN — CARVEDILOL 25 MG: 25 TABLET, FILM COATED ORAL at 08:42

## 2024-12-10 RX ADMIN — NIFEDIPINE 30 MG: 30 TABLET, FILM COATED, EXTENDED RELEASE ORAL at 08:43

## 2024-12-10 RX ADMIN — POTASSIUM CHLORIDE 10 MEQ: 750 TABLET, EXTENDED RELEASE ORAL at 08:42

## 2024-12-10 RX ADMIN — EPLERENONE 50 MG: 25 TABLET, FILM COATED ORAL at 08:42

## 2024-12-10 RX ADMIN — CETIRIZINE HYDROCHLORIDE 10 MG: 10 TABLET, FILM COATED ORAL at 08:43

## 2024-12-10 RX ADMIN — VALSARTAN 160 MG: 160 TABLET, FILM COATED ORAL at 08:43

## 2024-12-10 RX ADMIN — Medication 1 CAPSULE: at 08:43

## 2024-12-10 RX ADMIN — Medication 1 TABLET: at 08:41

## 2024-12-10 NOTE — PROGRESS NOTES
Exercise Oximetry    Patient Name:Alexy Ram   MRN: [id number]   Date: 12/10/24             ROOM AIR BASELINE   SpO2% 94   Heart Rate 68   Blood Pressure      EXERCISE ON ROOM AIR SpO2% EXERCISE ON O2 @  LPM SpO2%   1 MINUTE 93 1 MINUTE    2 MINUTES 94 2 MINUTES    3 MINUTES 92 3 MINUTES    4 MINUTES 90 4 MINUTES    5 MINUTES 91 5 MINUTES    6 MINUTES 92 6 MINUTES               Distance Walked  2 laps around nurses' station Distance Walked   Dyspnea (Kaylynn Scale)  8 Dyspnea (Kaylynn Scale)   Fatigue (Kaylynn Scale)   Fatigue (Kaylynn Scale)   SpO2% Post Exercise  95 SpO2% Post Exercise   HR Post Exercise  90 HR Post Exercise   Time to Recovery   Time to Recovery     Comments: found patient on 2 liter nasal cannula. Patient's spo2 was 94% on room air. Walked patient two laps around nurses' station. Patient did not require supplemental oxygen to maintain spo2 greater than 88%. Weaned patient to room air.

## 2024-12-10 NOTE — PLAN OF CARE
Problem: Adult Inpatient Plan of Care  Goal: Plan of Care Review  Outcome: Progressing  Goal: Patient-Specific Goal (Individualized)  Outcome: Progressing  Goal: Absence of Hospital-Acquired Illness or Injury  Outcome: Progressing  Intervention: Identify and Manage Fall Risk  Recent Flowsheet Documentation  Taken 12/10/2024 0318 by Brenda Stephenson RN  Safety Promotion/Fall Prevention:   nonskid shoes/slippers when out of bed   safety round/check completed   fall prevention program maintained   clutter free environment maintained  Taken 12/10/2024 0200 by Brenda Stephenson RN  Safety Promotion/Fall Prevention:   safety round/check completed   nonskid shoes/slippers when out of bed   clutter free environment maintained  Taken 12/10/2024 0000 by Brenda Stephenson RN  Safety Promotion/Fall Prevention:   safety round/check completed   nonskid shoes/slippers when out of bed   fall prevention program maintained   clutter free environment maintained  Taken 12/9/2024 2115 by Brenda Stephenson RN  Safety Promotion/Fall Prevention:   safety round/check completed   nonskid shoes/slippers when out of bed   fall prevention program maintained   clutter free environment maintained  Taken 12/9/2024 2000 by Brenda Stephenson RN  Safety Promotion/Fall Prevention:   nonskid shoes/slippers when out of bed   fall prevention program maintained   clutter free environment maintained  Intervention: Prevent Skin Injury  Recent Flowsheet Documentation  Taken 12/10/2024 0318 by Brenda Stephenson RN  Body Position: position maintained  Taken 12/10/2024 0200 by Brenda Stephenson RN  Body Position: position changed independently  Taken 12/10/2024 0000 by Brenda Stephenson RN  Body Position: position changed independently  Taken 12/9/2024 2115 by Brenda Stephenson RN  Body Position: position changed independently  Intervention: Prevent Infection  Recent Flowsheet Documentation  Taken 12/10/2024 0318 by Brenda Stephenson RN  Infection Prevention:   rest/sleep promoted   hand  hygiene promoted  Taken 12/10/2024 0200 by Brenda Stephenson RN  Infection Prevention:   rest/sleep promoted   hand hygiene promoted  Taken 12/10/2024 0000 by Brenda Stephenson RN  Infection Prevention:   rest/sleep promoted   hand hygiene promoted  Taken 12/9/2024 2115 by Brenda Stephenson RN  Infection Prevention:   rest/sleep promoted   hand hygiene promoted  Taken 12/9/2024 2000 by Brenda Stephenson RN  Infection Prevention:   rest/sleep promoted   hand hygiene promoted  Goal: Optimal Comfort and Wellbeing  Outcome: Progressing  Intervention: Provide Person-Centered Care  Recent Flowsheet Documentation  Taken 12/10/2024 0318 by Brenda Stephenson RN  Trust Relationship/Rapport:   care explained   choices provided   emotional support provided   empathic listening provided   questions answered   questions encouraged   reassurance provided   thoughts/feelings acknowledged  Taken 12/9/2024 2115 by Brenda Stephenson RN  Trust Relationship/Rapport:   care explained   choices provided   emotional support provided   empathic listening provided   questions answered   questions encouraged   reassurance provided   thoughts/feelings acknowledged  Goal: Readiness for Transition of Care  Outcome: Progressing     Problem: Comorbidity Management  Goal: Blood Pressure in Desired Range  Outcome: Progressing  Intervention: Maintain Blood Pressure Management  Recent Flowsheet Documentation  Taken 12/10/2024 0318 by Brenda Stephenson RN  Medication Review/Management: medications reviewed  Taken 12/10/2024 0200 by Brenda Stephenson RN  Medication Review/Management: medications reviewed  Taken 12/10/2024 0000 by Brenda Stephenson RN  Medication Review/Management: medications reviewed  Taken 12/9/2024 2115 by Brenda Stephenson RN  Medication Review/Management: medications reviewed  Taken 12/9/2024 2000 by Brenda Stephenson RN  Medication Review/Management: medications reviewed     Problem: Fall Injury Risk  Goal: Absence of Fall and Fall-Related Injury  Outcome:  Progressing  Intervention: Identify and Manage Contributors  Recent Flowsheet Documentation  Taken 12/10/2024 0318 by Brenda Stephenson, RN  Medication Review/Management: medications reviewed  Taken 12/10/2024 0200 by Brenda Stephenson RN  Medication Review/Management: medications reviewed  Taken 12/10/2024 0000 by Brenda Stephenson RN  Medication Review/Management: medications reviewed  Taken 12/9/2024 2115 by Brenda Stephenson RN  Medication Review/Management: medications reviewed  Taken 12/9/2024 2000 by Brenda Stephenson RN  Medication Review/Management: medications reviewed  Intervention: Promote Injury-Free Environment  Recent Flowsheet Documentation  Taken 12/10/2024 0318 by Brenda Stephenson RN  Safety Promotion/Fall Prevention:   nonskid shoes/slippers when out of bed   safety round/check completed   fall prevention program maintained   clutter free environment maintained  Taken 12/10/2024 0200 by Brenda Stephenson RN  Safety Promotion/Fall Prevention:   safety round/check completed   nonskid shoes/slippers when out of bed   clutter free environment maintained  Taken 12/10/2024 0000 by Brenda Stephenson RN  Safety Promotion/Fall Prevention:   safety round/check completed   nonskid shoes/slippers when out of bed   fall prevention program maintained   clutter free environment maintained  Taken 12/9/2024 2115 by Brenda Stephenson RN  Safety Promotion/Fall Prevention:   safety round/check completed   nonskid shoes/slippers when out of bed   fall prevention program maintained   clutter free environment maintained  Taken 12/9/2024 2000 by Brenda Stephenson RN  Safety Promotion/Fall Prevention:   nonskid shoes/slippers when out of bed   fall prevention program maintained   clutter free environment maintained   Goal Outcome Evaluation:

## 2024-12-10 NOTE — OUTREACH NOTE
Prep Survey      Flowsheet Row Responses   [facility] patient discharged from? [facility]   Is LACE score < 7 ? No   Eligibility [facility]   Date of Admission 12/07/24   Date of Discharge 12/10/24   Discharge Disposition Home or Self Care   Discharge diagnosis Bronchial obstruction  [BRONCHOSCOPY WITH WASHING, BAL, BIOPSIES]   Does the patient have one of the following disease processes/diagnoses(primary or secondary)? Other   Does the patient have Home health ordered? No   Is there a DME ordered? No   Medication alerts for this patient see AVS   Prep survey completed? Yes            Blank SHIELDS - Registered Nurse

## 2024-12-10 NOTE — DISCHARGE PLACEMENT REQUEST
"Patricia Ram (69 y.o. Male)       Date of Birth   1955    Social Security Number       Address   [address] [place] [address]    Home Phone   [phone]    MRN   [id number]       [facility]    Marital Status                               Admission Date   12/7/24    Admission Type   Urgent    Admitting Provider   Dayan Jimenez MD    Attending Provider   Buster Crain MD    Department, Room/Bed   [facility], [address]/1       Discharge Date       Discharge Disposition       Discharge Destination                                 Attending Provider: Buster Crain MD    Allergies: Spironolactone    Isolation: Droplet   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), Rhinovirus  (12/09/24)   Code Status: CPR    Ht: 170.2 cm (67\")   Wt: 119 kg (262 lb 3.2 oz)    Admission Cmt: None   Principal Problem: Bronchial obstruction [J98.09]                   Active Insurance as of 12/7/2024       Primary Coverage       Payor Plan Insurance Group Employer/Plan Group    MEDICARE MEDICARE A & B        Payor Plan Address Payor Plan Phone Number Payor Plan Fax Number Effective Dates    [address] [phone]  3/1/2020 - None Entered    [facility] [id number]         Subscriber Name Subscriber Birth Date Member ID       PATRICIA RAM 1955 [id number]               Secondary Coverage       Payor Plan Insurance Group Employer/Plan Group    [employer] SUP AAR HEALTH CARE OPTIONS        Payor Plan Address Payor Plan Phone Number Payor Plan Fax Number Effective Dates    [facility] [phone]  3/1/2020 - None Entered    [address]       [facility] [address]         Subscriber Name Subscriber Birth Date Member ID       PATRICIA RAM 1955 [id number]                     Emergency Contacts        (Rel.) Home Phone Work Phone Mobile Phone    MariiaDarlin abarca (Spouse) [phone] -- [phone]    Dexter Ram (Son) [phone] -- [phone]      "

## 2024-12-10 NOTE — PROGRESS NOTES
Dr. SANAM Steele    [facility] CORONARY CARE        Patient ID:  Name:  Alexy Ram  MRN:  [id number]  1955  69 y.o.  male            CC/Reason for visit: Left lung atelectasis, acute rhinovirus bronchitis, squamous cell lung cancer 2018    Interval hx: He wants to go home today.  He feels stronger.  Patient still has cough.  Still requiring oxygen, 1 L.  Not usually on home O2.    ROS: No hemoptysis, no chest pain, no abdominal pain    I reviewed old medical records.  Past medical history, social history and family history: Unchanged from admission H&P.      Vitals:  Vitals:    12/10/24 0705 12/10/24 0707 12/10/24 0711 12/10/24 1238   BP:   128/72 141/71   BP Location:   Left arm Left arm   Patient Position:   Sitting Lying   Pulse: 70 70 71 60   Resp: 16  16 16   Temp:   97.5 °F (36.4 °C) 97.8 °F (36.6 °C)   TempSrc:   Oral Oral   SpO2: 95% 92% 95% 96%   Weight:       Height:               Body mass index is 41.07 kg/m².    Intake/Output Summary (Last 24 hours) at 12/10/2024 1416  Last data filed at 12/10/2024 0840  Gross per 24 hour   Intake 340 ml   Output --   Net 340 ml       Exam:  GEN:  No distress  Alert, oriented x 3.   LUNGS: Diminished breath sounds left side.  Few rhonchi right side, no use of accessory muscles  CV:  Normal S1S2, without murmur, no edema  ABD:  Non tender, obesity hampers exam      Scheduled meds:  apixaban, 5 mg, Oral, BID  atorvastatin, 40 mg, Oral, Daily  carvedilol, 25 mg, Oral, BID With Meals  cetirizine, 10 mg, Oral, Daily  cholecalciferol, 2,000 Units, Oral, Daily  empagliflozin, 25 mg, Oral, Daily  eplerenone, 50 mg, Oral, BID  gabapentin, 100 mg, Oral, Q12H  insulin lispro, 2-7 Units, Subcutaneous, 4x Daily AC & at Bedtime  lactobacillus acidophilus, 1 capsule, Oral, Daily  multivitamin with minerals, 1 tablet, Oral, Daily  mupirocin, 1 Application, Each Nare, BID  NIFEdipine XL, 30 mg, Oral, Daily  piperacillin-tazobactam, 3.375 g, Intravenous,  Q8H  potassium chloride, 10 mEq, Oral, BID  sodium chloride, 10 mL, Intravenous, Q12H  tamsulosin, 0.4 mg, Oral, Nightly  tiotropium bromide monohydrate, 2 puff, Inhalation, Daily - RT  valsartan, 160 mg, Oral, Q24H      IV meds:                           Data Review:   I reviewed the patient's medications and new clinical results.    COVID19   Date Value Ref Range Status   12/09/2024 Not Detected Not Detected - Ref. Range Final           Results from last 7 days   Lab Units 12/10/24  0633 12/09/24  0549 12/07/24  1138   SODIUM mmol/L 140 138 135*   POTASSIUM mmol/L 4.3 4.4 4.0   CHLORIDE mmol/L 106 106 105   CO2 mmol/L 23.3 19.9* 22.3   BUN mg/dL 20 25* 24*   CREATININE mg/dL 0.87 1.03 1.11   CALCIUM mg/dL 8.6 9.0 9.4   BILIRUBIN mg/dL 0.3 0.4 0.4   ALK PHOS U/L 91 98 105   ALT (SGPT) U/L 24 27 28   AST (SGOT) U/L 19 17 12   GLUCOSE mg/dL 167* 125* 179*   WBC 10*3/mm3 11.82* 13.26* 13.82*   HEMOGLOBIN g/dL 13.1 13.7 13.4   PLATELETS 10*3/mm3 401 355 303   PROBNP pg/mL  --   --  667.1     Results from last 7 days   Lab Units 12/09/24  0905 12/07/24  1757 12/07/24  1756   BLOODCX   --  No growth at 2 days No growth at 2 days   RESPCX  No growth  --   --         Results from last 7 days   Lab Units 12/09/24  0905   ADENOVIRUS DETECTION BY PCR  Not Detected   CORONAVIRUS 229E  Not Detected   CORONAVIRUS HKU1  Not Detected   CORONAVIRUS NL63  Not Detected   CORONAVIRUS OC43  Not Detected   HUMAN METAPNEUMOVIRUS  Not Detected   HUMAN RHINOVIRUS/ENTEROVIRUS  Detected*   INFLUENZA B PCR  Not Detected   PARAINFLUENZA 1  Not Detected   PARAINFLUENZA VIRUS 2  Not Detected   PARAINFLUENZA VIRUS 3  Not Detected   PARAINFLUENZA VIRUS 4  Not Detected   BORDETELLA PERTUSSIS PCR  Not Detected   CHLAMYDOPHILA PNEUMONIAE PCR  Not Detected   MYCOPLAMA PNEUMO PCR  Not Detected   INFLUENZA A PCR  Not Detected   RSV, PCR  Not Detected         ASSESSMENT:     Bronchial obstruction    Hypercholesterolemia    Hypertension    Type 2 diabetes  mellitus with hyperglycemia, without long-term current use of insulin    Squamous cell carcinoma of left lung    Status post lobectomy of lung    Obstructive sleep apnea of adult    Cardiac pacemaker in situ    Chronic anticoagulation    PAF (paroxysmal atrial fibrillation)    Chronic disease anemia    Primary hyperaldosteronism    Collapse of left lung    Tobacco abuse  Acute rhinovirus bronchitis  Previous left lower lobectomy for squamous cell lung cancer November 2018      PLAN:  Patient and all problems new to me.  Bronchoscopy report reviewed, notes by my colleagues and other medical consultants reviewed.  The patient had left lower lobectomy in 2018 for squamous cell lung cancer.  His left upper lobe was found to be partially collapsed, bronchial obstruction due to mucous.  Lavage and biopsies obtained bronchoscopically by Dr Jaquez recently.  Patient is still requiring oxygen.  So far preliminary results show acute rhinovirus bronchitis from bronchoscopy lavage.  I will stop Zosyn today.  The patient is improving.  He may go home today on oxygen.  We will walk him to document desaturation for qualifying for oxygen.  He has to follow-up with Dr. Ortiz within 1 to 2 weeks in the office.  Discussed with patient and wife at bedside.  Discussed with Dr. Crain    I reviewed the chart and other providers notes and reviewed labs.  Copied text in this note has been reviewed and is accurate as of today      Mike Steele MD  12/10/2024

## 2024-12-10 NOTE — DISCHARGE SUMMARY
Patient Name: Alexy Ram  : 1955  MRN: [id number]    Date of Admission: 2024  Date of Discharge:  12/10/2024  Primary Care Physician: Pipe Vaughn MD      Chief Complaint:   Nasal Congestion      Discharge Diagnoses     Active Hospital Problems    Diagnosis  POA    **Bronchial obstruction [J98.09]  Yes    Tobacco abuse [Z72.0]  Yes    Collapse of left lung [J98.11]  Yes    Primary hyperaldosteronism [E26.09]  Yes    Chronic disease anemia [D63.8]  Yes    Chronic anticoagulation [Z79.01]  Not Applicable    Obstructive sleep apnea of adult [G47.33]  Yes    Status post lobectomy of lung [Z90.2]  Not Applicable    PAF (paroxysmal atrial fibrillation) [I48.0]  Yes    Cardiac pacemaker in situ [Z95.0]  Yes    Squamous cell carcinoma of left lung [C34.92]  Yes    Type 2 diabetes mellitus with hyperglycemia, without long-term current use of insulin [E11.65]  Yes    Hypertension [I10]  Yes    Hypercholesterolemia [E78.00]  Yes      Resolved Hospital Problems   No resolved problems to display.        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 He was admitted with post-obstructive PNA of PERI. Please see below for details of admission by problem:      PERI bronchus occlusion  Possible post-obstructive PNA  Acute rhinovirus bronchitis  H/o SCCA of left lung s/p lower lobectomy  Appreciate Pulm attention to pt  Treated with IV Zosyn  Cultures neg so far  Afebrile, WBC falling  Weaned to RA now and walking oximetry today was fine  S/p bronch yesterday by Dr. Jaquez with therapeutic aspiration of secretions, BAL of PERI, and biopsy of PERI  Path pending  RVP on BAL was positive for rhinovirus  Dr. Steele recommends stopping abx  and is okay with his dc home today  Pulm will follow cultures  F/u with Dr. Ortiz in 1-2 weeks  F/u with PCP (Roderick) tomorrow     COPD  Stable, no exacerbation, no wheeze, no hypoxia  Continued Spiriva     GIL/CPAP  Continued home CPAP      DM2  Sugars acceptable  Continued SGLT-2 and SSI  A1c 7.0     PAF  2nd deg AVB/PPM  Chronic AC (Eliquis)  HRs fine on Coreg  Eliquis on hold for procedure (hasn't taken since Thursday as C/S was planned for Monday), restarted this AM (okay with Pulm)     HTN  BPs a bit soft on home regimen  Continued valsartan but stopped HCTZ with some improvement noted  Continue to hold HCTZ, have given rx for valsartan to continue at home     Primary hyperaldosteronism  Continued Inspra  Lytes fine     HLD  Continued Lipitor     Anemia of chronic disease  Hgb wnl        Eliquis sufficed for DVT prophylaxis.  Full code confirmed.  Discussed with patient and wife. D/w Dr. Steele and CCP.  Anticipate discharge home with family later today.    Day of Discharge     Subjective:  Feeling good today. No SOA or CP. No cough. No subjective fever or malaise. Voiding well. No N/V/D. No abd pain. Tolerating diet and appetite good. Eager to go home.    Physical Exam:  Temp:  [97.2 °F (36.2 °C)-97.8 °F (36.6 °C)] 97.2 °F (36.2 °C)  Heart Rate:  [60-72] 65  Resp:  [16-21] 16  BP: (108-144)/(59-81) 144/80  Body mass index is 41.07 kg/m².  Physical Exam  Vitals and nursing note reviewed. Exam conducted with a chaperone present (Wife).   Constitutional:       General: He is not in acute distress.     Appearance: He is obese. He is not ill-appearing, toxic-appearing or diaphoretic.   Cardiovascular:      Rate and Rhythm: Normal rate and regular rhythm.      Pulses: Normal pulses.   Pulmonary:      Effort: Pulmonary effort is normal. No respiratory distress.      Breath sounds: No wheezing or rales.      Comments: Decreased BS left lower lung field  Abdominal:      General: Bowel sounds are normal. There is no  distension.      Palpations: Abdomen is soft.      Tenderness: There is no abdominal tenderness.   Musculoskeletal:         General: No swelling.      Cervical back: Neck supple.   Skin:     General: Skin is warm and dry.      Capillary Refill: Capillary refill takes less than 2 seconds.      Coloration: Skin is not jaundiced.   Neurological:      General: No focal deficit present.      Mental Status: He is alert and oriented to person, place, and time. Mental status is at baseline.      Cranial Nerves: No cranial nerve deficit.      Coordination: Coordination normal.   Psychiatric:         Mood and Affect: Mood normal.         Behavior: Behavior normal.         Thought Content: Thought content normal.      Consultants     Consult Orders (all) (From admission, onward)       Start     Ordered    12/07/24 1621  Inpatient Pulmonology Consult  Once        Specialty:  Pulmonary Disease  Provider:  Clavin Vega Jr., MD    12/07/24 1621                  Procedures     BRONCHOSCOPY WITH WASHING, BAL, BIOPSIES    Imaging Results (All)       Procedure Component Value Units Date/Time    CT Chest Without Contrast Diagnostic [[id number]] Collected: 12/07/24 1220     Updated: 12/07/24 1855    Narrative:      CT CHEST WITHOUT CONTRAST     HISTORY: 69-year-old male with shortness of breath for 1 week. Left  lower lobe lobectomy for lung cancer in 2018.     TECHNIQUE: Radiation dose reduction techniques were utilized, including  automated exposure control and exposure modulation based on body size.   3 mm images were obtained through the chest without the administration  of IV contrast. Compared with chest CT 6/24/2024.     FINDINGS:  1. The left upper lobe bronchus has become occluded and there is  complete consolidation/collapse of the left upper lobe. There is greater  mediastinal shift to the left.     2. There are no new pulmonary opacities on the right and there is no  right pleural or pericardial effusion. Mediastinal  Female nodes appear slightly  larger and the largest AP window node measures 1.8 x 1.2 cm, previously  1.4 x 0.8 cm. The nodes may be reactive and reevaluation is recommended  following treatment for the left upper lobe collapse with suspected  possible superimposed pneumonia.  Reevaluation of the nodes is recommended with a chest CT in 3-4 weeks.     Incidentally, there are many coronary artery calcifications.              This report was finalized on 12/7/2024 6:52 PM by Dr. Carina Ryan M.D on  Workstation: [id number]       XR Chest 2 View [[id number]] Collected: 12/07/24 1045     Updated: 12/07/24 1114    Narrative:      XR CHEST 2 VW-     HISTORY: 69 years of age, Male. Cough, fatigue     COMPARISON: CT angiogram chest 06/04/2024.     FINDINGS: There has developed white out of the left lung in patient with  previous left lower lobectomy. There is focal bronchial cut off of the  left mainstem bronchus plugging or aspiration the lesion is also in the  differential diagnosis. There is opacification left thorax which may be  due to consolidation or atelectasis and/or pleural fluid right lung  appears clear no focal airspace disease in the no pneumothorax is  demonstrated. There is a right subclavian cardiac pacer. Right to left  cardiomediastinal shift. Advanced osteoarthritis right shoulder.       Impression:      There has developed a white out of the left lung in patient  with previous left lower lobectomy. There is bronchial cut off at the  left mainstem bronchus and this may be associate with mucous plugging or  aspiration with left pulmonary opacification due to atelectasis or  consolidation and suspected pleural fluid. Right to left  cardiomediastinal shift.     This report was finalized on 12/7/2024 11:11 AM by Naren Newby M.D  on Workstation: [id number]               Results for orders placed during the hospital encounter of 06/24/24    Adult Transthoracic Echo Complete W/ Cont if Necessary Per  Protocol    Interpretation Summary    Left ventricular systolic function is normal. Calculated left ventricular EF = 67.2%; visually estimated LVEF 55-60%.    The left ventricular cavity is mildly dilated.    Left ventricular wall thickness is consistent with mild concentric hypertrophy.    Left ventricular diastolic function was normal.    RV mildly dilated with grossly normal function.    Aortic valve sclerosis without hemodynamically significant stenosis.    There is a small (<1cm) pericardial effusion. There is no evidence of cardiac tamponade.    Pacemaker lead noted.    Saline test results are negative.    Biatrial enlargement, normal IVC size.    Pertinent Labs     Results from last 7 days   Lab Units 12/10/24  0633 12/09/24  0549 12/07/24  1138   WBC 10*3/mm3 11.82* 13.26* 13.82*   HEMOGLOBIN g/dL 13.1 13.7 13.4   PLATELETS 10*3/mm3 401 355 303     Results from last 7 days   Lab Units 12/10/24  0633 12/09/24  0549 12/07/24  1138 12/04/24  1055   SODIUM mmol/L 140 138 135* 141   POTASSIUM mmol/L 4.3 4.4 4.0 4.0   CHLORIDE mmol/L 106 106 105 101   CO2 mmol/L 23.3 19.9* 22.3 20   BUN mg/dL 20 25* 24* 30*   CREATININE mg/dL 0.87 1.03 1.11 1.26   GLUCOSE mg/dL 167* 125* 179* 116*   EGFR mL/min/1.73 93.4 78.6 71.9  --      Results from last 7 days   Lab Units 12/10/24  0633 12/09/24  0549 12/07/24  1138   ALBUMIN g/dL 3.1* 3.2* 3.6   BILIRUBIN mg/dL 0.3 0.4 0.4   ALK PHOS U/L 91 98 105   AST (SGOT) U/L 19 17 12   ALT (SGPT) U/L 24 27 28     Results from last 7 days   Lab Units 12/10/24  0633 12/09/24  0549 12/07/24  1138 12/04/24  1055   CALCIUM mg/dL 8.6 9.0 9.4 9.4   ALBUMIN g/dL 3.1* 3.2* 3.6  --    MAGNESIUM mg/dL 2.3 2.3  --   --    PHOSPHORUS mg/dL 3.9 4.5  --   --        Results from last 7 days   Lab Units 12/07/24  1138   PROBNP pg/mL 667.1       Results from last 7 days   Lab Units 12/04/24  1055   TRIGLYCERIDES mg/dL 86   HDL CHOL mg/dL 39*   LDL CHOL mg/dL 87     Results from last 7 days   Lab Units  12/09/24 0905 12/07/24  1757 12/07/24 1756   BLOODCX   --  No growth at 2 days No growth at 2 days   RESPCX  No growth  --   --      Results from last 7 days   Lab Units 12/09/24 0905   COVID19  Not Detected       Test Results Pending at Discharge     Pending Results       Procedure [Order ID] Specimen - Date/Time    Aspergillus Galactomannan Antigen - Wash, Bronchus [[id number]] Collected: 12/09/24 0905    Specimen: Wash from Bronchus Updated: 12/09/24 0945    Fungus Culture - Lavage, Lung, Left Upper Lobe [[id number]] Collected: 12/09/24 0907    Specimen: Lavage from Lung, Left Upper Lobe Updated: 12/09/24 0945    Fungus Culture - Wash, Bronchus [[id number]] Collected: 12/09/24 0905    Specimen: Wash from Bronchus Updated: 12/09/24 0945    Histoplasma Antigen, CSF or BAL - Wash, Bronchus [[id number]] Collected: 12/09/24 0905    Specimen: Wash from Bronchus Updated: 12/09/24 0945              Discharge Details        Discharge Medications        New Medications        Instructions Start Date   mupirocin 2 % nasal ointment  Commonly known as: BACTROBAN   1 Application, Each Nare, 2 Times Daily      valsartan 160 MG tablet  Commonly known as: DIOVAN  Replaces: valsartan-hydrochlorothiazide 160-12.5 MG per tablet   160 mg, Oral, Every 24 Hours Scheduled   Start Date: December 11, 2024            Continue These Medications        Instructions Start Date   albuterol sulfate  (90 Base) MCG/ACT inhaler  Commonly known as: PROVENTIL HFA;VENTOLIN HFA;PROAIR HFA   2 puffs, Inhalation, Every 4 Hours PRN      atorvastatin 40 MG tablet  Commonly known as: LIPITOR   40 mg, Oral, Every Night at Bedtime      carvedilol 25 MG tablet  Commonly known as: COREG   25 mg, Oral, 2 Times Daily With Meals      cetirizine 10 MG tablet  Commonly known as: zyrTEC   10 mg, Oral, Daily      Eliquis 5 MG tablet tablet  Generic drug: apixaban   5 mg, Oral, Daily      empagliflozin 25 MG tablet tablet  Commonly known as: JARDIANCE   25 mg,  Oral, Daily      eplerenone 50 MG tablet  Commonly known as: INSPRA   50 mg, Oral, 2 Times Daily      EYE VITAMINS PO   Take  by mouth.      gabapentin 100 MG capsule  Commonly known as: NEURONTIN   TAKE TWO CAPSULES BY MOUTH EVERY MORNING, ONE CAPSULE IN THE MIDDLE OF THE DAY AND 2 CAPSULES AT BEDTIME INDICATIONS: CHRONIC PAIN      MULTI-ENZYME PO   Take  by mouth.      multivitamin with minerals tablet tablet   1 tablet, Daily      NIFEdipine XL 30 MG 24 hr tablet  Commonly known as: PROCARDIA XL   30 mg, Oral, Daily      potassium chloride 10 MEQ CR capsule  Commonly known as: MICRO-K   10 mEq, 2 Times Daily      PROSTATE HEALTH PO   Take  by mouth.      Spiriva Respimat 2.5 MCG/ACT aerosol solution inhaler  Generic drug: tiotropium bromide monohydrate   Inhale 2 puffs Daily.      tamsulosin 0.4 MG capsule 24 hr capsule  Commonly known as: FLOMAX   0.4 mg, Oral, Nightly      VITAMIN D PO   Take  by mouth.      VITAMIN-B COMPLEX PO   Take  by mouth.             Stop These Medications      PROBIOTIC DAILY PO     valsartan-hydrochlorothiazide 160-12.5 MG per tablet  Commonly known as: Diovan HCT  Replaced by: valsartan 160 MG tablet              Allergies   Allergen Reactions    Spironolactone Swelling     Facial swelling       Discharge Disposition:  Home or Self Care      Discharge Diet:  Diet Order   Procedures    Diet: Regular/House, Cardiac, Diabetic; Healthy Heart (2-3 Na+); Consistent Carbohydrate; Fluid Consistency: Thin (IDDSI 0)       Discharge Activity:   As tolerated    CODE STATUS:    Code Status and Medical Interventions: CPR (Attempt to Resuscitate); Full Support   Ordered at: 12/07/24 1622     Code Status (Patient has no pulse and is not breathing):    CPR (Attempt to Resuscitate)     Medical Interventions (Patient has pulse or is breathing):    Full Support       Future Appointments   Date Time Provider Department Center   12/11/2024 10:30 AM Pipe Vaughn MD [facility]   1/21/2025  3:00  PM Vlad Flanagan MD [facility]   6/2/2025  4:10 PM Nima Farmer APRN [facility]     Additional Instructions for the Follow-ups that You Need to Schedule       Discharge Follow-up with PCP   As directed       Currently Documented PCP:    Pipe Vaughn MD    PCP Phone Number:    [phone]     Follow Up Details: Dr. Vaughn (PCP) at next available appt (tomorrow)        Discharge Follow-up with Specified Provider: Dr. Ortiz ([facility]); 1 Week   As directed      To: Dr. Ortiz (Pulm)   Follow Up: 1 Week               Follow-up Information       Pipe Vaughn MD .    Specialty: Family Medicine  Why: Dr. Vaughn (PCP) at next available appt (tomorrow)  Contact information:  [address] [facility] [address] [phone]                             Additional Instructions for the Follow-ups that You Need to Schedule       Discharge Follow-up with PCP   As directed       Currently Documented PCP:    Pipe Vaughn MD    PCP Phone Number:    [phone]     Follow Up Details: Dr. Vaughn (PCP) at next available appt (tomorrow)        Discharge Follow-up with Specified Provider: Dr. Ortiz ([facility]); 1 Week   As directed      To: Dr. Ortiz (Pulm)   Follow Up: 1 Week            Time Spent on Discharge:  Greater than 30 minutes      Buster Crain MD  [facility]  12/10/24  15:08 EST

## 2024-12-10 NOTE — PROGRESS NOTES
Name: Alexy Ram ADMIT: 2024   : 1955  PCP: Pipe Vaughn MD    MRN: [id number] LOS: 2 days   AGE/SEX: 69 y.o. male  ROOM: [facility]     Subjective   Subjective   Feeling good today. No SOA or CP. No cough. No subjective fever or malaise. Voiding well. No N/V/D. No abd pain. Tolerating diet and appetite good.        Objective   Objective   Vital Signs  Temp:  [97.3 °F (36.3 °C)-97.8 °F (36.6 °C)] 97.5 °F (36.4 °C)  Heart Rate:  [64-72] 71  Resp:  [16-21] 16  BP: (108-135)/(59-81) 128/72  SpO2:  [90 %-96 %] 95 %  on  Flow (L/min) (Oxygen Therapy):  [2] 2;   Device (Oxygen Therapy): room air  Body mass index is 41.07 kg/m².    (No change in exam today)    Physical Exam  Vitals and nursing note reviewed. Exam conducted with a chaperone present (Wife).   Constitutional:       General: He is not in acute distress.     Appearance: He is obese. He is not ill-appearing, toxic-appearing or diaphoretic.   HENT:      Head: Normocephalic.      Nose: Nose normal.      Mouth/Throat:      Mouth: Mucous membranes are moist.      Pharynx: Oropharynx is clear.   Eyes:      General: No scleral icterus.        Right eye: No discharge.         Left eye: No discharge.      Extraocular Movements: Extraocular movements intact.      Conjunctiva/sclera: Conjunctivae normal.   Cardiovascular:      Rate and Rhythm: Normal rate and regular rhythm.      Pulses: Normal pulses.   Pulmonary:      Effort: Pulmonary effort is normal. No respiratory distress.      Breath sounds: No wheezing or rales.      Comments: Decreased BS left lower lung field  Abdominal:      General: Bowel sounds are normal. There is no distension.      Palpations: Abdomen is soft.      Tenderness: There is no abdominal tenderness.   Musculoskeletal:         General: No swelling.      Cervical back: Neck supple.   Skin:     General: Skin is warm and dry.      Capillary Refill: Capillary refill takes less than 2 seconds.      Coloration: Skin is not  jaundiced.   Neurological:      General: No focal deficit present.      Mental Status: He is alert and oriented to person, place, and time. Mental status is at baseline.      Cranial Nerves: No cranial nerve deficit.      Coordination: Coordination normal.   Psychiatric:         Mood and Affect: Mood normal.         Behavior: Behavior normal.         Thought Content: Thought content normal.       Results Review     I reviewed the patient's new clinical results.  Results from last 7 days   Lab Units 12/10/24  0633 12/09/24  0549 12/07/24  1138   WBC 10*3/mm3 11.82* 13.26* 13.82*   HEMOGLOBIN g/dL 13.1 13.7 13.4   PLATELETS 10*3/mm3 401 355 303     Results from last 7 days   Lab Units 12/10/24  0633 12/09/24  0549 12/07/24  1138 12/04/24  1055   SODIUM mmol/L 140 138 135* 141   POTASSIUM mmol/L 4.3 4.4 4.0 4.0   CHLORIDE mmol/L 106 106 105 101   CO2 mmol/L 23.3 19.9* 22.3 20   BUN mg/dL 20 25* 24* 30*   CREATININE mg/dL 0.87 1.03 1.11 1.26   GLUCOSE mg/dL 167* 125* 179* 116*   EGFR mL/min/1.73 93.4 78.6 71.9  --      Results from last 7 days   Lab Units 12/10/24  0633 12/09/24  0549 12/07/24  1138   ALBUMIN g/dL 3.1* 3.2* 3.6   BILIRUBIN mg/dL 0.3 0.4 0.4   ALK PHOS U/L 91 98 105   AST (SGOT) U/L 19 17 12   ALT (SGPT) U/L 24 27 28     Results from last 7 days   Lab Units 12/10/24  0633 12/09/24  0549 12/07/24  1138 12/04/24  1055   CALCIUM mg/dL 8.6 9.0 9.4 9.4   ALBUMIN g/dL 3.1* 3.2* 3.6  --    MAGNESIUM mg/dL 2.3 2.3  --   --    PHOSPHORUS mg/dL 3.9 4.5  --   --      Results from last 7 days   Lab Units 12/07/24  1138   LACTATE mmol/L 1.1     Glucose   Date/Time Value Ref Range Status   12/10/2024 1107 106 70 - 130 mg/dL Final   12/10/2024 0559 158 (H) 70 - 130 mg/dL Final   12/09/2024 2016 102 70 - 130 mg/dL Final   12/09/2024 1543 179 (H) 70 - 130 mg/dL Final   12/09/2024 1100 124 70 - 130 mg/dL Final   12/08/2024 2157 161 (H) 70 - 130 mg/dL Final   12/08/2024 1515 107 70 - 130 mg/dL Final       No radiology  results for the last day    I have personally reviewed all medications:  Scheduled Medications  apixaban, 5 mg, Oral, BID  atorvastatin, 40 mg, Oral, Daily  carvedilol, 25 mg, Oral, BID With Meals  cetirizine, 10 mg, Oral, Daily  cholecalciferol, 2,000 Units, Oral, Daily  empagliflozin, 25 mg, Oral, Daily  eplerenone, 50 mg, Oral, BID  gabapentin, 100 mg, Oral, Q12H  insulin lispro, 2-7 Units, Subcutaneous, 4x Daily AC & at Bedtime  lactobacillus acidophilus, 1 capsule, Oral, Daily  multivitamin with minerals, 1 tablet, Oral, Daily  mupirocin, 1 Application, Each Nare, BID  NIFEdipine XL, 30 mg, Oral, Daily  piperacillin-tazobactam, 3.375 g, Intravenous, Q8H  potassium chloride, 10 mEq, Oral, BID  sodium chloride, 10 mL, Intravenous, Q12H  tamsulosin, 0.4 mg, Oral, Nightly  tiotropium bromide monohydrate, 2 puff, Inhalation, Daily - RT  valsartan, 160 mg, Oral, Q24H    Infusions     Diet  Diet: Regular/House, Cardiac, Diabetic; Healthy Heart (2-3 Na+); Consistent Carbohydrate; Fluid Consistency: Thin (IDDSI 0)    I have personally reviewed:  [x]  Laboratory   [x]  Microbiology   []  Radiology   [x]  EKG/Telemetry  []  Cardiology/Vascular   []  Pathology    []  Records       Assessment/Plan     Active Hospital Problems    Diagnosis  POA    **Bronchial obstruction [J98.09]  Yes    Tobacco abuse [Z72.0]  Yes    Collapse of left lung [J98.11]  Yes    Primary hyperaldosteronism [E26.09]  Yes    Chronic disease anemia [D63.8]  Yes    Chronic anticoagulation [Z79.01]  Not Applicable    Obstructive sleep apnea of adult [G47.33]  Yes    Status post lobectomy of lung [Z90.2]  Not Applicable    PAF (paroxysmal atrial fibrillation) [I48.0]  Yes    Cardiac pacemaker in situ [Z95.0]  Yes    Squamous cell carcinoma of left lung [C34.92]  Yes    Type 2 diabetes mellitus with hyperglycemia, without long-term current use of insulin [E11.65]  Yes    Hypertension [I10]  Yes    Hypercholesterolemia [E78.00]  Yes      Resolved Hospital  Problems   No resolved problems to display.     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 He was admitted with post-obstructive PNA of PERI.    PERI bronchus occlusion  Post-obstructive PNA  H/o SCCA of left lung s/p lower lobectomy  Appreciate Pulm attention to pt  Continue IV Zosyn  Cultures neg so far  Afebrile, WBC falling  Weaned to RA now  S/p bronch yesterday by Dr. 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  Full code.  Discussed with patient and wife. D/w Dr. Jaquez.  Anticipate discharge home with family when okay with Pulm.  Expected Discharge Date: 12/11/2024; Expected Discharge Time:       Buster Crain MD  [facility]  12/10/24  11:47 EST

## 2024-12-10 NOTE — CASE MANAGEMENT/SOCIAL WORK
Continued Stay Note  [facility]     Patient Name: Alexy Ram  MRN: [id number]  Today's Date: 12/10/2024    Admit Date: 12/7/2024    Plan: home   Discharge Plan       Row Name 12/10/24 1506       Plan    Plan home    Plan Comments Per RT notes patient doen not meet requirements for home o2. md brooks      Row Name 12/10/24 1420       Plan    [place]    Roadmap to Recovery Yes    Patient/Family in Agreement with Plan yes    Plan Comments Spoke with patient and spouse at bedside. facesheet, PCP and pharmacy verified. Patient lives with spouse, is IADL, up ad alicia in room. Possible home o2 needs at dc. referral sent to patient choice of [facility]. Walk oximetry is pending.                   Discharge Codes    No documentation.                 Expected Discharge Date and Time       Expected Discharge Date Expected Discharge Time    Dec 11, 2024               Angeli Molina RN

## 2024-12-10 NOTE — CASE MANAGEMENT/SOCIAL WORK
Discharge Planning Assessment  [facility]     Patient Name: Alexy Ram  MRN: [id number]  Today's Date: 12/10/2024    Admit Date: 12/7/2024    Plan: Home with spouse and [facility]   Discharge Needs Assessment       Row Name 12/10/24 1425       Living Environment    People in Home spouse    Current Living Arrangements home    Potentially Unsafe Housing Conditions none    In the past 12 months has the electric, gas, oil, or water company threatened to shut off services in your home? No    Primary Care Provided by self    Provides Primary Care For no one    Family Caregiver if Needed spouse    Family Caregiver Names Darlin    Quality of Family Relationships helpful    Able to Return to Prior Arrangements yes       Resource/Environmental Concerns    Resource/Environmental Concerns none    Transportation Concerns none       Transportation Needs    In the past 12 months, has lack of transportation kept you from medical appointments or from getting medications? no    In the past 12 months, has lack of transportation kept you from meetings, work, or from getting things needed for daily living? No       Food Insecurity    Within the past 12 months, you worried that your food would run out before you got the money to buy more. Never true    Within the past 12 months, the food you bought just didn't last and you didn't have money to get more. Never true       Transition Planning    Patient/Family Anticipates Transition to home    Patient/Family Anticipated Services at Transition none    Transportation Anticipated family or friend will provide       Discharge Needs Assessment    Equipment Currently Used at Home cpap    Concerns to be Addressed discharge planning    Anticipated Changes Related to Illness none    Equipment Needed After Discharge oxygen    Provided Post Acute Provider List? Yes    Post Acute Provider List DME Supplier    Delivered To Patient    Method of Delivery In person    Offered/Gave  Vendor List yes    Current Discharge Risk chronically ill                   Discharge Plan       Row Name 12/10/24 1428       Plan    Plan [place] [facility]    Roadmap to Recovery Yes    Patient/Family in Agreement with Plan yes    Plan Comments Spoke with patient and spouse at bedside. facesheet, PCP and pharmacy verified. Patient lives with spouse, is IADL, up ad alicia in room. Possible home o2 needs at dc. referral sent to [facility] choice of [facility]. Walk oximetry is pending.                  Continued Care and Services - Admitted Since 12/7/2024       Durable Medical Equipment       Service Provider Request Status Services Address Phone Fax Patient Preferred    [facility] Accepted -- [id number], [place] [address] [phone] --                  Expected Discharge Date and Time       Expected Discharge Date Expected Discharge Time    Dec 11, 2024            Demographic Summary       Row Name 12/10/24 1421       General Information    Admission Type inpatient    Arrived From emergency department    Required Notices Provided Important Message from Medicare    Referral Source admission list    Reason for Consult discharge planning    Preferred Language English                   Functional Status       Row Name 12/10/24 1421       Functional Status    Usual Activity Tolerance moderate    Current Activity Tolerance fair       Functional Status, IADL    Medications independent    Meal Preparation assistive person    Housekeeping assistive person    Laundry assistive person    Shopping assistive person    IADL Comments spouse assists as needed.       Mental Status    General Appearance WDL WDL       Mental Status Summary    Recent Changes in Mental Status/Cognitive Functioning no changes                               Angeli Molina, RN

## 2024-12-10 NOTE — CASE MANAGEMENT/SOCIAL WORK
Case Management Discharge Note      Final Note: home    Provided Post Acute Provider List?: Yes  Post Acute Provider List: DME Supplier  Delivered To: Patient  Method of Delivery: In person    Selected Continued Care - Admitted Since 12/7/2024              Transportation Services  Private: Car    Final Discharge Disposition Code: 01 - home or self-care

## 2024-12-11 ENCOUNTER — TRANSITIONAL CARE MANAGEMENT TELEPHONE ENCOUNTER (OUTPATIENT)
Dept: CALL CENTER | Facility: HOSPITAL | Age: 69
End: 2024-12-11
Payer: MEDICARE

## 2024-12-11 ENCOUNTER — OFFICE VISIT (OUTPATIENT)
Dept: FAMILY MEDICINE CLINIC | Facility: CLINIC | Age: 69
End: 2024-12-11
Payer: MEDICARE

## 2024-12-11 VITALS
OXYGEN SATURATION: 93 % | WEIGHT: 263 LBS | HEART RATE: 72 BPM | TEMPERATURE: 97.8 F | HEIGHT: 67 IN | DIASTOLIC BLOOD PRESSURE: 80 MMHG | BODY MASS INDEX: 41.28 KG/M2 | SYSTOLIC BLOOD PRESSURE: 140 MMHG

## 2024-12-11 DIAGNOSIS — J20.6 ACUTE BRONCHITIS DUE TO RHINOVIRUS: ICD-10-CM

## 2024-12-11 DIAGNOSIS — Z00.00 MEDICARE ANNUAL WELLNESS VISIT, SUBSEQUENT: Primary | ICD-10-CM

## 2024-12-11 DIAGNOSIS — J98.11 COLLAPSE OF LEFT LUNG: ICD-10-CM

## 2024-12-11 DIAGNOSIS — Z09 HOSPITAL DISCHARGE FOLLOW-UP: ICD-10-CM

## 2024-12-11 LAB
BACTERIA SPEC AEROBE CULT: NO GROWTH
BACTERIA SPEC RESP CULT: NO GROWTH
CYTO UR: NORMAL
GRAM STN SPEC: NORMAL
LAB AP CASE REPORT: NORMAL
LAB AP DIAGNOSIS COMMENT: NORMAL
PATH REPORT.FINAL DX SPEC: NORMAL
PATH REPORT.GROSS SPEC: NORMAL

## 2024-12-11 PROCEDURE — 1170F FXNL STATUS ASSESSED: CPT | Performed by: FAMILY MEDICINE

## 2024-12-11 PROCEDURE — G0439 PPPS, SUBSEQ VISIT: HCPCS | Performed by: FAMILY MEDICINE

## 2024-12-11 PROCEDURE — 3051F HG A1C>EQUAL 7.0%<8.0%: CPT | Performed by: FAMILY MEDICINE

## 2024-12-11 PROCEDURE — 3077F SYST BP >= 140 MM HG: CPT | Performed by: FAMILY MEDICINE

## 2024-12-11 PROCEDURE — 1111F DSCHRG MED/CURRENT MED MERGE: CPT | Performed by: FAMILY MEDICINE

## 2024-12-11 PROCEDURE — 1126F AMNT PAIN NOTED NONE PRSNT: CPT | Performed by: FAMILY MEDICINE

## 2024-12-11 PROCEDURE — 99213 OFFICE O/P EST LOW 20 MIN: CPT | Performed by: FAMILY MEDICINE

## 2024-12-11 PROCEDURE — 3079F DIAST BP 80-89 MM HG: CPT | Performed by: FAMILY MEDICINE

## 2024-12-11 NOTE — PROGRESS NOTES
Subjective   The ABCs of the Annual Wellness Visit  Medicare Wellness Visit      Alexy Ram is a 69 y.o. patient who presents for a Medicare Wellness Visit.    The following portions of the patient's history were reviewed and   updated as appropriate: allergies, current medications, past medical history, past social history, past surgical history, and problem list.    Compared to one year ago, the patient's physical   health is worse.  Compared to one year ago, the patient's mental   health is worse.    Recent Hospitalizations:  This patient has had a [facility] admission record on file within the last 365 days.  Current Medical Providers:  Patient Care Team:  Pipe Vaughn MD as PCP - General (Family Medicine)  Steve Rice MD as Consulting Physician (Cardiac Electrophysiology)  Taye Chavira MD as Surgeon (Thoracic Surgery)  Akil Ortiz MD as Consulting Physician (Pulmonary Disease)  Nikko Staton MD as Surgeon (Orthopedic Surgery)  Vlad Flanagan MD as Consulting Physician (Cardiology)  Lan Solis MD as Consulting Physician (Gastroenterology)    Outpatient Medications Prior to Visit   Medication Sig Dispense Refill    albuterol sulfate  (90 Base) MCG/ACT inhaler Inhale 2 puffs Every 4 (Four) Hours As Needed for Wheezing. 18 g 1    atorvastatin (LIPITOR) 40 MG tablet Take 1 tablet by mouth every night at bedtime. 90 tablet 3    B Complex Vitamins (VITAMIN-B COMPLEX PO) Take  by mouth.      carvedilol (COREG) 25 MG tablet Take 1 tablet by mouth 2 (Two) Times a Day With Meals. Indications: High Blood Pressure 180 tablet 3    cetirizine (zyrTEC) 10 MG tablet Take 1 tablet by mouth Daily. 90 tablet 3    Digestive Enzymes (MULTI-ENZYME PO) Take  by mouth.      Eliquis 5 MG tablet tablet Take 1 tablet by mouth Daily. Indications: Prevention of Unwanted Clot in Veins      empagliflozin (JARDIANCE) 25 MG tablet tablet Take 1 tablet by mouth Daily. Indications: Cardiac  Failure 30 tablet 11    eplerenone (INSPRA) 50 MG tablet Take 1 tablet by mouth 2 (Two) Times a Day.      gabapentin (NEURONTIN) 100 MG capsule TAKE TWO CAPSULES BY MOUTH EVERY MORNING, ONE CAPSULE IN THE MIDDLE OF THE DAY AND 2 CAPSULES AT BEDTIME INDICATIONS: CHRONIC PAIN 150 capsule 5    Misc Natural Products (PROSTATE HEALTH PO) Take  by mouth.      Multiple Vitamins-Minerals (EYE VITAMINS PO) Take  by mouth.      multivitamin with minerals (MULTIVITAMIN ADULT PO) Take 1 tablet by mouth Daily.      mupirocin (BACTROBAN) 2 % ointment Administer into the nostril(s) as directed by provider 2 (Two) Times a Day for 6 doses. 15 g 0    NIFEdipine XL (PROCARDIA XL) 30 MG 24 hr tablet Take 1 tablet by mouth Daily. 30 tablet 11    potassium chloride (MICRO-K) 10 MEQ CR capsule Take 1 capsule by mouth 2 (Two) Times a Day.      tamsulosin (FLOMAX) 0.4 MG capsule 24 hr capsule Take 1 capsule by mouth Every Night. Indications: Benign Enlargement of Prostate 90 capsule 3    tiotropium bromide monohydrate (Spiriva Respimat) 2.5 MCG/ACT aerosol solution inhaler Inhale 2 puffs Daily. 4 g 3    valsartan (DIOVAN) 160 MG tablet Take 1 tablet by mouth Daily. 30 tablet 0    VITAMIN D PO Take  by mouth.       No facility-administered medications prior to visit.     No opioid medication identified on active medication list. I have reviewed chart for other potential  high risk medication/s and harmful drug interactions in the elderly.      Aspirin is not on active medication list.  Aspirin use is not indicated based on review of current medical condition/s. Risk of harm outweighs potential benefits.  .    Patient Active Problem List   Diagnosis    Hypercholesterolemia    Hypertension    Type 2 diabetes mellitus with hyperglycemia, without long-term current use of insulin    Squamous cell carcinoma of left lung    Status post lobectomy of lung    Class 3 severe obesity due to excess calories with body mass index (BMI) of 40.0 to 44.9 in  "adult    Encounter for screening for malignant neoplasm of colon    Family history of colon cancer    Obstructive sleep apnea of adult    Screening PSA (prostate specific antigen)    Osteoarthritis of left hip    AV block, 2nd degree    Cardiac pacemaker in situ    Chronic anticoagulation    PAF (paroxysmal atrial fibrillation)    Adjustment and management of cardiac pacemaker    Primary osteoarthritis of left knee    Benign prostatic hyperplasia with urinary frequency    Status post total knee replacement    Acute diastolic CHF (congestive heart failure)    Chronic disease anemia    Dyslipidemia    Acute heart failure with preserved ejection fraction (HFpEF)    Primary hyperaldosteronism    Edema of right lower leg    Bronchial obstruction    Collapse of left lung    Tobacco abuse    Medicare annual wellness visit, subsequent     Advance Care Planning Advance Directive is not on file.  ACP discussion was held with the patient during this visit. Patient has an advance directive (not in EMR), copy requested.            Objective   Vitals:    12/11/24 1033   BP: 140/80   BP Location: Left arm   Patient Position: Sitting   Cuff Size: Adult   Pulse: 72   Temp: 97.8 °F (36.6 °C)   SpO2: 93%   Weight: 119 kg (263 lb)   Height: 170.2 cm (67\")   PainSc: 0-No pain       Estimated body mass index is 41.19 kg/m² as calculated from the following:    Height as of this encounter: 170.2 cm (67\").    Weight as of this encounter: 119 kg (263 lb).            Does the patient have evidence of cognitive impairment? No  Lab Results   Component Value Date    CHLPL 143 12/04/2024    TRIG 86 12/04/2024    HDL 39 (L) 12/04/2024    LDL 87 12/04/2024    VLDL 17 12/04/2024    HGBA1C 7.0 (H) 12/04/2024                                                                                                Health  Risk Assessment    Smoking Status:  Social History     Tobacco Use   Smoking Status Former    Current packs/day: 0.00    Average packs/day: 1 " pack/day for 33.0 years (33.0 ttl pk-yrs)    Types: Cigarettes    Start date: 1985    Quit date: 2018    Years since quittin.9    Passive exposure: Past   Smokeless Tobacco Never     Alcohol Consumption:  Social History     Substance and Sexual Activity   Alcohol Use Not Currently       Fall Risk Screen  ERINADI Fall Risk Assessment was completed, and patient is at LOW risk for falls.Assessment completed on:2024    Depression Screening   Little interest or pleasure in doing things? Not at all   Feeling down, depressed, or hopeless? Not at all   PHQ-2 Total Score 0      Health Habits and Functional and Cognitive Screenin/10/2024    12:36 PM   Functional & Cognitive Status   Do you have difficulty preparing food and eating? No    Do you have difficulty bathing yourself, getting dressed or grooming yourself? No    Do you have difficulty using the toilet? No    Do you have difficulty moving around from place to place? No    Do you have trouble with steps or getting out of a bed or a chair? No    Current Diet Well Balanced Diet    Dental Exam Up to date    Eye Exam Up to date    Exercise (times per week) 4 times per week    Current Exercises Include Walking    Do you need help using the phone?  No    Are you deaf or do you have serious difficulty hearing?  No    Do you need help to go to places out of walking distance? No    Do you need help shopping? No    Do you need help preparing meals?  No    Do you need help with housework?  No    Do you need help with laundry? No    Do you need help taking your medications? No    Do you need help managing money? No    Do you ever drive or ride in a car without wearing a seat belt? Yes    Have you felt unusual stress, anger or loneliness in the last month? No    Who do you live with? Spouse    If you need help, do you have trouble finding someone available to you? No    Have you been bothered in the last four weeks by sexual problems? No    Do you have  difficulty concentrating, remembering or making decisions? No        Patient-reported           Age-appropriate Screening Schedule:  Refer to the list below for future screening recommendations based on patient's age, sex and/or medical conditions. Orders for these recommended tests are listed in the plan section. The patient has been provided with a written plan.    Health Maintenance List  Health Maintenance   Topic Date Due    DIABETIC EYE EXAM  10/03/2023    INFLUENZA VACCINE  07/01/2024    COVID-19 Vaccine (5 - 2024-25 season) 09/01/2024    HEMOGLOBIN A1C  06/04/2025    BMI FOLLOWUP  06/30/2025    LIPID PANEL  12/04/2025    ANNUAL WELLNESS VISIT  12/11/2025    TDAP/TD VACCINES (3 - Td or Tdap) 07/08/2027    HEPATITIS C SCREENING  Completed    Pneumococcal Vaccine 65+  Completed    AAA SCREEN (ONE-TIME)  Completed    ZOSTER VACCINE  Completed    URINE MICROALBUMIN  Discontinued    LUNG CANCER SCREENING  Discontinued    COLORECTAL CANCER SCREENING  Discontinued                                                                                                                                                CMS Preventative Services Quick Reference  Risk Factors Identified During Encounter  None Identified    The above risks/problems have been discussed with the patient.  Pertinent information has been shared with the patient in the After Visit Summary.  An After Visit Summary and PPPS were made available to the patient.    Follow Up:   Next Medicare Wellness visit to be scheduled in 1 year.         Additional E&M Note during same encounter follows:  Patient has additional, significant, and separately identifiable condition(s)/problem(s) that require work above and beyond the Medicare Wellness Visit     Chief Complaint  Medicare Wellness-subsequent    Subjective   HPI  Alexy is also being seen today for additional medical problem/s.        Patient was hospitalized this week with acute respiratory failure.  Found to  "have left lung collapse due to rhinovirus.  Had a bronchoscopy.  Results reviewed.  Feeling much better today.  Not on oxygen.  Productive cough.  No hemoptysis.        Objective   Vital Signs:  /80 (BP Location: Left arm, Patient Position: Sitting, Cuff Size: Adult)   Pulse 72   Temp 97.8 °F (36.6 °C)   Ht 170.2 cm (67\")   Wt 119 kg (263 lb)   SpO2 93%   BMI 41.19 kg/m²   Physical Exam  Vitals reviewed.   Cardiovascular:      Rate and Rhythm: Normal rate.   Pulmonary:      Effort: Pulmonary effort is normal.      Breath sounds: Normal breath sounds.   Neurological:      Mental Status: He is alert.         The following data was reviewed by: Pipe Vaughn MD on 12/11/2024:  Data reviewed : hospital stay this week  Common labs          12/7/2024    11:38 12/9/2024    05:49 12/10/2024    06:33   Common Labs   Glucose 179  125  167    BUN 24  25  20    Creatinine 1.11  1.03  0.87    Sodium 135  138  140    Potassium 4.0  4.4  4.3    Chloride 105  106  106    Calcium 9.4  9.0  8.6    Albumin 3.6  3.2  3.1    Total Bilirubin 0.4  0.4  0.3    Alkaline Phosphatase 105  98  91    AST (SGOT) 12  17  19    ALT (SGPT) 28  27  24    WBC 13.82  13.26  11.82    Hemoglobin 13.4  13.7  13.1    Hematocrit 41.8  41.6  40.2    Platelets 303  355  401              Assessment and Plan Additional age appropriate preventative wellness advice topics were discussed during today's preventative wellness exam(some topics already addressed during AWV portion of the note above):   Nutrition: Discussed nutrition plan with patient. Information shared in after visit summary. Goal is for a well balanced diet to enhance overall health.           Medicare annual wellness visit, subsequent         Hospital discharge follow-up         Acute bronchitis due to Rhinovirus         Collapse of left lung               I spent 15 minutes caring for Alexy on this date of service. This time includes time spent by me in the following " activities:preparing for the visit, reviewing tests, obtaining and/or reviewing a separately obtained history, performing a medically appropriate examination and/or evaluation , counseling and educating the patient/family/caregiver, and documenting information in the medical record  Follow Up   Return in about 6 months (around 6/11/2025) for blood pressure.  Patient was given instructions and counseling regarding his condition or for health maintenance advice. Please see specific information pulled into the AVS if appropriate.    Current outpatient and discharge medications have been reconciled for the patient.  Reviewed by: Pipe Vaughn MD

## 2024-12-11 NOTE — OUTREACH NOTE
Call Center TCM Note      Flowsheet Row Responses   [facility] patient discharged from? [place]   Does the patient have one of the following disease processes/diagnoses(primary or secondary)? Other   TCM attempt successful? Yes   TCM call completed? Yes   Wrap up additional comments Pt had f/u with PCP today.  TCM completed.            Monika De La Rosa LPN    12/11/2024, 14:14 EST         Self

## 2024-12-12 LAB
BACTERIA SPEC AEROBE CULT: NORMAL
BACTERIA SPEC AEROBE CULT: NORMAL

## 2024-12-13 LAB
GALACTOMANNAN AG SPEC IA-ACNC: 0.09 INDEX (ref 0–0.49)
INTERPRETATION: POSITIVE
RESULT: 0.26 NG/ML
SPECIMEN SOURCE: ABNORMAL

## 2024-12-17 NOTE — PROGRESS NOTES
"Enter Query Response Below      Query Response: Viral pneumonia due to rhinovirus              If applicable, please update the problem list.     Patient: Alexy Ram        : 1955  Account: [id number]           Admit Date:         How to Respond to this query:       a. Click New Note     b. Answer query within the yellow box.                c. Update the Problem List, if applicable.      If you have any questions about this query contact me at: [email or web address]     ,     Risk Factors: 69-year-old male with a history of \"hypertension, obstructive sleep apnea, history of squamous cell carcinoma of the lung for which he has had left lower lobe lobectomy, type 2 diabetes, atrial fibrillation\" per H&P.  Clinical Indicators: Presented on  from outside hospital with \"CT scan of the chest which showed bronchial obstruction of the left upper lobe with atelectasis and midline shift\" per H&P. Progress note , \"Currently on Zosyn and agree for likely postobstructive pneumonia.\" \"PERI bronchus occlusion, Post-obstructive PNA\" per progress note (). Progress note (12/10 reads, \"Left lung atelectasis, acute rhinovirus bronchitis.\" Discharge summary states, \" RVP on BAL was positive for rhinovirus. Dr. Steele recommends stopping abx.\"   Treatment: Pulmonary consult, Therapeutic and diagnostic bronchoscopy with BAL. Zosyn -10, inhalers.     Please clarify the type of pneumonia the patient was treated/monitored for:    Viral pneumonia due to rhinovirus  Bacterial pneumonia unspecified  Viral and bacterial pneumonia  Other- specify______    By submitting this query, we are merely seeking further clarification of documentation to accurately reflect all conditions that you are monitoring, evaluating, treating or that extend the hospitalization or utilize additional resources of care. Please utilize your independent clinical judgment when addressing the question(s) above.     This query and your " response, once completed, will be entered into the legal medical record.    Sincerely,  Zakia Pan RN  Clinical Documentation Integrity Program

## 2024-12-18 ENCOUNTER — READMISSION MANAGEMENT (OUTPATIENT)
Dept: CALL CENTER | Facility: HOSPITAL | Age: 69
End: 2024-12-18
Payer: MEDICARE

## 2024-12-18 NOTE — OUTREACH NOTE
Medical Week 2 Survey      Flowsheet Row Responses   [facility] patient discharged from? [facility]   Does the patient have one of the following disease processes/diagnoses(primary or secondary)? Other   Week 2 attempt successful? Yes   Call start time 1617   Discharge diagnosis Bronchial obstruction   Call end time 1621   Meds reviewed with patient/caregiver? Yes   Is the patient having any side effects they believe may be caused by any medication additions or changes? No   Does the patient have all medications ordered at discharge? Yes   Is the patient taking all medications as directed (includes completed medication regime)? Yes   Does the patient have a primary care provider?  Yes   Has the patient kept scheduled appointments due by today? Yes   Comments Saw PCP and has cardiology appt on Fri   Psychosocial issues? No   What is the patient's perception of their health status since discharge? Improving   Is the patient/caregiver able to teach back signs and symptoms related to disease process for when to call PCP? Yes   Is the patient/caregiver able to teach back signs and symptoms related to disease process for when to call 911? Yes   Is the patient/caregiver able to teach back the hierarchy of who to call/visit for symptoms/problems? PCP, Specialist, Home health nurse, Urgent Care, ED, 911 Yes   If the patient is a current smoker, are they able to teach back resources for cessation? Not a smoker   Additional teach back comments States he is doing better but does still have nasal drainage.   Week 2 Call Completed? Yes   Graduated Yes   Graduated/Revoked comments He will discuss nasal drainage with his pulmonary dr on Friday.   Call end time 1621            Monika SHEARER - Licensed Nurse

## 2025-01-04 LAB
FUNGUS WND CULT: NORMAL
FUNGUS WND CULT: NORMAL

## 2025-01-08 NOTE — OUTREACH NOTE
No care due was identified.  Health [facility] Embedded Care Due Messages. Reference number: [id number].   1/08/2025 5:08:26 AM CST   Prep Survey      Flowsheet Row Responses   [facility] in [place] patient discharged from? [place]   Is LACE score < 7 ? Yes   Eligibility [facility]   Date of Admission 09/07/23   Date of Discharge 09/08/23   Discharge Disposition Home or Self Care   Discharge diagnosis Squamous cell carcinoma of left lung   Does the patient have one of the following disease processes/diagnoses(primary or secondary)? Other   Does the patient have Home health ordered? No   Is there a DME ordered? No   Prep survey completed? Yes            Venessa POST - Registered Nurse

## 2025-01-16 ENCOUNTER — TRANSCRIBE ORDERS (OUTPATIENT)
Dept: ADMINISTRATIVE | Facility: HOSPITAL | Age: 70
End: 2025-01-16
Payer: MEDICARE

## 2025-01-16 DIAGNOSIS — C34.92 SQUAMOUS CELL LUNG CANCER, LEFT: Primary | ICD-10-CM

## 2025-01-20 LAB
MYCOBACTERIUM SPEC CULT: NORMAL
MYCOBACTERIUM SPEC CULT: NORMAL
NIGHT BLUE STAIN TISS: NORMAL
NIGHT BLUE STAIN TISS: NORMAL

## 2025-01-21 ENCOUNTER — OFFICE VISIT (OUTPATIENT)
Dept: CARDIOLOGY | Facility: CLINIC | Age: 70
End: 2025-01-21
Payer: MEDICARE

## 2025-01-21 ENCOUNTER — TELEPHONE (OUTPATIENT)
Dept: CARDIOLOGY | Facility: CLINIC | Age: 70
End: 2025-01-21

## 2025-01-21 VITALS
HEIGHT: 67 IN | HEART RATE: 77 BPM | OXYGEN SATURATION: 95 % | WEIGHT: 254.6 LBS | DIASTOLIC BLOOD PRESSURE: 90 MMHG | SYSTOLIC BLOOD PRESSURE: 136 MMHG | BODY MASS INDEX: 39.96 KG/M2

## 2025-01-21 DIAGNOSIS — I50.31 ACUTE DIASTOLIC CHF (CONGESTIVE HEART FAILURE): ICD-10-CM

## 2025-01-21 DIAGNOSIS — I48.0 PAF (PAROXYSMAL ATRIAL FIBRILLATION): Primary | ICD-10-CM

## 2025-01-21 DIAGNOSIS — E26.09 PRIMARY HYPERALDOSTERONISM: ICD-10-CM

## 2025-01-21 DIAGNOSIS — I50.31 ACUTE HEART FAILURE WITH PRESERVED EJECTION FRACTION (HFPEF): ICD-10-CM

## 2025-01-21 RX ORDER — EPLERENONE 50 MG/1
50 TABLET, FILM COATED ORAL 2 TIMES DAILY
Qty: 180 TABLET | Refills: 3 | Status: SHIPPED | OUTPATIENT
Start: 2025-01-21 | End: 2026-01-21

## 2025-01-21 RX ORDER — VALSARTAN AND HYDROCHLOROTHIAZIDE 160; 12.5 MG/1; MG/1
1 TABLET, FILM COATED ORAL DAILY
Qty: 90 TABLET | Refills: 3 | Status: SHIPPED | OUTPATIENT
Start: 2025-01-21 | End: 2026-01-21

## 2025-01-21 NOTE — PATIENT INSTRUCTIONS
Like we discussed, you should continue your plan on medication with 50 mg twice per day.  This helps treat your condition called primary hyperaldosteronism.    You should continue the carvedilol at the current dose, this helps your heart and blood pressure.    I think we can stop the nifedipine medication.  I would prefer you stay on the valsartan 160/HCTZ 12.5, as this helps keep your electrolytes and check with your condition.  You can probably just get rid of the plain 160 valsartan tablets since you are going to take the combo pill with HCTZ.    I think we can stop the potassium supplementation, but lets check your blood work again next week with your electrolytes to make sure you are doing okay on the current regimen of eplerenone 50 twice daily, valsartan 160, HCTZ 12.5, and no more potassium.

## 2025-01-21 NOTE — PROGRESS NOTES
[facility]    Subjective:     Encounter Date:01/21/25      Patient ID: Alexy Ram is a 69 y.o. male.    Chief Complaint:   Chief Complaint   Patient presents with    Follow-up      History of Present Illness    Alexy Ram is a pleasant 69 y.o. gentleman who presents for follow-up.  He previously followed with [facility] for transition his care to [facility].  He followed with Dr. Steve Rice.  He had a history of second-degree AV block, sinus bradycardia with syncope and he had a pacemaker implanted in 2005, device was changed in 2014.  He is about 30% atrial paced and 3% ventricular paced historically.  He follows with our device clinic here.       We saw him in consultation for volume overload that occurred in the postoperative setting. He had some acute onset CHF that occurred in late June of this year.  He had a knee surgery on June 13, in a few weeks after he then had rather acute onset of shortness of breath that occurred and brought him to the ER.  He was noted to be volume overloaded.  He underwent an echocardiogram which revealed a small pericardial effusion, mild concentric hypertrophy, and a grossly normal LVEF.  He was also quite hypertensive.  With hypokalemia.    He did have previous testing at [facility] where he followed up with his previous history of AV block listed below.    Medications were adjusted when he was seen in the hospital setting.  He was diuresed, his Actos was stopped, and he was started on Jardiance.  He remained hypertensive and subsequent testing revealed primary hyperaldosteronism.  He was started on spironolactone but developed some facial swelling, which improved with eplerenone.    He did recently have a hospitalization for pneumonia in the setting of his prior lung cancer and pulmonary evaluated the patient.    His medications were adjusted his blood pressure was on the lower side.  He was recommended to stop the HCTZ, continue  on nifedipine.  He presents today without complaint.  He intermittently had been continuing on the valsartan HCT.    Previous Cardiac Testing:  Transthoracic echocardiogram 1/4/2023  - Mild left ventricular hypertrophy with normal systolic function.    -The LVEF is 61%.   -Moderately dilated left atrium.   - Trace-to-mild mitral regurgitation.   -Borderline right ventricular enlargement with normal systolic function.   - Mild pulmonic insufficiency.   -Mild dilation of the ascending aorta at 3.9 cm.   -Right ventricular systolic pressure of 45 mmHg.      Myocardial PET perfusion study 2/13/2023  -There is a small sized mild severity fixed perfusion defect(s) of the apex    wall consistent with apical thinning.    -No evidence of stress induced myocardial ischemia or infarction.     Echocardiogram June 2024:    Left ventricular systolic function is normal. Calculated left ventricular EF = 67.2%; visually estimated LVEF 55-60%.    The left ventricular cavity is mildly dilated.    Left ventricular wall thickness is consistent with mild concentric hypertrophy.    Left ventricular diastolic function was normal.    RV mildly dilated with grossly normal function.    Aortic valve sclerosis without hemodynamically significant stenosis.    There is a small (<1cm) pericardial effusion. There is no evidence of cardiac tamponade.    Pacemaker lead noted.    Saline test results are negative.    Biatrial enlargement, normal IVC size.    The following portions of the patient's history were reviewed and updated as appropriate: allergies, current medications, past family history, past medical history, past social history, past surgical history and problem list.    Past Medical History:   Diagnosis Date    Acromioclavicular separation     shoulder pulled out of place    Ankle sprain     Arthritis     OSTEOARTHRITIS    Arthritis of back     so long I don't know when it started    Arthritis of neck     Cancer     Lung    COPD (chronic  obstructive pulmonary disease)     Diabetes mellitus     Dislocation, shoulder     Enlarged prostate     Fatty liver     Frozen shoulder     Hip arthrosis     History of low potassium     History of lung cancer 2018    HX LEFT LOWER LOBECTOMY    History of MRSA infection 2018    History of transfusion     Hyperlipidemia     Hypertension     Knee swelling     Left upper lobe consolidation     REPEAT CT SCANS - FOLLOWED BY PULMONARY, MOST RECENT CT SCAN 4/3/24    Low back strain     Lumbosacral disc disease     Neck strain     Neuropathy     On anticoagulant therapy     Pacemaker     PAF (paroxysmal atrial fibrillation)     Periarthritis of shoulder     Scoliosis     Second degree AV block     Sinus node dysfunction     Sleep apnea     WEARS CPAP    Slow to wake up after anesthesia     Thoracic disc disorder        Past Surgical History:   Procedure Laterality Date    BRONCHOSCOPY N/A 10/11/2018    Procedure: BRONCHOSCOPY WITH FLUORO, LEFT LOWER LOBE BRUSHINGS WET AND DRY. WITH BX'S AND BAL (IN LLL).;  Surgeon: Akil Ortiz MD;  Location: [facility] ENDOSCOPY;  Service: Pulmonary    BRONCHOSCOPY N/A 12/9/2024    Procedure: BRONCHOSCOPY WITH WASHING, BAL, BIOPSIES;  Surgeon: Jamison Jaquez MD;  Location: [facility] ENDOSCOPY;  Service: Pulmonary;  Laterality: N/A;  PRE- LEFT LUNG ATELECTASIS  POST- SAME    BRONCHOSCOPY WITH ION ROBOTIC ASSIST N/A 09/07/2023    Procedure: BRONCHOSCOPY WITH ION ROBOT AND ENDOBRONCHIAL ULTRASOUND with brushing and FNA;  Surgeon: Taye Chavira MD;  Location: [facility] ENDOSCOPY;  Service: Robotics - Pulmonary;  Laterality: N/A;  PRE/POST - left upper lobe mass    CATARACT EXTRACTION Bilateral 04/01/2024    COLONOSCOPY  2021    COLONOSCOPY W/ POLYPECTOMY N/A 10/22/2021    Procedure: COLONOSCOPY WITH POLYPECTOMY;  Surgeon: Lan Solis MD;  Location: [facility] OR;  Service: Gastroenterology;  Laterality: N/A;  cecal polyp x1 (cold snare)  ascending polyp x1 (hot  "snare)  transverse polyp x3 (hot snare x 1), (cold snare x2)  sigmoid polyp x1 (hot snare, clip applied)  rectal polyp x3 (cold snare)    INSERT / REPLACE / REMOVE PACEMAKER  6/5/2005    replaced Oct 2014    JOINT REPLACEMENT  Hip    Hip    KNEE ARTHROSCOPY Left     PACEMAKER IMPLANTATION  2005, 2014    THORACOSCOPY Left 11/19/2018    Procedure: BRONCHOSCOPY, DAVINCI ROBOT ASSISTED VIDEIO ASSISTED THORACOSCOPY  WITH CONVERT TO OPEN THORACOTOMY,LEFT LOWER LOBECTOMY,INTERCOSTAL NERVE BLOCK;  Surgeon: Michael Ring III, MD;  Location: [facility] MAIN OR;  Service: [facility]    TOTAL HIP ARTHROPLASTY Left 07/14/2023    Procedure: TOTAL HIP ARTHROPLASTY;  Surgeon: Nikko Staton MD;  Location: [facility] OR [facility];  Service: Orthopedics;  Laterality: Left;    TOTAL KNEE ARTHROPLASTY Left 06/13/2024    Procedure: TOTAL KNEE ARTHROPLASTY;  Surgeon: Nikko Staton MD;  Location:  [facility];  Service: Orthopedics;  Laterality: Left;         Procedures       Objective:     Vitals:    01/21/25 1506   BP: 136/90   Pulse: 77   SpO2: 95%   Weight: 115 kg (254 lb 9.6 oz)   Height: 170.2 cm (67\")         Constitutional:       Appearance: Healthy appearance. Not in distress.   Neck:      Vascular: JVD normal.   Pulmonary:      Effort: Pulmonary effort is normal.      Breath sounds: Normal breath sounds.   Cardiovascular:      PMI at left midclavicular line. Normal rate. Regular rhythm. Normal S2.       Murmurs: There is no murmur.   Pulses:     Intact distal pulses.   Edema:     Peripheral edema absent.   Skin:     General: Skin is warm and dry.   Neurological:      General: No focal deficit present.      Mental Status: Alert, oriented to person, place, and time and oriented to person, place and time.   Psychiatric:         Mood and Affect: Mood and affect normal.         Lab Review:     Lipid Panel          5/13/2024    08:16 12/4/2024    10:55   Lipid Panel   Total Cholesterol 131  143    Triglycerides 101  86    HDL Cholesterol 36  39  "   VLDL Cholesterol 19  17    LDL Cholesterol  76  87      BUN   Date Value Ref Range Status   12/10/2024 20 8 - 23 mg/dL Final     Creatinine   Date Value Ref Range Status   12/10/2024 0.87 0.76 - 1.27 mg/dL Final   07/09/2020 0.70 0.60 - 1.30 mg/dL Final     Comment:     Serial Number: [id number]:  [id number]     Potassium   Date Value Ref Range Status   12/10/2024 4.3 3.5 - 5.2 mmol/L Final     ALT (SGPT)   Date Value Ref Range Status   12/10/2024 24 1 - 41 U/L Final     AST (SGOT)   Date Value Ref Range Status   12/10/2024 19 1 - 40 U/L Final         Performed        Assessment:          Diagnosis Plan   1. PAF (paroxysmal atrial fibrillation)        2. Primary hyperaldosteronism  eplerenone (INSPRA) 50 MG tablet    valsartan-hydrochlorothiazide (Diovan HCT) 160-12.5 MG per tablet    Basic Metabolic Panel      3. Acute diastolic CHF (congestive heart failure)        4. Acute heart failure with preserved ejection fraction (HFpEF)                 Plan:         Hypertension: Consistent with primary hyperaldosteronism.  He was seen by Dr. Braden with endocrinology, continue medical therapy.  Adrenalectomy could be considered.  CT imaging with adrenal hyperplasia but no overt adenoma.    Allergic to spironolactone with facial swelling.    Continue eplerenone 50 twice daily  Continue carvedilol  BP is decreasing, valsartan currently 160, HCTZ 12.5.  I prefer if he keeps on this regimen to keep electrolytes and check  Will discontinue nifedipine 30  Discontinue potassium supplement, recheck BMP next week  Chronic diastolic CHF.  No evidence of acute exacerbation today.  Jardiance per above, maintain euvolemia with this   On eplerenone  No longer on Actos, would avoid given his propensity for volume overload.  Diabetes mellitus:   He remains on Jardiance 25.  Remainder per PCP.    GIL.  Continue CPAP.  Atrial fibrillation, paroxysmal.  Low burden, however he has been maintained on Eliquis 5 twice daily, continue  Status  post left TKA June 2024 with Dr. Staton.   History of lung cancer status post left lower lobe resection.  He had a recent pneumonia episode and pulmonary was following.  He underwent bronchoscopy due to possible post-obstructive pneumonia but pathology was unremarkable to date.  He follows with pulm.  Possible cryptogenic organizing pneumonia.  Follow-up with pulmonary.       RTC 6 months, sooner if any new issues arise.  Repeat BMP next week.  Medication adjustments per above.    Vlad Flanagan MD  [facility]  01/21/25  15:42 EDT       Current Outpatient Medications:     albuterol sulfate  (90 Base) MCG/ACT inhaler, Inhale 2 puffs Every 4 (Four) Hours As Needed for Wheezing., Disp: 18 g, Rfl: 1    atorvastatin (LIPITOR) 40 MG tablet, Take 1 tablet by mouth every night at bedtime., Disp: 90 tablet, Rfl: 3    B Complex Vitamins (VITAMIN-B COMPLEX PO), Take  by mouth., Disp: , Rfl:     carvedilol (COREG) 25 MG tablet, Take 1 tablet by mouth 2 (Two) Times a Day With Meals. Indications: High Blood Pressure, Disp: 180 tablet, Rfl: 3    cetirizine (zyrTEC) 10 MG tablet, Take 1 tablet by mouth Daily., Disp: 90 tablet, Rfl: 3    Digestive Enzymes (MULTI-ENZYME PO), Take  by mouth., Disp: , Rfl:     Eliquis 5 MG tablet tablet, Take 1 tablet by mouth Daily. Indications: Prevention of Unwanted Clot in Veins, Disp: , Rfl:     empagliflozin (JARDIANCE) 25 MG tablet tablet, Take 1 tablet by mouth Daily. Indications: Cardiac Failure, Disp: 30 tablet, Rfl: 11    eplerenone (INSPRA) 50 MG tablet, Take 1 tablet by mouth 2 (Two) Times a Day., Disp: 180 tablet, Rfl: 3    gabapentin (NEURONTIN) 100 MG capsule, TAKE TWO CAPSULES BY MOUTH EVERY MORNING, ONE CAPSULE IN THE MIDDLE OF THE DAY AND 2 CAPSULES AT BEDTIME INDICATIONS: CHRONIC PAIN, Disp: 150 capsule, Rfl: 5    Misc Natural Products (PROSTATE HEALTH PO), Take  by mouth., Disp: , Rfl:     Multiple Vitamins-Minerals (EYE VITAMINS PO), Take  by mouth., Disp: ,  Rfl:     multivitamin with minerals (MULTIVITAMIN ADULT PO), Take 1 tablet by mouth Daily., Disp: , Rfl:     tamsulosin (FLOMAX) 0.4 MG capsule 24 hr capsule, Take 1 capsule by mouth Every Night. Indications: Benign Enlargement of Prostate, Disp: 90 capsule, Rfl: 3    tiotropium bromide monohydrate (Spiriva Respimat) 2.5 MCG/ACT aerosol solution inhaler, Inhale 2 puffs Daily., Disp: 4 g, Rfl: 3    VITAMIN D PO, Take  by mouth., Disp: , Rfl:     empagliflozin (Jardiance) 10 MG tablet tablet, Take 1 tablet by mouth Daily., Disp: 14 tablet, Rfl: 0    valsartan-hydrochlorothiazide (Diovan HCT) 160-12.5 MG per tablet, Take 1 tablet by mouth Daily., Disp: 90 tablet, Rfl: 3         Return in about 6 months (around 7/21/2025).      Part of this note may be an electronic transcription/translation of spoken language to printed text using the Dragon Dictation System.

## 2025-01-31 ENCOUNTER — ANESTHESIA EVENT (OUTPATIENT)
Dept: PERIOP | Facility: HOSPITAL | Age: 70
End: 2025-01-31
Payer: MEDICARE

## 2025-02-03 ENCOUNTER — ANESTHESIA (OUTPATIENT)
Dept: PERIOP | Facility: HOSPITAL | Age: 70
End: 2025-02-03
Payer: MEDICARE

## 2025-02-03 ENCOUNTER — HOSPITAL ENCOUNTER (OUTPATIENT)
Facility: HOSPITAL | Age: 70
Setting detail: HOSPITAL OUTPATIENT SURGERY
Discharge: HOME | End: 2025-02-03
Attending: INTERNAL MEDICINE | Admitting: INTERNAL MEDICINE
Payer: MEDICARE

## 2025-02-03 VITALS
DIASTOLIC BLOOD PRESSURE: 93 MMHG | RESPIRATION RATE: 15 BRPM | WEIGHT: 254 LBS | SYSTOLIC BLOOD PRESSURE: 128 MMHG | BODY MASS INDEX: 39.87 KG/M2 | OXYGEN SATURATION: 98 % | HEIGHT: 67 IN | TEMPERATURE: 97.8 F | HEART RATE: 54 BPM

## 2025-02-03 DIAGNOSIS — Z86.0100 PERSONAL HISTORY OF COLONIC POLYPS: ICD-10-CM

## 2025-02-03 DIAGNOSIS — Z86.0100 HISTORY OF COLONIC POLYPS: ICD-10-CM

## 2025-02-03 LAB — GLUCOSE BLDC GLUCOMTR-MCNC: 114 MG/DL (ref 70–130)

## 2025-02-03 PROCEDURE — 88305 TISSUE EXAM BY PATHOLOGIST: CPT | Performed by: INTERNAL MEDICINE

## 2025-02-03 PROCEDURE — 45385 COLONOSCOPY W/LESION REMOVAL: CPT | Performed by: INTERNAL MEDICINE

## 2025-02-03 PROCEDURE — 82948 REAGENT STRIP/BLOOD GLUCOSE: CPT

## 2025-02-03 PROCEDURE — 25010000002 LIDOCAINE 2% SOLUTION: Performed by: NURSE ANESTHETIST, CERTIFIED REGISTERED

## 2025-02-03 PROCEDURE — 45380 COLONOSCOPY AND BIOPSY: CPT | Performed by: INTERNAL MEDICINE

## 2025-02-03 PROCEDURE — 25010000002 PROPOFOL 200 MG/20ML EMULSION: Performed by: NURSE ANESTHETIST, CERTIFIED REGISTERED

## 2025-02-03 RX ORDER — PROPOFOL 10 MG/ML
INJECTION, EMULSION INTRAVENOUS AS NEEDED
Status: DISCONTINUED | OUTPATIENT
Start: 2025-02-03 | End: 2025-02-03 | Stop reason: SURG

## 2025-02-03 RX ORDER — LIDOCAINE HYDROCHLORIDE 10 MG/ML
0.5 INJECTION, SOLUTION EPIDURAL; INFILTRATION; INTRACAUDAL; PERINEURAL ONCE AS NEEDED
Status: DISCONTINUED | OUTPATIENT
Start: 2025-02-03 | End: 2025-02-03 | Stop reason: HOSPADM

## 2025-02-03 RX ORDER — LIDOCAINE HYDROCHLORIDE 20 MG/ML
INJECTION, SOLUTION INFILTRATION; PERINEURAL AS NEEDED
Status: DISCONTINUED | OUTPATIENT
Start: 2025-02-03 | End: 2025-02-03 | Stop reason: SURG

## 2025-02-03 RX ORDER — SODIUM CHLORIDE 0.9 % (FLUSH) 0.9 %
10 SYRINGE (ML) INJECTION EVERY 12 HOURS SCHEDULED
Status: DISCONTINUED | OUTPATIENT
Start: 2025-02-03 | End: 2025-02-03 | Stop reason: HOSPADM

## 2025-02-03 RX ORDER — SODIUM CHLORIDE 9 MG/ML
40 INJECTION, SOLUTION INTRAVENOUS AS NEEDED
Status: DISCONTINUED | OUTPATIENT
Start: 2025-02-03 | End: 2025-02-03 | Stop reason: HOSPADM

## 2025-02-03 RX ORDER — SODIUM CHLORIDE 0.9 % (FLUSH) 0.9 %
10 SYRINGE (ML) INJECTION AS NEEDED
Status: DISCONTINUED | OUTPATIENT
Start: 2025-02-03 | End: 2025-02-03 | Stop reason: HOSPADM

## 2025-02-03 RX ORDER — ONDANSETRON 2 MG/ML
4 INJECTION INTRAMUSCULAR; INTRAVENOUS ONCE AS NEEDED
Status: DISCONTINUED | OUTPATIENT
Start: 2025-02-03 | End: 2025-02-03 | Stop reason: HOSPADM

## 2025-02-03 RX ADMIN — PROPOFOL 100 MG: 10 INJECTION, EMULSION INTRAVENOUS at 10:54

## 2025-02-03 RX ADMIN — PROPOFOL 100 MG: 10 INJECTION, EMULSION INTRAVENOUS at 10:59

## 2025-02-03 RX ADMIN — PROPOFOL 50 MG: 10 INJECTION, EMULSION INTRAVENOUS at 11:08

## 2025-02-03 RX ADMIN — PROPOFOL 100 MG: 10 INJECTION, EMULSION INTRAVENOUS at 10:50

## 2025-02-03 RX ADMIN — LIDOCAINE HYDROCHLORIDE 100 MG: 20 INJECTION, SOLUTION INFILTRATION; PERINEURAL at 10:50

## 2025-02-03 RX ADMIN — PROPOFOL 50 MG: 10 INJECTION, EMULSION INTRAVENOUS at 11:18

## 2025-02-03 NOTE — BRIEF OP NOTE
COLONOSCOPY WITH POLYPECTOMY  Progress Note    Alexy Ram  2/3/2025    Pre-op Diagnosis:   Personal history of colonic polyps [Z86.010]       Post-Op Diagnosis Codes:     * Personal history of colonic polyps [Z86.0100]     * Diverticulosis [K57.90]     * Colon polyp [K63.5]    Procedure/CPT® Codes:  GA COLONOSCOPY W/BIOPSY SINGLE/MULTIPLE [98350]      Procedure(s):  COLONOSCOPY WITH POLYPECTOMY              Surgeon(s):  Lan Solis MD    Anesthesia: Monitored Anesthesia Care    Staff:   Circulator: Silvia Frazier RN  Scrub Person: Anitha Nunez         Estimated Blood Loss: none    Urine Voided: * No values recorded between 2/3/2025 10:44 AM and 2/3/2025 11:22 AM *    Specimens:                Specimens       ID Source Type Tests Collected By Collected At Frozen?    A Large Intestine, Right / Ascending Colon Polyp TISSUE PATHOLOGY EXAM   Lan Solis MD 2/3/25 1059     Description: Ascending polyp x 1    B Large Intestine, Transverse Colon Polyp TISSUE PATHOLOGY EXAM   Lan Solis MD 2/3/25 1103     Description: Transverse polyp x 3- cold snare x3    C Large Intestine, Sigmoid Colon Polyp TISSUE PATHOLOGY EXAM   Lan Solis MD 2/3/25 1116     Description: Sigmoid polyp x 1                  Drains:   [REMOVED] Closed/Suction Drain 1 Left;Anterior Knee Accordion 10 Fr. (Removed)       Findings: Colon to Cecum Good prep  Sigmoid Diverticulosis  Polyps-5-Cold Snare x 3, biopsy        Complications: none          Lan Solis MD     Date: 2/3/2025  Time: 11:23 EST

## 2025-02-03 NOTE — ANESTHESIA PREPROCEDURE EVALUATION
Anesthesia Evaluation     Patient summary reviewed and Nursing notes reviewed   no history of anesthetic complications:   NPO Solid Status: > 8 hours             Airway   Mallampati: II  TM distance: >3 FB  Neck ROM: full  No difficulty expected  Dental - normal exam     Comment: Partial out    Pulmonary - normal exam   (+) a smoker (quit 2018) Former, lung cancer (lung cancer left with lobectomy, multiple bronchoscpy), COPD (spiriva this am),recent URI (Rhinovirus 12/24 with collapsed lung, resolved), sleep apnea on CPAP  Cardiovascular - normal exam    PT is on anticoagulation therapy  Patient on routine beta blocker  Rhythm: regular  Rate: normal    (+) pacemaker (2nd degree AV block and sinus nichole) pacemaker, hypertension (due to aldosterone) well controlled 2 medications or greater, dysrhythmias Paroxysmal Atrial Fib, CHF , hyperlipidemia    ROS comment:   He had a history of second-degree AV block, sinus bradycardia with syncope and he had a pacemaker implanted in 2005, device was changed in 2014.  He is about 30% atrial paced and 3% ventricular paced historically.  He follows with our device clinic here.      -There is a small sized mild severity fixed perfusion defect(s) of the apex    wall consistent with apical thinning.    -No evidence of stress induced myocardial ischemia or infarction.      Echocardiogram June 2024:  ·  Left ventricular systolic function is normal. Calculated left ventricular EF = 67.2%; visually estimated LVEF 55-60%.  ·  The left ventricular cavity is mildly dilated.  ·  Left ventricular wall thickness is consistent with mild concentric hypertrophy.  ·  Left ventricular diastolic function was normal.  ·  RV mildly dilated with grossly normal function.  ·  Aortic valve sclerosis without hemodynamically significant stenosis.  ·  There is a small (<1cm) pericardial effusion. There is no evidence of cardiac tamponade.  ·  Pacemaker lead noted.  ·  Saline test results are negative.  ·   Biatrial enlargement, normal IVC size.         Neuro/Psych- negative ROS  GI/Hepatic/Renal/Endo    (+) morbid obesity, liver disease (cyst on liver) fatty liver disease, diabetes mellitus type 2 well controlled    Musculoskeletal     (+) neck pain (numbness arms when wakes in am, due to bone spurs)  Abdominal   (+) obese   Substance History - negative use     OB/GYN negative ob/gyn ROS         Other   arthritis (generalized.  Left TKA,6/2024, left hip  7/2023),   history of cancer (left lung) remission    ROS/Med Hx Other: Sip water with carvedilol                Anesthesia Plan    ASA 3     MAC     intravenous induction     Anesthetic plan, risks, benefits, and alternatives have been provided, discussed and informed consent has been obtained with: patient and spouse/significant other.    Use of blood products discussed with patient and spouse/significant other  Consented to blood products.      CODE STATUS:

## 2025-02-03 NOTE — ANESTHESIA POSTPROCEDURE EVALUATION
Patient: Alexy Ram    Procedure Summary       Date: 02/03/25 Room / Location: [facility] ENDOSCOPY 1 /  [facility] OR    Anesthesia Start: 1045 Anesthesia Stop: 1120    Procedure: COLONOSCOPY WITH POLYPECTOMY Diagnosis:       Personal history of colonic polyps      Diverticulosis      Colon polyp      (Personal history of colonic polyps [Z86.010])    Surgeons: Lan Solis MD Provider: Jason Fernando CRNA    Anesthesia Type: MAC ASA Status: 3            Anesthesia Type: MAC    Vitals  Vitals Value Taken Time   /81 02/03/25 1140   Temp 97.8 °F (36.6 °C) 02/03/25 1126   Pulse 58 02/03/25 1143   Resp 18 02/03/25 1130   SpO2 97 % 02/03/25 1137   Vitals shown include unfiled device data.        Post Anesthesia Care and Evaluation    Patient location during evaluation: PHASE II  Patient participation: complete - patient participated  Level of consciousness: awake and alert  Pain score: 0  Pain management: satisfactory to patient    Airway patency: patent  Anesthetic complications: No anesthetic complications  PONV Status: none  Cardiovascular status: acceptable  Respiratory status: acceptable  Hydration status: acceptable

## 2025-02-03 NOTE — H&P
Patient Care Team:  Pipe Vaughn MD as PCP - General (Family Medicine)  Steve Rice MD as Consulting Physician (Cardiac Electrophysiology)  Taye Chavira MD as Surgeon (Thoracic Surgery)  Akil Ortiz MD as Consulting Physician (Pulmonary Disease)  Nikko Staton MD as Surgeon (Orthopedic Surgery)  Vlad Flanagan MD as Consulting Physician (Cardiology)  Lan Solis MD as Consulting Physician (Gastroenterology)    CHIEF COMPLAINT: Personal hx colon polyps    HISTORY OF PRESENT ILLNESS:  Last exam was 2021    Past Medical History:   Diagnosis Date    Acromioclavicular separation     shoulder pulled out of place    Ankle sprain     Arthritis     OSTEOARTHRITIS    Arthritis of back     so long I don't know when it started    Arthritis of neck     Cancer     Lung    COPD (chronic obstructive pulmonary disease)     Diabetes mellitus     Dislocation, shoulder     Enlarged prostate     Fatty liver     Frozen shoulder     Hip arthrosis     History of low potassium     History of lung cancer 2018    HX LEFT LOWER LOBECTOMY    History of MRSA infection 2018    History of transfusion     Hyperlipidemia     Hypertension     Knee swelling     Left upper lobe consolidation     REPEAT CT SCANS - FOLLOWED BY PULMONARY, MOST RECENT CT SCAN 4/3/24    Low back strain     Lumbosacral disc disease     Neck strain     Neuropathy     On anticoagulant therapy     Pacemaker     PAF (paroxysmal atrial fibrillation)     Periarthritis of shoulder     Scoliosis     Second degree AV block     Sinus node dysfunction     Sleep apnea     WEARS CPAP    Slow to wake up after anesthesia     Thoracic disc disorder      Past Surgical History:   Procedure Laterality Date    BRONCHOSCOPY N/A 10/11/2018    Procedure: BRONCHOSCOPY WITH FLUORO, LEFT LOWER LOBE BRUSHINGS WET AND DRY. WITH BX'S AND BAL (IN LLL).;  Surgeon: Akil Ortiz MD;  Location: [facility] ENDOSCOPY;  Service: Pulmonary    BRONCHOSCOPY N/A 12/9/2024     Procedure: BRONCHOSCOPY WITH WASHING, BAL, BIOPSIES;  Surgeon: Jamison Jaquez MD;  Location: [facility] ENDOSCOPY;  Service: Pulmonary;  Laterality: N/A;  PRE- LEFT LUNG ATELECTASIS  POST- SAME    BRONCHOSCOPY WITH ION ROBOTIC ASSIST N/A 09/07/2023    Procedure: BRONCHOSCOPY WITH ION ROBOT AND ENDOBRONCHIAL ULTRASOUND with brushing and FNA;  Surgeon: Taye Chavira MD;  Location: [facility] ENDOSCOPY;  Service: Robotics - Pulmonary;  Laterality: N/A;  PRE/POST - left upper lobe mass    CATARACT EXTRACTION Bilateral 04/01/2024    COLONOSCOPY  2021    COLONOSCOPY W/ POLYPECTOMY N/A 10/22/2021    Procedure: COLONOSCOPY WITH POLYPECTOMY;  Surgeon: Lan Solis MD;  Location: [facility] OR;  Service: Gastroenterology;  Laterality: N/A;  cecal polyp x1 (cold snare)  ascending polyp x1 (hot snare)  transverse polyp x3 (hot snare x 1), (cold snare x2)  sigmoid polyp x1 (hot snare, clip applied)  rectal polyp x3 (cold snare)    INSERT / REPLACE / REMOVE PACEMAKER  6/5/2005    replaced Oct 2014    JOINT REPLACEMENT  Hip    Hip    KNEE ARTHROSCOPY Left     PACEMAKER IMPLANTATION  2005, 2014    THORACOSCOPY Left 11/19/2018    Procedure: BRONCHOSCOPY, DAVINCI ROBOT ASSISTED VIDEIO ASSISTED THORACOSCOPY  WITH CONVERT TO OPEN THORACOTOMY,LEFT LOWER LOBECTOMY,INTERCOSTAL NERVE BLOCK;  Surgeon: Michael Ring III, MD;  Location: [facility] OR;  Service: DaVinci    TOTAL HIP ARTHROPLASTY Left 07/14/2023    Procedure: TOTAL HIP ARTHROPLASTY;  Surgeon: Nikko Staton MD;  [place] [facility] [place] [facility]  Service: Orthopedics;  Laterality: Left;    TOTAL KNEE ARTHROPLASTY Left 06/13/2024    Procedure: TOTAL KNEE ARTHROPLASTY;  Surgeon: Nikko Staton MD;  [place] [facility] [place] [facility];  Service: Orthopedics;  Laterality: Left;     Family History   Problem Relation Age of Onset    Diabetes Mother     Stroke Father     Heart disease Father     Stroke Sister     Cancer Sister         Colon    Stroke Sister     Diabetes Sister     Heart disease  Brother     Diabetes Brother     Diabetes Brother     cT Hyperthermia Neg Hx      Social History     Tobacco Use    Smoking status: Former     Current packs/day: 0.00     Average packs/day: 1 pack/day for 33.0 years (33.0 ttl pk-yrs)     Types: Cigarettes     Start date: 1985     Quit date: 2018     Years since quittin.0     Passive exposure: Past    Smokeless tobacco: Never   Vaping Use    Vaping status: Never Used   Substance Use Topics    Alcohol use: Not Currently    Drug use: No     Medications Prior to Admission   Medication Sig Dispense Refill Last Dose/Taking    albuterol sulfate  (90 Base) MCG/ACT inhaler Inhale 2 puffs Every 4 (Four) Hours As Needed for Wheezing. 18 g 1 Past Week    atorvastatin (LIPITOR) 40 MG tablet Take 1 tablet by mouth every night at bedtime. 90 tablet 3 2024 Morning    B Complex Vitamins (VITAMIN-B COMPLEX PO) Take  by mouth.   2024 Morning    carvedilol (COREG) 25 MG tablet Take 1 tablet by mouth 2 (Two) Times a Day With Meals. Indications: High Blood Pressure 180 tablet 3 2024 Morning    cetirizine (zyrTEC) 10 MG tablet Take 1 tablet by mouth Daily. 90 tablet 3 2024    empagliflozin (JARDIANCE) 25 MG tablet tablet Take 1 tablet by mouth Daily. Indications: Cardiac Failure 30 tablet 11 2024 Morning    gabapentin (NEURONTIN) 100 MG capsule TAKE TWO CAPSULES BY MOUTH EVERY MORNING, ONE CAPSULE IN THE MIDDLE OF THE DAY AND 2 CAPSULES AT BEDTIME INDICATIONS: CHRONIC PAIN 150 capsule 5 2024 Morning    Misc Natural Products (PROSTATE HEALTH PO) Take  by mouth.   2024 Morning    Multiple Vitamins-Minerals (EYE VITAMINS PO) Take  by mouth.   2024 Morning    multivitamin with minerals (MULTIVITAMIN ADULT PO) Take 1 tablet by mouth Daily.   2024 Morning    tamsulosin (FLOMAX) 0.4 MG capsule 24 hr capsule Take 1 capsule by mouth Every Night. Indications: Benign Enlargement of Prostate 90 capsule 3 2024 Morning     "tiotropium bromide monohydrate (Spiriva Respimat) 2.5 MCG/ACT aerosol solution inhaler Inhale 2 puffs Daily. 4 g 3 Past Week    VITAMIN D PO Take  by mouth.   12/6/2024 Morning    Digestive Enzymes (MULTI-ENZYME PO) Take  by mouth.   Unknown    Eliquis 5 MG tablet tablet Take 1 tablet by mouth Daily. Indications: Prevention of Unwanted Clot in Veins       eplerenone (INSPRA) 50 MG tablet Take 1 tablet by mouth 2 (Two) Times a Day. 180 tablet 3     valsartan-hydrochlorothiazide (Diovan HCT) 160-12.5 MG per tablet Take 1 tablet by mouth Daily. 90 tablet 3      Allergies:  Spironolactone    REVIEW OF SYSTEMS:  Please see the above history of present illness for pertinent positives and negatives.  The remainder of the patient's systems have been reviewed and are negative.     Vital Signs       Flowsheet Rows      Flowsheet Row First Filed Value   Admission Height 170.2 cm (67\") Documented at 12/06/2024 1014   Admission Weight 116 kg (255 lb) Documented at 12/06/2024 1014             Physical Exam:  Physical Exam   Constitutional: Patient appears well-developed and well-nourished and in no acute distress   HEENT:   Head: Normocephalic and atraumatic.   Eyes:  Pupils are equal, round, and reactive to light. EOM are intact. Sclerae are anicteric and non-injected.  Mouth and Throat: Patient has moist mucous membranes. Oropharynx is clear of any erythema or exudate.     Neck: Neck supple. No JVD present. No thyromegaly present. No lymphadenopathy present.  Cardiovascular: Regular rate, regular rhythm, S1 normal and S2 normal.  Exam reveals no gallop and no friction rub.  No murmur heard.  Pulmonary/Chest: Lungs are clear to auscultation bilaterally. No respiratory distress. No wheezes. No rhonchi. No rales.   Abdominal: Soft. Bowel sounds are normal. No distension and no mass. There is no hepatosplenomegaly. There is no tenderness.   Musculoskeletal: Normal Muscle tone  Extremities: No edema. Pulses are palpable in all 4 " extremities.  Neurological: Patient is alert and oriented to person, place, and time. Cranial nerves II-XII are grossly intact with no focal deficits.  Skin: Skin is warm. No rash noted. Nails show no clubbing.  No cyanosis or erythema.    Debilities/Disabilities Identified: None  Emotional Behavior: Appropriate     Results Review:   I reviewed the patient's new clinical results.    Lab Results (most recent)       None            Imaging Results (Most Recent)       None          reviewed    ECG/EMG Results (most recent)       None          reviewed    Assessment & Plan   Personal hx colon polyps/  colonoscopy      I discussed the patient's findings and my recommendations with patient.     Lan Solis MD  02/03/25  09:54 EST    Time: 10 min prior to procedure.

## 2025-02-06 ENCOUNTER — LAB (OUTPATIENT)
Dept: LAB | Facility: HOSPITAL | Age: 70
End: 2025-02-06
Payer: MEDICARE

## 2025-02-06 DIAGNOSIS — E26.09 PRIMARY HYPERALDOSTERONISM: ICD-10-CM

## 2025-02-06 LAB
ANION GAP SERPL CALCULATED.3IONS-SCNC: 11.7 MMOL/L (ref 5–15)
BUN SERPL-MCNC: 23 MG/DL (ref 8–23)
BUN/CREAT SERPL: 18.4 (ref 7–25)
CALCIUM SPEC-SCNC: 9.6 MG/DL (ref 8.6–10.5)
CHLORIDE SERPL-SCNC: 105 MMOL/L (ref 98–107)
CO2 SERPL-SCNC: 26.3 MMOL/L (ref 22–29)
CREAT SERPL-MCNC: 1.25 MG/DL (ref 0.76–1.27)
EGFRCR SERPLBLD CKD-EPI 2021: 62.3 ML/MIN/1.73
GLUCOSE SERPL-MCNC: 115 MG/DL (ref 65–99)
POTASSIUM SERPL-SCNC: 4.1 MMOL/L (ref 3.5–5.2)
SODIUM SERPL-SCNC: 143 MMOL/L (ref 136–145)

## 2025-02-06 PROCEDURE — 36415 COLL VENOUS BLD VENIPUNCTURE: CPT

## 2025-02-06 PROCEDURE — 80048 BASIC METABOLIC PNL TOTAL CA: CPT

## 2025-02-06 NOTE — PROGRESS NOTES
Please let patient know that his blood work is stable.  His kidney function, electrolytes and potassium levels are all within normal range and he can continue his current regimen.  Thank you much.

## 2025-02-07 NOTE — PROGRESS NOTES
Reviewed results and recommendations with Alexy Ram.  Patient verbalized understanding of results and recommendations.    Thank you,  Eileen DAVIS RN  Triage Nurse [facility]  02/07/25    08:49 EST

## 2025-02-13 RX ORDER — ATORVASTATIN CALCIUM 40 MG/1
40 TABLET, FILM COATED ORAL
Qty: 90 TABLET | Refills: 2 | Status: SHIPPED | OUTPATIENT
Start: 2025-02-13

## 2025-02-17 ENCOUNTER — OFFICE VISIT (OUTPATIENT)
Dept: FAMILY MEDICINE CLINIC | Facility: CLINIC | Age: 70
End: 2025-02-17
Payer: MEDICARE

## 2025-02-17 VITALS
SYSTOLIC BLOOD PRESSURE: 130 MMHG | WEIGHT: 263 LBS | BODY MASS INDEX: 41.28 KG/M2 | HEIGHT: 67 IN | HEART RATE: 75 BPM | TEMPERATURE: 96.6 F | DIASTOLIC BLOOD PRESSURE: 80 MMHG | OXYGEN SATURATION: 95 %

## 2025-02-17 DIAGNOSIS — R68.89 FLU-LIKE SYMPTOMS: Primary | ICD-10-CM

## 2025-02-17 DIAGNOSIS — J06.9 ACUTE URI: ICD-10-CM

## 2025-02-17 LAB
EXPIRATION DATE: NORMAL
FLUAV AG UPPER RESP QL IA.RAPID: NOT DETECTED
FLUBV AG UPPER RESP QL IA.RAPID: NOT DETECTED
INTERNAL CONTROL: NORMAL
Lab: NORMAL
SARS-COV-2 AG UPPER RESP QL IA.RAPID: NOT DETECTED

## 2025-02-17 PROCEDURE — 3079F DIAST BP 80-89 MM HG: CPT | Performed by: FAMILY MEDICINE

## 2025-02-17 PROCEDURE — 99213 OFFICE O/P EST LOW 20 MIN: CPT | Performed by: FAMILY MEDICINE

## 2025-02-17 PROCEDURE — 87428 SARSCOV & INF VIR A&B AG IA: CPT | Performed by: FAMILY MEDICINE

## 2025-02-17 PROCEDURE — 1126F AMNT PAIN NOTED NONE PRSNT: CPT | Performed by: FAMILY MEDICINE

## 2025-02-17 PROCEDURE — 3075F SYST BP GE 130 - 139MM HG: CPT | Performed by: FAMILY MEDICINE

## 2025-02-17 RX ORDER — AMOXICILLIN 500 MG/1
500 TABLET, FILM COATED ORAL 3 TIMES DAILY
Qty: 21 TABLET | Refills: 0 | Status: SHIPPED | OUTPATIENT
Start: 2025-02-17 | End: 2025-02-24

## 2025-02-17 NOTE — PROGRESS NOTES
"  Chief Complaint   Patient presents with    Nasal Congestion    Cough     Upper Respiratory Infection: Patient complains of symptoms of a URI. Symptoms include congestion, cough, fever, and sore throat. Onset of symptoms was a few days ago, gradually worsening since that time. .  He is drinking plenty of fluids. Evaluation to date: none. Treatment to date: none.  Ill contacts at home discussed.  Vitals:    02/17/25 1423   BP: 130/80   Pulse: 75   Temp: 96.6 °F (35.9 °C)   TempSrc: Infrared   SpO2: 95%   Weight: 119 kg (263 lb)   Height: 170.2 cm (67.01\")     Gen: mildly ill appearing, alert  Ears: Tm's  without redness  Nose:  Congestion  Throat:  Red without exudate, some drainage, tonsils okay  Neck: no LAD  Lung: mild rales, good air movement, regular RR  Heart: RR without murmur  Skin: no rash.  Results for orders placed or performed in visit on 02/17/25   POCT SARS-CoV-2 Antigen KILLIAN + Flu    Collection Time: 02/17/25  2:42 PM    Specimen: Swab   Result Value Ref Range    SARS Antigen Not Detected Not Detected, Presumptive Negative    Influenza A Antigen KILLIAN Not Detected Not Detected    Influenza B Antigen KILLIAN Not Detected Not Detected    Internal Control Passed Passed    Lot Number [id number]     Expiration Date 11/27/2025        Assessment & Plan   Diagnoses and all orders for this visit:    1. Flu-like symptoms (Primary)  -     POCT SARS-CoV-2 Antigen KILLIAN + Flu  -     amoxicillin (AMOXIL) 500 MG tablet; Take 1 tablet by mouth 3 (Three) Times a Day for 7 days.  Dispense: 21 tablet; Refill: 0    2. Acute URI  -     amoxicillin (AMOXIL) 500 MG tablet; Take 1 tablet by mouth 3 (Three) Times a Day for 7 days.  Dispense: 21 tablet; Refill: 0         There are no Patient Instructions on file for this visit.  Tylenol or Advil as needed for pain, fever, muscle aches  Plenty of fluids  Hand washing discussed    Warm tea for throat.  Pros and cons of antibiotic use discussed    Dr. Pipe Vaughn MD  Family " Practice  [place], [place]  [facility]

## 2025-04-03 ENCOUNTER — OFFICE VISIT (OUTPATIENT)
Dept: ORTHOPEDIC SURGERY | Facility: CLINIC | Age: 70
End: 2025-04-03
Payer: MEDICARE

## 2025-04-03 VITALS — BODY MASS INDEX: 41.59 KG/M2 | WEIGHT: 265 LBS | HEIGHT: 67 IN | TEMPERATURE: 96.4 F

## 2025-04-03 DIAGNOSIS — R52 PAIN: Primary | ICD-10-CM

## 2025-04-03 DIAGNOSIS — M17.11 PRIMARY OSTEOARTHRITIS OF RIGHT KNEE: ICD-10-CM

## 2025-04-03 PROCEDURE — 99214 OFFICE O/P EST MOD 30 MIN: CPT | Performed by: NURSE PRACTITIONER

## 2025-04-03 PROCEDURE — 73562 X-RAY EXAM OF KNEE 3: CPT | Performed by: NURSE PRACTITIONER

## 2025-04-03 RX ORDER — POTASSIUM CHLORIDE 750 MG/1
TABLET, EXTENDED RELEASE ORAL
COMMUNITY
Start: 2025-03-17

## 2025-04-03 RX ORDER — PREGABALIN 150 MG/1
150 CAPSULE ORAL ONCE
OUTPATIENT
Start: 2025-04-03 | End: 2025-04-03

## 2025-04-03 RX ORDER — MELOXICAM 7.5 MG/1
15 TABLET ORAL ONCE
OUTPATIENT
Start: 2025-04-03 | End: 2025-04-03

## 2025-04-03 RX ORDER — CHLORHEXIDINE GLUCONATE 500 MG/1
CLOTH TOPICAL ONCE
OUTPATIENT
Start: 2025-04-03

## 2025-04-03 NOTE — PROGRESS NOTES
Patient: Alexy Ram  YOB: 1955 70 y.o. male  Medical Record Number: [id number]    Chief Complaints:   Chief Complaint   Patient presents with    Right Knee - Initial Evaluation, Pain       History of Present Illness:Alexy Ram is a 70 y.o. male who presents for follow-up of right knee pain that is chronic in nature.  Patient has known advanced right knee osteoarthritis that has been conservatively treated.  Patient has previously done conservative measures physical therapy.  Patient has had previous injections to his left knee which gave him no relief and considering his medical history he states he wanted to avoid them in the right knee.  Patient reports that he has constant anterior knee pain mostly to the medial aspect of the knee and rates it as a 7 on a scale of 10.  States that occasionally his knee is started to want to buckle and give way.  He is concerned about safety now with his knee becoming unstable.  He also states that this is greatly affecting his quality of life.  Patient has done well with his previous left knee replacement done last year and is ready to proceed forward with surgical intervention on the right knee.    Allergies:   Allergies   Allergen Reactions    Spironolactone Swelling     Facial swelling       Medications:   Current Outpatient Medications   Medication Sig Dispense Refill    albuterol sulfate  (90 Base) MCG/ACT inhaler Inhale 2 puffs Every 4 (Four) Hours As Needed for Wheezing. 18 g 1    atorvastatin (LIPITOR) 40 MG tablet TAKE 1 TABLET BY MOUTH EVERY NIGHT AT BEDTIME. 90 tablet 2    B Complex Vitamins (VITAMIN-B COMPLEX PO) Take  by mouth.      carvedilol (COREG) 25 MG tablet Take 1 tablet by mouth 2 (Two) Times a Day With Meals. Indications: High Blood Pressure 180 tablet 3    cetirizine (zyrTEC) 10 MG tablet Take 1 tablet by mouth Daily. 90 tablet 3    Digestive Enzymes (MULTI-ENZYME PO) Take  by mouth.      Eliquis 5 MG tablet tablet Take 1  "tablet by mouth Daily. Indications: Prevention of Unwanted Clot in Veins      empagliflozin (JARDIANCE) 25 MG tablet tablet Take 1 tablet by mouth Daily. Indications: Cardiac Failure 30 tablet 11    eplerenone (INSPRA) 50 MG tablet Take 1 tablet by mouth 2 (Two) Times a Day. 180 tablet 3    gabapentin (NEURONTIN) 100 MG capsule TAKE TWO CAPSULES BY MOUTH EVERY MORNING, ONE CAPSULE IN THE MIDDLE OF THE DAY AND 2 CAPSULES AT BEDTIME INDICATIONS: CHRONIC PAIN 150 capsule 5    Misc Natural Products (PROSTATE HEALTH PO) Take  by mouth.      Multiple Vitamins-Minerals (EYE VITAMINS PO) Take  by mouth.      multivitamin with minerals (MULTIVITAMIN ADULT PO) Take 1 tablet by mouth Daily.      tamsulosin (FLOMAX) 0.4 MG capsule 24 hr capsule Take 1 capsule by mouth Every Night. Indications: Benign Enlargement of Prostate 90 capsule 3    tiotropium bromide monohydrate (Spiriva Respimat) 2.5 MCG/ACT aerosol solution inhaler Inhale 2 puffs Daily. 4 g 3    valsartan-hydrochlorothiazide (Diovan HCT) 160-12.5 MG per tablet Take 1 tablet by mouth Daily. 90 tablet 3    VITAMIN D PO Take  by mouth.      potassium chloride 10 MEQ CR tablet  (Patient not taking: Reported on 4/3/2025)       No current facility-administered medications for this visit.         The following portions of the patient's history were reviewed and updated as appropriate: allergies, current medications, past family history, past medical history, past social history, past surgical history and problem list.    Review of Systems:   Pertinent positives/negative listed in HPI above    Physical Exam:   Vitals:    04/03/25 0836   Temp: 96.4 °F (35.8 °C)   TempSrc: Temporal   Weight: 120 kg (265 lb)   Height: 170.2 cm (67\")   PainSc: 7    PainLoc: Knee       General: A and O x 3, ASA, NAD      Knee:  right    ALIGNMENT:     Varus  ,   Patella  tracks  midline    GAIT:    Antalgic    SKIN:    No abnormality    RANGE OF MOTION:   3  -  115   DEG    STRENGTH:   4  / " 5    LIGAMENTS:    No varus / valgus instability.   Negative  Lachman.    MENISCUS:     Negative   Fawad       DISTAL PULSES:    Paplable    DISTAL SENSATION :   Intact    LYMPHATICS:     No   lymphadenopathy    OTHER:          - Positive   effusion      - Crepitance with ROM       Radiology:  Xrays 3views right (ap,lateral, sunrise) were ordered and reviewed for evaluation of knee pain demonstratingadvanced varus osteoarthritis with bone on bone articulation, subchondral cysts, and periarticular osteophytes.  Patient also shows advanced patellofemoral osteoarthritis as well.  In comparison to previous films patient does show further arthritic progression.      Assessment/Plan: Primary osteoarthritis right knee    Knee Plan List: Continuation of conservative management vs. TKA discussed.  The patient wishes to proceed with total knee replacement.  At this point the patient has failed the full compliment of conservative treatment and stating complete understanding of the risks/benefits/ anternatives wishes to proceed with surgical treatment.    Risk and benefits of surgery were reviewed.  Including, but not limited to, blood clots or pulmonary embolism, anesthesia risk, infection, fracture, skin/leg numbness, persistent pain/crepitance/popping/catching, failure of the implant, need for future surgeries, hematoma, possible nerve or blood vessel injury, need for transfusion, and potential risk of stroke,heart attack or death, among others.  The patient understands and wishes to proceed.     It was explained that if tissue has been repaired or reconstructed, there is also an increased chance of failure which may require further management.  Following the completion of the discussion, the patient expressed understanding of this planned course of care, all their questions were answered and consent will be obtained preoperatively.    Operative Plan: [facility] [employer] Total Knee Replacement an overnight stay with  home health rehab.  Patient does have a cardiac history of A-fib on Eliquis as well as has a pacemaker.  He sees Dr. Flanagan with [facility].  We would need cardiac clearance before proceeding forward surgery.  He also has a history of previous lung issue in which we would need pulmonary clearance from Dr. Ortiz.  I have encouraged him also to work on a weight loss goal of losing 10 pounds before surgery.        Nima Farmer, APRN  4/3/2025

## 2025-04-18 ENCOUNTER — TELEPHONE (OUTPATIENT)
Dept: ORTHOPEDIC SURGERY | Facility: CLINIC | Age: 70
End: 2025-04-18

## 2025-04-18 NOTE — TELEPHONE ENCOUNTER
Caller: Alexy Ram    Relationship: Self    Best call back number:     What is the best time to reach you: ANYTIME    Who are you requesting to speak with (clinical staff, provider,  specific staff member): DR. RAMIREZ    What was the call regarding: PATIENT HAS AN UPCOMING SURGERY WITH DR. RAMIREZ FOR A TKR AND THE PATIENT IS WONDERING IF DR. RAMIREZ HAD GOTTEN A CHANCE TO GET APPROVAL FROM HIS OTHER TWO PROVIDERS DR. GILMORE AND DR. GILBERT. PLEASE CONTACT PATIENT TO ADVISE IF HE IS OKAY WITH PROCEEDING IN THE SURGERY.    Is it okay if the provider responds through MyChart: CALL

## 2025-04-21 ENCOUNTER — TELEPHONE (OUTPATIENT)
Dept: CARDIOLOGY | Age: 70
End: 2025-04-21
Payer: MEDICARE

## 2025-04-21 NOTE — TELEPHONE ENCOUNTER
Pt called and left V/M stating Dr. Staton, needs a CC for upcoming knee surgery. Surgery is scheduled for 5/19/25.     Dr. Shemar El, can be reached at [phone]

## 2025-04-28 ENCOUNTER — HOSPITAL ENCOUNTER (OUTPATIENT)
Dept: CT IMAGING | Facility: HOSPITAL | Age: 70
Discharge: HOME | End: 2025-04-28
Admitting: INTERNAL MEDICINE
Payer: MEDICARE

## 2025-04-28 DIAGNOSIS — C34.92 SQUAMOUS CELL LUNG CANCER, LEFT: ICD-10-CM

## 2025-04-28 PROCEDURE — 71250 CT THORAX DX C-: CPT

## 2025-05-05 ENCOUNTER — PRE-ADMISSION TESTING (OUTPATIENT)
Dept: PREADMISSION TESTING | Facility: HOSPITAL | Age: 70
End: 2025-05-05
Payer: MEDICARE

## 2025-05-05 VITALS
RESPIRATION RATE: 20 BRPM | BODY MASS INDEX: 41.91 KG/M2 | WEIGHT: 267 LBS | HEART RATE: 68 BPM | OXYGEN SATURATION: 96 % | TEMPERATURE: 97.3 F | DIASTOLIC BLOOD PRESSURE: 77 MMHG | SYSTOLIC BLOOD PRESSURE: 119 MMHG | HEIGHT: 67 IN

## 2025-05-05 LAB
ANION GAP SERPL CALCULATED.3IONS-SCNC: 9.8 MMOL/L (ref 5–15)
BUN SERPL-MCNC: 27 MG/DL (ref 8–23)
BUN/CREAT SERPL: 23.1 (ref 7–25)
CALCIUM SPEC-SCNC: 10 MG/DL (ref 8.6–10.5)
CHLORIDE SERPL-SCNC: 102 MMOL/L (ref 98–107)
CO2 SERPL-SCNC: 22.2 MMOL/L (ref 22–29)
CREAT SERPL-MCNC: 1.17 MG/DL (ref 0.76–1.27)
DEPRECATED RDW RBC AUTO: 44.4 FL (ref 37–54)
EGFRCR SERPLBLD CKD-EPI 2021: 67.1 ML/MIN/1.73
ERYTHROCYTE [DISTWIDTH] IN BLOOD BY AUTOMATED COUNT: 14.3 % (ref 12.3–15.4)
GLUCOSE SERPL-MCNC: 164 MG/DL (ref 65–99)
HCT VFR BLD AUTO: 49.1 % (ref 37.5–51)
HGB BLD-MCNC: 16.5 G/DL (ref 13–17.7)
INR PPP: 1.15 (ref 0.9–1.1)
MCH RBC QN AUTO: 28.8 PG (ref 26.6–33)
MCHC RBC AUTO-ENTMCNC: 33.6 G/DL (ref 31.5–35.7)
MCV RBC AUTO: 85.8 FL (ref 79–97)
PLATELET # BLD AUTO: 268 10*3/MM3 (ref 140–450)
PMV BLD AUTO: 10.7 FL (ref 6–12)
POTASSIUM SERPL-SCNC: 4.1 MMOL/L (ref 3.5–5.2)
PROTHROMBIN TIME: 14.7 SECONDS (ref 11.7–14.2)
RBC # BLD AUTO: 5.72 10*6/MM3 (ref 4.14–5.8)
SODIUM SERPL-SCNC: 134 MMOL/L (ref 136–145)
WBC NRBC COR # BLD AUTO: 9.84 10*3/MM3 (ref 3.4–10.8)

## 2025-05-05 PROCEDURE — 85027 COMPLETE CBC AUTOMATED: CPT

## 2025-05-05 PROCEDURE — 36415 COLL VENOUS BLD VENIPUNCTURE: CPT

## 2025-05-05 PROCEDURE — 80048 BASIC METABOLIC PNL TOTAL CA: CPT

## 2025-05-05 PROCEDURE — 85610 PROTHROMBIN TIME: CPT | Performed by: ORTHOPAEDIC SURGERY

## 2025-05-05 RX ORDER — ACETAMINOPHEN 500 MG
1000 TABLET ORAL EVERY 6 HOURS PRN
COMMUNITY

## 2025-05-05 RX ORDER — GABAPENTIN 100 MG/1
100 CAPSULE ORAL
COMMUNITY

## 2025-05-05 NOTE — DISCHARGE INSTRUCTIONS
Take the following medications the morning of surgery:    Eplerenone   coreg      If you are on prescription narcotic pain medication to control your pain you may also take that medication the morning of surgery.      General Instructions:     Do not eat solid food after midnight the night before surgery.  Clear liquids day of surgery are allowed but must be stopped at least two hours before your hospital arrival time.       Allowed clear liquids      Water, sodas, and tea or coffee with no cream or milk added.       12 to 20 ounces of a clear liquid that contains carbohydrates is recommended.  If non-diabetic, have Gatorade or Powerade.  If diabetic, have G2 or Powerade Zero.     Do not have liquids red in color.  Do not consume chicken, beef, pork or vegetable broth or bouillon cubes of any variety as they are not considered clear liquids and are not allowed.      Infants may have breast milk up to four hours before surgery.  Infants drinking formula may drink formula up to six hours before surgery.   Patients who avoid smoking, chewing tobacco and alcohol for 4 weeks prior to surgery have a reduced risk of post-operative complications.  Quit smoking as many days before surgery as you can.  Do not smoke, use chewing tobacco or drink alcohol the day of surgery.   If applicable bring your C-PAP/ BI-PAP machine in with you to preop day of surgery.  Bring any papers given to you in the doctor’s office.  Wear clean comfortable clothes.  Do not wear contact lenses, false eyelashes or make-up.  Bring a case for your glasses.   Bring crutches or walker if applicable.  Remove all piercings.  Leave jewelry and any other valuables at home.  Hair extensions with metal clips must be removed prior to surgery.  The Pre-Admission Testing nurse will instruct you to bring medications if unable to obtain an accurate list in Pre-Admission Testing.    Day of surgery you will need to let the preoperative nurse know the last time you  took each of your medications.  To ensure a safe environment for patients and staff, we kindly ask that children under the age of 16 not accompany patients.  If you must bring a dependent child or dependent adult please ensure a responsible adult, other than yourself, is present to supervise them.      If you were given a blood bank ID arm band remember to bring it with you the day of surgery.    Preventing a Surgical Site Infection:  For 2 to 3 days before surgery, avoid shaving with a razor because the razor can irritate skin and make it easier to develop an infection.    Any areas of open skin can increase the risk of a post-operative wound infection by allowing bacteria to enter and travel throughout the body.  Notify your surgeon if you have any skin wounds / rashes even if it is not near the expected surgical site.  The area will need assessed to determine if surgery should be delayed until it is healed.  The night prior to surgery shower using a fresh bar of anti-bacterial soap (such as Dial) and clean washcloth.  Sleep in a clean bed with clean clothing.  Do not allow pets to sleep with you.  Shower on the morning of surgery using a fresh bar of anti-bacterial soap (such as Dial) and clean washcloth.  Dry with a clean towel and dress in clean clothing.  Ask your surgeon if you will be receiving antibiotics prior to surgery.  Make sure you, your family, and all healthcare providers clean their hands with soap and water or an alcohol based hand  before caring for you or your wound.    Day of surgery:5/19/2025  Your arrival time is approximately two hours before your scheduled surgery time.  Please note if you have an early arrival time the surgery doors do not open before 5:00 AM.  Upon arrival, a Pre-op nurse and Anesthesiologist will review your health history, obtain vital signs, and answer questions you may have.  The only belongings needed at this time will be a list of your home medications and  if applicable your C-PAP/BI-PAP machine.  A Pre-op nurse will start an IV and you may receive medication in preparation for surgery, including something to help you relax.     Please be aware that surgery does come with discomfort.  We want to make every effort to control your discomfort so please discuss any uncontrolled symptoms with your nurse.   Your doctor will most likely have prescribed pain medications.      If you are going home after surgery you will receive individualized written care instructions before being discharged.  A responsible adult must drive you to and from the hospital on the day of your surgery and ideally stay with you through the night.   .  Discharge prescriptions can be filled by the hospital pharmacy during regular pharmacy hours.  If you are having surgery late in the day/evening your prescription may be e-prescribed to your pharmacy.  Please verify your pharmacy hours or chose a 24 hour pharmacy to avoid not having access to your prescription because your pharmacy has closed for the day.    If you are staying overnight following surgery, you will be transported to your hospital room following the recovery period.  [facility] has all private rooms.    If you have any questions please call Pre-Admission Testing at [phone].  Deductibles and co-payments are collected on the day of service. Please be prepared to pay the required co-pay, deductible or deposit on the day of service as defined by your plan.    Call your surgeon immediately if you experience any of the following symptoms:  Sore Throat  Shortness of Breath or difficulty breathing  Cough  Chills  Body soreness or muscle pain  Headache  Fever  New loss of taste or smell  Do not arrive for your surgery ill.  Your procedure will need to be rescheduled to another time.  You will need to call your physician before the day of surgery to avoid any unnecessary exposure to hospital staff as well as other patients.   CHLORHEXIDINE CLOTH INSTRUCTIONS  The morning of surgery follow these instructions using the Chlorhexidine cloths you've been given.  These steps reduce bacteria on the body.  Do not use the cloths near your eyes, ears mouth, genitalia or on open wounds.  Throw the cloths away after use but do not try to flush them down a toilet.      Open and remove one cloth at a time from the package.    Leave the cloth unfolded and begin the bathing.  Massage the skin with the cloths using gentle pressure to remove bacteria.  Do not scrub harshly.   Follow the steps below with one 2% CHG cloth per area (6 total cloths).  One cloth for neck, shoulders and chest.  One cloth for both arms, hands, fingers and underarms (do underarms last).  One cloth for the abdomen followed by groin.  One cloth for right leg and foot including between the toes.  One cloth for left leg and foot including between the toes.  The last cloth is to be used for the back of the neck, back and buttocks.    Allow the CHG to air dry 3 minutes on the skin which will give it time to work and decrease the chance of irritation.  The skin may feel sticky until it is dry.  Do not rinse with water or any other liquid or you will lose the beneficial effects of the CHG.  If mild skin irritation occurs, do rinse the skin to remove the CHG.  Report this to the nurse at time of admission.  Do not apply lotions, creams, ointments, deodorants or perfumes after using the clothes. Dress in clean clothes before coming to the hospital.

## 2025-05-05 NOTE — PROGRESS NOTES
Oncology Social Work    Referral from April Anaid, DEN and Lung Nurse Navigator to see for insurance questions.  Chart reviewed.  Patient was currently seen in the thoracic clinc for his newly diagnosed lung mass .  Plan is to have a left lower lobectomy.    OSW spoke to patient and his wife in the CRC and explained role of social work and services we can assist with.  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  He currently has [employer] insurance and is 63 yrs old.  Advised his to stay on [employer] insurance through his employer for November and December.  Patient then plans to go on his wife's insurance in January.  Her open enrollment period starts in January.  Discussed SSI vs SSD.    Questions answered.    Provided contact information and encouraged them to call if future needs arise  Thank you,  Lizabeth Brewer, MSSW, CSW, OSW-C   Provider at bedside.     Genesis Ruiz RN  05/05/25 [id number]

## 2025-05-15 ENCOUNTER — OFFICE VISIT (OUTPATIENT)
Dept: ORTHOPEDIC SURGERY | Facility: CLINIC | Age: 70
End: 2025-05-15
Payer: MEDICARE

## 2025-05-15 VITALS
SYSTOLIC BLOOD PRESSURE: 139 MMHG | DIASTOLIC BLOOD PRESSURE: 87 MMHG | BODY MASS INDEX: 41.84 KG/M2 | HEIGHT: 67 IN | OXYGEN SATURATION: 94 % | WEIGHT: 266.6 LBS | HEART RATE: 69 BPM | TEMPERATURE: 98.2 F

## 2025-05-15 DIAGNOSIS — R52 PAIN: Primary | ICD-10-CM

## 2025-05-15 NOTE — H&P
Patient: Alexy Ram    Date of Admission: 5/19/2025    YOB: 1955    Medical Record Number: [id number]    Admitting Physician: Dr. Nikko Staton    Reason for Admission: End Stage Right Knee OA    History of Present Illness: 70 y.o. male presents with severe end stage knee osteoarthritis which has not been responsive to the full compliment of conservative measures. Despite conservative attempts, there is still severe, constant activity-limiting pain. Given the severity of the pain, the patient has elected to proceed with knee replacement.    Allergies:   Allergies   Allergen Reactions    Spironolactone Swelling     Facial swelling         Current Medications:  Home Medications:    Current Outpatient Medications on File Prior to Visit   Medication Sig    acetaminophen (TYLENOL) 500 MG tablet Take 2 tablets by mouth Every 6 (Six) Hours As Needed for Mild Pain.    albuterol sulfate  (90 Base) MCG/ACT inhaler Inhale 2 puffs Every 4 (Four) Hours As Needed for Wheezing.    atorvastatin (LIPITOR) 40 MG tablet TAKE 1 TABLET BY MOUTH EVERY NIGHT AT BEDTIME.    B Complex Vitamins (VITAMIN-B COMPLEX PO) Take  by mouth.    B Complex Vitamins (VITAMIN-B COMPLEX PO) Take 1 tablet by mouth Daily.    carvedilol (COREG) 25 MG tablet Take 1 tablet by mouth 2 (Two) Times a Day With Meals. Indications: High Blood Pressure    cetirizine (zyrTEC) 10 MG tablet Take 1 tablet by mouth Daily.    Digestive Enzymes (MULTI-ENZYME PO) Take 1 capsule by mouth Daily.    Eliquis 5 MG tablet tablet Take 1 tablet by mouth Daily. Indications: Prevention of Unwanted Clot in Veins (Patient taking differently: Take 1 tablet by mouth Every 12 (Twelve) Hours. To stop 2 days before surgery  Indications: Prevention of Unwanted Clot in Veins)    empagliflozin (JARDIANCE) 25 MG tablet tablet Take 1 tablet by mouth Daily. Indications: Cardiac Failure    eplerenone (INSPRA) 50 MG tablet Take 1 tablet by mouth 2 (Two) Times a Day.     gabapentin (NEURONTIN) 100 MG capsule TAKE TWO CAPSULES BY MOUTH EVERY MORNING, ONE CAPSULE IN THE MIDDLE OF THE DAY AND 2 CAPSULES AT BEDTIME INDICATIONS: CHRONIC PAIN (Patient taking differently: Take 2 capsules by mouth 2 (Two) Times a Day. TAKE TWO CAPSULES BY MOUTH EVERY MORNING, ONE CAPSULE IN THE MIDDLE OF THE DAY AND 2 CAPSULES AT BEDTIME  Indications: Chronic pain)    gabapentin (NEURONTIN) 100 MG capsule Take 1 capsule by mouth After Lunch.    Misc Natural Products (PROSTATE HEALTH PO) Take 1 tablet by mouth Daily.    Multiple Vitamins-Minerals (EYE VITAMINS PO) Take 1 capsule by mouth Daily.    multivitamin with minerals (MULTIVITAMIN ADULT PO) Take 1 tablet by mouth Daily.    Probiotic Product (PROBIOTIC PO) Take 1 tablet by mouth Daily.    tamsulosin (FLOMAX) 0.4 MG capsule 24 hr capsule Take 1 capsule by mouth Every Night. Indications: Benign Enlargement of Prostate    tiotropium bromide monohydrate (Spiriva Respimat) 2.5 MCG/ACT aerosol solution inhaler Inhale 2 puffs Daily. (Patient taking differently: Inhale 2 puffs Daily As Needed. Indications: Chronic Obstructive Lung Disease)    valsartan-hydrochlorothiazide (Diovan HCT) 160-12.5 MG per tablet Take 1 tablet by mouth Daily.    VITAMIN D PO Take 1 capsule by mouth Daily.     No current facility-administered medications on file prior to visit.     PRN Meds:.    PMH:     Past Medical History:   Diagnosis Date    Acromioclavicular separation     shoulder pulled out of place    Ankle sprain     Arthritis     OSTEOARTHRITIS    Arthritis of back     so long I don't know when it started    Arthritis of neck     Cancer     Lung    CHF (congestive heart failure)     COPD (chronic obstructive pulmonary disease)     Diabetes mellitus     Dislocation, shoulder     Emphysema of lung     Enlarged prostate     Fatty liver     Frozen shoulder     Hip arthrosis     History of low potassium     History of lung cancer 2018    HX LEFT LOWER LOBECTOMY    History of  mononucleosis     History of MRSA infection 2018    swabbed nose after surgery  and tested + MRSA    History of transfusion     no reaction    Hyperlipidemia     Hypertension     Knee swelling     Left upper lobe consolidation     REPEAT CT SCANS - FOLLOWED BY PULMONARY, MOST RECENT CT SCAN 4/3/24    Low back strain     Lumbosacral disc disease     Neck strain     Neuropathy     On anticoagulant therapy     eliquis    Pacemaker     right  side    PAF (paroxysmal atrial fibrillation)     Periarthritis of shoulder     Right knee pain     Scoliosis     Second degree AV block     Shortness of breath     Sinus node dysfunction     Sleep apnea     WEARS CPAP    Slow to wake up after anesthesia     Thoracic disc disorder        PF/Surg/Soc Hx:     Past Surgical History:   Procedure Laterality Date    BRONCHOSCOPY N/A 10/11/2018    Procedure: BRONCHOSCOPY WITH FLUORO, LEFT LOWER LOBE BRUSHINGS WET AND DRY. WITH BX'S AND BAL (IN LLL).;  Surgeon: Akil Ortiz MD;  Location: [facility] ENDOSCOPY;  Service: Pulmonary    BRONCHOSCOPY N/A 12/09/2024    Procedure: BRONCHOSCOPY WITH WASHING, BAL, BIOPSIES;  Surgeon: Jamison Jaquez MD;  Location: [facility] ENDOSCOPY;  Service: Pulmonary;  Laterality: N/A;  PRE- LEFT LUNG ATELECTASIS  POST- SAME    BRONCHOSCOPY WITH ION ROBOTIC ASSIST N/A 09/07/2023    Procedure: BRONCHOSCOPY WITH ION ROBOT AND ENDOBRONCHIAL ULTRASOUND with brushing and FNA;  Surgeon: Taye Chavira MD;  Location: [facility] ENDOSCOPY;  Service: Robotics - Pulmonary;  Laterality: N/A;  PRE/POST - left upper lobe mass    CATARACT EXTRACTION Bilateral 04/01/2024    COLONOSCOPY  2021    COLONOSCOPY W/ POLYPECTOMY N/A 10/22/2021    Procedure: COLONOSCOPY WITH POLYPECTOMY;  Surgeon: Lan Solis MD;  Location: [facility];  Service: Gastroenterology;  Laterality: N/A;  cecal polyp x1 (cold snare)  ascending polyp x1 (hot snare)  transverse polyp x3 (hot snare x 1), (cold snare x2)  sigmoid polyp x1 (hot snare,  clip applied)  rectal polyp x3 (cold snare)    COLONOSCOPY W/ POLYPECTOMY N/A 2025    Procedure: COLONOSCOPY WITH POLYPECTOMY;  Surgeon: Lan Solis MD;  Location:  [facility];  Service: Gastroenterology;  Laterality: N/A;  Diverticulosis; Ascending polyp x 1; Transverse polyp x 3- cold snare x 3; Sigmoid polyp x 1    INSERT / REPLACE / REMOVE PACEMAKER  2005    replaced Oct 2014    JOINT REPLACEMENT  Hip    left hip and knee    KNEE ARTHROSCOPY Left     PACEMAKER IMPLANTATION  2014    THORACOSCOPY Left 2018    Procedure: BRONCHOSCOPY, DAVINCI ROBOT ASSISTED VIDEIO ASSISTED THORACOSCOPY  WITH CONVERT TO OPEN THORACOTOMY,LEFT LOWER LOBECTOMY,INTERCOSTAL NERVE BLOCK;  Surgeon: Michael Ring III, MD;  Location: [facility] MAIN OR;  Service: DaVinc    TOTAL HIP ARTHROPLASTY Left 2023    Procedure: TOTAL HIP ARTHROPLASTY;  Surgeon: Nikko Staton MD;  Location:  [facility];  Service: Orthopedics;  Laterality: Left;    TOTAL KNEE ARTHROPLASTY Left 2024    Procedure: TOTAL KNEE ARTHROPLASTY;  Surgeon: Nikko Staton MD;  [facility];  Service: Orthopedics;  Laterality: Left;        Social History     Occupational History    Not on file   Tobacco Use    Smoking status: Former     Current packs/day: 0.00     Average packs/day: 1 pack/day for 33.0 years (33.0 ttl pk-yrs)     Types: Cigarettes     Start date: 1985     Quit date: 2018     Years since quittin.3     Passive exposure: Past    Smokeless tobacco: Never    Tobacco comments:     Smoked 45  years  daily 1.5 daily quit     cigars 10 daily     Vaping Use    Vaping status: Never Used   Substance and Sexual Activity    Alcohol use: Not Currently    Drug use: Never    Sexual activity: Not Currently     Partners: Female      Social History     Social History Narrative    Not on file        Family History   Problem Relation Age of Onset    Diabetes Mother     Stroke Father     Heart disease Father   "   Stroke Sister     Cancer Sister         Colon    Diabetes Sister     Stroke Sister     Diabetes Sister     Heart disease Brother     Diabetes Brother     Heart attack Brother     Diabetes Brother     Heart attack Brother     Diabetes Sister     Stroke Sister     Diabetes Brother     Malig Hyperthermia Neg Hx          Review of Systems:   A 14 point review of systems was performed, pertinent positives discussed above, all other systems are negative    Physical Exam: 70 y.o. male  Vital Signs :   Vitals:    05/15/25 1407   BP: 139/87   Pulse: 69   Temp: 98.2 °F (36.8 °C)   SpO2: 94%   Weight: 121 kg (266 lb 9.6 oz)   Height: 170.2 cm (67\")     General: Alert and Oriented x 3, No acute distress.  Psych: mood and affect appropriate; recent and remote memory intact  Eyes: conjunctivae clear; pupils equally round and reactive, sclerae anicteric  CV: RRR  Resp: normal respiratory effort  Skin: no rashes or wounds; normal turgor    Xrays:  -3 views (AP, lateral, and sunrise) were reviewed demonstrating end-stage OA with bone on bone articulation.  -A full length AP xray was ordered and reviewed today for purposes of operative alignment demonstrating end stage arthritic findings. There are no previous full length films for review    Assessment:  End-stage Right knee osteoarthritis. Conservative measures have failed.      Plan:  The plan is to proceed with Right Total Knee Replacement. The patient voiced understanding of the risks, benefits, and alternative forms of treatment that were discussed with Dr Staton at the time of scheduling.  23-hour home health    Dari Nelson, APRN  5/15/2025         "

## 2025-05-15 NOTE — H&P (VIEW-ONLY)
Patient: Alexy Ram    Date of Admission: 5/19/2025    YOB: 1955    Medical Record Number: [id number]    Admitting Physician: Dr. Nikko Staton    Reason for Admission: End Stage Right Knee OA    History of Present Illness: 70 y.o. male presents with severe end stage knee osteoarthritis which has not been responsive to the full compliment of conservative measures. Despite conservative attempts, there is still severe, constant activity-limiting pain. Given the severity of the pain, the patient has elected to proceed with knee replacement.    Allergies:   Allergies   Allergen Reactions    Spironolactone Swelling     Facial swelling         Current Medications:  Home Medications:    Current Outpatient Medications on File Prior to Visit   Medication Sig    acetaminophen (TYLENOL) 500 MG tablet Take 2 tablets by mouth Every 6 (Six) Hours As Needed for Mild Pain.    albuterol sulfate  (90 Base) MCG/ACT inhaler Inhale 2 puffs Every 4 (Four) Hours As Needed for Wheezing.    atorvastatin (LIPITOR) 40 MG tablet TAKE 1 TABLET BY MOUTH EVERY NIGHT AT BEDTIME.    B Complex Vitamins (VITAMIN-B COMPLEX PO) Take  by mouth.    B Complex Vitamins (VITAMIN-B COMPLEX PO) Take 1 tablet by mouth Daily.    carvedilol (COREG) 25 MG tablet Take 1 tablet by mouth 2 (Two) Times a Day With Meals. Indications: High Blood Pressure    cetirizine (zyrTEC) 10 MG tablet Take 1 tablet by mouth Daily.    Digestive Enzymes (MULTI-ENZYME PO) Take 1 capsule by mouth Daily.    Eliquis 5 MG tablet tablet Take 1 tablet by mouth Daily. Indications: Prevention of Unwanted Clot in Veins (Patient taking differently: Take 1 tablet by mouth Every 12 (Twelve) Hours. To stop 2 days before surgery  Indications: Prevention of Unwanted Clot in Veins)    empagliflozin (JARDIANCE) 25 MG tablet tablet Take 1 tablet by mouth Daily. Indications: Cardiac Failure    eplerenone (INSPRA) 50 MG tablet Take 1 tablet by mouth 2 (Two) Times a Day.     gabapentin (NEURONTIN) 100 MG capsule TAKE TWO CAPSULES BY MOUTH EVERY MORNING, ONE CAPSULE IN THE MIDDLE OF THE DAY AND 2 CAPSULES AT BEDTIME INDICATIONS: CHRONIC PAIN (Patient taking differently: Take 2 capsules by mouth 2 (Two) Times a Day. TAKE TWO CAPSULES BY MOUTH EVERY MORNING, ONE CAPSULE IN THE MIDDLE OF THE DAY AND 2 CAPSULES AT BEDTIME  Indications: Chronic pain)    gabapentin (NEURONTIN) 100 MG capsule Take 1 capsule by mouth After Lunch.    Misc Natural Products (PROSTATE HEALTH PO) Take 1 tablet by mouth Daily.    Multiple Vitamins-Minerals (EYE VITAMINS PO) Take 1 capsule by mouth Daily.    multivitamin with minerals (MULTIVITAMIN ADULT PO) Take 1 tablet by mouth Daily.    Probiotic Product (PROBIOTIC PO) Take 1 tablet by mouth Daily.    tamsulosin (FLOMAX) 0.4 MG capsule 24 hr capsule Take 1 capsule by mouth Every Night. Indications: Benign Enlargement of Prostate    tiotropium bromide monohydrate (Spiriva Respimat) 2.5 MCG/ACT aerosol solution inhaler Inhale 2 puffs Daily. (Patient taking differently: Inhale 2 puffs Daily As Needed. Indications: Chronic Obstructive Lung Disease)    valsartan-hydrochlorothiazide (Diovan HCT) 160-12.5 MG per tablet Take 1 tablet by mouth Daily.    VITAMIN D PO Take 1 capsule by mouth Daily.     No current facility-administered medications on file prior to visit.     PRN Meds:.    PMH:     Past Medical History:   Diagnosis Date    Acromioclavicular separation     shoulder pulled out of place    Ankle sprain     Arthritis     OSTEOARTHRITIS    Arthritis of back     so long I don't know when it started    Arthritis of neck     Cancer     Lung    CHF (congestive heart failure)     COPD (chronic obstructive pulmonary disease)     Diabetes mellitus     Dislocation, shoulder     Emphysema of lung     Enlarged prostate     Fatty liver     Frozen shoulder     Hip arthrosis     History of low potassium     History of lung cancer 2018    HX LEFT LOWER LOBECTOMY    History of  mononucleosis     History of MRSA infection 2018    swabbed nose after surgery  and tested + MRSA    History of transfusion     no reaction    Hyperlipidemia     Hypertension     Knee swelling     Left upper lobe consolidation     REPEAT CT SCANS - FOLLOWED BY PULMONARY, MOST RECENT CT SCAN 4/3/24    Low back strain     Lumbosacral disc disease     Neck strain     Neuropathy     On anticoagulant therapy     eliquis    Pacemaker     right  side    PAF (paroxysmal atrial fibrillation)     Periarthritis of shoulder     Right knee pain     Scoliosis     Second degree AV block     Shortness of breath     Sinus node dysfunction     Sleep apnea     WEARS CPAP    Slow to wake up after anesthesia     Thoracic disc disorder        PF/Surg/Soc Hx:     Past Surgical History:   Procedure Laterality Date    BRONCHOSCOPY N/A 10/11/2018    Procedure: BRONCHOSCOPY WITH FLUORO, LEFT LOWER LOBE BRUSHINGS WET AND DRY. WITH BX'S AND BAL (IN LLL).;  Surgeon: Akil Ortiz MD;  Location: [facility] ENDOSCOPY;  Service: Pulmonary    BRONCHOSCOPY N/A 12/09/2024    Procedure: BRONCHOSCOPY WITH WASHING, BAL, BIOPSIES;  Surgeon: Jamison Jaquez MD;  Location: [facility] ENDOSCOPY;  Service: Pulmonary;  Laterality: N/A;  PRE- LEFT LUNG ATELECTASIS  POST- SAME    BRONCHOSCOPY WITH ION ROBOTIC ASSIST N/A 09/07/2023    Procedure: BRONCHOSCOPY WITH ION ROBOT AND ENDOBRONCHIAL ULTRASOUND with brushing and FNA;  Surgeon: Taye Chavira MD;  Location: [facility] ENDOSCOPY;  Service: Robotics - Pulmonary;  Laterality: N/A;  PRE/POST - left upper lobe mass    CATARACT EXTRACTION Bilateral 04/01/2024    COLONOSCOPY  2021    COLONOSCOPY W/ POLYPECTOMY N/A 10/22/2021    Procedure: COLONOSCOPY WITH POLYPECTOMY;  Surgeon: Lan Solis MD;  Location: [facility];  Service: Gastroenterology;  Laterality: N/A;  cecal polyp x1 (cold snare)  ascending polyp x1 (hot snare)  transverse polyp x3 (hot snare x 1), (cold snare x2)  sigmoid polyp x1 (hot snare,  clip applied)  rectal polyp x3 (cold snare)    COLONOSCOPY W/ POLYPECTOMY N/A 2025    Procedure: COLONOSCOPY WITH POLYPECTOMY;  Surgeon: Lan Solis MD;  Location:  [facility];  Service: Gastroenterology;  Laterality: N/A;  Diverticulosis; Ascending polyp x 1; Transverse polyp x 3- cold snare x 3; Sigmoid polyp x 1    INSERT / REPLACE / REMOVE PACEMAKER  2005    replaced Oct 2014    JOINT REPLACEMENT  Hip    left hip and knee    KNEE ARTHROSCOPY Left     PACEMAKER IMPLANTATION  2014    THORACOSCOPY Left 2018    Procedure: BRONCHOSCOPY, DAVINCI ROBOT ASSISTED VIDEIO ASSISTED THORACOSCOPY  WITH CONVERT TO OPEN THORACOTOMY,LEFT LOWER LOBECTOMY,INTERCOSTAL NERVE BLOCK;  Surgeon: Michael Ring III, MD;  Location: [facility] MAIN OR;  Service: DaVinc    TOTAL HIP ARTHROPLASTY Left 2023    Procedure: TOTAL HIP ARTHROPLASTY;  Surgeon: Nikko Staton MD;  Location:  [facility];  Service: Orthopedics;  Laterality: Left;    TOTAL KNEE ARTHROPLASTY Left 2024    Procedure: TOTAL KNEE ARTHROPLASTY;  Surgeon: Nikko Staton MD;  [facility];  Service: Orthopedics;  Laterality: Left;        Social History     Occupational History    Not on file   Tobacco Use    Smoking status: Former     Current packs/day: 0.00     Average packs/day: 1 pack/day for 33.0 years (33.0 ttl pk-yrs)     Types: Cigarettes     Start date: 1985     Quit date: 2018     Years since quittin.3     Passive exposure: Past    Smokeless tobacco: Never    Tobacco comments:     Smoked 45  years  daily 1.5 daily quit     cigars 10 daily     Vaping Use    Vaping status: Never Used   Substance and Sexual Activity    Alcohol use: Not Currently    Drug use: Never    Sexual activity: Not Currently     Partners: Female      Social History     Social History Narrative    Not on file        Family History   Problem Relation Age of Onset    Diabetes Mother     Stroke Father     Heart disease Father   "   Stroke Sister     Cancer Sister         Colon    Diabetes Sister     Stroke Sister     Diabetes Sister     Heart disease Brother     Diabetes Brother     Heart attack Brother     Diabetes Brother     Heart attack Brother     Diabetes Sister     Stroke Sister     Diabetes Brother     Malig Hyperthermia Neg Hx          Review of Systems:   A 14 point review of systems was performed, pertinent positives discussed above, all other systems are negative    Physical Exam: 70 y.o. male  Vital Signs :   Vitals:    05/15/25 1407   BP: 139/87   Pulse: 69   Temp: 98.2 °F (36.8 °C)   SpO2: 94%   Weight: 121 kg (266 lb 9.6 oz)   Height: 170.2 cm (67\")     General: Alert and Oriented x 3, No acute distress.  Psych: mood and affect appropriate; recent and remote memory intact  Eyes: conjunctivae clear; pupils equally round and reactive, sclerae anicteric  CV: RRR  Resp: normal respiratory effort  Skin: no rashes or wounds; normal turgor    Xrays:  -3 views (AP, lateral, and sunrise) were reviewed demonstrating end-stage OA with bone on bone articulation.  -A full length AP xray was ordered and reviewed today for purposes of operative alignment demonstrating end stage arthritic findings. There are no previous full length films for review    Assessment:  End-stage Right knee osteoarthritis. Conservative measures have failed.      Plan:  The plan is to proceed with Right Total Knee Replacement. The patient voiced understanding of the risks, benefits, and alternative forms of treatment that were discussed with Dr Staton at the time of scheduling.  23-hour home health    Dari Nelson, APRN  5/15/2025         "

## 2025-05-16 ENCOUNTER — TELEPHONE (OUTPATIENT)
Dept: ORTHOPEDIC SURGERY | Facility: CLINIC | Age: 70
End: 2025-05-16
Payer: MEDICARE

## 2025-05-16 NOTE — TELEPHONE ENCOUNTER
LVM for patient to provide updated arrival time of 9:00 for SX 5/19/25. Requested call back to confirm. Okay to relay.

## 2025-05-19 ENCOUNTER — HOSPITAL ENCOUNTER (OUTPATIENT)
Facility: HOSPITAL | Age: 70
Discharge: HOME HEALTH | End: 2025-05-20
Attending: ORTHOPAEDIC SURGERY | Admitting: ORTHOPAEDIC SURGERY
Payer: MEDICARE

## 2025-05-19 ENCOUNTER — ANESTHESIA EVENT (OUTPATIENT)
Dept: PERIOP | Facility: HOSPITAL | Age: 70
End: 2025-05-19
Payer: MEDICARE

## 2025-05-19 ENCOUNTER — ANESTHESIA (OUTPATIENT)
Dept: PERIOP | Facility: HOSPITAL | Age: 70
End: 2025-05-19
Payer: MEDICARE

## 2025-05-19 ENCOUNTER — APPOINTMENT (OUTPATIENT)
Dept: GENERAL RADIOLOGY | Facility: HOSPITAL | Age: 70
End: 2025-05-19
Payer: MEDICARE

## 2025-05-19 DIAGNOSIS — M17.11 PRIMARY OSTEOARTHRITIS OF RIGHT KNEE: ICD-10-CM

## 2025-05-19 DIAGNOSIS — Z96.651 S/P TKR (TOTAL KNEE REPLACEMENT), RIGHT: Primary | ICD-10-CM

## 2025-05-19 PROBLEM — M17.9 OA (OSTEOARTHRITIS) OF KNEE: Status: ACTIVE | Noted: 2025-05-19

## 2025-05-19 LAB
GLUCOSE BLDC GLUCOMTR-MCNC: 130 MG/DL (ref 70–130)
GLUCOSE BLDC GLUCOMTR-MCNC: 150 MG/DL (ref 70–130)

## 2025-05-19 PROCEDURE — 25010000002 DEXAMETHASONE SODIUM PHOSPHATE 20 MG/5ML SOLUTION: Performed by: NURSE ANESTHETIST, CERTIFIED REGISTERED

## 2025-05-19 PROCEDURE — 25010000002 SUGAMMADEX 200 MG/2ML SOLUTION: Performed by: NURSE ANESTHETIST, CERTIFIED REGISTERED

## 2025-05-19 PROCEDURE — 25010000002 LIDOCAINE 2% SOLUTION: Performed by: NURSE ANESTHETIST, CERTIFIED REGISTERED

## 2025-05-19 PROCEDURE — 25010000002 MAGNESIUM SULFATE PER 500 MG OF MAGNESIUM: Performed by: NURSE ANESTHETIST, CERTIFIED REGISTERED

## 2025-05-19 PROCEDURE — C1776 JOINT DEVICE (IMPLANTABLE): HCPCS | Performed by: ORTHOPAEDIC SURGERY

## 2025-05-19 PROCEDURE — 25010000002 CEFAZOLIN PER 500 MG: Performed by: NURSE PRACTITIONER

## 2025-05-19 PROCEDURE — 85610 PROTHROMBIN TIME: CPT | Performed by: ORTHOPAEDIC SURGERY

## 2025-05-19 PROCEDURE — 25010000002 PROPOFOL 10 MG/ML EMULSION: Performed by: NURSE ANESTHETIST, CERTIFIED REGISTERED

## 2025-05-19 PROCEDURE — 82948 REAGENT STRIP/BLOOD GLUCOSE: CPT

## 2025-05-19 PROCEDURE — 25010000002 CEFAZOLIN 3 G RECONSTITUTED SOLUTION 1 EACH VIAL: Performed by: ORTHOPAEDIC SURGERY

## 2025-05-19 PROCEDURE — 25010000002 HYDROMORPHONE PER 4 MG: Performed by: NURSE ANESTHETIST, CERTIFIED REGISTERED

## 2025-05-19 PROCEDURE — 25010000002 FENTANYL CITRATE (PF) 50 MCG/ML SOLUTION: Performed by: ANESTHESIOLOGY

## 2025-05-19 PROCEDURE — 25810000003 LACTATED RINGERS PER 1000 ML: Performed by: ANESTHESIOLOGY

## 2025-05-19 PROCEDURE — 25010000002 BUPIVACAINE LIPOSOME 1.3 % SUSPENSION 20 ML VIAL: Performed by: ORTHOPAEDIC SURGERY

## 2025-05-19 PROCEDURE — 25810000003 SODIUM CHLORIDE 0.9 % SOLUTION: Performed by: NURSE PRACTITIONER

## 2025-05-19 PROCEDURE — 73560 X-RAY EXAM OF KNEE 1 OR 2: CPT

## 2025-05-19 PROCEDURE — 25010000002 ACETAMINOPHEN 10 MG/ML SOLUTION: Performed by: NURSE ANESTHETIST, CERTIFIED REGISTERED

## 2025-05-19 PROCEDURE — G0378 HOSPITAL OBSERVATION PER HR: HCPCS

## 2025-05-19 PROCEDURE — 25010000002 FENTANYL CITRATE (PF) 50 MCG/ML SOLUTION: Performed by: NURSE ANESTHETIST, CERTIFIED REGISTERED

## 2025-05-19 PROCEDURE — 25010000002 VANCOMYCIN 10 G RECONSTITUTED SOLUTION: Performed by: NURSE PRACTITIONER

## 2025-05-19 PROCEDURE — 25010000002 ROPIVACAINE PER 1 MG: Performed by: ANESTHESIOLOGY

## 2025-05-19 PROCEDURE — C1713 ANCHOR/SCREW BN/BN,TIS/BN: HCPCS | Performed by: ORTHOPAEDIC SURGERY

## 2025-05-19 PROCEDURE — 25010000002 ONDANSETRON PER 1 MG: Performed by: NURSE ANESTHETIST, CERTIFIED REGISTERED

## 2025-05-19 PROCEDURE — 25010000002 FAMOTIDINE 10 MG/ML SOLUTION: Performed by: ANESTHESIOLOGY

## 2025-05-19 PROCEDURE — 25010000002 DEXAMETHASONE PER 1 MG: Performed by: ANESTHESIOLOGY

## 2025-05-19 DEVICE — GENESIS II BICONVEX PATELLA 32MM
Type: IMPLANTABLE DEVICE | Site: KNEE | Status: FUNCTIONAL
Brand: GENESIS II

## 2025-05-19 DEVICE — LEGION POSTERIOR STABILIZED HIGH                                    FLEX HIGHLY CROSS LINKED                                    POLYETHYLENE SIZE 5-6 11MM
Type: IMPLANTABLE DEVICE | Site: KNEE | Status: FUNCTIONAL
Brand: LEGION

## 2025-05-19 DEVICE — PALACOS® R IS A FAST-CURING, RADIOPAQUE, POLY(METHYL METHACRYLATE)-BASED BONE CEMENT.PALACOS ® R CONTAINS THE X-RAY CONTRAST MEDIUM ZIRCONIUM DIOXIDE. TO IMPROVE VISIBILITY IN THE SURGICAL FIELD PALACOS ® R HAS BEEN COLOURED WITH CHLOROPHYLL (E141). THE BONE CEMENT IS PREPARED DIRECTLY BEFORE USE BY MIXING A POLYMER POWDER COMPONENT WITH A LIQUID MONOMER COMPONENT. A DUCTILE DOUGH FORMS WHICH CURES WITHIN A FEW MINUTES.
Type: IMPLANTABLE DEVICE | Site: KNEE | Status: FUNCTIONAL
Brand: PALACOS®

## 2025-05-19 DEVICE — IMPLANTABLE DEVICE: Type: IMPLANTABLE DEVICE | Status: FUNCTIONAL

## 2025-05-19 DEVICE — DEV CONTRL TISS STRATAFIXSPIRALMNCRYL PLSPS2 REV3/0 45CM: Type: IMPLANTABLE DEVICE | Site: KNEE | Status: FUNCTIONAL

## 2025-05-19 DEVICE — GENESIS II NON-POROUS TIBIAL                                    BASEPLATE SIZE 6 RIGHT
Type: IMPLANTABLE DEVICE | Site: KNEE | Status: FUNCTIONAL
Brand: GENESIS II

## 2025-05-19 DEVICE — LEGION POSTERIOR STABILIZED                                    OXINIUM FEMORAL SIZE 6 RIGHT
Type: IMPLANTABLE DEVICE | Site: KNEE | Status: FUNCTIONAL
Brand: LEGION

## 2025-05-19 DEVICE — DEV CONTRL TISS STRATAFIX SYMM PDS PLUS VIL CT-1 60CM: Type: IMPLANTABLE DEVICE | Site: KNEE | Status: FUNCTIONAL

## 2025-05-19 RX ORDER — MIDAZOLAM HYDROCHLORIDE 1 MG/ML
0.5 INJECTION, SOLUTION INTRAMUSCULAR; INTRAVENOUS
Status: DISCONTINUED | OUTPATIENT
Start: 2025-05-19 | End: 2025-05-19 | Stop reason: SDUPTHER

## 2025-05-19 RX ORDER — HYDROCODONE BITARTRATE AND ACETAMINOPHEN 7.5; 325 MG/1; MG/1
2 TABLET ORAL EVERY 4 HOURS PRN
Status: DISCONTINUED | OUTPATIENT
Start: 2025-05-19 | End: 2025-05-20 | Stop reason: HOSPADM

## 2025-05-19 RX ORDER — NALOXONE HCL 0.4 MG/ML
0.2 VIAL (ML) INJECTION AS NEEDED
Status: DISCONTINUED | OUTPATIENT
Start: 2025-05-19 | End: 2025-05-19 | Stop reason: HOSPADM

## 2025-05-19 RX ORDER — EPLERENONE 25 MG/1
50 TABLET ORAL 2 TIMES DAILY
Status: DISCONTINUED | OUTPATIENT
Start: 2025-05-19 | End: 2025-05-20 | Stop reason: HOSPADM

## 2025-05-19 RX ORDER — PREGABALIN 75 MG/1
150 CAPSULE ORAL ONCE
Status: DISCONTINUED | OUTPATIENT
Start: 2025-05-19 | End: 2025-05-19 | Stop reason: HOSPADM

## 2025-05-19 RX ORDER — FENTANYL CITRATE 50 UG/ML
INJECTION, SOLUTION INTRAMUSCULAR; INTRAVENOUS AS NEEDED
Status: DISCONTINUED | OUTPATIENT
Start: 2025-05-19 | End: 2025-05-19 | Stop reason: SURG

## 2025-05-19 RX ORDER — PROPOFOL 10 MG/ML
VIAL (ML) INTRAVENOUS AS NEEDED
Status: DISCONTINUED | OUTPATIENT
Start: 2025-05-19 | End: 2025-05-19 | Stop reason: SURG

## 2025-05-19 RX ORDER — LIDOCAINE HYDROCHLORIDE 20 MG/ML
INJECTION, SOLUTION INFILTRATION; PERINEURAL AS NEEDED
Status: DISCONTINUED | OUTPATIENT
Start: 2025-05-19 | End: 2025-05-19 | Stop reason: SURG

## 2025-05-19 RX ORDER — ATORVASTATIN CALCIUM 20 MG/1
40 TABLET, FILM COATED ORAL DAILY
Status: DISCONTINUED | OUTPATIENT
Start: 2025-05-20 | End: 2025-05-20 | Stop reason: HOSPADM

## 2025-05-19 RX ORDER — VALSARTAN 160 MG/1
160 TABLET ORAL
Status: DISCONTINUED | OUTPATIENT
Start: 2025-05-20 | End: 2025-05-20 | Stop reason: HOSPADM

## 2025-05-19 RX ORDER — LABETALOL HYDROCHLORIDE 5 MG/ML
5 INJECTION, SOLUTION INTRAVENOUS
Status: DISCONTINUED | OUTPATIENT
Start: 2025-05-19 | End: 2025-05-19 | Stop reason: HOSPADM

## 2025-05-19 RX ORDER — PROMETHAZINE HYDROCHLORIDE 12.5 MG/1
12.5 TABLET ORAL EVERY 4 HOURS PRN
Status: DISCONTINUED | OUTPATIENT
Start: 2025-05-19 | End: 2025-05-20 | Stop reason: HOSPADM

## 2025-05-19 RX ORDER — ONDANSETRON 2 MG/ML
4 INJECTION INTRAMUSCULAR; INTRAVENOUS ONCE AS NEEDED
Status: DISCONTINUED | OUTPATIENT
Start: 2025-05-19 | End: 2025-05-19 | Stop reason: HOSPADM

## 2025-05-19 RX ORDER — SODIUM CHLORIDE 0.9 % (FLUSH) 0.9 %
3-10 SYRINGE (ML) INJECTION AS NEEDED
Status: DISCONTINUED | OUTPATIENT
Start: 2025-05-19 | End: 2025-05-19 | Stop reason: HOSPADM

## 2025-05-19 RX ORDER — PROMETHAZINE HYDROCHLORIDE 25 MG/1
25 TABLET ORAL ONCE AS NEEDED
Status: DISCONTINUED | OUTPATIENT
Start: 2025-05-19 | End: 2025-05-19 | Stop reason: HOSPADM

## 2025-05-19 RX ORDER — ONDANSETRON 4 MG/1
4 TABLET, FILM COATED ORAL EVERY 8 HOURS PRN
Qty: 10 TABLET | Refills: 0 | Status: SHIPPED | OUTPATIENT
Start: 2025-05-19

## 2025-05-19 RX ORDER — MIDAZOLAM HYDROCHLORIDE 1 MG/ML
0.5 INJECTION, SOLUTION INTRAMUSCULAR; INTRAVENOUS
Status: DISCONTINUED | OUTPATIENT
Start: 2025-05-19 | End: 2025-05-19 | Stop reason: HOSPADM

## 2025-05-19 RX ORDER — ONDANSETRON 4 MG/1
4 TABLET, ORALLY DISINTEGRATING ORAL EVERY 6 HOURS PRN
Status: DISCONTINUED | OUTPATIENT
Start: 2025-05-19 | End: 2025-05-20 | Stop reason: HOSPADM

## 2025-05-19 RX ORDER — SODIUM CHLORIDE 0.9 % (FLUSH) 0.9 %
3 SYRINGE (ML) INJECTION EVERY 12 HOURS SCHEDULED
Status: DISCONTINUED | OUTPATIENT
Start: 2025-05-19 | End: 2025-05-19 | Stop reason: HOSPADM

## 2025-05-19 RX ORDER — SODIUM CHLORIDE, SODIUM LACTATE, POTASSIUM CHLORIDE, CALCIUM CHLORIDE 600; 310; 30; 20 MG/100ML; MG/100ML; MG/100ML; MG/100ML
9 INJECTION, SOLUTION INTRAVENOUS CONTINUOUS
Status: DISCONTINUED | OUTPATIENT
Start: 2025-05-19 | End: 2025-05-19

## 2025-05-19 RX ORDER — HYDROCODONE BITARTRATE AND ACETAMINOPHEN 7.5; 325 MG/1; MG/1
1 TABLET ORAL EVERY 4 HOURS PRN
Qty: 40 TABLET | Refills: 0 | Status: SHIPPED | OUTPATIENT
Start: 2025-05-19

## 2025-05-19 RX ORDER — FAMOTIDINE 10 MG/ML
20 INJECTION, SOLUTION INTRAVENOUS ONCE
Status: COMPLETED | OUTPATIENT
Start: 2025-05-19 | End: 2025-05-19

## 2025-05-19 RX ORDER — ACETAMINOPHEN 325 MG/1
650 TABLET ORAL EVERY 6 HOURS PRN
Status: DISCONTINUED | OUTPATIENT
Start: 2025-05-19 | End: 2025-05-20 | Stop reason: HOSPADM

## 2025-05-19 RX ORDER — HYDROCODONE BITARTRATE AND ACETAMINOPHEN 7.5; 325 MG/1; MG/1
1 TABLET ORAL EVERY 4 HOURS PRN
Status: DISCONTINUED | OUTPATIENT
Start: 2025-05-19 | End: 2025-05-20 | Stop reason: HOSPADM

## 2025-05-19 RX ORDER — ACETAMINOPHEN 10 MG/ML
INJECTION, SOLUTION INTRAVENOUS AS NEEDED
Status: DISCONTINUED | OUTPATIENT
Start: 2025-05-19 | End: 2025-05-19 | Stop reason: SURG

## 2025-05-19 RX ORDER — ROCURONIUM BROMIDE 10 MG/ML
INJECTION, SOLUTION INTRAVENOUS AS NEEDED
Status: DISCONTINUED | OUTPATIENT
Start: 2025-05-19 | End: 2025-05-19 | Stop reason: SURG

## 2025-05-19 RX ORDER — TAMSULOSIN HYDROCHLORIDE 0.4 MG/1
0.4 CAPSULE ORAL NIGHTLY
Status: DISCONTINUED | OUTPATIENT
Start: 2025-05-19 | End: 2025-05-20 | Stop reason: HOSPADM

## 2025-05-19 RX ORDER — TRANEXAMIC ACID 100 MG/ML
INJECTION, SOLUTION INTRAVENOUS AS NEEDED
Status: DISCONTINUED | OUTPATIENT
Start: 2025-05-19 | End: 2025-05-19 | Stop reason: SURG

## 2025-05-19 RX ORDER — DIPHENHYDRAMINE HYDROCHLORIDE 50 MG/ML
12.5 INJECTION, SOLUTION INTRAMUSCULAR; INTRAVENOUS
Status: DISCONTINUED | OUTPATIENT
Start: 2025-05-19 | End: 2025-05-19 | Stop reason: HOSPADM

## 2025-05-19 RX ORDER — CARVEDILOL 25 MG/1
25 TABLET ORAL 2 TIMES DAILY WITH MEALS
Status: DISCONTINUED | OUTPATIENT
Start: 2025-05-19 | End: 2025-05-20 | Stop reason: HOSPADM

## 2025-05-19 RX ORDER — ROPIVACAINE HYDROCHLORIDE 5 MG/ML
INJECTION, SOLUTION EPIDURAL; INFILTRATION; PERINEURAL
Status: COMPLETED | OUTPATIENT
Start: 2025-05-19 | End: 2025-05-19

## 2025-05-19 RX ORDER — EPHEDRINE SULFATE 50 MG/ML
5 INJECTION, SOLUTION INTRAVENOUS ONCE AS NEEDED
Status: DISCONTINUED | OUTPATIENT
Start: 2025-05-19 | End: 2025-05-19 | Stop reason: HOSPADM

## 2025-05-19 RX ORDER — DROPERIDOL 2.5 MG/ML
0.62 INJECTION, SOLUTION INTRAMUSCULAR; INTRAVENOUS
Status: DISCONTINUED | OUTPATIENT
Start: 2025-05-19 | End: 2025-05-19 | Stop reason: HOSPADM

## 2025-05-19 RX ORDER — HYDRALAZINE HYDROCHLORIDE 20 MG/ML
5 INJECTION INTRAMUSCULAR; INTRAVENOUS
Status: DISCONTINUED | OUTPATIENT
Start: 2025-05-19 | End: 2025-05-19 | Stop reason: HOSPADM

## 2025-05-19 RX ORDER — POLYETHYLENE GLYCOL 3350 17 G/17G
17 POWDER, FOR SOLUTION ORAL 2 TIMES DAILY
Qty: 238 G | Refills: 0 | Status: SHIPPED | OUTPATIENT
Start: 2025-05-19 | End: 2025-05-27

## 2025-05-19 RX ORDER — PANTOPRAZOLE SODIUM 40 MG/1
40 TABLET, DELAYED RELEASE ORAL DAILY
Qty: 14 TABLET | Refills: 0 | Status: SHIPPED | OUTPATIENT
Start: 2025-05-19 | End: 2025-06-03

## 2025-05-19 RX ORDER — HYDROCODONE BITARTRATE AND ACETAMINOPHEN 7.5; 325 MG/1; MG/1
1 TABLET ORAL EVERY 4 HOURS PRN
Status: DISCONTINUED | OUTPATIENT
Start: 2025-05-19 | End: 2025-05-19 | Stop reason: HOSPADM

## 2025-05-19 RX ORDER — PROMETHAZINE HYDROCHLORIDE 25 MG/1
25 SUPPOSITORY RECTAL ONCE AS NEEDED
Status: DISCONTINUED | OUTPATIENT
Start: 2025-05-19 | End: 2025-05-19 | Stop reason: HOSPADM

## 2025-05-19 RX ORDER — HYDROCODONE BITARTRATE AND ACETAMINOPHEN 5; 325 MG/1; MG/1
1 TABLET ORAL ONCE AS NEEDED
Status: COMPLETED | OUTPATIENT
Start: 2025-05-19 | End: 2025-05-19

## 2025-05-19 RX ORDER — MELOXICAM 15 MG/1
15 TABLET ORAL ONCE
Status: COMPLETED | OUTPATIENT
Start: 2025-05-19 | End: 2025-05-19

## 2025-05-19 RX ORDER — FENTANYL CITRATE 50 UG/ML
50 INJECTION, SOLUTION INTRAMUSCULAR; INTRAVENOUS ONCE AS NEEDED
Status: COMPLETED | OUTPATIENT
Start: 2025-05-19 | End: 2025-05-19

## 2025-05-19 RX ORDER — LIDOCAINE HYDROCHLORIDE 10 MG/ML
0.5 INJECTION, SOLUTION INFILTRATION; PERINEURAL ONCE AS NEEDED
Status: DISCONTINUED | OUTPATIENT
Start: 2025-05-19 | End: 2025-05-19 | Stop reason: HOSPADM

## 2025-05-19 RX ORDER — HYDROMORPHONE HYDROCHLORIDE 1 MG/ML
0.25 INJECTION, SOLUTION INTRAMUSCULAR; INTRAVENOUS; SUBCUTANEOUS
Status: DISCONTINUED | OUTPATIENT
Start: 2025-05-19 | End: 2025-05-19 | Stop reason: HOSPADM

## 2025-05-19 RX ORDER — ONDANSETRON 2 MG/ML
4 INJECTION INTRAMUSCULAR; INTRAVENOUS ONCE AS NEEDED
Status: DISCONTINUED | OUTPATIENT
Start: 2025-05-19 | End: 2025-05-20 | Stop reason: HOSPADM

## 2025-05-19 RX ORDER — FLUMAZENIL 0.1 MG/ML
0.2 INJECTION INTRAVENOUS AS NEEDED
Status: DISCONTINUED | OUTPATIENT
Start: 2025-05-19 | End: 2025-05-19 | Stop reason: HOSPADM

## 2025-05-19 RX ORDER — ATROPINE SULFATE 0.4 MG/ML
0.4 INJECTION, SOLUTION INTRAMUSCULAR; INTRAVENOUS; SUBCUTANEOUS ONCE AS NEEDED
Status: DISCONTINUED | OUTPATIENT
Start: 2025-05-19 | End: 2025-05-19 | Stop reason: HOSPADM

## 2025-05-19 RX ORDER — HYDROCHLOROTHIAZIDE 12.5 MG/1
12.5 TABLET ORAL
Status: DISCONTINUED | OUTPATIENT
Start: 2025-05-20 | End: 2025-05-20 | Stop reason: HOSPADM

## 2025-05-19 RX ORDER — DEXAMETHASONE SODIUM PHOSPHATE 4 MG/ML
INJECTION, SOLUTION INTRA-ARTICULAR; INTRALESIONAL; INTRAMUSCULAR; INTRAVENOUS; SOFT TISSUE AS NEEDED
Status: DISCONTINUED | OUTPATIENT
Start: 2025-05-19 | End: 2025-05-19 | Stop reason: SURG

## 2025-05-19 RX ORDER — FENTANYL CITRATE 50 UG/ML
25 INJECTION, SOLUTION INTRAMUSCULAR; INTRAVENOUS ONCE AS NEEDED
Status: DISCONTINUED | OUTPATIENT
Start: 2025-05-19 | End: 2025-05-19 | Stop reason: HOSPADM

## 2025-05-19 RX ORDER — GABAPENTIN 100 MG/1
200 CAPSULE ORAL 2 TIMES DAILY
Status: DISCONTINUED | OUTPATIENT
Start: 2025-05-19 | End: 2025-05-20 | Stop reason: HOSPADM

## 2025-05-19 RX ORDER — ONDANSETRON 2 MG/ML
INJECTION INTRAMUSCULAR; INTRAVENOUS AS NEEDED
Status: DISCONTINUED | OUTPATIENT
Start: 2025-05-19 | End: 2025-05-19 | Stop reason: SURG

## 2025-05-19 RX ORDER — MAGNESIUM SULFATE HEPTAHYDRATE 500 MG/ML
INJECTION, SOLUTION INTRAMUSCULAR; INTRAVENOUS AS NEEDED
Status: DISCONTINUED | OUTPATIENT
Start: 2025-05-19 | End: 2025-05-19 | Stop reason: SURG

## 2025-05-19 RX ORDER — ALBUTEROL SULFATE 0.83 MG/ML
2.5 SOLUTION RESPIRATORY (INHALATION) EVERY 4 HOURS PRN
Status: DISCONTINUED | OUTPATIENT
Start: 2025-05-19 | End: 2025-05-20 | Stop reason: HOSPADM

## 2025-05-19 RX ORDER — MAGNESIUM HYDROXIDE 1200 MG/15ML
LIQUID ORAL AS NEEDED
Status: DISCONTINUED | OUTPATIENT
Start: 2025-05-19 | End: 2025-05-19 | Stop reason: HOSPADM

## 2025-05-19 RX ORDER — TRANEXAMIC ACID 100 MG/ML
INJECTION, SOLUTION INTRAVENOUS AS NEEDED
Status: DISCONTINUED | OUTPATIENT
Start: 2025-05-19 | End: 2025-05-19

## 2025-05-19 RX ORDER — DEXAMETHASONE SODIUM PHOSPHATE 4 MG/ML
INJECTION, SOLUTION INTRA-ARTICULAR; INTRALESIONAL; INTRAMUSCULAR; INTRAVENOUS; SOFT TISSUE
Status: COMPLETED | OUTPATIENT
Start: 2025-05-19 | End: 2025-05-19

## 2025-05-19 RX ORDER — FENTANYL CITRATE 50 UG/ML
25 INJECTION, SOLUTION INTRAMUSCULAR; INTRAVENOUS
Status: DISCONTINUED | OUTPATIENT
Start: 2025-05-19 | End: 2025-05-19 | Stop reason: HOSPADM

## 2025-05-19 RX ORDER — IPRATROPIUM BROMIDE AND ALBUTEROL SULFATE 2.5; .5 MG/3ML; MG/3ML
3 SOLUTION RESPIRATORY (INHALATION) ONCE AS NEEDED
Status: DISCONTINUED | OUTPATIENT
Start: 2025-05-19 | End: 2025-05-19 | Stop reason: HOSPADM

## 2025-05-19 RX ADMIN — CARVEDILOL 25 MG: 25 TABLET, FILM COATED ORAL at 21:37

## 2025-05-19 RX ADMIN — LIDOCAINE HYDROCHLORIDE 60 MG: 20 INJECTION, SOLUTION INFILTRATION; PERINEURAL at 12:00

## 2025-05-19 RX ADMIN — TAMSULOSIN HYDROCHLORIDE 0.4 MG: 0.4 CAPSULE ORAL at 21:37

## 2025-05-19 RX ADMIN — FENTANYL CITRATE 25 MCG: 50 INJECTION, SOLUTION INTRAMUSCULAR; INTRAVENOUS at 14:35

## 2025-05-19 RX ADMIN — SODIUM CHLORIDE 3000 MG: 900 INJECTION INTRAVENOUS at 11:45

## 2025-05-19 RX ADMIN — DEXAMETHASONE SODIUM PHOSPHATE 4 MG: 4 INJECTION, SOLUTION INTRA-ARTICULAR; INTRALESIONAL; INTRAMUSCULAR; INTRAVENOUS; SOFT TISSUE at 11:21

## 2025-05-19 RX ADMIN — TRANEXAMIC ACID 1000 MG: 100 INJECTION INTRAVENOUS at 12:59

## 2025-05-19 RX ADMIN — HYDROCODONE BITARTRATE AND ACETAMINOPHEN 1 TABLET: 7.5; 325 TABLET ORAL at 14:10

## 2025-05-19 RX ADMIN — MAGNESIUM SULFATE HEPTAHYDRATE 2 G: 500 INJECTION, SOLUTION INTRAMUSCULAR; INTRAVENOUS at 12:32

## 2025-05-19 RX ADMIN — FENTANYL CITRATE 25 MCG: 50 INJECTION, SOLUTION INTRAMUSCULAR; INTRAVENOUS at 14:40

## 2025-05-19 RX ADMIN — GABAPENTIN 200 MG: 100 CAPSULE ORAL at 21:37

## 2025-05-19 RX ADMIN — HYDROMORPHONE HYDROCHLORIDE 0.25 MG: 1 INJECTION, SOLUTION INTRAMUSCULAR; INTRAVENOUS; SUBCUTANEOUS at 14:20

## 2025-05-19 RX ADMIN — HYDROMORPHONE HYDROCHLORIDE 0.25 MG: 1 INJECTION, SOLUTION INTRAMUSCULAR; INTRAVENOUS; SUBCUTANEOUS at 14:25

## 2025-05-19 RX ADMIN — PROPOFOL 250 MG: 10 INJECTION, EMULSION INTRAVENOUS at 12:00

## 2025-05-19 RX ADMIN — ONDANSETRON 4 MG: 2 INJECTION, SOLUTION INTRAMUSCULAR; INTRAVENOUS at 12:06

## 2025-05-19 RX ADMIN — DEXAMETHASONE SODIUM PHOSPHATE 8 MG: 4 INJECTION, SOLUTION INTRAMUSCULAR; INTRAVENOUS at 12:06

## 2025-05-19 RX ADMIN — EPLERENONE 50 MG: 25 TABLET, FILM COATED ORAL at 22:22

## 2025-05-19 RX ADMIN — FENTANYL CITRATE 25 MCG: 50 INJECTION, SOLUTION INTRAMUSCULAR; INTRAVENOUS at 14:05

## 2025-05-19 RX ADMIN — FENTANYL CITRATE 25 MCG: 50 INJECTION, SOLUTION INTRAMUSCULAR; INTRAVENOUS at 14:00

## 2025-05-19 RX ADMIN — SODIUM CHLORIDE, POTASSIUM CHLORIDE, SODIUM LACTATE AND CALCIUM CHLORIDE 9 ML/HR: 600; 310; 30; 20 INJECTION, SOLUTION INTRAVENOUS at 10:44

## 2025-05-19 RX ADMIN — ROCURONIUM BROMIDE 80 MG: 10 INJECTION INTRAVENOUS at 12:00

## 2025-05-19 RX ADMIN — VANCOMYCIN HYDROCHLORIDE 1750 MG: 10 INJECTION, POWDER, LYOPHILIZED, FOR SOLUTION INTRAVENOUS at 10:48

## 2025-05-19 RX ADMIN — SUGAMMADEX 400 MG: 100 INJECTION, SOLUTION INTRAVENOUS at 13:34

## 2025-05-19 RX ADMIN — HYDROMORPHONE HYDROCHLORIDE 0.25 MG: 1 INJECTION, SOLUTION INTRAMUSCULAR; INTRAVENOUS; SUBCUTANEOUS at 14:55

## 2025-05-19 RX ADMIN — FAMOTIDINE 20 MG: 10 INJECTION INTRAVENOUS at 10:49

## 2025-05-19 RX ADMIN — HYDROMORPHONE HYDROCHLORIDE 0.25 MG: 1 INJECTION, SOLUTION INTRAMUSCULAR; INTRAVENOUS; SUBCUTANEOUS at 15:00

## 2025-05-19 RX ADMIN — CEFAZOLIN 2000 MG: 2 INJECTION, POWDER, FOR SOLUTION INTRAMUSCULAR; INTRAVENOUS at 21:37

## 2025-05-19 RX ADMIN — FENTANYL CITRATE 50 MCG: 50 INJECTION, SOLUTION INTRAMUSCULAR; INTRAVENOUS at 11:55

## 2025-05-19 RX ADMIN — PROPOFOL 150 MCG/KG/MIN: 10 INJECTION, EMULSION INTRAVENOUS at 12:04

## 2025-05-19 RX ADMIN — FENTANYL CITRATE 25 MCG: 50 INJECTION, SOLUTION INTRAMUSCULAR; INTRAVENOUS at 11:18

## 2025-05-19 RX ADMIN — MELOXICAM 15 MG: 15 TABLET ORAL at 10:44

## 2025-05-19 RX ADMIN — ROPIVACAINE HYDROCHLORIDE 12 ML: 5 INJECTION EPIDURAL; INFILTRATION; PERINEURAL at 11:21

## 2025-05-19 RX ADMIN — HYDROCODONE BITARTRATE AND ACETAMINOPHEN 1 TABLET: 7.5; 325 TABLET ORAL at 22:22

## 2025-05-19 RX ADMIN — HYDROCODONE BITARTRATE AND ACETAMINOPHEN 1 TABLET: 5; 325 TABLET ORAL at 18:11

## 2025-05-19 RX ADMIN — ACETAMINOPHEN 1000 MG: 1000 INJECTION INTRAVENOUS at 12:09

## 2025-05-19 NOTE — ANESTHESIA PREPROCEDURE EVALUATION
Anesthesia Evaluation     NPO Solid Status: > 8 hours             Airway   Mallampati: III  TM distance: >3 FB  Neck ROM: full  Possible difficult intubation and No difficulty expected  Comment: Grade 1 view previously  Dental - normal exam     Pulmonary    (+) lung cancer, COPD,shortness of breath, sleep apnea  (-) wheezes  Cardiovascular     ECG reviewed  Rhythm: regular    (+) pacemaker pacemaker, hypertension, dysrhythmias Paroxysmal Atrial Fib, CHF , hyperlipidemia  (-) murmur    ROS comment:   Interpretation Summary       ·  Left ventricular systolic function is normal. Calculated left ventricular EF = 67.2%; visually estimated LVEF 55-60%.  ·  The left ventricular cavity is mildly dilated.  ·  Left ventricular wall thickness is consistent with mild concentric hypertrophy.  ·  Left ventricular diastolic function was normal.  ·  RV mildly dilated with grossly normal function.  ·  Aortic valve sclerosis without hemodynamically significant stenosis.  ·  There is a small (<1cm) pericardial effusion. There is no evidence of cardiac tamponade.  ·  Pacemaker lead noted.  ·  Saline test results are negative.  ·  Biatrial enlargement, normal IVC size.      Neuro/Psych  GI/Hepatic/Renal/Endo    (+) morbid obesity, liver disease fatty liver disease, diabetes mellitus type 2    ROS Comment: BMI 41    Hx of hyperaldosteronemia- being treated to help maintain euvolemia.  No adenoma    Musculoskeletal     Abdominal    Substance History      OB/GYN          Other      history of cancer                  Anesthesia Plan    ASA 3     general     (  D/W R&B of GA including but not limited to: heart, lung, liver, kidney, neurologic problems, positioning injuries, dental damage, corneal abrasion and TMJ.  .)  intravenous induction     Anesthetic plan, risks, benefits, and alternatives have been provided, discussed and informed consent has been obtained with: patient.    CODE STATUS:

## 2025-05-19 NOTE — ANESTHESIA PROCEDURE NOTES
Airway  Reason: elective    Date/Time: 5/19/2025 12:03 PM  Airway not difficult    General Information and Staff    Patient location during procedure: OR  Anesthesiologist: Reji Burt MD  CRNA/CAA: Sade Blevins CRNA    Indications and Patient Condition  Indications for airway management: airway protection    Preoxygenated: yes  MILS maintained throughout    Mask difficulty assessment: 2 - vent by mask + OA or adjuvant +/- NMBA    Final Airway Details    Final airway type: endotracheal airway      Successful airway: ETT  Cuffed: yes   Successful intubation technique: direct laryngoscopy  Adjuncts used in placement: anterior pressure/BURP and intubating stylet  Endotracheal tube insertion site: oral  Blade: Frazier  Blade size: 2  ETT size (mm): 7.5  Cormack-Lehane Classification: grade IIa - partial view of glottis  Placement verified by: chest auscultation and capnometry   Cuff volume (mL): 8  Measured from: lips  ETT/EBT  to lips (cm): 23  Number of attempts at approach: 1  Assessment: lips, teeth, and gum same as pre-op and atraumatic intubation    Additional Comments  Pt preoxygenated, SIVI, bag mask vent, ATETI, dentition as before

## 2025-05-19 NOTE — ANESTHESIA POSTPROCEDURE EVALUATION
Patient: Alexy Ram    Procedure Summary       Date: 05/19/25 Room / Location: [facility] OR [address] [place] [address] [facility]    Anesthesia Start: 1151 Anesthesia Stop: 1348    Procedure: TOTAL KNEE ARTHROPLASTY (Right: Knee) Diagnosis:       Primary osteoarthritis of right knee      (Primary osteoarthritis of right knee [M17.11])    Surgeons: Nikko Staton MD Provider: Reji Burt MD    Anesthesia Type: general ASA Status: 3            Anesthesia Type: general    Vitals  Vitals Value Taken Time   /79 05/19/25 16:30   Temp 36.6 °C (97.8 °F) 05/19/25 13:45   Pulse 62 05/19/25 16:36   Resp 17 05/19/25 16:15   SpO2 96 % 05/19/25 16:36   Vitals shown include unfiled device data.        Post Anesthesia Care and Evaluation    Patient location during evaluation: bedside  Pain management: adequate    Airway patency: patent  Anesthetic complications: No anesthetic complications    Cardiovascular status: acceptable  Respiratory status: acceptable  Hydration status: acceptable

## 2025-05-19 NOTE — ANESTHESIA PROCEDURE NOTES
Peripheral Block    Pre-sedation assessment completed: 5/19/2025 11:19 AM    Patient reassessed immediately prior to procedure    Patient location during procedure: pre-op  Start time: 5/19/2025 11:20 AM  Stop time: 5/19/2025 11:21 AM  Reason for block: at surgeon's request and post-op pain management  Performed by  Anesthesiologist: Reji Burt MD  Preanesthetic Checklist  Completed: patient identified, IV checked, site marked, risks and benefits discussed, surgical consent, monitors and equipment checked, pre-op evaluation and timeout performed  Prep:  Sterile barriers:gloves  Prep: ChloraPrep  Patient monitoring: blood pressure monitoring, continuous pulse oximetry and EKG  Procedure    Sedation: yes  Performed under: local infiltration  Guidance:ultrasound guided    ULTRASOUND INTERPRETATION.  Using ultrasound guidance a 22 G gauge needle was placed in close proximity to the femoral nerve, at which point, under ultrasound guidance anesthetic was injected in the area of the nerve and spread of the anesthesia was seen on ultrasound in close proximity thereto.  There were no abnormalities seen on ultrasound; a digital image was taken; and the patient tolerated the procedure with no complications. Images:still images obtained    Laterality:rightInjection Technique:single-shot  Needle Type:short-bevel  Needle Gauge:22 G      Medications Used: dexamethasone (DECADRON) injection - Injection   4 mg - 5/19/2025 11:21:00 AM  ropivacaine (NAROPIN) 0.5 % injection - Injection   12 mL - 5/19/2025 11:21:00 AM      Medications  Comment:Ultrasound Interpretation:  Using ultrasound guidance the needle was placed in close proximity to the nerve and anesthetic was injected in the area of the nerve and spread of the anesthetic was seen on ultrasound in close proximity thereto.  There were no abnormalities seen on ultrasound; a digital image was taken; and the patient tolerated the procedure with no complications.     .    Post Assessment  Injection Assessment: negative aspiration for heme, no paresthesia on injection and incremental injection  Patient Tolerance:comfortable throughout block  Complications:no  Performed by: Reji Burt MD

## 2025-05-19 NOTE — OP NOTE
Name: Alexy Ram    YOB: 1955    DATE OF SURGERY: 5/19/2025    PREOPERATIVE DIAGNOSIS: Right knee end-stage osteoarthritis    POSTOPERATIVE DIAGNOSIS: Right knee end-stage osteoarthritis    PROCEDURE PERFORMED: Right total knee replacement     SURGEON: Nikko Staton M.D.    ASSISTANT: NOLA WALL    A surgical assistant was integral in ensuring a successful outcome with this procedure.  The assistant was utilized to assist in positioning the patient, draping the patient, was used throughout the case to provide with retraction of tissues, suctioning of blood and body fluids for visualization, positioning of the extremity to allow for proper exposure so that I could perform the procedure.  Without the use of a surgical assistant during this procedure I feel that the outcome may have been compromised or would have been suboptimal or at risk for complications.    IMPLANTS: [employer]:     Implant Name Type Inv. Item Serial No.  Lot No. LRB No. Used Action   CMT BONE PALACOS R HI/VISC 1X40 - [id number] Implant CMT BONE PALACOS R HI/VISC 1X40  [facility] [id number] Right 2 Implanted   DEV CONTRL TISS STRATAFIX SYMM PDS PLUS NII CT-1 60CM - [id number] Implant DEV CONTRL TISS STRATAFIX SYMM PDS PLUS NII CT-1 60CM  [employer]  DIV [employer] J AND J [id number] Right 1 Implanted   DEV CONTRL TISS STRATAFIXSPIRALMNCRYL PLSPS2 REV3/0 45CM - [id number] Implant DEV CONTRL TISS STRATAFIXSPIRALMNCRYL PLSPS2 REV3/0 45CM  [employer]  DIV OF J AND J [id number] Right 1 Implanted   PAT GEN2 BICONVEX [id number] - [id number] Implant PAT GEN2 BICONVEX [id number]  [employer] [id number] Right 1 Implanted   COMP FEM/KN LEGION OXINIUM PS SZ6 RT - [id number] Implant COMP FEM/KN LEGION OXINIUM PS SZ6 RT  [employer] [id number] Right 1 Implanted   BASE TIB/KN GEN2 NONPOR TI SZ6 RT - [id number] Implant BASE TIB/KN GEN2 NONPOR TI SZ6 RT  [employer] [id number] Right 1 Implanted   INSRT ART/KN LEGION PS HF XLPE  SZ5TO6 11MM - [id number] Implant INSRT ART/KN LEGION PS HF XLPE [id number] 11MM  [employer] [id number] Right 1 Implanted   DEV CONTRL TISS STRATAFIXSPIRALMNCRYL PLSPS2 REV3/0 45CM - [id number] Implant DEV CONTRL TISS STRATAFIXSPIRALMNCRYL PLSPS2 REV3/0 45CM  [employer]  DIV OF J AND J [id number] Right 1 Implanted       Estimated Blood Loss: 200cc  Specimens : none  Complications: none    DESCRIPTION OF PROCEDURE: The patient was taken to the operating room and placed in the supine position. A sequential compression device was carefully placed on the non-operative leg. Preoperative antibiotics were administered. Surgical time out was performed. After adequate induction of anesthesia, the leg was prepped and draped in the usual sterile fashion, exsanguinated with an Esmarch bandage and the tourniquet inflated to 250 mmHg. A midline incision was performed followed by a medial parapatellar arthrotomy. The patella was subluxed laterally.  A portion of the fat pad, ACL, and anterior horns of the meniscus were excised.  A drill hole was then placed in the center of the femoral canal in line with the canal.  It was irrigated and suctioned.  The intramedullary nallely was then placed and a 5 degree distal valgus cut was performed after the block pinned in place appropriately.  Cut surface was then removed.  The sizing and rotation guide was then placed and seated appropriately.  It was sized and then the drill holes for the 4-in-1 cutting guide were placed in 3 degrees of external rotation based off of the posterior condyles.  The 4-in-1 cutting block was then placed and the femoral cuts were performed.  The excess bone was removed and the cut surfaces looked good.  At this point we placed the retractors around the proximal tibia and a slight release of the PCL fibers off of the posterior proximal tibia was performed.  We used the extramedullary tibial alignment guide and it was aligned appropriately and then the depth was set and the  block pinned in place.  The tibial cut was then performed and the alignment guide was removed.  The tibial cut was removed and the cut surface looked good.  The posterior horns of the menisci were then removed as well as the posterior osteophytes.  Flexion extension blocks were then used to check the balance of the knee. The tibial cut surface was then sized with the sizing templates and the tibial and femoral trial were then placed. The knee was placed in full extension and then the tibial tray rotation was then matched to the femoral rotation and marked.    Attention was then placed to the patella. The patella was noted to track centrally through range of motion. The patella was then sized with the trials. The thickness of the patella was then measured. The patella was resurfaced and the surrounding osteophytes were removed. The preoperative thickness was reproduced. The patella tracked centrally through range of motion.  We then checked the balance with the trial implants in place and there was excellent medial lateral and flexion-extension balance.    At this point all trial components were removed, the knee was copiously irrigated with pulsed lavage, and the knee was injected with anesthetic cocktail solution. The cut surfaces were then dried with clean lap sponges, and the components were cemented tibia, followed by femur, then patella. The knee was held in full extension and all excess cement was removed. The knee was held still until the cement had completely hardened. We then placed the trial polyethylene spacer which resulted in full extension and excellent flexion-extension balance. We placed the final polyethylene spacer.   The knee was then copiously irrigated. The tourniquet was then released. There was excellent hemostasis. We placed a one-eighth inch Hemovac drain. We closed the knee in multiple layers in standard fashion. Sterile dressing were applied. At the end of the case, the sponge and needle  counts were reported as being correct. There were no known complications. The patient was then transported to the recovery room.      Nikko Staton M.D.

## 2025-05-20 ENCOUNTER — READMISSION MANAGEMENT (OUTPATIENT)
Dept: CALL CENTER | Facility: HOSPITAL | Age: 70
End: 2025-05-20
Payer: MEDICARE

## 2025-05-20 VITALS
TEMPERATURE: 98.2 F | SYSTOLIC BLOOD PRESSURE: 122 MMHG | BODY MASS INDEX: 41.91 KG/M2 | HEIGHT: 67 IN | DIASTOLIC BLOOD PRESSURE: 67 MMHG | RESPIRATION RATE: 16 BRPM | WEIGHT: 267 LBS | OXYGEN SATURATION: 94 % | HEART RATE: 59 BPM

## 2025-05-20 LAB
HCT VFR BLD AUTO: 45.9 % (ref 37.5–51)
HGB BLD-MCNC: 14.6 G/DL (ref 13–17.7)

## 2025-05-20 PROCEDURE — 97110 THERAPEUTIC EXERCISES: CPT

## 2025-05-20 PROCEDURE — 85018 HEMOGLOBIN: CPT | Performed by: NURSE PRACTITIONER

## 2025-05-20 PROCEDURE — 85014 HEMATOCRIT: CPT | Performed by: NURSE PRACTITIONER

## 2025-05-20 PROCEDURE — 25010000002 CEFAZOLIN PER 500 MG: Performed by: NURSE PRACTITIONER

## 2025-05-20 PROCEDURE — G0378 HOSPITAL OBSERVATION PER HR: HCPCS

## 2025-05-20 PROCEDURE — 97161 PT EVAL LOW COMPLEX 20 MIN: CPT

## 2025-05-20 RX ADMIN — ATORVASTATIN CALCIUM 40 MG: 20 TABLET, FILM COATED ORAL at 08:35

## 2025-05-20 RX ADMIN — GABAPENTIN 200 MG: 100 CAPSULE ORAL at 08:35

## 2025-05-20 RX ADMIN — EPLERENONE 50 MG: 25 TABLET, FILM COATED ORAL at 08:35

## 2025-05-20 RX ADMIN — VALSARTAN 160 MG: 160 TABLET, FILM COATED ORAL at 08:37

## 2025-05-20 RX ADMIN — EMPAGLIFLOZIN 25 MG: 10 TABLET, FILM COATED ORAL at 08:36

## 2025-05-20 RX ADMIN — HYDROCHLOROTHIAZIDE 12.5 MG: 12.5 TABLET ORAL at 08:37

## 2025-05-20 RX ADMIN — CARVEDILOL 25 MG: 25 TABLET, FILM COATED ORAL at 08:37

## 2025-05-20 RX ADMIN — HYDROCODONE BITARTRATE AND ACETAMINOPHEN 2 TABLET: 7.5; 325 TABLET ORAL at 08:34

## 2025-05-20 RX ADMIN — HYDROCODONE BITARTRATE AND ACETAMINOPHEN 2 TABLET: 7.5; 325 TABLET ORAL at 04:04

## 2025-05-20 RX ADMIN — CEFAZOLIN 2000 MG: 2 INJECTION, POWDER, FOR SOLUTION INTRAMUSCULAR; INTRAVENOUS at 04:34

## 2025-05-20 NOTE — DISCHARGE SUMMARY
Patient Name: Alexy Ram  Patient YOB: 1955    Date of Admission:  5/19/2025  Date of Discharge:  5/20/2025  Discharge Diagnosis: CO ARTHRP KNE CONDYLE&PLATU MEDIAL&LAT COMPARTMENTS [20854] (TOTAL KNEE ARTHROPLASTY)  CO ARTHRP KNE CONDYLE&PLATU MEDIAL&LAT COMPARTMENTS [69354]  Presenting Problem/History of Present Illness: Primary osteoarthritis of right knee [M17.11]  OA (osteoarthritis) of knee [M17.9]  Admitting Physician: Dr Nikko Staton  Consults:   Consults       No orders found for last 30 day(s).            DETAILS OF HOSPITAL STAY:  Patient is a 70 y.o. male was admitted to the floor following the above procedure and underwent an uncomplicated hospital stay.  Patient did well with physical therapy and was ambulating well without problems.  On the day of discharge the wound was clean, dry and intact and calf was soft and nontender and Homans sign was negative.  Patient was tolerating  without problems.  Patient will be discharged home.    Condition on Discharge:  Stable    Vital Signs  Temp:  [97.7 °F (36.5 °C)-98.4 °F (36.9 °C)] 98.2 °F (36.8 °C)  Heart Rate:  [55-75] 59  Resp:  [15-20] 16  BP: (113-185)/(67-98) 122/67    LABS:      Admission on 05/19/2025   Component Date Value Ref Range Status    Protime 05/05/2025 14.7 (H)  11.7 - 14.2 Seconds Final    INR 05/05/2025 1.15 (H)  0.90 - 1.10 Final    Glucose 05/19/2025 130  70 - 130 mg/dL Final    Glucose 05/19/2025 150 (H)  70 - 130 mg/dL Final    Hemoglobin 05/20/2025 14.6  13.0 - 17.7 g/dL Final    Hematocrit 05/20/2025 45.9  37.5 - 51.0 % Final       XR Knee 1 or 2 View Right  Result Date: 5/19/2025  Narrative: TWO-VIEW PORTABLE RIGHT KNEE  HISTORY: Knee replacement for osteoarthritis  FINDINGS: The patient has had recent total knee replacement and the alignment appears satisfactory.  This report was finalized on 5/19/2025 2:06 PM by Dr. Amador Carreno M.D on Workstation: [id number]      Peripheral Block  Result Date:  5/19/2025  Narrative: Reji Burt MD     5/19/2025 11:23 AM Peripheral Block Pre-sedation assessment completed: 5/19/2025 11:19 AM Patient reassessed immediately prior to procedure Patient location during procedure: pre-op Start time: 5/19/2025 11:20 AM Stop time: 5/19/2025 11:21 AM Reason for block: at surgeon's request and post-op pain management Performed by Anesthesiologist: Reji Burt MD Preanesthetic Checklist Completed: patient identified, IV checked, site marked, risks and benefits discussed, surgical consent, monitors and equipment checked, pre-op evaluation and timeout performed Prep: Sterile barriers:gloves Prep: ChloraPrep Patient monitoring: blood pressure monitoring, continuous pulse oximetry and EKG Procedure Sedation: yes Performed under: local infiltration Guidance:ultrasound guided ULTRASOUND INTERPRETATION.  Using ultrasound guidance a 22 G gauge needle was placed in close proximity to the femoral nerve, at which point, under ultrasound guidance anesthetic was injected in the area of the nerve and spread of the anesthesia was seen on ultrasound in close proximity thereto.  There were no abnormalities seen on ultrasound; a digital image was taken; and the patient tolerated the procedure with no complications. Images:still images obtained Laterality:rightInjection Technique:single-shot Needle Type:short-bevel Needle Gauge:22 G Medications Used: dexamethasone (DECADRON) injection - Injection  4 mg - 5/19/2025 11:21:00 AM ropivacaine (NAROPIN) 0.5 % injection - Injection  12 mL - 5/19/2025 11:21:00 AM Medications Comment:Ultrasound Interpretation:  Using ultrasound guidance the needle was placed in close proximity to the nerve and anesthetic was injected in the area of the nerve and spread of the anesthetic was seen on ultrasound in close proximity thereto.  There were no abnormalities seen on ultrasound; a digital image was taken; and the patient tolerated the procedure with no  complications.  . Post Assessment Injection Assessment: negative aspiration for heme, no paresthesia on injection and incremental injection Patient Tolerance:comfortable throughout block Complications:no Performed by: Reji Burt MD     XR Joint Survey AP 2+ Joints  Impression: Ordering physician's impression is located in the Encounter Note dated 05/15/25. X-ray performed in the DR room.     CT Chest Without Contrast Diagnostic  Result Date: 4/29/2025  Narrative: CT CHEST WO CONTRAST DIAGNOSTIC Date of Exam: 4/28/2025 10:32 AM EDT Indication: SQUAMOUS CELL LUNG CANCER, LEFT. Comparison: 12/7/2024 chest CT Technique: Axial CT images were obtained of the chest without contrast administration.  Sagittal and coronal reconstructions were performed.  Automated exposure control and iterative reconstruction methods were used. Findings: The central airways are patent. There are surgical changes of left lower lobectomy. No focal airspace consolidation, pleural effusion, or pneumothorax. Mild emphysema. No suspicious pulmonary nodule or mass. Unchanged heart size. Right chest wall cardiac device in unchanged position. Coronary artery calcifications. No pericardial effusion. Enlarged pulmonary arteries. Chest wall soft tissues and included upper abdomen show no acute abnormality. Hepatic steatosis. Healed left-sided rib fractures. No evidence of acute fracture or suspicious bone lesion.     Impression: Impression: 1.Surgical changes of left lower lobectomy. No evidence of recurrent or metastatic disease in the chest. 2.Chronic findings as above. Electronically Signed: Robbie Riddle MD  4/29/2025 9:44 AM EDT  Workstation ID: [id number]      Discharge Medications     Discharge Medications        New Medications        Instructions Start Date   HYDROcodone-acetaminophen 7.5-325 MG per tablet  Commonly known as: NORCO   1 tablet, Oral, Every 4 Hours PRN      ondansetron 4 MG tablet  Commonly known as: Zofran   4 mg, Oral,  Every 8 Hours PRN      pantoprazole 40 MG EC tablet  Commonly known as: PROTONIX   40 mg, Oral, Daily      polyethylene glycol 17 GM/SCOOP powder  Commonly known as: MIRALAX   17 g, Oral, 2 Times Daily, MIX IN FULL GLASS OF WATER AND DRINK AS DIRECTED             Changes to Medications        Instructions Start Date   gabapentin 100 MG capsule  Commonly known as: NEURONTIN  What changed: See the new instructions.   TAKE TWO CAPSULES BY MOUTH EVERY MORNING, ONE CAPSULE IN THE MIDDLE OF THE DAY AND 2 CAPSULES AT BEDTIME INDICATIONS: CHRONIC PAIN      gabapentin 100 MG capsule  Commonly known as: NEURONTIN  What changed: Another medication with the same name was changed. Make sure you understand how and when to take each.   100 mg, After Lunch             Continue These Medications        Instructions Start Date   albuterol sulfate  (90 Base) MCG/ACT inhaler  Commonly known as: PROVENTIL HFA;VENTOLIN HFA;PROAIR HFA   2 puffs, Inhalation, Every 4 Hours PRN      atorvastatin 40 MG tablet  Commonly known as: LIPITOR   40 mg, Oral, Every Night at Bedtime      carvedilol 25 MG tablet  Commonly known as: COREG   25 mg, Oral, 2 Times Daily With Meals      cetirizine 10 MG tablet  Commonly known as: zyrTEC   10 mg, Oral, Daily      Eliquis 5 MG tablet tablet  Generic drug: apixaban   5 mg, Oral, Daily      empagliflozin 25 MG tablet tablet  Commonly known as: JARDIANCE   25 mg, Oral, Daily      eplerenone 50 MG tablet  Commonly known as: INSPRA   50 mg, Oral, 2 Times Daily      Spiriva Respimat 2.5 MCG/ACT aerosol solution inhaler  Generic drug: tiotropium bromide monohydrate   Inhale 2 puffs Daily.      tamsulosin 0.4 MG capsule 24 hr capsule  Commonly known as: FLOMAX   0.4 mg, Oral, Nightly      valsartan-hydrochlorothiazide 160-12.5 MG per tablet  Commonly known as: Diovan HCT   1 tablet, Oral, Daily             Stop These Medications      acetaminophen 500 MG tablet  Commonly known as: TYLENOL     EYE VITAMINS PO      MULTI-ENZYME PO     multivitamin with minerals tablet tablet     PROBIOTIC PO     PROSTATE HEALTH PO     VITAMIN D PO     VITAMIN-B COMPLEX PO              Discharge Instructions: Patient is to continue with physical therapy exercises daily and continue working with the physical therapist as ordered. Patient may weight bear as tolerated. Apply ice regularly. Patient may ice for long periods of time as long as ice is not directly on the skin. Patient instructed on frequent calf pumping exercises.  Patient also instructed on incentive spirometer during hospitalization and encouraged to continue to use at home regularly.    Dressing: The dressing is designed to be left in place until you return to the office in 2 weeks.  The suction unit should stop functioning at 7 days and the green light will switch to yellow.  At that point the suction unit and tubing can be disconnected at the port closest to the dressing.  The suction unit and tubing may be discarded.  You may shower immediately upon return home, you will need to turn the pump off by depressing the orange button once and then you may disconnect the pump and tubing at the connection port.  After showering, shake off the excess water and reattach the tubing.  Restart the pump by depressing the orange button one time and you will notice the green light flashing again.  If the dressing becomes dislodged or saturated it should be changed. Please refer to the [facility] information sheet if you have any questions about the dressing.  If you have a home health nurse or therapist they can be contacted to assist with dressing change or repair. You may also call the [facility] dressing hotline for questions related to the dressing ([phone]). If there are still other problems or questions related to the dressing despite these measures then you can contact Olena at our office [phone].  If for some reason the QUINN dressing is removed, after 7 days the wound can be gently  cleaned with antibacterial soap then allowed to dry and covered with a dry sterile dressing. The wound should be covered at all times except while showering.  Patient may change dressings daily and prn using sterile 4x4 and paper tape, and should call if any unusual drainage, redness or swelling.*  Follow up appointment in 2 weeks - patient to call the office at [phone] to schedule.  Patient will be discharged on Eliquis as directed. Restart Eliquis tomorrow.     Discharge Diagnosis:    Primary osteoarthritis of right knee    OA (osteoarthritis) of knee      Follow-up Appointments  Future Appointments   Date Time Provider Department Center   6/2/2025  4:10 PM Nima Farmer APRN [facility]   6/3/2025  3:10 PM Nikko Staton MD [facility]   6/12/2025  9:00 AM [facility]   6/19/2025 10:30 AM Pipe Vaughn MD [facility]   7/15/2025  8:00 AM [facility] [place] [facility] DEVICE CHECK [id number] None   7/17/2025  9:40 AM Nima Farmer APRN [facility]   7/23/2025  9:00 AM Vlad Flanagan MD [facility]          ROME Ignacio  05/20/25  07:49 EDT

## 2025-05-20 NOTE — PLAN OF CARE
Goal Outcome Evaluation:              Outcome Evaluation: POD 1 R TKA.  Pt normally independent.  TOday pt able to ambulate and ascend/descend steps w/ RW and SBA to modified independence safely for DC to home with home PT, pt owns RW.    Anticipated Discharge Disposition (PT): home with assist

## 2025-05-20 NOTE — THERAPY EVALUATION
Patient Name: Alexy Ram  : 1955    MRN: [id number]                              Today's Date: 2025       Admit Date: 2025    Visit Dx:     ICD-10-CM ICD-9-CM   1. S/P TKR (total knee replacement), right  Z96.651 V43.65   2. Primary osteoarthritis of right knee  M17.11 715.16     Patient Active Problem List   Diagnosis    Hypercholesterolemia    Hypertension    Type 2 diabetes mellitus with hyperglycemia, without long-term current use of insulin    Squamous cell carcinoma of left lung    Status post lobectomy of lung    Class 3 severe obesity due to excess calories with body mass index (BMI) of 40.0 to 44.9 in adult    Encounter for screening for malignant neoplasm of colon    Family history of colon cancer    Obstructive sleep apnea of adult    Screening PSA (prostate specific antigen)    Osteoarthritis of left hip    AV block, 2nd degree    Cardiac pacemaker in situ    Chronic anticoagulation    PAF (paroxysmal atrial fibrillation)    Adjustment and management of cardiac pacemaker    Primary osteoarthritis of left knee    Benign prostatic hyperplasia with urinary frequency    Status post total knee replacement    Acute diastolic CHF (congestive heart failure)    Chronic disease anemia    Dyslipidemia    Acute heart failure with preserved ejection fraction (HFpEF)    Primary hyperaldosteronism    Edema of right lower leg    Bronchial obstruction    Collapse of left lung    Tobacco abuse    Medicare annual wellness visit, subsequent    Primary osteoarthritis of right knee    OA (osteoarthritis) of knee     Past Medical History:   Diagnosis Date    Acromioclavicular separation     shoulder pulled out of place    Ankle sprain     Arthritis     OSTEOARTHRITIS    Arthritis of back     so long I don't know when it started    Arthritis of neck     Cancer     Lung    CHF (congestive heart failure)     COPD (chronic obstructive pulmonary disease)     Diabetes mellitus     Dislocation, shoulder      Emphysema of lung     Enlarged prostate     Fatty liver     Frozen shoulder     Hip arthrosis     History of low potassium     History of lung cancer 2018    HX LEFT LOWER LOBECTOMY    History of mononucleosis     History of MRSA infection 2018    swabbed nose after surgery  and tested + MRSA    History of transfusion     no reaction    Hyperlipidemia     Hypertension     Knee swelling     Left upper lobe consolidation     REPEAT CT SCANS - FOLLOWED BY PULMONARY, MOST RECENT CT SCAN 4/3/24    Low back strain     Lumbosacral disc disease     Neck strain     Neuropathy     On anticoagulant therapy     eliquis    Pacemaker     right  side    PAF (paroxysmal atrial fibrillation)     Periarthritis of shoulder     Right knee pain     Scoliosis     Second degree AV block     Shortness of breath     Sinus node dysfunction     Sleep apnea     WEARS CPAP    Slow to wake up after anesthesia     Thoracic disc disorder      Past Surgical History:   Procedure Laterality Date    BRONCHOSCOPY N/A 10/11/2018    Procedure: BRONCHOSCOPY WITH FLUORO, LEFT LOWER LOBE BRUSHINGS WET AND DRY. WITH BX'S AND BAL (IN LLL).;  Surgeon: Akil Ortiz MD;  Location: [facility] ENDOSCOPY;  Service: Pulmonary    BRONCHOSCOPY N/A 12/09/2024    Procedure: BRONCHOSCOPY WITH WASHING, BAL, BIOPSIES;  Surgeon: Jamison Jaquez MD;  Location: [facility] ENDOSCOPY;  Service: Pulmonary;  Laterality: N/A;  PRE- LEFT LUNG ATELECTASIS  POST- SAME    BRONCHOSCOPY WITH ION ROBOTIC ASSIST N/A 09/07/2023    Procedure: BRONCHOSCOPY WITH ION ROBOT AND ENDOBRONCHIAL ULTRASOUND with brushing and FNA;  Surgeon: Taye Chavira MD;  Location: [facility] ENDOSCOPY;  Service: Robotics - Pulmonary;  Laterality: N/A;  PRE/POST - left upper lobe mass    CATARACT EXTRACTION Bilateral 04/01/2024    COLONOSCOPY  2021    COLONOSCOPY W/ POLYPECTOMY N/A 10/22/2021    Procedure: COLONOSCOPY WITH POLYPECTOMY;  Surgeon: Lan Solis MD;  Location: [facility];  Service:  Gastroenterology;  Laterality: N/A;  cecal polyp x1 (cold snare)  ascending polyp x1 (hot snare)  transverse polyp x3 (hot snare x 1), (cold snare x2)  sigmoid polyp x1 (hot snare, clip applied)  rectal polyp x3 (cold snare)    COLONOSCOPY W/ POLYPECTOMY N/A 02/03/2025    Procedure: COLONOSCOPY WITH POLYPECTOMY;  Surgeon: Lan Solis MD;  Location:  [facility];  Service: Gastroenterology;  Laterality: N/A;  Diverticulosis; Ascending polyp x 1; Transverse polyp x 3- cold snare x 3; Sigmoid polyp x 1    INSERT / REPLACE / REMOVE PACEMAKER  6/5/2005    replaced Oct 2014    JOINT REPLACEMENT  Hip    left hip and knee    KNEE ARTHROSCOPY Left     PACEMAKER IMPLANTATION  2005, 2014    THORACOSCOPY Left 11/19/2018    Procedure: BRONCHOSCOPY, DAVINCI ROBOT ASSISTED VIDEIO ASSISTED THORACOSCOPY  WITH CONVERT TO OPEN THORACOTOMY,LEFT LOWER LOBECTOMY,INTERCOSTAL NERVE BLOCK;  Surgeon: Michael Ring III, MD;  [facility];  Service: DaVinci    TOTAL HIP ARTHROPLASTY Left 07/14/2023    Procedure: TOTAL HIP ARTHROPLASTY;  Surgeon: Nikko Staton MD;  [facility];  Service: Orthopedics;  Laterality: Left;    TOTAL KNEE ARTHROPLASTY Left 06/13/2024    Procedure: TOTAL KNEE ARTHROPLASTY;  Surgeon: Nikko Staton MD;  [facility];  Service: Orthopedics;  Laterality: Left;      General Information       Row Name 05/20/25 2581          Physical Therapy Time and Intention    Document Type evaluation  -AR     Mode of Treatment physical therapy  -AR       Row Name 05/20/25 8887          General Information    Patient Profile Reviewed yes  -AR     Prior Level of Function independent:  -AR     Existing Precautions/Restrictions fall  -AR     Barriers to Rehab none identified  -AR       Row Name 05/20/25 6751          Living Environment    Current Living Arrangements home  -AR     People in Home spouse  -AR       Row Name 05/20/25 0909          Home Main Entrance    Number of Stairs, Main  Entrance three  -AR     Stair Railings, Main Entrance none  -AR       Row Name 05/20/25 0948          Cognition    Orientation Status (Cognition) oriented x 4  -AR       Row Name 05/20/25 0948          Safety Issues/Impairments Affecting Functional Mobility    Impairments Affecting Function (Mobility) strength;range of motion (ROM);endurance/activity tolerance  -AR               User Key  (r) = Recorded By, (t) = Taken By, (c) = Cosigned By      Initials Name Provider Type    AR Gladys Castillo, PT Physical Therapist                   Mobility       Row Name 05/20/25 0948          Bed Mobility    Comment, (Bed Mobility) NT  -AR       Row Name 05/20/25 0948          Sit-Stand Transfer    Sit-Stand Haynes (Transfers) modified independence  -AR     Assistive Device (Sit-Stand Transfers) walker, front-wheeled  -AR       Row Name 05/20/25 0948          Gait/Stairs (Locomotion)    Haynes Level (Gait) standby assist  -AR     Assistive Device (Gait) walker, front-wheeled  -AR     Patient was able to Ambulate yes  -AR     Distance in Feet (Gait) 150  -AR     Deviations/Abnormal Patterns (Gait) gait speed decreased;base of support, wide  -AR     Right Sided Gait Deviations weight shift ability decreased  -AR     Haynes Level (Stairs) stand by assist  -AR     Handrail Location (Stairs) right side (ascending)  -AR     Number of Steps (Stairs) 4  -AR     Ascending Technique (Stairs) step-to-step;step-over-step  -AR     Descending Technique (Stairs) step-to-step  -AR               User Key  (r) = Recorded By, (t) = Taken By, (c) = Cosigned By      Initials Name Provider Type    AR Gladys Castillo, PT Physical Therapist                   Obj/Interventions       Row Name 05/20/25 0949          Range of Motion Comprehensive    Comment, General Range of Motion WFL except R knee  -AR       Row Name 05/20/25 0949          Strength Comprehensive (MMT)    Comment, General Manual Muscle Testing (MMT) Assessment WFL  except R LE  -AR       Row Name 05/20/25 0949          Motor Skills    Therapeutic Exercise --  R TKA ex 5x  -AR       Row Name 05/20/25 0949          Balance    Balance Assessment standing dynamic balance  -AR     Dynamic Standing Balance standby assist  -AR     Position/Device Used, Standing Balance walker, rolling  -AR               User Key  (r) = Recorded By, (t) = Taken By, (c) = Cosigned By      Initials Name Provider Type    AR Gladys Castillo, PT Physical Therapist                   Goals/Plan    No documentation.                  Clinical Impression       Row Name 05/20/25 0949          Pain    Pretreatment Pain Rating 1/10  -AR     Posttreatment Pain Rating 1/10  -AR     Pain Location knee  -AR     Pain Side/Orientation right  -AR     Response to Pain Interventions activity participation with tolerable pain  -AR       Row Name 05/20/25 0949          Plan of Care Review    Outcome Evaluation POD 1 R TKA.  Pt normally independent.  TOday pt able to ambulate and ascend/descend steps w/ RW and SBA to modified independence safely for DC to home with home PT, pt owns RW.  -AR       Row Name 05/20/25 0949          Therapy Assessment/Plan (PT)    Criteria for Skilled Interventions Met (PT) no  -AR     Therapy Frequency (PT) evaluation only  -AR       Row Name 05/20/25 0949          Vital Signs    O2 Delivery Pre Treatment room air  -AR       Row Name 05/20/25 0949          Positioning and Restraints    Pre-Treatment Position sitting in chair/recliner  no alarm  -AR     Post Treatment Position chair  -AR     In Chair notified nsg;reclined;sitting;call light within reach;encouraged to call for assist;with family/caregiver  no alarm nursing aware  -AR               User Key  (r) = Recorded By, (t) = Taken By, (c) = Cosigned By      Initials Name Provider Type    AR Gladys Castillo, PT Physical Therapist                   Outcome Measures       Row Name 05/20/25 0950 05/20/25 0834       How much help from another  person do you currently need...    Turning from your back to your side while in flat bed without using bedrails? 4  -AR 3  -SH    Moving from lying on back to sitting on the side of a flat bed without bedrails? 4  -AR 3  -SH    Moving to and from a bed to a chair (including a wheelchair)? 4  -AR 3  -SH    Standing up from a chair using your arms (e.g., wheelchair, bedside chair)? 4  -AR 3  -SH    Climbing 3-5 steps with a railing? 3  -AR 3  -SH    To walk in hospital room? 3  -AR 3  -SH    AM-PAC 6 Clicks Score (PT) 22  -AR 18  -SH    Highest Level of Mobility Goal Walk 25 Feet or More-7  -AR Walk 10 Steps or More-6  -SH      Row Name 05/20/25 0950          Functional Assessment    Outcome Measure Options AM-PAC 6 Clicks Basic Mobility (PT)  -AR               User Key  (r) = Recorded By, (t) = Taken By, (c) = Cosigned By      Initials Name Provider Type    Gladys Bailey, JAMI Physical Therapist    SH Aida Han, RN Registered Nurse                                 Physical Therapy Education       Title: PT OT SLP Therapies (In Progress)       Topic: Physical Therapy (In Progress)       Point: Mobility training (In Progress)       Learning Progress Summary            Patient Acceptance, E, NR by AR at 5/20/2025 0951   Family Acceptance, E, NR by AR at 5/20/2025 0951                      Point: Home exercise program (In Progress)       Learning Progress Summary            Patient Acceptance, E, NR by AR at 5/20/2025 0951   Family Acceptance, E, NR by AR at 5/20/2025 0951                      Point: Body mechanics (In Progress)       Learning Progress Summary            Patient Acceptance, E, NR by AR at 5/20/2025 0951   Family Acceptance, E, NR by AR at 5/20/2025 0951                      Point: Precautions (In Progress)       Learning Progress Summary            Patient Acceptance, E, NR by AR at 5/20/2025 0951   Family Acceptance, E, NR by AR at 5/20/2025 0951                                      User Key        Initials Effective Dates Name Provider Type Discipline    AR 06/16/21 -  Gladys Castillo, PT Physical Therapist PT                  PT Recommendation and Plan     Outcome Evaluation: POD 1 R TKA.  Pt normally independent.  TOday pt able to ambulate and ascend/descend steps w/ RW and SBA to modified independence safely for DC to home with home PT, pt owns RW.     Time Calculation:         PT Charges       Row Name 05/20/25 0947             Time Calculation    Start Time 0929  -AR      Stop Time 0947  -AR      Time Calculation (min) 18 min  -AR      PT Received On 05/20/25  -AR                User Key  (r) = Recorded By, (t) = Taken By, (c) = Cosigned By      Initials Name Provider Type    AR Gladys Castillo, PT Physical Therapist                  Therapy Charges for Today       Code Description Service Date Service Provider Modifiers Qty    85905454207 HC PT EVAL LOW COMPLEXITY 3 5/20/2025 Gladys Castillo, PT GP 1    38915415905 HC PT THER PROC EA 15 MIN 5/20/2025 Gladys Castillo, PT GP 1            PT G-Codes  Outcome Measure Options: AM-PAC 6 Clicks Basic Mobility (PT)  AM-PAC 6 Clicks Score (PT): 22  PT Discharge Summary  Anticipated Discharge Disposition (PT): home with assist    Gladys Castillo PT  5/20/2025

## 2025-05-20 NOTE — OUTREACH NOTE
Prep Survey      Flowsheet Row Responses   [facility] facility patient discharged from? [facility]   Is LACE score < 7 ? No   Eligibility [facility]   Date of Admission 05/19/25   Date of Discharge 05/20/25   Discharge Disposition Home-Health Care Sv   Discharge diagnosis Total knee arthroplasty   Does the patient have one of the following disease processes/diagnoses(primary or secondary)? Total Joint Replacement   Does the patient have Home health ordered? Yes   What is the Home health agency?  [facility]   Is there a DME ordered? No   Prep survey completed? Yes            FERNIE CORDOBA - Registered Nurse

## 2025-05-20 NOTE — DISCHARGE PLACEMENT REQUEST
"Patricia Ram BROWN (70 y.o. Male)       Date of Birth   1955    Social Security Number       Address   [facility] [address] [place] [facility] [address]    Home Phone   [phone]    MRN   [id number]       [facility]    Marital Status                               Admission Date   5/19/2025    Admission Type   Elective    Admitting Provider   Nikko Staton MD    Attending Provider   Nikko Staton MD    Department, Room/Bed   [address], P795/1       Discharge Date       Discharge Disposition   Home-Health Care [facility]    Discharge Destination                                 Attending Provider: Nikko Staton MD    Allergies: Spironolactone    Isolation: None   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)   Code Status: Prior    Ht: 170.2 cm (67\")   Wt: 121 kg (267 lb)    Admission Cmt: None   Principal Problem: Primary osteoarthritis of right knee [M17.11]                   Active Insurance as of 5/19/2025       Primary Coverage       Payor Plan Insurance Group Employer/Plan Group    MEDICARE MEDICARE A & B        Payor Plan Address Payor Plan Phone Number Payor Plan Fax Number Effective Dates    [address] [phone]  3/1/2020 - None Entered    [facility] [address]         Subscriber Name Subscriber Birth Date Member ID       PATRICIA RAM 1955 [id number]               Secondary Coverage       Payor Plan Insurance Group Employer/Plan Group    AARP MC SUP AARP HEALTH CARE OPTIONS        Payor Plan Address Payor Plan Phone Number Payor Plan Fax Number Effective Dates    [facility] [phone]  3/1/2020 - None Entered    [address]       [facility] [address]         Subscriber Name Subscriber Birth Date Member ID       PATRICIA RAM 1955 [id number]                     Emergency Contacts        (Rel.) Home Phone Work Phone Mobile Phone    MariiaDarlin abarca (Spouse) [phone] -- [phone]    Dexter Ram (Son) [phone] -- [phone]              "

## 2025-05-20 NOTE — PROGRESS NOTES
Start PACC Note    Home Health Referral    Evaluated patient on Home Care and services available. Patient offered choice of available HHC and agreeable to PT services with [facility].    Isolation Precautions: No active isolations    START PATIENT REGISTRATION INFORMATION  Order Information  Order Signing Physician: Dr. Nikko Staton  Service Ordered RN?: No  Service Ordered PT?: Yes  Service Ordered OT?: No  Service Ordered ST?: No  Service Ordered MSW?: No  Service Ordered HHA?: No  Following Physician: Dr. Nikko Staton  Following Physician Phone: [phone]  Overseeing Physician: Dr. Nikko Staton  (Required for Residents Only)  Agreeable to Follow ? Yes  Date/Time of Call 05/20/25 10:49 EDT, Spoke with: Written order in EPIC chart    Care Coordination  Same Day SOC?: No  Primary Care Physician: Pipe Vaughn MD  Primary Care Physician Phone: [phone]  Primary Care Physician Address: [address] [place] [address]  Visit Instructions:   Service Discharge Location Type: Home  Service Facility Name:   Service Floor Facility:   Service Room No:     Demographics  Patient Last Name: Yo  Patient First Name: Alexy  Language/Communication Barrier: no  Service Address: [address] [place] [address]  Service City: [facility] State: [place]  Service Zip: [address]  Service Home Phone: [phone]  Other Phone Numbers:   Telephone Information:   Mobile [phone]     Emergency Contact:   Extended Emergency Contact Information  Primary Emergency Contact: Darlin Ram  Address: [address] [place] [address]  Home Phone: [phone]  Mobile Phone: [phone]  Relation: Spouse  Secondary Emergency Contact: Dexter Ram  Address: [address] [place] [address]  Home Phone: [phone]  Mobile Phone: [phone]  Relation: Son    Admission Information  Admit Date: 5/19/2025  Patient Status at Discharge: Observation  Admitting  Diagnosis: Primary osteoarthritis of right knee [M17.11]  OA (osteoarthritis) of knee [M17.9]    Caregiver Information  Caregiver First Name:   Caregiver Last Name:   Caregiver Relationship to Patient:   Caregiver Phone Number:   Caregiver Notes:     HITECH  [employer]  HIGHTECH: HI TECH - FRESH ORTHO  Procedure: R TKR  Date of Procedure: 05/19/2025  Precautions:   Surgeon: Dr. Nikko Staton      END PATIENT REGISTRATION INFORMATION    Start PACC Summary    Additional Comments:     END PACC Summary    Discharge Date: Pending    Referral Source: [facility]    Signed By: uLpe Arriola RN, 5/20/2025, 10:49 EDT     Date/Time: 05/20/25 10:49 EDT    End PACC Note

## 2025-05-20 NOTE — PLAN OF CARE
"Subjective:      Pilo Barba is a 5 y.o. male who presents with Cough (wheezing this am)    Reviewed past medical, surgical and family history. Reviewed prescription and OTC medications with patient in electronic health record today      No Known Allergies          HPI is improved.  This is a 5-year-old male patient brought in by his father for complaint of coughing with congestion.  He was coughing so hard this morning that he look like he was having a difficult time breathing afterwards.  This episode lasted less than a minute.  No vomiting.  His behavior is been normal since then.  He has intermittent cough.  He has eaten normally.  He has no complaints at this time and has been coughing much less.  He has not had a fever.  Dad became concerned and brought him into urgent care for evaluation.  No other aggravating or relieving factors.    Review of Systems   Constitutional: Negative for chills, fever and malaise/fatigue.   Respiratory: Positive for cough. Negative for sputum production, shortness of breath and wheezing.    Gastrointestinal: Negative for abdominal pain, constipation, diarrhea, nausea and vomiting.   Neurological: Negative for headaches.   Psychiatric/Behavioral: Negative for substance abuse (Lives in a smoke-free home).          Objective:     Pulse 104   Temp 37.2 °C (99 °F) (Tympanic)   Resp 22   Ht 1.168 m (3' 10\")   Wt 20.4 kg (45 lb)   SpO2 97%   BMI 14.95 kg/m²      Physical Exam   Constitutional: He appears well-developed and well-nourished. He is active. No distress.   HENT:   Mouth/Throat: Mucous membranes are moist.   Eyes: Pupils are equal, round, and reactive to light. Conjunctivae are normal.   Neck: Normal range of motion. Neck supple. No neck rigidity or neck adenopathy.   Cardiovascular: Normal rate and regular rhythm.   Pulmonary/Chest: Effort normal. There is normal air entry. No stridor. No respiratory distress. Air movement is not decreased. He has no wheezes. He has " Goal Outcome Evaluation:  Plan of Care Reviewed With: patient                   Pt goals met for dc                       no rhonchi. He has no rales. He exhibits no retraction.   Musculoskeletal: Normal range of motion.   Neurological: He is alert.   Skin: Skin is warm. Capillary refill takes less than 2 seconds. He is not diaphoretic.   Nursing note and vitals reviewed.              Assessment/Plan:     1. Viral upper respiratory tract infection     2. Cough        Humidifier at night prn   Keep well hydrated  OTC children's anti-tussive medication of choice to help cough. Dosage and directions per .   Return to clinic or PCP 5-7  days if current symptoms are not resolving in a satisfactory manner or sooner if new or worsening symptoms occur. Differential diagnosis, natural history, supportive care, and indications for immediate follow-up discussed at length.   Patient's father was advised of signs and symptoms which would warrant further evaluation and /or emergent evaluation in ER.  Verbalized agreement with this treatment plan and seemed to understand without barriers. Questions were encouraged and answered to patients satisfaction.

## 2025-05-20 NOTE — PLAN OF CARE
Goal Outcome Evaluation:  Plan of Care Reviewed With: patient        Progress: improving  Outcome Evaluation: vss. nvi. dressing CDI flashing green. x1 assist BRP. 4 L O2 and CPAP while sleeping. pain managed with PO meds. plan to d/c home if medically stable.

## 2025-05-21 ENCOUNTER — TRANSITIONAL CARE MANAGEMENT TELEPHONE ENCOUNTER (OUTPATIENT)
Dept: CALL CENTER | Facility: HOSPITAL | Age: 70
End: 2025-05-21
Payer: MEDICARE

## 2025-05-21 NOTE — CASE MANAGEMENT/SOCIAL WORK
Case Management Discharge Note      Final Note: Home with [facility] via private vehicle         Selected Continued Care - Discharged on 5/20/2025 Admission date: 5/19/2025 - Discharge disposition: Home-Health Care Svc      Destination    No services have been selected for the patient.                Durable Medical Equipment    No services have been selected for the patient.                Dialysis/Infusion    No services have been selected for the patient.                Home Medical Care Coordination complete.      Service Provider Services Address Phone Fax Patient Preferred    [facility] [address], [address] [address] [phone] --              Therapy    No services have been selected for the patient.                Community Resources    No services have been selected for the patient.                Community & DME    No services have been selected for the patient.                    Transportation Services  Private: Car    Final Discharge Disposition Code: 06 - home with home health care

## 2025-05-21 NOTE — OUTREACH NOTE
Call Center TCM Note      Flowsheet Row Responses   [facility] patient discharged from? [place]   Does the patient have one of the following disease processes/diagnoses(primary or secondary)? Total Joint Replacement   Joint surgery performed? Knee   TCM attempt successful? Yes   Call start time 1024   Call end time 1034   Has the patient been back in either the hospital or Emergency Department since discharge? No   Discharge diagnosis Total knee arthroplasty   Is patient permission given to speak with other caregiver? Yes   List who call center can speak with Spouse   Person spoke with today (if not patient) and relationship Spouse   Does the patient have all medications related to this admission filled (includes all antibiotics, pain medications, etc.) Yes   Is the patient taking all medications as directed (includes completed medication regime)? Yes   Is the patient able to teach back alternate methods of pain control? Ice, Short, frequent activity, Reposition   Does the patient have an appointment with their PCP within 7-14 days of discharge? No   Nursing Interventions Patient declined scheduling/rescheduling appointment at this time, Routed TCM call to PCP office   What is the Home health agency?  [facility]   Has home health visited the patient within 72 hours of discharge? Call prior to 72 hours   Home health comments HH has reached out ot patient   Psychosocial issues? No   Has the patient began therapy sessions (either in the home or as an out patient)? No  [HH has reached out to patient but first visit has not been scheduled.]   Does the patient have a wound vac in place? No   Has the patient fallen since discharge? No   Did the patient receive a copy of their discharge instructions? Yes   Nursing interventions Reviewed instructions with patient   What is the patient's perception of their functional status since discharge? Improving   Is the patient able to teach back signs and symptoms of infection?  Temp >100.4 for 24h or longer, Incisional drainage, Blisters around incision, Shortness of breath or chest pain   Is the patient able to teach back how to prevent infection? Wash hands before and after touching incision, Keep incision covered if drainage, Eat well-balanced diet, No lotion or creams, No tub baths, hot tub or swimming   Is the patient able to teach back signs and symptoms of DVT? Redness in calf, Swelling in calf, Severe pain in calf, Area hot to touch   Did the patient implement home safety suggestions from pre-surgery classes if attended? N/A   Is the patient/caregiver able to teach back the hierarchy of who to call/visit for symptoms/problems? PCP, Specialist, Home health nurse, Urgent Care, ED, 911 Yes   TCM call completed? Yes   Wrap up additional comments Spouse reports patient doing well since d/c. Pain has been well controlled, ambulating without difficulty.   Call end time 1034   Would this patient benefit from a Referral to [facility] Social Work? No   Is the patient interested in additional calls from an ambulatory ? No            Karen POST - Registered Nurse    5/21/2025, 10:40 EDT

## 2025-05-27 ENCOUNTER — TELEPHONE (OUTPATIENT)
Dept: PHYSICAL THERAPY | Facility: CLINIC | Age: 70
End: 2025-05-27

## 2025-05-27 ENCOUNTER — OFFICE VISIT (OUTPATIENT)
Dept: FAMILY MEDICINE CLINIC | Facility: CLINIC | Age: 70
End: 2025-05-27
Payer: MEDICARE

## 2025-05-27 ENCOUNTER — TELEPHONE (OUTPATIENT)
Dept: ORTHOPEDIC SURGERY | Facility: HOSPITAL | Age: 70
End: 2025-05-27
Payer: MEDICARE

## 2025-05-27 VITALS
WEIGHT: 264 LBS | SYSTOLIC BLOOD PRESSURE: 102 MMHG | TEMPERATURE: 97.4 F | HEIGHT: 67 IN | HEART RATE: 90 BPM | BODY MASS INDEX: 41.44 KG/M2 | OXYGEN SATURATION: 97 % | DIASTOLIC BLOOD PRESSURE: 64 MMHG

## 2025-05-27 DIAGNOSIS — M17.11 PRIMARY OSTEOARTHRITIS OF RIGHT KNEE: Primary | ICD-10-CM

## 2025-05-27 DIAGNOSIS — Z09 HOSPITAL DISCHARGE FOLLOW-UP: ICD-10-CM

## 2025-05-27 PROCEDURE — 1111F DSCHRG MED/CURRENT MED MERGE: CPT | Performed by: FAMILY MEDICINE

## 2025-05-27 PROCEDURE — 1125F AMNT PAIN NOTED PAIN PRSNT: CPT | Performed by: FAMILY MEDICINE

## 2025-05-27 PROCEDURE — 3074F SYST BP LT 130 MM HG: CPT | Performed by: FAMILY MEDICINE

## 2025-05-27 PROCEDURE — 99213 OFFICE O/P EST LOW 20 MIN: CPT | Performed by: FAMILY MEDICINE

## 2025-05-27 PROCEDURE — 3078F DIAST BP <80 MM HG: CPT | Performed by: FAMILY MEDICINE

## 2025-05-27 NOTE — TELEPHONE ENCOUNTER
Attempted to reach Mr. Ram to see how he is doing as he is 1 week SP TKA. Message left at this time.

## 2025-05-27 NOTE — PROGRESS NOTES
"Transitional Care Follow Up Visit  Subjective     Alexy Ram is a 70 y.o. male who presents for a transitional care management visit.    Within 48 business hours after discharge our office contacted him via telephone to coordinate his care and needs.      I reviewed and discussed the details of that call along with the discharge summary, hospital problems, inpatient lab results, inpatient diagnostic studies, and consultation reports with Alexy.     Current outpatient and discharge medications have been reconciled for the patient.  Reviewed by: Pipe Vaughn MD          5/20/2025     7:24 PM   Date of TCM Phone Call   [facility]   Date of Admission 5/19/2025   Date of Discharge 5/20/2025   Discharge Disposition Home-Health Care [facility]     Risk for Readmission (LACE) Score: 7 (5/20/2025  6:00 AM)      History of Present Illness   Course During Hospital Stay:  .  Patient is status post right total knee replacement on May 19  Uneventful hospital stay  Went to physical therapy this morning  Right knee surgical pain fairly well-controlled on hydrocodone       The following portions of the patient's history were reviewed and updated as appropriate: allergies, current medications, past family history, past medical history, past social history, past surgical history, and problem list.    Review of Systems    Objective   /64 (BP Location: Left arm, Patient Position: Sitting, Cuff Size: Adult)   Pulse 90   Temp 97.4 °F (36.3 °C)   Ht 170.2 cm (67\")   Wt 120 kg (264 lb)   SpO2 97%   BMI 41.35 kg/m²   Physical Exam  Vitals reviewed.   Musculoskeletal:      Right knee: Swelling and ecchymosis present.   Neurological:      Mental Status: He is alert.     Right knee still has the surgical dressing in place.  There is dried blood on the bandage.  There is soft tissue swelling in the leg and purple bruising throughout the thigh and calf area  He is back on his Eliquis  His postop hemoglobin was " 14.6  Current outpatient and discharge medications have been reconciled for the patient.  Reviewed by: Pipe Vaughn MD      Assessment & Plan   Diagnoses and all orders for this visit:    1. Primary osteoarthritis of right knee (Primary)    2. Hospital discharge follow-up

## 2025-05-29 ENCOUNTER — READMISSION MANAGEMENT (OUTPATIENT)
Dept: CALL CENTER | Facility: HOSPITAL | Age: 70
End: 2025-05-29
Payer: MEDICARE

## 2025-05-29 ENCOUNTER — TELEPHONE (OUTPATIENT)
Dept: ORTHOPEDIC SURGERY | Facility: CLINIC | Age: 70
End: 2025-05-29

## 2025-05-29 NOTE — OUTREACH NOTE
Total Joint Week 2 Survey      Flowsheet Row Responses   [facility] [place] patient discharged from? [place]   Does the patient have one of the following disease processes/diagnoses(primary or secondary)? Total Joint Replacement   Joint surgery performed? Knee   Week 2 attempt successful? Yes   Call start time 1320   Call end time 1324   Has the patient been back in either the hospital or Emergency Department since discharge? No   Discharge diagnosis Total knee arthroplasty   Person spoke with today (if not patient) and relationship Darlin (spouse)   Does the patient have all medications related to this admission filled (includes all antibiotics, pain medications, etc.) Yes   Is the patient taking all medications as directed (includes completed medication regime)? Yes   Is the patient able to teach back alternate methods of pain control? Short, frequent activity, Reposition   Does the patient have a follow up appointment with their surgeon? Yes   Comments Follow up with orthopedics 6/2   What is the Home health agency?  [facility]   Has home health visited the patient within 72 hours of discharge? Yes   Psychosocial issues? No   Has the patient began therapy sessions (either in the home or as an out patient)? Yes   What is the patient's perception of their functional status since discharge? Improving   Is the patient able to teach back signs and symptoms of infection? Blisters around incision, Increased swelling or redness around incision (not associated with surgical edema), Severe discomfort or pain, Shortness of breath or chest pain   Is the patient/caregiver able to teach back the hierarchy of who to call/visit for symptoms/problems? PCP, Specialist, Home health nurse, Urgent Care, ED, 911 Yes   Week 2 call completed? Yes   Is the patient interested in additional calls from an ambulatory ? No   Would this patient benefit from a Referral to Amb Social Work? No   Wrap up additional comments Spouse  reports patient is doing well. HH is coming. No questions or concerns at this time.   Call end time 1324            CRISTINA LEON - Registered Nurse

## 2025-05-29 NOTE — TELEPHONE ENCOUNTER
I called Malka back NO answer. I left detailed VM advised for pt to take otc benadryl and OK for PT

## 2025-05-29 NOTE — TELEPHONE ENCOUNTER
Caller: INSA    Relationship to patient: [facility]    Best call back number: [phone]*    Patient is needing: NISA IS CALLING TO GET NEW ORDERS FOR HOME HEALTH , FOR 2 TIMES A WEEK UNTIL 06/20/25.. PLEASE ADVISE..    ALSO NISA IS STATING THAT THE PATIENTS KNEE IS ITCHING TERRIBLY, NO REDNESS OR BUMPS.. PLEASE ADVISE..

## 2025-05-30 ENCOUNTER — TELEPHONE (OUTPATIENT)
Dept: ORTHOPEDIC SURGERY | Facility: CLINIC | Age: 70
End: 2025-05-30

## 2025-05-30 NOTE — TELEPHONE ENCOUNTER
Hub staff attempted to follow warm transfer process and was unsuccessful     Caller: Darlin Ram    Relationship to patient: Emergency Contact    Best call back number:     Patient is needing: PATIENT WIFE STATES HE RECEIVED A CALL YESTERDAY FROM DAYAN IN DR. ITNOCO OFFICE WHICH STATES THAT HIS VISIT FOR 6.3.25 NEEDS TO BE RESCHEDULED DUE TO DR. RAMIREZ BEING IN SURGERY THAT DAY. DR. TINOCO NEXT AVAILABLE IS 6.24.25 AND PATIENTS WIFE STATES IT IS FIRST INITIAL POST-OP FOR 2 WEEKS FROM DATE OF SURGERY. PLEASE CONTACT PATIENTS WIFE TO GET RESCHEDULED IF POSSIBLE.

## 2025-05-30 NOTE — TELEPHONE ENCOUNTER
Hub staff attempted to follow warm transfer process and was unsuccessful     Caller: Darlin Ram    Relationship to patient: Emergency Contact    Best call back number: [phone]*    Patient is needing: PT'S WIFE IS RETURNING A MISSED CALL FROM THE OFFICE.. PLEASE ADVISE..

## 2025-06-03 ENCOUNTER — OFFICE VISIT (OUTPATIENT)
Dept: ORTHOPEDIC SURGERY | Facility: CLINIC | Age: 70
End: 2025-06-03
Payer: MEDICARE

## 2025-06-03 VITALS — HEIGHT: 67 IN | WEIGHT: 265 LBS | BODY MASS INDEX: 41.59 KG/M2 | TEMPERATURE: 98.6 F

## 2025-06-03 DIAGNOSIS — Z96.651 STATUS POST RIGHT KNEE REPLACEMENT: Primary | ICD-10-CM

## 2025-06-03 NOTE — PROGRESS NOTES
Alexy Ram : 1955 MRN: [id number] DATE: 6/3/2025    DIAGNOSIS: 2 week follow up right total knee      SUBJECTIVE:Patient returns today for 2 week follow up of right total knee replacement. Patient reports doing well with no unusual complaints. Appears to be progressing appropriately.    OBJECTIVE:   Exam:. The incision is healing appropriately. No sign of infection. Range of motion is progressing as expected. The calf is soft and nontender with a negative Homans sign.    ASSESSMENT: 2 week status post right knee replacement.    PLAN: 1) Staples removed and steri strips applied   2) Order given for PT   3) Discontinue RADHA hose   4) Continue ice PRN   5) aspirin 81 mg orally every day for 1 month   6) Follow up in 6 weeks with repeat Xrays of right knee (3views)    Nikko Staton MD  6/3/2025

## 2025-06-04 ENCOUNTER — TELEPHONE (OUTPATIENT)
Dept: ORTHOPEDIC SURGERY | Facility: CLINIC | Age: 70
End: 2025-06-04

## 2025-06-04 DIAGNOSIS — C34.92 SQUAMOUS CELL CARCINOMA OF LEFT LUNG: ICD-10-CM

## 2025-06-04 DIAGNOSIS — Z90.2 STATUS POST LOBECTOMY OF LUNG: Primary | ICD-10-CM

## 2025-06-04 RX ORDER — GABAPENTIN 100 MG/1
CAPSULE ORAL
Qty: 150 CAPSULE | Refills: 5 | Status: SHIPPED | OUTPATIENT
Start: 2025-06-04

## 2025-06-04 NOTE — TELEPHONE ENCOUNTER
Hub staff attempted to follow warm transfer process and was unsuccessful     Caller: Darlin Ram    Relationship to patient: Emergency Contact    Best call back number:     Patient is needing: PATIENT WAS IN FOR A POST-OP VISIT YESTERDAY ON 6.3.25 AND FORGOT TO ASK A FEW QUESTIONS ONE BEING HOW LONG TILL HE CAN DRIVE? THE PATIENT HAS ALSO BEEN DOING HOME HEALTH PHYSICAL THERAPY WITH APPROVAL TILL 6.20.25, DR. RAMIREZ STATES YESTERDAY HE NEEDED TO GO TO OUTPATIENT PHYSICAL THERAPY HOWEVER THE PATIENT HAS ALREADY STARTED HOME PHYSICAL THERAPY AND WANTS TO ENSURE HE CAN CONTINUE THAT TILL HE IS CAPABLE OF DRIVING.

## 2025-06-04 NOTE — TELEPHONE ENCOUNTER
Driving parameters are on the back side of 's two week post op sheet. Please call and discuss with patient.

## 2025-06-09 DIAGNOSIS — E11.65 TYPE 2 DIABETES MELLITUS WITH HYPERGLYCEMIA, WITHOUT LONG-TERM CURRENT USE OF INSULIN: Primary | ICD-10-CM

## 2025-06-09 DIAGNOSIS — N40.1 BENIGN PROSTATIC HYPERPLASIA (BPH) WITH STRAINING ON URINATION: ICD-10-CM

## 2025-06-09 DIAGNOSIS — E78.00 HYPERCHOLESTEROLEMIA: ICD-10-CM

## 2025-06-09 DIAGNOSIS — R39.16 BENIGN PROSTATIC HYPERPLASIA (BPH) WITH STRAINING ON URINATION: ICD-10-CM

## 2025-06-09 DIAGNOSIS — I10 PRIMARY HYPERTENSION: ICD-10-CM

## 2025-06-10 DIAGNOSIS — I10 PRIMARY HYPERTENSION: ICD-10-CM

## 2025-06-10 DIAGNOSIS — R39.16 BENIGN PROSTATIC HYPERPLASIA (BPH) WITH STRAINING ON URINATION: ICD-10-CM

## 2025-06-10 DIAGNOSIS — E11.65 TYPE 2 DIABETES MELLITUS WITH HYPERGLYCEMIA, WITHOUT LONG-TERM CURRENT USE OF INSULIN: ICD-10-CM

## 2025-06-10 DIAGNOSIS — E78.00 HYPERCHOLESTEROLEMIA: ICD-10-CM

## 2025-06-10 DIAGNOSIS — N40.1 BENIGN PROSTATIC HYPERPLASIA (BPH) WITH STRAINING ON URINATION: ICD-10-CM

## 2025-06-13 LAB
ALBUMIN SERPL-MCNC: 4.1 G/DL (ref 3.5–5.2)
ALBUMIN/CREAT UR: 37 MG/G CREAT (ref 0–29)
ALBUMIN/GLOB SERPL: 1.6 G/DL
ALP SERPL-CCNC: 94 U/L (ref 39–117)
ALT SERPL-CCNC: 19 U/L (ref 1–41)
APPEARANCE UR: CLEAR
AST SERPL-CCNC: 18 U/L (ref 1–40)
BILIRUB SERPL-MCNC: 0.6 MG/DL (ref 0–1.2)
BILIRUB UR QL STRIP: NEGATIVE
BUN SERPL-MCNC: 20 MG/DL (ref 8–23)
BUN/CREAT SERPL: 16.4 (ref 7–25)
CALCIUM SERPL-MCNC: 9.9 MG/DL (ref 8.6–10.5)
CHLORIDE SERPL-SCNC: 102 MMOL/L (ref 98–107)
CHOLEST SERPL-MCNC: 131 MG/DL (ref 0–200)
CO2 SERPL-SCNC: 28.6 MMOL/L (ref 22–29)
COLOR UR: YELLOW
CREAT SERPL-MCNC: 1.22 MG/DL (ref 0.76–1.27)
CREAT UR-MCNC: 65.6 MG/DL
EGFRCR SERPLBLD CKD-EPI 2021: 63.8 ML/MIN/1.73
ERYTHROCYTE [DISTWIDTH] IN BLOOD BY AUTOMATED COUNT: 14.6 % (ref 12.3–15.4)
GLOBULIN SER CALC-MCNC: 2.6 GM/DL
GLUCOSE SERPL-MCNC: 130 MG/DL (ref 65–99)
GLUCOSE UR QL STRIP: ABNORMAL
HBA1C MFR BLD: 6.9 % (ref 4.8–5.6)
HCT VFR BLD AUTO: 47.5 % (ref 37.5–51)
HDLC SERPL-MCNC: 35 MG/DL (ref 40–60)
HGB BLD-MCNC: 15.4 G/DL (ref 13–17.7)
HGB UR QL STRIP: NEGATIVE
KETONES UR QL STRIP: NEGATIVE
LDLC SERPL CALC-MCNC: 75 MG/DL (ref 0–100)
LDLC/HDLC SERPL: 2.09 {RATIO}
LEUKOCYTE ESTERASE UR QL STRIP: NEGATIVE
MCH RBC QN AUTO: 29.2 PG (ref 26.6–33)
MCHC RBC AUTO-ENTMCNC: 32.4 G/DL (ref 31.5–35.7)
MCV RBC AUTO: 90.1 FL (ref 79–97)
MICROALBUMIN UR-MCNC: 24.5 UG/ML
NITRITE UR QL STRIP: NEGATIVE
PH UR STRIP: 7 [PH] (ref 5–8)
PLATELET # BLD AUTO: 370 10*3/MM3 (ref 140–450)
POTASSIUM SERPL-SCNC: 4.8 MMOL/L (ref 3.5–5.2)
PROT SERPL-MCNC: 6.7 G/DL (ref 6–8.5)
PROT UR QL STRIP: NEGATIVE
PSA SERPL-MCNC: 4.7 NG/ML (ref 0–4)
RBC # BLD AUTO: 5.27 10*6/MM3 (ref 4.14–5.8)
SODIUM SERPL-SCNC: 141 MMOL/L (ref 136–145)
SP GR UR STRIP: 1.02 (ref 1–1.03)
TRIGL SERPL-MCNC: 115 MG/DL (ref 0–150)
TSH SERPL DL<=0.005 MIU/L-ACNC: 1.97 UIU/ML (ref 0.27–4.2)
UROBILINOGEN UR STRIP-MCNC: ABNORMAL MG/DL
VLDLC SERPL CALC-MCNC: 21 MG/DL (ref 5–40)
WBC # BLD AUTO: 7.54 10*3/MM3 (ref 3.4–10.8)

## 2025-06-19 ENCOUNTER — OFFICE VISIT (OUTPATIENT)
Dept: FAMILY MEDICINE CLINIC | Facility: CLINIC | Age: 70
End: 2025-06-19
Payer: MEDICARE

## 2025-06-19 VITALS
HEIGHT: 67 IN | SYSTOLIC BLOOD PRESSURE: 122 MMHG | DIASTOLIC BLOOD PRESSURE: 80 MMHG | OXYGEN SATURATION: 97 % | BODY MASS INDEX: 40.49 KG/M2 | WEIGHT: 258 LBS | TEMPERATURE: 98 F | HEART RATE: 80 BPM

## 2025-06-19 DIAGNOSIS — E78.5 DYSLIPIDEMIA: Primary | ICD-10-CM

## 2025-06-19 DIAGNOSIS — I10 PRIMARY HYPERTENSION: ICD-10-CM

## 2025-06-19 DIAGNOSIS — N40.1 BENIGN PROSTATIC HYPERPLASIA (BPH) WITH STRAINING ON URINATION: ICD-10-CM

## 2025-06-19 DIAGNOSIS — R39.16 BENIGN PROSTATIC HYPERPLASIA (BPH) WITH STRAINING ON URINATION: ICD-10-CM

## 2025-06-19 DIAGNOSIS — E11.65 TYPE 2 DIABETES MELLITUS WITH HYPERGLYCEMIA, WITHOUT LONG-TERM CURRENT USE OF INSULIN: ICD-10-CM

## 2025-06-19 PROBLEM — M17.11 PRIMARY OSTEOARTHRITIS OF RIGHT KNEE: Status: RESOLVED | Noted: 2025-04-03 | Resolved: 2025-06-19

## 2025-06-19 RX ORDER — TAMSULOSIN HYDROCHLORIDE 0.4 MG/1
1 CAPSULE ORAL NIGHTLY
Qty: 90 CAPSULE | Refills: 3 | Status: SHIPPED | OUTPATIENT
Start: 2025-06-19

## 2025-06-19 NOTE — PROGRESS NOTES
"  Chief Complaint   Patient presents with    Diabetes    Hypertension       Subjective   Alexy Ram is an 70 y.o. male who presents for follow up of diabetes. Current symptoms include: hyperglycemia. Patient denies hypoglycemia . Evaluation to date has included: fasting blood sugar, fasting lipid panel, hemoglobin A1C, and microalbuminuria. Home sugars: patient does not check sugars. Current treatments: more intensive attention to diet which has been ineffective, Continued statin which has been effective, Continued ACE inhibitor/ARB which has been effective, and Continued .jardiance which has been somewhat effective. Discussed importance of yearly eye exams and checking feet for skin integrity.  Doing total right knee replacement  Has graduated from physical therapy        The following portions of the patient's history were reviewed and updated as appropriate: allergies, current medications, past family history, past medical history, past social history, past surgical history, and problem list.    Review of Systems  Pertinent items are noted in HPI.     Vitals:    06/19/25 1029   BP: 122/80   BP Location: Left arm   Patient Position: Sitting   Cuff Size: Adult   Pulse: 80   Temp: 98 °F (36.7 °C)   SpO2: 97%   Weight: 117 kg (258 lb)   Height: 170.2 cm (67\")       Objective    Gen:  Alert, pleasant, obese  Ears: canals clear, TMs normal  Throat: clear , no thrush, teeth ok  Neck: no bruit, no LAD  Lungs: clear  Heart: RR no murmur  Feet:  No rash, no skin breakdown, sensation grossly normal.    Laboratory:  Results for orders placed or performed in visit on 06/10/25   Microalbumin / Creatinine Urine Ratio - Urine, Clean Catch    Collection Time: 06/12/25  8:55 AM    Specimen: Urine, Clean Catch   Result Value Ref Range    Creatinine, Urine 65.6 Not Estab. mg/dL    Microalbumin, Urine 24.5 Not Estab. ug/mL    Microalbumin/Creatinine Ratio 37 (H) 0 - 29 mg/g creat   PSA DIAGNOSTIC ONLY    Collection Time: " 06/12/25  8:55 AM    Specimen: Blood   Result Value Ref Range    PSA 4.700 (H) 0.000 - 4.000 ng/mL   Urinalysis With Microscopic If Indicated (No Culture) - Urine, Clean Catch    Collection Time: 06/12/25  8:55 AM    Specimen: Urine, Clean Catch   Result Value Ref Range    Specific Gravity, UA 1.020 1.005 - 1.030    pH, UA 7.0 5.0 - 8.0    Color, UA Yellow     Appearance, UA Clear Clear    Leukocytes, UA Negative Negative    Protein Negative Negative    Glucose, UA See below: (A) Negative    Ketones Negative Negative    Blood, UA Negative Negative    Bilirubin, UA Negative Negative    Urobilinogen, UA Comment     Nitrite, UA Negative Negative   Hemoglobin A1c    Collection Time: 06/12/25  8:55 AM    Specimen: Blood   Result Value Ref Range    Hemoglobin A1C 6.90 (H) 4.80 - 5.60 %   TSH Rfx On Abnormal To Free T4    Collection Time: 06/12/25  8:55 AM    Specimen: Blood   Result Value Ref Range    TSH 1.970 0.270 - 4.200 uIU/mL   Lipid Panel With LDL/HDL Ratio    Collection Time: 06/12/25  8:55 AM    Specimen: Blood   Result Value Ref Range    Total Cholesterol 131 0 - 200 mg/dL    Triglycerides 115 0 - 150 mg/dL    HDL Cholesterol 35 (L) 40 - 60 mg/dL    VLDL Cholesterol Quique 21 5 - 40 mg/dL    LDL Chol Calc (NIH) 75 0 - 100 mg/dL    LDL/HDL RATIO 2.09    Comprehensive metabolic panel    Collection Time: 06/12/25  8:55 AM    Specimen: Blood   Result Value Ref Range    Glucose 130 (H) 65 - 99 mg/dL    BUN 20.0 8.0 - 23.0 mg/dL    Creatinine 1.22 0.76 - 1.27 mg/dL    EGFR Result 63.8 >60.0 mL/min/1.73    BUN/Creatinine Ratio 16.4 7.0 - 25.0    Sodium 141 136 - 145 mmol/L    Potassium 4.8 3.5 - 5.2 mmol/L    Chloride 102 98 - 107 mmol/L    Total CO2 28.6 22.0 - 29.0 mmol/L    Calcium 9.9 8.6 - 10.5 mg/dL    Total Protein 6.7 6.0 - 8.5 g/dL    Albumin 4.1 3.5 - 5.2 g/dL    Globulin 2.6 gm/dL    A/G Ratio 1.6 g/dL    Total Bilirubin 0.6 0.0 - 1.2 mg/dL    Alkaline Phosphatase 94 39 - 117 U/L    AST (SGOT) 18 1 - 40 U/L     ALT (SGPT) 19 1 - 41 U/L   CBC (No Diff)    Collection Time: 06/12/25  8:55 AM    Specimen: Blood   Result Value Ref Range    WBC 7.54 3.40 - 10.80 10*3/mm3    RBC 5.27 4.14 - 5.80 10*6/mm3    Hemoglobin 15.4 13.0 - 17.7 g/dL    Hematocrit 47.5 37.5 - 51.0 %    MCV 90.1 79.0 - 97.0 fL    MCH 29.2 26.6 - 33.0 pg    MCHC 32.4 31.5 - 35.7 g/dL    RDW 14.6 12.3 - 15.4 %    Platelets 370 140 - 450 10*3/mm3      A1C Last 3 Results          12/4/2024    10:55 6/12/2025    08:55   HGBA1C Last 3 Results   Hemoglobin A1C 7.0  6.90       Assessment & Plan        Discussed general issues about diabetes pathophysiology and management.  Addressed ADA diet.  Continued statin drug see medication orders.  Continued ACE inhibitor; see medication orders.  Follow up in 6 months or as needed.    Diagnoses and all orders for this visit:    1. Dyslipidemia (Primary)    2. Benign prostatic hyperplasia (BPH) with straining on urination  Comments:  start tamsulosin and follow-up in one month.  Orders:  -     tamsulosin (FLOMAX) 0.4 MG capsule 24 hr capsule; Take 1 capsule by mouth Every Night. Indications: Benign Enlargement of Prostate  Dispense: 90 capsule; Refill: 3    3. Primary hypertension    4. Type 2 diabetes mellitus with hyperglycemia, without long-term current use of insulin    Labs reviewed  Diabetes remains not well-controlled  We discussed improving his diet.  He admits to eating a lot of ice cream  Cholesterol is reasonable with Lipitor  Blood pressure looks okay  Chronic low back pain stable with gabapentin  PSA is up again, has known BPH with LUTS  Flomax is helpful      Education: Reviewed ‘ABCs’ of diabetes management:    A1C (<7), blood pressure (<130/80), and cholesterol (LDL <100).  Discussed healthy diabetic eating plan.  May refer to ADA web site, diabetes.org    No follow-ups on file.  There are no Patient Instructions on file for this visit.  Medications Discontinued During This Encounter   Medication Reason     HYDROcodone-acetaminophen (NORCO) 7.5-325 MG per tablet *Therapy completed    ondansetron (Zofran) 4 MG tablet *Therapy completed    tamsulosin (FLOMAX) 0.4 MG capsule 24 hr capsule Reorder         Dr. Pipe Vaughn MD  [place], [place]  [facility].

## 2025-06-20 ENCOUNTER — READMISSION MANAGEMENT (OUTPATIENT)
Dept: CALL CENTER | Facility: HOSPITAL | Age: 70
End: 2025-06-20
Payer: MEDICARE

## 2025-06-20 NOTE — OUTREACH NOTE
Total Joint Month 1 Survey      Flowsheet Row Responses   [facility] patient discharged from? [place]   Does the patient have one of the following disease processes/diagnoses(primary or secondary)? Total Joint Replacement   Joint surgery performed? Knee   Month 1 attempt successful? Yes   Call start time 1104   Call end time 1106   Has the patient been back in either the hospital or Emergency Department since discharge? No   Is the patient taking all medications as directed (includes completed medication regime)? Yes   Has the patient kept scheduled appointments due by today? Yes   Comments seen orthopedic surgery on 6/3   Is the patient still receiving Home Health Services? Yes   Home health comments HH PT just discharged him yesterday (6/19)   Is the patient still attending therapy sessions(either in the home or as an outpatient)? No   Has the patient fallen since discharge? No   What is the patient's perception of their functional status since discharge? Improving   If the patient is a current smoker, are they able to teach back resources for cessation? Not a smoker   Is the patient/caregiver able to teach back the hierarchy of who to call/visit for symptoms/problems? PCP, Specialist, Home health nurse, Urgent Care, ED, 911 Yes   Month 1 call completed? Yes   Graduated Yes   Is the patient interested in additional calls from an ambulatory ? No   Would this patient benefit from a Referral to Amb Social Work? No   Wrap up additional comments Doing well, just finished his PT yesterday and has been to his follow up appts, no further calls needed.   Call end time 1106            Olivia JHA - Registered Nurse

## 2025-06-21 RX ORDER — TIOTROPIUM BROMIDE INHALATION SPRAY 3.12 UG/1
SPRAY, METERED RESPIRATORY (INHALATION)
Qty: 4 G | Refills: 10 | Status: SHIPPED | OUTPATIENT
Start: 2025-06-21

## 2025-06-24 ENCOUNTER — OFFICE VISIT (OUTPATIENT)
Dept: ORTHOPEDIC SURGERY | Facility: CLINIC | Age: 70
End: 2025-06-24
Payer: MEDICARE

## 2025-06-24 VITALS — BODY MASS INDEX: 40.78 KG/M2 | HEIGHT: 67 IN | WEIGHT: 259.8 LBS | TEMPERATURE: 97.7 F

## 2025-06-24 DIAGNOSIS — Z96.652 S/P TKR (TOTAL KNEE REPLACEMENT), LEFT: Primary | ICD-10-CM

## 2025-06-24 PROCEDURE — 99212 OFFICE O/P EST SF 10 MIN: CPT | Performed by: NURSE PRACTITIONER

## 2025-06-24 PROCEDURE — 73562 X-RAY EXAM OF KNEE 3: CPT | Performed by: NURSE PRACTITIONER

## 2025-06-24 NOTE — PROGRESS NOTES
"Alexy Ram : 1955 MRN: [id number] DATE: 2025    DIAGNOSIS: Annual follow up left total knee      SUBJECTIVE:Patient returns today for 1 year follow up of left total knee replacement. Patient reports doing well with no unusual complaints.  Denies any significant pain, swelling, stiffness.  Denies any limitations or instability issues due to the knee.  Overall reports he is very happy with postoperative outcome.  He is without any other significant complaints today.    OBJECTIVE:    Temp 97.7 °F (36.5 °C) (Temporal)   Ht 170.2 cm (67\")   Wt 118 kg (259 lb 12.8 oz)   BMI 40.69 kg/m²   Family History   Problem Relation Age of Onset    Diabetes Mother     Stroke Father     Heart disease Father     Stroke Sister     Cancer Sister         Colon    Diabetes Sister     Stroke Sister     Diabetes Sister     Heart disease Brother     Diabetes Brother     Heart attack Brother     Diabetes Brother     Heart attack Brother     Diabetes Sister     Stroke Sister     Diabetes Brother     Malig Hyperthermia Neg Hx      Past Medical History:   Diagnosis Date    Acromioclavicular separation     shoulder pulled out of place    Ankle sprain     Arthritis     OSTEOARTHRITIS    Arthritis of back     so long I don't know when it started    Arthritis of neck     Cancer     Lung    CHF (congestive heart failure)     COPD (chronic obstructive pulmonary disease)     Diabetes mellitus     Dislocation, shoulder     Emphysema of lung     Enlarged prostate     Fatty liver     Frozen shoulder     Hip arthrosis     History of low potassium     History of lung cancer 2018    HX LEFT LOWER LOBECTOMY    History of mononucleosis     History of MRSA infection 2018    swabbed nose after surgery  and tested + MRSA    History of transfusion     no reaction    Hyperlipidemia     Hypertension     Knee swelling     Left upper lobe consolidation     REPEAT CT SCANS - FOLLOWED BY PULMONARY, MOST RECENT CT SCAN 4/3/24    Low back strain     " Lumbosacral disc disease     Neck strain     Neuropathy     On anticoagulant therapy     eliquis    Pacemaker     right  side    PAF (paroxysmal atrial fibrillation)     Periarthritis of shoulder     Right knee pain     Scoliosis     Second degree AV block     Shortness of breath     Sinus node dysfunction     Sleep apnea     WEARS CPAP    Slow to wake up after anesthesia     Thoracic disc disorder      Past Surgical History:   Procedure Laterality Date    BRONCHOSCOPY N/A 10/11/2018    Procedure: BRONCHOSCOPY WITH FLUORO, LEFT LOWER LOBE BRUSHINGS WET AND DRY. WITH BX'S AND BAL (IN LLL).;  Surgeon: Akil Ortiz MD;  Location: [facility] ENDOSCOPY;  Service: Pulmonary    BRONCHOSCOPY N/A 12/09/2024    Procedure: BRONCHOSCOPY WITH WASHING, BAL, BIOPSIES;  Surgeon: Jamison Jaquez MD;  Location: [facility] ENDOSCOPY;  Service: Pulmonary;  Laterality: N/A;  PRE- LEFT LUNG ATELECTASIS  POST- SAME    BRONCHOSCOPY WITH ION ROBOTIC ASSIST N/A 09/07/2023    Procedure: BRONCHOSCOPY WITH ION ROBOT AND ENDOBRONCHIAL ULTRASOUND with brushing and FNA;  Surgeon: Taye Chavira MD;  Location: [facility] ENDOSCOPY;  Service: Robotics - Pulmonary;  Laterality: N/A;  PRE/POST - left upper lobe mass    CATARACT EXTRACTION Bilateral 04/01/2024    COLONOSCOPY  2021    COLONOSCOPY W/ POLYPECTOMY N/A 10/22/2021    Procedure: COLONOSCOPY WITH POLYPECTOMY;  Surgeon: Lan Solis MD;  Location: [facility] OR;  Service: Gastroenterology;  Laterality: N/A;  cecal polyp x1 (cold snare)  ascending polyp x1 (hot snare)  transverse polyp x3 (hot snare x 1), (cold snare x2)  sigmoid polyp x1 (hot snare, clip applied)  rectal polyp x3 (cold snare)    COLONOSCOPY W/ POLYPECTOMY N/A 02/03/2025    Procedure: COLONOSCOPY WITH POLYPECTOMY;  Surgeon: Lan Solis MD;  Location: [facility] OR;  Service: Gastroenterology;  Laterality: N/A;  Diverticulosis; Ascending polyp x 1; Transverse polyp x 3- cold snare x 3; Sigmoid polyp x 1     INSERT / REPLACE / REMOVE PACEMAKER  2005    replaced Oct 2014    JOINT REPLACEMENT  right knee    left hip and knee    KNEE ARTHROSCOPY Left     PACEMAKER IMPLANTATION  2014    THORACOSCOPY Left 2018    Procedure: BRONCHOSCOPY, DAVINCI ROBOT ASSISTED VIDEIO ASSISTED THORACOSCOPY  WITH CONVERT TO OPEN THORACOTOMY,LEFT LOWER LOBECTOMY,INTERCOSTAL NERVE BLOCK;  Surgeon: Michael Ring III, MD;  Location: [facility] MAIN OR;  Service: DaVinci    TOTAL HIP ARTHROPLASTY Left 2023    Procedure: TOTAL HIP ARTHROPLASTY;  Surgeon: Nikko Staton MD;  [place] [facility] [place] [facility];  Service: Orthopedics;  Laterality: Left;    TOTAL KNEE ARTHROPLASTY Left 2024    Procedure: TOTAL KNEE ARTHROPLASTY;  Surgeon: Nikko Staton MD;  [place] [facility] [place] [facility];  Service: Orthopedics;  Laterality: Left;    TOTAL KNEE ARTHROPLASTY Right 2025    Procedure: TOTAL KNEE ARTHROPLASTY;  Surgeon: Nikko Staton MD;  Location: [facility] [place] [facility];  Service: Orthopedics;  Laterality: Right;     Social History     Socioeconomic History    Marital status:    Tobacco Use    Smoking status: Former     Current packs/day: 0.00     Average packs/day: 1 pack/day for 33.0 years (33.0 ttl pk-yrs)     Types: Cigarettes     Start date: 1985     Quit date: 2018     Years since quittin.4     Passive exposure: Past    Smokeless tobacco: Never    Tobacco comments:     Smoked 45  years  daily 1.5 daily quit     cigars 10 daily     Vaping Use    Vaping status: Never Used   Substance and Sexual Activity    Alcohol use: Not Currently    Drug use: No    Sexual activity: Not Currently     Partners: Female     Review of Systems - a 14 point review of systems was performed. All systems were negative.    Exam:. The incision is well healed. Range of motion is measured at 0 to 120. The calf is soft and nontender with a negative Homans sign. Alignment is neutral. Good quad strength. There is no evidence of varus/valgus or flexion  instability. No effusion. Intact to light touch with palpable distal pulses.     DIAGNOSTIC STUDIES  Xrays: 3 views of the left knee (AP, lateral, and sunrise) were ordered and reviewed for evaluation of recent knee replacement. They demonstrate a well positioned, well aligned knee replacement without complicating factors noted. In comparison with previous films there has been no change.    ASSESSMENT: Annual follow up left knee replacement.    PLAN:  Continue activities as tolerated    Follow up as needed      Nima Farmer, APRN  6/24/2025

## 2025-07-14 LAB
MC_CV_MDC_IDC_RATE_1: 160
MC_CV_MDC_IDC_ZONE_ID: 1
MDC_IDC_MSMT_BATTERY_REMAINING_LONGEVITY: 3 MO
MDC_IDC_MSMT_BATTERY_REMAINING_PERCENTAGE: 1 %
MDC_IDC_MSMT_BATTERY_STATUS: NORMAL
MDC_IDC_MSMT_LEADCHNL_RA_IMPEDANCE_VALUE: 511
MDC_IDC_MSMT_LEADCHNL_RA_PACING_THRESHOLD_POLARITY: NORMAL
MDC_IDC_MSMT_LEADCHNL_RA_SENSING_INTR_AMPL: 0.9
MDC_IDC_MSMT_LEADCHNL_RV_DTM: NORMAL
MDC_IDC_MSMT_LEADCHNL_RV_IMPEDANCE_VALUE: 570
MDC_IDC_MSMT_LEADCHNL_RV_PACING_THRESHOLD_AMPLITUDE: 1.1
MDC_IDC_MSMT_LEADCHNL_RV_PACING_THRESHOLD_POLARITY: NORMAL
MDC_IDC_MSMT_LEADCHNL_RV_PACING_THRESHOLD_PULSEWIDTH: 0.4
MDC_IDC_MSMT_LEADCHNL_RV_SENSING_INTR_AMPL: 24.3
MDC_IDC_PG_IMPLANT_DTM: NORMAL
MDC_IDC_PG_MFG: NORMAL
MDC_IDC_PG_MODEL: NORMAL
MDC_IDC_PG_SERIAL: NORMAL
MDC_IDC_PG_TYPE: NORMAL
MDC_IDC_SESS_DTM: NORMAL
MDC_IDC_SESS_TYPE: NORMAL
MDC_IDC_SET_BRADY_AT_MODE_SWITCH_RATE: 170
MDC_IDC_SET_BRADY_LOWRATE: 55
MDC_IDC_SET_BRADY_MAX_SENSOR_RATE: 130
MDC_IDC_SET_BRADY_MAX_TRACKING_RATE: 115
MDC_IDC_SET_BRADY_MODE: NORMAL
MDC_IDC_SET_BRADY_PAV_DELAY: 270
MDC_IDC_SET_BRADY_SAV_DELAY: 240
MDC_IDC_SET_LEADCHNL_RA_PACING_AMPLITUDE: 3
MDC_IDC_SET_LEADCHNL_RA_PACING_POLARITY: NORMAL
MDC_IDC_SET_LEADCHNL_RA_PACING_PULSEWIDTH: 0.4
MDC_IDC_SET_LEADCHNL_RA_SENSING_POLARITY: NORMAL
MDC_IDC_SET_LEADCHNL_RA_SENSING_SENSITIVITY: 0.25
MDC_IDC_SET_LEADCHNL_RV_PACING_AMPLITUDE: 1.8
MDC_IDC_SET_LEADCHNL_RV_PACING_POLARITY: NORMAL
MDC_IDC_SET_LEADCHNL_RV_PACING_PULSEWIDTH: 0.4
MDC_IDC_SET_LEADCHNL_RV_SENSING_POLARITY: NORMAL
MDC_IDC_SET_LEADCHNL_RV_SENSING_SENSITIVITY: 0.6
MDC_IDC_SET_ZONE_STATUS: NORMAL
MDC_IDC_SET_ZONE_TYPE: NORMAL
MDC_IDC_STAT_AT_BURDEN_PERCENT: 1
MDC_IDC_STAT_BRADY_RA_PERCENT_PACED: 17
MDC_IDC_STAT_BRADY_RV_PERCENT_PACED: 2

## 2025-07-15 ENCOUNTER — TELEPHONE (OUTPATIENT)
Age: 70
End: 2025-07-15

## 2025-07-17 ENCOUNTER — OFFICE VISIT (OUTPATIENT)
Dept: ORTHOPEDIC SURGERY | Facility: CLINIC | Age: 70
End: 2025-07-17
Payer: MEDICARE

## 2025-07-17 VITALS — HEIGHT: 67 IN | BODY MASS INDEX: 40.65 KG/M2 | TEMPERATURE: 97.5 F | WEIGHT: 259 LBS

## 2025-07-17 DIAGNOSIS — R52 PAIN: Primary | ICD-10-CM

## 2025-07-17 DIAGNOSIS — Z96.651 STATUS POST RIGHT KNEE REPLACEMENT: ICD-10-CM

## 2025-07-17 NOTE — PROGRESS NOTES
Alexy Ram : 1955 MRN: [id number] DATE: 2025    DIAGNOSIS: 8 week follow up right total knee      SUBJECTIVE:Patient returns today for 8 week follow up of right total knee replacement. Patient reports doing well with no unusual complaints.  Patient reports that his pain level is tolerable currently rated 0 on a scale of 10.  He states that he is done with physical therapy and is now doing home exercises.  Appears to be progressing appropriately, as he is ambulating full weightbearing walks unassisted in clinic today.  He denies any instability or buckling issues.  Denies any signs or symptoms of infection, and is without any other significant complaints today.    OBJECTIVE:   Exam:. The incision is well healed. No sign of infection. Range of motion is measured at 0 to 125. The calf is soft and nontender with a negative Homans sign. Strength is progressing and the patient is ambulating appropriately.    DIAGNOSTIC STUDIES  Xrays: 3 views of the right knee (AP, lateral, and sunrise) were ordered and reviewed for evaluation of recent knee replacement. They demonstrate a well positioned, well aligned knee replacement without complicating factors noted. In comparison with previous films there has been no change.    ASSESSMENT: 8 week status post right knee replacement.    PLAN: 1) Continue with PT exercises as prescribed   2) Follow up in 10 months for annual visit    ROME Ignacio  2025

## 2025-07-21 ENCOUNTER — CLINICAL SUPPORT NO REQUIREMENTS (OUTPATIENT)
Age: 70
End: 2025-07-21
Payer: MEDICARE

## 2025-07-23 ENCOUNTER — OFFICE VISIT (OUTPATIENT)
Dept: CARDIOLOGY | Facility: CLINIC | Age: 70
End: 2025-07-23
Payer: MEDICARE

## 2025-07-23 VITALS
HEIGHT: 67 IN | BODY MASS INDEX: 41 KG/M2 | DIASTOLIC BLOOD PRESSURE: 82 MMHG | SYSTOLIC BLOOD PRESSURE: 122 MMHG | HEART RATE: 65 BPM | WEIGHT: 261.2 LBS

## 2025-07-23 DIAGNOSIS — I44.1 AV BLOCK, 2ND DEGREE: ICD-10-CM

## 2025-07-23 DIAGNOSIS — I48.0 PAF (PAROXYSMAL ATRIAL FIBRILLATION): ICD-10-CM

## 2025-07-23 DIAGNOSIS — E26.09 PRIMARY HYPERALDOSTERONISM: Primary | ICD-10-CM

## 2025-07-23 DIAGNOSIS — I10 PRIMARY HYPERTENSION: ICD-10-CM

## 2025-07-23 DIAGNOSIS — I45.2 BIFASCICULAR BLOCK: ICD-10-CM

## 2025-07-23 DIAGNOSIS — I50.31 ACUTE HEART FAILURE WITH PRESERVED EJECTION FRACTION (HFPEF): ICD-10-CM

## 2025-07-23 RX ORDER — APIXABAN 5 MG/1
5 TABLET, FILM COATED ORAL 2 TIMES DAILY
Qty: 180 TABLET | Refills: 3 | Status: SHIPPED | OUTPATIENT
Start: 2025-07-23 | End: 2026-07-18

## 2025-07-23 RX ORDER — VALSARTAN AND HYDROCHLOROTHIAZIDE 160; 12.5 MG/1; MG/1
1 TABLET, FILM COATED ORAL DAILY
Qty: 90 TABLET | Refills: 3 | Status: SHIPPED | OUTPATIENT
Start: 2025-07-23 | End: 2026-07-23

## 2025-07-23 RX ORDER — EPLERENONE 50 MG/1
50 TABLET ORAL 2 TIMES DAILY
Qty: 180 TABLET | Refills: 3 | Status: SHIPPED | OUTPATIENT
Start: 2025-07-23 | End: 2026-07-23

## 2025-07-23 RX ORDER — CARVEDILOL 25 MG/1
25 TABLET ORAL 2 TIMES DAILY WITH MEALS
Qty: 180 TABLET | Refills: 3 | Status: SHIPPED | OUTPATIENT
Start: 2025-07-23 | End: 2026-07-23

## 2025-07-23 NOTE — PROGRESS NOTES
[facility]    Subjective:     Encounter Date:07/23/25      Patient ID: Alexy Ram is a 70 y.o. male.    Chief Complaint:   Chief Complaint   Patient presents with    Follow-up     6 MONTH      History of Present Illness    Alexy Ram is a pleasant 70 y.o. gentleman who presents for follow-up.  He previously followed with [place] heart specialist for transition his care to [facility].  He followed with Dr. Steve Rice in the past.  He had a history of second-degree AV block, sinus bradycardia with syncope and he had a pacemaker implanted in 2005, device was changed in 2014.  He is about 30% atrial paced and 3% ventricular paced historically.  He follows with our device clinic here.       We saw him in consultation for volume overload that occurred in the postoperative setting. He had some acute onset CHF that occurred in late June of this year.  He had a knee surgery on June 13, in a few weeks after he then had rather acute onset of shortness of breath that occurred and brought him to the ER.  He was noted to be volume overloaded.  He underwent an echocardiogram which revealed a small pericardial effusion, mild concentric hypertrophy, and a grossly normal LVEF.  He was also quite hypertensive.  With hypokalemia.    He did have previous testing at [place] where he followed up with his previous history of AV block listed below.    Medications were adjusted when he was seen in the hospital setting.  He was diuresed, his Actos was stopped, and he was started on Jardiance.  He remained hypertensive and subsequent testing revealed primary hyperaldosteronism.  He was started on spironolactone but developed some facial swelling, which improved with eplerenone.     His medications were adjusted his blood pressure was on the lower side.  He is no longer on nifedipine.  He presents today without complaint.  His blood pressure is stable on his current regimen.    Previous Cardiac  Testing:  Transthoracic echocardiogram 1/4/2023  - Mild left ventricular hypertrophy with normal systolic function.    -The LVEF is 61%.   -Moderately dilated left atrium.   - Trace-to-mild mitral regurgitation.   -Borderline right ventricular enlargement with normal systolic function.   - Mild pulmonic insufficiency.   -Mild dilation of the ascending aorta at 3.9 cm.   -Right ventricular systolic pressure of 45 mmHg.      Myocardial PET perfusion study 2/13/2023  -There is a small sized mild severity fixed perfusion defect(s) of the apex    wall consistent with apical thinning.    -No evidence of stress induced myocardial ischemia or infarction.     Echocardiogram June 2024:    Left ventricular systolic function is normal. Calculated left ventricular EF = 67.2%; visually estimated LVEF 55-60%.    The left ventricular cavity is mildly dilated.    Left ventricular wall thickness is consistent with mild concentric hypertrophy.    Left ventricular diastolic function was normal.    RV mildly dilated with grossly normal function.    Aortic valve sclerosis without hemodynamically significant stenosis.    There is a small (<1cm) pericardial effusion. There is no evidence of cardiac tamponade.    Pacemaker lead noted.    Saline test results are negative.    Biatrial enlargement, normal IVC size.    The following portions of the patient's history were reviewed and updated as appropriate: allergies, current medications, past family history, past medical history, past social history, past surgical history and problem list.    Past Medical History:   Diagnosis Date    Acromioclavicular separation     shoulder pulled out of place    Ankle sprain     Arthritis     OSTEOARTHRITIS    Arthritis of back     so long I don't know when it started    Arthritis of neck     Cancer     Lung    CHF (congestive heart failure)     COPD (chronic obstructive pulmonary disease)     Diabetes mellitus     Dislocation, shoulder     Emphysema of lung      Enlarged prostate     Fatty liver     Frozen shoulder     Hip arthrosis     History of low potassium     History of lung cancer 2018    HX LEFT LOWER LOBECTOMY    History of mononucleosis     History of MRSA infection 2018    swabbed nose after surgery  and tested + MRSA    History of transfusion     no reaction    Hyperlipidemia     Hypertension     Knee swelling     Left upper lobe consolidation     REPEAT CT SCANS - FOLLOWED BY PULMONARY, MOST RECENT CT SCAN 4/3/24    Low back strain     Lumbosacral disc disease     Neck strain     Neuropathy     On anticoagulant therapy     eliquis    Pacemaker     right  side    PAF (paroxysmal atrial fibrillation)     Periarthritis of shoulder     Right knee pain     Scoliosis     Second degree AV block     Shortness of breath     Sinus node dysfunction     Sleep apnea     WEARS CPAP    Slow to wake up after anesthesia     Thoracic disc disorder        Past Surgical History:   Procedure Laterality Date    BRONCHOSCOPY N/A 10/11/2018    Procedure: BRONCHOSCOPY WITH FLUORO, LEFT LOWER LOBE BRUSHINGS WET AND DRY. WITH BX'S AND BAL (IN LLL).;  Surgeon: Akil Ortiz MD;  Location: [facility] ENDOSCOPY;  Service: Pulmonary    BRONCHOSCOPY N/A 12/09/2024    Procedure: BRONCHOSCOPY WITH WASHING, BAL, BIOPSIES;  Surgeon: Jamison Jaquez MD;  Location: [facility] ENDOSCOPY;  Service: Pulmonary;  Laterality: N/A;  PRE- LEFT LUNG ATELECTASIS  POST- SAME    BRONCHOSCOPY WITH ION ROBOTIC ASSIST N/A 09/07/2023    Procedure: BRONCHOSCOPY WITH ION ROBOT AND ENDOBRONCHIAL ULTRASOUND with brushing and FNA;  Surgeon: Taye Chavira MD;  Location: [facility] ENDOSCOPY;  Service: Robotics - Pulmonary;  Laterality: N/A;  PRE/POST - left upper lobe mass    CATARACT EXTRACTION Bilateral 04/01/2024    COLONOSCOPY  2021    COLONOSCOPY W/ POLYPECTOMY N/A 10/22/2021    Procedure: COLONOSCOPY WITH POLYPECTOMY;  Surgeon: Lan Solis MD;  Location: [facility];  Service: Gastroenterology;   Laterality: N/A;  cecal polyp x1 (cold snare)  ascending polyp x1 (hot snare)  transverse polyp x3 (hot snare x 1), (cold snare x2)  sigmoid polyp x1 (hot snare, clip applied)  rectal polyp x3 (cold snare)    COLONOSCOPY W/ POLYPECTOMY N/A 02/03/2025    Procedure: COLONOSCOPY WITH POLYPECTOMY;  Surgeon: Lan Solis MD;  Location:  [facility] OR;  Service: Gastroenterology;  Laterality: N/A;  Diverticulosis; Ascending polyp x 1; Transverse polyp x 3- cold snare x 3; Sigmoid polyp x 1    INSERT / REPLACE / REMOVE PACEMAKER  6/5/2005    replaced Oct 2014    JOINT REPLACEMENT  right knee    left hip and knee    KNEE ARTHROSCOPY Left     KNEE SURGERY      PACEMAKER IMPLANTATION  2005, 2014    THORACOSCOPY Left 11/19/2018    Procedure: BRONCHOSCOPY, DAVINCI ROBOT ASSISTED VIDEIO ASSISTED THORACOSCOPY  WITH CONVERT TO OPEN THORACOTOMY,LEFT LOWER LOBECTOMY,INTERCOSTAL NERVE BLOCK;  Surgeon: Michael Ring III, MD;  Location: [facility] MAIN OR;  Service: DaVinci    TOTAL HIP ARTHROPLASTY Left 07/14/2023    Procedure: TOTAL HIP ARTHROPLASTY;  Surgeon: Nikko Staton MD;  [facility];  Service: Orthopedics;  Laterality: Left;    TOTAL KNEE ARTHROPLASTY Left 06/13/2024    Procedure: TOTAL KNEE ARTHROPLASTY;  Surgeon: Nikko Staton MD;  Location:  [facility];  Service: Orthopedics;  Laterality: Left;    TOTAL KNEE ARTHROPLASTY Right 05/19/2025    Procedure: TOTAL KNEE ARTHROPLASTY;  Surgeon: Nikko Staton MD;  Location:  [facility];  Service: Orthopedics;  Laterality: Right;           ECG 12 Lead    Date/Time: 7/23/2025 9:20 AM  Performed by: Vlad Flanagan MD    Authorized by: Vlad Flanagan MD  Comparison: compared with previous ECG from 12/7/2024  Similar to previous ECG  Rhythm: sinus rhythm  Rate: normal  Conduction: right bundle branch block, left anterior fascicular block, bifascicular block and 1st degree AV block  ST Segments: ST segments normal  T Waves: T waves normal  QRS axis:  "left  Other: no other findings    Clinical impression: abnormal EKG             Objective:     Vitals:    07/23/25 0916   BP: 122/82   Pulse: 65   Weight: 118 kg (261 lb 3.2 oz)   Height: 170.2 cm (67\")           Constitutional:       Appearance: Healthy appearance. Not in distress.   Neck:      Vascular: JVD normal.   Pulmonary:      Effort: Pulmonary effort is normal.      Breath sounds: Normal breath sounds.   Cardiovascular:      PMI at left midclavicular line. Normal rate. Regular rhythm. Normal S2.       Murmurs: There is no murmur.   Pulses:     Intact distal pulses.   Edema:     Peripheral edema absent.   Skin:     General: Skin is warm and dry.   Neurological:      General: No focal deficit present.      Mental Status: Alert, oriented to person, place, and time and oriented to person, place and time.   Psychiatric:         Mood and Affect: Mood and affect normal.         Lab Review:     Lipid Panel          12/4/2024    10:55 6/12/2025    08:55   Lipid Panel   Total Cholesterol 143  131    Triglycerides 86  115    HDL Cholesterol 39  35    VLDL Cholesterol 17  21    LDL Cholesterol  87  75    LDL/HDL Ratio  2.09      BUN   Date Value Ref Range Status   06/12/2025 20.0 8.0 - 23.0 mg/dL Final   05/05/2025 27 (H) 8 - 23 mg/dL Final     Creatinine   Date Value Ref Range Status   06/12/2025 1.22 0.76 - 1.27 mg/dL Final   05/05/2025 1.17 0.76 - 1.27 mg/dL Final   07/09/2020 0.70 0.60 - 1.30 mg/dL Final     Comment:     Serial Number: [id number]:  [id number]     Potassium   Date Value Ref Range Status   06/12/2025 4.8 3.5 - 5.2 mmol/L Final   05/05/2025 4.1 3.5 - 5.2 mmol/L Final     ALT (SGPT)   Date Value Ref Range Status   06/12/2025 19 1 - 41 U/L Final   12/10/2024 24 1 - 41 U/L Final     AST (SGOT)   Date Value Ref Range Status   06/12/2025 18 1 - 40 U/L Final   12/10/2024 19 1 - 40 U/L Final         Performed        Assessment:          Diagnosis Plan   1. Bifascicular block  ECG 12 Lead      2. Primary " hyperaldosteronism  eplerenone (INSPRA) 50 MG tablet    valsartan-hydrochlorothiazide (Diovan HCT) 160-12.5 MG per tablet      3. Primary hypertension  carvedilol (COREG) 25 MG tablet                 Plan:         Hypertension: Consistent with primary hyperaldosteronism.  He was seen by Dr. Braden with endocrinology, continue medical therapy.  Adrenalectomy could be considered.  CT imaging with adrenal hyperplasia but no overt adenoma.    Allergic to spironolactone with facial swelling.    Continue eplerenone 50 twice daily.  Electrolytes stable on this regimen June 2025  Continue carvedilol, can consider decreasing to 12.5 twice daily if needed but his blood pressure is pristine as it is  BP is decreasing, valsartan currently 160, HCTZ 12.5.  I prefer if he keeps on this regimen to keep electrolytes in check  Nifedipine 30 was discontinued   No longer requiring potassium supplement  Chronic diastolic CHF.  Better since BP and eplerenone on board  Jardiance per above, maintain euvolemia with this   On eplerenone  No longer on Actos, would avoid given his propensity for volume overload.  Stress PET at [facility] 2023, no evidence of ischemia.  Diabetes mellitus:   He remains on Jardiance 25.  Remainder per PCP.    GIL.  Continue CPAP.  Atrial fibrillation, paroxysmal.  Low burden, however he has been maintained on Eliquis 5 twice daily, continue   History of lung cancer status post left lower lobe resection.  He had a recent pneumonia episode and pulmonary was following.  He underwent bronchoscopy due to possible post-obstructive pneumonia but pathology was unremarkable to date.  He follows with pulm.  Possible cryptogenic organizing pneumonia.  Follow-up with pulmonary.  Sick sinus syndrome/AV block status post pacemaker implant 2005.  Due for generator change this year likely.  EP on board.     RTC 6 months,      Vlad Flanagan MD  [facility]  07/23/25  15:42 EDT       Current Outpatient  Medications:     albuterol sulfate  (90 Base) MCG/ACT inhaler, Inhale 2 puffs Every 4 (Four) Hours As Needed for Wheezing., Disp: 18 g, Rfl: 1    atorvastatin (LIPITOR) 40 MG tablet, TAKE 1 TABLET BY MOUTH EVERY NIGHT AT BEDTIME., Disp: 90 tablet, Rfl: 2    carvedilol (COREG) 25 MG tablet, Take 1 tablet by mouth 2 (Two) Times a Day With Meals. Indications: High Blood Pressure, Disp: 180 tablet, Rfl: 3    Eliquis 5 MG tablet tablet, Take 1 tablet by mouth 2 (Two) Times a Day for 360 days. Indications: Prevention of Unwanted Clot in Veins, Disp: 180 tablet, Rfl: 3    empagliflozin (JARDIANCE) 25 MG tablet tablet, Take 1 tablet by mouth Daily. Indications: Cardiac Failure, Disp: 30 tablet, Rfl: 11    eplerenone (INSPRA) 50 MG tablet, Take 1 tablet by mouth 2 (Two) Times a Day., Disp: 180 tablet, Rfl: 3    gabapentin (NEURONTIN) 100 MG capsule, TAKE TWO CAPSULES BY MOUTH EVERY MORNING, ONE CAPSULE IN THE MIDDLE OF THE DAY AND 2 CAPSULES AT BEDTIME INDICATIONS: CHRONIC PAIN, Disp: 150 capsule, Rfl: 5    Spiriva Respimat 2.5 MCG/ACT aerosol solution inhaler, INHALE 1 PUFF EVERY NIGHT AT BEDTIME., Disp: 4 g, Rfl: 10    tamsulosin (FLOMAX) 0.4 MG capsule 24 hr capsule, Take 1 capsule by mouth Every Night. Indications: Benign Enlargement of Prostate, Disp: 90 capsule, Rfl: 3    valsartan-hydrochlorothiazide (Diovan HCT) 160-12.5 MG per tablet, Take 1 tablet by mouth Daily., Disp: 90 tablet, Rfl: 3    cetirizine (zyrTEC) 10 MG tablet, Take 1 tablet by mouth Daily. (Patient not taking: Reported on 7/23/2025), Disp: 90 tablet, Rfl: 3    gabapentin (NEURONTIN) 100 MG capsule, Take 1 capsule by mouth After Lunch., Disp: , Rfl:          Return in about 6 months (around 1/23/2026).      Part of this note may be an electronic transcription/translation of spoken language to printed text using the Dragon Dictation System.

## 2025-07-25 LAB
MC_CV_MDC_IDC_RATE_1: 160
MC_CV_MDC_IDC_RATE_1: 160
MC_CV_MDC_IDC_ZONE_ID: 1
MC_CV_MDC_IDC_ZONE_ID: 1
MC_CV_MDC_IDC_ZONE_ID: 2
MC_CV_MDC_IDC_ZONE_ID: 3
MDC_IDC_MSMT_BATTERY_REMAINING_LONGEVITY: 3 MO
MDC_IDC_MSMT_BATTERY_REMAINING_LONGEVITY: 3 MO
MDC_IDC_MSMT_BATTERY_REMAINING_PERCENTAGE: 1 %
MDC_IDC_MSMT_BATTERY_STATUS: NORMAL
MDC_IDC_MSMT_BATTERY_STATUS: NORMAL
MDC_IDC_MSMT_LEADCHNL_RA_IMPEDANCE_VALUE: 511
MDC_IDC_MSMT_LEADCHNL_RA_PACING_THRESHOLD_POLARITY: NORMAL
MDC_IDC_MSMT_LEADCHNL_RA_SENSING_INTR_AMPL: 0.9
MDC_IDC_MSMT_LEADCHNL_RV_DTM: NORMAL
MDC_IDC_MSMT_LEADCHNL_RV_IMPEDANCE_VALUE: 570
MDC_IDC_MSMT_LEADCHNL_RV_PACING_THRESHOLD_AMPLITUDE: 1.1
MDC_IDC_MSMT_LEADCHNL_RV_PACING_THRESHOLD_POLARITY: NORMAL
MDC_IDC_MSMT_LEADCHNL_RV_PACING_THRESHOLD_PULSEWIDTH: 0.4
MDC_IDC_MSMT_LEADCHNL_RV_SENSING_INTR_AMPL: 24.3
MDC_IDC_PG_IMPLANT_DTM: NORMAL
MDC_IDC_PG_IMPLANT_DTM: NORMAL
MDC_IDC_PG_MFG: NORMAL
MDC_IDC_PG_MFG: NORMAL
MDC_IDC_PG_MODEL: NORMAL
MDC_IDC_PG_MODEL: NORMAL
MDC_IDC_PG_SERIAL: NORMAL
MDC_IDC_PG_SERIAL: NORMAL
MDC_IDC_PG_TYPE: NORMAL
MDC_IDC_PG_TYPE: NORMAL
MDC_IDC_SESS_DTM: NORMAL
MDC_IDC_SESS_DTM: NORMAL
MDC_IDC_SESS_TYPE: NORMAL
MDC_IDC_SET_BRADY_AT_MODE_SWITCH_RATE: 170
MDC_IDC_SET_BRADY_LOWRATE: 40
MDC_IDC_SET_BRADY_LOWRATE: 55
MDC_IDC_SET_BRADY_MAX_SENSOR_RATE: 130
MDC_IDC_SET_BRADY_MAX_TRACKING_RATE: 115
MDC_IDC_SET_BRADY_MODE: NORMAL
MDC_IDC_SET_BRADY_MODE: NORMAL
MDC_IDC_SET_BRADY_PAV_DELAY: 270
MDC_IDC_SET_BRADY_PAV_DELAY: 270
MDC_IDC_SET_BRADY_SAV_DELAY: 240
MDC_IDC_SET_LEADCHNL_RA_PACING_AMPLITUDE: 3
MDC_IDC_SET_LEADCHNL_RA_PACING_POLARITY: NORMAL
MDC_IDC_SET_LEADCHNL_RA_PACING_PULSEWIDTH: 0.4
MDC_IDC_SET_LEADCHNL_RA_SENSING_POLARITY: NORMAL
MDC_IDC_SET_LEADCHNL_RA_SENSING_SENSITIVITY: 0.25
MDC_IDC_SET_LEADCHNL_RA_SENSING_SENSITIVITY: 0.5
MDC_IDC_SET_LEADCHNL_RV_PACING_AMPLITUDE: 1.8
MDC_IDC_SET_LEADCHNL_RV_PACING_AMPLITUDE: 3.2
MDC_IDC_SET_LEADCHNL_RV_PACING_POLARITY: NORMAL
MDC_IDC_SET_LEADCHNL_RV_PACING_PULSEWIDTH: 0.4
MDC_IDC_SET_LEADCHNL_RV_PACING_PULSEWIDTH: 0.4
MDC_IDC_SET_LEADCHNL_RV_SENSING_POLARITY: NORMAL
MDC_IDC_SET_LEADCHNL_RV_SENSING_SENSITIVITY: 0.6
MDC_IDC_SET_LEADCHNL_RV_SENSING_SENSITIVITY: 0.6
MDC_IDC_SET_ZONE_STATUS: NORMAL
MDC_IDC_SET_ZONE_TYPE: NORMAL
MDC_IDC_STAT_AT_BURDEN_PERCENT: 0
MDC_IDC_STAT_AT_BURDEN_PERCENT: 1
MDC_IDC_STAT_BRADY_RA_PERCENT_PACED: 15
MDC_IDC_STAT_BRADY_RA_PERCENT_PACED: 17
MDC_IDC_STAT_BRADY_RV_PERCENT_PACED: 1
MDC_IDC_STAT_BRADY_RV_PERCENT_PACED: 2

## 2025-08-22 LAB
MC_CV_MDC_IDC_RATE_1: 160
MC_CV_MDC_IDC_ZONE_ID: 1
MDC_IDC_MSMT_BATTERY_REMAINING_LONGEVITY: 3 MO
MDC_IDC_MSMT_BATTERY_REMAINING_PERCENTAGE: 4 %
MDC_IDC_MSMT_BATTERY_STATUS: NORMAL
MDC_IDC_MSMT_LEADCHNL_RA_IMPEDANCE_VALUE: 521
MDC_IDC_MSMT_LEADCHNL_RV_DTM: NORMAL
MDC_IDC_MSMT_LEADCHNL_RV_IMPEDANCE_VALUE: 560
MDC_IDC_MSMT_LEADCHNL_RV_PACING_THRESHOLD_AMPLITUDE: 1.1
MDC_IDC_MSMT_LEADCHNL_RV_PACING_THRESHOLD_POLARITY: NORMAL
MDC_IDC_MSMT_LEADCHNL_RV_PACING_THRESHOLD_PULSEWIDTH: 0.4
MDC_IDC_MSMT_LEADCHNL_RV_SENSING_INTR_AMPL: 23.2
MDC_IDC_PG_IMPLANT_DTM: NORMAL
MDC_IDC_PG_MFG: NORMAL
MDC_IDC_PG_MODEL: NORMAL
MDC_IDC_PG_SERIAL: NORMAL
MDC_IDC_PG_TYPE: NORMAL
MDC_IDC_SESS_DTM: NORMAL
MDC_IDC_SESS_TYPE: NORMAL
MDC_IDC_SET_BRADY_AT_MODE_SWITCH_RATE: 170
MDC_IDC_SET_BRADY_LOWRATE: 40
MDC_IDC_SET_BRADY_MODE: NORMAL
MDC_IDC_SET_LEADCHNL_RA_SENSING_SENSITIVITY: 0.5
MDC_IDC_SET_LEADCHNL_RV_PACING_AMPLITUDE: 1.6
MDC_IDC_SET_LEADCHNL_RV_PACING_POLARITY: NORMAL
MDC_IDC_SET_LEADCHNL_RV_PACING_PULSEWIDTH: 0.4
MDC_IDC_SET_LEADCHNL_RV_SENSING_POLARITY: NORMAL
MDC_IDC_SET_LEADCHNL_RV_SENSING_SENSITIVITY: 0.6
MDC_IDC_SET_ZONE_STATUS: NORMAL
MDC_IDC_SET_ZONE_TYPE: NORMAL
MDC_IDC_STAT_BRADY_RA_PERCENT_PACED: 0
MDC_IDC_STAT_BRADY_RV_PERCENT_PACED: 0

## (undated) DEVICE — SOL NACL 0.9PCT 1000ML

## (undated) DEVICE — TUBING, SUCTION, 1/4" X 10', STRAIGHT: Brand: MEDLINE

## (undated) DEVICE — ADAPT SWVL FIBROPTIC BRONCH

## (undated) DEVICE — INTENDED TO SUPPORT AND MAINTAIN THE POSITION OF AN ANESTHETIZED PATIENT DURING SURGERY: Brand: HERMANTOR XL PINK KNEE POSITIONING PAD

## (undated) DEVICE — LAPAROSCOPIC SMOKE ELIMINATION DEVICE: Brand: PNEUVIEW XE

## (undated) DEVICE — SINGLE USE SUCTION VALVE MAJ-209: Brand: SINGLE USE SUCTION VALVE (STERILE)

## (undated) DEVICE — VAGINAL PACKING: Brand: DEROYAL

## (undated) DEVICE — KT DRN EVAC WND PVC PCH WTROC RND 10F400

## (undated) DEVICE — GLV SURG SENSICARE W/ALOE PF LF 7.5 STRL

## (undated) DEVICE — LOU THORACIC: Brand: MEDLINE INDUSTRIES, INC.

## (undated) DEVICE — CANNULA SEAL

## (undated) DEVICE — FRCP BX RADJAW4 NDL 2.8 240CM LG OG BX40

## (undated) DEVICE — SUT SILK 0 TIES 30IN A306H

## (undated) DEVICE — ADHS SKIN DERMABOND TOP ADVANCED

## (undated) DEVICE — ADAPT CLN BIOGUARD AIR/H2O DISP

## (undated) DEVICE — ANTIBACTERIAL UNDYED BRAIDED (POLYGLACTIN 910), SYNTHETIC ABSORBABLE SUTURE: Brand: COATED VICRYL

## (undated) DEVICE — GLV SURG SENSICARE PI MIC PF SZ8 LF STRL

## (undated) DEVICE — TRAP,MUCUS SPECIMEN, 80CC: Brand: MEDLINE

## (undated) DEVICE — THE SINGLE USE ETRAP – POLYP TRAP IS USED FOR SUCTION RETRIEVAL OF ENDOSCOPICALLY REMOVED POLYPS.: Brand: ETRAP

## (undated) DEVICE — GLV SURG SENSICARE PI MIC PF SZ7.5 LF STRL

## (undated) DEVICE — CONTAINER,SPECIMEN,OR STERILE,4OZ: Brand: MEDLINE

## (undated) DEVICE — GLV SURG SENSICARE PI MIC PF SZ7 LF STRL

## (undated) DEVICE — ELECTRD BLD EZ CLN MOD XLNG 2.75IN

## (undated) DEVICE — DISPOSABLE MONOPOLAR ENDOSCOPIC CORD 10 FT. (3M): Brand: KIRWAN

## (undated) DEVICE — PK KN TOTL 40

## (undated) DEVICE — SUT VIC 0 CT 36IN J958H

## (undated) DEVICE — SINGLE USE BIOPSY VALVE MAJ-210: Brand: SINGLE USE BIOPSY VALVE (STERILE)

## (undated) DEVICE — NEEDLE, QUINCKE 22GX3.5": Brand: MEDLINE INDUSTRIES, INC.

## (undated) DEVICE — SUT VIC 1 CT1 36IN J947H

## (undated) DEVICE — VITAL SIGNS™ JACKSON-REES CIRCUITS: Brand: VITAL SIGNS™

## (undated) DEVICE — APPL DURAPREP IODOPHOR APL 26ML

## (undated) DEVICE — THE STERILE LIGHT HANDLE COVER IS USED WITH STERIS SURGICAL LIGHTING AND VISUALIZATION SYSTEMS.

## (undated) DEVICE — JACKT LAB F/R KNIT CUFF/COLR XLG BLU

## (undated) DEVICE — 450 ML BOTTLE OF 0.05% CHLORHEXIDINE GLUCONATE IN 99.95% STERILE WATER FOR IRRIGATION, USP AND APPLICATOR.: Brand: IRRISEPT ANTIMICROBIAL WOUND LAVAGE

## (undated) DEVICE — LAPAROSCOPIC GAS CONDITIONING DEVICE.: Brand: INSUFLOW

## (undated) DEVICE — DRSNG SURESITE WNDW 2.38X2.75

## (undated) DEVICE — SYS PERFUS SEP PLATLT W TIPS CUST

## (undated) DEVICE — GLV SURG SENSICARE W/ALOE PF LF 8 STRL

## (undated) DEVICE — 3M™ DURAPORE™ SURGICAL TAPE 1538-3, 3 INCH X 10 YARD (7,5CM X 9,1M), 4 ROLLS/BOX: Brand: 3M™ DURAPORE™

## (undated) DEVICE — 3M™ IOBAN™ 2 ANTIMICROBIAL INCISE DRAPE 6640EZ: Brand: IOBAN™ 2

## (undated) DEVICE — VIAL FORMALIN CAP 10P 40ML

## (undated) DEVICE — BW-412T DISP COMBO CLEANING BRUSH: Brand: SINGLE USE COMBINATION CLEANING BRUSH

## (undated) DEVICE — DRSNG TELFA PAD NONADH STR 1S 3X4IN

## (undated) DEVICE — APPL CHLORAPREP W/TINT 26ML ORNG

## (undated) DEVICE — PREP SOL POVIDONE/IODINE BT 4OZ

## (undated) DEVICE — NDL SPINE 22G 31/2IN BLK

## (undated) DEVICE — YANKAUER,BULB TIP,W/O VENT,RIGID,STERILE: Brand: MEDLINE

## (undated) DEVICE — KT ORCA ORCAPOD DISP STRL

## (undated) DEVICE — SUT VIC 2 TP1 54IN J880T

## (undated) DEVICE — CONMED DISPOSABLE MICROBIOLOGY BRUSH, Ø1 MM, 1.8 MM WORKING DIAMETER, 110 CM LENGTH: Brand: CONMED

## (undated) DEVICE — DUAL CUT SAGITTAL BLADE

## (undated) DEVICE — CONMED DISPOSABLE BRONCHIAL CYTOLOGY BRUSH, STRAIGHT HANDLE, 3 MM X 120 CM: Brand: CONMED

## (undated) DEVICE — MSK AIRWY LARYNG LMA UNIQUE STD PK SZ4

## (undated) DEVICE — GLV SURG SENSICARE PI PF LF 7 GRN STRL

## (undated) DEVICE — UNDERGLV SURG BIOGEL INDICATOR LF PF 7.5

## (undated) DEVICE — MSK AIRWY LARYNG LMA PILOT SZ4

## (undated) DEVICE — ENCORE® LATEX ORTHO SIZE 8, STERILE LATEX POWDER-FREE SURGICAL GLOVE: Brand: ENCORE

## (undated) DEVICE — SENSR O2 OXIMAX FNGR A/ 18IN NONSTR

## (undated) DEVICE — TRAP FLD MINIVAC MEGADYNE 100ML

## (undated) DEVICE — VISION PROBE ADAPTER AND SUCTION ADAPTER

## (undated) DEVICE — SOL ANTISTICK CAUTRY ELECTROLUBE LF

## (undated) DEVICE — SPNG GZ WOVN 4X4IN 12PLY 10/BX STRL

## (undated) DEVICE — NDL PULM SUPERDIMENSION ARCPOINT 21G

## (undated) DEVICE — PATIENT RETURN ELECTRODE, SINGLE-USE, CONTACT QUALITY MONITORING, ADULT, WITH 9FT CORD, FOR PATIENTS WEIGING OVER 33LBS. (15KG): Brand: MEGADYNE

## (undated) DEVICE — SNAR POLYP CAPTIVATOR/COLD STFF RND 10MM 240CM

## (undated) DEVICE — SKIN PREP TRAY 4 COMPARTM TRAY: Brand: MEDLINE INDUSTRIES, INC.

## (undated) DEVICE — Device

## (undated) DEVICE — FRCP BX RADJAW4 PULM WO NDL STD1.8X2 100

## (undated) DEVICE — DRSNG WND BORDR/ADHS NONADHR/GZ LF 4X14IN STRL

## (undated) DEVICE — CONMED DISPOSABLE BIPOLAR CABLE, 10' (3.05M): Brand: CONMED

## (undated) DEVICE — SWIVEL CONNECTOR

## (undated) DEVICE — LN SMPL O2 NASL/ORL SMART/CAPNOLINE PLS A/

## (undated) DEVICE — ECHELON FLEX  POWERED VASCULAR STAPLER WITH ADVANCED PLACEMENT TIP, 35MM: Brand: ECHELON FLEX

## (undated) DEVICE — TRAP POLYP 4CHAMBER

## (undated) DEVICE — VESSEL LOOPS X-RAY DETECTABLE: Brand: DEROYAL

## (undated) DEVICE — LINER SURG CANSTR SXN S/RIGD 1500CC

## (undated) DEVICE — SOL IRR NACL 0.9PCT 3000ML

## (undated) DEVICE — CANNULA,ADULT,SOFT-TOUCH,7'TUBE,UC: Brand: PENDING

## (undated) DEVICE — SUT SILK 0 PSL 18IN 580H

## (undated) DEVICE — SYS SKIN EXOFIN WND CLS 4X22CM

## (undated) DEVICE — DRP SLUSH WARMR MACH 52X66IN OM-ORS-301

## (undated) DEVICE — SNAR POLYP SENSATION STDOVL 27 240 BX40

## (undated) DEVICE — Device: Brand: SINGLE USE ASPIRATION NEEDLE NA-U401SX

## (undated) DEVICE — MEDI-VAC YANKAUER SUCTION HANDLE W/BULBOUS TIP: Brand: CARDINAL HEALTH

## (undated) DEVICE — SEAL CANN CAM ENDOWRIST DAVINCI/S 8.5MM

## (undated) DEVICE — PREMIUM WET SKIN PREP TRAY: Brand: MEDLINE INDUSTRIES, INC.

## (undated) DEVICE — DRAPE,REIN 53X77,STERILE: Brand: MEDLINE

## (undated) DEVICE — DRP ARM DAVINCI

## (undated) DEVICE — OASIS DRAIN, SINGLE, INLINE & ATS COMPATIBLE: Brand: OASIS

## (undated) DEVICE — BIOPSY NEEDLE, 21G: Brand: FLEXISION

## (undated) DEVICE — SUCTION CANISTER, 1000CC,SAFELINER: Brand: DEROYAL

## (undated) DEVICE — BNDG,ELSTC,MATRIX,STRL,6"X5YD,LF,HOOK&LP: Brand: MEDLINE

## (undated) DEVICE — Device: Brand: ION

## (undated) DEVICE — 3M™ STERI-DRAPE™ INSTRUMENT POUCH 1018L: Brand: STERI-DRAPE™

## (undated) DEVICE — ENDOPATH XCEL BLADELESS TROCARS WITH STABILITY SLEEVES: Brand: ENDOPATH XCEL

## (undated) DEVICE — GLV SURG BIOGEL M LTX PF 7 1/2

## (undated) DEVICE — 2, DISPOSABLE SUCTION/IRRIGATOR WITH DISPOSABLE TIP: Brand: STRYKEFLOW

## (undated) DEVICE — TP UMB COTN 1/8X36 U12T

## (undated) DEVICE — PAD GRND E/S W/CORD SPLT A/

## (undated) DEVICE — VISUALIZATION SYSTEM: Brand: CLEARIFY

## (undated) DEVICE — RL DENTL WO/ STRNG LF STRL

## (undated) DEVICE — KIT,ANTI FOG,W/SPONGE & FLUID,SOFT PACK: Brand: MEDLINE

## (undated) DEVICE — UNDERCAST PADDING: Brand: DEROYAL

## (undated) DEVICE — 28 FR STRAIGHT – SOFT PVC CATHETER: Brand: PVC THORACIC CATHETERS

## (undated) DEVICE — THE ECHELON FLEX POWERED PLUS ARTICULATING ENDOSCOPIC LINEAR CUTTERS ARE STERILE, SINGLE PATIENT USE INSTRUMENTS THAT SIMULTANEOUSLYCUT AND STAPLE TISSUE. THERE ARE SIX STAGGERED ROWS OF STAPLES, THREE ON EITHER SIDE OF THE CUT LINE. THE ECHELON FLEX 45 POWERED PLUSINSTRUMENTS HAVE A STAPLE LINE THAT IS APPROXIMATELY 45 MM LONG AND A CUT LINE THAT IS APPROXIMATELY 42 MM LONG. THE SHAFT CAN ROTATE FREELYIN BOTH DIRECTIONS AND AN ARTICULATION MECHANISM ENABLES THE DISTAL PORTION OF THE SHAFT TO PIVOT TO FACILITATE LATERAL ACCESS TO THE OPERATIVESITE.THE INSTRUMENTS ARE PACKAGED WITH A PRIMARY LITHIUM BATTERY PACK THAT MUST BE INSTALLED PRIOR TO USE. THERE ARE SPECIFIC REQUIREMENTS FORDISPOSING OF THE BATTERY PACK. REFER TO THE BATTERY PACK DISPOSAL SECTION.THE INSTRUMENTS ARE PACKAGED WITHOUT A RELOAD AND MUST BE LOADED PRIOR TO USE. A STAPLE RETAINING CAP ON THE RELOAD PROTECTS THE STAPLE LEGPOINTS DURING SHIPPING AND TRANSPORTATION. THE INSTRUMENTS’ LOCK-OUT FEATURE IS DESIGNED TO PREVENT A USED OR IMPROPERLY INSTALLED RELOADFROM BEING REFIRED OR AN INSTRUMENT FROM BEING FIRED WITHOUT A RELOAD.: Brand: ECHELON FLEX

## (undated) DEVICE — SYR LL 3CC

## (undated) DEVICE — SPNG DISECTOR KTNER XRAY COTN 1/4X9/16IN PK/5

## (undated) DEVICE — TOTAL TRAY, 16FR 10ML SIL FOLEY, URN: Brand: MEDLINE

## (undated) DEVICE — SYS CLS SKIN PREMIERPRO EXOFINFUSION 22CM